# Patient Record
Sex: MALE | Race: WHITE | NOT HISPANIC OR LATINO | Employment: OTHER | URBAN - METROPOLITAN AREA
[De-identification: names, ages, dates, MRNs, and addresses within clinical notes are randomized per-mention and may not be internally consistent; named-entity substitution may affect disease eponyms.]

---

## 2017-02-13 ENCOUNTER — ALLSCRIPTS OFFICE VISIT (OUTPATIENT)
Dept: OTHER | Facility: OTHER | Age: 62
End: 2017-02-13

## 2017-03-16 ENCOUNTER — ALLSCRIPTS OFFICE VISIT (OUTPATIENT)
Dept: OTHER | Facility: OTHER | Age: 62
End: 2017-03-16

## 2017-04-19 ENCOUNTER — GENERIC CONVERSION - ENCOUNTER (OUTPATIENT)
Dept: OTHER | Facility: OTHER | Age: 62
End: 2017-04-19

## 2017-06-09 ENCOUNTER — GENERIC CONVERSION - ENCOUNTER (OUTPATIENT)
Dept: OTHER | Facility: OTHER | Age: 62
End: 2017-06-09

## 2017-07-10 ENCOUNTER — GENERIC CONVERSION - ENCOUNTER (OUTPATIENT)
Dept: OTHER | Facility: OTHER | Age: 62
End: 2017-07-10

## 2018-01-12 NOTE — RESULT NOTES
Verified Results  * MRI BRAIN IAC WO AND W CONTRAST [id number] 07:21AM Randy Stovall     Test Name Result Flag Reference   MRI BRAIN IAC WO AND W CONTRAST (Report)     MRI BRAIN AND IAC'S - WITH AND WITHOUT CONTRAST     INDICATION:  ([id number]) History:pt c/o mini stroke/ seizure-couldn't move occurred 2 times on different days x 1 month ago     COMPARISON: None  TECHNIQUE:   Brain:  Sagittal T1, axial T2, axial [[place], axial T1, axial FLAIR, axial diffusion imaging  Axial T1 postcontrast  Axial BRAVO post contrast       IAC'S: Coronal FIESTA, coronal T1 postcontrast, axial T1 postcontrast with fat suppression  Targeted images of the IAC'S were performed requiring additional time at acquisition and interpretation of approximately 25%   10 mL of Gadavist was injected intravenously without immediate consequence  IMAGE QUALITY:  Diagnostic  FINDINGS:     BRAIN PARENCHYMA: There is no evidence of acute intra-axial or extra-axial hemorrhage  In the parasagittal aspect of the right parietal lobe, image 100, series 6 there is a small focus of blooming artifact, possibly sequela of previous petechial    hemorrhage  No midline shift or mass-effect is demonstrated  The ventricular system is not dilated out of proportion to the degree of parenchymal volume loss  The cerebral parenchyma demonstrates signal abnormality in the periventricular white matter,   compatible with moderate microangiopathy  Age-appropriate parenchymal involution noted  The cerebellar tonsils are normal in position  There is no diffusion restriction to suggest recent infarction  Normal postcontrast imaging  IAC'S: No CP angle mass or abnormal enhancement  Normal aeration of the mastoid air cells and middle ear cavity       VENTRICLES: Normal      SELLA AND PITUITARY GLAND: Normal      ORBITS: Normal      PARANASAL SINUSES: Normal      VASCULATURE: Evaluation of the major intracranial vasculature demonstrates appropriate flow voids      CALVARIUM AND SKULL BASE: Normal      EXTRACRANIAL SOFT TISSUES: Normal        IMPRESSION:       1  Moderate, chronic microangiopathy   2  No acute infarction, intracranial hemorrhage or mass  3  Normal-appearing cranial nerve VII and VIII complexes  No cerebellopontine angle cistern mass lesion  Workstation performed: [id number]     Signed by:    Zaheer Gold MD   9/12/16       Discussion/Summary   Dr Jocelyn Rodriguez

## 2018-01-13 VITALS
WEIGHT: 248 LBS | SYSTOLIC BLOOD PRESSURE: 138 MMHG | DIASTOLIC BLOOD PRESSURE: 88 MMHG | RESPIRATION RATE: 16 BRPM | HEIGHT: 71 IN | BODY MASS INDEX: 34.72 KG/M2 | HEART RATE: 88 BPM | TEMPERATURE: 98.9 F

## 2018-01-13 VITALS
HEART RATE: 92 BPM | SYSTOLIC BLOOD PRESSURE: 140 MMHG | WEIGHT: 242 LBS | HEIGHT: 71 IN | TEMPERATURE: 98.2 F | DIASTOLIC BLOOD PRESSURE: 92 MMHG | BODY MASS INDEX: 33.88 KG/M2

## 2018-01-16 NOTE — MISCELLANEOUS
Message   Recorded as Task   Date: 07/03/2017 11:46 AM, Created By: Zaheer Fairchild   Task Name: Call Back   Assigned To: Richard Varela   Regarding Patient: Enrique Gale, Status: In Progress   Comment:    Emerald Mccabe - 03 Jul 2017 11:46 AM     TASK CREATED  Caller: Ilyafidel Patelnco; Other  patients wife called and stated that Patient was suppose to fly to [place] and then his son went into the hospital   He can no longer go to [place] now and due to this and contacted his airline for a reimbursment for the flight  The airline will NOT reimburse his flight to him unless HE is the person with a medical problem or if his Doctor writes him a letter  patients wife is aware that Dr Driss Maldonado is not in this week  Please call patients wife at [phone]   MICHAEL ARANA  [facility] - 03 Jul 2017 12:33 PM     TASK REASSIGNED: Previously Assigned To [facility] [place] [facility] [place]  please advise? will you write note? pts aware you are not in office this week  ac/Madison Fritz - 05 Jul 2017 4:23 PM     TASK EDITED  I calll to tell them this should come from the Son's doctor  Lenny Kckajal - 05 Jul 2017 4:23 PM     TASK REASSIGNED: Previously Assigned To Garett Denson - 05 Jul 2017 4:23 PM     TASK IN [place]if[place] Shahidbrynnangela Borden - 06 Jul 2017 9:44 AM     TASK EDITED  He needs a note that he can not travel due to him being sick in order to get the cost of his ticket back  She is aware you are out till Monday  I felt like you knew them the best to make a decision  Richard Varela - 06 Jul 2017 9:58 PM     TASK EDITED  I cannot write a note stating that he Nithya Mendez) is sick if he was not (and was not seen for the illness)  The best I can do is state that his son was reportedly sick (since we are not his son's doctor) and Augustina Becker was unable to keep his flight as a result  Cherire Whitney - 07 Jul 2017 10:16 AM     TASK EDITED  calling   [facility] 07 Jul 2017 10:17 AM     TASK EDITED  left message on machine requesting a call back  ac/cma   Madison Carreon - 10 Jul 2017 9:30 AM     TASK EDITED  LMOM TCB   Bree Colon - 10 Jul 2017 9:41 AM     TASK REASSIGNED: [facility]  spoke with pt's wife  she said what you offered is not [place] work  she said thank you, but they don't want a note anymore     Richard Varela - 10 Jul 2017 5:45 PM     TASK EDITED  ok        Signatures   Electronically signed by : Bekah Kelley DO; Jul 10 2017  5:46PM EST                       (Author)

## 2018-02-08 ENCOUNTER — OFFICE VISIT (OUTPATIENT)
Dept: FAMILY MEDICINE CLINIC | Facility: CLINIC | Age: 63
End: 2018-02-08
Payer: COMMERCIAL

## 2018-02-08 VITALS
RESPIRATION RATE: 22 BRPM | TEMPERATURE: 97.4 F | BODY MASS INDEX: 32.1 KG/M2 | SYSTOLIC BLOOD PRESSURE: 140 MMHG | HEART RATE: 86 BPM | WEIGHT: 237 LBS | DIASTOLIC BLOOD PRESSURE: 90 MMHG | HEIGHT: 72 IN

## 2018-02-08 DIAGNOSIS — J01.00 ACUTE NON-RECURRENT MAXILLARY SINUSITIS: Primary | ICD-10-CM

## 2018-02-08 PROCEDURE — 99213 OFFICE O/P EST LOW 20 MIN: CPT | Performed by: FAMILY MEDICINE

## 2018-02-08 RX ORDER — FEXOFENADINE HCL 180 MG/1
1 TABLET ORAL DAILY
COMMUNITY
Start: 2017-03-16 | End: 2018-02-14 | Stop reason: SDUPTHER

## 2018-02-08 RX ORDER — AMOXICILLIN AND CLAVULANATE POTASSIUM 875; 125 MG/1; MG/1
1 TABLET, FILM COATED ORAL EVERY 12 HOURS SCHEDULED
Qty: 20 TABLET | Refills: 0 | Status: SHIPPED | OUTPATIENT
Start: 2018-02-08 | End: 2018-02-18

## 2018-02-08 NOTE — PATIENT INSTRUCTIONS
Rhinosinusitis   WHAT YOU NEED TO KNOW:   Rhinosinusitis (RS) is inflammation of your nose and sinuses  It commonly begins as a virus, often as a common cold  Viruses usually last 7 to 10 days and do not need treatment  When the virus does not get better on its own, you may have bacterial RS  This means that bacteria have begun to grow inside your sinuses  Acute RS lasts less than 4 weeks  Chronic RS lasts 12 weeks or more  Recurrent RS is when you have 4 or more episodes of RS in one year  DISCHARGE INSTRUCTIONS:   Return to the emergency department if:   · Your eye and eyelid are red, swollen, and painful  · You cannot open your eye  · You have double vision or you cannot see  · Your eyeball bulges out or you cannot move your eye  · You are more sleepy than normal or you notice changes in your ability to think, move, or talk  · You have a stiff neck, a fever, or a bad headache  · You have swelling of your forehead or scalp  Contact your healthcare provider if:   · Your symptoms are worse or do not improve after 3 to 5 days of treatment  · You have questions or concerns about your condition or care  Medicines: You may need any of the following:  · Acetaminophen  decreases pain and fever  It is available without a doctor's order  Ask how much to take and how often to take it  Follow directions  Acetaminophen can cause liver damage if not taken correctly  · NSAIDs , such as ibuprofen, help decrease swelling, pain, and fever  This medicine is available with or without a doctor's order  NSAIDs can cause stomach bleeding or kidney problems in certain people  If you take blood thinner medicine, always ask your healthcare provider if NSAIDs are safe for you  Always read the medicine label and follow directions  · Nasal steroid sprays  decrease inflammation in your nose and sinuses  · Decongestants  reduce swelling and drain mucus in the nose and sinuses   They may help you breathe easier  · Antihistamines  dry mucus in the nose and relieve sneezing  · Antibiotics  treat a bacterial infection and may be needed if your symptoms do not improve or they get worse  · Take your medicine as directed  Contact your healthcare provider if you think your medicine is not helping or if you have side effects  Tell him or her if you are allergic to any medicine  Keep a list of the medicines, vitamins, and herbs you take  Include the amounts, and when and why you take them  Bring the list or the pill bottles to follow-up visits  Carry your medicine list with you in case of an emergency  Self-care:   · Rinse your sinuses  Use a sinus rinse device to rinse your nasal passages with a saline (salt water) solution  This will help thin the mucus in your nose and rinse away pollen and dirt  It will also help reduce swelling so you can breathe normally  Ask your healthcare provider how often to do this  · Breathe in steam   Heat a bowl of water until you see steam  Lean over the bowl and make a tent over your head with a large towel  Breathe deeply for about 20 minutes  Be careful not to get too close to the steam or burn yourself  Do this 3 times a day  You can also breathe deeply when you take a hot shower  · Sleep with your head elevated  Place an extra pillow under your head before you go to sleep to help your sinuses drain  · Drink liquids as directed  Ask your healthcare provider how much liquid to drink each day and which liquids are best for you  Liquids will thin the mucus in your nose and help it drain  Avoid drinks that contain alcohol or caffeine  · Do not smoke, and avoid secondhand smoke  Nicotine and other chemicals in cigarettes and cigars can make your symptoms worse  Ask your healthcare provider for information if you currently smoke and need help to quit  E-cigarettes or smokeless tobacco still contain nicotine   Talk to your healthcare provider before you use these products  Follow up with your healthcare provider as directed: Follow up if your symptoms are worse or not better after 3 to 5 days of treatment  Write down your questions so you remember to ask them during your visits  © 2017 [address] [employer] Information is for End User's use only and may not be sold, redistributed or otherwise used for commercial purposes  All illustrations and images included in CareNotes® are the copyrighted property of A D A M , Inc  or Mikhail Lee  The above information is an  only  It is not intended as medical advice for individual conditions or treatments  Talk to your doctor, nurse or pharmacist before following any medical regimen to see if it is safe and effective for you

## 2018-02-08 NOTE — PROGRESS NOTES
Assessment/Plan:    No problem-specific Assessment & Plan notes found for this encounter  Diagnoses and all orders for this visit:    Acute non-recurrent maxillary sinusitis  -     amoxicillin-clavulanate (AUGMENTIN) 875-125 mg per tablet; Take 1 tablet by mouth every 12 (twelve) hours for 10 days    Other orders  -     fexofenadine (ALLEGRA) 180 MG tablet; Take 1 tablet by mouth daily  -     aspirin 81 MG tablet; Take by mouth daily          Patient Instructions   Rhinosinusitis   WHAT YOU NEED TO KNOW:   Rhinosinusitis (RS) is inflammation of your nose and sinuses  It commonly begins as a virus, often as a common cold  Viruses usually last 7 to 10 days and do not need treatment  When the virus does not get better on its own, you may have bacterial RS  This means that bacteria have begun to grow inside your sinuses  Acute RS lasts less than 4 weeks  Chronic RS lasts 12 weeks or more  Recurrent RS is when you have 4 or more episodes of RS in one year  DISCHARGE INSTRUCTIONS:   Return to the emergency department if:   · Your eye and eyelid are red, swollen, and painful  · You cannot open your eye  · You have double vision or you cannot see  · Your eyeball bulges out or you cannot move your eye  · You are more sleepy than normal or you notice changes in your ability to think, move, or talk  · You have a stiff neck, a fever, or a bad headache  · You have swelling of your forehead or scalp  Contact your healthcare provider if:   · Your symptoms are worse or do not improve after 3 to 5 days of treatment  · You have questions or concerns about your condition or care  Medicines: You may need any of the following:  · Acetaminophen  decreases pain and fever  It is available without a doctor's order  Ask how much to take and how often to take it  Follow directions  Acetaminophen can cause liver damage if not taken correctly      · NSAIDs , such as ibuprofen, help decrease swelling, pain, and fever  This medicine is available with or without a doctor's order  NSAIDs can cause stomach bleeding or kidney problems in certain people  If you take blood thinner medicine, always ask your healthcare provider if NSAIDs are safe for you  Always read the medicine label and follow directions  · Nasal steroid sprays  decrease inflammation in your nose and sinuses  · Decongestants  reduce swelling and drain mucus in the nose and sinuses  They may help you breathe easier  · Antihistamines  dry mucus in the nose and relieve sneezing  · Antibiotics  treat a bacterial infection and may be needed if your symptoms do not improve or they get worse  · Take your medicine as directed  Contact your healthcare provider if you think your medicine is not helping or if you have side effects  Tell him or her if you are allergic to any medicine  Keep a list of the medicines, vitamins, and herbs you take  Include the amounts, and when and why you take them  Bring the list or the pill bottles to follow-up visits  Carry your medicine list with you in case of an emergency  Self-care:   · Rinse your sinuses  Use a sinus rinse device to rinse your nasal passages with a saline (salt water) solution  This will help thin the mucus in your nose and rinse away pollen and dirt  It will also help reduce swelling so you can breathe normally  Ask your healthcare provider how often to do this  · Breathe in steam   Heat a bowl of water until you see steam  Lean over the bowl and make a tent over your head with a large towel  Breathe deeply for about 20 minutes  Be careful not to get too close to the steam or burn yourself  Do this 3 times a day  You can also breathe deeply when you take a hot shower  · Sleep with your head elevated  Place an extra pillow under your head before you go to sleep to help your sinuses drain  · Drink liquids as directed    Ask your healthcare provider how much liquid to drink each day and which liquids are best for you  Liquids will thin the mucus in your nose and help it drain  Avoid drinks that contain alcohol or caffeine  · Do not smoke, and avoid secondhand smoke  Nicotine and other chemicals in cigarettes and cigars can make your symptoms worse  Ask your healthcare provider for information if you currently smoke and need help to quit  E-cigarettes or smokeless tobacco still contain nicotine  Talk to your healthcare provider before you use these products  Follow up with your healthcare provider as directed: Follow up if your symptoms are worse or not better after 3 to 5 days of treatment  Write down your questions so you remember to ask them during your visits  © 2017 [facility] [facility] Information is for End User's use only and may not be sold, redistributed or otherwise used for commercial purposes  All illustrations and images included in CareNotes® are the copyrighted property of A D A M , Inc  or [employer]ail Lee  The above information is an  only  It is not intended as medical advice for individual conditions or treatments  Talk to your doctor, nurse or pharmacist before following any medical regimen to see if it is safe and effective for you  Return if symptoms worsen or fail to improve  Subjective:      Patient ID: Harpal Hall is a 63 y o  male  Chief Complaint   Patient presents with    Cough     for the past 3 weeks     Nasal Congestion       Pt states for three weeks he had a uri, had mucus , cough runny nose  May have had a fever  Day quil ny qil helped a little  No throat pain  No ear pain   No facial pressure  Cough   This is a new problem  The current episode started 1 to 4 weeks ago  The problem has been gradually worsening  The problem occurs constantly  The cough is non-productive  Associated symptoms include chills, a fever, nasal congestion and postnasal drip   Pertinent negatives include no chest pain, ear congestion, ear pain, headaches, heartburn, hemoptysis, myalgias, rash, rhinorrhea, sore throat, shortness of breath, sweats, weight loss or wheezing  Nothing aggravates the symptoms  The following portions of the patient's history were reviewed and updated as appropriate: allergies, current medications, past family history, past medical history, past social history, past surgical history and problem list     Review of Systems   Constitutional: Positive for chills and fever  Negative for weight loss  HENT: Positive for postnasal drip  Negative for ear pain, rhinorrhea and sore throat  Respiratory: Positive for cough  Negative for hemoptysis, shortness of breath and wheezing  Cardiovascular: Negative for chest pain  Gastrointestinal: Negative for heartburn  Musculoskeletal: Negative for myalgias  Skin: Negative for rash  Neurological: Negative for headaches  Current Outpatient Prescriptions   Medication Sig Dispense Refill    aspirin 81 MG tablet Take by mouth daily      fexofenadine (ALLEGRA) 180 MG tablet Take 1 tablet by mouth daily      amoxicillin-clavulanate (AUGMENTIN) 875-125 mg per tablet Take 1 tablet by mouth every 12 (twelve) hours for 10 days 20 tablet 0     No current facility-administered medications for this visit  Objective:    /90   Pulse 86   Temp (!) 97 4 °F (36 3 °C)   Resp 22   Ht 6' (1 829 m)   Wt 108 kg (237 lb)   BMI 32 14 kg/m²        Physical Exam   Constitutional: He appears well-developed and well-nourished  No distress  HENT:   Right Ear: Tympanic membrane is erythematous and bulging  Left Ear: Tympanic membrane is erythematous and bulging  Nose: Mucosal edema present  Mouth/Throat:       Eyes: Pupils are equal, round, and reactive to light  Neck: Normal range of motion  Cardiovascular: Normal rate  Pulmonary/Chest: Effort normal    Abdominal: Soft  Musculoskeletal: Normal range of motion  Neurological: He is alert  Skin: He is not diaphoretic  Nursing note and vitals reviewed               Angel Lakhani DO

## 2018-02-14 ENCOUNTER — TELEPHONE (OUTPATIENT)
Dept: FAMILY MEDICINE CLINIC | Facility: CLINIC | Age: 63
End: 2018-02-14

## 2018-02-14 DIAGNOSIS — J06.9 UPPER RESPIRATORY TRACT INFECTION, UNSPECIFIED TYPE: Primary | ICD-10-CM

## 2018-02-14 RX ORDER — FEXOFENADINE HCL 180 MG/1
180 TABLET ORAL DAILY
Qty: 30 TABLET | Refills: 0 | Status: SHIPPED | OUTPATIENT
Start: 2018-02-14 | End: 2018-02-15 | Stop reason: SDUPTHER

## 2018-02-14 RX ORDER — PSEUDOEPHEDRINE HCL 60 MG/1
60 TABLET ORAL EVERY 6 HOURS PRN
Qty: 30 TABLET | Refills: 0 | Status: SHIPPED | OUTPATIENT
Start: 2018-02-14 | End: 2018-02-15 | Stop reason: SDUPTHER

## 2018-02-14 NOTE — TELEPHONE ENCOUNTER
SCRIPTS for Sanjay Conteh and [phone]    So insurance can pay for it  The medications need to be on a script so that wife can mail to insurance company so she can get reimbursed  He was seen Monday by Dr Yoav Trent but his primary is Dr Kirill Kohler      [phone] Wife, Sharon Williamsonina

## 2018-02-14 NOTE — TELEPHONE ENCOUNTER
Left message for terri making aware  Rx were  Sent to pharmacy  No further action is required  af/rma

## 2018-02-15 ENCOUNTER — TELEPHONE (OUTPATIENT)
Dept: FAMILY MEDICINE CLINIC | Facility: CLINIC | Age: 63
End: 2018-02-15

## 2018-02-15 DIAGNOSIS — J06.9 UPPER RESPIRATORY TRACT INFECTION, UNSPECIFIED TYPE: ICD-10-CM

## 2018-02-15 RX ORDER — PSEUDOEPHEDRINE HCL 60 MG/1
60 TABLET ORAL EVERY 6 HOURS PRN
Qty: 30 TABLET | Refills: 0 | Status: SHIPPED | OUTPATIENT
Start: 2018-02-15 | End: 2018-05-04

## 2018-02-15 RX ORDER — FEXOFENADINE HCL 180 MG/1
180 TABLET ORAL DAILY
Qty: 30 TABLET | Refills: 0 | Status: SHIPPED | OUTPATIENT
Start: 2018-02-15 | End: 2022-06-21

## 2018-02-15 NOTE — TELEPHONE ENCOUNTER
[facility] EMERGENCY [facility]    Patient needs WRITTEN SCRIPTS to  for allegra, mucinex and sudafed  They turn them in to their insurance for reimbursement  Please mail

## 2018-03-02 ENCOUNTER — OFFICE VISIT (OUTPATIENT)
Dept: GASTROENTEROLOGY | Facility: CLINIC | Age: 63
End: 2018-03-02
Payer: COMMERCIAL

## 2018-03-02 VITALS
BODY MASS INDEX: 29.29 KG/M2 | TEMPERATURE: 99 F | DIASTOLIC BLOOD PRESSURE: 76 MMHG | HEART RATE: 74 BPM | SYSTOLIC BLOOD PRESSURE: 134 MMHG | HEIGHT: 73 IN | WEIGHT: 221 LBS

## 2018-03-02 DIAGNOSIS — Z86.010 HISTORY OF COLON POLYPS: Primary | ICD-10-CM

## 2018-03-02 PROBLEM — Z86.0100 HX OF COLONIC POLYPS: Status: ACTIVE | Noted: 2018-03-02

## 2018-03-02 PROBLEM — Z86.0100 HISTORY OF COLON POLYPS: Status: ACTIVE | Noted: 2018-03-02

## 2018-03-02 PROCEDURE — 99243 OFF/OP CNSLTJ NEW/EST LOW 30: CPT | Performed by: INTERNAL MEDICINE

## 2018-03-02 NOTE — LETTER
March 2, 2018     Liang Gonzalez, [address] [id number]    Patient: Claudio Becker   YOB: 1955   Date of Visit: 3/2/2018       Dear Dr Elissa Griffin: Thank you for referring Schuyler Soulier to me for evaluation  Below are my notes for this consultation  If you have questions, please do not hesitate to call me  I look forward to following your patient along with you           Sincerely,        Helio Montiel MD        CC: No Recipients

## 2018-03-02 NOTE — PROGRESS NOTES
Consultation - [facility]  Gerard Butcher 1955 male         Chief Complaint:  History of colon polyps    HPI:  53-year-old male with no significant past medical history was referred for colonoscopy  Patient gives history of multiple colon polyps removed in the past and his last colonoscopy was about 2-3 years ago  Patient has regular bowel movements and denies any blood or mucus in the stool  Appetite is good and denies any recent weight loss  Denies any abdominal pain, nausea, or vomiting  Has no heartburn or acid reflux  Denies any difficulty swallowing  REVIEW OF SYSTEMS: Review of Systems   Constitutional: Negative for activity change, appetite change, chills, diaphoresis, fatigue, fever and unexpected weight change  HENT: Negative for ear discharge, ear pain, facial swelling, hearing loss, nosebleeds, sore throat, tinnitus and voice change  Eyes: Negative for pain, discharge, redness, itching and visual disturbance  Respiratory: Negative for apnea, cough, chest tightness, shortness of breath and wheezing  Cardiovascular: Negative for chest pain and palpitations  Gastrointestinal:        As noted in HPI   Endocrine: Negative for cold intolerance, heat intolerance and polyuria  Genitourinary: Negative for difficulty urinating, dysuria, flank pain, hematuria and urgency  Musculoskeletal: Negative for arthralgias, back pain, gait problem, joint swelling and myalgias  Skin: Negative for rash and wound  Neurological: Negative for dizziness, tremors, seizures, speech difficulty, light-headedness, numbness and headaches  Hematological: Negative for adenopathy  Does not bruise/bleed easily  Psychiatric/Behavioral: Negative for agitation, behavioral problems and confusion  The patient is not nervous/anxious           Past Medical History:   Diagnosis Date    Colon polyp       Past Surgical History:   Procedure Laterality Date    COLONOSCOPY       Social History     Social History    Marital status: /Civil Union     Spouse name: N/A    Number of children: N/A    Years of education: N/A     Occupational History    Not on file  Social History Main Topics    Smoking status: Never Smoker    Smokeless tobacco: Never Used    Alcohol use Yes      Comment: 2-3 mixed drinks    Drug use: No    Sexual activity: Not on file     Other Topics Concern    Not on file     Social History Narrative    No narrative on file     Family History   Problem Relation Age of Onset    Colon cancer Neg Hx     Liver disease Neg Hx      Iodine and Pollen extract  Current Outpatient Prescriptions   Medication Sig Dispense Refill    aspirin 81 MG tablet Take by mouth daily      fexofenadine (ALLEGRA) 180 MG tablet Take 1 tablet (180 mg total) by mouth daily for 30 days 30 tablet 0    dextromethorphan-guaifenesin (MUCINEX DM)  MG per 12 hr tablet Take 1 tablet by mouth every 12 (twelve) hours 60 tablet 0    pseudoephedrine (SUDAFED) 60 mg tablet Take 1 tablet (60 mg total) by mouth every 6 (six) hours as needed for congestion for up to 30 days 30 tablet 0     No current facility-administered medications for this visit  Blood pressure 134/76, pulse 74, temperature 99 °F (37 2 °C), height 6' 1" (1 854 m), weight 100 kg (221 lb)  PHYSICAL EXAM: Physical Exam   Constitutional: He is oriented to person, place, and time  He appears well-developed  HENT:   Head: Normocephalic and atraumatic  Mouth/Throat: Oropharynx is clear and moist    Eyes: Conjunctivae are normal  Pupils are equal, round, and reactive to light  Right eye exhibits no discharge  Left eye exhibits no discharge  No scleral icterus  Neck: Neck supple  No JVD present  No tracheal deviation present  No thyromegaly present  Cardiovascular: Normal rate, regular rhythm, normal heart sounds and intact distal pulses  Exam reveals no gallop and no friction rub      No murmur heard   Pulmonary/Chest: Effort normal and breath sounds normal  No respiratory distress  He has no wheezes  He has no rales  He exhibits no tenderness  Abdominal: Soft  Bowel sounds are normal  He exhibits no distension and no mass  There is no tenderness  There is no rebound and no guarding  No hernia  Musculoskeletal: He exhibits no edema  Lymphadenopathy:     He has no cervical adenopathy  Neurological: He is alert and oriented to person, place, and time  Skin: Skin is warm and dry  No rash noted  No erythema  Psychiatric: He has a normal mood and affect  His behavior is normal  Thought content normal         Lab Results   Component Value Date    WBC 5 6 09/07/2016    HGB 16 1 09/07/2016    HCT 47 2 09/07/2016    MCV 90 09/07/2016     09/07/2016     Lab Results   Component Value Date    GLUCOSE 109 (H) 09/07/2016    CALCIUM 8 8 09/07/2016     09/07/2016    K 3 7 09/07/2016    CO2 22 09/07/2016     09/07/2016    BUN 13 09/07/2016    CREATININE 0 72 (L) 09/07/2016     Lab Results   Component Value Date    ALT 32 09/07/2016    AST 20 09/07/2016    ALKPHOS 102 09/07/2016    BILITOT 1 0 09/07/2016     No results found for: INR, PROTIME    Mri Brain Iac Wo And W Contrast    Result Date: 9/12/2016  Impression: 1  Moderate, chronic microangiopathy 2  No acute infarction, intracranial hemorrhage or mass  3   Normal-appearing cranial nerve VII and VIII complexes  No cerebellopontine angle cistern mass lesion  Workstation performed: [id number]       ASSESSMENT & PLAN:    No problem-specific Assessment & Plan notes found for this encounter

## 2018-03-02 NOTE — ASSESSMENT & PLAN NOTE
Personal history of colon polyps- patient is at increased risk for colon cancer screening  Rule out colorectal lesions including polyps or malignancy     -Schedule for colonoscopy  -High-fiber diet     -Patient was given instructions about the colonoscopy prep     -Patient was explained about  the risks and benefits of the procedure  Risks including but not limited to bleeding, infection, perforation were explained in detail  Also explained about less than 100% sensitivity with the exam and other alternatives

## 2018-05-04 NOTE — PRE-PROCEDURE INSTRUCTIONS
Pre-Surgery Instructions:   Medication Instructions    aspirin 81 MG tablet Patient was instructed by Physician and understands   fexofenadine (ALLEGRA) 180 MG tablet Patient was instructed by Physician and understands   Na Sulfate-K Sulfate-Mg Sulf (SUPREP BOWEL PREP KIT) 17 5-3 13-1 6 GM/180ML SOLN Patient was instructed by Physician and understands  Pt to follow Dr Cata Jacobsen instructions    wife Letitia [facility] [phone]

## 2018-05-05 DIAGNOSIS — Z86.010 HISTORY OF COLON POLYPS: ICD-10-CM

## 2018-05-07 ENCOUNTER — ANESTHESIA EVENT (OUTPATIENT)
Dept: GASTROENTEROLOGY | Facility: AMBULARY SURGERY CENTER | Age: 63
End: 2018-05-07
Payer: COMMERCIAL

## 2018-05-07 ENCOUNTER — HOSPITAL ENCOUNTER (OUTPATIENT)
Facility: AMBULARY SURGERY CENTER | Age: 63
Setting detail: OUTPATIENT SURGERY
Discharge: HOME | End: 2018-05-07
Attending: INTERNAL MEDICINE | Admitting: INTERNAL MEDICINE
Payer: COMMERCIAL

## 2018-05-07 VITALS
DIASTOLIC BLOOD PRESSURE: 81 MMHG | RESPIRATION RATE: 16 BRPM | WEIGHT: 209 LBS | HEIGHT: 72 IN | HEART RATE: 66 BPM | OXYGEN SATURATION: 97 % | TEMPERATURE: 97.8 F | BODY MASS INDEX: 28.31 KG/M2 | SYSTOLIC BLOOD PRESSURE: 129 MMHG

## 2018-05-07 PROCEDURE — G0105 COLORECTAL SCRN; HI RISK IND: HCPCS | Performed by: INTERNAL MEDICINE

## 2018-05-07 RX ORDER — SODIUM CHLORIDE, SODIUM LACTATE, POTASSIUM CHLORIDE, CALCIUM CHLORIDE 600; 310; 30; 20 MG/100ML; MG/100ML; MG/100ML; MG/100ML
125 INJECTION, SOLUTION INTRAVENOUS CONTINUOUS
Status: DISCONTINUED | OUTPATIENT
Start: 2018-05-07 | End: 2018-05-07 | Stop reason: HOSPADM

## 2018-05-07 RX ORDER — LIDOCAINE HYDROCHLORIDE 10 MG/ML
INJECTION, SOLUTION INFILTRATION; PERINEURAL AS NEEDED
Status: DISCONTINUED | OUTPATIENT
Start: 2018-05-07 | End: 2018-05-07 | Stop reason: SURG

## 2018-05-07 RX ORDER — PROPOFOL 10 MG/ML
INJECTION, EMULSION INTRAVENOUS AS NEEDED
Status: DISCONTINUED | OUTPATIENT
Start: 2018-05-07 | End: 2018-05-07 | Stop reason: SURG

## 2018-05-07 RX ADMIN — SODIUM CHLORIDE, SODIUM LACTATE, POTASSIUM CHLORIDE, AND CALCIUM CHLORIDE 125 ML/HR: .6; .31; .03; .02 INJECTION, SOLUTION INTRAVENOUS at 08:17

## 2018-05-07 RX ADMIN — PROPOFOL 20 MG: 10 INJECTION, EMULSION INTRAVENOUS at 09:00

## 2018-05-07 RX ADMIN — LIDOCAINE HYDROCHLORIDE 50 MG: 10 INJECTION, SOLUTION INFILTRATION; PERINEURAL at 08:50

## 2018-05-07 RX ADMIN — PROPOFOL 100 MG: 10 INJECTION, EMULSION INTRAVENOUS at 08:50

## 2018-05-07 RX ADMIN — PROPOFOL 50 MG: 10 INJECTION, EMULSION INTRAVENOUS at 08:53

## 2018-05-07 RX ADMIN — PROPOFOL 20 MG: 10 INJECTION, EMULSION INTRAVENOUS at 08:57

## 2018-05-07 NOTE — ANESTHESIA PREPROCEDURE EVALUATION
Review of Systems/Medical History      No history of anesthetic complications     Cardiovascular  Exercise tolerance: good,  Hypertension controlled,    Pulmonary  Sleep apnea CPAP,        GI/Hepatic            Endo/Other     GYN       Hematology   Musculoskeletal       Neurology   Psychology           Physical Exam    Airway    Mallampati score: III  TM Distance: >3 FB  Neck ROM: full     Dental       Cardiovascular      Pulmonary  Breath sounds clear to auscultation,     Other Findings        Anesthesia Plan  ASA Score- 2     Anesthesia Type- IV sedation with anesthesia with ASA Monitors  Additional Monitors:   Airway Plan:         Plan Factors-    Induction- intravenous  Postoperative Plan-     Informed Consent- Anesthetic plan and risks discussed with patient  I personally reviewed this patient with the CRNA  Discussed and agreed on the Anesthesia Plan with the CRNA           Medical History     History Comments   Colon polyp    Wears hearing aid bilateral   Tinnitus    Sleep apnea    CPAP (continuous positive airway pressure) dependence    History of hypertension    Seasonal allergies    Pertinent Negatives Comments   No pertinent negative medical history recorded   Surgical History     COLONOSCOPY HERNIA REPAIR   KNEE ARTHROSCOPY

## 2018-05-07 NOTE — H&P
History and Physical - [facility]  Winnie Mayer 63 y o  male MRN: [id number]        HPI: 44-year-old male with no significant past medical history was referred for colonoscopy  Patient gives history of multiple colon polyps removed in the past and his last colonoscopy was about 2-3 years ago  Patient has regular bowel movements and denies any blood or mucus in the stool  Appetite is good and denies any recent weight loss  Denies any abdominal pain, nausea, or vomiting  Has no heartburn or acid reflux  Denies any difficulty swallowing             Historical Information   Past Medical History:   Diagnosis Date    Colon polyp     CPAP (continuous positive airway pressure) dependence     History of hypertension     Seasonal allergies     Sleep apnea     Tinnitus     Wears hearing aid     bilateral     Past Surgical History:   Procedure Laterality Date    COLONOSCOPY      x3-w/polyps    HERNIA REPAIR      umbilical,hemal inguinal    KNEE ARTHROSCOPY Left     meniscus     Social History   History   Alcohol Use    12 0 oz/week    20 Standard drinks or equivalent per week     Comment: 2-3 mixed drinks/day     History   Drug Use No     History   Smoking Status    Never Smoker   Smokeless Tobacco    Never Used     Family History   Problem Relation Age of Onset    Cancer Mother      breast    Diverticulitis Mother      colostomy    Heart disease Father      CHF    Cancer Sister      breast    Cancer Sister      skin cancer    Cancer Sister      skin cancer    Colon cancer Neg Hx     Liver disease Neg Hx        Meds/Allergies     Prescriptions Prior to Admission   Medication    aspirin 81 MG tablet    fexofenadine (ALLEGRA) 180 MG tablet    Na Sulfate-K Sulfate-Mg Sulf (SUPREP BOWEL PREP KIT) 17 5-3 13-1 6 GM/180ML SOLN       Allergies   Allergen Reactions    Iodine      Reaction Date: 18Sep2006;     Pollen Extract      Reaction Date: 18Sep2006;        Objective     There were no vitals taken for this visit      PHYSICAL EXAM:    Gen: NAD  CV: S1 & S2 normal, RRR  CHEST: Clear to auscultate  ABD: soft, NT/ND, good bowel sounds  EXT: no edema    ASSESSMENT:     History of colon polyps    PLAN:    Colonoscopy

## 2018-05-07 NOTE — OP NOTE
COLONOSCOPY    PROCEDURE: Colonoscopy    INDICATIONS: History of Colon Polyps    POST-OP DIAGNOSIS: See the impression below    SEDATION: Monitored anesthesia care, check anesthesia records    PHYSICAL EXAM:    /93   Pulse 73   Temp 97 8 °F (36 6 °C) (Tympanic)   Resp 16   Ht 6' (1 829 m)   Wt 94 8 kg (209 lb)   SpO2 95%   BMI 28 35 kg/m²   Body mass index is 28 35 kg/m²  General: NAD  Heart: S1 & S2 normal, RRR  Lungs: CTA, No rales or rhonchi  Abdomen: Soft, nontender, nondistended, good bowel sounds    CONSENT:  Informed consent was obtained for the procedure, including sedation after explaining the risks and benefits of the procedure  Risks including but not limited to bleeding, perforation, infection, aspiration were discussed in detail  Also explained about less than 100%$ sensitivity with the exam and other alternatives  PREPARATION:   EKG tracing, pulse oximetry, blood pressure were monitored throughout the procedure  Patient was identified by myself both verbally and by visual inspection of ID band  DESCRIPTION:   Patient was placed in the left lateral decubitus position and was sedated with the above medication  Digital rectal examination was performed  The colonoscope was introduced in to the anal canal and advanced up to cecum, which was identified by the appendiceal orifice and IC valve  A careful inspection was made as the colonoscope was withdrawn, including a retroflexed view of the rectum; findings and interventions are described below  Appropriate photodocumentation was obtained  The quality of the colonic preparation was adequate  FINDINGS:    1  Some liquid stool was washed off and suctioned    2  Cecum and ileocecal valve-normal mucosa    3  Ascending colon-adjacent to the ileocecal valve ink marker was noted with the well-healed scar    4    Multiple diverticula noted throughout the colon         IMPRESSIONS:      As above    RECOMMENDATIONS:    Next colonoscopy in 5 years    COMPLICATIONS:  None; patient tolerated the procedure well      DISPOSITION: PACU           CONDITION: Stable

## 2018-08-26 PROBLEM — K64.8 INTERNAL HEMORRHOIDS: Status: ACTIVE | Noted: 2017-03-16

## 2018-08-26 PROBLEM — M12.9 ARTHROPATHY: Status: ACTIVE | Noted: 2017-03-16

## 2018-08-28 ENCOUNTER — OFFICE VISIT (OUTPATIENT)
Dept: FAMILY MEDICINE CLINIC | Facility: CLINIC | Age: 63
End: 2018-08-28
Payer: COMMERCIAL

## 2018-08-28 VITALS
HEIGHT: 72 IN | RESPIRATION RATE: 16 BRPM | BODY MASS INDEX: 28.17 KG/M2 | DIASTOLIC BLOOD PRESSURE: 98 MMHG | TEMPERATURE: 99.1 F | WEIGHT: 208 LBS | HEART RATE: 82 BPM | SYSTOLIC BLOOD PRESSURE: 140 MMHG

## 2018-08-28 DIAGNOSIS — H61.21 IMPACTED CERUMEN OF RIGHT EAR: ICD-10-CM

## 2018-08-28 DIAGNOSIS — Z13.1 SCREENING FOR DIABETES MELLITUS (DM): Primary | ICD-10-CM

## 2018-08-28 DIAGNOSIS — Z13.83 SCREENING FOR CARDIOVASCULAR, RESPIRATORY, AND GENITOURINARY DISEASES: ICD-10-CM

## 2018-08-28 DIAGNOSIS — Z13.6 SCREENING FOR CARDIOVASCULAR, RESPIRATORY, AND GENITOURINARY DISEASES: ICD-10-CM

## 2018-08-28 DIAGNOSIS — Z13.89 SCREENING FOR CARDIOVASCULAR, RESPIRATORY, AND GENITOURINARY DISEASES: ICD-10-CM

## 2018-08-28 DIAGNOSIS — Z12.5 PROSTATE CANCER SCREENING: ICD-10-CM

## 2018-08-28 PROCEDURE — 3008F BODY MASS INDEX DOCD: CPT | Performed by: FAMILY MEDICINE

## 2018-08-28 PROCEDURE — 99213 OFFICE O/P EST LOW 20 MIN: CPT | Performed by: FAMILY MEDICINE

## 2018-08-28 PROCEDURE — 69209 REMOVE IMPACTED EAR WAX UNI: CPT | Performed by: FAMILY MEDICINE

## 2018-08-28 NOTE — PROGRESS NOTES
Assessment/Plan:    No problem-specific Assessment & Plan notes found for this encounter  Plan cpe in October  Labslip given  Hearing improved  Prevention discussed     Diagnoses and all orders for this visit:    Screening for diabetes mellitus (DM)  -     Comprehensive metabolic panel; Future    Prostate cancer screening  -     PSA, Total Screen; Future    Screening for cardiovascular, respiratory, and genitourinary diseases  -     Lipid Panel with Direct LDL reflex; Future    Impacted cerumen of right ear  -     Ear cerumen removal        Ear cerumen removal  Date/Time: 8/28/2018 12:12 PM  Performed by: Gregory Tripp by: Maryjane Mota     Patient location:  Clinic  Other Assisting Provider: No    Consent:     Consent obtained:  Verbal    Consent given by:  Patient    Risks discussed:  Bleeding, dizziness, incomplete removal, infection, pain and TM perforation    Alternatives discussed:  No treatment  Universal protocol:     Procedure explained and questions answered to patient or proxy's satisfaction: yes      Patient identity confirmed:  Verbally with patient  Procedure details:     Local anesthetic:  None    Location:  R ear    Procedure type: irrigation      Approach:  External  Post-procedure details:     Complication:  None    Hearing quality:  Normal    Patient tolerance of procedure: Tolerated well, no immediate complications      Flush right ear done    Return in about 2 months (around 10/28/2018) for Annual physical     Subjective:      Patient ID: Chantal Gamble is a 63 y o  male      Chief Complaint   Patient presents with    Clogged Ear     pt sts right ear is clogged drhlpn       HPI  Has hearing aids, clogged right side per audiologist  Used debrox few days, no pain  No dc or odor  Uses qtips      The following portions of the patient's history were reviewed and updated as appropriate: allergies, current medications, past family history, past medical history, past social history, past surgical history and problem list     Review of Systems   Constitutional: Negative for fever  Respiratory: Negative for shortness of breath  Neurological: Negative for dizziness  Current Outpatient Prescriptions   Medication Sig Dispense Refill    aspirin 81 MG tablet Take by mouth every morning        fexofenadine (ALLEGRA) 180 MG tablet Take 1 tablet (180 mg total) by mouth daily for 30 days (Patient taking differently: Take 180 mg by mouth every morning  ) 30 tablet 0     No current facility-administered medications for this visit  Objective:    /98   Pulse 82   Temp 99 1 °F (37 3 °C)   Resp 16   Ht 6' (1 829 m)   Wt 94 3 kg (208 lb)   BMI 28 21 kg/m²        Physical Exam   Constitutional: He appears well-developed  HENT:   Head: Normocephalic  Right cerumen impaction   Eyes: Conjunctivae are normal    Neck: Neck supple  Cardiovascular: Normal rate and intact distal pulses  Pulmonary/Chest: Effort normal  No respiratory distress  Abdominal: Soft  Musculoskeletal: He exhibits no edema or deformity  Neurological: He is alert  Skin: Skin is warm and dry  No rash noted  No pallor  Psychiatric: His behavior is normal  Thought content normal    Nursing note and vitals reviewed               Betsy Jamison DO

## 2018-09-28 LAB
ALBUMIN SERPL-MCNC: 4.4 G/DL (ref 3.6–4.8)
ALBUMIN/GLOB SERPL: 2.6 {RATIO} (ref 1.2–2.2)
ALP SERPL-CCNC: 81 IU/L (ref 39–117)
ALT SERPL-CCNC: 12 IU/L (ref 0–44)
AMBIG ABBREV DEFAULT: NORMAL
AST SERPL-CCNC: 14 IU/L (ref 0–40)
BILIRUB SERPL-MCNC: 0.7 MG/DL (ref 0–1.2)
BUN SERPL-MCNC: 18 MG/DL (ref 8–27)
BUN/CREAT SERPL: 23 (ref 10–24)
CALCIUM SERPL-MCNC: 8.9 MG/DL (ref 8.6–10.2)
CHLORIDE SERPL-SCNC: 103 MMOL/L (ref 96–106)
CHOLEST SERPL-MCNC: 169 MG/DL (ref 100–199)
CO2 SERPL-SCNC: 24 MMOL/L (ref 20–29)
CREAT SERPL-MCNC: 0.8 MG/DL (ref 0.76–1.27)
GLOBULIN SER-MCNC: 1.7 G/DL (ref 1.5–4.5)
GLUCOSE SERPL-MCNC: 98 MG/DL (ref 65–99)
HDLC SERPL-MCNC: 48 MG/DL
LDLC SERPL CALC-MCNC: 113 MG/DL (ref 0–99)
MICRODELETION SYND BLD/T FISH: NORMAL
POTASSIUM SERPL-SCNC: 4 MMOL/L (ref 3.5–5.2)
PROT SERPL-MCNC: 6.1 G/DL (ref 6–8.5)
PSA SERPL-MCNC: 1 NG/ML (ref 0–4)
SL AMB EGFR AFRICAN AMERICAN: 112 ML/MIN/1.73
SL AMB EGFR NON AFRICAN AMERICAN: 97 ML/MIN/1.73
SODIUM SERPL-SCNC: 142 MMOL/L (ref 134–144)
TRIGL SERPL-MCNC: 42 MG/DL (ref 0–149)

## 2018-10-17 ENCOUNTER — OFFICE VISIT (OUTPATIENT)
Dept: FAMILY MEDICINE CLINIC | Facility: CLINIC | Age: 63
End: 2018-10-17
Payer: COMMERCIAL

## 2018-10-17 VITALS
WEIGHT: 207.6 LBS | RESPIRATION RATE: 18 BRPM | BODY MASS INDEX: 28.12 KG/M2 | DIASTOLIC BLOOD PRESSURE: 84 MMHG | TEMPERATURE: 98.3 F | HEIGHT: 72 IN | HEART RATE: 80 BPM | SYSTOLIC BLOOD PRESSURE: 128 MMHG

## 2018-10-17 DIAGNOSIS — Z23 IMMUNIZATION DUE: ICD-10-CM

## 2018-10-17 DIAGNOSIS — Z00.00 HEALTHCARE MAINTENANCE: Primary | ICD-10-CM

## 2018-10-17 DIAGNOSIS — B35.4 TINEA CORPORIS: ICD-10-CM

## 2018-10-17 DIAGNOSIS — J30.89 SEASONAL ALLERGIC RHINITIS DUE TO OTHER ALLERGIC TRIGGER: ICD-10-CM

## 2018-10-17 PROCEDURE — 90682 RIV4 VACC RECOMBINANT DNA IM: CPT

## 2018-10-17 PROCEDURE — 90471 IMMUNIZATION ADMIN: CPT

## 2018-10-17 PROCEDURE — 99396 PREV VISIT EST AGE 40-64: CPT | Performed by: FAMILY MEDICINE

## 2018-10-17 RX ORDER — NYSTATIN 100000 U/G
CREAM TOPICAL 2 TIMES DAILY
Qty: 30 G | Refills: 0 | Status: SHIPPED | OUTPATIENT
Start: 2018-10-17 | End: 2020-02-28

## 2018-10-17 RX ORDER — NYSTATIN 100000 U/G
CREAM TOPICAL 2 TIMES DAILY
Qty: 30 G | Refills: 0 | Status: SHIPPED | OUTPATIENT
Start: 2018-10-17 | End: 2018-10-17 | Stop reason: SDUPTHER

## 2018-10-17 RX ORDER — LEVOCETIRIZINE DIHYDROCHLORIDE 5 MG/1
5 TABLET, FILM COATED ORAL EVERY EVENING
Qty: 90 TABLET | Refills: 3 | Status: SHIPPED | OUTPATIENT
Start: 2018-10-17 | End: 2019-06-06

## 2018-10-17 NOTE — PROGRESS NOTES
Assessment/Plan:    No problem-specific Assessment & Plan notes found for this encounter  cpe today  Encouraged on successful weight loss  Offer xyzal if covered/effective to replace allegra  llq tinea corporis new, nystatin rx  F/u if no better   Diagnoses and all orders for this visit:    Healthcare maintenance    Seasonal allergic rhinitis due to other allergic trigger  -     levocetirizine (XYZAL) 5 MG tablet; Take 1 tablet (5 mg total) by mouth every evening    Immunization due  -     influenza vaccine, 8744-0645, quadrivalent, recombinant, PF, 0 5 mL, for patients 18 yr+ (FLUBLOK)    Tinea corporis  -     Discontinue: nystatin (MYCOSTATIN) cream; Apply topically 2 (two) times a day , abdomen  -     nystatin (MYCOSTATIN) cream; Apply topically 2 (two) times a day , abdomen              Return if symptoms worsen or fail to improve  Subjective:      Patient ID: Ophelia Barthel is a 66 y o  male  Chief Complaint   Patient presents with    Physical Exam     akrma        HPI  Seasonal allergies, all but winter  Allegra helps, sudafed as needed  Could do better diet  Exercises daily  Limits carb  Lost 46# in 2m  Atkins and stress  Feels he can maintain it    The following portions of the patient's history were reviewed and updated as appropriate: allergies, current medications, past family history, past medical history, past social history, past surgical history and problem list     Review of Systems   Respiratory: Negative for shortness of breath  Cardiovascular: Negative for chest pain           Current Outpatient Prescriptions   Medication Sig Dispense Refill    aspirin 81 MG tablet Take by mouth every morning        fexofenadine (ALLEGRA) 180 MG tablet Take 1 tablet (180 mg total) by mouth daily for 30 days (Patient taking differently: Take 180 mg by mouth every morning  ) 30 tablet 0    levocetirizine (XYZAL) 5 MG tablet Take 1 tablet (5 mg total) by mouth every evening 90 tablet 3    nystatin (MYCOSTATIN) cream Apply topically 2 (two) times a day , abdomen 30 g 0     No current facility-administered medications for this visit  Objective:    /84   Pulse 80   Temp 98 3 °F (36 8 °C)   Resp 18   Ht 6' (1 829 m)   Wt 94 2 kg (207 lb 9 6 oz)   BMI 28 16 kg/m²        Physical Exam   Constitutional: He appears well-developed  No distress  HENT:   Head: Normocephalic  Mouth/Throat: No oropharyngeal exudate  Eyes: Conjunctivae are normal  No scleral icterus  Neck: Neck supple  No thyromegaly present  Cardiovascular: Normal rate and intact distal pulses  No murmur heard  Pulmonary/Chest: Effort normal  No respiratory distress  He has no wheezes  Abdominal: Soft  He exhibits no mass  There is no tenderness  There is no rebound and no guarding  Genitourinary: Prostate normal and penis normal  No penile tenderness  Musculoskeletal: He exhibits no edema or deformity  Lymphadenopathy:     He has no cervical adenopathy  Neurological: He is alert  He exhibits normal muscle tone  Skin: Skin is warm and dry  Rash noted  No pallor  tineal itchy rash llq   Psychiatric: His behavior is normal  Thought content normal    Nursing note and vitals reviewed               Emily Greco DO

## 2019-06-06 ENCOUNTER — OFFICE VISIT (OUTPATIENT)
Dept: FAMILY MEDICINE CLINIC | Facility: CLINIC | Age: 64
End: 2019-06-06
Payer: COMMERCIAL

## 2019-06-06 VITALS
TEMPERATURE: 98.3 F | DIASTOLIC BLOOD PRESSURE: 90 MMHG | HEIGHT: 72 IN | WEIGHT: 218.2 LBS | SYSTOLIC BLOOD PRESSURE: 126 MMHG | HEART RATE: 84 BPM | BODY MASS INDEX: 29.55 KG/M2 | RESPIRATION RATE: 18 BRPM

## 2019-06-06 DIAGNOSIS — Z13.89 SCREENING FOR CARDIOVASCULAR, RESPIRATORY, AND GENITOURINARY DISEASES: ICD-10-CM

## 2019-06-06 DIAGNOSIS — Z12.5 PROSTATE CANCER SCREENING: ICD-10-CM

## 2019-06-06 DIAGNOSIS — Z13.83 SCREENING FOR CARDIOVASCULAR, RESPIRATORY, AND GENITOURINARY DISEASES: ICD-10-CM

## 2019-06-06 DIAGNOSIS — E66.3 OVER WEIGHT: ICD-10-CM

## 2019-06-06 DIAGNOSIS — G56.21 CUBITAL TUNNEL SYNDROME ON RIGHT: ICD-10-CM

## 2019-06-06 DIAGNOSIS — R20.0 NUMBNESS IN FEET: Primary | ICD-10-CM

## 2019-06-06 DIAGNOSIS — Z13.1 SCREENING FOR DIABETES MELLITUS (DM): ICD-10-CM

## 2019-06-06 DIAGNOSIS — Z13.6 SCREENING FOR CARDIOVASCULAR, RESPIRATORY, AND GENITOURINARY DISEASES: ICD-10-CM

## 2019-06-06 PROCEDURE — 99214 OFFICE O/P EST MOD 30 MIN: CPT | Performed by: FAMILY MEDICINE

## 2019-06-06 PROCEDURE — 3008F BODY MASS INDEX DOCD: CPT | Performed by: FAMILY MEDICINE

## 2019-06-14 ENCOUNTER — CONSULT (OUTPATIENT)
Dept: OBGYN CLINIC | Facility: CLINIC | Age: 64
End: 2019-06-14
Payer: COMMERCIAL

## 2019-06-14 VITALS
BODY MASS INDEX: 30.88 KG/M2 | WEIGHT: 228 LBS | SYSTOLIC BLOOD PRESSURE: 164 MMHG | HEIGHT: 72 IN | DIASTOLIC BLOOD PRESSURE: 99 MMHG | HEART RATE: 97 BPM

## 2019-06-14 DIAGNOSIS — G56.21 CUBITAL TUNNEL SYNDROME ON RIGHT: Primary | ICD-10-CM

## 2019-06-14 PROCEDURE — 99243 OFF/OP CNSLTJ NEW/EST LOW 30: CPT | Performed by: ORTHOPAEDIC SURGERY

## 2019-06-18 ENCOUNTER — HOSPITAL ENCOUNTER (OUTPATIENT)
Dept: RADIOLOGY | Facility: HOSPITAL | Age: 64
Discharge: HOME | End: 2019-06-18
Attending: FAMILY MEDICINE
Payer: COMMERCIAL

## 2019-06-18 DIAGNOSIS — R20.0 NUMBNESS IN FEET: ICD-10-CM

## 2019-06-18 PROCEDURE — 93925 LOWER EXTREMITY STUDY: CPT

## 2019-06-18 PROCEDURE — 93923 UPR/LXTR ART STDY 3+ LVLS: CPT

## 2019-06-19 PROCEDURE — 93922 UPR/L XTREMITY ART 2 LEVELS: CPT | Performed by: SURGERY

## 2019-06-19 PROCEDURE — 93925 LOWER EXTREMITY STUDY: CPT | Performed by: SURGERY

## 2019-06-25 ENCOUNTER — TELEPHONE (OUTPATIENT)
Dept: OBGYN CLINIC | Facility: HOSPITAL | Age: 64
End: 2019-06-25

## 2019-06-25 NOTE — TELEPHONE ENCOUNTER
Caller: Dr Yumiko Dotson   C/B #  Cell or  office  Dr Azael Em wants to discuss this patient with you   Thanks

## 2019-07-01 ENCOUNTER — TELEPHONE (OUTPATIENT)
Dept: OBGYN CLINIC | Facility: CLINIC | Age: 64
End: 2019-07-01

## 2019-07-01 NOTE — TELEPHONE ENCOUNTER
Tried to call patient and was just blank air  Will try and call again later     ----- Message from Amrit Pina DO sent at 6/27/2019  8:25 PM EDT -----  Please let Alex Doing know I spoke with Dr Jessica Goode,     Dr Jessica Goode would like Alex Doing to see him as a patient to do a full evaluation of his numbness and tingling  Dr Jessica Goode saw Alex Doing for EMG to rule out carpal tunnel but EMG showed no carpal tunnel    Dr Jessica Goode thinks he may have other causes for his numbness

## 2019-07-03 NOTE — TELEPHONE ENCOUNTER
LMOM stating we have a message for him from Dr Fernanda Simpson to please give our office a call back

## 2020-02-28 ENCOUNTER — TELEPHONE (OUTPATIENT)
Dept: GASTROENTEROLOGY | Facility: AMBULARY SURGERY CENTER | Age: 65
End: 2020-02-28

## 2020-02-28 ENCOUNTER — HOSPITAL ENCOUNTER (EMERGENCY)
Facility: HOSPITAL | Age: 65
Discharge: HOME | End: 2020-02-28
Attending: EMERGENCY MEDICINE | Admitting: EMERGENCY MEDICINE
Payer: COMMERCIAL

## 2020-02-28 ENCOUNTER — APPOINTMENT (EMERGENCY)
Dept: RADIOLOGY | Facility: HOSPITAL | Age: 65
End: 2020-02-28
Payer: COMMERCIAL

## 2020-02-28 VITALS
SYSTOLIC BLOOD PRESSURE: 138 MMHG | WEIGHT: 226.38 LBS | RESPIRATION RATE: 18 BRPM | TEMPERATURE: 100.2 F | HEART RATE: 94 BPM | BODY MASS INDEX: 30.7 KG/M2 | OXYGEN SATURATION: 96 % | DIASTOLIC BLOOD PRESSURE: 83 MMHG

## 2020-02-28 DIAGNOSIS — K62.5 RECTAL BLEEDING: Primary | ICD-10-CM

## 2020-02-28 LAB
ABO GROUP BLD: NORMAL
ALBUMIN SERPL BCP-MCNC: 3.1 G/DL (ref 3.5–5)
ALP SERPL-CCNC: 76 U/L (ref 46–116)
ALT SERPL W P-5'-P-CCNC: 25 U/L (ref 12–78)
ANION GAP SERPL CALCULATED.3IONS-SCNC: 10 MMOL/L (ref 4–13)
APTT PPP: 27 SECONDS (ref 23–37)
AST SERPL W P-5'-P-CCNC: 11 U/L (ref 5–45)
BASOPHILS # BLD AUTO: 0.03 THOUSANDS/ΜL (ref 0–0.1)
BASOPHILS NFR BLD AUTO: 0 % (ref 0–1)
BILIRUB SERPL-MCNC: 0.7 MG/DL (ref 0.2–1)
BILIRUB UR QL STRIP: NEGATIVE
BLD GP AB SCN SERPL QL: NEGATIVE
BUN SERPL-MCNC: 21 MG/DL (ref 5–25)
CALCIUM SERPL-MCNC: 8.2 MG/DL (ref 8.3–10.1)
CHLORIDE SERPL-SCNC: 108 MMOL/L (ref 100–108)
CLARITY UR: CLEAR
CO2 SERPL-SCNC: 25 MMOL/L (ref 21–32)
COLOR UR: YELLOW
CREAT SERPL-MCNC: 1 MG/DL (ref 0.6–1.3)
EOSINOPHIL # BLD AUTO: 0.11 THOUSAND/ΜL (ref 0–0.61)
EOSINOPHIL NFR BLD AUTO: 1 % (ref 0–6)
ERYTHROCYTE [DISTWIDTH] IN BLOOD BY AUTOMATED COUNT: 12 % (ref 11.6–15.1)
GFR SERPL CREATININE-BSD FRML MDRD: 80 ML/MIN/1.73SQ M
GLUCOSE SERPL-MCNC: 166 MG/DL (ref 65–140)
GLUCOSE UR STRIP-MCNC: NEGATIVE MG/DL
HCT VFR BLD AUTO: 35.1 % (ref 36.5–49.3)
HGB BLD-MCNC: 12.3 G/DL (ref 12–17)
HGB UR QL STRIP.AUTO: NEGATIVE
IMM GRANULOCYTES # BLD AUTO: 0.03 THOUSAND/UL (ref 0–0.2)
IMM GRANULOCYTES NFR BLD AUTO: 0 % (ref 0–2)
INR PPP: 0.92 (ref 0.91–1.09)
KETONES UR STRIP-MCNC: NEGATIVE MG/DL
LEUKOCYTE ESTERASE UR QL STRIP: NEGATIVE
LIPASE SERPL-CCNC: 143 U/L (ref 73–393)
LYMPHOCYTES # BLD AUTO: 1.06 THOUSANDS/ΜL (ref 0.6–4.47)
LYMPHOCYTES NFR BLD AUTO: 14 % (ref 14–44)
MAGNESIUM SERPL-MCNC: 2 MG/DL (ref 1.6–2.6)
MCH RBC QN AUTO: 31.9 PG (ref 26.8–34.3)
MCHC RBC AUTO-ENTMCNC: 35 G/DL (ref 31.4–37.4)
MCV RBC AUTO: 91 FL (ref 82–98)
MONOCYTES # BLD AUTO: 0.68 THOUSAND/ΜL (ref 0.17–1.22)
MONOCYTES NFR BLD AUTO: 9 % (ref 4–12)
NEUTROPHILS # BLD AUTO: 5.71 THOUSANDS/ΜL (ref 1.85–7.62)
NEUTS SEG NFR BLD AUTO: 76 % (ref 43–75)
NITRITE UR QL STRIP: NEGATIVE
NRBC BLD AUTO-RTO: 0 /100 WBCS
PH UR STRIP.AUTO: 5 [PH]
PLATELET # BLD AUTO: 236 THOUSANDS/UL (ref 149–390)
PMV BLD AUTO: 8.9 FL (ref 8.9–12.7)
POTASSIUM SERPL-SCNC: 3.1 MMOL/L (ref 3.5–5.3)
PROT SERPL-MCNC: 5.6 G/DL (ref 6.4–8.2)
PROT UR STRIP-MCNC: NEGATIVE MG/DL
PROTHROMBIN TIME: 10 SECONDS (ref 9.8–12)
RBC # BLD AUTO: 3.86 MILLION/UL (ref 3.88–5.62)
RH BLD: POSITIVE
SODIUM SERPL-SCNC: 143 MMOL/L (ref 136–145)
SP GR UR STRIP.AUTO: 1.01 (ref 1–1.03)
SPECIMEN EXPIRATION DATE: NORMAL
TROPONIN I SERPL-MCNC: 0.02 NG/ML
UROBILINOGEN UR QL STRIP.AUTO: 0.2 E.U./DL
WBC # BLD AUTO: 7.62 THOUSAND/UL (ref 4.31–10.16)

## 2020-02-28 PROCEDURE — 96375 TX/PRO/DX INJ NEW DRUG ADDON: CPT

## 2020-02-28 PROCEDURE — 93005 ELECTROCARDIOGRAM TRACING: CPT

## 2020-02-28 PROCEDURE — 86900 BLOOD TYPING SEROLOGIC ABO: CPT | Performed by: EMERGENCY MEDICINE

## 2020-02-28 PROCEDURE — 81003 URINALYSIS AUTO W/O SCOPE: CPT | Performed by: EMERGENCY MEDICINE

## 2020-02-28 PROCEDURE — 36415 COLL VENOUS BLD VENIPUNCTURE: CPT | Performed by: EMERGENCY MEDICINE

## 2020-02-28 PROCEDURE — 96374 THER/PROPH/DIAG INJ IV PUSH: CPT

## 2020-02-28 PROCEDURE — 99285 EMERGENCY DEPT VISIT HI MDM: CPT

## 2020-02-28 PROCEDURE — 99285 EMERGENCY DEPT VISIT HI MDM: CPT | Performed by: EMERGENCY MEDICINE

## 2020-02-28 PROCEDURE — 83690 ASSAY OF LIPASE: CPT | Performed by: EMERGENCY MEDICINE

## 2020-02-28 PROCEDURE — 84484 ASSAY OF TROPONIN QUANT: CPT | Performed by: EMERGENCY MEDICINE

## 2020-02-28 PROCEDURE — 96361 HYDRATE IV INFUSION ADD-ON: CPT

## 2020-02-28 PROCEDURE — 71045 X-RAY EXAM CHEST 1 VIEW: CPT

## 2020-02-28 PROCEDURE — 80053 COMPREHEN METABOLIC PANEL: CPT | Performed by: EMERGENCY MEDICINE

## 2020-02-28 PROCEDURE — 85025 COMPLETE CBC W/AUTO DIFF WBC: CPT | Performed by: EMERGENCY MEDICINE

## 2020-02-28 PROCEDURE — 74177 CT ABD & PELVIS W/CONTRAST: CPT

## 2020-02-28 PROCEDURE — 85610 PROTHROMBIN TIME: CPT | Performed by: EMERGENCY MEDICINE

## 2020-02-28 PROCEDURE — 83735 ASSAY OF MAGNESIUM: CPT | Performed by: EMERGENCY MEDICINE

## 2020-02-28 PROCEDURE — 86901 BLOOD TYPING SEROLOGIC RH(D): CPT | Performed by: EMERGENCY MEDICINE

## 2020-02-28 PROCEDURE — 85730 THROMBOPLASTIN TIME PARTIAL: CPT | Performed by: EMERGENCY MEDICINE

## 2020-02-28 PROCEDURE — 86850 RBC ANTIBODY SCREEN: CPT | Performed by: EMERGENCY MEDICINE

## 2020-02-28 RX ORDER — DIPHENHYDRAMINE HYDROCHLORIDE 50 MG/ML
50 INJECTION INTRAMUSCULAR; INTRAVENOUS ONCE
Status: COMPLETED | OUTPATIENT
Start: 2020-02-28 | End: 2020-02-28

## 2020-02-28 RX ORDER — ACETAMINOPHEN 325 MG/1
650 TABLET ORAL ONCE
Status: COMPLETED | OUTPATIENT
Start: 2020-02-28 | End: 2020-02-28

## 2020-02-28 RX ORDER — METHYLPREDNISOLONE SODIUM SUCCINATE 125 MG/2ML
125 INJECTION, POWDER, LYOPHILIZED, FOR SOLUTION INTRAMUSCULAR; INTRAVENOUS ONCE
Status: COMPLETED | OUTPATIENT
Start: 2020-02-28 | End: 2020-02-28

## 2020-02-28 RX ADMIN — METHYLPREDNISOLONE SODIUM SUCCINATE 125 MG: 125 INJECTION, POWDER, FOR SOLUTION INTRAMUSCULAR; INTRAVENOUS at 09:46

## 2020-02-28 RX ADMIN — IOHEXOL 100 ML: 350 INJECTION, SOLUTION INTRAVENOUS at 10:36

## 2020-02-28 RX ADMIN — ACETAMINOPHEN 650 MG: 325 TABLET, FILM COATED ORAL at 12:44

## 2020-02-28 RX ADMIN — DIPHENHYDRAMINE HYDROCHLORIDE 50 MG: 50 INJECTION, SOLUTION INTRAMUSCULAR; INTRAVENOUS at 09:46

## 2020-02-28 RX ADMIN — SODIUM CHLORIDE 1000 ML: 0.9 INJECTION, SOLUTION INTRAVENOUS at 08:37

## 2020-02-28 NOTE — ED PROVIDER NOTES
History  Chief Complaint   Patient presents with    Rectal Bleeding     Pt c/o rectal bleeding for 2 days  Sts diarrhea mixed with blood  Happened 5 times yesterday and 2 times this am   C/O feeling dizzy this am   Was admitted to ICU several years ago for GI bleed  45-year-old male with past medical history of colon polyps, GRACIE on CPAP, hypertension, presents to the ED with complaint of bloody diarrhea since yesterday  Patient denies any specific abdominal pain  Patient does have history of colon polyps however denies any previous history of diverticulosis or diverticulitis  Patient was diagnosed with severe anemia during his last episode of rectal bleeding after having a colonoscopy done  Patient came to the ED for further evaluation  Patient denies any fevers, chills, chest pain, shortness of breath, headaches, weakness, dizziness  History provided by:  Patient      Prior to Admission Medications   Prescriptions Last Dose Informant Patient Reported?  Taking?   aspirin 81 MG tablet 2/27/2020 at Unknown time  Yes Yes   Sig: Take 81 mg by mouth daily    fexofenadine (ALLEGRA) 180 MG tablet 2/28/2020 at Unknown time  No Yes   Sig: Take 1 tablet (180 mg total) by mouth daily for 30 days   Patient taking differently: Take 180 mg by mouth every morning        Facility-Administered Medications: None       Past Medical History:   Diagnosis Date    Arthropathy of knee     last assessed 8/19/15    Bacterial pneumonia 02/05/2007    last assessed 2/5/7    Colon polyp     CPAP (continuous positive airway pressure) dependence     Disorder of male genital organ 02/12/2008    last assessed 2/12/08    ED (erectile dysfunction) of organic origin     last assessed 2/13/17     History of hypertension     Seasonal allergies     Situational anxiety     resolved 3/16/17     Sleep apnea     Tinnitus     Wears hearing aid     bilateral       Past Surgical History:   Procedure Laterality Date    COLONOSCOPY x3-w/polyps    HERNIA REPAIR      umbilical,hemal inguinal    KNEE ARTHROSCOPY Left     meniscus    [place] COLONOSCOPY FLX DX W/COLLJ SPEC WHEN PFRMD N/A 5/7/2018    Procedure: COLONOSCOPY;  Surgeon: Myles Arteaga MD;  Location: [place] [address] GI LAB; Service: Gastroenterology       Family History   Problem Relation Age of Onset    Cancer Mother         breast    Diverticulitis Mother         colostomy    Breast cancer Mother     Heart disease Father         CHF    Cancer Sister         breast    Cancer Sister         skin cancer    Cancer Sister         skin cancer    Colon cancer Neg Hx     Liver disease Neg Hx      I have reviewed and agree with the history as documented  E-Cigarette/Vaping    E-Cigarette Use Never User      E-Cigarette/Vaping Substances     Social History     Tobacco Use    Smoking status: Never Smoker    Smokeless tobacco: Never Used   Substance Use Topics    Alcohol use: Yes     Alcohol/week: 20 0 standard drinks     Types: 20 Standard drinks or equivalent per week     Frequency: 4 or more times a week     Drinks per session: 3 or 4     Comment: 2-3 mixed drinks/day    Drug use: No       Review of Systems   Constitutional: Negative for activity change, fatigue and fever  HENT: Negative for congestion, ear discharge and sore throat  Eyes: Negative for pain and redness  Respiratory: Negative for cough, chest tightness, shortness of breath and wheezing  Cardiovascular: Negative for chest pain  Gastrointestinal: Negative for abdominal pain, diarrhea, nausea and vomiting  Bloody diarrhea   Endocrine: Negative for cold intolerance  Genitourinary: Negative for dysuria and urgency  Musculoskeletal: Negative for arthralgias and back pain  Neurological: Negative for dizziness, weakness and headaches  Psychiatric/Behavioral: Negative for agitation and behavioral problems  Physical Exam  Physical Exam   Constitutional: He is oriented to person, place, and time  He appears well-developed and well-nourished  HENT:   Head: Normocephalic and atraumatic  Nose: Nose normal    Mouth/Throat: Oropharynx is clear and moist    Eyes: Conjunctivae and EOM are normal    Neck: Normal range of motion  Neck supple  Cardiovascular: Normal rate, regular rhythm and normal heart sounds  Pulmonary/Chest: Effort normal and breath sounds normal    Abdominal: Soft  Bowel sounds are normal  He exhibits no distension  There is no tenderness  Genitourinary: Rectal exam shows guaiac positive stool  Genitourinary Comments: Mild alida blood noted on digital rectal exam    Musculoskeletal: Normal range of motion  Neurological: He is alert and oriented to person, place, and time  Skin: Skin is warm  Psychiatric: He has a normal mood and affect  His behavior is normal  Judgment and thought content normal    Nursing note and vitals reviewed        Vital Signs  ED Triage Vitals   Temperature Pulse Respirations Blood Pressure SpO2   02/28/20 0811 02/28/20 0811 02/28/20 0811 02/28/20 0811 02/28/20 0811   98 9 °F (37 2 °C) (!) 114 18 146/77 99 %      Temp Source Heart Rate Source Patient Position - Orthostatic VS BP Location FiO2 (%)   02/28/20 1230 02/28/20 0900 -- -- --   Tympanic Monitor         Pain Score       02/28/20 0811       No Pain           Vitals:    02/28/20 0930 02/28/20 0945 02/28/20 1215 02/28/20 1230   BP: 126/81 127/81 155/86 138/83   Pulse: 92 90 94 94         Visual Acuity      ED Medications  Medications   sodium chloride 0 9 % bolus 1,000 mL (0 mL Intravenous Stopped 2/28/20 0946)   diphenhydrAMINE (BENADRYL) injection 50 mg (50 mg Intravenous Given 2/28/20 0946)   methylPREDNISolone sodium succinate (Solu-MEDROL) injection 125 mg (125 mg Intravenous Given 2/28/20 0946)   iohexol (OMNIPAQUE) 350 MG/ML injection (MULTI-DOSE) 100 mL (100 mL Intravenous Given 2/28/20 1036)   acetaminophen (TYLENOL) tablet 650 mg (650 mg Oral Given 2/28/20 1244)       Diagnostic Studies  Results Reviewed     Procedure Component Value Units Date/Time    UA w Reflex to Microscopic w Reflex to Culture [[id number]] Collected:  02/28/20 1239    Lab Status:  Final result Specimen:  Urine, Clean Catch Updated:  02/28/20 1245     Color, UA Yellow     Clarity, UA Clear     Specific Gravity, UA 1 010     pH, UA 5 0     Leukocytes, UA Negative     Nitrite, UA Negative     Protein, UA Negative mg/dl      Glucose, UA Negative mg/dl      Ketones, UA Negative mg/dl      Urobilinogen, UA 0 2 E U /dl      Bilirubin, UA Negative     Blood, UA Negative    Comprehensive metabolic panel [[id number]]  (Abnormal) Collected:  02/28/20 0827    Lab Status:  Final result Specimen:  Blood from Arm, Left Updated:  02/28/20 0858     Sodium 143 mmol/L      Potassium 3 1 mmol/L      Chloride 108 mmol/L      CO2 25 mmol/L      ANION GAP 10 mmol/L      BUN 21 mg/dL      Creatinine 1 00 mg/dL      Glucose 166 mg/dL      Calcium 8 2 mg/dL      AST 11 U/L      ALT 25 U/L      Alkaline Phosphatase 76 U/L      Total Protein 5 6 g/dL      Albumin 3 1 g/dL      Total Bilirubin 0 70 mg/dL      eGFR 80 ml/min/1 73sq m     Narrative:       [place] guidelines for Chronic Kidney Disease (CKD):     Stage 1 with normal or high GFR (GFR > 90 mL/min/1 73 square meters)    Stage 2 Mild CKD (GFR = 60-89 mL/min/1 73 square meters)    Stage 3A Moderate CKD (GFR = 45-59 mL/min/1 73 square meters)    Stage 3B Moderate CKD (GFR = 30-44 mL/min/1 73 square meters)    Stage 4 Severe CKD (GFR = 15-29 mL/min/1 73 square meters)    Stage 5 End Stage CKD (GFR <15 mL/min/1 73 square meters)  Note: GFR calculation is accurate only with a steady state creatinine    Magnesium [[id number]]  (Normal) Collected:  02/28/20 0827    Lab Status:  Final result Specimen:  Blood from Arm, Left Updated:  02/28/20 0858     Magnesium 2 0 mg/dL     Lipase [[id number]]  (Normal) Collected:  02/28/20 0827    Lab Status:  Final result Specimen: Blood from Arm, Left Updated:  02/28/20 0858     Lipase 143 u/L     Troponin I [[id number]]  (Normal) Collected:  02/28/20 0827    Lab Status:  Final result Specimen:  Blood from Arm, Left Updated:  02/28/20 0858     Troponin I 0 02 ng/mL     Protime-INR [[id number]]  (Normal) Collected:  02/28/20 0827    Lab Status:  Final result Specimen:  Blood from Arm, Left Updated:  02/28/20 0852     Protime 10 0 seconds      INR 0 92    APTT [[id number]]  (Normal) Collected:  02/28/20 0827    Lab Status:  Final result Specimen:  Blood from Arm, Left Updated:  02/28/20 0852     PTT 27 seconds     CBC and differential [[id number]]  (Abnormal) Collected:  02/28/20 0827    Lab Status:  Final result Specimen:  Blood from Arm, Left Updated:  02/28/20 0835     WBC 7 62 Thousand/uL      RBC 3 86 Million/uL      Hemoglobin 12 3 g/dL      Hematocrit 35 1 %      MCV 91 fL      MCH 31 9 pg      MCHC 35 0 g/dL      RDW 12 0 %      MPV 8 9 fL      Platelets 067 Thousands/uL      nRBC 0 /100 WBCs      Neutrophils Relative 76 %      Immat GRANS % 0 %      Lymphocytes Relative 14 %      Monocytes Relative 9 %      Eosinophils Relative 1 %      Basophils Relative 0 %      Neutrophils Absolute 5 71 Thousands/µL      Immature Grans Absolute 0 03 Thousand/uL      Lymphocytes Absolute 1 06 Thousands/µL      Monocytes Absolute 0 68 Thousand/µL      Eosinophils Absolute 0 11 Thousand/µL      Basophils Absolute 0 03 Thousands/µL                  CT abdomen pelvis with contrast   Final Result by Maricruz Smyth MD (02/28 1114)      No acute pathology identified in the abdomen and pelvis  There is colonic diverticulosis without diverticulitis  Workstation performed: [id number]         XR chest 1 view portable   Final Result by Dennise Meyer MD (02/28 1111)      Borderline enlargement of cardiac silhouette with tortuous aorta  Clear lungs              Workstation performed: [id number]                    Procedures  ECG 12 Lead Documentation Only  Date/Time: 2/28/2020 8:38 AM  Performed by: Emilia Lee DO  Authorized by: Emilia Lee DO     Indications / Diagnosis:  Rectal bleeding  ECG reviewed by me, the ED Provider: yes    Patient location:  ED  Previous ECG:     Previous ECG:  Compared to current    Similarity:  No change    Comparison to cardiac monitor: Yes    Interpretation:     Interpretation: abnormal    Comments:      Sinus rhythm rate 98, normal axis, normal intervals, no acute ST/T-wave abnormalities noted, Q-waves noted inferiorly suggesting inferior infarct of undetermined age that is unchanged from previous study  ED Course  ED Course as of Feb 28 1253   Fri Feb 28, 2020   1204 Patient re-examined at bedside  Patient has not had any further rectal bleeding in the ED  MDM  Number of Diagnoses or Management Options  Rectal bleeding: new and requires workup  Diagnosis management comments: Obtain blood work, UA, type and screen, CT abdomen/pelvis  Give IV fluids       Amount and/or Complexity of Data Reviewed  Clinical lab tests: ordered and reviewed  Tests in the radiology section of CPT®: ordered and reviewed  Tests in the medicine section of CPT®: ordered and reviewed  Review and summarize past medical records: yes  Independent visualization of images, tracings, or specimens: yes    Risk of Complications, Morbidity, and/or Mortality  General comments: Patient's lab work was essentially unremarkable  CT scan did not show any acute pathology  At this time patient's rectal bleeding may be diverticular in origin  At this time patient's hemoglobin is stable in the ED  Patient is discharged home with follow-up to GI  Close return instructions given to return to the ER for any worsening symptoms  Patient agrees with discharge plan  Patient well appearing at time of discharge        Patient Progress  Patient progress: stable        Disposition  Final diagnoses:   Rectal bleeding     Time reflects when diagnosis was documented in both MDM as applicable and the Disposition within this note     Time User Action Codes Description Comment    2/28/2020 12:06 PM Ryan Grande Add [K62 5] Rectal bleeding       ED Disposition     ED Disposition Condition Date/Time Comment    Discharge Stable Fri Feb 28, 2020 12:06 PM [address] discharge to home/self care  Follow-up Information     Follow up With Specialties Details Why [address] [facility], DO Family Medicine In 4 days  [facility] [place] [address] [phone]      Jacobo Mcleod MD Gastroenterology  Called to make an appointment as soon as possible for further evaluation of your rectal bleeding  [place] [address]  [phone]            Discharge Medication List as of 2/28/2020 12:06 PM      CONTINUE these medications which have NOT CHANGED    Details   aspirin 81 MG tablet Take 81 mg by mouth daily , Starting Mon 8/22/2016, Historical Med      fexofenadine (ALLEGRA) 180 MG tablet Take 1 tablet (180 mg total) by mouth daily for 30 days, Starting Thu 2/15/2018, Until Fri 2/28/2020, Print           No discharge procedures on file      PDMP Review     None          ED Provider  Electronically Signed by           Moises Bolaños DO  02/28/20 701 WENDY Becerril DO  02/28/20 [id number]

## 2020-02-28 NOTE — TELEPHONE ENCOUNTER
Patients GI provider:  Dr Caitlyn Steele    Number to return call: ([phone]    Reason for call: Pt calling because he was just seen in the ED today  For bloody diarrhea and wants to be seen by dr Татьяна Vizcaino over at [facility] today    I offered him an appt for next week be he doesn't want to wait  Scheduled procedure/appointment date if applicable: Apt/procedure

## 2020-03-02 LAB
ATRIAL RATE: 98 BPM
P AXIS: 39 DEGREES
PR INTERVAL: 138 MS
QRS AXIS: -4 DEGREES
QRSD INTERVAL: 92 MS
QT INTERVAL: 358 MS
QTC INTERVAL: 457 MS
T WAVE AXIS: 76 DEGREES
VENTRICULAR RATE: 98 BPM

## 2020-03-02 PROCEDURE — 93010 ELECTROCARDIOGRAM REPORT: CPT | Performed by: INTERNAL MEDICINE

## 2020-03-02 NOTE — TELEPHONE ENCOUNTER
Called and spoke with patient  He has since stopped bleeding and is asking if he should still be seen?

## 2020-03-02 NOTE — TELEPHONE ENCOUNTER
Offer patient 11:40am on 3/5/2020 with Dr Wilber Schaefer in our [facility] location

## 2020-03-05 ENCOUNTER — CONSULT (OUTPATIENT)
Dept: GASTROENTEROLOGY | Facility: AMBULARY SURGERY CENTER | Age: 65
End: 2020-03-05
Payer: COMMERCIAL

## 2020-03-05 ENCOUNTER — APPOINTMENT (OUTPATIENT)
Dept: LAB | Facility: AMBULARY SURGERY CENTER | Age: 65
End: 2020-03-05
Attending: INTERNAL MEDICINE
Payer: COMMERCIAL

## 2020-03-05 VITALS
RESPIRATION RATE: 18 BRPM | BODY MASS INDEX: 29.8 KG/M2 | HEIGHT: 72 IN | HEART RATE: 89 BPM | WEIGHT: 220 LBS | DIASTOLIC BLOOD PRESSURE: 90 MMHG | SYSTOLIC BLOOD PRESSURE: 124 MMHG | TEMPERATURE: 100 F

## 2020-03-05 DIAGNOSIS — K64.8 INTERNAL HEMORRHOIDS: ICD-10-CM

## 2020-03-05 DIAGNOSIS — D12.6 TUBULAR ADENOMA OF COLON: ICD-10-CM

## 2020-03-05 DIAGNOSIS — K62.5 RECTAL BLEEDING: ICD-10-CM

## 2020-03-05 DIAGNOSIS — K62.5 RECTAL BLEEDING: Primary | ICD-10-CM

## 2020-03-05 LAB
BASOPHILS # BLD AUTO: 0.05 THOUSANDS/ΜL (ref 0–0.1)
BASOPHILS NFR BLD AUTO: 1 % (ref 0–1)
EOSINOPHIL # BLD AUTO: 0.09 THOUSAND/ΜL (ref 0–0.61)
EOSINOPHIL NFR BLD AUTO: 1 % (ref 0–6)
ERYTHROCYTE [DISTWIDTH] IN BLOOD BY AUTOMATED COUNT: 14 % (ref 11.6–15.1)
HCT VFR BLD AUTO: 29.4 % (ref 36.5–49.3)
HGB BLD-MCNC: 10 G/DL (ref 12–17)
IMM GRANULOCYTES # BLD AUTO: 0.11 THOUSAND/UL (ref 0–0.2)
IMM GRANULOCYTES NFR BLD AUTO: 2 % (ref 0–2)
LYMPHOCYTES # BLD AUTO: 1.11 THOUSANDS/ΜL (ref 0.6–4.47)
LYMPHOCYTES NFR BLD AUTO: 16 % (ref 14–44)
MCH RBC QN AUTO: 32.2 PG (ref 26.8–34.3)
MCHC RBC AUTO-ENTMCNC: 34 G/DL (ref 31.4–37.4)
MCV RBC AUTO: 95 FL (ref 82–98)
MONOCYTES # BLD AUTO: 0.76 THOUSAND/ΜL (ref 0.17–1.22)
MONOCYTES NFR BLD AUTO: 11 % (ref 4–12)
NEUTROPHILS # BLD AUTO: 4.73 THOUSANDS/ΜL (ref 1.85–7.62)
NEUTS SEG NFR BLD AUTO: 69 % (ref 43–75)
NRBC BLD AUTO-RTO: 1 /100 WBCS
PLATELET # BLD AUTO: 364 THOUSANDS/UL (ref 149–390)
PMV BLD AUTO: 9.1 FL (ref 8.9–12.7)
RBC # BLD AUTO: 3.11 MILLION/UL (ref 3.88–5.62)
WBC # BLD AUTO: 6.85 THOUSAND/UL (ref 4.31–10.16)

## 2020-03-05 PROCEDURE — 82784 ASSAY IGA/IGD/IGG/IGM EACH: CPT

## 2020-03-05 PROCEDURE — 85025 COMPLETE CBC W/AUTO DIFF WBC: CPT

## 2020-03-05 PROCEDURE — 83516 IMMUNOASSAY NONANTIBODY: CPT

## 2020-03-05 PROCEDURE — 1036F TOBACCO NON-USER: CPT | Performed by: INTERNAL MEDICINE

## 2020-03-05 PROCEDURE — 3074F SYST BP LT 130 MM HG: CPT | Performed by: INTERNAL MEDICINE

## 2020-03-05 PROCEDURE — 99214 OFFICE O/P EST MOD 30 MIN: CPT | Performed by: INTERNAL MEDICINE

## 2020-03-05 PROCEDURE — 86255 FLUORESCENT ANTIBODY SCREEN: CPT

## 2020-03-05 PROCEDURE — 36415 COLL VENOUS BLD VENIPUNCTURE: CPT

## 2020-03-05 PROCEDURE — 3008F BODY MASS INDEX DOCD: CPT | Performed by: INTERNAL MEDICINE

## 2020-03-05 PROCEDURE — 3080F DIAST BP >= 90 MM HG: CPT | Performed by: INTERNAL MEDICINE

## 2020-03-05 NOTE — PROGRESS NOTES
[address] [facility]  Severiano Floras 60 y o  male MRN: [id number]            Assessment & Plan:    Pleasant 80-year-old gentleman, last colonoscopy in 2018 was relatively unremarkable, has a long-standing history irregular stools, recently admitted to the hospital with rectal bleeding  Has a history of post polypectomy bleed about 6 years ago  1  Rectal bleeding:  Appears to have been self-limited, most likely secondary to hemorrhoids, infectious etiology is also in the differential  -given irregular stools and rectal bleeding, will proceed with colonoscopy to exclude underlying inflammatory bowel disease  -if he has hemorrhoids would recommend Anusol suppositories at that time  2  Irregular stools and diarrhea:  Longstanding history of symptoms, could be irritable bowel syndrome  -recommend lactose-free diet  -we will check a celiac panel  -we will proceed with an upper endoscopy the same time as his colonoscopy    I discussed with him the risks of the procedures including bleeding, surgery, perforation               _____________________________________________________________        CC:  Rectal bleeding    HPI:  Severiano Floras is a 60 y o male who is here for rectal bleeding  As you know this is a pleasant 82-year-old gentleman who reports a longstanding history of irregular stools, frequent loose stools, recently had bright red blood per rectum  He presented to the emergency room where he after he had fiber 6 episodes felt weak and dizzy  Had a CT scan the ER which was normal   Laboratory studies demonstrated fairly unremarkable examination except for mild hypoalbuminemia and hemoglobin of 12  Patient reports the bleeding stops, he was discharged home  Presents today for follow-up  His last colonoscopy was in 2018 was unremarkable at that time      He had a colonoscopy in 2014 with a polyp removed, he developed post polypectomy bleed at that time which required ICU admission and repeat colonoscopy  Patient reports a longstanding history of irregular stools with frequent diarrhea  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[employer]  ROS:  The remainder of the ROS was negative except for the pertinent positives mentioned in HPI  Allergies: Pollen extract    Medications:   Current Outpatient Medications:     aspirin 81 MG tablet, Take 81 mg by mouth daily , Disp: , Rfl:     fexofenadine (ALLEGRA) 180 MG tablet, Take 1 tablet (180 mg total) by mouth daily for 30 days (Patient taking differently: Take 180 mg by mouth every morning  ), Disp: 30 tablet, Rfl: 0    Na Sulfate-K Sulfate-Mg Sulf (Suprep Bowel Prep Kit) 17 5-3 13-1 6 GM/177ML SOLN, Take 1 Bottle by mouth once for 1 dose, Disp: 1 Bottle, Rfl: 0    Past Medical History:   Diagnosis Date    Arthropathy of knee     last assessed 8/19/15    Bacterial pneumonia 02/05/2007    last assessed 2/5/7    Colon polyp     CPAP (continuous positive airway pressure) dependence     Disorder of male genital organ 02/12/2008    last assessed 2/12/08    ED (erectile dysfunction) of organic origin     last assessed 2/13/17     History of hypertension     Seasonal allergies     Situational anxiety     resolved 3/16/17     Sleep apnea     Tinnitus     Wears hearing aid     bilateral       Past Surgical History:   Procedure Laterality Date    COLONOSCOPY      x3-w/polyps    HERNIA REPAIR      umbilical,hemal inguinal    KNEE ARTHROSCOPY Left     meniscus    [place] COLONOSCOPY FLX DX W/COLLJ SPEC WHEN PFRMD N/A 5/7/2018    Procedure: COLONOSCOPY;  Surgeon: Adele Camejo MD;  Location: [place] [address] GI LAB;   Service: Gastroenterology       Family History   Problem Relation Age of Onset    Cancer Mother         breast    Diverticulitis Mother         colostomy    Breast cancer Mother     Heart disease Father         CHF    Cancer Sister         breast    Cancer Sister         skin cancer    Cancer Sister         skin cancer    Colon cancer Neg Hx     Liver disease Neg Hx         reports that he has never smoked  He has never used smokeless tobacco  He reports that he drinks about 20 0 standard drinks of alcohol per week  He reports that he does not use drugs        Physical Exam:    /90 (BP Location: Left arm, Patient Position: Sitting, Cuff Size: Standard)   Pulse 89   Temp 100 °F (37 8 °C) (Tympanic)   Resp 18   Ht 6' (1 829 m)   Wt 99 8 kg (220 lb)   BMI 29 84 kg/m²     Gen: wn/wd, NAD a pleasant elderly male  HEENT: anicteric, MMM, no cervical LAD  CVS: RRR, no m/r/g  CHEST: CTA b/l  ABD: +BS, soft, NT,ND, no hepatosplenomegaly  EXT: no c/c/e  NEURO: aaox3  SKIN: NO rashes

## 2020-03-05 NOTE — LETTER
March 5, 2020     Shen Pretty, DO  [address] [facility] [address]    Patient: Gilberto Baig   YOB: 1955   Date of Visit: 3/5/2020       Dear Dr Michelle Galloway: Thank you for referring Honorio Meliza to me for evaluation  Below are my notes for this consultation  If you have questions, please do not hesitate to call me  I look forward to following your patient along with you  Sincerely,        Chris Goldberg MD        CC: No Recipients  Chris Goldberg MD  3/5/2020 12:27 PM  Sign at close encounter  [address] [facility]  Gilberto Baig 60 y o  male MRN: [id number]            Assessment & Plan:    Pleasant 63-year-old gentleman, last colonoscopy in 2018 was relatively unremarkable, has a long-standing history irregular stools, recently admitted to the hospital with rectal bleeding  Has a history of post polypectomy bleed about 6 years ago  1  Rectal bleeding:  Appears to have been self-limited, most likely secondary to hemorrhoids, infectious etiology is also in the differential  -given irregular stools and rectal bleeding, will proceed with colonoscopy to exclude underlying inflammatory bowel disease  -if he has hemorrhoids would recommend Anusol suppositories at that time  2  Irregular stools and diarrhea:  Longstanding history of symptoms, could be irritable bowel syndrome  -recommend lactose-free diet  -we will check a celiac panel  -we will proceed with an upper endoscopy the same time as his colonoscopy    I discussed with him the risks of the procedures including bleeding, surgery, perforation               _____________________________________________________________        CC:  Rectal bleeding    HPI:  Gilberto Baig is a 60 y o male who is here for rectal bleeding  As you know this is a pleasant 63-year-old gentleman who reports a longstanding history of irregular stools, frequent loose stools, recently had bright red blood per rectum    He presented to the emergency room where he after he had fiber 6 episodes felt weak and dizzy  Had a CT scan the ER which was normal   Laboratory studies demonstrated fairly unremarkable examination except for mild hypoalbuminemia and hemoglobin of 12  Patient reports the bleeding stops, he was discharged home  Presents today for follow-up  His last colonoscopy was in 2018 was unremarkable at that time  He had a colonoscopy in 2014 with a polyp removed, he developed post polypectomy bleed at that time which required ICU admission and repeat colonoscopy  Patient reports a longstanding history of irregular stools with frequent diarrhea  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[employer]  ROS:  The remainder of the ROS was negative except for the pertinent positives mentioned in HPI        Allergies: Pollen extract    Medications:   Current Outpatient Medications:     aspirin 81 MG tablet, Take 81 mg by mouth daily , Disp: , Rfl:     fexofenadine (ALLEGRA) 180 MG tablet, Take 1 tablet (180 mg total) by mouth daily for 30 days (Patient taking differently: Take 180 mg by mouth every morning  ), Disp: 30 tablet, Rfl: 0    Na Sulfate-K Sulfate-Mg Sulf (Suprep Bowel Prep Kit) 17 5-3 13-1 6 GM/177ML SOLN, Take 1 Bottle by mouth once for 1 dose, Disp: 1 Bottle, Rfl: 0    Past Medical History:   Diagnosis Date    Arthropathy of knee     last assessed 8/19/15    Bacterial pneumonia 02/05/2007    last assessed 2/5/7    Colon polyp     CPAP (continuous positive airway pressure) dependence     Disorder of male genital organ 02/12/2008    last assessed 2/12/08    ED (erectile dysfunction) of organic origin     last assessed 2/13/17     History of hypertension     Seasonal allergies     Situational anxiety     resolved 3/16/17     Sleep apnea     Tinnitus     Wears hearing aid     bilateral       Past Surgical History:   Procedure Laterality Date    COLONOSCOPY      x3-w/polyps    HERNIA REPAIR      umbilical,hemal inguinal    KNEE ARTHROSCOPY Left meniscus    [place] COLONOSCOPY FLX DX W/COLLJ SPEC WHEN PFRMD N/A 5/7/2018    Procedure: COLONOSCOPY;  Surgeon: Rafy Merchant MD;  Location: [facility] GI [facility]; Service: Gastroenterology       Family History   Problem Relation Age of Onset    Cancer Mother         breast    Diverticulitis Mother         colostomy    Breast cancer Mother     Heart disease Father         CHF    Cancer Sister         breast    Cancer Sister         skin cancer    Cancer Sister         skin cancer    Colon cancer Neg Hx     Liver disease Neg Hx         reports that he has never smoked  He has never used smokeless tobacco  He reports that he drinks about 20 0 standard drinks of alcohol per week  He reports that he does not use drugs        Physical Exam:    /90 (BP Location: Left arm, Patient Position: Sitting, Cuff Size: Standard)   Pulse 89   Temp 100 °F (37 8 °C) (Tympanic)   Resp 18   Ht 6' (1 829 m)   Wt 99 8 kg (220 lb)   BMI 29 84 kg/m²      Gen: wn/wd, NAD a pleasant elderly male  HEENT: anicteric, MMM, no cervical LAD  CVS: RRR, no m/r/g  CHEST: CTA b/l  ABD: +BS, soft, NT,ND, no hepatosplenomegaly  EXT: no c/c/e  NEURO: aaox3  SKIN: NO rashes

## 2020-03-07 LAB
ENDOMYSIUM IGA SER QL: NEGATIVE
GLIADIN PEPTIDE IGA SER-ACNC: 4 UNITS (ref 0–19)
GLIADIN PEPTIDE IGG SER-ACNC: 2 UNITS (ref 0–19)
IGA SERPL-MCNC: 105 MG/DL (ref 61–437)
TTG IGA SER-ACNC: <2 U/ML (ref 0–3)
TTG IGG SER-ACNC: <2 U/ML (ref 0–5)

## 2020-03-13 ENCOUNTER — OFFICE VISIT (OUTPATIENT)
Dept: FAMILY MEDICINE CLINIC | Facility: CLINIC | Age: 65
End: 2020-03-13
Payer: COMMERCIAL

## 2020-03-13 VITALS
TEMPERATURE: 100 F | HEIGHT: 72 IN | RESPIRATION RATE: 16 BRPM | BODY MASS INDEX: 30.48 KG/M2 | OXYGEN SATURATION: 97 % | SYSTOLIC BLOOD PRESSURE: 134 MMHG | HEART RATE: 87 BPM | WEIGHT: 225 LBS | DIASTOLIC BLOOD PRESSURE: 86 MMHG

## 2020-03-13 DIAGNOSIS — Z12.5 PROSTATE CANCER SCREENING: ICD-10-CM

## 2020-03-13 DIAGNOSIS — F41.1 GAD (GENERALIZED ANXIETY DISORDER): ICD-10-CM

## 2020-03-13 DIAGNOSIS — K62.5 RECTAL BLEEDING: ICD-10-CM

## 2020-03-13 DIAGNOSIS — R73.03 PRE-DIABETES: ICD-10-CM

## 2020-03-13 DIAGNOSIS — G47.33 OBSTRUCTIVE SLEEP APNEA: ICD-10-CM

## 2020-03-13 DIAGNOSIS — Z13.6 SCREENING FOR CARDIOVASCULAR, RESPIRATORY, AND GENITOURINARY DISEASES: ICD-10-CM

## 2020-03-13 DIAGNOSIS — G62.9 NEUROPATHY: Primary | ICD-10-CM

## 2020-03-13 DIAGNOSIS — Z13.83 SCREENING FOR CARDIOVASCULAR, RESPIRATORY, AND GENITOURINARY DISEASES: ICD-10-CM

## 2020-03-13 DIAGNOSIS — Z13.1 SCREENING FOR DIABETES MELLITUS (DM): ICD-10-CM

## 2020-03-13 DIAGNOSIS — Z13.89 SCREENING FOR CARDIOVASCULAR, RESPIRATORY, AND GENITOURINARY DISEASES: ICD-10-CM

## 2020-03-13 PROBLEM — H61.21 IMPACTED CERUMEN OF RIGHT EAR: Status: RESOLVED | Noted: 2018-08-28 | Resolved: 2020-03-13

## 2020-03-13 PROCEDURE — 3008F BODY MASS INDEX DOCD: CPT | Performed by: FAMILY MEDICINE

## 2020-03-13 PROCEDURE — 3075F SYST BP GE 130 - 139MM HG: CPT | Performed by: FAMILY MEDICINE

## 2020-03-13 PROCEDURE — 1036F TOBACCO NON-USER: CPT | Performed by: FAMILY MEDICINE

## 2020-03-13 PROCEDURE — 99214 OFFICE O/P EST MOD 30 MIN: CPT | Performed by: FAMILY MEDICINE

## 2020-03-13 PROCEDURE — 3079F DIAST BP 80-89 MM HG: CPT | Performed by: FAMILY MEDICINE

## 2020-03-13 RX ORDER — ESCITALOPRAM OXALATE 10 MG/1
10 TABLET ORAL DAILY
Qty: 30 TABLET | Refills: 5 | Status: SHIPPED | OUTPATIENT
Start: 2020-03-13 | End: 2020-08-27

## 2020-03-13 NOTE — PROGRESS NOTES
Assessment/Plan:    No problem-specific Assessment & Plan notes found for this encounter  anxiety new  He will try to unload work a bit before considering SSRI  rx given in case decides to start    Rectal bleeding, hemorrhoidal vs diverticular vs pathology  Recheck cbc/iron  Keep gi f/u appt    ifg hx, glu 166 in ER, recheck fasting  Update phm labs    Allergies stable    Neuropathy, check b12, been to neurologist in past? He is not sure  Next step may be neurology referral for evaluation  GRACIE, encouraged to use CPAP     Diagnoses and all orders for this visit:    Neuropathy  -     Vitamin B12; Future    Obstructive sleep apnea    CAMMIE (generalized anxiety disorder)  -     escitalopram (LEXAPRO) 10 mg tablet; Take 1 tablet (10 mg total) by mouth daily    Pre-diabetes  -     Hemoglobin A1C; Future    Rectal bleeding  -     CBC and differential; Future  -     Iron Saturation %; Future    Screening for diabetes mellitus (DM)  -     Comprehensive metabolic panel; Future    Screening for cardiovascular, respiratory, and genitourinary diseases  -     Lipid Panel with Direct LDL reflex; Future    Prostate cancer screening  -     PSA, Total Screen; Future          Return in about 1 month (around 4/13/2020) for Recheck  Subjective:      Patient ID: Yoselyn Riojas is a 60 y o  male  Chief Complaint   Patient presents with    Follow-up     follow up post E/R visit due to rectal bleeding jlopezcma        HPI  Had rectal bleeding and diarrhea  Recurred after ER visit, lasted 2d before stopping  Not dripping from rectum  Some abdominal cramping  No rectal pain    Notes neuropathy  Affects gait  Started about few months ago  Drinks etoh 3-4 drinks  No supplements besides mvi  Mostly toes but some on fingers  Nocturia x 1    Has anxiety   Overwhelmed  Work is busy  Stressed  Irritable  Denies suicidal/homicidal/destructive ideations    Wife also notes    Sister has depression, no suicide attempt    The following portions of the patient's history were reviewed and updated as appropriate: allergies, current medications, past family history, past medical history, past social history, past surgical history and problem list     Review of Systems   Respiratory: Negative for shortness of breath  Cardiovascular: Negative for chest pain  Current Outpatient Medications   Medication Sig Dispense Refill    aspirin 81 MG tablet Take 81 mg by mouth daily       fexofenadine (ALLEGRA) 180 MG tablet Take 1 tablet (180 mg total) by mouth daily for 30 days (Patient taking differently: Take 180 mg by mouth every morning  ) 30 tablet 0    escitalopram (LEXAPRO) 10 mg tablet Take 1 tablet (10 mg total) by mouth daily 30 tablet 5    Na Sulfate-K Sulfate-Mg Sulf (Suprep Bowel Prep Kit) 17 5-3 13-1 6 GM/177ML SOLN Take 1 Bottle by mouth once for 1 dose 1 Bottle 0     No current facility-administered medications for this visit  Objective:    /86   Pulse 87   Temp 100 °F (37 8 °C)   Resp 16   Ht 6' (1 829 m)   Wt 102 kg (225 lb)   SpO2 97%   BMI 30 52 kg/m²        Physical Exam   Constitutional: He appears well-developed  No distress  HENT:   Head: Normocephalic  Right Ear: External ear normal    Left Ear: External ear normal    Mouth/Throat: No oropharyngeal exudate  Eyes: Conjunctivae are normal  No scleral icterus  Neck: Neck supple  No thyromegaly present  Cardiovascular: Normal rate, regular rhythm, normal heart sounds and intact distal pulses  Pulmonary/Chest: Effort normal and breath sounds normal  No respiratory distress  Abdominal: Soft  Bowel sounds are normal  He exhibits no distension  There is no tenderness  There is no rebound and no guarding  Musculoskeletal: He exhibits no edema or deformity  Neurological: He is alert  He exhibits normal muscle tone  Skin: Skin is warm and dry  No pallor  Psychiatric: His behavior is normal  Thought content normal    Nursing note and vitals reviewed  BMI Counseling: Body mass index is 30 52 kg/m²  The BMI is above normal  Nutrition recommendations include decreasing portion sizes and moderation in carbohydrate intake  Exercise recommendations include exercising 3-5 times per week  No pharmacotherapy was ordered                [place]ico DO Marcelo

## 2020-03-16 ENCOUNTER — TELEPHONE (OUTPATIENT)
Dept: GASTROENTEROLOGY | Facility: AMBULARY SURGERY CENTER | Age: 65
End: 2020-03-16

## 2020-03-16 NOTE — TELEPHONE ENCOUNTER
Patient:   SOFIA, GIRL            MRN: [id number]            FIN: [id number]              Age:   9 days     Sex:  FEMALE     :  22   Associated Diagnoses:   None   Author:   MINE NUÑEZ       :  2020 15:51  AGE:  9 Days  Duration of Current Hospitalization:  9 Days  Gestational Age at Birth:  39 1/7   Corrected Gestational Age:  40 3/7   PROBLEM LIST:    TTN (transient tachypnea of )  SIGNIFICANT EVENTS SINCE PREVIOUS NOTE:   - supported on 2L HFNC - weaning to 2L for VSS  - all NG feeds  - bilirubin stabilizing    Vitals between:   2020 04:32:22   TO   2020 04:32:22                   LAST RESULT MINIMUM MAXIMUM  Temperature 36.8 36.7 37.1  Heart Rate 128 113 140  Respiratory Rate 52 27 69  NISBP           65 65 84  NIDBP           37 37 65  NIMBP           45 45 72  SpO2                    96 94 99  FiO2                    0.23 0.23 0.26  PHYSICAL EXAM:   General: No acute distress, well appearing, responds to stimuli appropriately  Eye: Normal conjunctiva  HENT: Normocephalic, Anterior fontanelle open/soft/flat, hard protuberance non-fluctuant over occipital suture  Neck: Supple  Respiratory: Lungs are clear to auscultation, Respirations are non-labored, Breath sounds are equal, Symmetrical chest wall expansion  Cardiovascular: Normal rate, Regular rhythm, No murmur, Normal peripheral perfusion  Gastrointestinal: Soft, Non-tender, Non-distended, Anus patent  Genitourinary: Normal genitalia for gestational age and sex, No lesions  Musculoskeletal: No swelling. No deformity  Skin: Moist, No pallor, No rash  Neurologic: No focal deficits, normal tone for gestational age  FLUIDS, ELECTROLYTES, NUTRITION:   Birthweight: 4260 g, 97 %tile  Length at birth: 50.8 cm, 69 %tile  Head circumference at birth: 37 cm, 98 %tile  Most recent length (Date: ):  52.5 cm, 89 %tile   Most recent head circumference (Date: ): 36 cm, 90 %tile   7 Day weight change (Tue to  ----- Message from Audrey Ramos MD sent at 3/16/2020  9:28 AM EDT -----  Please inform the patient that recent laboratory studies were negative for celiac sprue  His blood count did drop since he left the hospital     Please find out of patient continues to have rectal bleeding, if so please have him call the office immediately  Otherwise please make sure that he is scheduled for the upper endoscopy and colonoscopy as discussed previously in the office  Please have him call with any questions or concerns  Tue): -50 g/day      Dosing Weight:   4.260 kg    2020 18:07  Most Recent Clinical Weight:   3.900  kg    2020 14:56    Previous Clinical Weight:   3.756  kg 2020 21:00  Changed:   +   %3.83   +  144.00 gm    I & O between:  2020 04:32 TO 2020 04:32  Med Dosing Weight:  4.260  kg   2020  24 Hour Intake:   560.00  ( 131.46 mL/kg )  24 Hour Output:   420.10           24 Hour Urine/Stool Output:   324.00  24 Hour Balance:   139.90           24 Hour Urine Output:   95.00  ( 0.93 mL/kg/hr )                   Urine Count:  7.00    Stool Count:  5.00     TOTAL PARENTERAL NUTRITION: n/a   Lab Value - Sodium:  129 mmol/L 07/16/20 05:30  Lab Value - Potassium:  5.3 mmol/L 07/16/20 05:30  Lab Value - Calcium:  6.3 mg/dL 07/16/20 05:30  Lab Value - Ionized Calcium:  No results available    Lab Value - Magnesium:  No results available    Lab Value - Carbon Dioxide (CO2):  27 mmol/L 07/16/20 05:30  Lab Value - Chloride:  86 mmol/L 07/16/20 05:30  Lab Value - Creatinine:  0.47 mg/dL 07/16/20 05:30  Lab Value - BUN:  22 mg/dL 07/16/20 05:30  Lab Value - Phosphorous:  No results available     Lab Value - Clinical Weight:  4.260 kg 07/07/20 18:07  Lab Value - Med Dose Weight:  4.260 kg 07/07/20 18:07  Lab Value - Glucose:  115 mg/dL 07/16/20 05:48  Lab Value - Prealbumin:  No results available    Lab Value - Albumin:  3.2 gm/dL 07/15/20 16:30  Lab Value - Triglycerides:  No results available    ENTERAL NUTRITION:   Type of feed (Breast milk or Formula): sim19  kcal / oz: _  Volume (ml/kg/day): 130  Frequency: 70mL q3 NG  % PO: 0   FEN Medications   Enteral   cholecalciferol,     400 unit Oral Daily  sodium chloride,       4 mEq Oral Q12H  ASSESSMENT: LGA,  at risk for hypoglycemia, glucose x4 pre-feed normal, feeds increasing, recieved Calcium supplement 7/16 and started on NaCl supplements 7/16.    PLAN:   - SIM19 70mL q3hNG (goal 84mL)  - Speech consult for disordered eating; VSS today  - NaCl  supplements  - Maximize feeds for hypocalcemia; calcium carbonate supplement - consider scheduling qD  - AM BMP  HYPERBILIRUBINEMIA:   24HOL 6.3; LL 9.9  38HOL 9.3; LL 11.9, RoR 0.17  48HOL 12; LL 13.1, RoR 0.27 - single photo  62 HOL 13.7, LL 16.8   86HOL 15.1, LL 19.1, RoR 0.09  92HOL 14.7  109HOL 14; LL 18   134HOL 15; LL 18  158HOL 15.5, LL 18  182HOL 18.3, LL 18 - double photo  192HOL 17.4,  205HOL 14.4 - single photo  229HOL 11  ASSESSMENT: fullterm, Mom A+, antibody negative, no risk factors, photo (7/9 - 7/12) (7/15-current), CBC with stable HB; otherwise reassuring.  CMP with reassuring liver function tests  PLAN:   - bili  AM  - stop single photo  RESPIRATORY:     NO VENTILATORS QUALIFIED    Respiratory Support    Nonmechanical    Oxygen Delivery: High Humidity Cannula as of 07/17/20 03:12   FiO2: 0.23 as of 07/17/20 03:12   O2 L/min: 2.5 as of 07/17/20 03:12  No ongoing noninvasive mechanical respiratory documentation has been documented in the last 24 hours.  Respiratory Labs    No Resp results found over the defined time period   Repiratory Medications   Enteral   sodium chloride,       4 mEq Oral Q12H  Dates on Ventilator: _  Dates on CPAP/HFNC: 7/7-7/8, 7/10- current  Dates on Oxygen: 7/7-7/8; 7/10- current  ASSESSMENT: required CPAP at delivery, supported on 3L HFNC 21%, weaned to RA 7/8, had desaturation requiring 2L HFNC s7/10, CXR 7/12 reassuring, completed Lasix 3d course (7/14-7/17)  PLAN:   - 2L HFNC 23%   CARDIOVASCULAR:    Echocardiogram Results  No Results Have Been Found  Cardiovascular Labs   No cardiovascular lab results found over the previous 48 hours.   Cardiovascular Medications   No relevant medications documented.   ASSESSMENT: LGA infant, non-diabetic mother, ECHO significant for PFO and mild PPHN  PLAN:   - monitor clinically  HEMATOLOGY:     Hematology Labs   CBC resulted on:  07/16/20 05:30     WBC 11.0 THOUSAND/mcL     Hemoglobin 20.7 gm/dL     Hematocrit 57.0 %     Platelet  279 THOUSAND/mcL     Segs 43 %     Band Neutrophil 1 %     Absolute Neutrophils 4.8 THOUSAND/mcL     Abs Lymph 4.8 THOUSAND/mcL   Anticoagulation:     Platelet 279  THOUSAND/mcL  (20 05:30)  ASSESSMENT: elevated Hb  PLAN:   - monitor  INFECTIOUS DISEASE:     ID Labs   No lab results for ID labs found in the previous 24 hours.    Anti-Infective Medications   No relevant medications documented.   ASSESSMENT: no concerns for sepsis however, unable to wean respiratory support and increasing bilirubin despite no risk factors; CBC reassuring x2, no concerns for infection  PLAN:   - monitor  NEUROLOGY:   Assessment: tuft of hair at sacrum  U/S Sacrum: within normal limits for age  Plan: monitor  CURRENT MEDICATIONS:    Medications (2) Active  Scheduled: (2)  Cholecalciferol 400 unit (10 mcg)/1 mL oral liquid repack  400 unit 1 mL, Oral, Daily  Sodium chloride 4 mEq/mL oral soln syringe  4 mEq 1 mL, Oral, Q12H  Continuous: (0)  PRN: (0)  LINES:   DRAINS AND TUBES  Gastric Tubes Nasogastric Left Nare   Tube Size: 6.5   Gastric Tube Level: 25   Charted: 20 21:00  Inserted: 20   Days Since Insertion: 2 days  Usage: Feeding  Necessity of lines discussed during rounds. Lines functioning well.  Reason for central line: n/a  PLAN:   - NG for feeds  HEALTH MAINTENANCE / DISCHARGE PLANNING     Orders:     SCREEN [NEOS] ( In Process ) 2020 10:14       SCREEN REPEAT [NEOSR] ( In Process ) 2020 08:27     Results:     Vaccine Name: hepatitis B pediatric VACCINE ( Completed ) Ordered On 2020 11:20  -   Administered: 2020 18:02      Hearing Screen:   Refer Left, Refer Right, Other: baby needs referral to audiology after discharge     Date and Time of Screenin20 13:22   CCHD Preductal SpO2:   95 %     CCHD Preductal SpO2 Site:   Right Wrist     CCHD Postductal SpO2:   95 %     CCHD Postductal SpO2 Site:   Left foot     Cardiac Screening Results:   Initial, Pass      Cardiac Screening Intervention:        Reason Cardiac Screening not Applicable:        Date and Time of Documentation:   07/09/20 18:00   Car Seat Screening has not yet been documented.  PEDIATRICIAN: AVELINO  DISPOSITION: NICU for respiratory support and risk of hypoglycemia  SOCIAL:   Plan of care discussed with family, who expressed verbal understanding. All questions and concerns addressed.

## 2020-03-16 NOTE — LETTER
March 16, 2020     [address] [id number]      Dear   Angelito Laguerre:    We have attempted to reach you regarding your results  We ask that you please contact our office upon receipt of this letter to receive your results  Thank you in advance for your cooperation and assistance          Sincerely,   C[facility] Staff  [phone]

## 2020-03-18 ENCOUNTER — TRANSCRIBE ORDERS (OUTPATIENT)
Dept: ADMINISTRATIVE | Facility: HOSPITAL | Age: 65
End: 2020-03-18

## 2020-03-18 ENCOUNTER — APPOINTMENT (OUTPATIENT)
Dept: LAB | Facility: HOSPITAL | Age: 65
End: 2020-03-18
Attending: FAMILY MEDICINE
Payer: COMMERCIAL

## 2020-03-18 DIAGNOSIS — Z13.89 SCREENING FOR CARDIOVASCULAR, RESPIRATORY, AND GENITOURINARY DISEASES: ICD-10-CM

## 2020-03-18 DIAGNOSIS — R73.03 PRE-DIABETES: ICD-10-CM

## 2020-03-18 DIAGNOSIS — Z12.5 PROSTATE CANCER SCREENING: ICD-10-CM

## 2020-03-18 DIAGNOSIS — Z13.1 SCREENING FOR DIABETES MELLITUS (DM): ICD-10-CM

## 2020-03-18 DIAGNOSIS — Z13.6 SCREENING FOR CARDIOVASCULAR, RESPIRATORY, AND GENITOURINARY DISEASES: ICD-10-CM

## 2020-03-18 DIAGNOSIS — K62.5 RECTAL BLEEDING: ICD-10-CM

## 2020-03-18 DIAGNOSIS — G62.9 NEUROPATHY: ICD-10-CM

## 2020-03-18 DIAGNOSIS — Z13.83 SCREENING FOR CARDIOVASCULAR, RESPIRATORY, AND GENITOURINARY DISEASES: ICD-10-CM

## 2020-03-18 PROBLEM — Z91.89 FRAMINGHAM CARDIAC RISK 10-20% IN NEXT 10 YEARS: Status: ACTIVE | Noted: 2020-03-18

## 2020-03-18 LAB
ALBUMIN SERPL BCP-MCNC: 3.7 G/DL (ref 3.5–5)
ALP SERPL-CCNC: 86 U/L (ref 46–116)
ALT SERPL W P-5'-P-CCNC: 24 U/L (ref 12–78)
ANION GAP SERPL CALCULATED.3IONS-SCNC: 9 MMOL/L (ref 4–13)
AST SERPL W P-5'-P-CCNC: 13 U/L (ref 5–45)
BASOPHILS # BLD AUTO: 0.02 THOUSANDS/ΜL (ref 0–0.1)
BASOPHILS NFR BLD AUTO: 1 % (ref 0–1)
BILIRUB SERPL-MCNC: 0.9 MG/DL (ref 0.2–1)
BUN SERPL-MCNC: 14 MG/DL (ref 5–25)
CALCIUM SERPL-MCNC: 8.4 MG/DL (ref 8.3–10.1)
CHLORIDE SERPL-SCNC: 107 MMOL/L (ref 100–108)
CHOLEST SERPL-MCNC: 159 MG/DL (ref 50–200)
CO2 SERPL-SCNC: 27 MMOL/L (ref 21–32)
CREAT SERPL-MCNC: 0.68 MG/DL (ref 0.6–1.3)
EOSINOPHIL # BLD AUTO: 0.09 THOUSAND/ΜL (ref 0–0.61)
EOSINOPHIL NFR BLD AUTO: 2 % (ref 0–6)
ERYTHROCYTE [DISTWIDTH] IN BLOOD BY AUTOMATED COUNT: 12.9 % (ref 11.6–15.1)
EST. AVERAGE GLUCOSE BLD GHB EST-MCNC: 85 MG/DL
GFR SERPL CREATININE-BSD FRML MDRD: 102 ML/MIN/1.73SQ M
GLUCOSE P FAST SERPL-MCNC: 104 MG/DL (ref 65–99)
HBA1C MFR BLD: 4.6 %
HCT VFR BLD AUTO: 37.6 % (ref 36.5–49.3)
HDLC SERPL-MCNC: 37 MG/DL
HGB BLD-MCNC: 12 G/DL (ref 12–17)
IMM GRANULOCYTES # BLD AUTO: 0.02 THOUSAND/UL (ref 0–0.2)
IMM GRANULOCYTES NFR BLD AUTO: 1 % (ref 0–2)
IRON SATN MFR SERPL: 9 %
IRON SERPL-MCNC: 36 UG/DL (ref 65–175)
LDLC SERPL CALC-MCNC: 109 MG/DL (ref 0–100)
LYMPHOCYTES # BLD AUTO: 0.77 THOUSANDS/ΜL (ref 0.6–4.47)
LYMPHOCYTES NFR BLD AUTO: 19 % (ref 14–44)
MCH RBC QN AUTO: 29.9 PG (ref 26.8–34.3)
MCHC RBC AUTO-ENTMCNC: 31.9 G/DL (ref 31.4–37.4)
MCV RBC AUTO: 94 FL (ref 82–98)
MONOCYTES # BLD AUTO: 0.41 THOUSAND/ΜL (ref 0.17–1.22)
MONOCYTES NFR BLD AUTO: 10 % (ref 4–12)
NEUTROPHILS # BLD AUTO: 2.79 THOUSANDS/ΜL (ref 1.85–7.62)
NEUTS SEG NFR BLD AUTO: 67 % (ref 43–75)
NRBC BLD AUTO-RTO: 0 /100 WBCS
PLATELET # BLD AUTO: 349 THOUSANDS/UL (ref 149–390)
PMV BLD AUTO: 8.6 FL (ref 8.9–12.7)
POTASSIUM SERPL-SCNC: 3.4 MMOL/L (ref 3.5–5.3)
PROT SERPL-MCNC: 6.5 G/DL (ref 6.4–8.2)
PSA SERPL-MCNC: 1.3 NG/ML (ref 0–4)
RBC # BLD AUTO: 4.01 MILLION/UL (ref 3.88–5.62)
SODIUM SERPL-SCNC: 143 MMOL/L (ref 136–145)
TIBC SERPL-MCNC: 385 UG/DL (ref 250–450)
TRIGL SERPL-MCNC: 66 MG/DL
VIT B12 SERPL-MCNC: 192 PG/ML (ref 100–900)
WBC # BLD AUTO: 4.1 THOUSAND/UL (ref 4.31–10.16)

## 2020-03-18 PROCEDURE — 80061 LIPID PANEL: CPT

## 2020-03-18 PROCEDURE — G0103 PSA SCREENING: HCPCS

## 2020-03-18 PROCEDURE — 83036 HEMOGLOBIN GLYCOSYLATED A1C: CPT

## 2020-03-18 PROCEDURE — 80053 COMPREHEN METABOLIC PANEL: CPT

## 2020-03-18 PROCEDURE — 83540 ASSAY OF IRON: CPT

## 2020-03-18 PROCEDURE — 85025 COMPLETE CBC W/AUTO DIFF WBC: CPT

## 2020-03-18 PROCEDURE — 36415 COLL VENOUS BLD VENIPUNCTURE: CPT

## 2020-03-18 PROCEDURE — 83550 IRON BINDING TEST: CPT

## 2020-03-18 PROCEDURE — 82607 VITAMIN B-12: CPT

## 2020-03-24 ENCOUNTER — TELEPHONE (OUTPATIENT)
Dept: GASTROENTEROLOGY | Facility: CLINIC | Age: 65
End: 2020-03-24

## 2020-03-24 NOTE — TELEPHONE ENCOUNTER
----- Message from Des Al MD sent at 3/23/2020 10:47 AM EDT -----  Please call patient, cancel and reschedule it is a non urgent patient  Unless he is having more bleeding    His recent hemoglobin was normal

## 2020-03-24 NOTE — TELEPHONE ENCOUNTER
Procedure has been cancelled due to COVID-19  Pt stated he will call back to reschedule at a later date

## 2020-05-28 ENCOUNTER — TELEPHONE (OUTPATIENT)
Dept: FAMILY MEDICINE CLINIC | Facility: CLINIC | Age: 65
End: 2020-05-28

## 2020-05-28 DIAGNOSIS — N52.9 ORGANIC IMPOTENCE: Primary | ICD-10-CM

## 2020-05-28 RX ORDER — SILDENAFIL 100 MG/1
100 TABLET, FILM COATED ORAL DAILY PRN
Qty: 12 TABLET | Refills: 5 | Status: SHIPPED | OUTPATIENT
Start: 2020-05-28 | End: 2021-11-05 | Stop reason: SDUPTHER

## 2020-07-07 ENCOUNTER — OFFICE VISIT (OUTPATIENT)
Dept: FAMILY MEDICINE CLINIC | Facility: CLINIC | Age: 65
End: 2020-07-07
Payer: COMMERCIAL

## 2020-07-07 VITALS
SYSTOLIC BLOOD PRESSURE: 126 MMHG | RESPIRATION RATE: 16 BRPM | TEMPERATURE: 98.8 F | DIASTOLIC BLOOD PRESSURE: 80 MMHG | HEIGHT: 72 IN | WEIGHT: 216 LBS | HEART RATE: 94 BPM | OXYGEN SATURATION: 96 % | BODY MASS INDEX: 29.26 KG/M2

## 2020-07-07 DIAGNOSIS — Z91.89 FRAMINGHAM CARDIAC RISK 10-20% IN NEXT 10 YEARS: ICD-10-CM

## 2020-07-07 DIAGNOSIS — F41.1 GAD (GENERALIZED ANXIETY DISORDER): ICD-10-CM

## 2020-07-07 DIAGNOSIS — R26.89 IMBALANCE: Primary | ICD-10-CM

## 2020-07-07 PROCEDURE — 1036F TOBACCO NON-USER: CPT | Performed by: FAMILY MEDICINE

## 2020-07-07 PROCEDURE — 3079F DIAST BP 80-89 MM HG: CPT | Performed by: FAMILY MEDICINE

## 2020-07-07 PROCEDURE — 3074F SYST BP LT 130 MM HG: CPT | Performed by: FAMILY MEDICINE

## 2020-07-07 PROCEDURE — 99214 OFFICE O/P EST MOD 30 MIN: CPT | Performed by: FAMILY MEDICINE

## 2020-07-07 PROCEDURE — 3008F BODY MASS INDEX DOCD: CPT | Performed by: FAMILY MEDICINE

## 2020-07-07 NOTE — PROGRESS NOTES
Assessment/Plan:    No problem-specific Assessment & Plan notes found for this encounter  Try w/o lexapro for 1w in case its related  If unchanged, restart lexapro and f/u I n1m    CT brain ordered  Has gait issues, imbalance, neg hallpike, no focal neuro deficits    Neurologist possible in future    fram score advised, declines statins again     Diagnoses and all orders for this visit:    Imbalance  -     CT head wo contrast; Future    CAMMIE (generalized anxiety disorder)    [place] cardiac risk 10-20% in next 10 years        Return in about 1 month (around 8/7/2020) for Recheck  Subjective:      Patient ID: Gerard Butcher is a 60 y o  male  Chief Complaint   Patient presents with    off balance     prcma       HPI  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    Only started lexapro recently about 1w ago  Was away for 2 months in [facility] worse when returned  Taking for past week    Hx of BPPV  Has been off balance when getting up about 1-2w ago  Lasted 1-2 minutes then better    Feet numb  Feels off balance  Has tinnitus  Not with rolling over in bed  No weakness or incoordination  No dropping  No change in speech  No confusion  No falls but only one near fall  No fever  No vision probs or trouble swallowing    No etoh much anyway    Use of statins for the purposes of CVD/CVA risks reduction was advised according to USPSTF guidelines along with appropriate continued liver monitoring  The following portions of the patient's history were reviewed and updated as appropriate: allergies, current medications, past family history, past medical history, past social history, past surgical history and problem list     Review of Systems   Constitutional: Negative for fever  Respiratory: Negative for shortness of breath  Neurological: Positive for dizziness and numbness  Negative for tremors, seizures, syncope, facial asymmetry, speech difficulty, weakness and light-headedness  Current Outpatient Medications   Medication Sig Dispense Refill    aspirin 81 MG tablet Take 81 mg by mouth daily       escitalopram (LEXAPRO) 10 mg tablet Take 1 tablet (10 mg total) by mouth daily 30 tablet 5    sildenafil (VIAGRA) 100 mg tablet Take 1 tablet (100 mg total) by mouth daily as needed for erectile dysfunction 12 tablet 5    fexofenadine (ALLEGRA) 180 MG tablet Take 1 tablet (180 mg total) by mouth daily for 30 days (Patient taking differently: Take 180 mg by mouth every morning  ) 30 tablet 0    Na Sulfate-K Sulfate-Mg Sulf (Suprep Bowel Prep Kit) 17 5-3 13-1 6 GM/177ML SOLN Take 1 Bottle by mouth once for 1 dose 1 Bottle 0     No current facility-administered medications for this visit  Objective:    /80   Pulse 94   Temp 98 8 °F (37 1 °C)   Resp 16   Ht 6' (1 829 m)   Wt 98 kg (216 lb)   SpO2 96%   BMI 29 29 kg/m²        Physical Exam   Constitutional: He appears well-developed  No distress  HENT:   Head: Normocephalic  Right Ear: External ear normal    Left Ear: External ear normal    Mouth/Throat: No oropharyngeal exudate  Eyes: Conjunctivae are normal  No scleral icterus  Neck: Neck supple  Carotid bruit is not present  Cardiovascular: Normal rate, regular rhythm, normal heart sounds and intact distal pulses  No murmur heard  Pulmonary/Chest: Effort normal and breath sounds normal  No stridor  No respiratory distress  He has no wheezes  He has no rales  Abdominal: Soft  Bowel sounds are normal  He exhibits no distension  There is no tenderness  Musculoskeletal: He exhibits no edema or deformity  Lymphadenopathy:     He has no cervical adenopathy  Neurological: He is alert  He displays normal reflexes  No cranial nerve deficit  He exhibits normal muscle tone  Coordination normal    Skin: Skin is warm and dry  No rash noted  No pallor  Psychiatric: His behavior is normal  Thought content normal    Nursing note and vitals reviewed  Jun Bales, DO

## 2020-07-07 NOTE — PATIENT INSTRUCTIONS
For one week, please stop the escitalopram in case it is related to your feeling of imbalance    Please get the CAT scan of your brain done especially if you do no improve without the escitalopram

## 2020-07-15 ENCOUNTER — HOSPITAL ENCOUNTER (OUTPATIENT)
Dept: RADIOLOGY | Facility: HOSPITAL | Age: 65
Discharge: HOME | End: 2020-07-15
Attending: FAMILY MEDICINE
Payer: COMMERCIAL

## 2020-07-15 DIAGNOSIS — R26.89 IMBALANCE: ICD-10-CM

## 2020-07-15 PROCEDURE — 70450 CT HEAD/BRAIN W/O DYE: CPT

## 2020-07-16 ENCOUNTER — TELEPHONE (OUTPATIENT)
Dept: FAMILY MEDICINE CLINIC | Facility: CLINIC | Age: 65
End: 2020-07-16

## 2020-07-17 ENCOUNTER — TELEPHONE (OUTPATIENT)
Dept: FAMILY MEDICINE CLINIC | Facility: CLINIC | Age: 65
End: 2020-07-17

## 2020-07-17 DIAGNOSIS — G31.9 BRAIN ATROPHY (HCC): Primary | ICD-10-CM

## 2020-07-17 NOTE — TELEPHONE ENCOUNTER
DR Mitch Bailey     Please add an order for a neurologist for patient  He wants to see Dr Jose A Baptiste  Patient would also like to speak to you

## 2020-07-17 NOTE — TELEPHONE ENCOUNTER
Dr Gloria Paez,    Let me know if you need additional information regarding this request  I know you had another message open for him as well   Nela Welch

## 2020-07-17 NOTE — TELEPHONE ENCOUNTER
ken pt  Explained results  Agreeable to neurology eval  He reports family history of Cadasil syndrome

## 2020-07-20 ENCOUNTER — TELEPHONE (OUTPATIENT)
Dept: NEUROLOGY | Facility: CLINIC | Age: 65
End: 2020-07-20

## 2020-07-20 NOTE — TELEPHONE ENCOUNTER
Patient's wife called again and said no one else is offering anything sooner, so patient's wife took the original appointment   Added patient to waitlist

## 2020-07-20 NOTE — TELEPHONE ENCOUNTER
Offered patient's wife October 1st in [facility] with Dr Ap Moore but patient's wife refused  I let them know this is a good appointment and we have a wait list, but patient's wife declined  and said he needs to get in sooner

## 2020-08-27 ENCOUNTER — OFFICE VISIT (OUTPATIENT)
Dept: FAMILY MEDICINE CLINIC | Facility: CLINIC | Age: 65
End: 2020-08-27
Payer: COMMERCIAL

## 2020-08-27 VITALS
OXYGEN SATURATION: 95 % | HEART RATE: 84 BPM | SYSTOLIC BLOOD PRESSURE: 136 MMHG | DIASTOLIC BLOOD PRESSURE: 80 MMHG | RESPIRATION RATE: 16 BRPM | WEIGHT: 214 LBS | BODY MASS INDEX: 28.99 KG/M2 | TEMPERATURE: 98.8 F | HEIGHT: 72 IN

## 2020-08-27 DIAGNOSIS — G47.00 PERSISTENT INSOMNIA: Primary | ICD-10-CM

## 2020-08-27 DIAGNOSIS — R73.03 PRE-DIABETES: ICD-10-CM

## 2020-08-27 DIAGNOSIS — R26.89 IMBALANCE: ICD-10-CM

## 2020-08-27 DIAGNOSIS — G31.9 BRAIN ATROPHY (HCC): ICD-10-CM

## 2020-08-27 PROBLEM — Z86.010 HX OF COLONIC POLYPS: Status: RESOLVED | Noted: 2018-03-02 | Resolved: 2020-08-27

## 2020-08-27 PROBLEM — Z86.0100 HX OF COLONIC POLYPS: Status: RESOLVED | Noted: 2018-03-02 | Resolved: 2020-08-27

## 2020-08-27 PROCEDURE — 3725F SCREEN DEPRESSION PERFORMED: CPT | Performed by: FAMILY MEDICINE

## 2020-08-27 PROCEDURE — 99214 OFFICE O/P EST MOD 30 MIN: CPT | Performed by: FAMILY MEDICINE

## 2020-08-27 PROCEDURE — 3075F SYST BP GE 130 - 139MM HG: CPT | Performed by: FAMILY MEDICINE

## 2020-08-27 PROCEDURE — 3008F BODY MASS INDEX DOCD: CPT | Performed by: FAMILY MEDICINE

## 2020-08-27 PROCEDURE — 1036F TOBACCO NON-USER: CPT | Performed by: FAMILY MEDICINE

## 2020-08-27 PROCEDURE — 3079F DIAST BP 80-89 MM HG: CPT | Performed by: FAMILY MEDICINE

## 2020-08-27 RX ORDER — TRAZODONE HYDROCHLORIDE 50 MG/1
50 TABLET ORAL
Qty: 90 TABLET | Refills: 1 | Status: SHIPPED | OUTPATIENT
Start: 2020-08-27 | End: 2020-09-16 | Stop reason: SDUPTHER

## 2020-08-27 RX ORDER — ALPRAZOLAM 0.25 MG/1
TABLET ORAL
Status: ON HOLD | COMMUNITY
Start: 2020-08-24 | End: 2020-09-23 | Stop reason: CLARIF

## 2020-08-27 NOTE — PROGRESS NOTES
Assessment/Plan:    No problem-specific Assessment & Plan notes found for this encounter  Brain atrophy uncertain causes, questionable gait disorder since wife mostly notes, faxed recent labs and prior brain imaging studies to neurologist at pt request    Insomnia new, suggest start with trazodone and avoid strong sedatives for now, offer titration in future    Prediabetes, cont TLC efforts, exercise, monitoring    ED stable on viagra     Diagnoses and all orders for this visit:    Persistent insomnia  -     traZODone (DESYREL) 50 mg tablet; Take 1 tablet (50 mg total) by mouth daily at bedtime as needed for sleep    Brain atrophy (HCC)    Pre-diabetes    Imbalance    Other orders  -     ALPRAZolam (XANAX) 0 25 mg tablet              Return in about 6 months (around 2/27/2021)  Subjective:      Patient ID: Vladimir Shelton is a 60 y o  male  Chief Complaint   Patient presents with    trouble sleeping     patient having truoble staying asleep for the past 6 months or so jlopezcma        HPI  Trouble sleeping  Sister on restoril? She is a nurse  Ok falling asleep, consistent hours, wakes after 1-2hrs  Has DSA  lexapro gave him violent thoughts while awake? ? Used to sleep walk    Had GRACIE  Cured per pt with wt loss  No more witnessed apnea    The following portions of the patient's history were reviewed and updated as appropriate: allergies, current medications, past family history, past medical history, past social history, past surgical history and problem list     Review of Systems   Constitutional: Negative for fever  Respiratory: Negative for shortness of breath  Psychiatric/Behavioral: Negative for dysphoric mood  The patient is not nervous/anxious            Current Outpatient Medications   Medication Sig Dispense Refill    aspirin 81 MG tablet Take 81 mg by mouth daily       fexofenadine (ALLEGRA) 180 MG tablet Take 1 tablet (180 mg total) by mouth daily for 30 days (Patient taking differently: Take 180 mg by mouth every morning  ) 30 tablet 0    sildenafil (VIAGRA) 100 mg tablet Take 1 tablet (100 mg total) by mouth daily as needed for erectile dysfunction 12 tablet 5    ALPRAZolam (XANAX) 0 25 mg tablet       traZODone (DESYREL) 50 mg tablet Take 1 tablet (50 mg total) by mouth daily at bedtime as needed for sleep 90 tablet 1     No current facility-administered medications for this visit  Objective:    /80   Pulse 84   Temp 98 8 °F (37 1 °C)   Resp 16   Ht 6' (1 829 m)   Wt 97 1 kg (214 lb)   SpO2 95%   BMI 29 02 kg/m²        Physical Exam  Vitals signs and nursing note reviewed  Constitutional:       General: He is not in acute distress  Appearance: Normal appearance  He is well-developed  He is not ill-appearing or diaphoretic  HENT:      Head: Normocephalic  Right Ear: Tympanic membrane normal       Left Ear: Tympanic membrane normal       Mouth/Throat:      Pharynx: No posterior oropharyngeal erythema  Eyes:      General: No scleral icterus  Conjunctiva/sclera: Conjunctivae normal    Neck:      Musculoskeletal: Neck supple  Vascular: No carotid bruit  Cardiovascular:      Rate and Rhythm: Normal rate and regular rhythm  Heart sounds: No murmur  Pulmonary:      Effort: Pulmonary effort is normal  No respiratory distress  Breath sounds: No wheezing  Abdominal:      Palpations: Abdomen is soft  Musculoskeletal:         General: No deformity  Skin:     General: Skin is warm and dry  Neurological:      General: No focal deficit present  Mental Status: He is alert  Psychiatric:         Behavior: Behavior normal          Thought Content: Thought content normal        BMI Counseling: Body mass index is 29 02 kg/m²  The BMI is above normal  Nutrition recommendations include decreasing portion sizes and moderation in carbohydrate intake  Exercise recommendations include exercising 3-5 times per week  No pharmacotherapy was ordered  Aissatou Zhao, DO

## 2020-09-16 ENCOUNTER — TELEPHONE (OUTPATIENT)
Dept: FAMILY MEDICINE CLINIC | Facility: CLINIC | Age: 65
End: 2020-09-16

## 2020-09-16 DIAGNOSIS — G47.00 PERSISTENT INSOMNIA: ICD-10-CM

## 2020-09-16 RX ORDER — TRAZODONE HYDROCHLORIDE 100 MG/1
100 TABLET ORAL
Qty: 90 TABLET | Refills: 1 | Status: SHIPPED | OUTPATIENT
Start: 2020-09-16 | End: 2021-03-17

## 2020-09-16 NOTE — TELEPHONE ENCOUNTER
DR Stephanie Victoria    Patient called and asked for his trazadone to be increased  Please call back

## 2020-09-16 NOTE — TELEPHONE ENCOUNTER
Spoke with pt, gave him the message below, he is aware a new Rx was sent over to the pharmacy  No further action needed   René Jenkins

## 2020-09-21 ENCOUNTER — TELEPHONE (OUTPATIENT)
Dept: FAMILY MEDICINE CLINIC | Facility: CLINIC | Age: 65
End: 2020-09-21

## 2020-09-21 ENCOUNTER — HOSPITAL ENCOUNTER (EMERGENCY)
Facility: HOSPITAL | Age: 65
End: 2020-09-21
Attending: EMERGENCY MEDICINE | Admitting: EMERGENCY MEDICINE
Payer: COMMERCIAL

## 2020-09-21 ENCOUNTER — APPOINTMENT (EMERGENCY)
Dept: RADIOLOGY | Facility: HOSPITAL | Age: 65
End: 2020-09-21
Payer: COMMERCIAL

## 2020-09-21 VITALS
TEMPERATURE: 98.2 F | BODY MASS INDEX: 28.7 KG/M2 | DIASTOLIC BLOOD PRESSURE: 92 MMHG | SYSTOLIC BLOOD PRESSURE: 155 MMHG | WEIGHT: 211.64 LBS | HEART RATE: 86 BPM | OXYGEN SATURATION: 95 % | RESPIRATION RATE: 17 BRPM

## 2020-09-21 DIAGNOSIS — I61.9 ICH (INTRACEREBRAL HEMORRHAGE) (HCC): ICD-10-CM

## 2020-09-21 DIAGNOSIS — R27.0 ATAXIA: ICD-10-CM

## 2020-09-21 DIAGNOSIS — R42 DIZZINESS: Primary | ICD-10-CM

## 2020-09-21 DIAGNOSIS — R93.89 ABNORMAL MRI: ICD-10-CM

## 2020-09-21 PROBLEM — R93.0 ABNORMAL MRI OF HEAD: Status: ACTIVE | Noted: 2020-09-21

## 2020-09-21 PROBLEM — J30.2 SEASONAL ALLERGIES: Status: ACTIVE | Noted: 2020-09-21

## 2020-09-21 LAB
ALBUMIN SERPL BCP-MCNC: 3.6 G/DL (ref 3.5–5)
ALP SERPL-CCNC: 103 U/L (ref 46–116)
ALT SERPL W P-5'-P-CCNC: 19 U/L (ref 12–78)
ANION GAP SERPL CALCULATED.3IONS-SCNC: 9 MMOL/L (ref 4–13)
AST SERPL W P-5'-P-CCNC: 14 U/L (ref 5–45)
BASOPHILS # BLD AUTO: 0.03 THOUSANDS/ΜL (ref 0–0.1)
BASOPHILS NFR BLD AUTO: 1 % (ref 0–1)
BILIRUB SERPL-MCNC: 0.5 MG/DL (ref 0.2–1)
BUN SERPL-MCNC: 15 MG/DL (ref 5–25)
CALCIUM SERPL-MCNC: 8.8 MG/DL (ref 8.3–10.1)
CHLORIDE SERPL-SCNC: 106 MMOL/L (ref 100–108)
CO2 SERPL-SCNC: 29 MMOL/L (ref 21–32)
CREAT SERPL-MCNC: 0.87 MG/DL (ref 0.6–1.3)
EOSINOPHIL # BLD AUTO: 0.19 THOUSAND/ΜL (ref 0–0.61)
EOSINOPHIL NFR BLD AUTO: 3 % (ref 0–6)
ERYTHROCYTE [DISTWIDTH] IN BLOOD BY AUTOMATED COUNT: 12.4 % (ref 11.6–15.1)
GFR SERPL CREATININE-BSD FRML MDRD: 92 ML/MIN/1.73SQ M
GLUCOSE SERPL-MCNC: 99 MG/DL (ref 65–140)
HCT VFR BLD AUTO: 45.2 % (ref 36.5–49.3)
HGB BLD-MCNC: 15.2 G/DL (ref 12–17)
IMM GRANULOCYTES # BLD AUTO: 0.02 THOUSAND/UL (ref 0–0.2)
IMM GRANULOCYTES NFR BLD AUTO: 0 % (ref 0–2)
INR PPP: 0.95 (ref 0.84–1.19)
LYMPHOCYTES # BLD AUTO: 1.13 THOUSANDS/ΜL (ref 0.6–4.47)
LYMPHOCYTES NFR BLD AUTO: 20 % (ref 14–44)
MCH RBC QN AUTO: 30 PG (ref 26.8–34.3)
MCHC RBC AUTO-ENTMCNC: 33.6 G/DL (ref 31.4–37.4)
MCV RBC AUTO: 89 FL (ref 82–98)
MONOCYTES # BLD AUTO: 0.55 THOUSAND/ΜL (ref 0.17–1.22)
MONOCYTES NFR BLD AUTO: 10 % (ref 4–12)
NEUTROPHILS # BLD AUTO: 3.63 THOUSANDS/ΜL (ref 1.85–7.62)
NEUTS SEG NFR BLD AUTO: 66 % (ref 43–75)
NRBC BLD AUTO-RTO: 0 /100 WBCS
PLATELET # BLD AUTO: 284 THOUSANDS/UL (ref 149–390)
PMV BLD AUTO: 8.6 FL (ref 8.9–12.7)
POTASSIUM SERPL-SCNC: 3.5 MMOL/L (ref 3.5–5.3)
PROT SERPL-MCNC: 6.6 G/DL (ref 6.4–8.2)
PROTHROMBIN TIME: 12.6 SECONDS (ref 11.6–14.5)
RBC # BLD AUTO: 5.07 MILLION/UL (ref 3.88–5.62)
SODIUM SERPL-SCNC: 144 MMOL/L (ref 136–145)
TROPONIN I SERPL-MCNC: <0.02 NG/ML
WBC # BLD AUTO: 5.55 THOUSAND/UL (ref 4.31–10.16)

## 2020-09-21 PROCEDURE — 84484 ASSAY OF TROPONIN QUANT: CPT

## 2020-09-21 PROCEDURE — 99285 EMERGENCY DEPT VISIT HI MDM: CPT

## 2020-09-21 PROCEDURE — NC001 PR NO CHARGE: Performed by: NEUROLOGICAL SURGERY

## 2020-09-21 PROCEDURE — 70496 CT ANGIOGRAPHY HEAD: CPT

## 2020-09-21 PROCEDURE — 70498 CT ANGIOGRAPHY NECK: CPT

## 2020-09-21 PROCEDURE — NC001 PR NO CHARGE: Performed by: PSYCHIATRY & NEUROLOGY

## 2020-09-21 PROCEDURE — 36415 COLL VENOUS BLD VENIPUNCTURE: CPT

## 2020-09-21 PROCEDURE — 99285 EMERGENCY DEPT VISIT HI MDM: CPT | Performed by: EMERGENCY MEDICINE

## 2020-09-21 PROCEDURE — G1004 CDSM NDSC: HCPCS

## 2020-09-21 PROCEDURE — 85610 PROTHROMBIN TIME: CPT | Performed by: EMERGENCY MEDICINE

## 2020-09-21 PROCEDURE — 85025 COMPLETE CBC W/AUTO DIFF WBC: CPT

## 2020-09-21 PROCEDURE — 80053 COMPREHEN METABOLIC PANEL: CPT

## 2020-09-21 PROCEDURE — 93005 ELECTROCARDIOGRAM TRACING: CPT

## 2020-09-21 RX ADMIN — IOHEXOL 85 ML: 350 INJECTION, SOLUTION INTRAVENOUS at 19:12

## 2020-09-21 NOTE — ED NOTES
Wife reported that he had outpatient MRI testing for gait imbalanced that pt has been experiencing for months  Result of MRI showed that he has recent strokes and was advised to come to ED  Pt denies any neuro symptoms        Tod Robison, RN  09/21/20 [id number]

## 2020-09-21 NOTE — ED PROVIDER NOTES
History  Chief Complaint   Patient presents with    Evaluation of Abnormal Diagnostic Test     patient states he has been "off balance" for several months   had an outpatient MRI done today that showed two acute strokes - directed to ER  61 yo male with off-balanced walking x months, had outpt  MRI in [facility] this morning  Pt  Was then called and told to go to the ER immediately for acute strokes  No new or different symptoms today  No slurred speech, vision changes, weakness  Does have some neuropathy with numbness both feet but that is not new  No chest pain or sob  No trauma  History provided by:  Patient   used: No    Evaluation of Abnormal Diagnostic Test       Prior to Admission Medications   Prescriptions Last Dose Informant Patient Reported? Taking?    ALPRAZolam (XANAX) 0 25 mg tablet   Yes No   aspirin 81 MG tablet 9/21/2020 at Unknown time  Yes Yes   Sig: Take 81 mg by mouth daily    fexofenadine (ALLEGRA) 180 MG tablet 9/21/2020 at Unknown time  No Yes   Sig: Take 1 tablet (180 mg total) by mouth daily for 30 days   Patient taking differently: Take 180 mg by mouth every morning     sildenafil (VIAGRA) 100 mg tablet Unknown at Unknown time  No No   Sig: Take 1 tablet (100 mg total) by mouth daily as needed for erectile dysfunction   traZODone (DESYREL) 100 mg tablet 9/20/2020 at Unknown time  No Yes   Sig: Take 1 tablet (100 mg total) by mouth daily at bedtime as needed for sleep      Facility-Administered Medications: None       Past Medical History:   Diagnosis Date    Arthropathy of knee     last assessed 8/19/15    Bacterial pneumonia 02/05/2007    last assessed 2/5/7    Colon polyp     CPAP (continuous positive airway pressure) dependence     Disorder of male genital organ 02/12/2008    last assessed 2/12/08    ED (erectile dysfunction) of organic origin     last assessed 2/13/17     History of hypertension     Seasonal allergies     Situational anxiety resolved 3/16/17     Sleep apnea     Tinnitus     Wears hearing aid     bilateral       Past Surgical History:   Procedure Laterality Date    COLONOSCOPY      x3-w/polyps    HERNIA REPAIR      umbilical,hemal inguinal    KNEE ARTHROSCOPY Left     meniscus    LA COLONOSCOPY FLX DX W/COLLJ SPEC WHEN PFRMD N/A 5/7/2018    Procedure: COLONOSCOPY;  Surgeon: Rupesh Dalton MD;  Location: [facility] GI LAB; Service: Gastroenterology       Family History   Problem Relation Age of Onset    Cancer Mother         breast    Diverticulitis Mother         colostomy    Breast cancer Mother     Heart disease Father         CHF    Cancer Sister         breast    Depression Sister     Cancer Sister         skin cancer    Cancer Sister         skin cancer    Colon cancer Neg Hx     Liver disease Neg Hx      I have reviewed and agree with the history as documented  E-Cigarette/Vaping    E-Cigarette Use Never User      E-Cigarette/Vaping Substances     Social History     Tobacco Use    Smoking status: Never Smoker    Smokeless tobacco: Never Used   Substance Use Topics    Alcohol use: Yes     Alcohol/week: 20 0 standard drinks     Types: 20 Standard drinks or equivalent per week     Frequency: 4 or more times a week     Drinks per session: 3 or 4     Comment: 2-3 mixed drinks/day    Drug use: No       Review of Systems   Constitutional: Negative  Negative for chills and fever  HENT: Negative  Negative for congestion and sore throat  Eyes: Negative  Respiratory: Negative  Negative for cough and shortness of breath  Cardiovascular: Negative  Negative for chest pain and leg swelling  Gastrointestinal: Negative  Negative for abdominal pain, diarrhea, nausea and vomiting  Genitourinary: Negative  Negative for dysuria, flank pain and hematuria  Musculoskeletal: Positive for gait problem  Negative for back pain and myalgias  Skin: Negative  Negative for rash and wound     Neurological: Positive for dizziness  Negative for headaches  Psychiatric/Behavioral: Negative  Negative for confusion and hallucinations  The patient is not nervous/anxious  All other systems reviewed and are negative  Physical Exam  Physical Exam  Vitals signs and nursing note reviewed  Constitutional:       General: He is not in acute distress  Appearance: He is well-developed  He is not ill-appearing or diaphoretic  HENT:      Head: Normocephalic and atraumatic  Eyes:      General: No scleral icterus  Extraocular Movements: Extraocular movements intact  Conjunctiva/sclera: Conjunctivae normal       Pupils: Pupils are equal, round, and reactive to light  Neck:      Musculoskeletal: Normal range of motion and neck supple  Cardiovascular:      Rate and Rhythm: Normal rate and regular rhythm  Heart sounds: Normal heart sounds  No murmur  Pulmonary:      Effort: Pulmonary effort is normal  No respiratory distress  Breath sounds: Normal breath sounds  Chest:      Chest wall: No tenderness  Abdominal:      General: Bowel sounds are normal  There is no distension  Palpations: Abdomen is soft  Tenderness: There is no abdominal tenderness  Musculoskeletal: Normal range of motion  General: No tenderness or deformity  Right lower leg: No edema  Left lower leg: No edema  Skin:     General: Skin is warm and dry  Coloration: Skin is not pale  Findings: No erythema or rash  Neurological:      General: No focal deficit present  Mental Status: He is alert and oriented to person, place, and time  Cranial Nerves: No cranial nerve deficit        Comments: No drift, no droop, finger to nose intact, speech intact   Psychiatric:         Mood and Affect: Mood normal          Behavior: Behavior normal          Vital Signs  ED Triage Vitals [09/21/20 1756]   Temperature Pulse Respirations Blood Pressure SpO2   98 2 °F (36 8 °C) 88 19 (!) 173/100 99 %      Temp Source Heart Rate Source Patient Position - Orthostatic VS BP Location FiO2 (%)   Tympanic Monitor Lying Left arm --      Pain Score       --           Vitals:    09/21/20 1830 09/21/20 2000 09/21/20 2030 09/21/20 2200   BP: 145/89 144/90 145/78 151/94   Pulse: 80 78 84 96   Patient Position - Orthostatic VS:             Visual Acuity  Visual Acuity      Most Recent Value   L Pupil Size (mm)  3   R Pupil Size (mm)  3          ED Medications  Medications   iohexol (OMNIPAQUE) 350 MG/ML injection (SINGLE-DOSE) 85 mL (85 mL Intravenous Given 9/21/20 1912)       Diagnostic Studies  Results Reviewed     Procedure Component Value Units Date/Time    Troponin I [[id number]]  (Normal) Collected:  09/21/20 1816    Lab Status:  Final result Specimen:  Blood from Arm, Right Updated:  09/21/20 1843     Troponin I <0 02 ng/mL     Comprehensive metabolic panel [[id number]] Collected:  09/21/20 1816    Lab Status:  Final result Specimen:  Blood from Arm, Right Updated:  09/21/20 1840     Sodium 144 mmol/L      Potassium 3 5 mmol/L      Chloride 106 mmol/L      CO2 29 mmol/L      ANION GAP 9 mmol/L      BUN 15 mg/dL      Creatinine 0 87 mg/dL      Glucose 99 mg/dL      Calcium 8 8 mg/dL      AST 14 U/L      ALT 19 U/L      Alkaline Phosphatase 103 U/L      Total Protein 6 6 g/dL      Albumin 3 6 g/dL      Total Bilirubin 0 50 mg/dL      eGFR 92 ml/min/1 73sq m     Narrative:       Chemo guidelines for Chronic Kidney Disease (CKD):     Stage 1 with normal or high GFR (GFR > 90 mL/min/1 73 square meters)    Stage 2 Mild CKD (GFR = 60-89 mL/min/1 73 square meters)    Stage 3A Moderate CKD (GFR = 45-59 mL/min/1 73 square meters)    Stage 3B Moderate CKD (GFR = 30-44 mL/min/1 73 square meters)    Stage 4 Severe CKD (GFR = 15-29 mL/min/1 73 square meters)    Stage 5 End Stage CKD (GFR <15 mL/min/1 73 square meters)  Note: GFR calculation is accurate only with a steady state creatinine    Protime-INR [[id number]]  (Normal) Collected:  09/21/20 1816    Lab Status:  Final result Specimen:  Blood from Arm, Right Updated:  09/21/20 1834     Protime 12 6 seconds      INR 0 95    CBC and differential [[id number]]  (Abnormal) Collected:  09/21/20 1816    Lab Status:  Final result Specimen:  Blood from Arm, Right Updated:  09/21/20 1821     WBC 5 55 Thousand/uL      RBC 5 07 Million/uL      Hemoglobin 15 2 g/dL      Hematocrit 45 2 %      MCV 89 fL      MCH 30 0 pg      MCHC 33 6 g/dL      RDW 12 4 %      MPV 8 6 fL      Platelets 284 Thousands/uL      nRBC 0 /100 WBCs      Neutrophils Relative 66 %      Immat GRANS % 0 %      Lymphocytes Relative 20 %      Monocytes Relative 10 %      Eosinophils Relative 3 %      Basophils Relative 1 %      Neutrophils Absolute 3 63 Thousands/µL      Immature Grans Absolute 0 02 Thousand/uL      Lymphocytes Absolute 1 13 Thousands/µL      Monocytes Absolute 0 55 Thousand/µL      Eosinophils Absolute 0 19 Thousand/µL      Basophils Absolute 0 03 Thousands/µL                  CTA head and neck with and without contrast   Final Result by  (09/21 2258)   Addendum 1 of 1 by Jaya Reynaga MD (09/21 2017)   ADDENDUM:   Impression should include   4 4 cm ascending thoracic aortic aneurysm        Final                 Procedures  ECG 12 Lead Documentation Only    Date/Time: 9/21/2020 6:55 PM  Performed by: Chilango Mott MD  Authorized by: Chilango Mott MD     Indications / Diagnosis:  Dizziness  ECG reviewed by me, the ED Provider: yes    Patient location:  ED  Previous ECG:     Previous ECG:  Unavailable  Interpretation:     Interpretation: normal    Rate:     ECG rate:  83    ECG rate assessment: normal    Rhythm:     Rhythm: sinus rhythm    Ectopy:     Ectopy: none    QRS:     QRS axis:  Normal  Conduction:     Conduction: normal    ST segments:     ST segments:  Normal  T waves:     T waves: normal               ED Course               Stroke Assessment     Row Name 09/21/20 1858 NIH Stroke Scale    Interval  Baseline      Level of Consciousness (1a )  0      LOC Questions (1b )  0      LOC Commands (1c )  0      Best Gaze (2 )  0      Visual (3 )  0      Facial Palsy (4 )  0      Motor Arm, Left (5a )  0      Motor Arm, Right (5b )  0      Motor Leg, Left (6a )  0      Motor Leg, Right (6b )  0      Limb Ataxia (7 )  0      Sensory (8 )  0      Best Language (9 )  0      Dysarthria (10 )  0      Extinction and Inattention (11 ) (Formerly Neglect)  0      Total  0          First Filed Value   TPA Decision  Patient not a TPA candidate  Patient is not a candidate options  Unclear time of onset outside appropriate time window  MDM  Number of Diagnoses or Management Options  Abnormal MRI:   Ataxia:   Dizziness:   ICH (intracerebral hemorrhage) [facility]):   Diagnosis management comments: Spoke with neurology on call for stroke alerts who reviewed MRI, did not recommend calling alert as pt  Does not meet criteria for any intervention but she does recommend CTA and admit for stroke evaluation  2100 - I have been back and forth with neurology, radiology and neurosurgeon  The neurosurgeon is trying to look at MRI but can't access it  Our tech tried to download it from disc but couldn't   2150 - still trying to get plan with neurosurgery whether to transfer to [place] and they can view MRI on disc or repeat MRI tomorrow  Pt  And wife are aware  2215 - will send to [place], discussed with [facility]  Pt  And wife aware and agree to transfer        Disposition  Final diagnoses:   Dizziness   Ataxia   Abnormal MRI   ICH (intracerebral hemorrhage) ([facility] [address] )     Time reflects when diagnosis was documented in both MDM as applicable and the Disposition within this note     Time User Action Codes Description Comment    2/51/2164  8:96 PM Jonas Pellet Add [S53] Dizziness     3/07/5114  5:43 PM Jonas Pellet Add [K56 4] Ataxia     6/09/8469  9:09 PM Jonas Pellet Add [C68 80] Abnormal MRI     5/31/7817  4:41 PM Milind Shabana LIPSCOMB Add [W25 5] ICH (intracerebral hemorrhage) [facility])       ED Disposition     ED Disposition Condition Date/Time Comment    Transfer to Another [facility] Sep 21, 2020 10:55 PM Lyla Chandra should be transferred out to [facility]**  Follow-up Information    None         Patient's Medications   Discharge Prescriptions    No medications on file     No discharge procedures on file      PDMP Review     None          ED Provider  Electronically Signed by           Eleanor Osborne MD  78/72/72 7536

## 2020-09-21 NOTE — TELEPHONE ENCOUNTER
Pt was seen by neurology outside of [facility], The office called to let him know he may have had a stroke  They are faxing orders for MRI brain and a script   Pt is being advised to go to the hospital  Transferred to [facility]

## 2020-09-21 NOTE — TELEPHONE ENCOUNTER
Spoke to harley's wife gave message that dr Russ Ragland office strongly suggested er visit  For possible stroke-I also strongly suggested he go to er for follow up after speaking to dr Russ Ragland office where mri shoed probable cause for stroke--they agreed  &  Were going over  Now--lj

## 2020-09-22 ENCOUNTER — APPOINTMENT (INPATIENT)
Dept: NON INVASIVE DIAGNOSTICS | Facility: HOSPITAL | Age: 65
DRG: 066 | End: 2020-09-22
Payer: COMMERCIAL

## 2020-09-22 ENCOUNTER — HOSPITAL ENCOUNTER (INPATIENT)
Facility: HOSPITAL | Age: 65
LOS: 1 days | Discharge: HOME | DRG: 066 | End: 2020-09-23
Attending: INTERNAL MEDICINE | Admitting: INTERNAL MEDICINE
Payer: COMMERCIAL

## 2020-09-22 DIAGNOSIS — I10 HTN (HYPERTENSION): ICD-10-CM

## 2020-09-22 DIAGNOSIS — I63.9 STROKE (HCC): ICD-10-CM

## 2020-09-22 DIAGNOSIS — R93.0 ABNORMAL MRI OF HEAD: ICD-10-CM

## 2020-09-22 DIAGNOSIS — R27.0 ATAXIA: Primary | ICD-10-CM

## 2020-09-22 DIAGNOSIS — R26.81 UNSTEADY GAIT: ICD-10-CM

## 2020-09-22 LAB
ALBUMIN SERPL BCP-MCNC: 3.2 G/DL (ref 3.5–5)
ALP SERPL-CCNC: 92 U/L (ref 46–116)
ALT SERPL W P-5'-P-CCNC: 16 U/L (ref 12–78)
ANION GAP SERPL CALCULATED.3IONS-SCNC: 7 MMOL/L (ref 4–13)
AST SERPL W P-5'-P-CCNC: 11 U/L (ref 5–45)
BASOPHILS # BLD AUTO: 0.05 THOUSANDS/ΜL (ref 0–0.1)
BASOPHILS NFR BLD AUTO: 1 % (ref 0–1)
BILIRUB SERPL-MCNC: 0.36 MG/DL (ref 0.2–1)
BILIRUB UR QL STRIP: NEGATIVE
BUN SERPL-MCNC: 16 MG/DL (ref 5–25)
CALCIUM ALBUM COR SERPL-MCNC: 9.1 MG/DL (ref 8.3–10.1)
CALCIUM SERPL-MCNC: 8.5 MG/DL (ref 8.3–10.1)
CHLORIDE SERPL-SCNC: 112 MMOL/L (ref 100–108)
CHOLEST SERPL-MCNC: 126 MG/DL (ref 50–200)
CLARITY UR: CLEAR
CO2 SERPL-SCNC: 25 MMOL/L (ref 21–32)
COLOR UR: YELLOW
CREAT SERPL-MCNC: 0.73 MG/DL (ref 0.6–1.3)
EOSINOPHIL # BLD AUTO: 0.22 THOUSAND/ΜL (ref 0–0.61)
EOSINOPHIL NFR BLD AUTO: 4 % (ref 0–6)
ERYTHROCYTE [DISTWIDTH] IN BLOOD BY AUTOMATED COUNT: 12.8 % (ref 11.6–15.1)
EST. AVERAGE GLUCOSE BLD GHB EST-MCNC: 103 MG/DL
GFR SERPL CREATININE-BSD FRML MDRD: 99 ML/MIN/1.73SQ M
GLUCOSE SERPL-MCNC: 105 MG/DL (ref 65–140)
GLUCOSE UR STRIP-MCNC: NEGATIVE MG/DL
HBA1C MFR BLD: 5.2 %
HCT VFR BLD AUTO: 40.7 % (ref 36.5–49.3)
HDLC SERPL-MCNC: 29 MG/DL
HGB BLD-MCNC: 14.1 G/DL (ref 12–17)
HGB UR QL STRIP.AUTO: NEGATIVE
IMM GRANULOCYTES # BLD AUTO: 0.04 THOUSAND/UL (ref 0–0.2)
IMM GRANULOCYTES NFR BLD AUTO: 1 % (ref 0–2)
KETONES UR STRIP-MCNC: NEGATIVE MG/DL
LDLC SERPL CALC-MCNC: 77 MG/DL (ref 0–100)
LEUKOCYTE ESTERASE UR QL STRIP: NEGATIVE
LYMPHOCYTES # BLD AUTO: 1.35 THOUSANDS/ΜL (ref 0.6–4.47)
LYMPHOCYTES NFR BLD AUTO: 23 % (ref 14–44)
MAGNESIUM SERPL-MCNC: 2.6 MG/DL (ref 1.6–2.6)
MCH RBC QN AUTO: 30.6 PG (ref 26.8–34.3)
MCHC RBC AUTO-ENTMCNC: 34.6 G/DL (ref 31.4–37.4)
MCV RBC AUTO: 88 FL (ref 82–98)
MONOCYTES # BLD AUTO: 0.64 THOUSAND/ΜL (ref 0.17–1.22)
MONOCYTES NFR BLD AUTO: 11 % (ref 4–12)
NEUTROPHILS # BLD AUTO: 3.48 THOUSANDS/ΜL (ref 1.85–7.62)
NEUTS SEG NFR BLD AUTO: 60 % (ref 43–75)
NITRITE UR QL STRIP: NEGATIVE
NRBC BLD AUTO-RTO: 0 /100 WBCS
PH UR STRIP.AUTO: 6.5 [PH]
PHOSPHATE SERPL-MCNC: 3.5 MG/DL (ref 2.3–4.1)
PLATELET # BLD AUTO: 252 THOUSANDS/UL (ref 149–390)
PMV BLD AUTO: 8.8 FL (ref 8.9–12.7)
POTASSIUM SERPL-SCNC: 3.1 MMOL/L (ref 3.5–5.3)
PROT SERPL-MCNC: 6 G/DL (ref 6.4–8.2)
PROT UR STRIP-MCNC: NEGATIVE MG/DL
RBC # BLD AUTO: 4.61 MILLION/UL (ref 3.88–5.62)
SODIUM SERPL-SCNC: 144 MMOL/L (ref 136–145)
SP GR UR STRIP.AUTO: 1.02 (ref 1–1.03)
TRIGL SERPL-MCNC: 100 MG/DL
TSH SERPL DL<=0.05 MIU/L-ACNC: 2.09 UIU/ML (ref 0.36–3.74)
UROBILINOGEN UR QL STRIP.AUTO: 1 E.U./DL
WBC # BLD AUTO: 5.78 THOUSAND/UL (ref 4.31–10.16)

## 2020-09-22 PROCEDURE — 93306 TTE W/DOPPLER COMPLETE: CPT

## 2020-09-22 PROCEDURE — 80053 COMPREHEN METABOLIC PANEL: CPT | Performed by: INTERNAL MEDICINE

## 2020-09-22 PROCEDURE — 99255 IP/OBS CONSLTJ NEW/EST HI 80: CPT | Performed by: PSYCHIATRY & NEUROLOGY

## 2020-09-22 PROCEDURE — 93306 TTE W/DOPPLER COMPLETE: CPT | Performed by: INTERNAL MEDICINE

## 2020-09-22 PROCEDURE — 81003 URINALYSIS AUTO W/O SCOPE: CPT | Performed by: INTERNAL MEDICINE

## 2020-09-22 PROCEDURE — 84443 ASSAY THYROID STIM HORMONE: CPT | Performed by: INTERNAL MEDICINE

## 2020-09-22 PROCEDURE — 99255 IP/OBS CONSLTJ NEW/EST HI 80: CPT | Performed by: NURSE PRACTITIONER

## 2020-09-22 PROCEDURE — 99255 IP/OBS CONSLTJ NEW/EST HI 80: CPT | Performed by: PHYSICIAN ASSISTANT

## 2020-09-22 PROCEDURE — 85025 COMPLETE CBC W/AUTO DIFF WBC: CPT | Performed by: INTERNAL MEDICINE

## 2020-09-22 PROCEDURE — 97163 PT EVAL HIGH COMPLEX 45 MIN: CPT

## 2020-09-22 PROCEDURE — 84100 ASSAY OF PHOSPHORUS: CPT | Performed by: INTERNAL MEDICINE

## 2020-09-22 PROCEDURE — 99223 1ST HOSP IP/OBS HIGH 75: CPT | Performed by: INTERNAL MEDICINE

## 2020-09-22 PROCEDURE — 83036 HEMOGLOBIN GLYCOSYLATED A1C: CPT | Performed by: INTERNAL MEDICINE

## 2020-09-22 PROCEDURE — 80061 LIPID PANEL: CPT | Performed by: INTERNAL MEDICINE

## 2020-09-22 PROCEDURE — 83735 ASSAY OF MAGNESIUM: CPT | Performed by: INTERNAL MEDICINE

## 2020-09-22 PROCEDURE — 97166 OT EVAL MOD COMPLEX 45 MIN: CPT

## 2020-09-22 RX ORDER — TRAZODONE HYDROCHLORIDE 100 MG/1
100 TABLET ORAL
Status: DISCONTINUED | OUTPATIENT
Start: 2020-09-22 | End: 2020-09-23 | Stop reason: HOSPADM

## 2020-09-22 RX ORDER — CLOPIDOGREL BISULFATE 75 MG/1
75 TABLET ORAL DAILY
Status: DISCONTINUED | OUTPATIENT
Start: 2020-09-23 | End: 2020-09-23 | Stop reason: HOSPADM

## 2020-09-22 RX ORDER — ONDANSETRON 2 MG/ML
4 INJECTION INTRAMUSCULAR; INTRAVENOUS EVERY 6 HOURS PRN
Status: DISCONTINUED | OUTPATIENT
Start: 2020-09-22 | End: 2020-09-23 | Stop reason: HOSPADM

## 2020-09-22 RX ORDER — POTASSIUM CHLORIDE 20 MEQ/1
40 TABLET, EXTENDED RELEASE ORAL ONCE
Status: COMPLETED | OUTPATIENT
Start: 2020-09-22 | End: 2020-09-22

## 2020-09-22 RX ORDER — ASPIRIN 81 MG/1
81 TABLET ORAL DAILY
Status: DISCONTINUED | OUTPATIENT
Start: 2020-09-23 | End: 2020-09-23 | Stop reason: HOSPADM

## 2020-09-22 RX ORDER — ACETAMINOPHEN 325 MG/1
650 TABLET ORAL EVERY 6 HOURS PRN
Status: DISCONTINUED | OUTPATIENT
Start: 2020-09-22 | End: 2020-09-23 | Stop reason: HOSPADM

## 2020-09-22 RX ORDER — LORATADINE 10 MG/1
10 TABLET ORAL DAILY
Status: DISCONTINUED | OUTPATIENT
Start: 2020-09-22 | End: 2020-09-23 | Stop reason: HOSPADM

## 2020-09-22 RX ORDER — DOCUSATE SODIUM 100 MG/1
100 CAPSULE, LIQUID FILLED ORAL 2 TIMES DAILY PRN
Status: DISCONTINUED | OUTPATIENT
Start: 2020-09-22 | End: 2020-09-23 | Stop reason: HOSPADM

## 2020-09-22 RX ORDER — ATORVASTATIN CALCIUM 40 MG/1
40 TABLET, FILM COATED ORAL EVERY EVENING
Status: DISCONTINUED | OUTPATIENT
Start: 2020-09-22 | End: 2020-09-23 | Stop reason: HOSPADM

## 2020-09-22 RX ADMIN — TRAZODONE HYDROCHLORIDE 100 MG: 100 TABLET ORAL at 00:42

## 2020-09-22 RX ADMIN — ATORVASTATIN CALCIUM 40 MG: 40 TABLET, FILM COATED ORAL at 18:16

## 2020-09-22 RX ADMIN — LORATADINE 10 MG: 10 TABLET ORAL at 08:05

## 2020-09-22 RX ADMIN — POTASSIUM CHLORIDE 40 MEQ: 1500 TABLET, EXTENDED RELEASE ORAL at 12:22

## 2020-09-22 RX ADMIN — ATORVASTATIN CALCIUM 40 MG: 40 TABLET, FILM COATED ORAL at 00:43

## 2020-09-22 RX ADMIN — TRAZODONE HYDROCHLORIDE 100 MG: 100 TABLET ORAL at 21:29

## 2020-09-22 RX ADMIN — GLYCERIN, HYPROMELLOSE, POLYETHYLENE GLYCOL 2 DROP: .2; .2; 1 LIQUID OPHTHALMIC at 14:46

## 2020-09-22 NOTE — CONSULTS
PHYSICAL MEDICINE AND REHABILITATION CONSULT NOTE  Laverne Jorge 60 y o  male MRN: [id number]  Unit/Bed#: [facility] 564-25 Encounter: [id number]    Requested by (Physician/Service): Duncan Santana MD  Reason for Consultation:  Assessment of rehabilitation needs    Assessment:  Rehabilitation Diagnosis:    Acute petechial hemorrhage vs CVA vs cavernoma    Impaired mobility and self care   Impaired cognition     Recommendations:  Rehabilitation Plan:   Continue PT/OT while on acute care   Therapy evaluations are pending however the patient may be able to d/c to home with therapy and family support  Medical Co-morbidities Plan:  · Acute petechial hemorrhage vs CVA vs cavernoma   · Gait imbalance due to ataxia   · HTN  · GRACIE  · Anxiety  · Allergic rhinitis   · DVT ppx:  SCD    Thank you for this consultation  Do not hesitate to contact service with further questions  Orpha ELVIS Govea  PM&R    History of Present Illness:  Laverne Jorge is a 60 y o  male with a PMH of GRACIE, HTN who presented to the [address] [place] [address] on 9/22/20 from his PCP's office for imbalanced gait  CT of the head showed a focus of hyperdensity in the right parietal lobe likely small focus of acute petechial hemorrhage or cavernoma  There were lacunar infarcts bilateral thalamus  MRI showed hyperdense tiny foci within the right thalamus and left frontal lobe concerning for acute infarcts  Therapy evaluations are pending  PM&R are consulted for rehabilitation recommendations  The patient was seen in his room with spouse at bedside  He denies any pain, weakness, SOB, chest pain, numbness or tingling  He states that he feels good  Review of Systems: 10 point ROS negative except for what is noted in HPI    Function:  Prior level of function and living situation:   The patient lives with his spouse in a 2 story home  Both of them were working from home due to the pandemic    He was independent prior to admission  Current level of function:  Physical Therapy:  Pending   Occupational Therapy:  Pending   Speech Therapy:  No consult needed       Physical Exam:  /96   Pulse 82   Temp 97 6 °F (36 4 °C)   Resp 16   Ht 6' (1 829 m)   Wt 102 kg (223 lb 15 8 oz)   SpO2 96%   BMI 30 38 kg/m²        Intake/Output Summary (Last 24 hours) at 9/22/2020 0939  Last data filed at 9/22/2020 0854  Gross per 24 hour   Intake 240 ml   Output    Net 240 ml       Body mass index is 30 38 kg/m²  Physical Exam  Constitutional:       Appearance: Normal appearance  HENT:      Head: Normocephalic and atraumatic  Nose: Nose normal       Mouth/Throat:      Mouth: Mucous membranes are moist    Eyes:      Extraocular Movements: Extraocular movements intact  Cardiovascular:      Pulses: Normal pulses  Pulmonary:      Effort: Pulmonary effort is normal    Abdominal:      General: There is no distension  Musculoskeletal: Normal range of motion  Skin:     General: Skin is warm and dry  Neurological:      Mental Status: He is alert and oriented to person, place, and time  Psychiatric:         Mood and Affect: Mood normal           Social History:    Social History     Socioeconomic History    Marital status: /Civil Union     Spouse name: Not on file    Number of children: Not on file    Years of education: Not on file    Highest education level: Not on file   Occupational History    Not on file   Social Needs    Financial resource strain: Not on file    Food insecurity     Worry: Not on file     Inability: Not on file   [employer] needs     Medical: Not on file     Non-medical: Not on file   Tobacco Use    Smoking status: Never Smoker    Smokeless tobacco: Never Used   Substance and Sexual Activity    Alcohol use:  Yes     Alcohol/week: 24 0 standard drinks     Types: 20 Standard drinks or equivalent, 2 Glasses of wine, 2 Cans of beer per week     Frequency: 4 or more times a week     Drinks per session: 3 or 4     Comment: 2-3 mixed drinks/day    Drug use: No    Sexual activity: Not on file   Lifestyle    Physical activity     Days per week: Not on file     Minutes per session: Not on file    Stress: Not on file   Relationships    Social connections     Talks on phone: Not on file     Gets together: Not on file     Attends [employer] service: Not on file     Active member of club or organization: Not on file     Attends meetings of clubs or organizations: Not on file     Relationship status: Not on file    Intimate partner violence     Fear of current or ex partner: Not on file     Emotionally abused: Not on file     Physically abused: Not on file     Forced sexual activity: Not on file   Other Topics Concern    Not on file   Social History Narrative    Not on file        Family History:    Family History   Problem Relation Age of Onset    Cancer Mother         breast    Diverticulitis Mother         colostomy    Breast cancer Mother     Heart disease Father         CHF    Cancer Sister         breast    Depression Sister     Cancer Sister         skin cancer    Cancer Sister         skin cancer    Colon cancer Neg Hx     Liver disease Neg Hx          Medications:     Current Facility-Administered Medications:     acetaminophen (TYLENOL) tablet 650 mg, 650 mg, Oral, Q6H PRN, Ascencion Cobb MD    atorvastatin (LIPITOR) tablet 40 mg, 40 mg, Oral, QPM, Ascencion Cobb MD, 40 mg at 09/22/20 0043    docusate sodium (COLACE) capsule 100 mg, 100 mg, Oral, BID PRN, Ascencion Cobb MD    loratadine (CLARITIN) tablet 10 mg, 10 mg, Oral, Daily, Ascencion Cobb MD, 10 mg at 09/22/20 0805    ondansetron (ZOFRAN) injection 4 mg, 4 mg, Intravenous, Q6H PRN, Ascencion Cobb MD    traZODone (DESYREL) tablet 100 mg, 100 mg, Oral, HS, Ascencion Cobb MD, 100 mg at 09/22/20 0042    Past Medical History:     Past Medical History:   Diagnosis Date    Arthropathy of knee last assessed 8/19/15    Bacterial pneumonia 02/05/2007    last assessed 2/5/7    Colon polyp     CPAP (continuous positive airway pressure) dependence     Disorder of male genital organ 02/12/2008    last assessed 2/12/08    ED (erectile dysfunction) of organic origin     last assessed 2/13/17     History of hypertension     Seasonal allergies     Situational anxiety     resolved 3/16/17     Sleep apnea     Tinnitus     Wears hearing aid     bilateral        Past Surgical History:     Past Surgical History:   Procedure Laterality Date    COLONOSCOPY      x3-w/polyps    HERNIA REPAIR      umbilical,hemal inguinal    KNEE ARTHROSCOPY Left     meniscus    CA COLONOSCOPY FLX DX W/COLLJ SPEC WHEN PFRMD N/A 5/7/2018    Procedure: COLONOSCOPY;  Surgeon: Ezekiel Ortega MD;  Location: [place] [address] GI LAB; Service: Gastroenterology         Allergies: Allergies   Allergen Reactions    Iodine Solution [Povidone Iodine]     Pollen Extract      Reaction Date: 18Sep2006;            LABORATORY RESULTS:      Lab Results   Component Value Date    HGB 14 1 09/22/2020    HGB 16 1 09/07/2016    HCT 40 7 09/22/2020    HCT 47 2 09/07/2016    WBC 5 78 09/22/2020    WBC 5 6 09/07/2016     Lab Results   Component Value Date    BUN 16 09/22/2020    BUN 18 09/27/2018     09/07/2016    K 3 1 (L) 09/22/2020    K 4 0 09/27/2018     (H) 09/22/2020     09/27/2018    GLUCOSE 109 (H) 09/07/2016    CREATININE 0 73 09/22/2020    CREATININE 0 72 (L) 09/07/2016     Lab Results   Component Value Date    PROTIME 12 6 09/21/2020    INR 0 95 09/21/2020        DIAGNOSTIC STUDIES: Reviewed  No results found

## 2020-09-22 NOTE — TELEMEDICINE
Neurology contacted regarding patient's MRI out of network for which he was sent to the hospital    Patient with right thalamic region of restricted diffusion and parietal cavernoma vs acute bleed  I cannot view MRI in PACS but read report    Pt denied any acute symptoms however does report imbalance x months  Thus does not meet criteria for acute intervention  Will defer work up re:cavernoma vs bleed to neurosurgery whom ED spoke with  Did recommend CTA H/N which shows no vascular anomaly    In regards to potential areas of acute ischemia right thalamus noted in MR, recommend admit for further work up stroke care path    Of note prior CTH from 7/20 did show bl remote thalamic infarcts    D/w Dr Boyle  Total time spent with physician to physician direct audio telecommunication to formulate above plan including chart review at least 25 minutes
157.48

## 2020-09-22 NOTE — H&P
H&P- Claudio Becker 1955, 60 y o  male MRN: [id number]    Unit/Bed#: [facility] 609-01 Encounter: [id number]    Primary Care Provider: Liang Gonzalez DO   Date and time admitted to hospital: 9/22/2020 12:04 AM        Abnormal MRI of head  Assessment & Plan  Films are sent with patient and will be uploaded through radiology reading room  Report of radiology an MRI of Jade Lou is in the chart  Patient would need to be seen by Neurosurgery to ensure that he does not have hemorrhage versus cavernomas as per findings  As the patient is complaining of 4 week history of possible ataxic gait; continue neurology consult with TIA/CVA protocol  Lipitor 40 mg started  Aspirin put on hold due to the possibility of hemangioma  Lipid profile, hemoglobin A1c and TSH for tomorrow  Neuro checks per protocol  Occupational and physical therapy consult with case management consult  PMR consult  Ataxia  Assessment & Plan  Evaluation by neuro pending  Please see the plans under abnormal MRI  CAMMIE (generalized anxiety disorder)  Assessment & Plan  Continue trazodone 100 mg daily  Patient is also on Xanax 0 25 as needed  Allergic rhinitis  Assessment & Plan  Continue Claritin/Allegra  Addendum:  Would also add echocardiogram due to presence of aortic site murmur in the setting of possible TIA/CVA  VTE Prophylaxis: Pharmacologic VTE Prophylaxis contraindicated due to Possible cavernoma/petechial hemorrhage on MRI  / sequential compression device   Code Status: Level 1 - Full Code as discussed with patient  POLST: There is no POLST form on file for this patient (pre-hospital)    Anticipated Length of Stay:  Patient will be admitted on an Inpatient basis with an anticipated length of stay of  greater than 2 midnights  Justification for Hospital Stay: Please see detailed plans noted above      Chief Complaint:     Ataxic gait with abnormal MRI finding  History of Present Illness:  Lynn Herbert Segundo Pantoja is a 60 y o  male who is a transfer from [address] [place] [address]  The patient prior to being seen in the emergency room just had an MRI upon the advice of primary care physician  According to history and the patient, he was noted by his wife to have imbalanced gait  Patient however denies any other neurologic symptoms such as numbness or tingling or visual disturbances or any alida weakness  He has not fallen nor has he lost consciousness  Also they were thinking of neurology consult in the future  So far he has not seen 1  In any case an MRI was taken as out patient by the radiology an MRI of [place], [facility] and not associated with [facility], it was officially read as follows:    Significant periventricular white matter disease and parenchymal atrophy, prominent for the patient's age, which likely represents sequela of chronic small vessel disease  The degree of progression cannot be determined with out the prior study from 2016 for comparison  An addendum will be made when that study becomes available  Solitary, tiny foci of high signal noted on diffusion imaging within the anterior right thalamus and left frontal lobe white matter may represent tiny acute infarcts  Foci of gradient echo blooming artifact are noted within both cerebral hemispheres, right greater than left, which may represent areas of prior petechial hemorrhage versus cavernomas however, there are no foci of high density on the CT exam to suggest cavernomas  Correlation with the prior exam can be made when that study becomes available  The case was then referred to neurosurgery and neurology and they suggested that the patient be transferred to [place] for further evaluation by neurosurgery and stroke/TIA workup on Neurology  Currently, patient is lying on bed without any complaints  He however feels anxious about whole situation      Review of Systems:    Constitutional:  Denies fever or chills   Eyes:  Denies change in visual acuity   HENT:  Denies nasal congestion or sore throat   Respiratory:  Denies cough or shortness of breath   Cardiovascular:  Denies chest pain or edema   GI:  Denies abdominal pain, nausea, vomiting, bloody stools or diarrhea   :  Denies dysuria   Musculoskeletal:  Denies back pain or joint pain   Integument:  Denies rash   Neurologic:  Denies headache, focal weakness or sensory changes   Endocrine:  Denies polyuria or polydipsia   Lymphatic:  Denies swollen glands   Psychiatric:  Denies depression or anxiety     Past Medical and Surgical History:   Past Medical History:   Diagnosis Date    Arthropathy of knee     last assessed 8/19/15    Bacterial pneumonia 02/05/2007    last assessed 2/5/7    Colon polyp     CPAP (continuous positive airway pressure) dependence     Disorder of male genital organ 02/12/2008    last assessed 2/12/08    ED (erectile dysfunction) of organic origin     last assessed 2/13/17     History of hypertension     Seasonal allergies     Situational anxiety     resolved 3/16/17     Sleep apnea     Tinnitus     Wears hearing aid     bilateral     Past Surgical History:   Procedure Laterality Date    COLONOSCOPY      x3-w/polyps    HERNIA REPAIR      umbilical,hemal inguinal    KNEE ARTHROSCOPY Left     meniscus    IN COLONOSCOPY FLX DX W/COLLJ SPEC WHEN PFRMD N/A 5/7/2018    Procedure: COLONOSCOPY;  Surgeon: Mack Graham MD;  Location: [facility]; Service: Gastroenterology       Meds/Allergies:  Medications Prior to Admission   Medication    ALPRAZolam (XANAX) 0 25 mg tablet    aspirin 81 MG tablet    fexofenadine (ALLEGRA) 180 MG tablet    sildenafil (VIAGRA) 100 mg tablet    traZODone (DESYREL) 100 mg tablet       Allergies: Allergies   Allergen Reactions    Iodine Solution [Povidone Iodine]     Pollen Extract      Reaction Date: 18Sep2006;       History:  Marital Status: /Civil Union   Occupation:  Restaurant owner  Patient Pre-hospital Living Situation:  Lives at home  Patient Pre-hospital Level of Mobility:  Ambulatory  Patient Pre-hospital Diet Restrictions:  Regular diet  Substance Use History:   Social History     Substance and Sexual Activity   Alcohol Use Yes    Alcohol/week: 24 0 standard drinks    Types: 20 Standard drinks or equivalent, 2 Glasses of wine, 2 Cans of beer per week    Frequency: 4 or more times a week    Drinks per session: 3 or 4    Comment: 2-3 mixed drinks/day     Social History     Tobacco Use   Smoking Status Never Smoker   Smokeless Tobacco Never Used     Social History     Substance and Sexual Activity   Drug Use No       Family History:  Family History   Problem Relation Age of Onset    Cancer Mother         breast    Diverticulitis Mother         colostomy    Breast cancer Mother     Heart disease Father         CHF    Cancer Sister         breast    Depression Sister     Cancer Sister         skin cancer    Cancer Sister         skin cancer    Colon cancer Neg Hx     Liver disease Neg Hx        Physical Exam:     Vitals:   Blood Pressure: 156/98 (09/22/20 0021)  Pulse: 91 (09/22/20 0021)  Temperature: 97 8 °F (36 6 °C) (09/22/20 0021)  Respirations: 17 (09/22/20 0021)  SpO2: 96 % (09/22/20 0021)    Constitutional:  Well developed, well nourished, no acute distress, non-toxic appearance   Eyes:  PERRL, conjunctiva normal   HENT:  Atraumatic, external ears normal, nose normal, oropharynx moist, no pharyngeal exudates  Neck- normal range of motion, no tenderness, supple   Respiratory:  No respiratory distress, normal breath sounds, no rales, no wheezing   Cardiovascular:  Normal rate, normal rhythm, systolic murmur aortic area?  no gallops, no rubs   GI:  Soft, nondistended, normal bowel sounds, nontender, no organomegaly, no mass, no rebound, no guarding   :  No costovertebral angle tenderness   Musculoskeletal:  No edema, no tenderness, no deformities   Back- no tenderness  Integument:  Well hydrated, no rash   Lymphatic:  No lymphadenopathy noted   Neurologic:  Alert &awake, communicative, CN 2-12 normal, normal motor function, normal sensory function, no focal deficits noted without any preferential movement  Psychiatric:  Speech and behavior appropriate       Lab Results: I have personally reviewed pertinent reports  Results from last 7 days   Lab Units 09/21/20  1816   WBC Thousand/uL 5 55   HEMOGLOBIN g/dL 15 2   HEMATOCRIT % 45 2   PLATELETS Thousands/uL 284   NEUTROS PCT % 66   LYMPHS PCT % 20   MONOS PCT % 10   EOS PCT % 3     Results from last 7 days   Lab Units 09/21/20  1816   POTASSIUM mmol/L 3 5   CHLORIDE mmol/L 106   CO2 mmol/L 29   BUN mg/dL 15   CREATININE mg/dL 0 87   CALCIUM mg/dL 8 8   ALK PHOS U/L 103   ALT U/L 19   AST U/L 14     Results from last 7 days   Lab Units 09/21/20  1816   INR  0 95       EKG:  Normal sinus rhythm and normal EKG with no ST T-wave changes  Film from outside imaging source, sent to radiology as a CD for upload to our records  Imaging: I have personally reviewed pertinent reports  Cta Head And Neck With And Without Contrast    Addendum Date: 9/21/2020 Addendum:   ADDENDUM: Impression should include 4 4 cm ascending thoracic aortic aneurysm  Result Date: 9/21/2020  Narrative: CTA NECK AND BRAIN WITH AND WITHOUT CONTRAST INDICATION: Ataxia, stroke suspected COMPARISON:   None  TECHNIQUE:  Routine CT imaging of the Brain without contrast   Post contrast imaging was performed after administration of iodinated contrast through the neck and brain  Post contrast axial 0 625 mm images timed to opacify the arterial system  3D rendering was performed on an independent workstation  MIP reconstructions performed  Coronal reconstructions were performed of the noncontrast portion of the brain  Radiation dose length product (DLP) for this visit:  21  mGy-cm     This examination, like all CT scans performed in the [facility], was performed utilizing techniques to minimize radiation dose exposure, including the use of iterative reconstruction and automated exposure control  IV Contrast:  85 mL of iohexol (OMNIPAQUE)  IMAGE QUALITY:   Diagnostic FINDINGS: NONCONTRAST BRAIN PARENCHYMA:  4 x 4 millimeter focus of hyperdensity identified in the right parietal lobe likely small focus of acute parenchymal hemorrhage  Microangiopathy  Bilateral old thalamic infarcts  VENTRICLES AND EXTRA-AXIAL SPACES:  Normal for the patient's age  VISUALIZED ORBITS AND PARANASAL SINUSES:  Unremarkable  CERVICAL VASCULATURE AORTIC ARCH AND GREAT VESSELS:  4 4 cm partially visualized ascending thoracic aortic aneurysm  Mild atherosclerotic plaque aortic arch  RIGHT VERTEBRAL ARTERY CERVICAL SEGMENT:  Normal origin  The vessel is normal in caliber throughout the neck  LEFT VERTEBRAL ARTERY CERVICAL SEGMENT:  Mild stenosis at the origin  The vessel is normal in caliber throughout the neck  RIGHT EXTRACRANIAL CAROTID SEGMENT:  Normal caliber common carotid artery  Normal bifurcation and cervical internal carotid artery  No stenosis or dissection  LEFT EXTRACRANIAL CAROTID SEGMENT:  Mild atherosclerotic disease of the distal common carotid artery and proximal cervical internal carotid artery without significant stenosis compared to the more distal ICA  NASCET criteria was used to determine the degree of internal carotid artery diameter stenosis  INTRACRANIAL VASCULATURE INTERNAL CAROTID ARTERIES:  Normal enhancement of the intracranial portions of the internal carotid arteries  Normal ophthalmic artery origins  Normal ICA terminus  ANTERIOR CIRCULATION:  Symmetric A1 segments and anterior cerebral arteries with normal enhancement  Normal anterior communicating artery  MIDDLE CEREBRAL ARTERY CIRCULATION:  M1 segment and middle cerebral artery branches demonstrate normal enhancement bilaterally  DISTAL VERTEBRAL ARTERIES:  Moderate atherosclerotic plaque distal vertebral arteries  Posterior inferior cerebellar artery origins are normal  Normal vertebral basilar junction  BASILAR ARTERY:  Basilar artery is normal in caliber  Normal superior cerebellar arteries  POSTERIOR CEREBRAL ARTERIES: Both posterior cerebral arteries arises from the basilar tip  Both arteries demonstrate normal enhancement  Normal posterior communicating arteries  DURAL VENOUS SINUSES:  Normal  NON VASCULAR ANATOMY BONY STRUCTURES:  No acute osseous abnormality  SOFT TISSUES OF THE NECK:  Normal  THORACIC INLET:  Unremarkable  Impression: 1  4 x 4 millimeter focus of hyperdensity identified in the right parietal lobe likely small focus of acute petechial hemorrhage or cavernoma  2  Microangiopathy, lacunar infarcts bilateral thalamus  3  No evidence of hemodynamic significant stenosis, aneurysm or dissection  I personally discussed this study with Lilly Tapia on 6/29/6452 at 7:48 PM  Workstation performed: [id number]         ** Please Note: Dragon 360 Dictation voice to text software was used in the creation of this document   **

## 2020-09-22 NOTE — OCCUPATIONAL THERAPY NOTE
Occupational Therapy Evaluation     Patient Name: Laverne Jorge  MRZQY'[id number] Date: 9/22/2020  Problem List  Principal Problem:    Abnormal MRI of head  Active Problems:    Allergic rhinitis    CAMMIE (generalized anxiety disorder)    Stroke ([address] )    Unsteady gait    Past Medical History  Past Medical History:   Diagnosis Date    Arthropathy of knee     last assessed 8/19/15    Bacterial pneumonia 02/05/2007    last assessed 2/5/7    Colon polyp     CPAP (continuous positive airway pressure) dependence     Disorder of male genital organ 02/12/2008    last assessed 2/12/08    ED (erectile dysfunction) of organic origin     last assessed 2/13/17     History of hypertension     Seasonal allergies     Situational anxiety     resolved 3/16/17     Sleep apnea     Tinnitus     Wears hearing aid     bilateral     Past Surgical History  Past Surgical History:   Procedure Laterality Date    COLONOSCOPY      x3-w/polyps    HERNIA REPAIR      umbilical,hemal inguinal    KNEE ARTHROSCOPY Left     meniscus    [place] COLONOSCOPY FLX DX W/COLLJ SPEC WHEN PFRMD N/A 5/7/2018    Procedure: COLONOSCOPY;  Surgeon: Ree Gutierrez MD;  Location: [facility];   Service: Gastroenterology         09/22/20 1025   Note Type   Note type Eval only   Restrictions/Precautions   Weight Bearing Precautions Per Order No   Other Precautions Fall Risk   Pain Assessment   Pain Assessment Tool Pain Assessment not indicated - pt denies pain   Pain Score No Pain   Home Living   Type of [address] [place] [address] Two level;Stairs to enter with rails   Bathroom Shower/Tub Walk-in shower   Bathroom Toilet Standard   Bathroom Accessibility Accessible   Additional Comments 3 LYLY; NO USE OF DME   Prior Function   Level of [place] Independent with ADLs and functional mobility   Lives With Spouse   Receives Help From Family   ADL Assistance Independent   IADLs Independent   Falls in the last 6 months 0   Vocational Self employed   Lifestyle   Autonomy PT REPORTS BEING FULLY INDEPENDENT AT BASELINE  Reciprocal Relationships LIVES WITH SUPPORTIVE SPOUSE    Service to Others WORKS FULL TIME; OWNS [employer]    Intrinsic Gratification ENJOYS WORKING    Psychosocial   Psychosocial (WDL) WDL   Subjective   Subjective "MY WIFE SAID I'VE BEEN WALKING FUNNY FOR A MONTH"    ADL   Eating Assistance 7  Independent   Grooming Assistance 7  Independent   UB Bathing Assistance 7  Independent   LB Bathing Assistance 5  Supervision/Setup   UB Dressing Assistance 7  Independent   LB Dressing Assistance 5  Supervision/Setup   Toileting Assistance  5  Supervision/Setup   Functional Assistance 5  Supervision/Setup   Bed Mobility   Supine to Sit 5  Supervision   Additional items Assist x 1; Increased time required;Verbal cues   Sit to Supine Unable to assess  (PT LEFT OOB WITH ALL NEEDS IN REACH )   Transfers   Sit to Stand 5  Supervision   Additional items Assist x 1; Increased time required;Verbal cues   Stand to Sit 5  Supervision   Additional items Assist x 1; Increased time required;Verbal cues   Functional Mobility   Functional Mobility 5  Supervision   Additional Comments CGA WITHOUT AD- IMPROVED TO S LEVEL WITH USE OF SPC    Additional items SPC   Balance   Static Sitting Good   Static Standing Fair   Ambulatory Fair -   Activity Tolerance   Activity Tolerance Patient tolerated treatment well   Medical Staff Made Aware MOHIT-PT   SPOKE TO CM REGARDING D/C PLAN    Nurse Made Aware APPROPRIATE TO SEE    RUE Assessment   RUE Assessment WFL   LUE Assessment   LUE Assessment WFL   Hand Function   Gross Motor Coordination   (MILD ATAXIA )   Fine Motor Coordination   (MILD ATAXIA)   Sensation   Light Touch No apparent deficits   Vision - Complex Assessment   Additional Comments PT REPORTS NO VISUAL CHANGES    Perception   Inattention/Neglect Appears intact   Motor Planning Appears intact   Perseveration Not present   Cognition   Overall Cognitive Status [facility]   Arousal/Participation Alert; Cooperative   Attention Within functional limits   Orientation Level Oriented X4   Memory Within functional limits   Following Commands Follows all commands and directions without difficulty   Comments PT IS PLEASANT AND COOPERATIVE- EAGER TO RETURN HOME    Assessment   Assessment Pt is a 60year old male admitted [facility] with abnormal MRI with questionable petchial hemorrhage following ~1 month of ataxic gait  Per neurosx, no surgical intervention  Pt with OT orders and cleared by RN prior to evaluation  Pt reports 0/10 pain and is A+Ox4  Pt lives w/ supportive wife in [facility] with 3STE  Full bathroom/bedroom on second floor with half bath on first  No AD or DME utilized  At baseline, pt I w/ all ADL's, IADL's, and is a   Pt is self-employed and owns [employer]  Pt enjoys spending time in [place]  Currently, pt at a S level with functional mobility with use of SPC  Pt presents with ataxic gait-see PT eval for further details  Bed mobility, UB ADL's, & LB ADL's also at S level  Pt educated on energy conservation and compensatory techniques  Pt limited by mild ataxia, impaired balance, and limited activity tolerance  From an OT standpoint, pt is okay for d/c to home with increased family support  No further acute care OT needs at this time      Goals   Patient Goals to return home   Recommendation   OT Discharge Recommendation Return to previous environment with social support   OT - OK to Discharge Yes   Modified [place] Scale   Modified [place] Scale 2       Documentation completed by PRATIBHA Hobbs, OTR/L

## 2020-09-22 NOTE — PLAN OF CARE
Problem: Potential for Falls  Goal: Patient will remain free of falls  Description: INTERVENTIONS:  - Assess patient frequently for physical needs  -  Identify cognitive and physical deficits and behaviors that affect risk of falls    -  [place] fall precautions as indicated by assessment   - Educate patient/family on patient safety including physical limitations  - Instruct patient to call for assistance with activity based on assessment  - Modify environment to reduce risk of injury  - Consider OT/PT consult to assist with strengthening/mobility  Outcome: Progressing     Problem: PAIN - ADULT  Goal: Verbalizes/displays adequate comfort level or baseline comfort level  Description: Interventions:  - Encourage patient to monitor pain and request assistance  - Assess pain using appropriate pain scale  - Administer analgesics based on type and severity of pain and evaluate response  - Implement non-pharmacological measures as appropriate and evaluate response  - Consider cultural and social influences on pain and pain management  - Notify physician/advanced practitioner if interventions unsuccessful or patient reports new pain  Outcome: Progressing     Problem: INFECTION - ADULT  Goal: Absence or prevention of progression during hospitalization  Description: INTERVENTIONS:  - Assess and monitor for signs and symptoms of infection  - Monitor lab/diagnostic results  - Monitor all insertion sites, i e  indwelling lines, tubes, and drains  - Monitor endotracheal if appropriate and nasal secretions for changes in amount and color  - [place] appropriate cooling/warming therapies per order  - Administer medications as ordered  - Instruct and encourage patient and family to use good hand hygiene technique  - Identify and instruct in appropriate isolation precautions for identified infection/condition  Outcome: Progressing     Problem: SAFETY ADULT  Goal: Patient will remain free of falls  Description: INTERVENTIONS:  - Assess patient frequently for physical needs  -  Identify cognitive and physical deficits and behaviors that affect risk of falls    -  [place] fall precautions as indicated by assessment   - Educate patient/family on patient safety including physical limitations  - Instruct patient to call for assistance with activity based on assessment  - Modify environment to reduce risk of injury  - Consider OT/PT consult to assist with strengthening/mobility  Outcome: Progressing  Goal: Maintain or return to baseline ADL function  Description: INTERVENTIONS:  -  Assess patient's ability to carry out ADLs; assess patient's baseline for ADL function and identify physical deficits which impact ability to perform ADLs (bathing, care of mouth/teeth, toileting, grooming, dressing, etc )  - Assess/evaluate cause of self-care deficits   - Assess range of motion  - Assess patient's mobility; develop plan if impaired  - Assess patient's need for assistive devices and provide as appropriate  - Encourage maximum independence but intervene and supervise when necessary  - Involve family in performance of ADLs  - Assess for home care needs following discharge   - Consider OT consult to assist with ADL evaluation and planning for discharge  - Provide patient education as appropriate  Outcome: Progressing  Goal: Maintain or return mobility status to optimal level  Description: INTERVENTIONS:  - Assess patient's baseline mobility status (ambulation, transfers, stairs, etc )    - Identify cognitive and physical deficits and behaviors that affect mobility  - Identify mobility aids required to assist with transfers and/or ambulation (gait belt, sit-to-stand, lift, walker, cane, etc )  - [place] fall precautions as indicated by assessment  - Record patient progress and toleration of activity level on Mobility SBAR; progress patient to next Phase/Stage  - Instruct patient to call for assistance with activity based on assessment  - Consider rehabilitation consult to assist with strengthening/weightbearing, etc   Outcome: Progressing     Problem: DISCHARGE PLANNING  Goal: Discharge to home or other facility with appropriate resources  Description: INTERVENTIONS:  - Identify barriers to discharge w/patient and caregiver  - Arrange for needed discharge resources and transportation as appropriate  - Identify discharge learning needs (meds, wound care, etc )  - Arrange for interpretive services to assist at discharge as needed  - Refer to Case Management Department for coordinating discharge planning if the patient needs post-hospital services based on physician/advanced practitioner order or complex needs related to functional status, cognitive ability, or social support system  Outcome: Progressing     Problem: Knowledge Deficit  Goal: Patient/family/caregiver demonstrates understanding of disease process, treatment plan, medications, and discharge instructions  Description: Complete learning assessment and assess knowledge base  Interventions:  - Provide teaching at level of understanding  - Provide teaching via preferred learning methods  Outcome: Progressing     Problem: NEUROSENSORY - ADULT  Goal: Achieves stable or improved neurological status  Description: INTERVENTIONS  - Monitor and report changes in neurological status  - Monitor vital signs such as temperature, blood pressure, glucose, and any other labs ordered   - Initiate measures to prevent increased intracranial pressure  - Monitor for seizure activity and implement precautions if appropriate      Outcome: Progressing  Goal: Achieves maximal functionality and self care  Description: INTERVENTIONS  - Monitor swallowing and airway patency with patient fatigue and changes in neurological status  - Encourage and assist patient to increase activity and self care     - Encourage visually impaired, hearing impaired and aphasic patients to use assistive/communication devices  Outcome: Progressing Problem: METABOLIC, FLUID AND ELECTROLYTES - ADULT  Goal: Electrolytes maintained within normal limits  Description: INTERVENTIONS:  - Monitor labs and assess patient for signs and symptoms of electrolyte imbalances  - Administer electrolyte replacement as ordered  - Monitor response to electrolyte replacements, including repeat lab results as appropriate  - Instruct patient on fluid and nutrition as appropriate  Outcome: Progressing     Problem: MUSCULOSKELETAL - ADULT  Goal: Maintain or return mobility to safest level of function  Description: INTERVENTIONS:  - Assess patient's ability to carry out ADLs; assess patient's baseline for ADL function and identify physical deficits which impact ability to perform ADLs (bathing, care of mouth/teeth, toileting, grooming, dressing, etc )  - Assess/evaluate cause of self-care deficits   - Assess range of motion  - Assess patient's mobility  - Assess patient's need for assistive devices and provide as appropriate  - Encourage maximum independence but intervene and supervise when necessary  - Involve family in performance of ADLs  - Assess for home care needs following discharge   - Consider OT consult to assist with ADL evaluation and planning for discharge  - Provide patient education as appropriate  Outcome: Progressing  Goal: Maintain proper alignment of affected body part  Description: INTERVENTIONS:  - Support, maintain and protect limb and body alignment  - Provide patient/ family with appropriate education  Outcome: Progressing     Problem: Neurological Deficit  Goal: Neurological status is stable or improving  Description: Interventions:  - Monitor and assess patient's level of consciousness, motor function, sensory function, and level of assistance needed for ADLs  - Monitor and report changes from baseline  Collaborate with interdisciplinary team to initiate plan and implement interventions as ordered  - Provide and maintain a safe environment    - Consider seizure precautions  - Consider fall precautions  - Consider aspiration precautions  - Consider bleeding precautions  Outcome: Progressing     Problem: Activity Intolerance/Impaired Mobility  Goal: Mobility/activity is maintained at optimum level for patient  Description: Interventions:  - Assess and monitor patient  barriers to mobility and need for assistive/adaptive devices  - Assess patient's emotional response to limitations  - Collaborate with interdisciplinary team and initiate plans and interventions as ordered  - Encourage independent activity per ability   - Maintain proper body alignment  - Perform active/passive rom as tolerated/ordered  - Plan activities to conserve energy   - Turn patient as appropriate  Outcome: Progressing     Problem: Communication Impairment  Goal: Ability to express needs and understand communication  Description: Assess patient's communication skills and ability to understand information  Patient will demonstrate use of effective communication techniques, alternative methods of communication and understanding even if not able to speak  - Encourage communication and provide alternate methods of communication as needed  - Collaborate with case management/ for discharge needs  - Include patient/family/caregiver in decisions related to communication  Outcome: Progressing     Problem: Potential for Aspiration  Goal: Non-ventilated patient's risk of aspiration is minimized  Description: Assess and monitor vital signs, respiratory status, and labs (WBC)  Monitor for signs of aspiration (tachypnea, cough, rales, wheezing, cyanosis, fever)  - Assess and monitor patient's ability to swallow  - Place patient up in chair to eat if possible  - HOB up at 90 degrees to eat if unable to get patient up into chair   - Supervise patient during oral intake  - Instruct patient/ family to take small bites    - Instruct patient/ family to take small single sips when taking liquids  - Follow patient-specific strategies generated by speech pathologist   Outcome: Progressing  Goal: Ventilated patient's risk of aspiration is minimized  Description: Assess and monitor vital signs, respiratory status, airway cuff pressure, and labs (WBC)  Monitor for signs of aspiration (tachypnea, cough, rales, wheezing, cyanosis, fever)  - Elevate head of bed 30 degrees if patient has tube feeding   - Monitor tube feeding  Outcome: Progressing     Problem: Nutrition  Goal: Nutrition/Hydration status is improving  Description: Monitor and assess patient's nutrition/hydration status for malnutrition (ex- brittle hair, bruises, dry skin, pale skin and conjunctiva, muscle wasting, smooth red tongue, and disorientation)  Collaborate with interdisciplinary team and initiate plan and interventions as ordered  Monitor patient's weight and dietary intake as ordered or per policy  Utilize nutrition screening tool and intervene per policy  Determine patient's food preferences and provide high-protein, high-caloric foods as appropriate  - Assist patient with eating   - Allow adequate time for meals   - Encourage patient to take dietary supplement as ordered  - Collaborate with clinical nutritionist   - Include patient/family/caregiver in decisions related to nutrition    Outcome: Progressing

## 2020-09-22 NOTE — EMTALA/ACUTE CARE TRANSFER
[address] [address]  Dept: [phone]      EMTALA TRANSFER CONSENT    NAME Mary Jo SCOTT 1955                              MRN [id number]    I have been informed of my rights regarding examination, treatment, and transfer   by Dr Nisreen Feldman MD    Benefits: Specialized equipment and/or services available at the receiving facility (Include comment)________________________    Risks: Potential for delay in receiving treatment, Potential deterioration of medical condition, Loss of IV, Increased discomfort during transfer, Possible worsening of condition or death during transfer      Consent for Transfer:  I acknowledge that my medical condition has been evaluated and explained to me by the emergency department physician or other qualified medical person and/or my attending physician, who has recommended that I be transferred to the service of  Accepting Physician: Dr Sylvester Johnson at [address] Name, [address] : [place], [place]  The above potential benefits of such transfer, the potential risks associated with such transfer, and the probable risks of not being transferred have been explained to me, and I fully understand them  The doctor has explained that, in my case, the benefits of transfer outweigh the risks  I agree to be transferred  I authorize the performance of emergency medical procedures and treatments upon me in both transit and upon arrival at the receiving facility  Additionally, I authorize the release of any and all medical records to the receiving facility and request they be transported with me, if possible  I understand that the safest mode of transportation during a medical emergency is an ambulance and that the Hospital advocates the use of this mode of transport   Risks of traveling to the receiving facility by car, including absence of medical control, life sustaining equipment, such as oxygen, and medical personnel has been explained to me and I fully understand them  (CAMMI[place] CORRECT BOX BELOW)  [  ]  I consent to the stated transfer and to be transported by ambulance/helicopter  [  ]  I consent to the stated transfer, but refuse transportation by ambulance and accept full responsibility for my transportation by car  I understand the risks of non-ambulance transfers and I exonerate the Hospital and its staff from any deterioration in my condition that results from this refusal     X___________________________________________    DATE  20  TIME________  Signature of patient or legally responsible individual signing on patient behalf           RELATIONSHIP TO PATIENT_________________________          Provider Certification    NAME Darwin Alves                                         1955                              MRN [id number]    A medical screening exam was performed on the above named patient  Based on the examination:    Condition Necessitating Transfer The primary encounter diagnosis was Dizziness  Diagnoses of Ataxia, Abnormal MRI, and ICH (intracerebral hemorrhage) ([facility] [address] ) were also pertinent to this visit      Patient Condition: The patient has been stabilized such that within reasonable medical probability, no material deterioration of the patient condition or the condition of the unborn child(leticia) is likely to result from the transfer    Reason for Transfer: Level of Care needed not available at this facility    Transfer Requirements: [address]   · Space available and qualified personnel available for treatment as acknowledged by    · Agreed to accept transfer and to provide appropriate medical treatment as acknowledged by       Dr Jeana Fitch  · Appropriate medical records of the examination and treatment of the patient are provided at the time of transfer   [address] _______  · Transfer will be performed by qualified personnel from    and appropriate transfer equipment as required, including the use of necessary and appropriate life support measures  Provider Certification: I have examined the patient and explained the following risks and benefits of being transferred/refusing transfer to the patient/family:  General risk, such as traffic hazards, adverse weather conditions, rough terrain or turbulence, possible failure of equipment (including vehicle or aircraft), or consequences of actions of persons outside the control of the transport personnel      Based on these reasonable risks and benefits to the patient and/or the unborn child(leticia), and based upon the information available at the time of the patients examination, I certify that the medical benefits reasonably to be expected from the provision of appropriate medical treatments at another medical facility outweigh the increasing risks, if any, to the individuals medical condition, and in the case of labor to the unborn child, from effecting the transfer      X____________________________________________ DATE 09/21/20        TIME_______      ORIGINAL - SEND TO MEDICAL RECORDS   COPY - SEND WITH PATIENT DURING TRANSFER

## 2020-09-22 NOTE — ASSESSMENT & PLAN NOTE
Umair Montero is a 60 y o  male who presented to [facility] following an abnormal MRI that showed tiny foci within the R thalamus and L frontal lobe concerning for infarcts  Gait is ataxic; exam was negative for other focal neurologic deficits  Imaging  · CTA: 4 x 4 millimeter focus of hyperdensity in R parietal lobe likely acute petechial hemorrhage or cavernoma; lacunar infarct bilateral thalamus  · MRI:  tiny foci of high signal noted on diffusion imaging within the anterior right thalamus and left frontal lobe white matter may represent tiny acute infarcts  Significant periventricular white matter disease and parenchymal atrophy representing small vessel disease     · Compared to the MRI done in 2016, worsening small vessel disease and 1 new infarct in the L frontal lobe seen on recent MRI    Plan  · Neurosurgery consult for petechial hemorrhage vs cavernoma  · Restart 81mg aspirin pending neurosurgery clearance  · 21 days of dual anticoagulation (aspirin, Plavix) followed by Plavix for stroke prevention  · Echo pending  · Outpatient neurology follow-up  · Patient has been counceled on abstinence from alcohol

## 2020-09-22 NOTE — CONSULTS
ConsultEtter Marisabelnademetri 1955, 60 y o  male MRN: [id number]    Unit/Bed#: [facility] 609-01 Encounter: [id number]    Primary Care Provider: Helena Khan DO   Date and time admitted to hospital: 9/22/2020 12:04 AM    Inpatient consult to Neurosurgery  Consult performed by: Ana Maria Hood PA-C  Consult ordered by: Lukas Valles MD        * Abnormal MRI of head  Assessment & Plan  · Presented to the hospital after outpatient work up for ataxia with MRI brain yesterday showing question for petechial hemorrhage  Imaging:   · MRI brain 9/21/2020: Solitary tiny focus of high signal noted within anterior right, left frontal lobe white matter may represent tiny acute infarct  Foci gradient blooming artifact within both cerebral hemispheres right greater than left may resume present small petechial hemorrhage versus cavernoma  · CTA head 9/21/2020:   4 x 4 mm and focus of hyperdensity identified in the right parietal lobe likely small focus of acute petechial hemorrhage or cavernoma  Microangiopathy in lacunar infarcts bilateral thalamus  No evidence of hemodynamic significant stenosis aneurysm or dissection  Plan:  · Ongoing neurologic checks  · Neurology consultation for a stroke seen on MRI brain as well as CTA  · No anticipated neurosurgical intervention this juncture  · Patient can have delayed MRI brain in 6-12 months in the outpatient setting to assess for stability given differential include cavernomas  · Nothing further from neurosurgical standpoint  · Medical management per primary team    · Would recommend stable CT head prior to initiation of pharmacologic DVT ppx  · Will sign off and follow up in 6 months  Call with questions or concerns  Stroke [facility])  Assessment & Plan  · Recommend neurology consultation  · No surgical needs       History of Present Illness   HPI: Trina Soni is a 60 y o  male with PMH including tennis, sleep apnea w/ CPAP use, hypertension who presents after MRI of the brain showed questionable petechial hemorrhage seen on outpatient MRI brain  He was undergoing evaluation of his gait stability that has been progressive over the last couple months  Use cauda his MRI results and told to come to the ER for evaluation  At this time he has no issues complaints or concerns  He did ask when he is able to go home  Patient denies any headaches, changes in vision, trouble speech, weakness, sensation changes, chest pain, shortness of breath, abdominal pain, neck pain, back pain  States over the last few months his wife more than him of notice some more gain stability the previously noted  He continues to deny any use of assistive device at this time  He states he did ambulate this morning with physical therapy while using a cane in the hallway  Patient denies any falls at home  Review of Systems   Constitutional: Negative for appetite change, diaphoresis and fatigue  HENT: Negative for congestion, facial swelling and voice change  Eyes: Negative for visual disturbance  Respiratory: Negative for cough and chest tightness  Cardiovascular: Negative for chest pain and leg swelling  Gastrointestinal: Negative for abdominal pain, diarrhea, nausea and vomiting  Genitourinary: Negative for difficulty urinating  Musculoskeletal: Positive for gait problem  Negative for back pain, neck pain and neck stiffness  Skin: Negative for rash and wound  Neurological: Negative for dizziness, weakness, numbness and headaches  Psychiatric/Behavioral: Negative for agitation, behavioral problems and confusion         Historical Information   Past Medical History:   Diagnosis Date    Arthropathy of knee     last assessed 8/19/15    Bacterial pneumonia 02/05/2007    last assessed 2/5/7    Colon polyp     CPAP (continuous positive airway pressure) dependence     Disorder of male genital organ 02/12/2008    last assessed 2/12/08    ED (erectile dysfunction) of organic origin     last assessed 2/13/17     History of hypertension     Seasonal allergies     Situational anxiety     resolved 3/16/17     Sleep apnea     Tinnitus     Wears hearing aid     bilateral     Past Surgical History:   Procedure Laterality Date    COLONOSCOPY      x3-w/polyps    HERNIA REPAIR      umbilical,hemal inguinal    KNEE ARTHROSCOPY Left     meniscus    VT COLONOSCOPY FLX DX W/COLLJ SPEC WHEN PFRMD N/A 5/7/2018    Procedure: COLONOSCOPY;  Surgeon: Madan Harrington MD;  Location: [place] [address] [facility];   Service: Gastroenterology     Social History     Substance and Sexual Activity   Alcohol Use Yes    Alcohol/week: 24 0 standard drinks    Types: 20 Standard drinks or equivalent, 2 Glasses of wine, 2 Cans of beer per week    Frequency: 4 or more times a week    Drinks per session: 3 or 4    Comment: 2-3 mixed drinks/day     Social History     Substance and Sexual Activity   Drug Use No     Social History     Tobacco Use   Smoking Status Never Smoker   Smokeless Tobacco Never Used     Family History   Problem Relation Age of Onset    Cancer Mother         breast    Diverticulitis Mother         colostomy    Breast cancer Mother     Heart disease Father         CHF    Cancer Sister         breast    Depression Sister     Cancer Sister         skin cancer    Cancer Sister         skin cancer    Colon cancer Neg Hx     Liver disease Neg Hx        Meds/Allergies   all current active meds have been reviewed, current meds:   Current Facility-Administered Medications   Medication Dose Route Frequency    acetaminophen (TYLENOL) tablet 650 mg  650 mg Oral Q6H PRN    atorvastatin (LIPITOR) tablet 40 mg  40 mg Oral QPM    docusate sodium (COLACE) capsule 100 mg  100 mg Oral BID PRN    glycerin-hypromellose- (ARTIFICIAL TEARS) ophthalmic solution 2 drop  2 drop Both Eyes Q3H PRN    loratadine (CLARITIN) tablet 10 mg  10 mg Oral Daily    ondansetron (ZOFRAN) injection 4 mg  4 mg Intravenous Q6H PRN    traZODone (DESYREL) tablet 100 mg  100 mg Oral HS    and PTA meds:   Prior to Admission Medications   Prescriptions Last Dose Informant Patient Reported? Taking? ALPRAZolam (XANAX) 0 25 mg tablet   Yes No   aspirin 81 MG tablet   Yes No   Sig: Take 81 mg by mouth daily    fexofenadine (ALLEGRA) 180 MG tablet   No No   Sig: Take 1 tablet (180 mg total) by mouth daily for 30 days   Patient taking differently: Take 180 mg by mouth every morning     sildenafil (VIAGRA) 100 mg tablet   No No   Sig: Take 1 tablet (100 mg total) by mouth daily as needed for erectile dysfunction   traZODone (DESYREL) 100 mg tablet   No No   Sig: Take 1 tablet (100 mg total) by mouth daily at bedtime as needed for sleep      Facility-Administered Medications: None     Allergies   Allergen Reactions    Iodine Solution [Povidone Iodine]     Pollen Extract      Reaction Date: 18Sep2006;        Objective   I/O       09/20 0701 - 09/21 0700 09/21 0701 - 09/22 0700 09/22 0701 - 09/23 0700    P  O    240    Total Intake(mL/kg)   240 (2 4)    Net   +240           Unmeasured Urine Occurrence   1 x          Physical Exam  Constitutional:       Appearance: He is well-developed  HENT:      Head: Normocephalic  Eyes:      General:         Right eye: No discharge  Extraocular Movements: EOM normal       Conjunctiva/sclera: Conjunctivae normal       Pupils: Pupils are equal, round, and reactive to light  Neck:      Musculoskeletal: Normal range of motion and neck supple  Vascular: No JVD  Cardiovascular:      Rate and Rhythm: Normal rate  Pulmonary:      Effort: Pulmonary effort is normal    Abdominal:      General: There is no distension  Palpations: Abdomen is soft  Tenderness: There is no abdominal tenderness  Musculoskeletal: Normal range of motion  General: No deformity  Skin:     General: Skin is warm and dry  Neurological:      General: No focal deficit present        Mental Status: He is alert and oriented to person, place, and time  Cranial Nerves: No cranial nerve deficit  Sensory: No sensory deficit  Motor: No weakness  Coordination: Finger-Nose-Finger Test normal       Deep Tendon Reflexes: Reflexes are normal and symmetric  Reflex Scores:       Bicep reflexes are 2+ on the right side and 2+ on the left side  Brachioradialis reflexes are 2+ on the right side and 2+ on the left side  Patellar reflexes are 2+ on the right side and 2+ on the left side  Psychiatric:         Speech: Speech normal          Behavior: Behavior normal          Thought Content: Thought content normal        Neurologic Exam     Mental Status   Oriented to person, place, and time  Registration: recalls 3 of 3 objects  Attention: normal    Speech: speech is normal   Level of consciousness: alert  Knowledge: good and consistent with education  Able to name object  Normal comprehension  Cranial Nerves     CN III, IV, VI   Pupils are equal, round, and reactive to light  Extraocular motions are normal    CN III: no CN III palsy  CN VI: no CN VI palsy  Nystagmus: none   Upgaze: normal  Downgaze: normal    CN V   Facial sensation intact  CN VII   Facial expression full, symmetric       CN VIII   CN VIII normal    Hearing: intact    CN XI   CN XI normal      CN XII   CN XII normal    Tongue: not atrophic  Tongue deviation: none    Motor Exam   Muscle bulk: normal  Overall muscle tone: normal  Right arm tone: normal  Left arm tone: normal  Right arm pronator drift: absent  Left arm pronator drift: absent  Right leg tone: normal  Left leg tone: normal    Strength   Right deltoid: 5/5  Left deltoid: 5/5  Right biceps: 5/5  Left biceps: 5/5  Right triceps: 5/5  Left triceps: 5/5  Right wrist flexion: 5/5  Left wrist flexion: 5/5  Right wrist extension: 5/5  Left wrist extension: 5/5  Right iliopsoas: 5/5  Left iliopsoas: 5/5  Right quadriceps: 5/5  Left quadriceps: 5/5  Right hamstrin/5  Left hamstrin/5  Right anterior tibial: 5/5  Left anterior tibial: 5/5  Right gastroc: 5/5  Left gastroc: 5/5    Sensory Exam   Light touch normal    DST intact  Gait, Coordination, and Reflexes     Coordination   Finger to nose coordination: normal    Tremor   Resting tremor: absent  Action tremor: absent    Reflexes   Right brachioradialis: 2+  Left brachioradialis: 2+  Right biceps: 2+  Left biceps: 2+  Right patellar: 2+  Left patellar: 2+  Right ankle clonus: absent  Left ankle clonus: absent        Vitals:Blood pressure 147/97, pulse 68, temperature (!) 97 4 °F (36 3 °C), resp  rate 17, height 6' (1 829 m), weight 102 kg (223 lb 15 8 oz), SpO2 96 %  ,Body mass index is 30 38 kg/m²  Lab Results:   Results from last 7 days   Lab Units 20  0439 20  1816   WBC Thousand/uL 5 78 5 55   HEMOGLOBIN g/dL 14 1 15 2   HEMATOCRIT % 40 7 45 2   PLATELETS Thousands/uL 252 284   NEUTROS PCT % 60 66   MONOS PCT % 11 10     Results from last 7 days   Lab Units 20  0439 20  1816   POTASSIUM mmol/L 3 1* 3 5   CHLORIDE mmol/L 112* 106   CO2 mmol/L 25 29   BUN mg/dL 16 15   CREATININE mg/dL 0 73 0 87   CALCIUM mg/dL 8 5 8 8   ALK PHOS U/L 92 103   ALT U/L 16 19   AST U/L 11 14     Results from last 7 days   Lab Units 20  0439   MAGNESIUM mg/dL 2 6     Results from last 7 days   Lab Units 20  0439   PHOSPHORUS mg/dL 3 5     Results from last 7 days   Lab Units 20  1816   INR  0 95     No results found for: TROPONINT  ABG:No results found for: PHART, XSI2YCG, PO2ART, LEC6KQT, Z7ADRUOF, BEART, SOURCE    Imaging Studies: I have personally reviewed pertinent reports  and I have personally reviewed pertinent films in PACS    No results found  EKG, Pathology, and Other Studies: I have personally reviewed pertinent reports        VTE Prophylaxis: Sequential compression device (Venodyne)     Code Status: Level 1 - Full Code  Advance Directive and Living Will:      Power of :    POLST:      Counseling / Coordination of Care  I spent 45 minutes with the patient

## 2020-09-22 NOTE — PHYSICAL THERAPY NOTE
PHYSICAL THERAPY Evaluation NOTE    Patient Name: Ophelia Barthel  DKBPP'C Date: 9/22/2020     AGE:   60 y o  Mrn:   [id number]  ADMIT DX:  Petechial hemorrhage [R23 3]    Past Medical History:   Diagnosis Date    Arthropathy of knee     last assessed 8/19/15    Bacterial pneumonia 02/05/2007    last assessed 2/5/7    Colon polyp     CPAP (continuous positive airway pressure) dependence     Disorder of male genital organ 02/12/2008    last assessed 2/12/08    ED (erectile dysfunction) of organic origin     last assessed 2/13/17     History of hypertension     Seasonal allergies     Situational anxiety     resolved 3/16/17     Sleep apnea     Tinnitus     Wears hearing aid     bilateral     Length Of Stay: 0  PHYSICAL THERAPY EVALUATION :    09/22/20 1023   PT Last Visit   PT Visit Date 09/22/20   Note Type   Note type Eval/Treat   Pain Assessment   Pain Assessment Tool Pain Assessment not indicated - pt denies pain   Pain Score No Pain   Home Living   Type of Home House  (2 , 3 LYLY)   Home Layout Two level;Stairs to enter with rails   Bathroom Shower/Tub Walk-in shower   Bathroom Toilet Standard   Bathroom Accessibility Accessible   Home Equipment   (No AD no DME)   Additional Comments Pt  lives in a [address] [place] [address] 3 LYLY    Prior Function   Level of [place] Independent with ADLs and functional mobility   Lives With [place] Help From Family   ADL Assistance Independent   IADLs Independent   Falls in the last 6 months 0   Vocational Self employed   Comments PTA, Pt  reports INDEP with ADLs, IADLs, and functional mobility without an AD  Restrictions/Precautions   Weight Bearing Precautions Per Order No   Other Precautions Fall Risk; Chair Alarm; Bed Alarm   General   Additional Pertinent History Pt  is a 59 yo M who presents with gait ataxia  MRI demonstrates acute infarction      Family/Caregiver Present No   Cognition Overall Cognitive Status WFL   Arousal/Participation Cooperative   Orientation Level Oriented X4   Memory Within functional limits   Following Commands Follows all commands and directions without difficulty   Comments Pt  was identified with full name and birthdate   RLE Assessment   RLE Assessment   (functionally > 3+/5)   LLE Assessment   LLE Assessment   (functionally > 3+/5)   Coordination   Movements are Fluid and Coordinated 0   Coordination and Movement Description gait ataxia noted    Bed Mobility   Supine to Sit 5  Supervision   Additional items Assist x 1   Sit to Supine 5  Supervision   Additional items Assist x 1   Transfers   Sit to Stand 5  Supervision   Additional items Assist x 1; Increased time required;Verbal cues  (to reach back to control descent)   Stand to Sit 5  Supervision   Additional items Assist x 1; Increased time required   Ambulation/Elevation   Gait pattern Improper Weight shift;Narrow NATHANAEL; Forward Flexion;Decreased foot clearance; Ataxia; Redundant gait at times   Gait Assistance 5  Supervision   Additional items Assist x 1;Verbal cues  (for posture and gait mechanics)   Assistive Device None;SPC   Distance 150'x1 without AD, 150'x1 with SPC noted to have improved balance and gait mechanics   Balance   Static Sitting Good   Dynamic Sitting Fair +   Static Standing Fair   Dynamic Standing Fair -   Ambulatory Poor +   Endurance Deficit   Endurance Deficit Yes   Endurance Deficit Description Postural and gait degradation noted with fatigue   Activity Tolerance   Activity Tolerance Patient limited by fatigue;Patient limited by pain   Medical Staff Made Aware Spoke to CM    Nurse Made Aware Spoke to RN   Assessment   Prognosis Good   Problem List Decreased strength;Decreased range of motion;Decreased endurance; Impaired balance;Decreased mobility; Decreased coordination;Decreased safety awareness   Assessment Pt  is a 59 yo M who presents with gait ataxia  MRI demonstrates acute infarction  order placed for PT eval and tx, w/ activity order of up w/ A  pt presents w/ comorbidities of CAMMIE, stroke, unsteady gait, Asthma, complaints of weakness of lower extremity, and personal factors of living in 2 story house, mobilizing w/ assistive device, stair(s) to enter home, inability to navigate level surfaces w/o external assistance, unable to perform dynamic tasks in community and unable to perform physical activity  pt presents w/ weakness, decreased ROM, decreased endurance, impaired balance, gait deviations, impaired coordination, decreased safety awareness and fall risk  these impairments are evident in findings from physical examination (weakness, decreased ROM and impaired coordination), mobility assessment (need for Supervision assist w/ all phases of mobility when usually mobilizing independently, tolerance to only 150'x2 feet of ambulation and need for cueing for mobility technique), and Barthel Index: 75/100  pt needed input for task focus and mobility technique  pt is at risk for falls due to physical and safety awareness deficits  pt's clinical presentation is unstable/unpredictable (evident in need for assist w/ all phases of mobility when usually mobilizing independently, tolerance to only 150'x2 feet of ambulation, pain impacting overall mobility status, need for input for mobility technique, need for input for task focus and mobility technique and need for input for mobility technique/safety)  pt needs inpatient PT tx to improve mobility deficits  discharge recommendation is for outpatient PT and home w/ family support to reduce fall risk and maximize level of functional independence  Goals   Patient Goals to get back to normal   STG Expiration Date 10/02/20   Short Term Goal #1 pt will:  Increase bilateral LE strength 1/2 grade to facilitate independent mobility, Perform all bed mobility tasks independently to decrease fall risk factors, Perform all transfers independently to improve independence, Ambulate 350 ft  with least restrictive assistive device w/ supervision w/o LOB, Navigate 12 stairs modified independent with unilateral handrail to facilitate return to previous living environment, Increase all balance 1/2 grade to decrease risk for falls and Improve Barthel Index score to 100 or greater to facilitate independence   PT Treatment Day 0   Plan   Treatment/Interventions Functional transfer training;LE strengthening/ROM; Therapeutic exercise; Endurance training;Patient/family training;Equipment eval/education; Bed mobility;Gait training;Spoke to nursing;Spoke to case management   PT Frequency   (3-5x/wk)   Recommendation   PT Discharge Recommendation Home with skilled therapy  (OPPT and home with family support)   Equipment Recommended Cane   PT - OK to Discharge Yes   Additional Comments when medically appropriate   Barthel Index   Feeding 10   Bathing 5   Grooming Score 5   Dressing Score 5   Bladder Score 10   Bowels Score 10   Toilet Use Score 5   Transfers (Bed/Chair) Score 10   Mobility (Level Surface) Score 10   Stairs Score 5   Barthel Index Score 75     Skilled PT recommended while in hospital and upon DC to progress pt toward treatment goals       Melynda Bamberger, PT, DPT 9/22/2020

## 2020-09-22 NOTE — ASSESSMENT & PLAN NOTE
· Presented to the hospital after outpatient work up for ataxia with MRI brain yesterday showing question for petechial hemorrhage  Imaging:   · MRI brain 9/21/2020: Solitary tiny focus of high signal noted within anterior right, left frontal lobe white matter may represent tiny acute infarct  Foci gradient blooming artifact within both cerebral hemispheres right greater than left may resume present small petechial hemorrhage versus cavernoma  · CTA head 9/21/2020:   4 x 4 mm and focus of hyperdensity identified in the right parietal lobe likely small focus of acute petechial hemorrhage or cavernoma  Microangiopathy in lacunar infarcts bilateral thalamus  No evidence of hemodynamic significant stenosis aneurysm or dissection  Plan:  · Ongoing neurologic checks  · Neurology consultation for a stroke seen on MRI brain as well as CTA  · No anticipated neurosurgical intervention this juncture  · Patient can have delayed MRI brain in 6-12 months in the outpatient setting to assess for stability given differential include cavernomas  · Nothing further from neurosurgical standpoint  · Medical management per primary team    · Would recommend stable CT head prior to initiation of pharmacologic DVT ppx  · Will sign off and follow up in 6 months  Call with questions or concerns

## 2020-09-22 NOTE — PROGRESS NOTES
Case reviewed in detail  Briefly this is the gentleman who had an MRI of the brain done  This suggested two areas of acute stroke  A CT and CT a were repeated  In the right parietal region there is a high density which could either be hemorrhage or a cavernoma  images or not available  Currently the ER doctor has the disc but they are not uploaded to our pact system  The report however is reviewed  This demonstrates a blooming artifact on gradient echo studies which is unchanged from a previous study  This could only happen in the setting of a cavernoma

## 2020-09-22 NOTE — PLAN OF CARE
Problem: Potential for Falls  Goal: Patient will remain free of falls  Description: INTERVENTIONS:  - Assess patient frequently for physical needs  -  Identify cognitive and physical deficits and behaviors that affect risk of falls    -  [place] fall precautions as indicated by assessment   - Educate patient/family on patient safety including physical limitations  - Instruct patient to call for assistance with activity based on assessment  - Modify environment to reduce risk of injury  - Consider OT/PT consult to assist with strengthening/mobility  Outcome: Progressing     Problem: PAIN - ADULT  Goal: Verbalizes/displays adequate comfort level or baseline comfort level  Description: Interventions:  - Encourage patient to monitor pain and request assistance  - Assess pain using appropriate pain scale  - Administer analgesics based on type and severity of pain and evaluate response  - Implement non-pharmacological measures as appropriate and evaluate response  - Consider cultural and social influences on pain and pain management  - Notify physician/advanced practitioner if interventions unsuccessful or patient reports new pain  Outcome: Progressing     Problem: INFECTION - ADULT  Goal: Absence or prevention of progression during hospitalization  Description: INTERVENTIONS:  - Assess and monitor for signs and symptoms of infection  - Monitor lab/diagnostic results  - Monitor all insertion sites, i e  indwelling lines, tubes, and drains  - Monitor endotracheal if appropriate and nasal secretions for changes in amount and color  - [place] appropriate cooling/warming therapies per order  - Administer medications as ordered  - Instruct and encourage patient and family to use good hand hygiene technique  - Identify and instruct in appropriate isolation precautions for identified infection/condition  Outcome: Progressing     Problem: SAFETY ADULT  Goal: Patient will remain free of falls  Description: INTERVENTIONS:  - Assess patient frequently for physical needs  -  Identify cognitive and physical deficits and behaviors that affect risk of falls    -  [place] fall precautions as indicated by assessment   - Educate patient/family on patient safety including physical limitations  - Instruct patient to call for assistance with activity based on assessment  - Modify environment to reduce risk of injury  - Consider OT/PT consult to assist with strengthening/mobility  Outcome: Progressing  Goal: Maintain or return to baseline ADL function  Description: INTERVENTIONS:  -  Assess patient's ability to carry out ADLs; assess patient's baseline for ADL function and identify physical deficits which impact ability to perform ADLs (bathing, care of mouth/teeth, toileting, grooming, dressing, etc )  - Assess/evaluate cause of self-care deficits   - Assess range of motion  - Assess patient's mobility; develop plan if impaired  - Assess patient's need for assistive devices and provide as appropriate  - Encourage maximum independence but intervene and supervise when necessary  - Involve family in performance of ADLs  - Assess for home care needs following discharge   - Consider OT consult to assist with ADL evaluation and planning for discharge  - Provide patient education as appropriate  Outcome: Progressing  Goal: Maintain or return mobility status to optimal level  Description: INTERVENTIONS:  - Assess patient's baseline mobility status (ambulation, transfers, stairs, etc )    - Identify cognitive and physical deficits and behaviors that affect mobility  - Identify mobility aids required to assist with transfers and/or ambulation (gait belt, sit-to-stand, lift, walker, cane, etc )  - [place] fall precautions as indicated by assessment  - Record patient progress and toleration of activity level on Mobility SBAR; progress patient to next Phase/Stage  - Instruct patient to call for assistance with activity based on assessment  - Consider rehabilitation consult to assist with strengthening/weightbearing, etc   Outcome: Progressing     Problem: DISCHARGE PLANNING  Goal: Discharge to home or other facility with appropriate resources  Description: INTERVENTIONS:  - Identify barriers to discharge w/patient and caregiver  - Arrange for needed discharge resources and transportation as appropriate  - Identify discharge learning needs (meds, wound care, etc )  - Arrange for interpretive services to assist at discharge as needed  - Refer to Case Management Department for coordinating discharge planning if the patient needs post-hospital services based on physician/advanced practitioner order or complex needs related to functional status, cognitive ability, or social support system  Outcome: Progressing     Problem: Knowledge Deficit  Goal: Patient/family/caregiver demonstrates understanding of disease process, treatment plan, medications, and discharge instructions  Description: Complete learning assessment and assess knowledge base    Interventions:  - Provide teaching at level of understanding  - Provide teaching via preferred learning methods  Outcome: Progressing

## 2020-09-22 NOTE — ASSESSMENT & PLAN NOTE
Claudean Charnley Enrike Ernandez is a 60 y o  male who presented to [facility] following an abnormal MRI that showed tiny foci within the R deep cortical region and L frontal lobe concerning for infarcts  Compared to imaging done in 2016, MRI showed worsening small vessel disease and 1 new infarct in R deep cortical structures  New infarct was likely a result of small vessel disease due to previous risk factors including HTN, sleep apnea, and alcohol use  He presented with a month long history of gait unsteadiness; exam was negative for other focal neurologic deficits  Imaging   · CTA: 4 x 4 millimeter focus of hyperdensity in R parietal lobe likely acute petechial hemorrhage or cavernoma; lacunar infarcts bilateral thalamus (performed and read at outside facility)  · MRI:  tiny foci of high signal noted on diffusion imaging within the anterior right thalamus and left frontal lobe white matter may represent tiny acute infarcts   Significant periventricular white matter disease and parenchymal atrophy representing small vessel disease  (performed and read at outside facility)    Plan  · Neurosurgery consult for petechial hemorrhage vs cavernoma  · Restart 81mg aspirin pending neurosurgery clearance  · 21 days of dual anticoagulation (aspirin, Plavix) followed by Plavix alone for stroke prevention  · Echo pending  · Outpatient neurology follow-up  · Patient has been counceled on abstinence from alcohol and regular exercise 3-4 times/week  · Maintain BP goal of <130/90

## 2020-09-22 NOTE — ASSESSMENT & PLAN NOTE
Films are sent with patient and will be uploaded through radiology reading room  Report of radiology an MRI of [place] is in the chart  Patient would need to be seen by Neurosurgery to ensure that he does not have hemorrhage versus cavernomas as per findings  As the patient is complaining of 4 week history of possible ataxic gait; continue neurology consult with TIA/CVA protocol  Lipitor 40 mg started  Aspirin put on hold due to the possibility of hemangioma  Lipid profile, hemoglobin A1c and TSH for tomorrow  Neuro checks per protocol  Occupational and physical therapy consult with case management consult  PMR consult

## 2020-09-22 NOTE — SPEECH THERAPY NOTE
Speech Language/Pathology  Pt admitted following c/o 4 week history of possible ataxic gait; noted abnormal MRI, CTA - 4 x 4 millimeter focus of hyperdensity identified in the right parietal lobe likely small focus of acute petechial hemorrhage or cavernoma,  Microangiopathy, lacunar infarcts bilateral thalamus, No evidence of hemodynamic significant stenosis, aneurysm or dissection  Pt screened bedside, speech is clear, no dysphagia noted & he completed 100% of his meal   No formal assessment needed at this time  Re consult if needed during stay

## 2020-09-22 NOTE — PLAN OF CARE
MD please advise     Pt requesting switching Zolpidem from CR to regular.     OK to switch?   Problem: PHYSICAL THERAPY ADULT  Goal: Performs mobility at highest level of function for planned discharge setting  See evaluation for individualized goals  Description: Treatment/Interventions: Functional transfer training, LE strengthening/ROM, Therapeutic exercise, Endurance training, Patient/family training, Equipment eval/education, Bed mobility, Gait training, Spoke to nursing, Spoke to case management  Equipment Recommended: [place]       See flowsheet documentation for full assessment, interventions and recommendations  Note: Prognosis: Good  Problem List: Decreased strength, Decreased range of motion, Decreased endurance, Impaired balance, Decreased mobility, Decreased coordination, Decreased safety awareness  Assessment: Pt  is a 61 yo M who presents with gait ataxia  MRI demonstrates acute infarction  order placed for PT eval and tx, w/ activity order of up w/ A  pt presents w/ comorbidities of CAMMIE, stroke, unsteady gait, Asthma, complaints of weakness of lower extremity, and personal factors of living in 2 story house, mobilizing w/ assistive device, stair(s) to enter home, inability to navigate level surfaces w/o external assistance, unable to perform dynamic tasks in community and unable to perform physical activity  pt presents w/ weakness, decreased ROM, decreased endurance, impaired balance, gait deviations, impaired coordination, decreased safety awareness and fall risk  these impairments are evident in findings from physical examination (weakness, decreased ROM and impaired coordination), mobility assessment (need for Supervision assist w/ all phases of mobility when usually mobilizing independently, tolerance to only 150'x2 feet of ambulation and need for cueing for mobility technique), and Barthel Index: 75/100  pt needed input for task focus and mobility technique  pt is at risk for falls due to physical and safety awareness deficits   pt's clinical presentation is unstable/unpredictable (evident in need for assist w/ all phases of mobility when usually mobilizing independently, tolerance to only 150'x2 feet of ambulation, pain impacting overall mobility status, need for input for mobility technique, need for input for task focus and mobility technique and need for input for mobility technique/safety)  pt needs inpatient PT tx to improve mobility deficits  discharge recommendation is for outpatient PT and home w/ family support to reduce fall risk and maximize level of functional independence  PT Discharge Recommendation: Home with skilled therapy(OPPT and home with family support)     PT - OK to Discharge: Yes    See flowsheet documentation for full assessment

## 2020-09-22 NOTE — CONSULTS
NEUROLOGY RESIDENCY CONSULT NOTE     Name: Surekha Elliott   Age & Sex: 60 y o  male   MRN: [id number]  Unit/Bed#: [facility] 609-01   Encounter: [id number]  Length of Stay: 0    ASSESSMENT & PLAN     Unsteady gait  Assessment & Plan  One month history of "wobbly" gait  Patient reports feeling unsteady intermittently and bilateral numbness in his hands and feet  Gait is not explained by abnormal MRI findings as the findings were not cerebellar  Possible etiologies include alcoholic neuropathy or vestibular origin  Plan  · Patient counseled on alcohol abstinence  · PT/OT follow-up        Stroke [facility])  Assessment & Plan  Earl Gitelman Duncan Ramsay is a 60 y o  male who presented to [facility] following an abnormal MRI that showed tiny foci within the R deep cortical region and L frontal lobe concerning for infarcts  Compared to imaging done in 2016, MRI showed worsening small vessel disease and 1 new infarct in R deep cortical structures  New infarct was likely a result of small vessel disease due to previous risk factors including HTN, sleep apnea, and alcohol use  He presented with a month long history of gait unsteadiness; exam was negative for other focal neurologic deficits  Imaging   · CTA: 4 x 4 millimeter focus of hyperdensity in R parietal lobe likely acute petechial hemorrhage or cavernoma; lacunar infarcts bilateral thalamus (performed and read at outside facility)  · MRI:  tiny foci of high signal noted on diffusion imaging within the anterior right thalamus and left frontal lobe white matter may represent tiny acute infarcts   Significant periventricular white matter disease and parenchymal atrophy representing small vessel disease  (performed and read at outside facility)    Plan  · Neurosurgery consult for petechial hemorrhage vs cavernoma  · Restart 81mg aspirin pending neurosurgery clearance  · 21 days of dual anticoagulation (aspirin, Plavix) followed by Plavix alone for stroke prevention  · Echo pending  · Outpatient neurology follow-up  · Patient has been counceled on abstinence from alcohol and regular exercise 3-4 times/week  · Maintain BP goal of <130/90          SUBJECTIVE     Reason for Consult / Principal Problem: Abnormal MRI of head  Hx and PE limited by: N/A    HPI: Digna Walker is a 60 y o  male with a past medical history of HTN, sleep apnea, and hay fever who presents after an abnormal MRI result concerning for stroke  Patient was initially seen at [address] [place] [address] and was sent to [facility]  His wife noticed ataxia for about a month which she describing as "wobbling " This prompted him to get an MRI on  showing hyperdense tiny foci within the R thalamus and L frontal lobe concerning for acute infarcts  CTA on  which showed hyperdensity in the R parietal lobe likely focus of acute petechial hemorrhage or cavernoma and lacunar infarcts of bilateral thalamus  Imaging and reads were done at an outside source  Patient was then transferred to [place] for further evaluation  Patient takes 81mg aspirin at home and controlled previous HTN with weight loss over the past 6 months  Relevant family history includes his father who  of heart disease at age 78 and multiple cousins with strokes  Patients has cousins with CADASIL; mother tested negative for the syndrome  No family hx of HTN or diabetes that he is aware of  Social history is positive for alcohol use  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  Patients reports some chronic numbness in the hands and feet bilaterally  Patient denies any weakness, sensory loss or abnormalities, tingling, dizziness, headaches, changes in vision, or LOC  Patient denies any recent falls or trauma  Denies chest pain, SOB, fever or chills, and urinary incontinence  Inpatient consult to Neurology  Consult performed by: Mckinley Murray  Consult ordered by:  Inocencia Gamble MD          Historical Information   Past Medical History:   Diagnosis Date    Arthropathy of knee     last assessed 8/19/15    Bacterial pneumonia 02/05/2007    last assessed 2/5/7    Colon polyp     CPAP (continuous positive airway pressure) dependence     Disorder of male genital organ 02/12/2008    last assessed 2/12/08    ED (erectile dysfunction) of organic origin     last assessed 2/13/17     History of hypertension     Seasonal allergies     Situational anxiety     resolved 3/16/17     Sleep apnea     Tinnitus     Wears hearing aid     bilateral     Past Surgical History:   Procedure Laterality Date    COLONOSCOPY      x3-w/polyps    HERNIA REPAIR      umbilical,hemal inguinal    KNEE ARTHROSCOPY Left     meniscus    RI COLONOSCOPY FLX DX W/COLLJ SPEC WHEN PFRMD N/A 5/7/2018    Procedure: COLONOSCOPY;  Surgeon: Joy Lujan MD;  Location: [facility] GI LAB;   Service: Gastroenterology     Social History   Social History     Substance and Sexual Activity   Alcohol Use Yes    Alcohol/week: 24 0 standard drinks    Types: 20 Standard drinks or equivalent, 2 Glasses of wine, 2 Cans of beer per week    Frequency: 4 or more times a week    Drinks per session: 3 or 4    Comment: 2-3 mixed drinks/day     Social History     Substance and Sexual Activity   Drug Use No     E-Cigarette/Vaping    E-Cigarette Use Never User      E-Cigarette/Vaping Substances     Social History     Tobacco Use   Smoking Status Never Smoker   Smokeless Tobacco Never Used     Family History:   Family History   Problem Relation Age of Onset    Cancer Mother         breast    Diverticulitis Mother         colostomy   Earna Dominqiue Breast cancer Mother     Heart disease Father         CHF    Cancer Sister         breast    Depression Sister     Cancer Sister         skin cancer    Cancer Sister         skin cancer    Colon cancer Neg Hx     Liver disease Neg Hx      Meds/Allergies   all current active meds have been reviewed  Allergies   Allergen Reactions    Iodine Solution [Povidone Iodine]     Pollen Extract      Reaction Date: 2006;        Review of previous medical records was completed  Review of Systems   Constitutional: Negative for chills and fever  Eyes: Negative for photophobia and visual disturbance  Respiratory: Negative for shortness of breath  Cardiovascular: Negative for chest pain  Gastrointestinal: Negative for abdominal pain and nausea  Genitourinary: Negative for difficulty urinating and urgency  Musculoskeletal: Positive for gait problem  Negative for arthralgias and myalgias  Neurological: Negative for dizziness, tremors, facial asymmetry, speech difficulty, weakness, light-headedness and headaches  Psychiatric/Behavioral: Negative for confusion  OBJECTIVE     Patient ID: Selwyn Aquino is a 60 y o  male  Vitals:   Vitals:    20 0307 20 0747 20 1058 20 1133   BP: 152/96 148/96  147/97   Pulse: 83 82  68   Resp: 17 16  17   Temp: 98 °F (36 7 °C) 97 6 °F (36 4 °C)  (!) 97 4 °F (36 3 °C)   SpO2: 97% 96% 96% 96%   Weight:  102 kg (223 lb 15 8 oz)     Height:  6' (1 829 m)        Body mass index is 30 38 kg/m²  Intake/Output Summary (Last 24 hours) at 2020 1459  Last data filed at 2020 0854  Gross per 24 hour   Intake 240 ml   Output    Net 240 ml       Temperature:   Temp (24hrs), Av 7 °F (36 5 °C), Min:97 4 °F (36 3 °C), Max:98 °F (36 7 °C)    Temperature: (!) 97 4 °F (36 3 °C)    Invasive Devices: Invasive Devices     Peripheral Intravenous Line            Peripheral IV 20 Right Antecubital 1 day                Physical Exam  Constitutional:       Appearance: Normal appearance  HENT:      Head: Normocephalic        Mouth/Throat:      Mouth: Mucous membranes are moist    Eyes:      Extraocular Movements: Extraocular movements intact and EOM normal       Conjunctiva/sclera: Conjunctivae normal       Pupils: Pupils are equal, round, and reactive to light  Neck:      Musculoskeletal: Normal range of motion and neck supple  Cardiovascular:      Rate and Rhythm: Normal rate and regular rhythm  Pulmonary:      Effort: Pulmonary effort is normal       Breath sounds: Normal breath sounds  Musculoskeletal: Normal range of motion  Skin:     General: Skin is warm and dry  Neurological:      General: No focal deficit present  Mental Status: He is alert and oriented to person, place, and time  Coordination: Finger-Nose-Finger Test and Heel to [place] Test normal       Deep Tendon Reflexes: Strength normal    Psychiatric:         Speech: Speech normal          Behavior: Behavior normal          Thought Content: Thought content normal           Neurologic Exam     Mental Status   Oriented to person, place, and time  Registration: recalls 3 of 3 objects  Recall at 5 minutes: recalls 3 of 3 objects  Follows 2 step commands  Attention: normal  Concentration: normal    Speech: speech is normal   Level of consciousness: alert  Knowledge: good  Normal comprehension  Cranial Nerves     CN II   Visual fields full to confrontation  CN III, IV, VI   Pupils are equal, round, and reactive to light  Extraocular motions are normal    Right pupil: Shape: regular  Reactivity: brisk  Consensual response: intact  Accommodation: intact  Left pupil: Shape: regular  Reactivity: brisk  Consensual response: intact  Accommodation: intact  Nystagmus: none   Diplopia: none    CN V   Facial sensation intact  CN VII   Facial expression full, symmetric  CN VIII   Hearing: impaired    CN IX, X   Palate: symmetric    CN XI   CN XI normal      CN XII   CN XII normal    Patient uses a hearing aid       Motor Exam   Muscle bulk: normal  Overall muscle tone: normal  Right arm tone: normal  Left arm tone: normal  Right arm pronator drift: absent  Left arm pronator drift: absent  Right leg tone: normal  Left leg tone: normal    Strength   Strength 5/5 throughout  Sensory Exam   Light touch normal    Vibration normal    Patient experiences some numbness bilaterally in his hands and feet  Gait, Coordination, and Reflexes     Gait  Gait: shuffling    Coordination   Finger to nose coordination: normal  Heel to shin coordination: normal    Tremor   Resting tremor: absent  Intention tremor: absent  Action tremor: absent    Reflexes   Reflexes 2+ except as noted  LABORATORY DATA     Labs: I have personally reviewed pertinent reports  Results from last 7 days   Lab Units 09/22/20  0439 09/21/20  1816   WBC Thousand/uL 5 78 5 55   HEMOGLOBIN g/dL 14 1 15 2   HEMATOCRIT % 40 7 45 2   PLATELETS Thousands/uL 252 284   NEUTROS PCT % 60 66   MONOS PCT % 11 10      Results from last 7 days   Lab Units 09/22/20  0439 09/21/20  1816   POTASSIUM mmol/L 3 1* 3 5   CHLORIDE mmol/L 112* 106   CO2 mmol/L 25 29   BUN mg/dL 16 15   CREATININE mg/dL 0 73 0 87   CALCIUM mg/dL 8 5 8 8   ALK PHOS U/L 92 103   ALT U/L 16 19   AST U/L 11 14     Results from last 7 days   Lab Units 09/22/20  0439   MAGNESIUM mg/dL 2 6     Results from last 7 days   Lab Units 09/22/20  0439   PHOSPHORUS mg/dL 3 5      Results from last 7 days   Lab Units 09/21/20  1816   INR  0 95         Results from last 7 days   Lab Units 09/21/20  1816   TROPONIN I ng/mL <0 02       IMAGING & DIAGNOSTIC TESTING     Radiology Results: I have personally reviewed pertinent reports  No orders to display       Other Diagnostic Testing: I have personally reviewed pertinent reports        ACTIVE MEDICATIONS     Current Facility-Administered Medications   Medication Dose Route Frequency    acetaminophen (TYLENOL) tablet 650 mg  650 mg Oral Q6H PRN    atorvastatin (LIPITOR) tablet 40 mg  40 mg Oral QPM    docusate sodium (COLACE) capsule 100 mg  100 mg Oral BID PRN    glycerin-hypromellose- (ARTIFICIAL TEARS) ophthalmic solution 2 drop  2 drop Both Eyes Q3H PRN    loratadine (CLARITIN) tablet 10 mg  10 mg Oral Daily    ondansetron (ZOFRAN) injection 4 mg  4 mg Intravenous Q6H PRN    traZODone (DESYREL) tablet 100 mg  100 mg Oral HS       Prior to Admission medications    Medication Sig Start Date End Date Taking?  Authorizing Provider   Tegan Madrid) 0 25 mg tablet  8/24/20   Historical Provider, MD   aspirin 81 MG tablet Take 81 mg by mouth daily  8/22/16   Historical Provider, MD   fexofenadine (ALLEGRA) 180 MG tablet Take 1 tablet (180 mg total) by mouth daily for 30 days  Patient taking differently: Take 180 mg by mouth every morning   2/15/18 9/21/20  Tamra Mattson DO   sildenafil (VIAGRA) 100 mg tablet Take 1 tablet (100 mg total) by mouth daily as needed for erectile dysfunction 5/28/20   Katie Cazares DO   traZODone (DESYREL) 100 mg tablet Take 1 tablet (100 mg total) by mouth daily at bedtime as needed for sleep 9/16/20   Katie Cazares DO         CODE STATUS & ADVANCED DIRECTIVES     Code Status: Level 1 - Full Code      VTE Pharmacologic Prophylaxis: Pharmacologic VTE Prophylaxis contraindicated due to possible bleed  VTE Mechanical Prophylaxis: foot pump applied    ==  Terri, MS3

## 2020-09-23 ENCOUNTER — TELEPHONE (OUTPATIENT)
Dept: NEUROSURGERY | Facility: CLINIC | Age: 65
End: 2020-09-23

## 2020-09-23 ENCOUNTER — TRANSITIONAL CARE MANAGEMENT (OUTPATIENT)
Dept: FAMILY MEDICINE CLINIC | Facility: CLINIC | Age: 65
End: 2020-09-23

## 2020-09-23 VITALS
RESPIRATION RATE: 18 BRPM | SYSTOLIC BLOOD PRESSURE: 146 MMHG | OXYGEN SATURATION: 95 % | HEART RATE: 82 BPM | TEMPERATURE: 98.3 F | HEIGHT: 72 IN | DIASTOLIC BLOOD PRESSURE: 92 MMHG | WEIGHT: 223.99 LBS | BODY MASS INDEX: 30.34 KG/M2

## 2020-09-23 PROBLEM — R26.81 UNSTEADY GAIT: Status: RESOLVED | Noted: 2020-09-22 | Resolved: 2020-09-23

## 2020-09-23 LAB
ANION GAP SERPL CALCULATED.3IONS-SCNC: 4 MMOL/L (ref 4–13)
BUN SERPL-MCNC: 14 MG/DL (ref 5–25)
CALCIUM SERPL-MCNC: 8.3 MG/DL (ref 8.3–10.1)
CHLORIDE SERPL-SCNC: 113 MMOL/L (ref 100–108)
CO2 SERPL-SCNC: 27 MMOL/L (ref 21–32)
CREAT SERPL-MCNC: 0.76 MG/DL (ref 0.6–1.3)
GFR SERPL CREATININE-BSD FRML MDRD: 98 ML/MIN/1.73SQ M
GLUCOSE SERPL-MCNC: 101 MG/DL (ref 65–140)
MAGNESIUM SERPL-MCNC: 2.7 MG/DL (ref 1.6–2.6)
POTASSIUM SERPL-SCNC: 3.6 MMOL/L (ref 3.5–5.3)
SODIUM SERPL-SCNC: 144 MMOL/L (ref 136–145)

## 2020-09-23 PROCEDURE — 99239 HOSP IP/OBS DSCHRG MGMT >30: CPT | Performed by: NURSE PRACTITIONER

## 2020-09-23 PROCEDURE — 97116 GAIT TRAINING THERAPY: CPT

## 2020-09-23 PROCEDURE — 83735 ASSAY OF MAGNESIUM: CPT | Performed by: NURSE PRACTITIONER

## 2020-09-23 PROCEDURE — 80048 BASIC METABOLIC PNL TOTAL CA: CPT | Performed by: NURSE PRACTITIONER

## 2020-09-23 RX ORDER — AMLODIPINE BESYLATE 2.5 MG/1
2.5 TABLET ORAL DAILY
Status: DISCONTINUED | OUTPATIENT
Start: 2020-09-23 | End: 2020-09-23 | Stop reason: HOSPADM

## 2020-09-23 RX ORDER — ASPIRIN 81 MG/1
81 TABLET ORAL DAILY
Qty: 21 TABLET | Refills: 0 | Status: SHIPPED | OUTPATIENT
Start: 2020-09-24 | End: 2021-03-17

## 2020-09-23 RX ORDER — AMLODIPINE BESYLATE 5 MG/1
5 TABLET ORAL DAILY
Qty: 30 TABLET | Refills: 0 | Status: SHIPPED | OUTPATIENT
Start: 2020-09-24 | End: 2021-05-18

## 2020-09-23 RX ORDER — ATORVASTATIN CALCIUM 40 MG/1
40 TABLET, FILM COATED ORAL EVERY EVENING
Qty: 30 TABLET | Refills: 0 | Status: SHIPPED | OUTPATIENT
Start: 2020-09-23 | End: 2020-10-30 | Stop reason: SDUPTHER

## 2020-09-23 RX ORDER — CLOPIDOGREL BISULFATE 75 MG/1
75 TABLET ORAL DAILY
Qty: 30 TABLET | Refills: 0 | Status: SHIPPED | OUTPATIENT
Start: 2020-09-24 | End: 2020-12-18 | Stop reason: SDUPTHER

## 2020-09-23 RX ADMIN — AMLODIPINE BESYLATE 2.5 MG: 2.5 TABLET ORAL at 05:11

## 2020-09-23 RX ADMIN — LORATADINE 10 MG: 10 TABLET ORAL at 08:02

## 2020-09-23 RX ADMIN — ASPIRIN 81 MG: 81 TABLET, COATED ORAL at 08:02

## 2020-09-23 RX ADMIN — CLOPIDOGREL BISULFATE 75 MG: 75 TABLET ORAL at 08:02

## 2020-09-23 NOTE — PLAN OF CARE
Problem: PHYSICAL THERAPY ADULT  Goal: Performs mobility at highest level of function for planned discharge setting  See evaluation for individualized goals  Description: Treatment/Interventions: Functional transfer training, LE strengthening/ROM, Therapeutic exercise, Endurance training, Patient/family training, Equipment eval/education, Bed mobility, Gait training, Spoke to nursing, Spoke to case management  Equipment Recommended: Jed Christine       See flowsheet documentation for full assessment, interventions and recommendations  Outcome: Progressing  Note: Prognosis: Good  Problem List: Decreased strength, Decreased range of motion, Decreased endurance, Impaired balance, Decreased mobility, Decreased coordination, Decreased safety awareness  Assessment: Pt continues to ambualte community distances without change in symptoms, utilizing SPC to improve gait stability  No LOB noted with dynamic mobility tasks  Trialed steps as noted above without incident  Pt educated on safe mobility, use of SPC to improve stability & symtpoms of worsening/changing brain function  Pt verbalied understanding during session & offers no further questions ro concerns at this time  Will continue to benefit from therapy services to progress mobiilty with LRAD during acute hospital stay  Barriers to Discharge: None     PT Discharge Recommendation: Return to previous environment with social support, Home with skilled therapy(OPPT)     PT - OK to Discharge: Yes    See flowsheet documentation for full assessment

## 2020-09-23 NOTE — PLAN OF CARE
Problem: Potential for Falls  Goal: Patient will remain free of falls  Description: INTERVENTIONS:  - Assess patient frequently for physical needs  -  Identify cognitive and physical deficits and behaviors that affect risk of falls    -  [place] fall precautions as indicated by assessment   - Educate patient/family on patient safety including physical limitations  - Instruct patient to call for assistance with activity based on assessment  - Modify environment to reduce risk of injury  - Consider OT/PT consult to assist with strengthening/mobility  Outcome: Progressing     Problem: PAIN - ADULT  Goal: Verbalizes/displays adequate comfort level or baseline comfort level  Description: Interventions:  - Encourage patient to monitor pain and request assistance  - Assess pain using appropriate pain scale  - Administer analgesics based on type and severity of pain and evaluate response  - Implement non-pharmacological measures as appropriate and evaluate response  - Consider cultural and social influences on pain and pain management  - Notify physician/advanced practitioner if interventions unsuccessful or patient reports new pain  Outcome: Progressing     Problem: INFECTION - ADULT  Goal: Absence or prevention of progression during hospitalization  Description: INTERVENTIONS:  - Assess and monitor for signs and symptoms of infection  - Monitor lab/diagnostic results  - Monitor all insertion sites, i e  indwelling lines, tubes, and drains  - Monitor endotracheal if appropriate and nasal secretions for changes in amount and color  - [place] appropriate cooling/warming therapies per order  - Administer medications as ordered  - Instruct and encourage patient and family to use good hand hygiene technique  - Identify and instruct in appropriate isolation precautions for identified infection/condition  Outcome: Progressing     Problem: SAFETY ADULT  Goal: Patient will remain free of falls  Description: INTERVENTIONS:  - Assess patient frequently for physical needs  -  Identify cognitive and physical deficits and behaviors that affect risk of falls    -  [place] fall precautions as indicated by assessment   - Educate patient/family on patient safety including physical limitations  - Instruct patient to call for assistance with activity based on assessment  - Modify environment to reduce risk of injury  - Consider OT/PT consult to assist with strengthening/mobility  Outcome: Progressing  Goal: Maintain or return to baseline ADL function  Description: INTERVENTIONS:  -  Assess patient's ability to carry out ADLs; assess patient's baseline for ADL function and identify physical deficits which impact ability to perform ADLs (bathing, care of mouth/teeth, toileting, grooming, dressing, etc )  - Assess/evaluate cause of self-care deficits   - Assess range of motion  - Assess patient's mobility; develop plan if impaired  - Assess patient's need for assistive devices and provide as appropriate  - Encourage maximum independence but intervene and supervise when necessary  - Involve family in performance of ADLs  - Assess for home care needs following discharge   - Consider OT consult to assist with ADL evaluation and planning for discharge  - Provide patient education as appropriate  Outcome: Progressing  Goal: Maintain or return mobility status to optimal level  Description: INTERVENTIONS:  - Assess patient's baseline mobility status (ambulation, transfers, stairs, etc )    - Identify cognitive and physical deficits and behaviors that affect mobility  - Identify mobility aids required to assist with transfers and/or ambulation (gait belt, sit-to-stand, lift, walker, cane, etc )  - [place] fall precautions as indicated by assessment  - Record patient progress and toleration of activity level on Mobility SBAR; progress patient to next Phase/Stage  - Instruct patient to call for assistance with activity based on assessment  - Consider rehabilitation consult to assist with strengthening/weightbearing, etc   Outcome: Progressing     Problem: DISCHARGE PLANNING  Goal: Discharge to home or other facility with appropriate resources  Description: INTERVENTIONS:  - Identify barriers to discharge w/patient and caregiver  - Arrange for needed discharge resources and transportation as appropriate  - Identify discharge learning needs (meds, wound care, etc )  - Arrange for interpretive services to assist at discharge as needed  - Refer to Case Management Department for coordinating discharge planning if the patient needs post-hospital services based on physician/advanced practitioner order or complex needs related to functional status, cognitive ability, or social support system  Outcome: Progressing     Problem: Knowledge Deficit  Goal: Patient/family/caregiver demonstrates understanding of disease process, treatment plan, medications, and discharge instructions  Description: Complete learning assessment and assess knowledge base  Interventions:  - Provide teaching at level of understanding  - Provide teaching via preferred learning methods  Outcome: Progressing     Problem: NEUROSENSORY - ADULT  Goal: Achieves stable or improved neurological status  Description: INTERVENTIONS  - Monitor and report changes in neurological status  - Monitor vital signs such as temperature, blood pressure, glucose, and any other labs ordered   - Initiate measures to prevent increased intracranial pressure  - Monitor for seizure activity and implement precautions if appropriate      Outcome: Progressing  Goal: Achieves maximal functionality and self care  Description: INTERVENTIONS  - Monitor swallowing and airway patency with patient fatigue and changes in neurological status  - Encourage and assist patient to increase activity and self care     - Encourage visually impaired, hearing impaired and aphasic patients to use assistive/communication devices  Outcome: Progressing Problem: METABOLIC, FLUID AND ELECTROLYTES - ADULT  Goal: Electrolytes maintained within normal limits  Description: INTERVENTIONS:  - Monitor labs and assess patient for signs and symptoms of electrolyte imbalances  - Administer electrolyte replacement as ordered  - Monitor response to electrolyte replacements, including repeat lab results as appropriate  - Instruct patient on fluid and nutrition as appropriate  Outcome: Progressing     Problem: MUSCULOSKELETAL - ADULT  Goal: Maintain or return mobility to safest level of function  Description: INTERVENTIONS:  - Assess patient's ability to carry out ADLs; assess patient's baseline for ADL function and identify physical deficits which impact ability to perform ADLs (bathing, care of mouth/teeth, toileting, grooming, dressing, etc )  - Assess/evaluate cause of self-care deficits   - Assess range of motion  - Assess patient's mobility  - Assess patient's need for assistive devices and provide as appropriate  - Encourage maximum independence but intervene and supervise when necessary  - Involve family in performance of ADLs  - Assess for home care needs following discharge   - Consider OT consult to assist with ADL evaluation and planning for discharge  - Provide patient education as appropriate  Outcome: Progressing  Goal: Maintain proper alignment of affected body part  Description: INTERVENTIONS:  - Support, maintain and protect limb and body alignment  - Provide patient/ family with appropriate education  Outcome: Progressing     Problem: Neurological Deficit  Goal: Neurological status is stable or improving  Description: Interventions:  - Monitor and assess patient's level of consciousness, motor function, sensory function, and level of assistance needed for ADLs  - Monitor and report changes from baseline  Collaborate with interdisciplinary team to initiate plan and implement interventions as ordered  - Provide and maintain a safe environment    - Consider seizure precautions  - Consider fall precautions  - Consider aspiration precautions  - Consider bleeding precautions  Outcome: Progressing     Problem: Activity Intolerance/Impaired Mobility  Goal: Mobility/activity is maintained at optimum level for patient  Description: Interventions:  - Assess and monitor patient  barriers to mobility and need for assistive/adaptive devices  - Assess patient's emotional response to limitations  - Collaborate with interdisciplinary team and initiate plans and interventions as ordered  - Encourage independent activity per ability   - Maintain proper body alignment  - Perform active/passive rom as tolerated/ordered  - Plan activities to conserve energy   - Turn patient as appropriate  Outcome: Progressing     Problem: Communication Impairment  Goal: Ability to express needs and understand communication  Description: Assess patient's communication skills and ability to understand information  Patient will demonstrate use of effective communication techniques, alternative methods of communication and understanding even if not able to speak  - Encourage communication and provide alternate methods of communication as needed  - Collaborate with case management/ for discharge needs  - Include patient/family/caregiver in decisions related to communication  Outcome: Progressing     Problem: Potential for Aspiration  Goal: Non-ventilated patient's risk of aspiration is minimized  Description: Assess and monitor vital signs, respiratory status, and labs (WBC)  Monitor for signs of aspiration (tachypnea, cough, rales, wheezing, cyanosis, fever)  - Assess and monitor patient's ability to swallow  - Place patient up in chair to eat if possible  - HOB up at 90 degrees to eat if unable to get patient up into chair   - Supervise patient during oral intake  - Instruct patient/ family to take small bites    - Instruct patient/ family to take small single sips when taking liquids  - Follow patient-specific strategies generated by speech pathologist   Outcome: Progressing  Goal: Ventilated patient's risk of aspiration is minimized  Description: Assess and monitor vital signs, respiratory status, airway cuff pressure, and labs (WBC)  Monitor for signs of aspiration (tachypnea, cough, rales, wheezing, cyanosis, fever)  - Elevate head of bed 30 degrees if patient has tube feeding   - Monitor tube feeding  Outcome: Progressing     Problem: Nutrition  Goal: Nutrition/Hydration status is improving  Description: Monitor and assess patient's nutrition/hydration status for malnutrition (ex- brittle hair, bruises, dry skin, pale skin and conjunctiva, muscle wasting, smooth red tongue, and disorientation)  Collaborate with interdisciplinary team and initiate plan and interventions as ordered  Monitor patient's weight and dietary intake as ordered or per policy  Utilize nutrition screening tool and intervene per policy  Determine patient's food preferences and provide high-protein, high-caloric foods as appropriate  - Assist patient with eating   - Allow adequate time for meals   - Encourage patient to take dietary supplement as ordered  - Collaborate with clinical nutritionist   - Include patient/family/caregiver in decisions related to nutrition    Outcome: Progressing     Problem: Prexisting or High Potential for Compromised Skin Integrity  Goal: Skin integrity is maintained or improved  Description: INTERVENTIONS:  - Identify patients at risk for skin breakdown  - Assess and monitor skin integrity  - Assess and monitor nutrition and hydration status  - Monitor labs   - Assess for incontinence   - Turn and reposition patient  - Assist with mobility/ambulation  - Relieve pressure over bony prominences  - Avoid friction and shearing  - Provide appropriate hygiene as needed including keeping skin clean and dry  - Evaluate need for skin moisturizer/barrier cream  - Collaborate with interdisciplinary team   - Patient/family teaching  - Consider wound care consult   Outcome: Progressing

## 2020-09-23 NOTE — TELEPHONE ENCOUNTER
02/24/2021-CT SCHEDULED ON 3/15/2021    09/24/2020-CALLED PT, CONFIRMED 03/17/2021 APT  PT AGREED FOR OUR OFFICE TO CALL HIM IN THE BEGINNING OF FEB 2021 TO SCHEDULE MRI       09/23/2020-PT Julia Anderson  03/17/2021 APT W/MRI BRAIN      09/22/2020-(MARILEE)SIGN OFF AND FOLLOW-UP IN 6 MONTHS WITH MRI BRAIN

## 2020-09-23 NOTE — UTILIZATION REVIEW
Notification of Inpatient Admission/Inpatient Authorization Request   This is a Notification of Inpatient Admission for [address]  Be advised that this patient was admitted to our facility under Inpatient Status  Contact Pedro Zaragoza at [phone] for additional admission information  [facility]  DEDICATED [phone]  Patient Name:   Kimberly Martínez   YOB: 1955       [address]   [address]:   [place]  Tax ID: [id number]  NPI: [id number] Attending Provider/NPI:  Phone:  Address: Sherri Osorio [[id number]]  [phone]  Same as LISA/Bianca Earl 1106 of Service Code: 24 Place of Service Name:  [address] [place] [address]   Start Date: 9/22/20 0004 Discharge Date & Time: 9/23/2020  1:12 PM    Type of Admission: Inpatient Status Discharge Disposition (if discharged): Home/Self Care   Patient Diagnoses: Petechial hemorrhage [R23 3]     Orders: Admission Orders (From admission, onward)     Ordered        09/22/20 0014  Inpatient Admission  Once                    Assigned Utilization Review Contact: Pedro Zaragoza  Utilization   Network Utilization Review Department  Phone: [phone]; Fax [phone]  Email: [email or web address]   ATTENTION PAYERS: Please call the assigned Utilization  directly with any questions or concerns ALL voicemails in the department are confidential  Send all requests for admission clinical reviews, approved or denied determinations and any other requests to dedicated fax number belonging to the campus where the patient is receiving treatment

## 2020-09-23 NOTE — PHYSICAL THERAPY NOTE
Physical Therapy Progress Note     09/23/20 1001   PT Last Visit   PT Visit Date 09/23/20   Pain Assessment   Pain Assessment Tool Pain Assessment not indicated - pt denies pain   Restrictions/Precautions   Other Precautions Fall Risk   Subjective   Subjective Pt with no new complaints at this time  Is hopeful to go home today  Bed Mobility   Supine to Sit 5  Supervision   Additional items Assist x 1   Transfers   Sit to Stand 5  Supervision   Additional items Assist x 1; Armrests; Increased time required   Stand to Sit 5  Supervision   Additional items Assist x 1; Armrests; Increased time required   Ambulation/Elevation   Gait pattern Excessively slow; Short stride; Inconsistent nia;Decreased foot clearance; Improper Weight shift   Gait Assistance 5  Supervision   Additional items Assist x 1   Assistive Device [facility]   Balance   Static Sitting Good   Static Standing Fair   Ambulatory Fair -   Endurance Deficit   Endurance Deficit No   Activity Tolerance   Activity Tolerance Patient tolerated treatment well   Nurse Made Janes Kaplan RN   Assessment   Prognosis Good   Problem List Decreased strength;Decreased range of motion;Decreased endurance; Impaired balance;Decreased mobility; Decreased coordination;Decreased safety awareness   Assessment Pt continues to ambualte community distances without change in symptoms, utilizing SPC to improve gait stability  No LOB noted with dynamic mobility tasks  Trialed steps as noted above without incident  Pt educated on safe mobility, use of SPC to improve stability & symtpoms of worsening/changing brain function  Pt verbalied understanding during session & offers no further questions ro concerns at this time  Will continue to benefit from therapy services to progress mobiilty with LRAD during acute hospital stay     Barriers to Discharge None   Goals   Patient Goals to go home before his wife's birthday tomorrow   Jassidaphne Garland Date 10/02/20   PT Treatment Day 1   Plan Treatment/Interventions Functional transfer training;LE strengthening/ROM; Elevations; Therapeutic exercise; Endurance training;Patient/family training;Equipment eval/education; Bed mobility;Gait training   Progress Progressing toward goals   PT Frequency Other (Comment)  (3-5x/week)   Recommendation   PT Discharge Recommendation Return to previous environment with social support;Home with skilled therapy  (OPPT)   Equipment Recommended Zev Stager   PT - OK to Discharge Yes     Jailyn Barry, PTA

## 2020-09-23 NOTE — UTILIZATION REVIEW
Initial Clinical Review    9/22 Transfer from [facility] ED  Pt at [facility] from 9/21    Admission: Date/Time/Statement:   Admission Orders (From admission, onward)     Ordered        09/22/20 0014  Inpatient Admission  Once                   Orders Placed This Encounter   Procedures    Inpatient Admission     Standing Status:   Standing     Number of Occurrences:   1     Order Specific Question:   Admitting Physician     Answer:   Lalitha Langford [[id number]]     Order Specific Question:   Level of Care     Answer:   Level 2 Stepdown / HOT [14]     Order Specific Question:   Estimated length of stay     Answer:   More than 2 Midnights     Order Specific Question:   Certification     Answer:   I certify that inpatient services are medically necessary for this patient for a duration of greater than two midnights  See H&P and MD Progress Notes for additional information about the patient's course of treatment  Assessment/Plan:   64y Male, transfer from [facility] ED to [facility] surg unit presents with imbalanced gait  MRI ordered and showing tiny foci of high signal noted on diffusion imaging within the anterior right thalamus and left frontal lobe white matter may represent tiny acute infarcts  Significant periventricular white matter disease and parenchymal atrophy representing small vessel disease  (performed and read at outside facility)  Transferred to [facility] for Neurology and Neurosurgery evaluation  Admit Inpatient level of care for Unsteady Gait and Stroke  One month history of "wobbly" gait  Pt reports feeling unsteady intermittently and bilateral numbness in his hands and feet  Gait is not explained by abnormal MRI findings as the findings were not cerebellar  Possible etiologies include alcoholic neuropathy or vestibular origin   CTA: 4 x 4 millimeter focus of hyperdensity in R parietal lobe likely acute petechial hemorrhage or cavernoma; lacunar infarcts bilateral thalamus (performed and read at outside facility)  Restart 81mg aspirin pending neurosurgery clearance  21 days of dual anticoagulation (aspirin, Plavix) followed by Plavix alone for stroke prevention  Echo pending  Stroke pathway  Neurology and neurosurgery consultation is pending  On statin  AP on hold given CTA 9/21 which was concerning for right parietal hemorrhage  9/22 Neurology cons; Petechial hemorrhage vs cavernoma  Restart 81mg aspirin pending neurosurgery clearance  Echo pending  Maintain BP goal of <130/90    9/22 Neurosurgery cons; Abnormal MRI of Head, Stroke  Neurologic checks  No anticipated neurosurgical intervention this juncture  Vitals   Temperature Pulse Respirations Blood Pressure SpO2   09/22/20 0021 09/22/20 0021 09/22/20 0021 09/22/20 0021 09/22/20 0021   97 8 °F (36 6 °C) 91 17 156/98 96 %      Temp src Heart Rate Source Patient Position - Orthostatic VS BP Location FiO2 (%)   -- -- -- 09/23/20 0805 --      Right arm       Pain Score       09/22/20 0020       No Pain          Wt Readings from Last 1 Encounters:   09/22/20 102 kg (223 lb 15 8 oz)     Additional Vital Signs:   09/23/20 08:00:08   98 1 °F (36 7 °C)   79   18   150/103Abnormal     119   95 %       09/23/20 03:14:28   97 6 °F (36 4 °C)   70   18   153/103Abnormal     120   95 %       09/22/20 22:38:03   98 3 °F (36 8 °C)   78   18   142/95   111   95 %       09/22/20 19:25:59   98 5 °F (36 9 °C)      17   144/97   113   100 %       09/22/20 15:44:59   97 6 °F (36 4 °C)   75      145/98   114   95 %       09/22/20 11:33:06   97 4 °F (36 3 °C)Abnormal     68   17   147/97            Pertinent Labs/Diagnostic Test Results:   9/21 CTA Head and Neck - 4 x 4 millimeter focus of hyperdensity identified in the right parietal lobe likely small focus of acute petechial hemorrhage or cavernoma  Microangiopathy, lacunar infarcts bilateral thalamus  No evidence of hemodynamic significant stenosis, aneurysm or dissection       3/03 Echo - Systolic function was normal  Ejection fraction was estimated to be 60 %        Results from last 7 days   Lab Units 09/22/20  0439 09/21/20  1816   WBC Thousand/uL 5 78 5 55   HEMOGLOBIN g/dL 14 1 15 2   HEMATOCRIT % 40 7 45 2   PLATELETS Thousands/uL 252 284   NEUTROS ABS Thousands/µL 3 48 3 63         Results from last 7 days   Lab Units 09/23/20  0502 09/22/20  0439 09/21/20  1816   SODIUM mmol/L 144 144 144   POTASSIUM mmol/L 3 6 3 1* 3 5   CHLORIDE mmol/L 113* 112* 106   CO2 mmol/L 27 25 29   ANION GAP mmol/L 4 7 9   BUN mg/dL 14 16 15   CREATININE mg/dL 0 76 0 73 0 87   EGFR ml/min/1 73sq m 98 99 92   CALCIUM mg/dL 8 3 8 5 8 8   MAGNESIUM mg/dL 2 7* 2 6  --    PHOSPHORUS mg/dL  --  3 5  --      Results from last 7 days   Lab Units 09/22/20  0439 09/21/20  1816   AST U/L 11 14   ALT U/L 16 19   ALK PHOS U/L 92 103   TOTAL PROTEIN g/dL 6 0* 6 6   ALBUMIN g/dL 3 2* 3 6   TOTAL BILIRUBIN mg/dL 0 36 0 50         Results from last 7 days   Lab Units 09/23/20  0502 09/22/20  0439 09/21/20  1816   GLUCOSE RANDOM mg/dL 101 105 99         Results from last 7 days   Lab Units 09/22/20  0439   HEMOGLOBIN A1C % 5 2   EAG mg/dl 103     Results from last 7 days   Lab Units 09/21/20  1816   TROPONIN I ng/mL <0 02         Results from last 7 days   Lab Units 09/21/20  1816   PROTIME seconds 12 6   INR  0 95     Results from last 7 days   Lab Units 09/22/20  0439   TSH 3RD GENERATON uIU/mL 2 090     Results from last 7 days   Lab Units 09/22/20  0609   CLARITY UA  Clear   COLOR UA  Yellow   SPEC GRAV UA  1 024   PH UA  6 5   GLUCOSE UA mg/dl Negative   KETONES UA mg/dl Negative   BLOOD UA  Negative   PROTEIN UA mg/dl Negative   NITRITE UA  Negative   BILIRUBIN UA  Negative   UROBILINOGEN UA E U /dl 1 0   LEUKOCYTES UA  Negative       Past Medical History:   Diagnosis Date    Arthropathy of knee     last assessed 8/19/15    Bacterial pneumonia 02/05/2007    last assessed 2/5/7    Colon polyp     CPAP (continuous positive airway pressure) dependence     Disorder of male genital organ 02/12/2008    last assessed 2/12/08    ED (erectile dysfunction) of organic origin     last assessed 2/13/17     History of hypertension     Seasonal allergies     Situational anxiety     resolved 3/16/17     Sleep apnea     Tinnitus     Wears hearing aid     bilateral     Present on Admission:   Abnormal MRI of head   Allergic rhinitis   CAMMIE (generalized anxiety disorder)      Admitting Diagnosis: Petechial hemorrhage [R23 3]  Age/Sex: 60 y o  male     Admission Orders:  Scheduled Medications:  amLODIPine, 2 5 mg, Oral, Daily  aspirin, 81 mg, Oral, Daily  atorvastatin, 40 mg, Oral, QPM  clopidogrel, 75 mg, Oral, Daily  loratadine, 10 mg, Oral, Daily  traZODone, 100 mg, Oral, HS      Continuous IV Infusions: None     PRN Meds:  acetaminophen, 650 mg, Oral, Q6H PRN  docusate sodium, 100 mg, Oral, BID PRN  glycerin-hypromellose-, 2 drop, Both Eyes, Q3H PRN  ondansetron, 4 mg, Intravenous, Q6H PRN      Cardiovascular diet  Echo  Neuro checks qEvery 1 hour x 4 hours, then every 2 hours x 8 hours, then every 4 hours x 72 hours   Tele monitoring  IP CONSULT TO PHYSICAL MEDICINE REHAB  IP CONSULT TO NEUROLOGY  IP CONSULT TO NEUROSURGERY    Network Utilization Review Department  [email or web address]  ATTENTION: Please call with any questions or concerns to [phone] and carefully listen to the prompts so that you are directed to the right person  All voicemails are confidential   Lynette Kussmaul all requests for admission clinical reviews, approved or denied determinations and any other requests to dedicated fax number below belonging to the campus where the patient is receiving treatment  List of dedicated fax numbers for the Facilities:  [address] [place] [address] DENIALS (Administrative/Medical Necessity) [phone]   [address] [place] [address] (Maternity/NICU/Pediatrics) [phone]   Felipe Fatuma [phone]     Tashia Dailey [phone]   Deangelo Nguyen [phone]   [address] [phone]   [facility]  [phone]   [address] [facility]  [address] [facility] [address] [facility] [phone]

## 2020-09-23 NOTE — DISCHARGE SUMMARY
Discharge Summary - [facility] Internal Medicine    Patient Information: Antione Simeon 60 y o  male MRN: [id number]  Unit/Bed#: [facility] 476-50 Encounter: [id number]    Discharging Physician / Practitioner: ELVIS Mclean  PCP: Nisreen Leiva DO  Admission Date: 9/22/2020  Discharge Date: 09/23/20    Reason for Admission:  Stroke    Discharge Diagnoses:     Principal Problem:    Abnormal MRI of head  Active Problems:    Allergic rhinitis    CAMMIE (generalized anxiety disorder)    Stroke [facility])  Resolved Problems:    Unsteady gait      Consultations During Hospital Stay:  · Neurology  · Neurosurgery  · CMP/CBC unremarkable  · Troponin negative  · Total cholesterol 126, triglycerides 100, HDL 29, LDL 77  · PT/OT    Procedures Performed:     · None    Significant Findings / Test Results:     · MRI brain done as outpatient, reports unavailable to me  · Echocardiogram EF 60%  No regional wall motion abnormalities  Left atrium, right atrium dilated  Mitral valve with mild regurgitation  Mild stenosis of aortic valve  Dilation of the ascending aorta with maximum measured diameter of 4 3 cm  Incidental Findings:     Dilation of the ascending aorta with maximum measured diameter of 4 3 cm  Recommend outpatient CTA of chest      Test Results Pending at Discharge (will require follow up): · None      Outpatient Tests Requested:  · Outpatient f/u with PCP  · Outpatient f/u with neurology  · Outpatient f/u with neurosurgery  · Outpatient MRI in March  · Outpatient CTA of chest     Complications:  None    Hospital Course:     Antione Simeon is a 60 y o  male patient with past medical history of anxiety and allergic rhinitis who originally presented to the hospital on 9/22/2020 due to ataxic gait  Patient had previously been seen by primary care provider who ordered outpatient MRI which was concerning for stroke  Patient was sent to the ED for further evaluation    Seen by neurology and neurosurgery given stroke but also concern for hemorrhage  Per Neurosurgery, no neurosurgical intervention anticipated  Patient should have delayed MRI brain in 6-months as an outpatient to assess for stability given differential to include cavernomas  Neurology recommended dual antiplatelet therapy with aspirin and Plavix for 21 days then Plavix monotherapy  He has also been started on a statin as well as Norvasc for hypertension  Incidentally on echo, patient was noted to have dilation of the ascending aorta  Would recommend outpatient CTA of chest which can be ordered by PCP  Medically stable for discharge  Cleared for home by PT/OT  Condition at Discharge: stable     Discharge Day Visit / Exam:     Subjective:  Patient offers no acute complaints  Feels well  Wants to go home  Vitals: Blood Pressure: 146/92 (09/23/20 0805)  Pulse: 82 (09/23/20 1113)  Temperature: 98 3 °F (36 8 °C) (09/23/20 1113)  Respirations: 18 (09/23/20 0800)  Height: 6' (182 9 cm) (09/22/20 0747)  Weight - Scale: 102 kg (223 lb 15 8 oz) (09/22/20 0747)  SpO2: 95 % (09/23/20 1113)     Exam:   Physical Exam  Vitals signs and nursing note reviewed  Constitutional:       Appearance: He is not ill-appearing  HENT:      Head: Normocephalic  Eyes:      Conjunctiva/sclera: Conjunctivae normal    Neck:      Musculoskeletal: Normal range of motion  Cardiovascular:      Rate and Rhythm: Normal rate  Pulmonary:      Breath sounds: Normal breath sounds  Abdominal:      General: Bowel sounds are normal    Musculoskeletal: Normal range of motion  Skin:     General: Skin is warm  Neurological:      Mental Status: He is alert and oriented to person, place, and time  Mental status is at baseline  Psychiatric:         Mood and Affect: Mood normal          Discussion with Family:  Patient    Discharge instructions/Information to patient and family:   See after visit summary for information provided to patient and family        Provisions for Follow-Up Care:  See after visit summary for information related to follow-up care and any pertinent home health orders  Disposition:     Home    For Discharges to [address] [facility]:   · Not Applicable to this Patient - Not Applicable to this Patient    Planned Readmission: no     Discharge Statement:  I spent 45 minutes discharging the patient  This time was spent on the day of discharge  I had direct contact with the patient on the day of discharge  Greater than 50% of the total time was spent examining patient, answering all patient questions, arranging and discussing plan of care with patient as well as directly providing post-discharge instructions  Additional time then spent on discharge activities  Discharge Medications:  See after visit summary for reconciled discharge medications provided to patient and family        ** Please Note: This note has been constructed using a voice recognition system **

## 2020-09-23 NOTE — INCIDENTAL FINDINGS
The following findings require follow up:  Radiographic finding   Finding:  Dilation of the ascending aorta with maximum measured diameter of 4 3 cm     Follow up required: outpatient CTA chest   Follow up should be done within 1 month(s)    Please notify the following clinician to assist with the follow up:   Dr Colten Robertson, DO

## 2020-09-23 NOTE — PLAN OF CARE
Problem: Potential for Falls  Goal: Patient will remain free of falls  Description: INTERVENTIONS:  - Assess patient frequently for physical needs  -  Identify cognitive and physical deficits and behaviors that affect risk of falls    -  [place] fall precautions as indicated by assessment   - Educate patient/family on patient safety including physical limitations  - Instruct patient to call for assistance with activity based on assessment  - Modify environment to reduce risk of injury  - Consider OT/PT consult to assist with strengthening/mobility  9/23/2020 1243 by Yinka Wells RN  Outcome: Adequate for Discharge  9/23/2020 0936 by Yinka Wells RN  Outcome: Progressing     Problem: PAIN - ADULT  Goal: Verbalizes/displays adequate comfort level or baseline comfort level  Description: Interventions:  - Encourage patient to monitor pain and request assistance  - Assess pain using appropriate pain scale  - Administer analgesics based on type and severity of pain and evaluate response  - Implement non-pharmacological measures as appropriate and evaluate response  - Consider cultural and social influences on pain and pain management  - Notify physician/advanced practitioner if interventions unsuccessful or patient reports new pain  9/23/2020 1243 by Yinka Wells RN  Outcome: Adequate for Discharge  9/23/2020 0936 by Yinka Wells RN  Outcome: Progressing     Problem: INFECTION - ADULT  Goal: Absence or prevention of progression during hospitalization  Description: INTERVENTIONS:  - Assess and monitor for signs and symptoms of infection  - Monitor lab/diagnostic results  - Monitor all insertion sites, i e  indwelling lines, tubes, and drains  - Monitor endotracheal if appropriate and nasal secretions for changes in amount and color  - [place] appropriate cooling/warming therapies per order  - Administer medications as ordered  - Instruct and encourage patient and family to use good hand hygiene technique  - Identify and instruct in appropriate isolation precautions for identified infection/condition  9/23/2020 1243 by Lilia Leach RN  Outcome: Adequate for Discharge  9/23/2020 2270 by Lilia Leach RN  Outcome: Progressing     Problem: SAFETY ADULT  Goal: Patient will remain free of falls  Description: INTERVENTIONS:  - Assess patient frequently for physical needs  -  Identify cognitive and physical deficits and behaviors that affect risk of falls    -  [place] fall precautions as indicated by assessment   - Educate patient/family on patient safety including physical limitations  - Instruct patient to call for assistance with activity based on assessment  - Modify environment to reduce risk of injury  - Consider OT/PT consult to assist with strengthening/mobility  9/23/2020 1243 by Lilia Leach RN  Outcome: Adequate for Discharge  9/23/2020 0936 by Lilia Leach RN  Outcome: Progressing  Goal: Maintain or return to baseline ADL function  Description: INTERVENTIONS:  -  Assess patient's ability to carry out ADLs; assess patient's baseline for ADL function and identify physical deficits which impact ability to perform ADLs (bathing, care of mouth/teeth, toileting, grooming, dressing, etc )  - Assess/evaluate cause of self-care deficits   - Assess range of motion  - Assess patient's mobility; develop plan if impaired  - Assess patient's need for assistive devices and provide as appropriate  - Encourage maximum independence but intervene and supervise when necessary  - Involve family in performance of ADLs  - Assess for home care needs following discharge   - Consider OT consult to assist with ADL evaluation and planning for discharge  - Provide patient education as appropriate  9/23/2020 1243 by Lilia Leach RN  Outcome: Adequate for Discharge  9/23/2020 0936 by Lilia Leach RN  Outcome: Progressing  Goal: Maintain or return mobility status to optimal level  Description: INTERVENTIONS:  - Assess patient's baseline mobility status (ambulation, transfers, stairs, etc )    - Identify cognitive and physical deficits and behaviors that affect mobility  - Identify mobility aids required to assist with transfers and/or ambulation (gait belt, sit-to-stand, lift, walker, cane, etc )  - [place] fall precautions as indicated by assessment  - Record patient progress and toleration of activity level on Mobility SBAR; progress patient to next Phase/Stage  - Instruct patient to call for assistance with activity based on assessment  - Consider rehabilitation consult to assist with strengthening/weightbearing, etc   9/23/2020 1243 by Cheyenne Ramirez RN  Outcome: Adequate for Discharge  9/23/2020 0936 by Cheyenne Ramirez RN  Outcome: Progressing     Problem: DISCHARGE PLANNING  Goal: Discharge to home or other facility with appropriate resources  Description: INTERVENTIONS:  - Identify barriers to discharge w/patient and caregiver  - Arrange for needed discharge resources and transportation as appropriate  - Identify discharge learning needs (meds, wound care, etc )  - Arrange for interpretive services to assist at discharge as needed  - Refer to Case Management Department for coordinating discharge planning if the patient needs post-hospital services based on physician/advanced practitioner order or complex needs related to functional status, cognitive ability, or social support system  9/23/2020 1243 by Cheyenne Ramirez RN  Outcome: Adequate for Discharge  9/23/2020 0936 by Cheyenne Ramirez RN  Outcome: Progressing     Problem: Knowledge Deficit  Goal: Patient/family/caregiver demonstrates understanding of disease process, treatment plan, medications, and discharge instructions  Description: Complete learning assessment and assess knowledge base    Interventions:  - Provide teaching at level of understanding  - Provide teaching via preferred learning methods  9/23/2020 1243 by Cheyenne Ramirez RN  Outcome: Adequate for Discharge  9/23/2020 5763 by Lisandro Shailesh Rodriguez RN  Outcome: Progressing     Problem: NEUROSENSORY - ADULT  Goal: Achieves stable or improved neurological status  Description: INTERVENTIONS  - Monitor and report changes in neurological status  - Monitor vital signs such as temperature, blood pressure, glucose, and any other labs ordered   - Initiate measures to prevent increased intracranial pressure  - Monitor for seizure activity and implement precautions if appropriate      9/23/2020 1243 by Zan Stark RN  Outcome: Adequate for Discharge  9/23/2020 0936 by Zan Stark RN  Outcome: Progressing  Goal: Achieves maximal functionality and self care  Description: INTERVENTIONS  - Monitor swallowing and airway patency with patient fatigue and changes in neurological status  - Encourage and assist patient to increase activity and self care     - Encourage visually impaired, hearing impaired and aphasic patients to use assistive/communication devices  9/23/2020 1243 by Zan Stark RN  Outcome: Adequate for Discharge  9/23/2020 9161 by Zan Stark RN  Outcome: Progressing     Problem: METABOLIC, FLUID AND ELECTROLYTES - ADULT  Goal: Electrolytes maintained within normal limits  Description: INTERVENTIONS:  - Monitor labs and assess patient for signs and symptoms of electrolyte imbalances  - Administer electrolyte replacement as ordered  - Monitor response to electrolyte replacements, including repeat lab results as appropriate  - Instruct patient on fluid and nutrition as appropriate  9/23/2020 1243 by Zan Stark RN  Outcome: Adequate for Discharge  9/23/2020 0936 by Zan Stark RN  Outcome: Progressing     Problem: MUSCULOSKELETAL - ADULT  Goal: Maintain or return mobility to safest level of function  Description: INTERVENTIONS:  - Assess patient's ability to carry out ADLs; assess patient's baseline for ADL function and identify physical deficits which impact ability to perform ADLs (bathing, care of mouth/teeth, toileting, grooming, dressing, etc )  - Assess/evaluate cause of self-care deficits   - Assess range of motion  - Assess patient's mobility  - Assess patient's need for assistive devices and provide as appropriate  - Encourage maximum independence but intervene and supervise when necessary  - Involve family in performance of ADLs  - Assess for home care needs following discharge   - Consider OT consult to assist with ADL evaluation and planning for discharge  - Provide patient education as appropriate  9/23/2020 1243 by Adelfo Darling RN  Outcome: Adequate for Discharge  9/23/2020 0936 by Adelfo Darling RN  Outcome: Progressing  Goal: Maintain proper alignment of affected body part  Description: INTERVENTIONS:  - Support, maintain and protect limb and body alignment  - Provide patient/ family with appropriate education  9/23/2020 1243 by Adelfo Darling RN  Outcome: Adequate for Discharge  9/23/2020 0936 by Adelfo Darling RN  Outcome: Progressing     Problem: Neurological Deficit  Goal: Neurological status is stable or improving  Description: Interventions:  - Monitor and assess patient's level of consciousness, motor function, sensory function, and level of assistance needed for ADLs  - Monitor and report changes from baseline  Collaborate with interdisciplinary team to initiate plan and implement interventions as ordered  - Provide and maintain a safe environment  - Consider seizure precautions  - Consider fall precautions  - Consider aspiration precautions  - Consider bleeding precautions  9/23/2020 1243 by Adelfo Darling RN  Outcome: Adequate for Discharge  9/23/2020 0213 by Adelfo Darling RN  Outcome: Progressing     Problem: Activity Intolerance/Impaired Mobility  Goal: Mobility/activity is maintained at optimum level for patient  Description: Interventions:  - Assess and monitor patient  barriers to mobility and need for assistive/adaptive devices  - Assess patient's emotional response to limitations    - Collaborate with interdisciplinary team and initiate plans and interventions as ordered  - Encourage independent activity per ability   - Maintain proper body alignment  - Perform active/passive rom as tolerated/ordered  - Plan activities to conserve energy   - Turn patient as appropriate  9/23/2020 1243 by Deandre Fowler RN  Outcome: Adequate for Discharge  9/23/2020 7706 by Deandre Fowler RN  Outcome: Progressing     Problem: Communication Impairment  Goal: Ability to express needs and understand communication  Description: Assess patient's communication skills and ability to understand information  Patient will demonstrate use of effective communication techniques, alternative methods of communication and understanding even if not able to speak  - Encourage communication and provide alternate methods of communication as needed  - Collaborate with case management/ for discharge needs  - Include patient/family/caregiver in decisions related to communication  9/23/2020 1243 by Deandre Fowler RN  Outcome: Adequate for Discharge  9/23/2020 6506 by Deandre Fowler RN  Outcome: Progressing     Problem: Potential for Aspiration  Goal: Non-ventilated patient's risk of aspiration is minimized  Description: Assess and monitor vital signs, respiratory status, and labs (WBC)  Monitor for signs of aspiration (tachypnea, cough, rales, wheezing, cyanosis, fever)  - Assess and monitor patient's ability to swallow  - Place patient up in chair to eat if possible  - HOB up at 90 degrees to eat if unable to get patient up into chair   - Supervise patient during oral intake  - Instruct patient/ family to take small bites  - Instruct patient/ family to take small single sips when taking liquids    - Follow patient-specific strategies generated by speech pathologist   9/23/2020 1243 by Deandre Fowler RN  Outcome: Adequate for Discharge  9/23/2020 2185 by Deandre Fowler RN  Outcome: Progressing  Goal: Ventilated patient's risk of aspiration is minimized  Description: Assess and monitor vital signs, respiratory status, airway cuff pressure, and labs (WBC)  Monitor for signs of aspiration (tachypnea, cough, rales, wheezing, cyanosis, fever)  - Elevate head of bed 30 degrees if patient has tube feeding   - Monitor tube feeding  9/23/2020 1243 by Arleth Mehta RN  Outcome: Adequate for Discharge  9/23/2020 6092 by Arleth Mehta RN  Outcome: Progressing     Problem: Nutrition  Goal: Nutrition/Hydration status is improving  Description: Monitor and assess patient's nutrition/hydration status for malnutrition (ex- brittle hair, bruises, dry skin, pale skin and conjunctiva, muscle wasting, smooth red tongue, and disorientation)  Collaborate with interdisciplinary team and initiate plan and interventions as ordered  Monitor patient's weight and dietary intake as ordered or per policy  Utilize nutrition screening tool and intervene per policy  Determine patient's food preferences and provide high-protein, high-caloric foods as appropriate  - Assist patient with eating   - Allow adequate time for meals   - Encourage patient to take dietary supplement as ordered  - Collaborate with clinical nutritionist   - Include patient/family/caregiver in decisions related to nutrition    9/23/2020 1243 by Arleth Mehta RN  Outcome: Adequate for Discharge  9/23/2020 4996 by Arleth Mehta RN  Outcome: Progressing     Problem: Prexisting or High Potential for Compromised Skin Integrity  Goal: Skin integrity is maintained or improved  Description: INTERVENTIONS:  - Identify patients at risk for skin breakdown  - Assess and monitor skin integrity  - Assess and monitor nutrition and hydration status  - Monitor labs   - Assess for incontinence   - Turn and reposition patient  - Assist with mobility/ambulation  - Relieve pressure over bony prominences  - Avoid friction and shearing  - Provide appropriate hygiene as needed including keeping skin clean and dry  - Evaluate need for skin moisturizer/barrier cream  - Collaborate with interdisciplinary team   - Patient/family teaching  - Consider wound care consult   9/23/2020 1243 by Hortencia Carrillo RN  Outcome: Adequate for Discharge  9/23/2020 0936 by Hortencia Carrillo RN  Outcome: Progressing

## 2020-09-24 ENCOUNTER — OFFICE VISIT (OUTPATIENT)
Dept: FAMILY MEDICINE CLINIC | Facility: CLINIC | Age: 65
End: 2020-09-24
Payer: COMMERCIAL

## 2020-09-24 VITALS
SYSTOLIC BLOOD PRESSURE: 126 MMHG | BODY MASS INDEX: 28.71 KG/M2 | WEIGHT: 212 LBS | RESPIRATION RATE: 16 BRPM | TEMPERATURE: 97.8 F | DIASTOLIC BLOOD PRESSURE: 74 MMHG | HEART RATE: 72 BPM | HEIGHT: 72 IN

## 2020-09-24 DIAGNOSIS — I10 BENIGN ESSENTIAL HYPERTENSION: ICD-10-CM

## 2020-09-24 DIAGNOSIS — I35.0 MILD AORTIC STENOSIS: ICD-10-CM

## 2020-09-24 DIAGNOSIS — E78.5 HYPERLIPIDEMIA LDL GOAL <130: ICD-10-CM

## 2020-09-24 DIAGNOSIS — G47.00 INSOMNIA, UNSPECIFIED TYPE: ICD-10-CM

## 2020-09-24 DIAGNOSIS — I77.810 ASCENDING AORTA DILATATION (HCC): Primary | ICD-10-CM

## 2020-09-24 DIAGNOSIS — R27.0 ATAXIA: ICD-10-CM

## 2020-09-24 DIAGNOSIS — I63.9 CEREBROVASCULAR ACCIDENT (CVA), UNSPECIFIED MECHANISM (HCC): ICD-10-CM

## 2020-09-24 LAB
ATRIAL RATE: 83 BPM
P AXIS: 47 DEGREES
PR INTERVAL: 138 MS
QRS AXIS: 5 DEGREES
QRSD INTERVAL: 92 MS
QT INTERVAL: 372 MS
QTC INTERVAL: 437 MS
T WAVE AXIS: 33 DEGREES
VENTRICULAR RATE: 83 BPM

## 2020-09-24 PROCEDURE — 99496 TRANSJ CARE MGMT HIGH F2F 7D: CPT | Performed by: NURSE PRACTITIONER

## 2020-09-24 PROCEDURE — 93010 ELECTROCARDIOGRAM REPORT: CPT | Performed by: INTERNAL MEDICINE

## 2020-09-24 NOTE — PROGRESS NOTES
Assessment/Plan:  Discussed with PCP and will follow up   1  Ascending aorta dilatation (HCC)  -     CTA chest wo w contrast; Future; Expected date: 09/24/2020    2  Ataxia  Comments:  will follow up with neurology and PT    3  Benign essential hypertension  Comments:  stable    4  Cerebrovascular accident (CVA), unspecified mechanism ([address] )    5  Hyperlipidemia LDL goal <130  Comments:  on statin     6  Insomnia, unspecified type  Comments:  Improved with trazadone but not at goal and will discuss with neurologist about other options  7  Mild aortic stenosis  Comments:  found on recent ECHO  and will follow up by PCP and discussed with PCP              Patient Instructions: Will follow up with neurologist and PT and will schedule CT  Supportive care discussed and advised  Advised to RTO for any worsening and no improvement  Follow up for no improvement and worsening of conditions  Patient advised and educated when to see immediate medical care  Return if symptoms worsen or fail to improve  Future Appointments   Date Time Provider Ron Chino   10/8/2020  3:00 PM Chilango May MD NEURO [facility]   3/17/2021  9:15 AM Georges Downs MD NEURO [facility]           Subjective:      Patient ID: Digna Walker is a 60 y o  male  Chief Complaint   Patient presents with    Transition of Care Management      [facility]-mini stroke--lj         Vitals:  /74   Pulse 72   Temp 97 8 °F (36 6 °C)   Resp 16   Ht 6' (1 829 m)   Wt 96 2 kg (212 lb)   BMI 28 75 kg/m²     HPI  Patient is here to follow up after recent hospitalization  Stated that doing very well  Stated that mostly his gait is steady and once felt staggering  Started taking aspirin and Plavix with lipitor  Have not scheduled with PT yet  Have appt scheduled with neurology on 10/8/2020  Had ECHO done in hospital and showed mild AS but EF is normal at 60%   ECHO also showed dilatation of ascending aorta and needs follow up CTA chest but denies any chest pain, sob and dizziness  TCM Call (since 8/24/2020)     Date and time call was made  9/23/2020  1:39 PM    Hospital care reviewed  Records reviewed    Patient was hospitial[facility] at  [facility]    Date of Admission  09/22/20    Date of discharge  09/23/20    Diagnosis  Abnormal MRI of head     Disposition  Home    Were the patients medications reviewed and updated  Yes    Current Symptoms  None      TCM Call (since 8/24/2020)     Post hospital issues  None    Should patient be enrolled in anticoag monitoring? No    Scheduled for follow up? Yes    Patients specialists  Neurologist    Neurologist name  Emma Ward [facility]    Did you obtain your prescribed medications  Yes    Do you need help managing your prescriptions or medications  No    Is transportation to your appointment needed  No    I have advised the patient to call PCP with any new or worsening symptoms  [address] [place] [address] [facility]    Support System  Family    The type of support provided  Physical; Emotional    Do you have social support  Yes, as much as I need    Are you recieving any outpatient services  No    Are you recieving home care services  No    Have you fallen in the last 12 months  Yes    How many times  one    Interperter language line needed  No    Counseling  Patient    Counseling topics  Activities of daily living; Importance of RX compliance; patient and family education; Risk factor reduction; instructions for management    Comments  I spoke with Carlos Aragon who states he is doing well  No c/o at this time   No chest pain, dyspnea, weakness, dizziness, etc  He knows to go to the ER if any chest pain, dyspnea, weakness, etc Lindy Olivarez                        The following portions of the patient's history were reviewed and updated as appropriate: allergies, current medications, past family history, past medical history, past social history, past surgical history and problem list       Review of Systems   Constitutional: Negative  Respiratory: Negative  Cardiovascular: Negative  Gastrointestinal: Negative  Genitourinary: Negative  Musculoskeletal:        As noted in HPI     Skin: Negative  Neurological: Negative for dizziness and headaches  As noted in HPI     Psychiatric/Behavioral: Negative  Objective:    Social History     Tobacco Use   Smoking Status Never Smoker   Smokeless Tobacco Never Used       Allergies: Allergies   Allergen Reactions    Iodine Solution [Povidone Iodine]     Pollen Extract      Reaction Date: 18Sep2006;          Current Outpatient Medications   Medication Sig Dispense Refill    amLODIPine (NORVASC) 5 mg tablet Take 1 tablet (5 mg total) by mouth daily 30 tablet 0    aspirin (ECOTRIN LOW STRENGTH) 81 mg EC tablet Take 1 tablet (81 mg total) by mouth daily for 21 days 21 tablet 0    atorvastatin (LIPITOR) 40 mg tablet Take 1 tablet (40 mg total) by mouth every evening 30 tablet 0    clopidogrel (PLAVIX) 75 mg tablet Take 1 tablet (75 mg total) by mouth daily 30 tablet 0    fexofenadine (ALLEGRA) 180 MG tablet Take 1 tablet (180 mg total) by mouth daily for 30 days (Patient taking differently: Take 180 mg by mouth every morning  ) 30 tablet 0    sildenafil (VIAGRA) 100 mg tablet Take 1 tablet (100 mg total) by mouth daily as needed for erectile dysfunction 12 tablet 5    traZODone (DESYREL) 100 mg tablet Take 1 tablet (100 mg total) by mouth daily at bedtime as needed for sleep 90 tablet 1     No current facility-administered medications for this visit  Physical Exam  Constitutional:       Appearance: Normal appearance  HENT:      Head: Normocephalic  Nose: Nose normal    Eyes:      Conjunctiva/sclera: Conjunctivae normal    Cardiovascular:      Rate and Rhythm: Normal rate and regular rhythm  Heart sounds: Murmur present     Pulmonary:      Effort: Pulmonary effort is normal       Breath sounds: Normal breath sounds  Musculoskeletal: Normal range of motion  General: No swelling or tenderness  Skin:     General: Skin is warm and dry  Findings: No rash  Neurological:      Mental Status: He is alert and oriented to person, place, and time  Psychiatric:         Mood and Affect: Mood normal          Behavior: Behavior normal          Thought Content:  Thought content normal          Judgment: Judgment normal                      ELVIS Travis

## 2020-09-24 NOTE — PATIENT INSTRUCTIONS
Will follow up with neurologist and PT and will schedule CT  Supportive care discussed and advised  Advised to RTO for any worsening and no improvement  Follow up for no improvement and worsening of conditions  Patient advised and educated when to see immediate medical care

## 2020-09-30 ENCOUNTER — TELEPHONE (OUTPATIENT)
Dept: NEUROLOGY | Facility: CLINIC | Age: 65
End: 2020-09-30

## 2020-09-30 NOTE — TELEPHONE ENCOUNTER
Post CVA Discharge Follow Up    Hospitalization: 9/22-9/23  The purpose of this phone call is to assess patient's general wellbeing or for any assistance needed with follow-up care  Called patient at [phone]  I introduced myself and explained neurovascular nurse navigator role  Since discharge, he denies experiencing any new or worsening stroke-like symptoms  Patient denies the presence of any residual symptoms following hospitalization and claims he has returned to baseline functioning  He notes his is slightly more fatigued than baseline but notes this is already improving  Ambulation / ADLs:  Patient claims he is ambulating independently as well as preforming his own ADLs  Patient manages his own medications, appointments, and affairs  Appointments / Medication Review:  Patient successfully followed up with PCP 9/24, patient states his neurologist in [place] is scheduled for "sometime in a couple weeks"  He did request his hospital records for this appointment and I provided him the phone number for medical records department  His stroke hospital follow up appointment with [address] [place] [address] [facility] is also scheduled 10/8  He notes he is on vacation in [place] at this time but will return 10/2 to be sure he can make his appointments  He is also scheduled with Neurosurgery as instructed at discharge, appointment is 3/17/2021  I reviewed medications with him  There have not been any medication changes since discharge from the hospital  Patient reports having no difficulties obtaining medications  Reports he is taking all as prescribed with no missed doses, medication side effects, or signs of bleeding  he also verbalizes understanding of [facility] instructions/plan per inpatient team, plan to discontinue aspirin 10/14  Risk Factors / Education:  He verbalizes understanding of stroke type and symptoms   We reviewed personal risk factors, management, medications, and resources  Patient verbalizes understanding of information  Offered to send stroke education binder and my card in the mail and patient is agreeable to this  I mailed the information as requested along with my card for any questions  Patient reports managing his risk factors in the following way: states he is participating in (almost daily) cardiovascular exercise  He reports he rode his bike for 10 miles today and also walks often  He notes he also has adjusted his diet and is "trying to eat smart"  He reports following a low salt diet and has "stayed away from junk food"  Also reviewed low cholesterol diets and he notes he will continue to try  Patient reports he is a nonsmoker and normally checks his BP at home but forgot his BP cuff at home while on vacation  He notes he will continue to monitor daily to ensure efficacy of medications and proper vasculature  I addressed all his questions  At the conclusion of the conversation, patient denies having any further questions or concerns

## 2020-10-01 ENCOUNTER — TELEPHONE (OUTPATIENT)
Dept: FAMILY MEDICINE CLINIC | Facility: CLINIC | Age: 65
End: 2020-10-01

## 2020-10-02 ENCOUNTER — TELEMEDICINE (OUTPATIENT)
Dept: FAMILY MEDICINE CLINIC | Facility: CLINIC | Age: 65
End: 2020-10-02
Payer: COMMERCIAL

## 2020-10-02 DIAGNOSIS — K57.30 COLON, DIVERTICULOSIS: ICD-10-CM

## 2020-10-02 DIAGNOSIS — K62.5 RECTAL BLEEDING: Primary | ICD-10-CM

## 2020-10-02 DIAGNOSIS — I63.9 CEREBROVASCULAR ACCIDENT (CVA), UNSPECIFIED MECHANISM (HCC): ICD-10-CM

## 2020-10-02 PROCEDURE — 99214 OFFICE O/P EST MOD 30 MIN: CPT | Performed by: FAMILY MEDICINE

## 2020-10-14 ENCOUNTER — TELEPHONE (OUTPATIENT)
Dept: NEUROLOGY | Facility: CLINIC | Age: 65
End: 2020-10-14

## 2020-10-20 ENCOUNTER — HOSPITAL ENCOUNTER (OUTPATIENT)
Dept: RADIOLOGY | Facility: HOSPITAL | Age: 65
Discharge: HOME | End: 2020-10-20
Payer: COMMERCIAL

## 2020-10-20 DIAGNOSIS — I77.810 ASCENDING AORTA DILATATION (HCC): ICD-10-CM

## 2020-10-20 PROCEDURE — G1004 CDSM NDSC: HCPCS

## 2020-10-20 PROCEDURE — 71275 CT ANGIOGRAPHY CHEST: CPT

## 2020-10-20 RX ADMIN — IOHEXOL 100 ML: 350 INJECTION, SOLUTION INTRAVENOUS at 10:24

## 2020-10-22 ENCOUNTER — OFFICE VISIT (OUTPATIENT)
Dept: NEUROLOGY | Facility: CLINIC | Age: 65
End: 2020-10-22
Payer: COMMERCIAL

## 2020-10-22 VITALS
HEIGHT: 72 IN | DIASTOLIC BLOOD PRESSURE: 70 MMHG | WEIGHT: 209 LBS | TEMPERATURE: 97.5 F | BODY MASS INDEX: 28.31 KG/M2 | HEART RATE: 68 BPM | SYSTOLIC BLOOD PRESSURE: 118 MMHG

## 2020-10-22 DIAGNOSIS — R26.89 IMBALANCE: ICD-10-CM

## 2020-10-22 DIAGNOSIS — I63.9 CEREBROVASCULAR ACCIDENT (CVA), UNSPECIFIED MECHANISM (HCC): Primary | ICD-10-CM

## 2020-10-22 PROCEDURE — 1036F TOBACCO NON-USER: CPT | Performed by: PSYCHIATRY & NEUROLOGY

## 2020-10-22 PROCEDURE — 3074F SYST BP LT 130 MM HG: CPT | Performed by: PSYCHIATRY & NEUROLOGY

## 2020-10-22 PROCEDURE — 3078F DIAST BP <80 MM HG: CPT | Performed by: PSYCHIATRY & NEUROLOGY

## 2020-10-22 PROCEDURE — 99214 OFFICE O/P EST MOD 30 MIN: CPT | Performed by: PSYCHIATRY & NEUROLOGY

## 2020-10-22 RX ORDER — ALPRAZOLAM 0.5 MG/1
0.5 TABLET ORAL
Qty: 1 TABLET | Refills: 0 | Status: SHIPPED | OUTPATIENT
Start: 2020-10-22 | End: 2021-03-17

## 2020-10-22 RX ORDER — METOPROLOL SUCCINATE 50 MG/1
50 TABLET, EXTENDED RELEASE ORAL DAILY
COMMUNITY
Start: 2020-10-14 | End: 2022-05-02 | Stop reason: SDUPTHER

## 2020-10-28 ENCOUNTER — TELEPHONE (OUTPATIENT)
Dept: NEUROLOGY | Facility: CLINIC | Age: 65
End: 2020-10-28

## 2020-10-28 ENCOUNTER — HOSPITAL ENCOUNTER (OUTPATIENT)
Dept: RADIOLOGY | Age: 65
Discharge: HOME | End: 2020-10-28
Payer: COMMERCIAL

## 2020-10-28 DIAGNOSIS — R26.89 IMBALANCE: ICD-10-CM

## 2020-10-28 DIAGNOSIS — I63.9 CEREBROVASCULAR ACCIDENT (CVA), UNSPECIFIED MECHANISM (HCC): ICD-10-CM

## 2020-10-28 PROCEDURE — 72141 MRI NECK SPINE W/O DYE: CPT

## 2020-10-28 PROCEDURE — G1004 CDSM NDSC: HCPCS

## 2020-10-29 ENCOUNTER — OFFICE VISIT (OUTPATIENT)
Dept: NEUROLOGY | Facility: CLINIC | Age: 65
End: 2020-10-29
Payer: COMMERCIAL

## 2020-10-29 VITALS
BODY MASS INDEX: 28.71 KG/M2 | DIASTOLIC BLOOD PRESSURE: 80 MMHG | HEIGHT: 72 IN | TEMPERATURE: 97.6 F | WEIGHT: 212 LBS | SYSTOLIC BLOOD PRESSURE: 130 MMHG | HEART RATE: 76 BPM

## 2020-10-29 DIAGNOSIS — I63.89 CEREBROVASCULAR ACCIDENT (CVA) DUE TO OTHER MECHANISM (HCC): ICD-10-CM

## 2020-10-29 DIAGNOSIS — F01.50 VASCULAR DEMENTIA WITHOUT BEHAVIORAL DISTURBANCE (HCC): ICD-10-CM

## 2020-10-29 DIAGNOSIS — R26.81 UNSTEADY GAIT: Primary | ICD-10-CM

## 2020-10-29 PROBLEM — F03.90 DEMENTIA WITHOUT BEHAVIORAL DISTURBANCE (HCC): Status: ACTIVE | Noted: 2020-10-29

## 2020-10-29 PROBLEM — I95.1 ORTHOSTATIC HYPOTENSION: Status: ACTIVE | Noted: 2020-10-29

## 2020-10-29 PROCEDURE — 99215 OFFICE O/P EST HI 40 MIN: CPT | Performed by: PSYCHIATRY & NEUROLOGY

## 2020-10-29 PROCEDURE — 3075F SYST BP GE 130 - 139MM HG: CPT | Performed by: PSYCHIATRY & NEUROLOGY

## 2020-10-29 PROCEDURE — 3008F BODY MASS INDEX DOCD: CPT | Performed by: PSYCHIATRY & NEUROLOGY

## 2020-10-29 PROCEDURE — 3079F DIAST BP 80-89 MM HG: CPT | Performed by: PSYCHIATRY & NEUROLOGY

## 2020-10-30 ENCOUNTER — TELEPHONE (OUTPATIENT)
Dept: NEUROLOGY | Facility: CLINIC | Age: 65
End: 2020-10-30

## 2020-10-30 ENCOUNTER — LAB (OUTPATIENT)
Dept: LAB | Facility: HOSPITAL | Age: 65
End: 2020-10-30
Payer: COMMERCIAL

## 2020-10-30 DIAGNOSIS — I63.9 STROKE (HCC): ICD-10-CM

## 2020-10-30 DIAGNOSIS — R26.81 UNSTEADY GAIT: ICD-10-CM

## 2020-10-30 LAB
FOLATE SERPL-MCNC: 15.5 NG/ML (ref 3.1–17.5)
RPR SER QL: NORMAL
TSH SERPL DL<=0.05 MIU/L-ACNC: 1.65 UIU/ML (ref 0.36–3.74)
VIT B12 SERPL-MCNC: 313 PG/ML (ref 100–900)

## 2020-10-30 PROCEDURE — 84165 PROTEIN E-PHORESIS SERUM: CPT

## 2020-10-30 PROCEDURE — 82607 VITAMIN B-12: CPT

## 2020-10-30 PROCEDURE — 86592 SYPHILIS TEST NON-TREP QUAL: CPT

## 2020-10-30 PROCEDURE — 84165 PROTEIN E-PHORESIS SERUM: CPT | Performed by: PATHOLOGY

## 2020-10-30 PROCEDURE — 36415 COLL VENOUS BLD VENIPUNCTURE: CPT

## 2020-10-30 PROCEDURE — 84207 ASSAY OF VITAMIN B-6: CPT

## 2020-10-30 PROCEDURE — 86618 LYME DISEASE ANTIBODY: CPT

## 2020-10-30 PROCEDURE — 84443 ASSAY THYROID STIM HORMONE: CPT

## 2020-10-30 PROCEDURE — 82746 ASSAY OF FOLIC ACID SERUM: CPT

## 2020-10-30 PROCEDURE — 84425 ASSAY OF VITAMIN B-1: CPT

## 2020-10-30 RX ORDER — ATORVASTATIN CALCIUM 40 MG/1
40 TABLET, FILM COATED ORAL EVERY EVENING
Qty: 30 TABLET | Refills: 5 | Status: SHIPPED | OUTPATIENT
Start: 2020-10-30 | End: 2021-04-22 | Stop reason: SDUPTHER

## 2020-10-31 LAB — B BURGDOR IGG+IGM SER-ACNC: 14

## 2020-11-02 LAB
ALBUMIN SERPL ELPH-MCNC: 3.91 G/DL (ref 3.5–5)
ALBUMIN SERPL ELPH-MCNC: 62.1 % (ref 52–65)
ALPHA1 GLOB SERPL ELPH-MCNC: 0.37 G/DL (ref 0.1–0.4)
ALPHA1 GLOB SERPL ELPH-MCNC: 5.8 % (ref 2.5–5)
ALPHA2 GLOB SERPL ELPH-MCNC: 0.76 G/DL (ref 0.4–1.2)
ALPHA2 GLOB SERPL ELPH-MCNC: 12 % (ref 7–13)
BETA GLOB ABNORMAL SERPL ELPH-MCNC: 0.45 G/DL (ref 0.4–0.8)
BETA1 GLOB SERPL ELPH-MCNC: 7.1 % (ref 5–13)
BETA2 GLOB SERPL ELPH-MCNC: 5 % (ref 2–8)
BETA2+GAMMA GLOB SERPL ELPH-MCNC: 0.32 G/DL (ref 0.2–0.5)
GAMMA GLOB ABNORMAL SERPL ELPH-MCNC: 0.5 G/DL (ref 0.5–1.6)
GAMMA GLOB SERPL ELPH-MCNC: 8 % (ref 12–22)
IGG/ALB SER: 1.64 {RATIO} (ref 1.1–1.8)
PROT PATTERN SERPL ELPH-IMP: ABNORMAL
PROT SERPL-MCNC: 6.3 G/DL (ref 6.4–8.2)

## 2020-11-04 ENCOUNTER — EVALUATION (OUTPATIENT)
Dept: PHYSICAL THERAPY | Facility: CLINIC | Age: 65
End: 2020-11-04
Payer: COMMERCIAL

## 2020-11-04 DIAGNOSIS — R26.81 UNSTEADY GAIT: Primary | ICD-10-CM

## 2020-11-04 DIAGNOSIS — I63.9 RIGHT-SIDED CEREBROVASCULAR ACCIDENT (CVA) (HCC): ICD-10-CM

## 2020-11-04 DIAGNOSIS — I67.9 ATAXIA DUE TO CEREBROVASCULAR DISEASE: ICD-10-CM

## 2020-11-04 DIAGNOSIS — R27.0 ATAXIA DUE TO CEREBROVASCULAR DISEASE: ICD-10-CM

## 2020-11-04 PROCEDURE — 97162 PT EVAL MOD COMPLEX 30 MIN: CPT | Performed by: PHYSICAL THERAPIST

## 2020-11-05 LAB — VIT B1 BLD-SCNC: 97.9 NMOL/L (ref 66.5–200)

## 2020-11-07 LAB — VIT B6 SERPL-MCNC: 5.8 UG/L (ref 5.3–46.7)

## 2020-11-10 ENCOUNTER — EVALUATION (OUTPATIENT)
Dept: OCCUPATIONAL THERAPY | Facility: CLINIC | Age: 65
End: 2020-11-10
Payer: COMMERCIAL

## 2020-11-10 ENCOUNTER — TELEPHONE (OUTPATIENT)
Dept: OTHER | Facility: HOSPITAL | Age: 65
End: 2020-11-10

## 2020-11-10 DIAGNOSIS — R26.81 UNSTEADY GAIT: ICD-10-CM

## 2020-11-10 DIAGNOSIS — G31.84 MILD COGNITIVE IMPAIRMENT: Primary | ICD-10-CM

## 2020-11-10 PROCEDURE — 97167 OT EVAL HIGH COMPLEX 60 MIN: CPT

## 2020-11-13 ENCOUNTER — APPOINTMENT (OUTPATIENT)
Dept: PHYSICAL THERAPY | Facility: CLINIC | Age: 65
End: 2020-11-13
Payer: COMMERCIAL

## 2020-11-13 ENCOUNTER — TELEPHONE (OUTPATIENT)
Dept: FAMILY MEDICINE CLINIC | Facility: CLINIC | Age: 65
End: 2020-11-13

## 2020-11-13 DIAGNOSIS — I63.89 CEREBROVASCULAR ACCIDENT (CVA) DUE TO OTHER MECHANISM (HCC): Primary | ICD-10-CM

## 2020-11-16 ENCOUNTER — APPOINTMENT (OUTPATIENT)
Dept: PHYSICAL THERAPY | Facility: CLINIC | Age: 65
End: 2020-11-16
Payer: COMMERCIAL

## 2020-11-17 ENCOUNTER — APPOINTMENT (OUTPATIENT)
Dept: OCCUPATIONAL THERAPY | Facility: CLINIC | Age: 65
End: 2020-11-17
Payer: COMMERCIAL

## 2020-11-20 ENCOUNTER — APPOINTMENT (OUTPATIENT)
Dept: PHYSICAL THERAPY | Facility: CLINIC | Age: 65
End: 2020-11-20
Payer: COMMERCIAL

## 2020-11-23 ENCOUNTER — APPOINTMENT (OUTPATIENT)
Dept: PHYSICAL THERAPY | Facility: CLINIC | Age: 65
End: 2020-11-23
Payer: COMMERCIAL

## 2020-11-30 ENCOUNTER — APPOINTMENT (OUTPATIENT)
Dept: PHYSICAL THERAPY | Facility: CLINIC | Age: 65
End: 2020-11-30
Payer: COMMERCIAL

## 2020-12-18 DIAGNOSIS — I63.9 STROKE (HCC): ICD-10-CM

## 2020-12-18 RX ORDER — CLOPIDOGREL BISULFATE 75 MG/1
75 TABLET ORAL DAILY
Qty: 30 TABLET | Refills: 0 | Status: SHIPPED | OUTPATIENT
Start: 2020-12-18 | End: 2021-01-11

## 2020-12-24 ENCOUNTER — TELEPHONE (OUTPATIENT)
Dept: NEUROLOGY | Facility: CLINIC | Age: 65
End: 2020-12-24

## 2021-01-11 DIAGNOSIS — I63.9 STROKE (HCC): ICD-10-CM

## 2021-01-11 RX ORDER — CLOPIDOGREL BISULFATE 75 MG/1
TABLET ORAL
Qty: 30 TABLET | Refills: 0 | Status: SHIPPED | OUTPATIENT
Start: 2021-01-11 | End: 2021-02-08

## 2021-02-08 DIAGNOSIS — I63.9 STROKE (HCC): ICD-10-CM

## 2021-02-08 RX ORDER — CLOPIDOGREL BISULFATE 75 MG/1
TABLET ORAL
Qty: 30 TABLET | Refills: 5 | Status: SHIPPED | OUTPATIENT
Start: 2021-02-08 | End: 2021-04-22 | Stop reason: SDUPTHER

## 2021-02-26 DIAGNOSIS — F40.240 CLAUSTROPHOBIA: Primary | ICD-10-CM

## 2021-02-26 RX ORDER — ALPRAZOLAM 0.5 MG/1
TABLET ORAL
Qty: 2 TABLET | Refills: 0 | Status: SHIPPED | OUTPATIENT
Start: 2021-02-26 | End: 2021-03-17

## 2021-02-26 NOTE — TELEPHONE ENCOUNTER
Patient's wife called nurseline requesting script for xanax for patient's MRI on 3/15/21  Will place order now  Left voicemail informing patient of medication order being sent to [facility] in [place]  Provided office number for any other questions or concerns

## 2021-03-15 ENCOUNTER — HOSPITAL ENCOUNTER (OUTPATIENT)
Dept: RADIOLOGY | Facility: HOSPITAL | Age: 66
Discharge: HOME | End: 2021-03-15
Payer: COMMERCIAL

## 2021-03-15 DIAGNOSIS — R26.81 UNSTEADY GAIT: ICD-10-CM

## 2021-03-15 DIAGNOSIS — R93.0 ABNORMAL MRI OF HEAD: ICD-10-CM

## 2021-03-15 PROCEDURE — A9585 GADOBUTROL INJECTION: HCPCS | Performed by: PHYSICIAN ASSISTANT

## 2021-03-15 PROCEDURE — 70553 MRI BRAIN STEM W/O & W/DYE: CPT

## 2021-03-15 PROCEDURE — G1004 CDSM NDSC: HCPCS

## 2021-03-15 RX ADMIN — GADOBUTROL 10 ML: 604.72 INJECTION INTRAVENOUS at 13:06

## 2021-03-16 NOTE — ASSESSMENT & PLAN NOTE
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  · Discovered during work up for stroke on 9/22/2020  · Presented at the time to hospital with ataxia  · Since has been well  Continues to endorse difficulty with balance/gait and memory struggles  · Exam non-focal  Slight delay in timing to responses of questions  Imaging:  · MRI brain 3/15/21: read pending  Unchanged foci of gradient blooming artifact throught both cerebral hemispheres  Plan:  · Imaging reviewed with patient and wife extensively  Discussed findings and that they remain unchanged from MRI completed 9/21/2020  · Follow up with neurology Dr Maria Elena Kerns as scheduled in May  · No neurosurgical intervention anticipated  · Follow up PRN

## 2021-03-17 ENCOUNTER — OFFICE VISIT (OUTPATIENT)
Dept: NEUROSURGERY | Facility: CLINIC | Age: 66
End: 2021-03-17
Payer: COMMERCIAL

## 2021-03-17 VITALS
HEART RATE: 69 BPM | RESPIRATION RATE: 16 BRPM | WEIGHT: 224 LBS | TEMPERATURE: 97.7 F | SYSTOLIC BLOOD PRESSURE: 140 MMHG | HEIGHT: 72 IN | BODY MASS INDEX: 30.34 KG/M2 | DIASTOLIC BLOOD PRESSURE: 86 MMHG

## 2021-03-17 DIAGNOSIS — R90.89 ABNORMAL FINDING ON MRI OF BRAIN: Primary | ICD-10-CM

## 2021-03-17 PROCEDURE — 3008F BODY MASS INDEX DOCD: CPT | Performed by: NURSE PRACTITIONER

## 2021-03-17 PROCEDURE — 1036F TOBACCO NON-USER: CPT | Performed by: NURSE PRACTITIONER

## 2021-03-17 PROCEDURE — 99213 OFFICE O/P EST LOW 20 MIN: CPT | Performed by: NURSE PRACTITIONER

## 2021-03-17 NOTE — PROGRESS NOTES
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  · Discovered during work up for stroke on 9/22/2020  · Presented at the time to hospital with ataxia  · Since has been well  Continues to endorse difficulty with balance/gait and memory struggles  · Exam non-focal  Slight delay in timing to responses of questions  Imaging:  · MRI brain 3/15/21: read pending  Unchanged foci of gradient blooming artifact throught both cerebral hemispheres  Plan:  · Imaging reviewed with patient and wife extensively  Discussed findings and that they remain unchanged from MRI completed 9/21/2020  · Follow up with neurology Dr Leola Mondragon as scheduled in May  · No neurosurgical intervention anticipated  · Follow up PRN  Diagnoses and all orders for this visit:    Abnormal finding on MRI of brain            CHIEF COMPLAINT    Chief Complaint   Patient presents with    Follow-up     Shared [facility] f/u with MRI Brain wwo 3/15/21  Dx: Abnormal MRI of head; Unsteady gait       HISTORY    History of Present Illness     63y o  year old male With past medical history including hypertension, sleep apnea, who presents for follow-up after an MRI completed last September during a workup for gait instability/ataxia indicated questionable petechial hemorrhage versus cavernoma  He was evaluated at the time in the hospital and was noted with lacunar infarcts as well  He states since that time he has been doing well at home although he continues to endorse difficulty with his gait as well as memory impairment  He followed up with a neurologist in floor where they spend the led[place] but also has scheduled follow-up in May with Dr Leloa Mondragon here  She he denies any visual disturbances  He denies any headache  He denies any dizziness    He denies any numbness or weakness  HPI    See Discussion    REVIEW OF SYSTEMS    Review of Systems   Constitutional: Negative  HENT: Negative  Eyes: Negative  Respiratory: Negative  Cardiovascular: Negative  Gastrointestinal: Negative  Endocrine: Negative  Genitourinary: Negative  Musculoskeletal: Negative  Skin: Negative  Allergic/Immunologic: Negative  Neurological: Negative  Hematological: Negative  Psychiatric/Behavioral: Negative  All other systems reviewed and are negative  ROS reviewed and edited as needed  Meds/Allergies     Current Outpatient Medications   Medication Sig Dispense Refill    amLODIPine (NORVASC) 5 mg tablet Take 1 tablet (5 mg total) by mouth daily 30 tablet 0    atorvastatin (LIPITOR) 40 mg tablet Take 1 tablet (40 mg total) by mouth every evening 30 tablet 5    clopidogrel (PLAVIX) 75 mg tablet TAKE 1 TABLET BY MOUTH EVERY DAY 30 tablet 5    fexofenadine (ALLEGRA) 180 MG tablet Take 1 tablet (180 mg total) by mouth daily for 30 days (Patient taking differently: Take 180 mg by mouth every morning  ) 30 tablet 0    metoprolol succinate (TOPROL-XL) 50 mg 24 hr tablet Take 50 mg by mouth daily      sildenafil (VIAGRA) 100 mg tablet Take 1 tablet (100 mg total) by mouth daily as needed for erectile dysfunction 12 tablet 5     No current facility-administered medications for this visit          Allergies   Allergen Reactions    Iodine Solution [Povidone Iodine]     Pollen Extract      Reaction Date: 18Sep2006;        PAST HISTORY    Past Medical History:   Diagnosis Date    Arthropathy of knee     last assessed 8/19/15    Bacterial pneumonia 02/05/2007    last assessed 2/5/7    Brain atrophy ([facility] [address] )     Colon polyp     CPAP (continuous positive airway pressure) dependence     Disorder of male genital organ 02/12/2008    last assessed 2/12/08    ED (erectile dysfunction) of organic origin     last assessed 2/13/17     History of hypertension  Seasonal allergies     Situational anxiety     resolved 3/16/17     Sleep apnea     Tinnitus     Wears hearing aid     bilateral       Past Surgical History:   Procedure Laterality Date    COLONOSCOPY      x3-w/polyps    HERNIA REPAIR      umbilical,hemal inguinal    KNEE ARTHROSCOPY Left     meniscus    MO COLONOSCOPY FLX DX W/COLLJ SPEC WHEN PFRMD N/A 5/7/2018    Procedure: COLONOSCOPY;  Surgeon: Abraham Dubois MD;  Location: [facility] GI LAB; Service: Gastroenterology       Social History     Tobacco Use    Smoking status: Never Smoker    Smokeless tobacco: Never Used   Substance Use Topics    Alcohol use: Yes     Alcohol/week: 24 0 standard drinks     Types: 20 Standard drinks or equivalent, 2 Glasses of wine, 2 Cans of beer per week     Frequency: 4 or more times a week     Drinks per session: 3 or 4     Comment: 2-3 mixed drinks/day    Drug use: No       Family History   Problem Relation Age of Onset    Cancer Mother         breast    Diverticulitis Mother         colostomy    Breast cancer Mother     Heart disease Father         CHF    Cancer Sister         breast    Depression Sister     Cancer Sister         skin cancer    Cancer Sister         skin cancer    Colon cancer Neg Hx     Liver disease Neg Hx          Above history personally reviewed  EXAM    Vitals:Blood pressure 140/86, pulse 69, temperature 97 7 °F (36 5 °C), temperature source Probe, resp  rate 16, height 6' (1 829 m), weight 102 kg (224 lb)  ,Body mass index is 30 38 kg/m²  Physical Exam  Constitutional:       General: He is not in acute distress  Appearance: He is well-developed  He is not diaphoretic  Eyes:      General:         Right eye: No discharge  Left eye: No discharge  Extraocular Movements: EOM normal       Conjunctiva/sclera: Conjunctivae normal       Pupils: Pupils are equal, round, and reactive to light  Neck:      Musculoskeletal: Normal range of motion and neck supple  Pulmonary:      Effort: Pulmonary effort is normal  No respiratory distress  Abdominal:      General: Bowel sounds are normal  There is no distension  Palpations: Abdomen is soft  Tenderness: There is no abdominal tenderness  Musculoskeletal: Normal range of motion  Skin:     General: Skin is warm and dry  Neurological:      Mental Status: He is alert and oriented to person, place, and time  Cranial Nerves: No cranial nerve deficit  Sensory: No sensory deficit  Motor: No weakness  Coordination: Coordination normal  Finger-Nose-Finger Test normal       Gait: Gait normal       Deep Tendon Reflexes: Reflexes normal    Psychiatric:         Speech: Speech normal          Behavior: Behavior normal          Thought Content: Thought content normal          Judgment: Judgment normal          Neurologic Exam     Mental Status   Oriented to person, place, and time  Oriented to person  Oriented to place  Oriented to time  Oriented to year, month and date  Registration: recalls 3 of 3 objects  Recall at 5 minutes: recalls 3 of 3 objects  Follows 2 step commands  Attention: normal  Concentration: normal    Speech: speech is normal   Level of consciousness: alert  Knowledge: good and consistent with education  Able to perform simple calculations  Able to name object  Cranial Nerves   Cranial nerves II through XII intact  CN III, IV, VI   Pupils are equal, round, and reactive to light  Extraocular motions are normal    Right pupil: Size: 3 mm  Shape: regular  Reactivity: brisk  Consensual response: intact  Accommodation: intact  Left pupil: Size: 3 mm  Shape: regular  Reactivity: brisk  Consensual response: intact  Accommodation: intact  Nystagmus: none   Diplopia: none  Conjugate gaze: present    CN V   Right facial sensation deficit: none  Left facial sensation deficit: none    CN VII   Facial expression full, symmetric       CN VIII   Hearing: intact    CN IX, X Palate: symmetric    CN XI   Right sternocleidomastoid strength: normal  Left sternocleidomastoid strength: normal  Right trapezius strength: normal  Left trapezius strength: normal    CN XII   Tongue: not atrophic  Fasciculations: absent  Tongue deviation: none    Motor Exam   Muscle bulk: normal  Overall muscle tone: normal  Right arm pronator drift: absent  Left arm pronator drift: absent    Strength   Right deltoid: 5/5  Left deltoid: 5/5  Right biceps: 5/5  Left biceps: 5/5  Right triceps: 5/5  Left triceps: 5/5  Right quadriceps: 5/5  Left quadriceps: 5/5  Right hamstrin/5  Left hamstrin/5  Right anterior tibial: 5/5  Left anterior tibial: 5/5  Right posterior tibial: 5/5  Left posterior tibial: 5/5  Right peroneal: 5/5  Left peroneal: 5/5  Right gastroc: 5/5  Left gastroc: 5/5    Sensory Exam   Light touch normal    Proprioception normal      Gait, Coordination, and Reflexes     Gait  Gait: non-neurologic    Coordination   Finger to nose coordination: normal    Tremor   Resting tremor: absent  Intention tremor: absent  Action tremor: absent        MEDICAL DECISION MAKING    Imaging Studies:     No results found  I have personally reviewed pertinent reports     and I have personally reviewed pertinent films in PACS

## 2021-04-12 DIAGNOSIS — Z23 ENCOUNTER FOR IMMUNIZATION: ICD-10-CM

## 2021-04-20 ENCOUNTER — TELEPHONE (OUTPATIENT)
Dept: GASTROENTEROLOGY | Facility: CLINIC | Age: 66
End: 2021-04-20

## 2021-04-20 NOTE — TELEPHONE ENCOUNTER
Left mess returning patient's call to schedule colon/EGD with Dr Loni Orta  Direct line given to patient

## 2021-04-21 ENCOUNTER — TELEPHONE (OUTPATIENT)
Dept: GASTROENTEROLOGY | Facility: CLINIC | Age: 66
End: 2021-04-21

## 2021-04-21 NOTE — TELEPHONE ENCOUNTER
Spoke with patient and scheduled his colon/EGD for 05/24/21 at [place] [address] with Dr Jeremiah Hayes/Johnalax prep emailed at patient's request  Request sent to family  to request 5 day hold on Plavix  Aware he needs covid test 05/18/21

## 2021-04-22 DIAGNOSIS — I63.9 STROKE (HCC): ICD-10-CM

## 2021-04-22 RX ORDER — CLOPIDOGREL BISULFATE 75 MG/1
75 TABLET ORAL DAILY
Qty: 90 TABLET | Refills: 0 | Status: SHIPPED | OUTPATIENT
Start: 2021-04-22 | End: 2021-11-05 | Stop reason: SDUPTHER

## 2021-04-22 RX ORDER — ATORVASTATIN CALCIUM 40 MG/1
40 TABLET, FILM COATED ORAL EVERY EVENING
Qty: 90 TABLET | Refills: 0 | Status: SHIPPED | OUTPATIENT
Start: 2021-04-22 | End: 2021-04-27

## 2021-04-27 DIAGNOSIS — I63.9 STROKE (HCC): ICD-10-CM

## 2021-04-27 RX ORDER — ATORVASTATIN CALCIUM 40 MG/1
TABLET, FILM COATED ORAL
Qty: 30 TABLET | Refills: 0 | Status: SHIPPED | OUTPATIENT
Start: 2021-04-27 | End: 2021-08-09

## 2021-05-02 PROBLEM — I71.21 ASCENDING AORTIC ANEURYSM: Status: ACTIVE | Noted: 2020-10-14

## 2021-05-02 PROBLEM — I71.2 ASCENDING AORTIC ANEURYSM (HCC): Status: ACTIVE | Noted: 2020-10-14

## 2021-05-02 PROBLEM — I34.0 MITRAL REGURGITATION: Status: ACTIVE | Noted: 2020-10-14

## 2021-05-02 PROBLEM — I35.0 AORTIC STENOSIS: Status: ACTIVE | Noted: 2020-10-14

## 2021-05-03 ENCOUNTER — OFFICE VISIT (OUTPATIENT)
Dept: FAMILY MEDICINE CLINIC | Facility: CLINIC | Age: 66
End: 2021-05-03
Payer: COMMERCIAL

## 2021-05-03 VITALS
HEART RATE: 79 BPM | SYSTOLIC BLOOD PRESSURE: 126 MMHG | TEMPERATURE: 100 F | HEIGHT: 72 IN | RESPIRATION RATE: 16 BRPM | OXYGEN SATURATION: 97 % | WEIGHT: 223 LBS | BODY MASS INDEX: 30.2 KG/M2 | DIASTOLIC BLOOD PRESSURE: 80 MMHG

## 2021-05-03 DIAGNOSIS — Z12.5 PROSTATE CANCER SCREENING: ICD-10-CM

## 2021-05-03 DIAGNOSIS — R73.03 PRE-DIABETES: ICD-10-CM

## 2021-05-03 DIAGNOSIS — Z00.00 HEALTHCARE MAINTENANCE: Primary | ICD-10-CM

## 2021-05-03 DIAGNOSIS — I63.89 CEREBROVASCULAR ACCIDENT (CVA) DUE TO OTHER MECHANISM (HCC): ICD-10-CM

## 2021-05-03 DIAGNOSIS — E78.5 HYPERLIPIDEMIA LDL GOAL <130: ICD-10-CM

## 2021-05-03 DIAGNOSIS — I10 BENIGN ESSENTIAL HYPERTENSION: ICD-10-CM

## 2021-05-03 PROCEDURE — 99396 PREV VISIT EST AGE 40-64: CPT | Performed by: FAMILY MEDICINE

## 2021-05-03 PROCEDURE — 3008F BODY MASS INDEX DOCD: CPT | Performed by: NURSE PRACTITIONER

## 2021-05-03 NOTE — PROGRESS NOTES
Assessment/Plan:    No problem-specific Assessment & Plan notes found for this encounter  cpe  htn stable  hld stable  Prediabetes unchanged  Monitor q6m     Diagnoses and all orders for this visit:    Healthcare maintenance    Benign essential hypertension  -     Comprehensive metabolic panel; Future    Pre-diabetes    Cerebrovascular accident (CVA) due to other mechanism ([facility] [address] )  -     CBC and differential; Future    Hyperlipidemia LDL goal <130  -     Comprehensive metabolic panel; Future  -     Lipid Panel with Direct LDL reflex; Future    Prostate cancer screening  -     PSA, Total Screen; Future              Return in about 6 months (around 11/3/2021) for Recheck  Subjective:      Patient ID: Laura Trujillo is a 63 y o  male  Chief Complaint   Patient presents with    Physical Exam     Rizwana Maria       HPI  Has seen neurologist in [place]  Continued on plavix  Hx of cva and has resulting ataxia    No bleeding  No gi bleeding noted    On his meds  Diet is regular, no restrictions    Some wt gain  Not exercising    Plans egd/colon  Ok to hold plavix 5d preprocedure    The following portions of the patient's history were reviewed and updated as appropriate: allergies, current medications, past family history, past medical history, past social history, past surgical history and problem list     Review of Systems   Constitutional: Negative for fever  Respiratory: Negative for shortness of breath  Musculoskeletal: Positive for gait problem           Current Outpatient Medications   Medication Sig Dispense Refill    atorvastatin (LIPITOR) 40 mg tablet TAKE 1 TABLET BY MOUTH EVERY DAY IN THE EVENING 30 tablet 0    clopidogrel (PLAVIX) 75 mg tablet Take 1 tablet (75 mg total) by mouth daily 90 tablet 0    fexofenadine (ALLEGRA) 180 MG tablet Take 1 tablet (180 mg total) by mouth daily for 30 days (Patient taking differently: Take 180 mg by mouth every morning  ) 30 tablet 0    metoprolol succinate (TOPROL-XL) 50 mg 24 hr tablet Take 50 mg by mouth daily      amLODIPine (NORVASC) 5 mg tablet Take 1 tablet (5 mg total) by mouth daily (Patient not taking: Reported on 5/3/2021) 30 tablet 0    sildenafil (VIAGRA) 100 mg tablet Take 1 tablet (100 mg total) by mouth daily as needed for erectile dysfunction (Patient not taking: Reported on 5/3/2021) 12 tablet 5     No current facility-administered medications for this visit  Objective:    /80   Pulse 79   Temp 100 °F (37 8 °C)   Resp 16   Ht 6' (1 829 m)   Wt 101 kg (223 lb)   SpO2 97%   BMI 30 24 kg/m²        Physical Exam  Vitals signs and nursing note reviewed  Constitutional:       General: He is not in acute distress  Appearance: He is well-developed  He is not ill-appearing  HENT:      Head: Normocephalic  Right Ear: Tympanic membrane normal       Left Ear: Tympanic membrane normal    Eyes:      General: No scleral icterus  Conjunctiva/sclera: Conjunctivae normal    Neck:      Musculoskeletal: Neck supple  Cardiovascular:      Rate and Rhythm: Normal rate and regular rhythm  Heart sounds: No murmur  Pulmonary:      Effort: Pulmonary effort is normal  No respiratory distress  Breath sounds: No wheezing  Abdominal:      General: There is no distension  Palpations: Abdomen is soft  Tenderness: There is no abdominal tenderness  Genitourinary:     Penis: Normal        Scrotum/Testes: Normal       Prostate: Normal       Rectum: Normal    Musculoskeletal:         General: No deformity  Right lower leg: No edema  Left lower leg: No edema  Skin:     General: Skin is warm and dry  Coloration: Skin is not jaundiced or pale  Neurological:      Mental Status: He is alert  Motor: No weakness  Gait: Gait abnormal    Psychiatric:         Behavior: Behavior normal          Thought Content: Thought content normal          BMI Counseling: Body mass index is 30 24 kg/m²   The BMI is above normal  Nutrition recommendations include decreasing portion sizes and moderation in carbohydrate intake  Exercise recommendations include exercising 3-5 times per week  No pharmacotherapy was ordered                Norm Josh, DO

## 2021-05-06 ENCOUNTER — OFFICE VISIT (OUTPATIENT)
Dept: NEUROLOGY | Facility: CLINIC | Age: 66
End: 2021-05-06
Payer: COMMERCIAL

## 2021-05-06 VITALS
HEIGHT: 72 IN | WEIGHT: 223.8 LBS | DIASTOLIC BLOOD PRESSURE: 82 MMHG | HEART RATE: 75 BPM | BODY MASS INDEX: 30.31 KG/M2 | SYSTOLIC BLOOD PRESSURE: 128 MMHG

## 2021-05-06 DIAGNOSIS — I63.89 CEREBROVASCULAR ACCIDENT (CVA) DUE TO OTHER MECHANISM (HCC): Primary | ICD-10-CM

## 2021-05-06 PROCEDURE — 1036F TOBACCO NON-USER: CPT | Performed by: PSYCHIATRY & NEUROLOGY

## 2021-05-06 PROCEDURE — 3008F BODY MASS INDEX DOCD: CPT | Performed by: PSYCHIATRY & NEUROLOGY

## 2021-05-06 PROCEDURE — 99214 OFFICE O/P EST MOD 30 MIN: CPT | Performed by: PSYCHIATRY & NEUROLOGY

## 2021-05-06 NOTE — ASSESSMENT & PLAN NOTE
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

## 2021-05-06 NOTE — ASSESSMENT & PLAN NOTE
Memory issues noted 1-2 years ago, Have trouble with names, conversations, medications, directions, trouble using phone  Still able to do all ADLS by himself  Can still drive  Wife does the finances, patient does not like to cook  [address] [place] [address], Ne- October 2020 14/30  Dementia about the same  No change       Etiology Likely has some short term memory changes, mild dementia at this time without behavior changes, likely related to vascular dementia (d/t uncontrolled vascular risk factors)     Plan  Control vascular risk factors

## 2021-05-06 NOTE — PATIENT INSTRUCTIONS
Will do MRI brain w and wo contrast       Things that we know are helpful for thinking and memory   1) Exercise program: gradually increase your physical activity over time  Start small and be patient  Aerobic (cardio) activity is best but incorporate balance, strength and flexibility training as well    a  Try to get at least 30min 3 times per week   2) Diet: Mediterranean diet (colorful fruits and vegetables, olive oil, fish, whole grains, very little red meet is any), MIND diet, anti-inflammatory diet  a  Stay well hydrated: drink 6-8 glasses of water per day   b  What's good for your heart is good for your brain  c  Avoid high-salt foods   3) Sleep: aim for at least 7-8 hours per night   a  Avoid alcohol and medications like Benadryl (Tylenol PM) or other sedating drugs  4) Stress management/mindfulness practice:   a  Talk with our social workers about finding a cognitive behavioral therapists  b  Try a smart phone lili like [employer] mobile or Spar[employer] for beginners   i  Try curable for pain    c  Take a course in Mindfulness Based Stress Reduction (MBSR)   i  Dariana solorzano  Read or listen to an audiobook about it:  i  Mindfulness for beginners   ii  10% happier  iii  The happiness advantage   5) Social engagement:   a  Stay in touch with family and friends   b  Plan a few specific activities for your social health every week   c  Shady Taylor a local support group   d  Volunteer! www [email or web address] or call [phone]    MIND diet score:  1 point for each component      - Green leafy vegetables: at least 6 per week  - Other vegetable: at least 1 per day   - Berries: at least 2 per week  - Red meat: fewer than 4 per week   - Fish: at least 1 per week   - Poultry: at least 2 per week  - Beans: at least 3 per week  - Nuts: at least 5 per week  - Fast or fried food: less than 1 per week  - Olive oil   - Butter less: less than 1 table-spoon per day   - Cheese: less than 1 serving per week - Pastries/sweets: less than 5 servings per week  - Alcohol: no more than 1 serving/ day

## 2021-05-06 NOTE — PROGRESS NOTES
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, sensory ataxia  Plan  Use cane while walking  Take fall precautions          Dementia without behavioral disturbance ([facility] [address] )  Memory issues noted 1-2 years ago, Have trouble with names, conversations, medications, directions, trouble using phone  Still able to do all ADLS by himself  Can still drive  Wife does the finances, patient does not like to cook  [address] [place] [address], Ne- October 2020 14/30  Dementia about the same  No change  Etiology Likely has some short term memory changes, mild dementia at this time without behavior changes, likely related to vascular dementia (d/t uncontrolled vascular risk factors)     Plan  Control vascular risk factors      Stroke [facility])  He started having trouble with walking around March/June 2020, walk was unsteady, wide based gait  2 falls in August 2020 then MRI was done 9/2020 found to have acute small lacunar stroke in Rt thalamic region  He was hospitalized on 9/21 for and stroke workup was done  Pt was on Aspirin at time of stroke, not on Plavix as it was stopped due to rectal bleed  It was suspected Small vessel stroke d/t uncontrolled vascular risk factors  (HTN, HLD, GRACIE not complaint on CPAP)    Repeat MRI March/2021-  reviewed by me and Dr Mcfarlane Few  It shows Punctuate nodular enhancement in the anterior medial right cerebellum otherwise stable       Plan-  Repeat MRI brain with and without contrast to confirm the cause of nodular enhancement in anterior right cerebellum  Continue Plavix 75 mg daily  Continue Lipitor 40 mg poqd   Use CPAP daily   BP goal <140/80   Physical activity 20-30 min 5 times a day  Pending LDL and HbA1c for primary       Diagnoses and all orders for this visit:    Cerebrovascular accident (CVA) due to other mechanism ([facility] )  -     MRI brain w wo contrast; Future           Subjective:    62 y/o m w recent h/o Rt thalamic stroke,  HTN, HLD, GRACIE (not compliant with CPAP), Colon polyps here as a follow up for balance issues, stroke and memory issues  Gait-  To review, He started having trouble with walking around March/June 2020, walk was unsteady, wide based gait  2 falls in August 2020, then MRI was done 9/2020 found to have small stroke in Rt thalamic region  Tamsen Sole is getting worse with time  Contributing factor- Alcohol usage- 1-3 hard liquor every day  All labs Vit B1, B6, B12, Lyme, RPR, SPEP, TSH reviewed, vitamin B12 313,     Interval history-   He thinks walk is about the same, no recent falls, may be last fall >6 months ago November 2020  He went PT once but then he stopped going d/t copay  He was advised to take vitamin B12 every day 1000 mcg however patient reports not taking  Educated patient about vitamin B12 importance  Dementia-  Memory issues noted 2019 and worsened in 2020 Have trouble with names, conversations, medications, directions, trouble using phone  Still able to do all ADLS by himself  Can still drive  10/29- MOCA- 14/30, Reflexes-increased  Interval history- Memory about the same, he still sometimes forgets the words, Doesn't drive long distances  Still independent in most of the ADLs, wife does the finances  He doesn't like to cook  He does clean by himself maintain hygiene, and independent in all basic ADLs  Stroke- MRI 09/2020 found to have acute small lacunar stroke in right thalamic region, MRI was done due to unsteady gait    He was just on aspirin at time of stroke and not on Plavix due to rectal bleed  His stroke was suspected due to small-vessel  No new stroke like symptoms, BP stable, he is taking Plavix regularly  He had recent MRI done March 2021, reviewed by me and Dr Darrion Styles  It shows Punctuate nodular enhancement is seen in the anterior medial right cerebellum otherwise stable  The following portions of the patient's history were reviewed and updated as appropriate: allergies, current medications, past family history, past medical history, past social history, past surgical history and problem list          Objective:    Blood pressure 128/82, pulse 75, height 6' (1 829 m), weight 102 kg (223 lb 12 8 oz)  Physical Exam  Vitals signs reviewed  Constitutional:       Appearance: Normal appearance  HENT:      Head: Normocephalic  Mouth/Throat:      Mouth: Mucous membranes are moist       Pharynx: Oropharynx is clear  Eyes:      Extraocular Movements: Extraocular movements intact  Pupils: Pupils are equal, round, and reactive to light  Neck:      Musculoskeletal: Normal range of motion  Cardiovascular:      Rate and Rhythm: Normal rate  Pulses: Normal pulses  Pulmonary:      Effort: Pulmonary effort is normal    Abdominal:      Palpations: Abdomen is soft  Musculoskeletal: Normal range of motion  Skin:     General: Skin is warm  Capillary Refill: Capillary refill takes less than 2 seconds  Neurological:      Mental Status: He is alert  Psychiatric:         Mood and Affect: Mood normal          Mental Status: The patient was awake, alert, attentive, oriented to time place and person, recall 2/3, president's name Lily So, unable to do the calculation  Able to name objects correctly  Cranial Nerves:   I: smell Not tested   II: visual fields Full to confrontation  Pupils equal, round, reactive to light with normal accomodation  Fundus: benign fundus     III,IV,VI: extraocular muscles EOMI, no nystagmus   V: masseter and pterygoid strength full  Sensation in the V1 through V3 distributions intact to pinprick and light touch bilaterally  VII: Face is symmetric with no weakness noted  VIII: Audition intact to finger rub bilaterally  IX/X: Uvula midline  Soft palate elevation symmetric  XI: Trapezius and SCM strength 5/5 B/L  XII: Tongue midline with no atrophy or fasciculations with appropriate movement  Motor Examination:   No tremors, No pronator drift  Bulk: Normal  No atrophy Tone: Normal  Fasciculations: None  Deltoid Biceps Triceps WE   WF   FF IO     Right        5         5          5         5      5      5   5        Left           5        5          5          5      5     5   5                       IP        Quad   Ham     TA       Gastroc   Right      5            5          5         5                5  Left         5            5         5         5                5       Reflexes:                   Biceps Brachioradialis Triceps Patella Achilles Plantars   Right          2+            3+                  3+        3+       2+         mute   Left            2+             3+                 3+         3+       2+         mute     Cross adductors present  Coordination: Patient able to perform normal finger-to-nose and heel to shin appropriately  Normal rapid alternating movements  Sensory: Decrease vibration b/l lower legs around knee and much lower on feet region  Gait:Sway on Romberg  wide base gait  ROS:    Review of Systems   Constitutional: Negative  Negative for appetite change and fever  HENT: Negative  Negative for hearing loss, tinnitus, trouble swallowing and voice change  Eyes: Negative  Negative for photophobia and pain  Respiratory: Negative  Negative for shortness of breath  Cardiovascular: Negative  Negative for palpitations  Gastrointestinal: Negative  Negative for nausea and vomiting  Endocrine: Negative  Negative for cold intolerance  Genitourinary: Negative  Negative for dysuria, frequency and urgency  Musculoskeletal: Negative  Negative for myalgias and neck pain  Off balance   Skin: Negative  Negative for rash  Neurological: Negative  Negative for dizziness, tremors, seizures, syncope, facial asymmetry, speech difficulty, weakness, light-headedness, numbness and headaches  Hematological: Negative  Does not bruise/bleed easily  Psychiatric/Behavioral: Negative  Negative for confusion, hallucinations and sleep disturbance

## 2021-05-06 NOTE — ASSESSMENT & PLAN NOTE
He started having trouble with walking around March/June 2020, walk was unsteady, wide based gait  2 falls in August 2020 then MRI was done 9/2020 found to have acute small lacunar stroke in Rt thalamic region  He was hospitalized on 9/21 for and stroke workup was done  Pt was on Aspirin at time of stroke, not on Plavix as it was stopped due to rectal bleed  It was suspected Small vessel stroke d/t uncontrolled vascular risk factors  (HTN, HLD, GRACIE not complaint on CPAP)    Repeat MRI March/2021-  reviewed by me and Dr Diaz Proffer  It shows Punctuate nodular enhancement in the anterior medial right cerebellum otherwise stable  Plan-  Repeat MRI brain with and without contrast to confirm the cause of nodular enhancement in anterior right cerebellum  Continue Plavix 75 mg daily  Continue Lipitor 40 mg poqd   Use CPAP daily   BP goal <140/80   Physical activity 20-30 min 5 times a day     Pending LDL and HbA1c for primary

## 2021-05-18 ENCOUNTER — TELEPHONE (OUTPATIENT)
Dept: NEUROLOGY | Facility: CLINIC | Age: 66
End: 2021-05-18

## 2021-05-18 NOTE — PRE-PROCEDURE INSTRUCTIONS
Pre-Surgery Instructions:   Medication Instructions    atorvastatin (LIPITOR) 40 mg tablet Patient was instructed by Physician and understands   clopidogrel (PLAVIX) 75 mg tablet Patient was instructed to contact Physician for medication instruction   fexofenadine (ALLEGRA) 180 MG tablet Patient was instructed by Physician and understands   metoprolol succinate (TOPROL-XL) 50 mg 24 hr tablet Instructed patient per Anesthesia Guidelines   sildenafil (VIAGRA) 100 mg tablet Patient was instructed by Physician and understands

## 2021-05-18 NOTE — TELEPHONE ENCOUNTER
Received TT from Amador Garza Azima([facility])  Usually we defer stopping/starting AP/AC on who ever is doing the procedure as it depends upon the bleeding risk of that particular procedure  If they want to ask our opinion, I would say okay to hold and start ASAP when ok per Nirmal Rodriguez([facility])  I agree  I typically caution that he wouldnt be protected as much for recurrent stroke during that time, but the risk is low as he restarts it right away after    These are our thoughts   Ok to hold then restart as soon as GI doc can

## 2021-05-18 NOTE — TELEPHONE ENCOUNTER
pt's wife called and states that pt is scheduled for colonoscopy for 5/24  GI is asking if pt can stop plavix for 5-7 days prior to colonoscopy  she states that pt would have to stop starting tomorrow if ok to hold plavix  pcp prescribes plavix, but pt takes for hx of stroke  She states that GI told her to check with our office    Please advise  [phone] to leave detailed message    TT sent to dr Manuel Chua

## 2021-05-19 DIAGNOSIS — Z11.59 SCREENING FOR VIRAL DISEASE: ICD-10-CM

## 2021-05-19 PROCEDURE — U0003 INFECTIOUS AGENT DETECTION BY NUCLEIC ACID (DNA OR RNA); SEVERE ACUTE RESPIRATORY SYNDROME CORONAVIRUS 2 (SARS-COV-2) (CORONAVIRUS DISEASE [COVID-19]), AMPLIFIED PROBE TECHNIQUE, MAKING USE OF HIGH THROUGHPUT TECHNOLOGIES AS DESCRIBED BY CMS-2020-01-R: HCPCS | Performed by: INTERNAL MEDICINE

## 2021-05-19 PROCEDURE — U0005 INFEC AGEN DETEC AMPLI PROBE: HCPCS | Performed by: INTERNAL MEDICINE

## 2021-05-20 LAB — SARS-COV-2 RNA RESP QL NAA+PROBE: NEGATIVE

## 2021-05-23 ENCOUNTER — ANESTHESIA EVENT (OUTPATIENT)
Dept: GASTROENTEROLOGY | Facility: AMBULARY SURGERY CENTER | Age: 66
End: 2021-05-23

## 2021-05-24 ENCOUNTER — HOSPITAL ENCOUNTER (OUTPATIENT)
Dept: GASTROENTEROLOGY | Facility: AMBULARY SURGERY CENTER | Age: 66
Setting detail: OUTPATIENT SURGERY
Discharge: HOME | End: 2021-05-24
Attending: INTERNAL MEDICINE
Payer: COMMERCIAL

## 2021-05-24 ENCOUNTER — ANESTHESIA (OUTPATIENT)
Dept: GASTROENTEROLOGY | Facility: AMBULARY SURGERY CENTER | Age: 66
End: 2021-05-24

## 2021-05-24 VITALS
DIASTOLIC BLOOD PRESSURE: 86 MMHG | WEIGHT: 223 LBS | SYSTOLIC BLOOD PRESSURE: 148 MMHG | HEART RATE: 62 BPM | BODY MASS INDEX: 30.2 KG/M2 | HEIGHT: 72 IN | TEMPERATURE: 98 F | RESPIRATION RATE: 14 BRPM | OXYGEN SATURATION: 100 %

## 2021-05-24 DIAGNOSIS — D12.6 TUBULAR ADENOMA OF COLON: ICD-10-CM

## 2021-05-24 DIAGNOSIS — K64.8 INTERNAL HEMORRHOIDS: ICD-10-CM

## 2021-05-24 DIAGNOSIS — R19.7 DIARRHEA, UNSPECIFIED TYPE: ICD-10-CM

## 2021-05-24 DIAGNOSIS — K62.5 RECTAL BLEEDING: ICD-10-CM

## 2021-05-24 PROCEDURE — 43239 EGD BIOPSY SINGLE/MULTIPLE: CPT | Performed by: INTERNAL MEDICINE

## 2021-05-24 PROCEDURE — 88305 TISSUE EXAM BY PATHOLOGIST: CPT | Performed by: PATHOLOGY

## 2021-05-24 PROCEDURE — 45380 COLONOSCOPY AND BIOPSY: CPT | Performed by: INTERNAL MEDICINE

## 2021-05-24 PROCEDURE — 45385 COLONOSCOPY W/LESION REMOVAL: CPT | Performed by: INTERNAL MEDICINE

## 2021-05-24 RX ORDER — PROPOFOL 10 MG/ML
INJECTION, EMULSION INTRAVENOUS CONTINUOUS PRN
Status: DISCONTINUED | OUTPATIENT
Start: 2021-05-24 | End: 2021-05-24

## 2021-05-24 RX ORDER — LIDOCAINE HYDROCHLORIDE 20 MG/ML
INJECTION, SOLUTION INFILTRATION; PERINEURAL AS NEEDED
Status: DISCONTINUED | OUTPATIENT
Start: 2021-05-24 | End: 2021-05-24

## 2021-05-24 RX ORDER — GLYCOPYRROLATE 0.2 MG/ML
INJECTION INTRAMUSCULAR; INTRAVENOUS AS NEEDED
Status: DISCONTINUED | OUTPATIENT
Start: 2021-05-24 | End: 2021-05-24

## 2021-05-24 RX ORDER — ONDANSETRON 2 MG/ML
4 INJECTION INTRAMUSCULAR; INTRAVENOUS ONCE AS NEEDED
Status: CANCELLED | OUTPATIENT
Start: 2021-05-24

## 2021-05-24 RX ORDER — SODIUM CHLORIDE, SODIUM LACTATE, POTASSIUM CHLORIDE, CALCIUM CHLORIDE 600; 310; 30; 20 MG/100ML; MG/100ML; MG/100ML; MG/100ML
125 INJECTION, SOLUTION INTRAVENOUS CONTINUOUS
Status: DISCONTINUED | OUTPATIENT
Start: 2021-05-24 | End: 2021-05-28 | Stop reason: HOSPADM

## 2021-05-24 RX ORDER — PROPOFOL 10 MG/ML
INJECTION, EMULSION INTRAVENOUS AS NEEDED
Status: DISCONTINUED | OUTPATIENT
Start: 2021-05-24 | End: 2021-05-24

## 2021-05-24 RX ADMIN — LIDOCAINE HYDROCHLORIDE 5 ML: 20 INJECTION, SOLUTION INFILTRATION; PERINEURAL at 12:04

## 2021-05-24 RX ADMIN — PROPOFOL 100 MG: 10 INJECTION, EMULSION INTRAVENOUS at 12:06

## 2021-05-24 RX ADMIN — PROPOFOL 100 MG: 10 INJECTION, EMULSION INTRAVENOUS at 12:04

## 2021-05-24 RX ADMIN — GLYCOPYRROLATE 0.2 MG: 0.2 INJECTION, SOLUTION INTRAMUSCULAR; INTRAVENOUS at 12:04

## 2021-05-24 RX ADMIN — SODIUM CHLORIDE, SODIUM LACTATE, POTASSIUM CHLORIDE, AND CALCIUM CHLORIDE 125 ML/HR: .6; .31; .03; .02 INJECTION, SOLUTION INTRAVENOUS at 11:18

## 2021-05-24 RX ADMIN — PROPOFOL 140 MCG/KG/MIN: 10 INJECTION, EMULSION INTRAVENOUS at 12:06

## 2021-05-24 NOTE — ANESTHESIA PREPROCEDURE EVALUATION
Procedure:  COLONOSCOPY  EGD    Relevant Problems   ANESTHESIA (within normal limits)      CARDIO   (+) Aortic stenosis   (+) Ascending aortic aneurysm (HCC)   (+) Benign essential hypertension   (+) Hyperlipidemia LDL goal <130   (+) Mitral regurgitation      GI/HEPATIC   (+) Rectal bleeding      NEURO/PSYCH   (+) Dementia without behavioral disturbance (HCC)   (+) CAMMIE (generalized anxiety disorder)   (+) History of colon polyps   (+) Stroke (HCC)      PULMONARY   (+) Asthma, mild intermittent        Physical Exam    Airway    Mallampati score: I  TM Distance: >3 FB  Neck ROM: full     Dental   Comment: No loose ,     Cardiovascular  Rhythm: regular, Rate: normal,     Pulmonary  Breath sounds clear to auscultation,     Other Findings        Anesthesia Plan  ASA Score- 3     Anesthesia Type- IV sedation with anesthesia with ASA Monitors  Additional Monitors:   Airway Plan:           Plan Factors-    Chart reviewed  EKG reviewed  Existing labs reviewed  Patient is not a current smoker  Induction- intravenous  Postoperative Plan-     Informed Consent- Anesthetic plan and risks discussed with patient  I personally reviewed this patient with the CRNA  Discussed and agreed on the Anesthesia Plan with the CRNA  Chris Shiakh

## 2021-05-24 NOTE — ANESTHESIA POSTPROCEDURE EVALUATION
Post-Op Assessment Note    CV Status:  Stable  Pain Score: 0    Pain management: adequate     Mental Status:  Sleepy   Hydration Status:  Stable   PONV Controlled:  None   Airway Patency:  Patent   Two or more mitigation strategies used for obstructive sleep apnea   Post Op Vitals Reviewed: Yes      Staff: CRNA         No complications documented      BP   100/54   Temp 97   Pulse 64   Resp 16   SpO2 99

## 2021-05-24 NOTE — H&P
History and Physical - [facility]  Gerard Butcher 63 y o  male MRN: [id number]    HPI: Gerard Butcher is a 63y o  year old male who presents with rectal bleeding and change in bowel movement    Review of Systems    Historical Information   Past Medical History:   Diagnosis Date    Arthropathy of knee     last assessed 8/19/15    Bacterial pneumonia 02/05/2007    last assessed 2/5/7    Brain atrophy ([facility] )     Colon polyp     CPAP (continuous positive airway pressure) dependence     Disorder of male genital organ 02/12/2008    last assessed 2/12/08    ED (erectile dysfunction) of organic origin     last assessed 2/13/17     History of hypertension     Seasonal allergies     Situational anxiety     resolved 3/16/17     Sleep apnea     Stroke ([facility] )     09/2020 TIA    Tinnitus     Wears hearing aid     bilateral     Past Surgical History:   Procedure Laterality Date    COLONOSCOPY      x3-w/polyps    HERNIA REPAIR      umbilical,hemal inguinal    KNEE ARTHROSCOPY Left     meniscus    WI COLONOSCOPY FLX DX W/COLLJ SPEC WHEN PFRMD N/A 5/7/2018    Procedure: COLONOSCOPY;  Surgeon: Cinthya Koehler MD;  Location: [facility];   Service: Gastroenterology     Social History   Social History     Substance and Sexual Activity   Alcohol Use Yes    Alcohol/week: 24 0 standard drinks    Types: 20 Standard drinks or equivalent, 2 Glasses of wine, 2 Cans of beer per week    Frequency: 4 or more times a week    Drinks per session: 3 or 4    Comment: 2-3 mixed drinks/day     Social History     Substance and Sexual Activity   Drug Use No     Social History     Tobacco Use   Smoking Status Never Smoker   Smokeless Tobacco Never Used     Family History   Problem Relation Age of Onset    Cancer Mother         breast    Diverticulitis Mother         colostomy    Breast cancer Mother     Heart disease Father         CHF    Cancer Sister         breast    Depression Sister     Cancer Sister skin cancer    Cancer Sister         skin cancer    Colon cancer Neg Hx     Liver disease Neg Hx        Meds/Allergies     (Not in a hospital admission)      Allergies   Allergen Reactions    Iodine Solution [Povidone Iodine]     Pollen Extract      Reaction Date: 18Sep2006;        Objective     /84   Pulse 71   Temp 98 °F (36 7 °C) (Tympanic)   Resp 18   Ht 6' (1 829 m)   Wt 101 kg (223 lb)   SpO2 95%   BMI 30 24 kg/m²       PHYSICAL EXAM    Gen: NAD  CV: RRR  CHEST: Clear  ABD: soft, NT/ND  EXT: no edema  Neuro: AAO      ASSESSMENT/PLAN:  This is a 63y o  year old male here for rectal bleeding change in bowel moved    PLAN:   Procedure:  EGD and colonoscopy

## 2021-06-01 ENCOUNTER — TELEPHONE (OUTPATIENT)
Dept: GASTROENTEROLOGY | Facility: CLINIC | Age: 66
End: 2021-06-01

## 2021-06-01 NOTE — TELEPHONE ENCOUNTER
----- Message from Sebastian Castro MD sent at 5/31/2021 11:18 PM EDT -----  Please inform the patient biopsies from small intestine are negative for celiac sprue  A small nodule in his duodenum was normal benign tissue  Biopsies from stomach were negative for H pylori  Biopsies from his small intestine, his terminal ileum was normal with no signs of Crohn's disease which is good news  Random biopsies throughout the colon were normal, no signs of colitis  The 1 polyp removed was a tubular adenoma, there was no high-grade dysplasia and no cancer  As we discussed, due to the poor prep I recommend repeat colonoscopy with a 2 day bowel prep  Please schedule the next 3 to 6 months at his convenience

## 2021-06-02 ENCOUNTER — TELEPHONE (OUTPATIENT)
Dept: GASTROENTEROLOGY | Facility: CLINIC | Age: 66
End: 2021-06-02

## 2021-06-02 NOTE — TELEPHONE ENCOUNTER
----- Message from Lore Kumar MD sent at 5/31/2021 11:18 PM EDT -----  Please inform the patient biopsies from small intestine are negative for celiac sprue  A small nodule in his duodenum was normal benign tissue  Biopsies from stomach were negative for H pylori  Biopsies from his small intestine, his terminal ileum was normal with no signs of Crohn's disease which is good news  Random biopsies throughout the colon were normal, no signs of colitis  The 1 polyp removed was a tubular adenoma, there was no high-grade dysplasia and no cancer  As we discussed, due to the poor prep I recommend repeat colonoscopy with a 2 day bowel prep  Please schedule the next 3 to 6 months at his convenience

## 2021-06-03 ENCOUNTER — TELEPHONE (OUTPATIENT)
Dept: GASTROENTEROLOGY | Facility: CLINIC | Age: 66
End: 2021-06-03

## 2021-06-03 NOTE — TELEPHONE ENCOUNTER
Pt aware of results and verbalized understanding  Pt is currently on the road, driving and gives verbal consent to sched his repeat colonoscopy with his wife, Ashlee Ingram, at the home phone # [phone]

## 2021-06-03 NOTE — TELEPHONE ENCOUNTER
----- Message from Nic Montalvo MD sent at 5/31/2021 11:18 PM EDT -----  Please inform the patient biopsies from small intestine are negative for celiac sprue  A small nodule in his duodenum was normal benign tissue  Biopsies from stomach were negative for H pylori  Biopsies from his small intestine, his terminal ileum was normal with no signs of Crohn's disease which is good news  Random biopsies throughout the colon were normal, no signs of colitis  The 1 polyp removed was a tubular adenoma, there was no high-grade dysplasia and no cancer  As we discussed, due to the poor prep I recommend repeat colonoscopy with a 2 day bowel prep  Please schedule the next 3 to 6 months at his convenience

## 2021-06-03 NOTE — TELEPHONE ENCOUNTER
----- Message from Dahiana Haynes MD sent at 5/31/2021 11:18 PM EDT -----  Please inform the patient biopsies from small intestine are negative for celiac sprue  A small nodule in his duodenum was normal benign tissue  Biopsies from stomach were negative for H pylori  Biopsies from his small intestine, his terminal ileum was normal with no signs of Crohn's disease which is good news  Random biopsies throughout the colon were normal, no signs of colitis  The 1 polyp removed was a tubular adenoma, there was no high-grade dysplasia and no cancer  As we discussed, due to the poor prep I recommend repeat colonoscopy with a 2 day bowel prep  Please schedule the next 3 to 6 months at his convenience

## 2021-06-04 ENCOUNTER — TELEPHONE (OUTPATIENT)
Dept: GASTROENTEROLOGY | Facility: CLINIC | Age: 66
End: 2021-06-04

## 2021-06-07 ENCOUNTER — TELEPHONE (OUTPATIENT)
Dept: GASTROENTEROLOGY | Facility: CLINIC | Age: 66
End: 2021-06-07

## 2021-06-07 NOTE — TELEPHONE ENCOUNTER
Patient is scheduled for colonoscopy on December 6 , 2021 at [address] [place] [address] with Maria Guadalupe Villalta MD  Patient is aware of pre-procedure prep of MIRALAX/DULCOLAX 2 DAY PREP and they will be called the day prior between 2 and 6 pm for time to report for procedure  Aware to hold plavix 5 days prior  Will fax request closer to the date  Patient aware to COVID test 11/30/21  Prep mailed to patient

## 2021-06-09 ENCOUNTER — TELEPHONE (OUTPATIENT)
Dept: FAMILY MEDICINE CLINIC | Facility: CLINIC | Age: 66
End: 2021-06-09

## 2021-06-09 NOTE — TELEPHONE ENCOUNTER
[facility]     Has a MRI Friday and wants to know if he can get something to keep him calm    He gets claustrophobic     Thank you

## 2021-06-10 DIAGNOSIS — F41.9 ANXIETY: Primary | ICD-10-CM

## 2021-06-10 RX ORDER — ALPRAZOLAM 0.5 MG/1
TABLET ORAL
Qty: 2 TABLET | Refills: 0 | Status: SHIPPED | OUTPATIENT
Start: 2021-06-10 | End: 2022-02-02 | Stop reason: CLARIF

## 2021-06-10 NOTE — TELEPHONE ENCOUNTER
Patient called again asking if you can call something in because he is claustrophobic  Please advise

## 2021-06-10 NOTE — TELEPHONE ENCOUNTER
I s/w wife   He has an MRI ordered by his neurologist Dr Freddy Cervantes  Wife feels he may have forgotten that Dr Cherrie Davis rx xanax for an MRI in Oct 2020 (per records) and called us instead  Advised the should contact his neurologist right away so he can get the test that he ordered    She agrees/understands

## 2021-06-10 NOTE — TELEPHONE ENCOUNTER
Pt's wife called and states that pt's brain MRI is scheduled tmrw  Whenever pt gets imaging done, he needs to take xanax  Requesting script be sent to [facility] pharmacy on file    Rx entered   Pls review and sign off      thanks

## 2021-06-11 ENCOUNTER — HOSPITAL ENCOUNTER (OUTPATIENT)
Dept: RADIOLOGY | Facility: HOSPITAL | Age: 66
Discharge: HOME | End: 2021-06-11
Payer: COMMERCIAL

## 2021-06-11 DIAGNOSIS — I63.89 CEREBROVASCULAR ACCIDENT (CVA) DUE TO OTHER MECHANISM (HCC): ICD-10-CM

## 2021-06-11 PROCEDURE — 70553 MRI BRAIN STEM W/O & W/DYE: CPT

## 2021-06-11 PROCEDURE — A9585 GADOBUTROL INJECTION: HCPCS | Performed by: PSYCHIATRY & NEUROLOGY

## 2021-06-11 PROCEDURE — G1004 CDSM NDSC: HCPCS

## 2021-06-11 RX ADMIN — GADOBUTROL 10 ML: 604.72 INJECTION INTRAVENOUS at 13:18

## 2021-06-16 ENCOUNTER — TELEPHONE (OUTPATIENT)
Dept: NEUROLOGY | Facility: CLINIC | Age: 66
End: 2021-06-16

## 2021-06-16 DIAGNOSIS — I63.89 CEREBROVASCULAR ACCIDENT (CVA) DUE TO OTHER MECHANISM (HCC): Primary | ICD-10-CM

## 2021-06-16 NOTE — TELEPHONE ENCOUNTER
Patient's wife calling in regarding MRI results  She would like a call back from either the provider or nursing staff with results   She is also concerned that the MRI report says "Patient has suspected COVID-19 " She states they have both been vaccinated and that patient was tested prior to a recent colonoscopy and was negative     Please review and advise     cb#: [phone], okay to leave detailed message

## 2021-06-18 NOTE — TELEPHONE ENCOUNTER
I attempted to call patient at the number listed in Epic, but the phone went to voicemail  The MRI did show an acute to subacute infarction in the left cerebellum  This is a new area of injury similar to the finding we discussed before in the right thalamus  Is he having any new symptoms? He is on Plavix and Atorvastatin, so we likely don't need to change anything with his medications currently, but it would be important to get some more tests to try to evaluate for a source of the infarctions  He already had blood vessel testing, but I would like to get an Echocardiogram and a thrombosis panel  If these are okay, given his MRI findings, we may consider doing a loop recorder  It is also important that he continue to follow with his PCP to assure good control of his cholesterol, blood pressure and blood sugar  The previously seen enhancing lesion was no different and was felt to possibly just be a prominent blood vessel, which is nothing to worry about

## 2021-06-21 NOTE — TELEPHONE ENCOUNTER
Patient's wife returning call and made aware of results  Hamzah Gannon that he is not having any new symptoms but is still having balance problems  Said all his symptoms seem to be physical  She is agreeable to plan of care  Transferred to  to schedule tests

## 2021-06-22 ENCOUNTER — TELEPHONE (OUTPATIENT)
Dept: FAMILY MEDICINE CLINIC | Facility: CLINIC | Age: 66
End: 2021-06-22

## 2021-06-22 DIAGNOSIS — G47.33 OSA ON CPAP: Primary | ICD-10-CM

## 2021-06-22 DIAGNOSIS — Z99.89 OSA ON CPAP: Primary | ICD-10-CM

## 2021-06-22 NOTE — TELEPHONE ENCOUNTER
DR Carol Hoffman    Patient needs a pulmonologist order put in the system   He used to see Dr Lesa Gerber

## 2021-06-22 NOTE — TELEPHONE ENCOUNTER
He needs to get this from his pulmonary or sleep medicine specialist   He can be given a referral if needed

## 2021-06-22 NOTE — TELEPHONE ENCOUNTER
Patients wife returned call  Questioning echo order  Patient already had echo done in September of 2020, do we need another echo done?

## 2021-06-22 NOTE — TELEPHONE ENCOUNTER
Patients wife left message on referral hotline stating that her  needs an order for a CPAP machine  She stated that his cardiologist told her to call us his PCP    Do you do this or does Pulmonary handle this?

## 2021-06-30 DIAGNOSIS — I63.89 CEREBROVASCULAR ACCIDENT (CVA) DUE TO OTHER MECHANISM (HCC): Primary | ICD-10-CM

## 2021-06-30 NOTE — TELEPHONE ENCOUNTER
Yes, due to new stroke we will need another ECHO of the heart and previous one was more than 6 months ago  Dr Lashawn Meadows suggests JOSE ELIAS (Transesophageal Echocardiogram) for better view of atrium and it was never done before  I will place the order  Please let patient and his wife know  If they have questions, I am happy to call them back  Thanks!

## 2021-06-30 NOTE — PROGRESS NOTES
Yes, due to new stroke we will need another ECHO of the heart and previous one was more than 6 months ago  Dr Carmen Osorio suggests JOSE ELIAS (Transesophageal Echocardiogram) for better view of atrium and it was never done before  I will place the order  Please let patient and his wife know  If they have questions, I am happy to call them back  Thanks!

## 2021-06-30 NOTE — TELEPHONE ENCOUNTER
[facility] message sent notifying patient  Patient's wife was busy when I called this morning

## 2021-06-30 NOTE — TELEPHONE ENCOUNTER
FYI  Patient's wife made aware  States that he is scheduled for the ECHO in July  She is agreeable to JOSE ELIAS as well  Will call CS to schedule

## 2021-06-30 NOTE — TELEPHONE ENCOUNTER
Thanks! We just need one- JOSE ELIAS (Transesophageal echocardiogram) which will require anesthesia no need for (Transthoracic echocardiogram)

## 2021-07-13 ENCOUNTER — ANESTHESIA (OUTPATIENT)
Dept: NON INVASIVE DIAGNOSTICS | Facility: HOSPITAL | Age: 66
End: 2021-07-13

## 2021-07-13 ENCOUNTER — HOSPITAL ENCOUNTER (OUTPATIENT)
Dept: NON INVASIVE DIAGNOSTICS | Facility: HOSPITAL | Age: 66
Discharge: HOME | End: 2021-07-13
Admitting: INTERNAL MEDICINE
Payer: COMMERCIAL

## 2021-07-13 ENCOUNTER — ANESTHESIA EVENT (OUTPATIENT)
Dept: NON INVASIVE DIAGNOSTICS | Facility: HOSPITAL | Age: 66
End: 2021-07-13

## 2021-07-13 VITALS
DIASTOLIC BLOOD PRESSURE: 65 MMHG | HEART RATE: 66 BPM | SYSTOLIC BLOOD PRESSURE: 108 MMHG | TEMPERATURE: 98.7 F | RESPIRATION RATE: 16 BRPM | OXYGEN SATURATION: 92 %

## 2021-07-13 DIAGNOSIS — I63.89 CEREBROVASCULAR ACCIDENT (CVA) DUE TO OTHER MECHANISM (HCC): ICD-10-CM

## 2021-07-13 PROCEDURE — 93312 ECHO TRANSESOPHAGEAL: CPT

## 2021-07-13 RX ORDER — PROPOFOL 10 MG/ML
INJECTION, EMULSION INTRAVENOUS AS NEEDED
Status: DISCONTINUED | OUTPATIENT
Start: 2021-07-13 | End: 2021-07-14

## 2021-07-13 RX ORDER — SODIUM CHLORIDE 9 MG/ML
INJECTION, SOLUTION INTRAVENOUS CONTINUOUS PRN
Status: DISCONTINUED | OUTPATIENT
Start: 2021-07-13 | End: 2021-07-14

## 2021-07-13 RX ORDER — LIDOCAINE HYDROCHLORIDE 20 MG/ML
INJECTION, SOLUTION EPIDURAL; INFILTRATION; INTRACAUDAL; PERINEURAL AS NEEDED
Status: DISCONTINUED | OUTPATIENT
Start: 2021-07-13 | End: 2021-07-14

## 2021-07-13 RX ADMIN — PROPOFOL 20 MG: 10 INJECTION, EMULSION INTRAVENOUS at 12:35

## 2021-07-13 RX ADMIN — PROPOFOL 20 MG: 10 INJECTION, EMULSION INTRAVENOUS at 12:38

## 2021-07-13 RX ADMIN — SODIUM CHLORIDE: 0.9 INJECTION, SOLUTION INTRAVENOUS at 12:27

## 2021-07-13 RX ADMIN — PROPOFOL 20 MG: 10 INJECTION, EMULSION INTRAVENOUS at 12:32

## 2021-07-13 RX ADMIN — PROPOFOL 20 MG: 10 INJECTION, EMULSION INTRAVENOUS at 12:40

## 2021-07-13 RX ADMIN — PROPOFOL 20 MG: 10 INJECTION, EMULSION INTRAVENOUS at 12:29

## 2021-07-13 RX ADMIN — PROPOFOL 100 MG: 10 INJECTION, EMULSION INTRAVENOUS at 12:28

## 2021-07-13 RX ADMIN — PROPOFOL 30 MG: 10 INJECTION, EMULSION INTRAVENOUS at 12:30

## 2021-07-13 RX ADMIN — LIDOCAINE HYDROCHLORIDE 5 MG: 20 INJECTION, SOLUTION EPIDURAL; INFILTRATION; INTRACAUDAL; PERINEURAL at 12:28

## 2021-07-13 NOTE — DISCHARGE INSTRUCTIONS
Transesophageal Echocardiogram     WHAT YOU NEED TO KNOW:   A transesophageal echocardiogram (JOSE ELIAS) is a procedure used to check for problems with your heart  It will also show any problems in the blood vessels near your heart  Sound waves are sent to the heart through a tube inserted into your throat  The sound waves show the structure and function of your heart through pictures on a monitor  DISCHARGE INSTRUCTIONS:     No driving for 24 hours, because you had sedation  Rest: Rest until you are fully awake  You may be sleepy for a while after your procedure  If your throat was numbed, do not eat or drink until you are able to swallow normally/ or until directed by your healthcare provider: start with small sips of water to assure you do not choke  If you cough, wait a little longer and then try again  Avoid hot liquids for the day  Follow up with your healthcare provider as directed: Write down your questions so you remember to ask them during your visits  Contact your healthcare provider if:   · You have a fever or chills  · You taste blood in your mouth  · You have a severe sore throat or difficulty swallowing  · You have questions or concerns about your condition or care  Seek care immediately or call 911 if:   · You have any of the following signs of a heart attack:    ? Squeezing, pressure, or pain in your chest that lasts longer than 5 minutes or returns   ? Discomfort or pain in your back, neck, jaw, stomach, or arm    ? Trouble breathing   ? Nausea or vomiting   ?  Lightheadedness or a sudden cold sweat, especially with chest pain or trouble breathing

## 2021-07-13 NOTE — ANESTHESIA POSTPROCEDURE EVALUATION
Post-Op Assessment Note    CV Status:  Stable  Pain Score: 0    Pain management: adequate     Mental Status:  Sleepy   PONV Controlled:  Controlled   Airway Patency:  Patent      Post Op Vitals Reviewed: Yes      Staff: Anesthesiologist         No complications documented      BP   102/64   Temp      Pulse  74   Resp   14   SpO2   96

## 2021-07-13 NOTE — SEDATION DOCUMENTATION
JOSE ELIAS completed  Patient tolerated procedure well  IV removed  Discharge instructions given to patient   Patient discharged from cath lab in stable condition

## 2021-07-16 PROCEDURE — 93312 ECHO TRANSESOPHAGEAL: CPT | Performed by: INTERNAL MEDICINE

## 2021-07-16 PROCEDURE — 93325 DOPPLER ECHO COLOR FLOW MAPG: CPT | Performed by: INTERNAL MEDICINE

## 2021-07-16 PROCEDURE — 93321 DOPPLER ECHO F-UP/LMTD STD: CPT | Performed by: INTERNAL MEDICINE

## 2021-07-27 ENCOUNTER — TELEPHONE (OUTPATIENT)
Dept: NEUROLOGY | Facility: CLINIC | Age: 66
End: 2021-07-27

## 2021-07-27 NOTE — TELEPHONE ENCOUNTER
I can call patient's wife this evening to discuss the JOSE ELIAS results and next steps  Dr Jennifer Milligan, do you agree next step will be loop monitor as both atriums are mild dilated to complete the work up for his stroke

## 2021-07-28 ENCOUNTER — TELEPHONE (OUTPATIENT)
Dept: PAIN MEDICINE | Facility: CLINIC | Age: 66
End: 2021-07-28

## 2021-07-28 NOTE — TELEPHONE ENCOUNTER
I called patient's wife [facility] yesterday 7/27/21 around 4:00 pm and left voice mail  as she didn't receive the call

## 2021-07-29 DIAGNOSIS — I63.9 STROKE (HCC): Primary | ICD-10-CM

## 2021-07-29 NOTE — TELEPHONE ENCOUNTER
Patient's wife returning your call regarding JOSE ELIAS  Asking if you could call her again  Said that if she does not  you can leave a detailed message

## 2021-07-29 NOTE — PROGRESS NOTES
I spoke with patient's wife- terri, discussed JOSE ELIAS results with her, EF-60%, b/l atrial enlargement, Aorta shows mild atheroma in transverse and descending aorta, I also discussed next step- Cardiac loop monitor, she verbalized understanding     Follow up in office on 11/11/21

## 2021-08-08 DIAGNOSIS — I63.9 STROKE (HCC): ICD-10-CM

## 2021-08-09 RX ORDER — ATORVASTATIN CALCIUM 40 MG/1
TABLET, FILM COATED ORAL
Qty: 30 TABLET | Refills: 0 | Status: SHIPPED | OUTPATIENT
Start: 2021-08-09 | End: 2021-09-09

## 2021-09-02 ENCOUNTER — OFFICE VISIT (OUTPATIENT)
Dept: PULMONOLOGY | Facility: MEDICAL CENTER | Age: 66
End: 2021-09-02
Payer: COMMERCIAL

## 2021-09-02 DIAGNOSIS — E66.09 CLASS 1 OBESITY DUE TO EXCESS CALORIES WITHOUT SERIOUS COMORBIDITY WITH BODY MASS INDEX (BMI) OF 30.0 TO 30.9 IN ADULT: ICD-10-CM

## 2021-09-02 DIAGNOSIS — G47.33 OBSTRUCTIVE SLEEP APNEA: Primary | ICD-10-CM

## 2021-09-02 DIAGNOSIS — J45.20 MILD INTERMITTENT ASTHMA WITHOUT COMPLICATION: ICD-10-CM

## 2021-09-02 PROCEDURE — 99244 OFF/OP CNSLTJ NEW/EST MOD 40: CPT | Performed by: INTERNAL MEDICINE

## 2021-09-02 PROCEDURE — 1036F TOBACCO NON-USER: CPT | Performed by: INTERNAL MEDICINE

## 2021-09-02 RX ORDER — ALBUTEROL SULFATE 90 UG/1
2 AEROSOL, METERED RESPIRATORY (INHALATION) EVERY 4 HOURS PRN
Qty: 6.7 G | Refills: 7 | Status: SHIPPED | OUTPATIENT
Start: 2021-09-02

## 2021-09-02 RX ORDER — ASPIRIN 81 MG/1
81 TABLET ORAL DAILY
COMMUNITY
End: 2021-11-11 | Stop reason: CLARIF

## 2021-09-02 NOTE — PATIENT INSTRUCTIONS
Check your present auto dream station CPAP machine and see if it is on recall list    I will order a new CPAP machine RESMED and set on auto mode at pressure of 5-20 cm of water just like you were on before    Your able to change out for the new mask during 30 day trial   If you do not like one given to you    My recommended mask is the full face mask  Airfit F30 medium size    I will place order for new CPAP machine and hopefully you will be contacted by next week  When she get new machine call our office for compliance visit 30-60 days later    I placed prescription for albuterol inhaler( Proventil)    Take 2 puffs every 4 hours as needed for wheezing or coughing    Call us if you do not hear from [address]  [employer] in next week or so and also call us to let us know if you got your CPAP machine       ([phone])  Back line

## 2021-09-02 NOTE — PROGRESS NOTES
Assessment/Plan:       Problem List Items Addressed This Visit        Respiratory    Asthma, mild intermittent     Does have mild intermittent asthma but is not have any difficulty at present time  I did give him prescription for rescue albuterol inhaler he can keep on hand at home  He is familiar with how to use an inhaler         Relevant Medications    albuterol (Proventil HFA) 90 mcg/act inhaler    Obstructive sleep apnea - Primary     Initially diagnosed with severe GRACIE by sleep study done December 7, 2011  Overall AHI was 35 3  This increased to 45 9 during REM sleep  His present auto dream states machine has stopped working and was set at 5-20 cm water  I did instruct him on how to check to see if his CPAP machine is on recall list from [employer]  He is due for a new machine anyhow and I will order him new auto CPAP machine set at same settings of 5-20 cm water  This is the present setting of his machine  I did show him different type of face masks and recommended the AirFit F30 fullface mask  He does not like his present fullface mask  I did place order to [facility]  I told Renee Avila to contact us once he gets his new machine and he will need compliance visit 30-60 days later  I did discuss compliance criteria with him and his wife         Relevant Orders    CPAP Auto New DME       Other    Class 1 obesity due to excess calories without serious comorbidity with body mass index (BMI) of 30 0 to 30 9 in adult     I did talk about the benefits of weight loss and this would also help with his GRACIE  Advised him to watch his carbohydrate intake                 No follow-ups on file  All questions are answered to the patient's satisfaction and understanding  He verbalizes understanding  He is encouraged to call with any further questions or concerns  Portions of the record may have been created with voice recognition software    Occasional wrong word or "sound a like" substitutions may have occurred due to the inherent limitations of voice recognition software  Read the chart carefully and recognize, using context, where substitutions have occurred  a    Electronically Signed by Rosa Rush DO    ______________________________________________________________________    Chief Complaint: here for evaluation of GRACIE       Patient ID: Shivani Diaz is a 63 y o  y o  male has a past medical history of Arthropathy of knee, Bacterial pneumonia (02/05/2007), Brain atrophy ([address] ), Colon polyp, CPAP (continuous positive airway pressure) dependence, Disorder of male genital organ (02/12/2008), ED (erectile dysfunction) of organic origin, History of hypertension, Seasonal allergies, Situational anxiety, Sleep apnea, Stroke ([facility] [address] ), Tinnitus, and Wears hearing aid  9/2/2021  Patient presents today for initial visit for GRACIE  HPI    Shivani Diaz has severe obstructive sleep apnea initially diagnosed by in-lab diagnostic sleep study on December 7, 2011  At that time he weighed 225 lb  Overall AHI was 35 3  This increased to 45 9 during REM sleep  He had been on auto CPAP pressure of 5-20 cm water but about 3 months ago his CPAP machine stopped working  He has an auto dream Station machine  He did not see if his CPAP machine is on recall this  He did use a fullface mask before and tolerates CPAP well  Does have some mild daytime fatigue without using the CPAP  Since he is not having Ace functional CPAP machine anymore , He does have loud snoring as noted by his wife  Justyna Portal  Also does have some witnessed apneas by her  He does have history of stroke    Shivani Diaz does have history of exercise-induced asthma for several years but has not used any inhaler therapy  Does have some allergic rhinitis postnasal drainage and he does take Allegra when needed  He also states he is allergic to cats  He used to use albuterol on as-needed basis but does not have any inhaler now    His nose some cough intermittently over past month or so  Not feeling short of breath  Not complain of any postnasal drainage now  He is not having any shortness of breath    Lonni Daniela  does have problems with his memory  He is able to drive but does state his memory is not good  He was accompanied by his wife Ashlee Ingram today        Occupational/Exposure history: disabled    Review of Systems   Constitutional: Positive for fatigue  Negative for chills, fever and unexpected weight change  HENT: Negative for congestion, rhinorrhea and sore throat  Eyes: Negative for discharge and redness  Respiratory: Positive for cough  Negative for shortness of breath  Cardiovascular: Negative for chest pain, palpitations and leg swelling  Gastrointestinal: Negative for abdominal distention, abdominal pain and nausea  Endocrine: Negative for polydipsia and polyphagia  Genitourinary: Negative for dysuria  Musculoskeletal: Negative for joint swelling and myalgias  Skin: Negative for rash  Neurological: Negative for light-headedness  Psychiatric/Behavioral:        Memory not good       Social history: He reports that he has never smoked  He has never used smokeless tobacco  He reports current alcohol use of about 24 0 standard drinks of alcohol per week  He reports that he does not use drugs  Past surgical history:   Past Surgical History:   Procedure Laterality Date    COLONOSCOPY      x3-w/polyps    HERNIA REPAIR      umbilical,hemal inguinal    KNEE ARTHROSCOPY Left     meniscus    MN COLONOSCOPY FLX DX W/COLLJ SPEC WHEN PFRMD N/A 5/7/2018    Procedure: COLONOSCOPY;  Surgeon: Mary Ann Edmond MD;  [facility] GI LAB;   Service: Gastroenterology     Family history:   Family History   Problem Relation Age of Onset    Cancer Mother         breast    Diverticulitis Mother         colostomy    Breast cancer Mother     Heart disease Father         CHF    Cancer Sister         breast    Depression Sister     Cancer Sister skin cancer    Cancer Sister         skin cancer    Colon cancer Neg Hx     Liver disease Neg Hx        Immunization History   Administered Date(s) Administered    Influenza Quadrivalent, 6-35 Months IM 09/26/2016    Influenza, recombinant, quadrivalent,injectable, preservative free 10/17/2018    Influenza, seasonal, injectable 12/11/2006, 03/08/2010    Tdap 08/08/2011     Current Outpatient Medications   Medication Sig Dispense Refill    ALPRAZolam (XANAX) 0 5 mg tablet Take 1 tab 30 minutes prior to MRI  May take another tab if needed  2 tablet 0    aspirin (ECOTRIN LOW STRENGTH) 81 mg EC tablet Take 81 mg by mouth daily      clopidogrel (PLAVIX) 75 mg tablet Take 1 tablet (75 mg total) by mouth daily 90 tablet 0    metoprolol succinate (TOPROL-XL) 50 mg 24 hr tablet Take 50 mg by mouth daily      sildenafil (VIAGRA) 100 mg tablet Take 1 tablet (100 mg total) by mouth daily as needed for erectile dysfunction 12 tablet 5    albuterol (Proventil HFA) 90 mcg/act inhaler Inhale 2 puffs every 4 (four) hours as needed for wheezing or shortness of breath 6 7 g 7    atorvastatin (LIPITOR) 40 mg tablet TAKE 1 TABLET BY MOUTH EVERY DAY IN THE EVENING 30 tablet 0    fexofenadine (ALLEGRA) 180 MG tablet Take 1 tablet (180 mg total) by mouth daily for 30 days (Patient taking differently: Take 180 mg by mouth every morning  ) 30 tablet 0     No current facility-administered medications for this visit  Allergies: Iodine solution [povidone iodine] and Pollen extract    Objective: There were no vitals filed for this visit     Wt Readings from Last 3 Encounters:   05/24/21 101 kg (223 lb)   05/06/21 102 kg (223 lb 12 8 oz)   05/03/21 101 kg (223 lb)     There is no height or weight on file to calculate BMI  Physical Exam  Constitutional:       General: He is not in acute distress  Appearance: He is well-developed  HENT:      Head: Normocephalic        Nose: Nose normal       Mouth/Throat: Mouth: Mucous membranes are moist       Pharynx: Oropharynx is clear  No oropharyngeal exudate  Comments: Mallampati score is 2  Eyes:      Conjunctiva/sclera: Conjunctivae normal       Pupils: Pupils are equal, round, and reactive to light  Neck:      Vascular: No JVD  Trachea: No tracheal deviation  Cardiovascular:      Rate and Rhythm: Normal rate and regular rhythm  Heart sounds: Normal heart sounds  Pulmonary:      Effort: Pulmonary effort is normal       Comments: Had couple faint wheezes in both lower lobes initially on aspiration but these cleared after a couple breaths  No crackles or rhonchi  Abdominal:      General: There is no distension  Palpations: Abdomen is soft  Tenderness: There is no abdominal tenderness  There is no guarding  Musculoskeletal:      Cervical back: Neck supple  Comments: No edema, cyanosis, or clubbing   Lymphadenopathy:      Cervical: No cervical adenopathy  Skin:     General: Skin is warm and dry  Findings: No rash  Neurological:      General: No focal deficit present  Mental Status: He is alert and oriented to person, place, and time  Psychiatric:         Behavior: Behavior normal          Thought Content:  Thought content normal          Lab Review:   Lab Results   Component Value Date     09/07/2016    K 3 6 09/23/2020    K 4 0 09/27/2018     (H) 09/23/2020     09/27/2018    CO2 27 09/23/2020    CO2 24 09/27/2018    BUN 14 09/23/2020    BUN 18 09/27/2018    CREATININE 0 76 09/23/2020    CREATININE 0 72 (L) 09/07/2016    GLUCOSE 109 (H) 09/07/2016    CALCIUM 8 3 09/23/2020    CALCIUM 8 8 09/07/2016     Lab Results   Component Value Date    WBC 5 78 09/22/2020    WBC 5 6 09/07/2016    HGB 14 1 09/22/2020    HGB 16 1 09/07/2016    HCT 40 7 09/22/2020    HCT 47 2 09/07/2016    MCV 88 09/22/2020    MCV 90 09/07/2016     09/22/2020     09/07/2016           ESS: Total score: 15

## 2021-09-09 DIAGNOSIS — I63.9 STROKE (HCC): ICD-10-CM

## 2021-09-09 RX ORDER — ATORVASTATIN CALCIUM 40 MG/1
TABLET, FILM COATED ORAL
Qty: 30 TABLET | Refills: 0 | Status: SHIPPED | OUTPATIENT
Start: 2021-09-09 | End: 2021-11-02

## 2021-09-13 NOTE — ASSESSMENT & PLAN NOTE
Does have mild intermittent asthma but is not have any difficulty at present time  I did give him prescription for rescue albuterol inhaler he can keep on hand at home    He is familiar with how to use an inhaler

## 2021-09-13 NOTE — ASSESSMENT & PLAN NOTE
I did talk about the benefits of weight loss and this would also help with his GRACIE    Advised him to watch his carbohydrate intake

## 2021-09-13 NOTE — ASSESSMENT & PLAN NOTE
Initially diagnosed with severe GRACIE by sleep study done December 7, 2011  Overall AHI was 35 3  This increased to 45 9 during REM sleep  His present auto dream states machine has stopped working and was set at 5-20 cm water  I did instruct him on how to check to see if his CPAP machine is on recall list from [employer]  He is due for a new machine anyhow and I will order him new auto CPAP machine set at same settings of 5-20 cm water  This is the present setting of his machine  I did show him different type of face masks and recommended the AirFit F30 fullface mask  He does not like his present fullface mask  I did place order to [facility]  I told Johan Belle to contact us once he gets his new machine and he will need compliance visit 30-60 days later    I did discuss compliance criteria with him and his wife

## 2021-11-02 ENCOUNTER — TELEPHONE (OUTPATIENT)
Dept: PULMONOLOGY | Facility: MEDICAL CENTER | Age: 66
End: 2021-11-02

## 2021-11-02 DIAGNOSIS — I63.9 STROKE (HCC): ICD-10-CM

## 2021-11-02 RX ORDER — ATORVASTATIN CALCIUM 40 MG/1
TABLET, FILM COATED ORAL
Qty: 30 TABLET | Refills: 0 | Status: SHIPPED | OUTPATIENT
Start: 2021-11-02 | End: 2021-11-05 | Stop reason: SDUPTHER

## 2021-11-05 ENCOUNTER — OFFICE VISIT (OUTPATIENT)
Dept: FAMILY MEDICINE CLINIC | Facility: CLINIC | Age: 66
End: 2021-11-05
Payer: COMMERCIAL

## 2021-11-05 VITALS
BODY MASS INDEX: 31.42 KG/M2 | DIASTOLIC BLOOD PRESSURE: 88 MMHG | HEART RATE: 80 BPM | SYSTOLIC BLOOD PRESSURE: 124 MMHG | HEIGHT: 72 IN | OXYGEN SATURATION: 96 % | TEMPERATURE: 97.5 F | RESPIRATION RATE: 16 BRPM | WEIGHT: 232 LBS

## 2021-11-05 DIAGNOSIS — G31.9 BRAIN ATROPHY (HCC): ICD-10-CM

## 2021-11-05 DIAGNOSIS — Z86.73 HISTORY OF CARDIOEMBOLIC CEREBROVASCULAR ACCIDENT (CVA): ICD-10-CM

## 2021-11-05 DIAGNOSIS — J30.89 SEASONAL ALLERGIC RHINITIS DUE TO OTHER ALLERGIC TRIGGER: ICD-10-CM

## 2021-11-05 DIAGNOSIS — Z23 IMMUNIZATION DUE: ICD-10-CM

## 2021-11-05 DIAGNOSIS — I10 BENIGN ESSENTIAL HYPERTENSION: Primary | ICD-10-CM

## 2021-11-05 DIAGNOSIS — E78.5 HYPERLIPIDEMIA LDL GOAL <130: ICD-10-CM

## 2021-11-05 DIAGNOSIS — R73.03 PRE-DIABETES: ICD-10-CM

## 2021-11-05 DIAGNOSIS — N52.9 ORGANIC IMPOTENCE: ICD-10-CM

## 2021-11-05 DIAGNOSIS — Z12.5 PROSTATE CANCER SCREENING: ICD-10-CM

## 2021-11-05 DIAGNOSIS — E66.9 OBESITY (BMI 30-39.9): ICD-10-CM

## 2021-11-05 PROBLEM — R20.0 NUMBNESS IN FEET: Status: RESOLVED | Noted: 2019-06-06 | Resolved: 2021-11-05

## 2021-11-05 PROBLEM — Z86.0100 HISTORY OF COLON POLYPS: Status: RESOLVED | Noted: 2018-03-02 | Resolved: 2021-11-05

## 2021-11-05 PROBLEM — B35.4 TINEA CORPORIS: Status: RESOLVED | Noted: 2018-10-17 | Resolved: 2021-11-05

## 2021-11-05 PROBLEM — E66.3 OVER WEIGHT: Status: RESOLVED | Noted: 2019-06-06 | Resolved: 2021-11-05

## 2021-11-05 PROBLEM — J30.2 SEASONAL ALLERGIES: Status: RESOLVED | Noted: 2020-09-21 | Resolved: 2021-11-05

## 2021-11-05 PROBLEM — Z86.010 HISTORY OF COLON POLYPS: Status: RESOLVED | Noted: 2018-03-02 | Resolved: 2021-11-05

## 2021-11-05 PROBLEM — R26.81 UNSTEADY GAIT: Status: RESOLVED | Noted: 2020-09-22 | Resolved: 2021-11-05

## 2021-11-05 PROCEDURE — 90682 RIV4 VACC RECOMBINANT DNA IM: CPT

## 2021-11-05 PROCEDURE — 90471 IMMUNIZATION ADMIN: CPT

## 2021-11-05 PROCEDURE — 99214 OFFICE O/P EST MOD 30 MIN: CPT | Performed by: FAMILY MEDICINE

## 2021-11-05 PROCEDURE — 3725F SCREEN DEPRESSION PERFORMED: CPT | Performed by: FAMILY MEDICINE

## 2021-11-05 RX ORDER — ATORVASTATIN CALCIUM 40 MG/1
40 TABLET, FILM COATED ORAL EVERY EVENING
Qty: 90 TABLET | Refills: 1 | Status: SHIPPED | OUTPATIENT
Start: 2021-11-05 | End: 2022-05-28

## 2021-11-05 RX ORDER — CLOPIDOGREL BISULFATE 75 MG/1
75 TABLET ORAL DAILY
Qty: 90 TABLET | Refills: 1 | Status: SHIPPED | OUTPATIENT
Start: 2021-11-05 | End: 2022-02-02 | Stop reason: CLARIF

## 2021-11-05 RX ORDER — SILDENAFIL 100 MG/1
100 TABLET, FILM COATED ORAL DAILY PRN
Qty: 30 TABLET | Refills: 5 | Status: SHIPPED | OUTPATIENT
Start: 2021-11-05

## 2021-11-11 ENCOUNTER — OFFICE VISIT (OUTPATIENT)
Dept: NEUROLOGY | Facility: CLINIC | Age: 66
End: 2021-11-11
Payer: COMMERCIAL

## 2021-11-11 VITALS
TEMPERATURE: 97 F | BODY MASS INDEX: 31.42 KG/M2 | HEIGHT: 72 IN | HEART RATE: 72 BPM | SYSTOLIC BLOOD PRESSURE: 120 MMHG | DIASTOLIC BLOOD PRESSURE: 80 MMHG | WEIGHT: 232 LBS

## 2021-11-11 DIAGNOSIS — F01.50 VASCULAR DEMENTIA WITHOUT BEHAVIORAL DISTURBANCE (HCC): ICD-10-CM

## 2021-11-11 DIAGNOSIS — Z86.73 HISTORY OF STROKE: ICD-10-CM

## 2021-11-11 DIAGNOSIS — R26.81 UNSTEADY GAIT: ICD-10-CM

## 2021-11-11 DIAGNOSIS — I63.9 STROKE (HCC): Primary | ICD-10-CM

## 2021-11-11 PROCEDURE — 3008F BODY MASS INDEX DOCD: CPT | Performed by: PSYCHIATRY & NEUROLOGY

## 2021-11-11 PROCEDURE — 1036F TOBACCO NON-USER: CPT | Performed by: PSYCHIATRY & NEUROLOGY

## 2021-11-11 PROCEDURE — 99215 OFFICE O/P EST HI 40 MIN: CPT | Performed by: PSYCHIATRY & NEUROLOGY

## 2021-11-12 ENCOUNTER — TELEPHONE (OUTPATIENT)
Dept: NEUROLOGY | Facility: CLINIC | Age: 66
End: 2021-11-12

## 2021-11-12 ENCOUNTER — APPOINTMENT (OUTPATIENT)
Dept: LAB | Facility: HOSPITAL | Age: 66
End: 2021-11-12
Attending: FAMILY MEDICINE
Payer: COMMERCIAL

## 2021-11-12 DIAGNOSIS — R73.03 PRE-DIABETES: ICD-10-CM

## 2021-11-12 DIAGNOSIS — Z86.73 HISTORY OF CARDIOEMBOLIC CEREBROVASCULAR ACCIDENT (CVA): ICD-10-CM

## 2021-11-12 DIAGNOSIS — I10 BENIGN ESSENTIAL HYPERTENSION: ICD-10-CM

## 2021-11-12 DIAGNOSIS — E78.5 HYPERLIPIDEMIA LDL GOAL <130: ICD-10-CM

## 2021-11-12 DIAGNOSIS — Z12.5 PROSTATE CANCER SCREENING: ICD-10-CM

## 2021-11-12 LAB
ALBUMIN SERPL BCP-MCNC: 3.7 G/DL (ref 3.5–5)
ALP SERPL-CCNC: 103 U/L (ref 46–116)
ALT SERPL W P-5'-P-CCNC: 30 U/L (ref 12–78)
ANION GAP SERPL CALCULATED.3IONS-SCNC: 10 MMOL/L (ref 4–13)
AST SERPL W P-5'-P-CCNC: 16 U/L (ref 5–45)
BASOPHILS # BLD AUTO: 0.02 THOUSANDS/ΜL (ref 0–0.1)
BASOPHILS NFR BLD AUTO: 0 % (ref 0–1)
BILIRUB SERPL-MCNC: 0.97 MG/DL (ref 0.2–1)
BUN SERPL-MCNC: 13 MG/DL (ref 5–25)
CALCIUM SERPL-MCNC: 8.9 MG/DL (ref 8.3–10.1)
CHLORIDE SERPL-SCNC: 106 MMOL/L (ref 100–108)
CHOLEST SERPL-MCNC: 103 MG/DL (ref 50–200)
CO2 SERPL-SCNC: 24 MMOL/L (ref 21–32)
CREAT SERPL-MCNC: 0.88 MG/DL (ref 0.6–1.3)
EOSINOPHIL # BLD AUTO: 0.21 THOUSAND/ΜL (ref 0–0.61)
EOSINOPHIL NFR BLD AUTO: 4 % (ref 0–6)
ERYTHROCYTE [DISTWIDTH] IN BLOOD BY AUTOMATED COUNT: 12.1 % (ref 11.6–15.1)
EST. AVERAGE GLUCOSE BLD GHB EST-MCNC: 105 MG/DL
GFR SERPL CREATININE-BSD FRML MDRD: 91 ML/MIN/1.73SQ M
GLUCOSE P FAST SERPL-MCNC: 99 MG/DL (ref 65–99)
HBA1C MFR BLD: 5.3 %
HCT VFR BLD AUTO: 49 % (ref 36.5–49.3)
HDLC SERPL-MCNC: 34 MG/DL
HGB BLD-MCNC: 16.7 G/DL (ref 12–17)
IMM GRANULOCYTES # BLD AUTO: 0.04 THOUSAND/UL (ref 0–0.2)
IMM GRANULOCYTES NFR BLD AUTO: 1 % (ref 0–2)
LDLC SERPL CALC-MCNC: 57 MG/DL (ref 0–100)
LYMPHOCYTES # BLD AUTO: 1.03 THOUSANDS/ΜL (ref 0.6–4.47)
LYMPHOCYTES NFR BLD AUTO: 17 % (ref 14–44)
MCH RBC QN AUTO: 30.4 PG (ref 26.8–34.3)
MCHC RBC AUTO-ENTMCNC: 34.1 G/DL (ref 31.4–37.4)
MCV RBC AUTO: 89 FL (ref 82–98)
MONOCYTES # BLD AUTO: 0.62 THOUSAND/ΜL (ref 0.17–1.22)
MONOCYTES NFR BLD AUTO: 11 % (ref 4–12)
NEUTROPHILS # BLD AUTO: 4.01 THOUSANDS/ΜL (ref 1.85–7.62)
NEUTS SEG NFR BLD AUTO: 67 % (ref 43–75)
NRBC BLD AUTO-RTO: 0 /100 WBCS
PLATELET # BLD AUTO: 249 THOUSANDS/UL (ref 149–390)
PMV BLD AUTO: 8.7 FL (ref 8.9–12.7)
POTASSIUM SERPL-SCNC: 3.9 MMOL/L (ref 3.5–5.3)
PROT SERPL-MCNC: 6.6 G/DL (ref 6.4–8.2)
PSA SERPL-MCNC: 1 NG/ML (ref 0–4)
RBC # BLD AUTO: 5.5 MILLION/UL (ref 3.88–5.62)
SODIUM SERPL-SCNC: 140 MMOL/L (ref 136–145)
TRIGL SERPL-MCNC: 58 MG/DL
WBC # BLD AUTO: 5.93 THOUSAND/UL (ref 4.31–10.16)

## 2021-11-12 PROCEDURE — 83036 HEMOGLOBIN GLYCOSYLATED A1C: CPT

## 2021-11-12 PROCEDURE — 80053 COMPREHEN METABOLIC PANEL: CPT

## 2021-11-12 PROCEDURE — 80061 LIPID PANEL: CPT

## 2021-11-12 PROCEDURE — 36415 COLL VENOUS BLD VENIPUNCTURE: CPT

## 2021-11-12 PROCEDURE — G0103 PSA SCREENING: HCPCS

## 2021-11-12 PROCEDURE — 85025 COMPLETE CBC W/AUTO DIFF WBC: CPT

## 2021-11-12 NOTE — TELEPHONE ENCOUNTER
Called patient wife to inform her that Bindu Rodrigues needs to have a visit with Dr Greg Alvarez to have his loop recorder placement  I gave his wife [phone] to call Dr office to schedule appointment with cardiologist, she understand and agree

## 2021-11-30 ENCOUNTER — TELEPHONE (OUTPATIENT)
Dept: GASTROENTEROLOGY | Facility: CLINIC | Age: 66
End: 2021-11-30

## 2021-11-30 ENCOUNTER — TELEPHONE (OUTPATIENT)
Dept: PREADMISSION TESTING | Facility: HOSPITAL | Age: 66
End: 2021-11-30

## 2021-12-03 ENCOUNTER — TELEPHONE (OUTPATIENT)
Dept: GASTROENTEROLOGY | Facility: CLINIC | Age: 66
End: 2021-12-03

## 2021-12-06 ENCOUNTER — CONSULT (OUTPATIENT)
Dept: CARDIOLOGY CLINIC | Facility: CLINIC | Age: 66
End: 2021-12-06
Payer: COMMERCIAL

## 2021-12-06 VITALS
WEIGHT: 233 LBS | HEART RATE: 78 BPM | BODY MASS INDEX: 31.56 KG/M2 | TEMPERATURE: 99.2 F | HEIGHT: 72 IN | SYSTOLIC BLOOD PRESSURE: 158 MMHG | DIASTOLIC BLOOD PRESSURE: 80 MMHG | OXYGEN SATURATION: 96 %

## 2021-12-06 DIAGNOSIS — R26.81 UNSTEADY GAIT: ICD-10-CM

## 2021-12-06 DIAGNOSIS — I10 PRIMARY HYPERTENSION: ICD-10-CM

## 2021-12-06 DIAGNOSIS — I63.9 STROKE (HCC): Primary | ICD-10-CM

## 2021-12-06 DIAGNOSIS — F01.50 VASCULAR DEMENTIA WITHOUT BEHAVIORAL DISTURBANCE (HCC): ICD-10-CM

## 2021-12-06 PROCEDURE — 99243 OFF/OP CNSLTJ NEW/EST LOW 30: CPT | Performed by: INTERNAL MEDICINE

## 2021-12-06 PROCEDURE — 93000 ELECTROCARDIOGRAM COMPLETE: CPT | Performed by: INTERNAL MEDICINE

## 2021-12-06 RX ORDER — METOPROLOL SUCCINATE 25 MG/1
25 TABLET, EXTENDED RELEASE ORAL DAILY
Qty: 90 TABLET | Refills: 2 | Status: SHIPPED | OUTPATIENT
Start: 2021-12-06

## 2021-12-07 ENCOUNTER — APPOINTMENT (OUTPATIENT)
Dept: OCCUPATIONAL THERAPY | Facility: CLINIC | Age: 66
End: 2021-12-07
Payer: COMMERCIAL

## 2021-12-07 ENCOUNTER — TELEPHONE (OUTPATIENT)
Dept: CARDIOLOGY CLINIC | Facility: CLINIC | Age: 66
End: 2021-12-07

## 2021-12-13 ENCOUNTER — EVALUATION (OUTPATIENT)
Dept: SPEECH THERAPY | Facility: CLINIC | Age: 66
End: 2021-12-13
Payer: COMMERCIAL

## 2021-12-13 ENCOUNTER — OFFICE VISIT (OUTPATIENT)
Dept: PULMONOLOGY | Facility: MEDICAL CENTER | Age: 66
End: 2021-12-13
Payer: COMMERCIAL

## 2021-12-13 VITALS
BODY MASS INDEX: 31.83 KG/M2 | HEART RATE: 69 BPM | RESPIRATION RATE: 12 BRPM | OXYGEN SATURATION: 96 % | HEIGHT: 72 IN | SYSTOLIC BLOOD PRESSURE: 160 MMHG | WEIGHT: 235 LBS | DIASTOLIC BLOOD PRESSURE: 98 MMHG

## 2021-12-13 DIAGNOSIS — J45.20 MILD INTERMITTENT ASTHMA WITHOUT COMPLICATION: ICD-10-CM

## 2021-12-13 DIAGNOSIS — R48.8 OTHER SYMBOLIC DYSFUNCTIONS: Primary | ICD-10-CM

## 2021-12-13 DIAGNOSIS — F41.9 ANXIETY: ICD-10-CM

## 2021-12-13 DIAGNOSIS — G47.33 OBSTRUCTIVE SLEEP APNEA: Primary | ICD-10-CM

## 2021-12-13 DIAGNOSIS — F01.50 VASCULAR DEMENTIA WITHOUT BEHAVIORAL DISTURBANCE (HCC): ICD-10-CM

## 2021-12-13 PROCEDURE — 99214 OFFICE O/P EST MOD 30 MIN: CPT | Performed by: INTERNAL MEDICINE

## 2021-12-13 PROCEDURE — 1036F TOBACCO NON-USER: CPT | Performed by: INTERNAL MEDICINE

## 2021-12-13 PROCEDURE — 3008F BODY MASS INDEX DOCD: CPT | Performed by: INTERNAL MEDICINE

## 2021-12-13 PROCEDURE — 96125 COGNITIVE TEST BY HC PRO: CPT

## 2021-12-13 RX ORDER — ALPRAZOLAM 0.25 MG/1
0.25 TABLET ORAL 2 TIMES DAILY PRN
Qty: 30 TABLET | Refills: 1 | Status: SHIPPED | OUTPATIENT
Start: 2021-12-13

## 2021-12-19 PROBLEM — F41.9 ANXIETY: Status: ACTIVE | Noted: 2021-12-19

## 2021-12-21 ENCOUNTER — OFFICE VISIT (OUTPATIENT)
Dept: OCCUPATIONAL THERAPY | Facility: CLINIC | Age: 66
End: 2021-12-21
Payer: COMMERCIAL

## 2021-12-21 DIAGNOSIS — F03.90 DEMENTIA WITHOUT BEHAVIORAL DISTURBANCE, UNSPECIFIED DEMENTIA TYPE (HCC): Primary | ICD-10-CM

## 2021-12-21 PROCEDURE — 97167 OT EVAL HIGH COMPLEX 60 MIN: CPT

## 2021-12-21 PROCEDURE — 96125 COGNITIVE TEST BY HC PRO: CPT

## 2021-12-22 ENCOUNTER — HOSPITAL ENCOUNTER (OUTPATIENT)
Facility: HOSPITAL | Age: 66
Setting detail: OUTPATIENT SURGERY
Discharge: HOME | End: 2021-12-22
Attending: INTERNAL MEDICINE | Admitting: INTERNAL MEDICINE
Payer: COMMERCIAL

## 2021-12-22 ENCOUNTER — TELEPHONE (OUTPATIENT)
Dept: CARDIOLOGY CLINIC | Facility: CLINIC | Age: 66
End: 2021-12-22

## 2021-12-22 VITALS
OXYGEN SATURATION: 96 % | SYSTOLIC BLOOD PRESSURE: 140 MMHG | RESPIRATION RATE: 18 BRPM | HEART RATE: 58 BPM | DIASTOLIC BLOOD PRESSURE: 82 MMHG

## 2021-12-22 DIAGNOSIS — I63.9 STROKE (HCC): ICD-10-CM

## 2021-12-22 PROCEDURE — 33285 INSJ SUBQ CAR RHYTHM MNTR: CPT | Performed by: INTERNAL MEDICINE

## 2021-12-22 PROCEDURE — C1764 EVENT RECORDER, CARDIAC: HCPCS | Performed by: INTERNAL MEDICINE

## 2021-12-22 DEVICE — LOOP RECORDER REVEAL LINQ II SYS DEVICE ONLY: Type: IMPLANTABLE DEVICE | Site: CHEST  WALL | Status: FUNCTIONAL

## 2021-12-22 RX ORDER — LIDOCAINE HYDROCHLORIDE 10 MG/ML
INJECTION, SOLUTION EPIDURAL; INFILTRATION; INTRACAUDAL; PERINEURAL AS NEEDED
Status: DISCONTINUED | OUTPATIENT
Start: 2021-12-22 | End: 2021-12-22 | Stop reason: HOSPADM

## 2021-12-30 ENCOUNTER — REMOTE DEVICE CLINIC VISIT (OUTPATIENT)
Dept: CARDIOLOGY CLINIC | Facility: CLINIC | Age: 66
End: 2021-12-30
Payer: COMMERCIAL

## 2021-12-30 DIAGNOSIS — Z95.818 PRESENCE OF CARDIAC DEVICE: Primary | ICD-10-CM

## 2021-12-30 PROCEDURE — G2066 INTER DEVC REMOTE 30D: HCPCS | Performed by: INTERNAL MEDICINE

## 2021-12-30 PROCEDURE — 93298 REM INTERROG DEV EVAL SCRMS: CPT | Performed by: INTERNAL MEDICINE

## 2022-01-04 ENCOUNTER — TELEPHONE (OUTPATIENT)
Dept: CARDIOLOGY CLINIC | Facility: CLINIC | Age: 67
End: 2022-01-04

## 2022-01-04 NOTE — TELEPHONE ENCOUNTER
----- Message from Ksenia Beck DO sent at 1/4/2022  8:59 AM EST -----  Normal device function  No arrhythmias noted

## 2022-01-31 ENCOUNTER — TELEPHONE (OUTPATIENT)
Dept: CARDIOLOGY CLINIC | Facility: CLINIC | Age: 67
End: 2022-01-31

## 2022-01-31 DIAGNOSIS — I48.0 PAROXYSMAL ATRIAL FIBRILLATION (HCC): Primary | ICD-10-CM

## 2022-01-31 NOTE — TELEPHONE ENCOUNTER
----- Message from Km Ramirez MD sent at 1/28/2022  1:23 PM EST -----  Please inform patient that his cardiac monitor shows that he has episodes of atrial fibrillation  Anticoagulation is recommended  Would recommend Eliquis 5 mg twice a day if patient is agreeable and at that time would stop his Plavix    If patient is undecided he can come to the office to discuss the medications

## 2022-01-31 NOTE — TELEPHONE ENCOUNTER
· Nya Mario([facility])   Forwarded:  [facility])   Manuel Hilda [birth year] paroxysmal At Fib seen on loop recorder Recommend Eliquis Patient wants Neurology clearance    3:14 PM  20 days left      NM   Nya Mario([facility])   Can you guys make sure this patient got started on eliqtus  3:14 PM  20 days left          NM   Nya Mario([facility])   Call and make sure and that neurology is ok with starting eliquis  3:15 PM  20 days left          · I'm on it! Thanks! 3:15 PM  20 days left  2/2 Read    · Nya Lopez([facility])   Or let patient know that we are ok with starting eliquis  Thank you      Nya Lopez([facility])   Hey let patient to also stop plavix  3:17 PM  20 days left      Called and left detailed message for wife (okay per consent form)  FYI - cleared from neuro standpoint  Thank you!

## 2022-01-31 NOTE — TELEPHONE ENCOUNTER
I spoke with patient's wife, agreeable to starting eliquis  Changed pharmacy to [facility] as requested  Also, concerned if this was cleared by neuro

## 2022-02-02 DIAGNOSIS — I63.9 CEREBROVASCULAR ACCIDENT (CVA), UNSPECIFIED MECHANISM (HCC): ICD-10-CM

## 2022-02-02 DIAGNOSIS — I48.0 PAROXYSMAL ATRIAL FIBRILLATION (HCC): Primary | ICD-10-CM

## 2022-02-04 ENCOUNTER — TELEPHONE (OUTPATIENT)
Dept: CARDIOLOGY CLINIC | Facility: CLINIC | Age: 67
End: 2022-02-04

## 2022-02-16 ENCOUNTER — TELEPHONE (OUTPATIENT)
Dept: GASTROENTEROLOGY | Facility: CLINIC | Age: 67
End: 2022-02-16

## 2022-02-16 NOTE — TELEPHONE ENCOUNTER
Our mutual patient is scheduled for procedure: colonoscopy    On: May 23, 2022     With: Dr Dk Calderon MD    He/She is taking the following blood thinner: Eliquis (Apixaban)    Can this be stopped  2 days prior to the procedure    Physician Approving clearance:

## 2022-03-31 ENCOUNTER — REMOTE DEVICE CLINIC VISIT (OUTPATIENT)
Dept: CARDIOLOGY CLINIC | Facility: CLINIC | Age: 67
End: 2022-03-31
Payer: COMMERCIAL

## 2022-03-31 DIAGNOSIS — Z95.818 PRESENCE OF CARDIAC DEVICE: Primary | ICD-10-CM

## 2022-03-31 PROCEDURE — G2066 INTER DEVC REMOTE 30D: HCPCS | Performed by: INTERNAL MEDICINE

## 2022-03-31 PROCEDURE — 93298 REM INTERROG DEV EVAL SCRMS: CPT | Performed by: INTERNAL MEDICINE

## 2022-03-31 NOTE — PROGRESS NOTES
Results for orders placed or performed in visit on 03/31/22   Cardiac EP device report    Narrative     [facility]: BATTERY STATUS "OK " 24 AF EPISODES DETECTED LONGEST 18 MIN, SOME PVCS ALSO NOTED   PATIENT IS ON ELIQUIS---DOAN

## 2022-05-02 ENCOUNTER — TELEPHONE (OUTPATIENT)
Dept: FAMILY MEDICINE CLINIC | Facility: CLINIC | Age: 67
End: 2022-05-02

## 2022-05-02 DIAGNOSIS — I10 PRIMARY HYPERTENSION: Primary | ICD-10-CM

## 2022-05-02 RX ORDER — METOPROLOL SUCCINATE 50 MG/1
50 TABLET, EXTENDED RELEASE ORAL DAILY
Qty: 30 TABLET | Refills: 5 | Status: SHIPPED | OUTPATIENT
Start: 2022-05-02 | End: 2023-08-21

## 2022-05-02 NOTE — TELEPHONE ENCOUNTER
Received a fax from Andre Christine requesting a prior auth on the patients Sildenafil Citrate 100mg  Submitted prior auth through covermymeds  Prior auth approved through 5/2/2023  [facility]      Key: [id number]  RX#: [id number]  PA Case ID: [id number]    Latonya Guzman LPN

## 2022-05-13 ENCOUNTER — TELEPHONE (OUTPATIENT)
Dept: PULMONOLOGY | Facility: MEDICAL CENTER | Age: 67
End: 2022-05-13

## 2022-05-16 NOTE — TELEPHONE ENCOUNTER
Hello  Just following up on this medication clearance  Patient is scheduled for Monday 05/23/22  Thanks

## 2022-05-18 ENCOUNTER — TELEPHONE (OUTPATIENT)
Dept: FAMILY MEDICINE CLINIC | Facility: CLINIC | Age: 67
End: 2022-05-18

## 2022-05-23 ENCOUNTER — HOSPITAL ENCOUNTER (OUTPATIENT)
Dept: GASTROENTEROLOGY | Facility: AMBULARY SURGERY CENTER | Age: 67
Setting detail: OUTPATIENT SURGERY
Discharge: HOME | End: 2022-05-23
Attending: INTERNAL MEDICINE | Admitting: INTERNAL MEDICINE
Payer: COMMERCIAL

## 2022-05-23 ENCOUNTER — ANESTHESIA EVENT (OUTPATIENT)
Dept: GASTROENTEROLOGY | Facility: AMBULARY SURGERY CENTER | Age: 67
End: 2022-05-23

## 2022-05-23 ENCOUNTER — ANESTHESIA (OUTPATIENT)
Dept: GASTROENTEROLOGY | Facility: AMBULARY SURGERY CENTER | Age: 67
End: 2022-05-23

## 2022-05-23 VITALS
SYSTOLIC BLOOD PRESSURE: 114 MMHG | BODY MASS INDEX: 31.83 KG/M2 | HEART RATE: 67 BPM | RESPIRATION RATE: 18 BRPM | OXYGEN SATURATION: 96 % | HEIGHT: 72 IN | WEIGHT: 235 LBS | DIASTOLIC BLOOD PRESSURE: 63 MMHG

## 2022-05-23 DIAGNOSIS — D12.6 TUBULAR ADENOMA OF COLON: ICD-10-CM

## 2022-05-23 PROBLEM — Z86.010 PERSONAL HISTORY OF COLONIC POLYPS: Status: ACTIVE | Noted: 2022-05-23

## 2022-05-23 PROBLEM — Z86.0100 PERSONAL HISTORY OF COLONIC POLYPS: Status: ACTIVE | Noted: 2022-05-23

## 2022-05-23 PROCEDURE — 45385 COLONOSCOPY W/LESION REMOVAL: CPT | Performed by: INTERNAL MEDICINE

## 2022-05-23 PROCEDURE — 88305 TISSUE EXAM BY PATHOLOGIST: CPT | Performed by: PATHOLOGY

## 2022-05-23 PROCEDURE — 45380 COLONOSCOPY AND BIOPSY: CPT | Performed by: INTERNAL MEDICINE

## 2022-05-23 RX ORDER — LIDOCAINE HYDROCHLORIDE 10 MG/ML
INJECTION, SOLUTION EPIDURAL; INFILTRATION; INTRACAUDAL; PERINEURAL AS NEEDED
Status: DISCONTINUED | OUTPATIENT
Start: 2022-05-23 | End: 2022-05-23

## 2022-05-23 RX ORDER — SODIUM CHLORIDE, SODIUM LACTATE, POTASSIUM CHLORIDE, CALCIUM CHLORIDE 600; 310; 30; 20 MG/100ML; MG/100ML; MG/100ML; MG/100ML
125 INJECTION, SOLUTION INTRAVENOUS CONTINUOUS
Status: CANCELLED | OUTPATIENT
Start: 2022-05-23

## 2022-05-23 RX ORDER — PROPOFOL 10 MG/ML
INJECTION, EMULSION INTRAVENOUS AS NEEDED
Status: DISCONTINUED | OUTPATIENT
Start: 2022-05-23 | End: 2022-05-23

## 2022-05-23 RX ORDER — PROPOFOL 10 MG/ML
INJECTION, EMULSION INTRAVENOUS CONTINUOUS PRN
Status: DISCONTINUED | OUTPATIENT
Start: 2022-05-23 | End: 2022-05-23

## 2022-05-23 RX ORDER — SODIUM CHLORIDE, SODIUM LACTATE, POTASSIUM CHLORIDE, CALCIUM CHLORIDE 600; 310; 30; 20 MG/100ML; MG/100ML; MG/100ML; MG/100ML
INJECTION, SOLUTION INTRAVENOUS CONTINUOUS PRN
Status: DISCONTINUED | OUTPATIENT
Start: 2022-05-23 | End: 2022-05-23

## 2022-05-23 RX ADMIN — PROPOFOL 100 MG: 10 INJECTION, EMULSION INTRAVENOUS at 10:45

## 2022-05-23 RX ADMIN — PROPOFOL 150 MCG/KG/MIN: 10 INJECTION, EMULSION INTRAVENOUS at 10:45

## 2022-05-23 RX ADMIN — SODIUM CHLORIDE, SODIUM LACTATE, POTASSIUM CHLORIDE, AND CALCIUM CHLORIDE: .6; .31; .03; .02 INJECTION, SOLUTION INTRAVENOUS at 09:47

## 2022-05-23 RX ADMIN — LIDOCAINE HYDROCHLORIDE 5 ML: 10 INJECTION, SOLUTION EPIDURAL; INFILTRATION; INTRACAUDAL at 10:45

## 2022-05-23 NOTE — ANESTHESIA PREPROCEDURE EVALUATION
Procedure:  COLONOSCOPY    Relevant Problems   CARDIO   (+) Aortic stenosis   (+) Ascending aortic aneurysm (HCC)   (+) Benign essential hypertension   (+) Hyperlipidemia LDL goal <130   (+) Mitral regurgitation      GI/HEPATIC   (+) Rectal bleeding      NEURO/PSYCH   (+) Anxiety   (+) Dementia without behavioral disturbance (HCC)   (+) CAMMIE (generalized anxiety disorder)   (+) History of cardioembolic cerebrovascular accident (CVA)   (+) History of stroke      PULMONARY   (+) Asthma, mild intermittent   (+) Obstructive sleep apnea        Physical Exam    Airway    Mallampati score: II  TM Distance: >3 FB  Neck ROM: full     Dental   No notable dental hx upper dentures and lower dentures,     Cardiovascular      Pulmonary      Other Findings        Anesthesia Plan  ASA Score- 2     Anesthesia Type- IV sedation with anesthesia with ASA Monitors  Additional Monitors:   Airway Plan:           Plan Factors-Exercise tolerance (METS): >4 METS  Chart reviewed  Patient summary reviewed  Patient is not a current smoker  Induction- intravenous  Postoperative Plan- Plan for postoperative opioid use  Informed Consent- Anesthetic plan and risks discussed with patient  I personally reviewed this patient with the CRNA  Discussed and agreed on the Anesthesia Plan with the CRNA  Aakash Hickman

## 2022-05-23 NOTE — ANESTHESIA POSTPROCEDURE EVALUATION
Post-Op Assessment Note    CV Status:  Stable       Mental Status:  Sleepy   Hydration Status:  Stable   PONV Controlled:  Controlled   Airway Patency:  Patent      Post Op Vitals Reviewed: Yes      Staff: CRNA         No complications documented      BP  99/55   Temp      Pulse  69   Resp   20   SpO2   97

## 2022-05-28 DIAGNOSIS — Z86.73 HISTORY OF CARDIOEMBOLIC CEREBROVASCULAR ACCIDENT (CVA): ICD-10-CM

## 2022-05-28 RX ORDER — ATORVASTATIN CALCIUM 40 MG/1
TABLET, FILM COATED ORAL
Qty: 30 TABLET | Refills: 1 | Status: SHIPPED | OUTPATIENT
Start: 2022-05-28 | End: 2022-06-22

## 2022-06-07 ENCOUNTER — HOSPITAL ENCOUNTER (INPATIENT)
Facility: HOSPITAL | Age: 67
LOS: 1 days | Discharge: HOME | DRG: 378 | End: 2022-06-10
Attending: EMERGENCY MEDICINE | Admitting: INTERNAL MEDICINE
Payer: COMMERCIAL

## 2022-06-07 ENCOUNTER — APPOINTMENT (EMERGENCY)
Dept: RADIOLOGY | Facility: HOSPITAL | Age: 67
DRG: 378 | End: 2022-06-07
Payer: COMMERCIAL

## 2022-06-07 DIAGNOSIS — K57.90 DIVERTICULOSIS: ICD-10-CM

## 2022-06-07 DIAGNOSIS — K92.2 GASTROINTESTINAL HEMORRHAGE, UNSPECIFIED GASTROINTESTINAL HEMORRHAGE TYPE: Primary | ICD-10-CM

## 2022-06-07 DIAGNOSIS — R53.1 GENERALIZED WEAKNESS: ICD-10-CM

## 2022-06-07 DIAGNOSIS — K92.2 LOWER GI BLEED: ICD-10-CM

## 2022-06-07 PROBLEM — R65.10 SIRS (SYSTEMIC INFLAMMATORY RESPONSE SYNDROME) (HCC): Status: ACTIVE | Noted: 2022-06-07

## 2022-06-07 PROBLEM — R79.89 ELEVATED TROPONIN: Status: ACTIVE | Noted: 2022-06-07

## 2022-06-07 PROBLEM — R77.8 ELEVATED TROPONIN: Status: ACTIVE | Noted: 2022-06-07

## 2022-06-07 PROBLEM — I48.0 PAROXYSMAL A-FIB (HCC): Status: ACTIVE | Noted: 2022-06-07

## 2022-06-07 LAB
2HR DELTA HS TROPONIN: 28 NG/L
4HR DELTA HS TROPONIN: 49 NG/L
ABO GROUP BLD: NORMAL
ALBUMIN SERPL BCP-MCNC: 3.4 G/DL (ref 3.5–5)
ALP SERPL-CCNC: 110 U/L (ref 46–116)
ALT SERPL W P-5'-P-CCNC: 21 U/L (ref 12–78)
ANION GAP SERPL CALCULATED.3IONS-SCNC: 13 MMOL/L (ref 4–13)
APTT PPP: 24 SECONDS (ref 23–37)
AST SERPL W P-5'-P-CCNC: 12 U/L (ref 5–45)
ATRIAL RATE: 81 BPM
BASOPHILS # BLD AUTO: 0.06 THOUSANDS/ΜL (ref 0–0.1)
BASOPHILS NFR BLD AUTO: 1 % (ref 0–1)
BILIRUB SERPL-MCNC: 0.63 MG/DL (ref 0.2–1)
BILIRUB UR QL STRIP: NEGATIVE
BLD GP AB SCN SERPL QL: NEGATIVE
BUN SERPL-MCNC: 19 MG/DL (ref 5–25)
CALCIUM ALBUM COR SERPL-MCNC: 9.2 MG/DL (ref 8.3–10.1)
CALCIUM SERPL-MCNC: 8.7 MG/DL (ref 8.3–10.1)
CARDIAC TROPONIN I PNL SERPL HS: 17 NG/L
CARDIAC TROPONIN I PNL SERPL HS: 45 NG/L
CARDIAC TROPONIN I PNL SERPL HS: 66 NG/L
CHLORIDE SERPL-SCNC: 112 MMOL/L (ref 100–108)
CLARITY UR: CLEAR
CO2 SERPL-SCNC: 22 MMOL/L (ref 21–32)
COLOR UR: NORMAL
CREAT SERPL-MCNC: 1.32 MG/DL (ref 0.6–1.3)
EOSINOPHIL # BLD AUTO: 0.29 THOUSAND/ΜL (ref 0–0.61)
EOSINOPHIL NFR BLD AUTO: 3 % (ref 0–6)
ERYTHROCYTE [DISTWIDTH] IN BLOOD BY AUTOMATED COUNT: 13.1 % (ref 11.6–15.1)
FLUAV RNA RESP QL NAA+PROBE: NEGATIVE
FLUBV RNA RESP QL NAA+PROBE: NEGATIVE
GFR SERPL CREATININE-BSD FRML MDRD: 56 ML/MIN/1.73SQ M
GLUCOSE SERPL-MCNC: 173 MG/DL (ref 65–140)
GLUCOSE UR STRIP-MCNC: NEGATIVE MG/DL
HCT VFR BLD AUTO: 40.4 % (ref 36.5–49.3)
HGB BLD-MCNC: 13.3 G/DL (ref 12–17)
HGB UR QL STRIP.AUTO: NEGATIVE
IMM GRANULOCYTES # BLD AUTO: 0.07 THOUSAND/UL (ref 0–0.2)
IMM GRANULOCYTES NFR BLD AUTO: 1 % (ref 0–2)
INR PPP: 1.15 (ref 0.84–1.19)
KETONES UR STRIP-MCNC: NEGATIVE MG/DL
LACTATE SERPL-SCNC: 1.3 MMOL/L (ref 0.5–2)
LACTATE SERPL-SCNC: 2.5 MMOL/L (ref 0.5–2)
LEUKOCYTE ESTERASE UR QL STRIP: NEGATIVE
LYMPHOCYTES # BLD AUTO: 2.21 THOUSANDS/ΜL (ref 0.6–4.47)
LYMPHOCYTES NFR BLD AUTO: 20 % (ref 14–44)
MAGNESIUM SERPL-MCNC: 2 MG/DL (ref 1.6–2.6)
MCH RBC QN AUTO: 28.7 PG (ref 26.8–34.3)
MCHC RBC AUTO-ENTMCNC: 32.9 G/DL (ref 31.4–37.4)
MCV RBC AUTO: 87 FL (ref 82–98)
MONOCYTES # BLD AUTO: 1.04 THOUSAND/ΜL (ref 0.17–1.22)
MONOCYTES NFR BLD AUTO: 10 % (ref 4–12)
NEUTROPHILS # BLD AUTO: 7.15 THOUSANDS/ΜL (ref 1.85–7.62)
NEUTS SEG NFR BLD AUTO: 65 % (ref 43–75)
NITRITE UR QL STRIP: NEGATIVE
NRBC BLD AUTO-RTO: 0 /100 WBCS
P AXIS: -6 DEGREES
PH UR STRIP.AUTO: 5.5 [PH]
PLATELET # BLD AUTO: 337 THOUSANDS/UL (ref 149–390)
PMV BLD AUTO: 9.2 FL (ref 8.9–12.7)
POTASSIUM SERPL-SCNC: 3.9 MMOL/L (ref 3.5–5.3)
PR INTERVAL: 142 MS
PROT SERPL-MCNC: 5.9 G/DL (ref 6.4–8.2)
PROT UR STRIP-MCNC: NEGATIVE MG/DL
PROTHROMBIN TIME: 14.5 SECONDS (ref 11.6–14.5)
QRS AXIS: 5 DEGREES
QRSD INTERVAL: 84 MS
QT INTERVAL: 402 MS
QTC INTERVAL: 466 MS
RBC # BLD AUTO: 4.64 MILLION/UL (ref 3.88–5.62)
RH BLD: POSITIVE
RSV RNA RESP QL NAA+PROBE: NEGATIVE
SARS-COV-2 RNA RESP QL NAA+PROBE: NEGATIVE
SODIUM SERPL-SCNC: 147 MMOL/L (ref 136–145)
SP GR UR STRIP.AUTO: 1.02 (ref 1–1.03)
SPECIMEN EXPIRATION DATE: NORMAL
T WAVE AXIS: 40 DEGREES
UROBILINOGEN UR QL STRIP.AUTO: 0.2 E.U./DL
VENTRICULAR RATE: 81 BPM
WBC # BLD AUTO: 10.82 THOUSAND/UL (ref 4.31–10.16)

## 2022-06-07 PROCEDURE — G1004 CDSM NDSC: HCPCS

## 2022-06-07 PROCEDURE — 85610 PROTHROMBIN TIME: CPT | Performed by: EMERGENCY MEDICINE

## 2022-06-07 PROCEDURE — 74178 CT ABD&PLV WO CNTR FLWD CNTR: CPT

## 2022-06-07 PROCEDURE — 81003 URINALYSIS AUTO W/O SCOPE: CPT | Performed by: EMERGENCY MEDICINE

## 2022-06-07 PROCEDURE — 96361 HYDRATE IV INFUSION ADD-ON: CPT

## 2022-06-07 PROCEDURE — 71045 X-RAY EXAM CHEST 1 VIEW: CPT

## 2022-06-07 PROCEDURE — 93010 ELECTROCARDIOGRAM REPORT: CPT | Performed by: INTERNAL MEDICINE

## 2022-06-07 PROCEDURE — 85025 COMPLETE CBC W/AUTO DIFF WBC: CPT | Performed by: EMERGENCY MEDICINE

## 2022-06-07 PROCEDURE — 86850 RBC ANTIBODY SCREEN: CPT | Performed by: EMERGENCY MEDICINE

## 2022-06-07 PROCEDURE — 86900 BLOOD TYPING SEROLOGIC ABO: CPT | Performed by: EMERGENCY MEDICINE

## 2022-06-07 PROCEDURE — 84484 ASSAY OF TROPONIN QUANT: CPT | Performed by: EMERGENCY MEDICINE

## 2022-06-07 PROCEDURE — 87154 CUL TYP ID BLD PTHGN 6+ TRGT: CPT | Performed by: EMERGENCY MEDICINE

## 2022-06-07 PROCEDURE — 93005 ELECTROCARDIOGRAM TRACING: CPT

## 2022-06-07 PROCEDURE — 86901 BLOOD TYPING SEROLOGIC RH(D): CPT | Performed by: EMERGENCY MEDICINE

## 2022-06-07 PROCEDURE — 87040 BLOOD CULTURE FOR BACTERIA: CPT | Performed by: EMERGENCY MEDICINE

## 2022-06-07 PROCEDURE — 96365 THER/PROPH/DIAG IV INF INIT: CPT

## 2022-06-07 PROCEDURE — 99214 OFFICE O/P EST MOD 30 MIN: CPT | Performed by: PHYSICIAN ASSISTANT

## 2022-06-07 PROCEDURE — 99285 EMERGENCY DEPT VISIT HI MDM: CPT

## 2022-06-07 PROCEDURE — 99291 CRITICAL CARE FIRST HOUR: CPT | Performed by: EMERGENCY MEDICINE

## 2022-06-07 PROCEDURE — 85730 THROMBOPLASTIN TIME PARTIAL: CPT | Performed by: EMERGENCY MEDICINE

## 2022-06-07 PROCEDURE — 83735 ASSAY OF MAGNESIUM: CPT | Performed by: EMERGENCY MEDICINE

## 2022-06-07 PROCEDURE — C9113 INJ PANTOPRAZOLE SODIUM, VIA: HCPCS | Performed by: EMERGENCY MEDICINE

## 2022-06-07 PROCEDURE — 36415 COLL VENOUS BLD VENIPUNCTURE: CPT | Performed by: EMERGENCY MEDICINE

## 2022-06-07 PROCEDURE — 96376 TX/PRO/DX INJ SAME DRUG ADON: CPT

## 2022-06-07 PROCEDURE — 0241U HB NFCT DS VIR RESP RNA 4 TRGT: CPT | Performed by: EMERGENCY MEDICINE

## 2022-06-07 PROCEDURE — 83605 ASSAY OF LACTIC ACID: CPT | Performed by: EMERGENCY MEDICINE

## 2022-06-07 PROCEDURE — 80053 COMPREHEN METABOLIC PANEL: CPT | Performed by: EMERGENCY MEDICINE

## 2022-06-07 RX ORDER — OCTREOTIDE ACETATE 50 UG/ML
50 INJECTION, SOLUTION INTRAVENOUS; SUBCUTANEOUS ONCE
Status: COMPLETED | OUTPATIENT
Start: 2022-06-07 | End: 2022-06-07

## 2022-06-07 RX ORDER — ATORVASTATIN CALCIUM 40 MG/1
40 TABLET, FILM COATED ORAL EVERY EVENING
Status: DISCONTINUED | OUTPATIENT
Start: 2022-06-07 | End: 2022-06-10 | Stop reason: HOSPADM

## 2022-06-07 RX ADMIN — SODIUM CHLORIDE 80 MG: 9 INJECTION, SOLUTION INTRAVENOUS at 14:29

## 2022-06-07 RX ADMIN — SODIUM CHLORIDE 1000 ML: 0.9 INJECTION, SOLUTION INTRAVENOUS at 14:12

## 2022-06-07 RX ADMIN — OCTREOTIDE ACETATE 50 MCG: 50 INJECTION, SOLUTION INTRAVENOUS; SUBCUTANEOUS at 14:21

## 2022-06-07 RX ADMIN — ATORVASTATIN CALCIUM 40 MG: 40 TABLET, FILM COATED ORAL at 18:43

## 2022-06-07 RX ADMIN — OCTREOTIDE ACETATE 25 MCG/HR: 500 INJECTION, SOLUTION INTRAVENOUS; SUBCUTANEOUS at 14:42

## 2022-06-07 RX ADMIN — IOHEXOL 70 ML: 350 INJECTION, SOLUTION INTRAVENOUS at 15:12

## 2022-06-07 RX ADMIN — SODIUM CHLORIDE 8 MG/HR: 9 INJECTION, SOLUTION INTRAVENOUS at 14:37

## 2022-06-07 NOTE — PLAN OF CARE
Problem: MOBILITY - ADULT  Goal: Maintain or return to baseline ADL function  Description: INTERVENTIONS:  -  Assess patient's ability to carry out ADLs; assess patient's baseline for ADL function and identify physical deficits which impact ability to perform ADLs (bathing, care of mouth/teeth, toileting, grooming, dressing, etc )  - Assess/evaluate cause of self-care deficits   - Assess range of motion  - Assess patient's mobility; develop plan if impaired  - Assess patient's need for assistive devices and provide as appropriate  - Encourage maximum independence but intervene and supervise when necessary  - Involve family in performance of ADLs  - Assess for home care needs following discharge   - Consider OT consult to assist with ADL evaluation and planning for discharge  - Provide patient education as appropriate  Outcome: Progressing  Goal: Maintains/Returns to pre admission functional level  Description: INTERVENTIONS:  - Perform BMAT or MOVE assessment daily    - Set and communicate daily mobility goal to care team and patient/family/caregiver     - Collaborate with rehabilitation services on mobility goals if consulted  - Out of bed for toileting  - Record patient progress and toleration of activity level   Outcome: Progressing     Problem: PAIN - ADULT  Goal: Verbalizes/displays adequate comfort level or baseline comfort level  Description: Interventions:  - Encourage patient to monitor pain and request assistance  - Assess pain using appropriate pain scale  - Administer analgesics based on type and severity of pain and evaluate response  - Implement non-pharmacological measures as appropriate and evaluate response  - Consider cultural and social influences on pain and pain management  - Notify physician/advanced practitioner if interventions unsuccessful or patient reports new pain  Outcome: Progressing

## 2022-06-07 NOTE — ASSESSMENT & PLAN NOTE
SIRS positive on presentation to ED as evidenced by tachycardia and tachypnea  No evidence of infection    Tachycardia and tachypnea likely secondary to GI bleed  No indication for antibiotics

## 2022-06-07 NOTE — ASSESSMENT & PLAN NOTE
PMH of diverticulitis, multiple prior episodes of diverticular bleeding  · Patient had multiple bloody stools followed by dizziness and diaphoresis starting morning of admission  Patient has history of prior diverticular bleed requiring ICU admission  · In ED heme-positive stool  · High volume CT lower GI bleed negative for active GI bleed  · Hgb 13 3 upon admission  Baseline hemoglobin around 15-16  Patient had another episode of bleeding per rectum last night  Hemoglobin dropped to 10 7-secondary to GI bleeding and possible dilution  Patient was on clear liquid diet which will be advanced to regular diet  Monitor hemoglobin  Restarted back on Eliquis which have been on hold

## 2022-06-07 NOTE — CONSULTS
Consultation -[facility]   Leroy Garcia 61 y o  male MRN: [id number]    Unit/Bed#: ED CT1 Encounter: [id number]      Physician Requesting Consult: Dr Willis Carlin    Reason for Consult / Principal Problem: lower gi bleed    HPI:   60 y/o male who presents with BRBPR  ER contacted me and patient had been doing well until this morning when he had numerous amounts of bloody stool per rectum and then became dizzy and lightheaded and had arrived to the ED pale and diaphoretic with blood pressure 107/72 and had grossly heme-positive stool and was started on fluid bolus  Hemoglobin stable at 13 3 at this time  Patient had recent colonoscopy which showed 6 mm ascending colon polyp removed by cold snare, diminutive sigmoid polyp removed by cold biopsy forceps,extensive diverticulosis, internal hemorrhoids and  in the ascending colon there was tattoo from prior polypectomy with no residual polyp  Patient went for CT high volume lower GI bleed study which showed no evidence of active high volume gastrointestinal hemorrhage  Patient apparently had GI bleeding before and 1 time when he was in [place] he had eaten nuts and then has started having rectal bleeding  Also reports history of post polypectomy bleed  Patient has been taking Eliquis for about a year after CVA  Patient did hold the Eliquis prior to his colonoscopy and then resumed after as directed  He otherwise denies any abdominal pain  Wife states he has had this intermittently over the years  Patient had bleeding from early this morning until about 1:00 p m  And has and no further bleeding since that time  Blood pressure is currently stable, with systolic in the 618N and was 107/72 on admission to ER  Allergies:    Allergies   Allergen Reactions    Pollen Extract Sneezing     Reaction Date: 18Sep2006;     Shellfish-Derived Products - Food Allergy Other (See Comments)     Eye swelling    Iodine Solution [Povidone Iodine] Rash     Eyes swell       Medications:No current facility-administered medications for this encounter      Current Outpatient Medications:     albuterol (Proventil HFA) 90 mcg/act inhaler, Inhale 2 puffs every 4 (four) hours as needed for wheezing or shortness of breath, Disp: 6 7 g, Rfl: 7    ALPRAZolam (XANAX) 0 25 mg tablet, Take 1 tablet (0 25 mg total) by mouth 2 (two) times a day as needed for anxiety or sleep, Disp: 30 tablet, Rfl: 1    apixaban (Eliquis) 5 mg, Take 1 tablet (5 mg total) by mouth 2 (two) times a day, Disp: 60 tablet, Rfl: 5    atorvastatin (LIPITOR) 40 mg tablet, TAKE 1 TABLET BY MOUTH EVERY DAY IN THE EVENING, Disp: 30 tablet, Rfl: 1    fexofenadine (ALLEGRA) 180 MG tablet, Take 1 tablet (180 mg total) by mouth daily for 30 days (Patient taking differently: Take 180 mg by mouth every morning), Disp: 30 tablet, Rfl: 0    metoprolol succinate (TOPROL-XL) 25 mg 24 hr tablet, Take 1 tablet (25 mg total) by mouth daily 25 mg in evening, Disp: 90 tablet, Rfl: 2    metoprolol succinate (TOPROL-XL) 50 mg 24 hr tablet, Take 1 tablet (50 mg total) by mouth daily (Patient taking differently: Take 50 mg by mouth every morning), Disp: 30 tablet, Rfl: 5    sildenafil (VIAGRA) 100 mg tablet, Take 1 tablet (100 mg total) by mouth daily as needed for erectile dysfunction, Disp: 30 tablet, Rfl: 5    Past Medical history:  Past Medical History:   Diagnosis Date    Arthropathy of knee     last assessed 8/19/15    Bacterial pneumonia 02/05/2007    last assessed 2/5/7    Brain atrophy (HCC)     Colon polyp     CPAP (continuous positive airway pressure) dependence     Disorder of male genital organ 02/12/2008    last assessed 2/12/08    ED (erectile dysfunction) of organic origin     last assessed 2/13/17     History of hypertension     Seasonal allergies     Situational anxiety     resolved 3/16/17     Sleep apnea     Stroke ([facility] [address] )     09/2020 TIA    Tinnitus     Wears hearing aid bilateral       Past Surgical History:   Past Surgical History:   Procedure Laterality Date    CARDIAC ELECTROPHYSIOLOGY PROCEDURE N/A 12/22/2021    Procedure: Cardiac Loop Recorder Implant;  Surgeon: Zhane Salvador DO;  Location: [place] [address] CATH LAB; Service: Cardiology    COLONOSCOPY      x3-w/polyps    HERNIA REPAIR      umbilical,hemal inguinal    KNEE ARTHROSCOPY Left     meniscus    IN COLONOSCOPY FLX DX W/COLLJ SPEC WHEN PFRMD N/A 5/7/2018    Procedure: COLONOSCOPY;  Surgeon: Dominic Barton MD;  Location: [facility] GI LAB; Service: Gastroenterology       Family History:   Family History   Problem Relation Age of Onset   Jeral Hose Cancer Mother         breast    Diverticulitis Mother         colostomy   Jeral Hose Breast cancer Mother     Heart disease Father         CHF    Cancer Sister         breast    Depression Sister     Cancer Sister         skin cancer    Cancer Sister         skin cancer    Colon cancer Neg Hx     Liver disease Neg Hx        Social history:   Social History     Socioeconomic History    Marital status: /Civil Union     Spouse name: Not on file    Number of children: Not on file    Years of education: Not on file    Highest education level: Not on file   Occupational History    Not on file   Tobacco Use    Smoking status: Passive Smoke Exposure - Never Smoker    Smokeless tobacco: Never Used   Vaping Use    Vaping Use: Never used   Substance and Sexual Activity    Alcohol use:  Yes     Alcohol/week: 24 0 standard drinks     Types: 2 Glasses of wine, 2 Cans of beer, 20 Standard drinks or equivalent per week     Comment: 2-3 mixed drinks/day    Drug use: No    Sexual activity: Not on file   Other Topics Concern    Not on file   Social History Narrative    Not on file     Social Determinants of Health     Financial Resource Strain: Not on file   Food Insecurity: Not on file   Transportation Needs: Not on file   Physical Activity: Not on file   Stress: Not on file   Social Connections: Not on file   Intimate Partner Violence: Not on file   Housing Stability: Not on file       Review of Systems: All other systems were reviewed and were negative, otherwise please refer to HPI    Physical Exam: /67   Pulse 78   Temp 97 5 °F (36 4 °C) (Tympanic)   Resp 22   Wt 107 kg (235 lb) Comment: last weight used not on weigh bed  SpO2 96%   BMI 31 87 kg/m²     General Appearance:    Alert, cooperative, no distress, appears stated age   Head:    Normocephalic, without obvious abnormality, atraumatic   Eyes:    No scleral icterus           Mouth:  Mucosa moist   Neck:   Supple, symmetrical, trachea midline, no thyromegaly       Lungs:     Clear to auscultation bilaterally, respirations unlabored       Heart:    Regular rate and rhythm, S1 and S2 normal, no murmur, rub   or gallop     Abdomen:     Soft, positive bowel sounds, obese, nontender, nondistended, no rebound rigidity guarding   Genitalia:   deferred   Rectal:   deferred   Extremities:   Extremities normal,no cyanosis or edema       Skin:   Skin color, texture, turgor normal, no rashes or lesions       Neurologic:   Grossly intact, no focal deficit           Lab Results:   Recent Results (from the past 24 hour(s))   CBC and differential    Collection Time: 06/07/22  2:07 PM   Result Value Ref Range    WBC 10 82 (H) 4 31 - 10 16 Thousand/uL    RBC 4 64 3 88 - 5 62 Million/uL    Hemoglobin 13 3 12 0 - 17 0 g/dL    Hematocrit 40 4 36 5 - 49 3 %    MCV 87 82 - 98 fL    MCH 28 7 26 8 - 34 3 pg    MCHC 32 9 31 4 - 37 4 g/dL    RDW 13 1 11 6 - 15 1 %    MPV 9 2 8 9 - 12 7 fL    Platelets 838 975 - 589 Thousands/uL    nRBC 0 /100 WBCs    Neutrophils Relative 65 43 - 75 %    Immat GRANS % 1 0 - 2 %    Lymphocytes Relative 20 14 - 44 %    Monocytes Relative 10 4 - 12 %    Eosinophils Relative 3 0 - 6 %    Basophils Relative 1 0 - 1 %    Neutrophils Absolute 7 15 1 85 - 7 62 Thousands/µL    Immature Grans Absolute 0 07 0 00 - 0 20 Thousand/uL Lymphocytes Absolute 2 21 0 60 - 4 47 Thousands/µL    Monocytes Absolute 1 04 0 17 - 1 22 Thousand/µL    Eosinophils Absolute 0 29 0 00 - 0 61 Thousand/µL    Basophils Absolute 0 06 0 00 - 0 10 Thousands/µL   Protime-INR    Collection Time: 06/07/22  2:07 PM   Result Value Ref Range    Protime 14 5 11 6 - 14 5 seconds    INR 1 15 0 84 - 1 19   APTT    Collection Time: 06/07/22  2:07 PM   Result Value Ref Range    PTT 24 23 - 37 seconds   Comprehensive metabolic panel    Collection Time: 06/07/22  2:07 PM   Result Value Ref Range    Sodium 147 (H) 136 - 145 mmol/L    Potassium 3 9 3 5 - 5 3 mmol/L    Chloride 112 (H) 100 - 108 mmol/L    CO2 22 21 - 32 mmol/L    ANION GAP 13 4 - 13 mmol/L    BUN 19 5 - 25 mg/dL    Creatinine 1 32 (H) 0 60 - 1 30 mg/dL    Glucose 173 (H) 65 - 140 mg/dL    Calcium 8 7 8 3 - 10 1 mg/dL    Corrected Calcium 9 2 8 3 - 10 1 mg/dL    AST 12 5 - 45 U/L    ALT 21 12 - 78 U/L    Alkaline Phosphatase 110 46 - 116 U/L    Total Protein 5 9 (L) 6 4 - 8 2 g/dL    Albumin 3 4 (L) 3 5 - 5 0 g/dL    Total Bilirubin 0 63 0 20 - 1 00 mg/dL    eGFR 56 ml/min/1 73sq m   Magnesium    Collection Time: 06/07/22  2:07 PM   Result Value Ref Range    Magnesium 2 0 1 6 - 2 6 mg/dL   Lactic acid    Collection Time: 06/07/22  2:07 PM   Result Value Ref Range    LACTIC ACID 2 5 (HH) 0 5 - 2 0 mmol/L   Type and screen    Collection Time: 06/07/22  2:07 PM   Result Value Ref Range    ABO Grouping O     Rh Factor Positive     Antibody Screen Negative     Specimen Expiration Date [id number]    HS Troponin 0hr (reflex protocol)    Collection Time: 06/07/22  2:07 PM   Result Value Ref Range    hs TnI 0hr 17 "Refer to ACS Flowchart"- see link ng/L       Imaging Studies: Colonoscopy    Result Date: 5/23/2022  Narrative:  [address] [place] [address] [phone] DATE OF SERVICE: 5/23/22 PHYSICIAN(S): Attending: Jerome Dubois MD Fellow: No Staff Documented INDICATION: Tubular adenoma of colon POST-OP DIAGNOSIS: See the impression below  HISTORY: Prior colonoscopy: Less than 3 years ago  It is being repeated at an interval of less than 3 years because: Last colonoscopy had inadequate bowel prep BOWEL PREPARATION: Miralax/Dulcolax PREPROCEDURE: Informed consent was obtained for the procedure, including sedation  Risks including but not limited to bleeding, infection, perforation, adverse drug reaction and aspiration were explained in detail  Also explained about less than 100% sensitivity with the exam and other alternatives  The patient was placed in the left lateral decubitus position  DETAILS OF PROCEDURE: Patient was taken to the procedure room where a time out was performed to confirm correct patient and correct procedure  The patient underwent monitored anesthesia care, which was administered by an anesthesia professional  The patient's blood pressure, heart rate, level of consciousness, oxygen and respirations were monitored throughout the procedure  A digital rectal exam was performed  The scope was introduced through the anus and advanced to the cecum  Retroflexion was performed in the rectum  The quality of bowel preparation was evaluated using the Robe Bowel Preparation Scale with scores of: right colon = 2, transverse colon = 2, left colon = 2  The total BBPS score was 6  Bowel prep was adequate  The patient experienced no blood loss  The procedure was not difficult  The patient tolerated the procedure well  There were no apparent complications  ANESTHESIA INFORMATION: ASA: II Anesthesia Type: IV Sedation with Anesthesia MEDICATIONS: No administrations occurring from 1036 to 1100 on 05/23/22 FINDINGS: One sessile polyp in the ascending colon; removed en bloc by cold snare and retrieved specimen Polyp measuring smaller than 5 mm in the sigmoid colon; performed cold forceps biopsy Tattoo from prior polypectomy in the ascending colon, no residual polyp  Well-healed scar   Few extensive diverticula in the transverse colon, descending colon and sigmoid colon Internal hemorrhoids Otherwise normal colon EVENTS: Procedure Events Event Event Time ENDO CECUM REACHED 5/23/2022 10:49 AM ENDO SCOPE OUT TIME 5/23/2022 11:00 AM SPECIMENS: ID Type Source Tests Collected by Time Destination 1 : bx- ascending polyp- cold snare Tissue Large Intestine, Right/Ascending Colon TISSUE EXAM Romero Santana MD 5/23/2022 10:52 AM  2 : bx- sigmoid polyp Tissue Large Intestine, Sigmoid Colon TISSUE EXAM Romero Santana MD 5/23/2022 10:58 AM  EQUIPMENT: Colonoscope -     Impression: 6 mm ascending colon polyp removed by cold snare Diminutive sigmoid polyp removed by cold biopsy forceps Extensive diverticulosis Internal hemorrhoids In the ascending colon there was tattoo from prior polypectomy with no residual polyp  RECOMMENDATION: Repeat colonoscopy in 3 years due to a personal history of colon polyps Discharge home Resume regular diet Resume medications, okay to start blood thinners tomorrow Follow-up biopsy results Recommend 2 day bowel prep for future colonoscopy Call with any abdominal pain, bleeding, fevers  Romero Santana MD       Assessment/Plan: This is a 33-year-old male who presents with rectal bleeding  Does have history of suspected diverticular bleeding in the past   Wife reports that previously had eaten nuts and had bleeding and again had eaten nuts prior to this admission  Patient less likely with post polypectomy bleed as these were small colon polyps that were removed by cold snare and cold biopsy and this was done about 2 weeks ago  Agree with holding Eliquis at this time  Likely patient has diverticular bleed and does have extensive diverticulosis noted on recent colonoscopy  At this point, would recommend observation  Patient did have high volume GI bleed study which was negative for active hemorrhage  Patient is requesting liquid diet which we can initiate with no red liquids    If evidence of active bleeding, may need repeat colonoscopy and/or IR evaluation  Thank you for the consultation  Case was discussed with Dr Osiris Leal  Spoke with patient and wife at bedside

## 2022-06-07 NOTE — ASSESSMENT & PLAN NOTE
At home on toprol-xl 50mg QAM, 25mg QHS  Blood pressure initially in the ED was on the soft side which has since improved  Metoprolol has been on hold in the setting of borderline blood pressure but will restart given his history of atrial fibrillation

## 2022-06-07 NOTE — ED PROVIDER NOTES
History  Chief Complaint   Patient presents with    Rectal Bleeding    Dizziness     Arrives pale, extremely diaphoretic, c/o dizziness  Patient had colonscopy last week and 2 polyps removed  Patient on Eliquis, states 6-7 BM's with blood   Cramping abdomen   Unable to obtain BP in triage, placed in hallway bed until CT 1 available     19-year-old male with past history of GRACIE on CPAP, hypertension, seasonal allergies, CVA, presents to the ED for evaluation of multiple episodes of bloody diarrhea, weakness, diaphoresis, dizziness since this morning  Patient states that he had a colonoscopy with some polyps removal about 2 weeks ago  Patient was doing well until today  Patient had multiple episodes of bloody stool  Patient then started to feel diaphoretic and lightheaded  Wife brought patient to the ED for further evaluation  Upon arrival to the ED patient was hypotensive with SBP of 107, diaphoretic and pale  Patient denies any fevers or chills  History provided by:  Patient  Dizziness  Associated symptoms: weakness    Associated symptoms: no chest pain, no diarrhea, no headaches, no nausea, no shortness of breath and no vomiting        Prior to Admission Medications   Prescriptions Last Dose Informant Patient Reported? Taking?    ALPRAZolam (XANAX) 0 25 mg tablet Not Taking at Unknown time  No No   Sig: Take 1 tablet (0 25 mg total) by mouth 2 (two) times a day as needed for anxiety or sleep   Patient not taking: Reported on 6/7/2022   albuterol (Proventil HFA) 90 mcg/act inhaler Not Taking at Unknown time  No No   Sig: Inhale 2 puffs every 4 (four) hours as needed for wheezing or shortness of breath   Patient not taking: Reported on 6/7/2022   apixaban (Eliquis) 5 mg 6/7/2022 at Unknown time  No Yes   Sig: Take 1 tablet (5 mg total) by mouth 2 (two) times a day   atorvastatin (LIPITOR) 40 mg tablet 6/7/2022 at Unknown time  No Yes   Sig: TAKE 1 TABLET BY MOUTH EVERY DAY IN THE EVENING   fexofenadine (ALLEGRA) 180 MG tablet   No No   Sig: Take 1 tablet (180 mg total) by mouth daily for 30 days   Patient taking differently: Take 180 mg by mouth every morning   metoprolol succinate (TOPROL-XL) 25 mg 24 hr tablet 6/6/2022 at Unknown time  No Yes   Sig: Take 1 tablet (25 mg total) by mouth daily 25 mg in evening   metoprolol succinate (TOPROL-XL) 50 mg 24 hr tablet 6/6/2022 at Unknown time  No Yes   Sig: Take 1 tablet (50 mg total) by mouth daily   Patient taking differently: Take 50 mg by mouth every morning   sildenafil (VIAGRA) 100 mg tablet   No No   Sig: Take 1 tablet (100 mg total) by mouth daily as needed for erectile dysfunction      Facility-Administered Medications: None       Past Medical History:   Diagnosis Date    Arthropathy of knee     last assessed 8/19/15    Bacterial pneumonia 02/05/2007    last assessed 2/5/7    [facility] atrophy ([facility] [address]     Colon polyp     CPAP (continuous positive airway pressure) dependence     Disorder of male genital organ 02/12/2008    last assessed 2/12/08    ED (erectile dysfunction) of organic origin     last assessed 2/13/17     History of hypertension     Seasonal allergies     Situational anxiety     resolved 3/16/17     Sleep apnea     Stroke ([address] )     09/2020 TIA    Tinnitus     Wears hearing aid     bilateral       Past Surgical History:   Procedure Laterality Date    CARDIAC ELECTROPHYSIOLOGY PROCEDURE N/A 12/22/2021    Procedure: Cardiac Loop Recorder Implant;  Surgeon: Samuel Menon DO;  Location: [place] [address] CATH LAB; Service: Cardiology    COLONOSCOPY      x3-w/polyps    HERNIA REPAIR      umbilical,hemal inguinal    KNEE ARTHROSCOPY Left     meniscus    [place] COLONOSCOPY FLX DX W/COLLJ SPEC WHEN PFRMD N/A 5/7/2018    Procedure: COLONOSCOPY;  Surgeon: Adele Camejo MD;  Location[facility] GI LAB;   Service: Gastroenterology       Family History   Problem Relation Age of Onset    Cancer Mother         breast    Diverticulitis Mother         colostomy    Breast cancer Mother     Heart disease Father         CHF    Cancer Sister         breast    Depression Sister     Cancer Sister         skin cancer    Cancer Sister         skin cancer    Colon cancer Neg Hx     Liver disease Neg Hx      I have reviewed and agree with the history as documented  E-Cigarette/Vaping    E-Cigarette Use Never User      E-Cigarette/Vaping Substances     Social History     Tobacco Use    Smoking status: Passive Smoke Exposure - Never Smoker    Smokeless tobacco: Never Used   Vaping Use    Vaping Use: Never used   Substance Use Topics    Alcohol use: Yes     Alcohol/week: 24 0 standard drinks     Types: 2 Glasses of wine, 2 Cans of beer, 20 Standard drinks or equivalent per week     Comment: 2-3 mixed drinks/day    Drug use: No       Review of Systems   Constitutional: Negative for activity change, fatigue and fever  HENT: Negative for congestion, ear discharge and sore throat  Eyes: Negative for pain and redness  Respiratory: Negative for cough, chest tightness, shortness of breath and wheezing  Cardiovascular: Negative for chest pain  Gastrointestinal: Negative for abdominal pain, diarrhea, nausea and vomiting  Endocrine: Negative for cold intolerance  Genitourinary: Negative for dysuria and urgency  Musculoskeletal: Negative for arthralgias and back pain  Neurological: Positive for dizziness and weakness  Negative for headaches  Psychiatric/Behavioral: Negative for agitation and behavioral problems  Physical Exam  Physical Exam  Vitals and nursing note reviewed  Constitutional:       Appearance: He is well-developed  HENT:      Head: Normocephalic and atraumatic  Nose: Nose normal    Eyes:      Conjunctiva/sclera: Conjunctivae normal    Cardiovascular:      Rate and Rhythm: Normal rate and regular rhythm  Heart sounds: Normal heart sounds     Pulmonary:      Effort: Pulmonary effort is normal       Breath sounds: Normal breath sounds  Abdominal:      General: Bowel sounds are normal  There is no distension  Palpations: Abdomen is soft  Tenderness: There is no abdominal tenderness  Comments: Abdomen is soft, nondistended, with also present all 4 quadrants  No tenderness noted to palpation of abdomen  Genitourinary:     Rectum: Guaiac result positive  Comments: Doyce Feil blood noted on digital rectal exam   Patient is Hemoccult positive per rectum as well  Musculoskeletal:         General: Normal range of motion  Cervical back: Normal range of motion and neck supple  Skin:     General: Skin is warm  Neurological:      General: No focal deficit present  Mental Status: He is alert and oriented to person, place, and time  Comments: No focal neuro deficits noted  Generalized weakness noted  Psychiatric:         Mood and Affect: Mood normal          Behavior: Behavior normal          Thought Content:  Thought content normal          Judgment: Judgment normal          Vital Signs  ED Triage Vitals [06/07/22 1400]   Temperature Pulse Respirations Blood Pressure SpO2   97 5 °F (36 4 °C) 104 (!) 24 107/72 92 %      Temp Source Heart Rate Source Patient Position - Orthostatic VS BP Location FiO2 (%)   Tympanic Monitor Lying Left arm --      Pain Score       5           Vitals:    06/07/22 1400 06/07/22 1427 06/07/22 1447 06/07/22 1636   BP: 107/72 142/96 105/67    Pulse: 104 74 78 79   Patient Position - Orthostatic VS: Lying Lying  Lying         Visual Acuity      ED Medications  Medications   sodium chloride 0 9 % bolus 1,000 mL (1,000 mL Intravenous New Bag 6/7/22 1412)   pantoprazole (PROTONIX) 80 mg in sodium chloride 0 9 % 100 mL IVPB (0 mg Intravenous Stopped 6/7/22 1524)   octreotide (SandoSTATIN) injection 50 mcg (50 mcg Intravenous Given 6/7/22 1421)   iohexol (OMNIPAQUE) 350 MG/ML injection (MULTI-DOSE) 70 mL (70 mL Intravenous Given 6/7/22 1512)       Diagnostic Studies  Results Reviewed Procedure Component Value Units Date/Time    HS Troponin I 2hr [[id number]]  (Abnormal) Collected: 06/07/22 1639    Lab Status: Final result Specimen: Blood from Arm, Right Updated: 06/07/22 1717     hs TnI 2hr 45 ng/L      Delta 2hr hsTnI 28 ng/L     UA w Reflex to Microscopic w Reflex to Culture [[id number]] Collected: 06/07/22 1636    Lab Status: Final result Specimen: Urine, Clean Catch Updated: 06/07/22 1704     Color, UA Light Yellow     Clarity, UA Clear     Specific [place], UA 1 020     pH, UA 5 5     Leukocytes, UA Negative     Nitrite, UA Negative     Protein, UA Negative mg/dl      Glucose, UA Negative mg/dl      Ketones, UA Negative mg/dl      Urobilinogen, UA 0 2 E U /dl      Bilirubin, UA Negative     Blood, UA Negative    Lactic acid 2 Hours [[id number]] Collected: 06/07/22 1639    Lab Status: In process Specimen: Blood from Arm, Right Updated: 06/07/22 1647    COVID/FLU/RSV - 2 hour TAT [[id number]]  (Normal) Collected: 06/07/22 1420    Lab Status: Final result Specimen: Nares from Nose Updated: 06/07/22 1619     SARS-CoV-2 Negative     INFLUENZA A PCR Negative     INFLUENZA B PCR Negative     RSV PCR Negative    Narrative:      FOR PEDIATRIC PATIENTS - copy/paste COVID Guidelines URL to browser: [email or web address]    SARS-CoV-2 assay is a Nucleic Acid Amplification assay intended for the  qualitative detection of nucleic acid from SARS-CoV-2 in nasopharyngeal  swabs  Results are for the presumptive identification of SARS-CoV-2 RNA  Positive results are indicative of infection with SARS-CoV-2, the virus  causing COVID-19, but do not rule out bacterial infection or co-infection  with other viruses  Laboratories within the [place] and its  territories are required to report all positive results to the appropriate  public health authorities   Negative results do not preclude SARS-CoV-2  infection and should not be used as the sole basis for treatment or other  patient management decisions  Negative results must be combined with  clinical observations, patient history, and epidemiological information  This test has not been FDA cleared or approved  This test has been authorized by FDA under an Emergency Use Authorization  (EUA)  This test is only authorized for the duration of time the  declaration that circumstances exist justifying the authorization of the  emergency use of an in vitro diagnostic tests for detection of SARS-CoV-2  virus and/or diagnosis of COVID-19 infection under section 564(b)(1) of  the Act, 21 U  S C  266RVQ-5(F)(3), unless the authorization is terminated  or revoked sooner  The test has been validated but independent review by FDA  and CLIA is pending  Test performed using [employer] Gene[employer]pert: This RT-PCR assay targets N2,  a region unique to SARS-CoV-2  A conserved region in the E-gene was chosen  for pan-Sarbecovirus detection which includes SARS-CoV-2  HS Troponin I 4hr [[id number]]     Lab Status: No result Specimen: Blood     Lactic acid [[id number]]  (Abnormal) Collected: 06/07/22 1407    Lab Status: Final result Specimen: Blood from Arm, Right Updated: 06/07/22 1445     LACTIC ACID 2 5 mmol/L     Narrative:      Result may be elevated if tourniquet was used during collection      HS Troponin 0hr (reflex protocol) [[id number]]  (Normal) Collected: 06/07/22 1407    Lab Status: Final result Specimen: Blood from Arm, Right Updated: 06/07/22 1439     hs TnI 0hr 17 ng/L     Comprehensive metabolic panel [[id number]]  (Abnormal) Collected: 06/07/22 1407    Lab Status: Final result Specimen: Blood from Arm, Right Updated: 06/07/22 1433     Sodium 147 mmol/L      Potassium 3 9 mmol/L      Chloride 112 mmol/L      CO2 22 mmol/L      ANION GAP 13 mmol/L      BUN 19 mg/dL      Creatinine 1 32 mg/dL      Glucose 173 mg/dL      Calcium 8 7 mg/dL      Corrected Calcium 9 2 mg/dL      AST 12 U/L      ALT 21 U/L      Alkaline Phosphatase 110 U/L      Total Protein 5 9 g/dL      Albumin 3 4 g/dL      Total Bilirubin 0 63 mg/dL      eGFR 56 ml/min/1 73sq m     Narrative:      [place] guidelines for Chronic Kidney Disease (CKD):     Stage 1 with normal or high GFR (GFR > 90 mL/min/1 73 square meters)    Stage 2 Mild CKD (GFR = 60-89 mL/min/1 73 square meters)    Stage 3A Moderate CKD (GFR = 45-59 mL/min/1 73 square meters)    Stage 3B Moderate CKD (GFR = 30-44 mL/min/1 73 square meters)    Stage 4 Severe CKD (GFR = 15-29 mL/min/1 73 square meters)    Stage 5 End Stage CKD (GFR <15 mL/min/1 73 square meters)  Note: GFR calculation is accurate only with a steady state creatinine    Magnesium [[id number]]  (Normal) Collected: 06/07/22 1407    Lab Status: Final result Specimen: Blood from Arm, Right Updated: 06/07/22 1433     Magnesium 2 0 mg/dL     Blood culture #2 [[id number]] Collected: 06/07/22 1420    Lab Status: In process Specimen: Blood from Arm, Left Updated: 06/07/22 410 [facility] [[id number]]  (Normal) Collected: 06/07/22 1407    Lab Status: Final result Specimen: Blood from Arm, Right Updated: 06/07/22 1426     Protime 14 5 seconds      INR 1 15    APTT [[id number]]  (Normal) Collected: 06/07/22 1407    Lab Status: Final result Specimen: Blood from Arm, Right Updated: 06/07/22 1426     PTT 24 seconds     Blood culture #1 [[id number]] Collected: 06/07/22 1413    Lab Status:  In process Specimen: Blood from Arm, Right Updated: 06/07/22 1420    CBC and differential [[id number]]  (Abnormal) Collected: 06/07/22 1407    Lab Status: Final result Specimen: Blood from Arm, Right Updated: 06/07/22 1414     WBC 10 82 Thousand/uL      RBC 4 64 Million/uL      Hemoglobin 13 3 g/dL      Hematocrit 40 4 %      MCV 87 fL      MCH 28 7 pg      MCHC 32 9 g/dL      RDW 13 1 %      MPV 9 2 fL      Platelets 843 Thousands/uL      nRBC 0 /100 WBCs      Neutrophils Relative 65 %      Immat GRANS % 1 %      Lymphocytes Relative 20 % Monocytes Relative 10 %      Eosinophils Relative 3 %      Basophils Relative 1 %      Neutrophils Absolute 7 15 Thousands/µL      Immature Grans Absolute 0 07 Thousand/uL      Lymphocytes Absolute 2 21 Thousands/µL      Monocytes Absolute 1 04 Thousand/µL      Eosinophils Absolute 0 29 Thousand/µL      Basophils Absolute 0 06 Thousands/µL                  XR chest 1 view portable   Final Result by Vanessa Byrnes MD (06/07 1701)      No active pulmonary disease on examination which is somewhat limited secondary to low lung volumes  Workstation performed: [id number]         CT high volume lower GI bleed   Final Result by Prince Levi MD (06/07 1627)      No CT evidence of active high volume gastrointestinal hemorrhage  Colonic diverticulosis without findings of acute diverticulitis  Workstation performed: [id number]                    Procedures  ECG 12 Lead Documentation Only    Date/Time: 6/7/2022 2:07 PM  Performed by: Jeff Peters DO  Authorized by: Jeff Peters DO     Indications / Diagnosis:  Weakness  ECG reviewed by me, the ED Provider: yes    Patient location:  ED  Previous ECG:     Previous ECG:  Unavailable    Comparison to cardiac monitor: Yes    Interpretation:     Interpretation: abnormal    Comments:      Sinus rhythm, rate 81, normal axis, normal intervals, no acute ST/T of his own, frequent PVCs noted, no previous EKG available for comparison    CriticalCare Time  Performed by: Jeff Peters DO  Authorized by: Jeff Peters DO     Critical care provider statement:     Critical care time (minutes):  30    Critical care time was exclusive of:  Separately billable procedures and treating other patients    Critical care was necessary to treat or prevent imminent or life-threatening deterioration of the following conditions:  Cardiac failure    Critical care was time spent personally by me on the following activities:  Blood draw for specimens, obtaining history from patient or surrogate, development of treatment plan with patient or surrogate, discussions with consultants, evaluation of patient's response to treatment, examination of patient, review of old charts, re-evaluation of patient's condition, ordering and review of laboratory studies, ordering and review of radiographic studies, interpretation of cardiac output measurements and ordering and performing treatments and interventions  Comments:      Hypotension, weakness, lower GI bleed             ED Course  ED Course as of 06/07/22 1719   Tu Jun 07, 2022   1675 Spoke with patient's GI team, GI PA, who recommended obtaining high volume GI bleed CT study and then admitting patient with consult to GI  Protonix and octreotide drip can be discontinued as patient's hemoglobin is stable  MDM  Number of Diagnoses or Management Options  Diverticulosis: new and requires workup  Generalized weakness: new and requires workup  Lower GI bleed: new and requires workup  Diagnosis management comments: Obtain blood work, EKG, Protonix bolus and drip, Sandostatin bolus and drip,, type and screen  Give IV fluids, continue to monitor patient for any worsening symptoms  Consult GI and plan for admission         Amount and/or Complexity of Data Reviewed  Clinical lab tests: ordered and reviewed  Tests in the radiology section of CPT®: ordered and reviewed  Tests in the medicine section of CPT®: reviewed and ordered  Review and summarize past medical records: yes  Independent visualization of images, tracings, or specimens: yes    Risk of Complications, Morbidity, and/or Mortality  General comments: Patient's blood work showed hemoglobin within normal range  Blood pressure improved with IV fluids  GI was consulted  GI ordered a CT high volume GI bleed study that did not show any acute source of bleeding  There was diverticulosis    At this time patient's GI bleeding may be diverticular in origin  GI recommended stopping Sandostatin and Protonix drip  At this time patient will be admitted to Medicine with GI consult for further observation and evaluation  Patient agrees with admission plans  Patient Progress  Patient progress: stable      Disposition  Final diagnoses:   Lower GI bleed   Generalized weakness   Diverticulosis     Time reflects when diagnosis was documented in both MDM as applicable and the Disposition within this note     Time User Action Codes Description Comment    6/7/2022  3:51 PM Jay Mcdonaldapp Add [K92 2] Gastrointestinal hemorrhage, unspecified gastrointestinal hemorrhage type     6/7/2022  4:43 PM Jakob Sprain Add [K92 2] Lower GI bleed     6/7/2022  4:43 PM Ferdous, Krupa Clock Add [R53 1] Generalized weakness     6/7/2022  4:43 PM Jakob Sprain Add [K57 90] Diverticulosis       ED Disposition     ED Disposition   Admit    Condition   Stable    Date/Time   Tue Jun 7, 2022  4:43 PM    Comment   Case was discussed with Dr Kaylah Pleitez and the patient's admission status was agreed to be Admission Status: observation status to the service of Dr Dr Kaylah Pleitez  Follow-up Information    None         Patient's Medications   Discharge Prescriptions    No medications on file       No discharge procedures on file      PDMP Review       Value Time User    PDMP Reviewed  Yes 6/10/2021  4:59 PM Bard Marina MD          ED Provider  Electronically Signed by           Rosalinda العراقي DO  06/07/22 3783

## 2022-06-07 NOTE — H&P
History and Physical - [facility]    Patient Information: Miriam Masters 61 y o  male MRN: [id number]  Unit/Bed#: ED CT1 Encounter: [id number]  Admitting Physician: Karen Dukes MD  PCP: Singh Howard DO  Date of Admission:  06/07/22        Hospital Problem List:     Active Problems:    Benign essential hypertension    Obstructive sleep apnea    History of cardioembolic cerebrovascular accident (CVA)    Paroxysmal A-fib ([address] )    GI bleed      Assessment/Plan:    * GI bleed  Assessment & Plan  PMH of diverticulitis, multiple prior episodes of diverticular bleeding  · Patient had multiple bloody stools followed by dizziness and diaphoresis starting morning of admission  Patient has history of prior diverticular bleed requiring ICU admission  · In ED heme-positive stool  · High volume CT lower GI bleed negative for active GI bleed  · Hgb 13 3 (Bl 15-16)    · Repeat cbc in am  · Clear liquid diet  · GI consulted, appreciate recommendations      SIRS (systemic inflammatory response syndrome) (HCC)  Assessment & Plan  SIRS positive on presentation to ED a/e/b tachycardia and tachypnea  No evidence of infection  Tachycardia and tachypnea likely 2/2 abdominal pain from GI bleed  No need to start antibiotics at this time  Monitor for signs of infection, consider antibiotics as indicated    Elevated troponin  Assessment & Plan  Troponin 17->45  No ST changes on EKG  Patient denies any chest pain, SOB  Likely type 2 MI 2/2 acute drop in hemoglobin from GI bleed  · Trend troponins  · AM EKG    Paroxysmal A-fib (HCC)  Assessment & Plan  Paroxysmal afib diagnosed few months ago  Takes eliquis 5mg BID at home    Hold eliquis in setting of GI bleed    History of cardioembolic cerebrovascular accident (CVA)  Assessment & Plan  Hx of CVA   MRI from 9/2020 showed small lacunar infarct in R thalamic region    MRI from 3/2021 showed punctate nodular enhancement in the anterior medial right cerebellum    Loop recorder completed January 2022  Showed paroxysmal afib and started on eliquis  · Eliquis held 2/2 GI bleed    Obstructive sleep apnea  Assessment & Plan  History of GRACIE, on CPAP at home  Home settings auto titrate, pressure range 6-20 cm H20      Continue nocturnal CPAP     Benign essential hypertension  Assessment & Plan  At home on toprol-xl 50mg QAM, 25mg QHS  In ED BP labile 100s-140s/60s-90s  Hold metoprolol given borderline BP  Resume if patient becomes hypertensive  VTE Prophylaxis: Pharmacologic VTE Prophylaxis contraindicated due to GI bleed  / sequential compression device   Code Status: Prior    Anticipated Length of Stay:  Patient will be admitted on an Observation basis with an anticipated length of stay of  less 2 midnights  Justification for Hospital Stay: GI bleed,     Total Time for Visit, including Counseling / Coordination of Care: 30 minutes  Greater than 50% of this total time spent on direct patient counseling and coordination of care  Chief Complaint:     Rectal Bleeding and Dizziness (Arrives pale, extremely diaphoretic, c/o dizziness  Patient had colonscopy last week and 2 polyps removed  Patient on Eliquis, states 6-7 BM's with blood   Cramping abdomen   Unable to obtain BP in triage, placed in hallway bed until CT 1 available)    History of Present Illness:    Leroy Garcia is a 61 y o  male with PMH of GRACIE on CPA, HTN, hx of prior CVA, diverticulitis who presents with multiple episodes of BRBPR occurring this morning  Patient reports having 8 bloody bowel movements this morning, with blood in bowel and mixed into stool  He also had some abdominal pain with bowel movements  Following bowel movements, patient developed light headedness and was diaphoretic  At time of admission, patient reports that his abdominal pain has resolved and he is no longer dizzy or diaphoretic        Review of Systems:    Review of Systems   Constitutional: Positive for diaphoresis  Negative for chills, fever and unexpected weight change  HENT: Negative for congestion and rhinorrhea  Respiratory: Negative for cough and shortness of breath  Cardiovascular: Negative for chest pain, palpitations and leg swelling  Gastrointestinal: Positive for abdominal pain, anal bleeding and blood in stool  Negative for abdominal distention, constipation, nausea and vomiting  Endocrine: Negative for polyuria  Genitourinary: Negative for dysuria, frequency, hematuria and urgency  Musculoskeletal: Negative for arthralgias and myalgias  Skin: Negative for rash and wound  Neurological: Negative for syncope and headaches  Psychiatric/Behavioral: Negative for sleep disturbance  The patient is not nervous/anxious  Past Medical and Surgical History:     Past Medical History:   Diagnosis Date    Arthropathy of knee     last assessed 8/19/15    Bacterial pneumonia 02/05/2007    last assessed 2/5/7    [facility]     Colon polyp     CPAP (continuous positive airway pressure) dependence     Disorder of male genital organ 02/12/2008    last assessed 2/12/08    ED (erectile dysfunction) of organic origin     last assessed 2/13/17     History of hypertension     Seasonal allergies     Situational anxiety     resolved 3/16/17     Sleep apnea     St[facility] )     09/2020 TIA    Tinnitus     Wears hearing aid     bilateral       Past Surgical History:   Procedure Laterality Date    CARDIAC ELECTROPHYSIOLOGY PROCEDURE N/A 12/22/2021    Procedure: Cardiac Loop Recorder Implant;  Surgeon: Marisa Soto DO;  Location: [place] [address] CATH LAB; Service: Cardiology    COLONOSCOPY      x3-w/polyps    HERNIA REPAIR      umbilical,hemal inguinal    KNEE ARTHROSCOPY Left     meniscus    W[place] COLONOSCOPY FLX DX W/COLLJ SPEC WHEN PFRMD N/A 5/7/2018    Procedure: COLONOSCOPY;  Surgeon: Judson Asencio MD;  Location: [place] [address] GI LAB;   Service: Gastroenterology Meds/Allergies:    PTA meds:   Prior to Admission Medications   Prescriptions Last Dose Informant Patient Reported? Taking? ALPRAZolam (XANAX) 0 25 mg tablet Not Taking at Unknown time  No No   Sig: Take 1 tablet (0 25 mg total) by mouth 2 (two) times a day as needed for anxiety or sleep   Patient not taking: Reported on 6/7/2022   albuterol (Proventil HFA) 90 mcg/act inhaler Not Taking at Unknown time  No No   Sig: Inhale 2 puffs every 4 (four) hours as needed for wheezing or shortness of breath   Patient not taking: Reported on 6/7/2022   apixaban (Eliquis) 5 mg 6/6/2022 at Unknown time  No Yes   Sig: Take 1 tablet (5 mg total) by mouth 2 (two) times a day   atorvastatin (LIPITOR) 40 mg tablet 6/6/2022 at Unknown time  No Yes   Sig: TAKE 1 TABLET BY MOUTH EVERY DAY IN THE EVENING   fexofenadine (ALLEGRA) 180 MG tablet   No No   Sig: Take 1 tablet (180 mg total) by mouth daily for 30 days   Patient taking differently: Take 180 mg by mouth every morning   metoprolol succinate (TOPROL-XL) 25 mg 24 hr tablet 6/6/2022 at Unknown time  No Yes   Sig: Take 1 tablet (25 mg total) by mouth daily 25 mg in evening   metoprolol succinate (TOPROL-XL) 50 mg 24 hr tablet 6/6/2022 at Unknown time  No Yes   Sig: Take 1 tablet (50 mg total) by mouth daily   Patient taking differently: Take 50 mg by mouth every morning   sildenafil (VIAGRA) 100 mg tablet   No No   Sig: Take 1 tablet (100 mg total) by mouth daily as needed for erectile dysfunction      Facility-Administered Medications: None       Allergies:    Allergies   Allergen Reactions    Pollen Extract Sneezing     Reaction Date: 18Sep2006;     Shellfish-Derived Products - Food Allergy Other (See Comments)     Eye swelling    Iodine Solution [Povidone Iodine] Rash     Eyes swell     History:     Marital Status: /Civil Union     Substance Use History:   Social History     Substance and Sexual Activity   Alcohol Use Yes    Alcohol/week: 24 0 standard drinks    Types: 2 Glasses of wine, 2 Cans of beer, 20 Standard drinks or equivalent per week    Comment: 2-3 mixed drinks/day     Social History     Tobacco Use   Smoking Status Passive Smoke Exposure - Never Smoker   Smokeless Tobacco Never Used     Social History     Substance and Sexual Activity   Drug Use No       Family History:    Family History   Problem Relation Age of Onset    Cancer Mother         breast    Diverticulitis Mother         colostomy    Breast cancer Mother     Heart disease Father         CHF    Cancer Sister         breast    Depression Sister     Cancer Sister         skin cancer    Cancer Sister         skin cancer    Colon cancer Neg Hx     Liver disease Neg Hx        Physical Exam:     Vitals:   Blood Pressure: 123/67 (06/07/22 1636)  Pulse: 79 (06/07/22 1636)  Temperature: 97 5 °F (36 4 °C) (06/07/22 1400)  Temp Source: Tympanic (06/07/22 1400)  Respirations: 20 (06/07/22 1636)  Weight - Scale: 107 kg (235 lb) (last weight used not on weigh bed) (06/07/22 1400)  SpO2: 98 % (06/07/22 1636)    Physical Exam  Vitals reviewed  Constitutional:       Appearance: Normal appearance  HENT:      Head: Normocephalic and atraumatic  Mouth/Throat:      Mouth: Mucous membranes are moist    Eyes:      Extraocular Movements: Extraocular movements intact  Pupils: Pupils are equal, round, and reactive to light  Cardiovascular:      Rate and Rhythm: Normal rate and regular rhythm  Pulses: Normal pulses  Heart sounds: Normal heart sounds  Pulmonary:      Effort: Pulmonary effort is normal       Breath sounds: Normal breath sounds  Abdominal:      General: Abdomen is flat  Bowel sounds are normal       Palpations: Abdomen is soft  Skin:     General: Skin is warm  Capillary Refill: Capillary refill takes less than 2 seconds  Neurological:      General: No focal deficit present  Mental Status: He is alert     Psychiatric:         Mood and Affect: Mood and affect normal          Speech: Speech normal          Behavior: Behavior normal          Cognition and Memory: Memory is impaired  Comments: Patient reports having memory problems  Asks wife to help provide history of what happened  Lab Results: I have personally reviewed pertinent reports  Results from last 7 days   Lab Units 06/07/22  1407   WBC Thousand/uL 10 82*   HEMOGLOBIN g/dL 13 3   HEMATOCRIT % 40 4   PLATELETS Thousands/uL 337   NEUTROS PCT % 65   LYMPHS PCT % 20   MONOS PCT % 10   EOS PCT % 3     Results from last 7 days   Lab Units 06/07/22  1407   POTASSIUM mmol/L 3 9   CHLORIDE mmol/L 112*   CO2 mmol/L 22   BUN mg/dL 19   CREATININE mg/dL 1 32*   CALCIUM mg/dL 8 7   ALK PHOS U/L 110   ALT U/L 21   AST U/L 12     Results from last 7 days   Lab Units 06/07/22  1407   INR  1 15       Imaging: I have personally reviewed pertinent reports  Colonoscopy    Result Date: 5/23/2022  Narrative: [address] [place] [phone] DATE OF SERVICE: 5/23/22 PHYSICIAN(S): Attending: Marcos Stark MD Fellow: No Staff Documented INDICATION: Tubular adenoma of colon POST-OP DIAGNOSIS: See the impression below  HISTORY: Prior colonoscopy: Less than 3 years ago  It is being repeated at an interval of less than 3 years because: Last colonoscopy had inadequate bowel prep BOWEL PREPARATION: Miralax/Dulcolax PREPROCEDURE: Informed consent was obtained for the procedure, including sedation  Risks including but not limited to bleeding, infection, perforation, adverse drug reaction and aspiration were explained in detail  Also explained about less than 100% sensitivity with the exam and other alternatives  The patient was placed in the left lateral decubitus position  DETAILS OF PROCEDURE: Patient was taken to the procedure room where a time out was performed to confirm correct patient and correct procedure   The patient underwent monitored anesthesia care, which was administered by an anesthesia professional  The patient's blood pressure, heart rate, level of consciousness, oxygen and respirations were monitored throughout the procedure  A digital rectal exam was performed  The scope was introduced through the anus and advanced to the cecum  Retroflexion was performed in the rectum  The quality of bowel preparation was evaluated using the [place] Bowel Preparation Scale with scores of: right colon = 2, transverse colon = 2, left colon = 2  The total BBPS score was 6  Bowel prep was adequate  The patient experienced no blood loss  The procedure was not difficult  The patient tolerated the procedure well  There were no apparent complications  ANESTHESIA INFORMATION: ASA: II Anesthesia Type: IV Sedation with Anesthesia MEDICATIONS: No administrations occurring from 1036 to 1100 on 05/23/22 FINDINGS: One sessile polyp in the ascending colon; removed en bloc by cold snare and retrieved specimen Polyp measuring smaller than 5 mm in the sigmoid colon; performed cold forceps biopsy Tattoo from prior polypectomy in the ascending colon, no residual polyp  Well-healed scar   Few extensive diverticula in the transverse colon, descending colon and sigmoid colon Internal hemorrhoids Otherwise normal colon EVENTS: Procedure Events Event Event Time ENDO CECUM REACHED 5/23/2022 10:49 AM ENDO SCOPE OUT TIME 5/23/2022 11:00 AM SPECIMENS: ID Type Source Tests Collected by Time Destination 1 : bx- ascending polyp- cold snare Tissue Large Intestine, Right/Ascending Colon TISSUE EXAM Jerome Dubois MD 5/23/2022 10:52 AM  2 : bx- sigmoid polyp Tissue Large Intestine, Sigmoid Colon TISSUE EXAM Jerome Dubois MD 5/23/2022 10:58 AM  EQUIPMENT: Colonoscope -     Impression: 6 mm ascending colon polyp removed by cold snare Diminutive sigmoid polyp removed by cold biopsy forceps Extensive diverticulosis Internal hemorrhoids In the ascending colon there was tattoo from prior polypectomy with no residual polyp  RECOMMENDATION: Repeat colonoscopy in 3 years due to a personal history of colon polyps Discharge home Resume regular diet Resume medications, okay to start blood thinners tomorrow Follow-up biopsy results Recommend 2 day bowel prep for future colonoscopy Call with any abdominal pain, bleeding, fevers  Mei Madrigal MD     XR chest 1 view portable    Result Date: 6/7/2022  Narrative: CHEST INDICATION:   Weakness and dizziness  COMPARISON:  2/28/2020 EXAM PERFORMED/VIEWS:  XR CHEST PORTABLE FINDINGS: A loop recorder is present  Cardiac silhouette appears prominent but may be accentuated by the shallow depth of inspiration  Lung markings are crowded secondary to low lung volumes  Within limitations of this examination there is no focal airspace opacity to suggest pneumonia  No pneumothorax or pleural effusion  Osseous structures appear within normal limits for patient age  Impression: No active pulmonary disease on examination which is somewhat limited secondary to low lung volumes  Workstation performed: [id number]     CT high volume lower GI bleed    Result Date: 6/7/2022  Narrative: CT ABDOMEN AND PELVIS - WITHOUT AND WITH IV CONTRAST INDICATION:   lower gi bleed  COMPARISON: CT abdomen pelvis 2/28/2020 TECHNIQUE:  CT examination of the abdomen and pelvis was performed both prior to and after the administration of intravenous contrast  Axial, sagittal, and coronal 2D reformatted images were created from the source data and submitted for interpretation  Radiation dose length product (DLP) for this visit:  [id number] [id number] mGy-cm   This examination, like all CT scans performed in the [facility], was performed utilizing techniques to minimize radiation dose exposure, including the use of iterative reconstruction and automated exposure control  IV Contrast:  70 mL of iohexol (OMNIPAQUE) Enteric Contrast:  Enteric contrast was not administered   FINDINGS: ABDOMEN  BOWEL:  There is no active extravasation of intravenous contrast into the lumen of stomach, small bowel, or large bowel loops  There is no abnormal bowel wall thickening or pathologic bowel wall enhancement  Colonic diverticulosis without findings of acute diverticulitis  Advanced atherosclerotic disease in the mesenteric vessels, without complete occlusion  LIVER/BILIARY TREE:  Scattered hepatic cysts  Otherwise unremarkable  GALLBLADDER:  No calcified gallstones  No pericholecystic inflammatory change  SPLEEN:  Unremarkable  PANCREAS:  Unremarkable  ADRENAL GLANDS:  Unremarkable  KIDNEYS/URETERS:  Right renal cyst   No hydronephrosis  APPENDIX:  Normal  ABDOMINOPELVIC CAVITY:  No ascites  No pneumoperitoneum  No lymphadenopathy  VESSELS:  Unremarkable for patient's age  PELVIS REPRODUCTIVE ORGANS:  Prostatomegaly  URINARY BLADDER:  Unremarkable  ABDOMINAL WALL/INGUINAL REGIONS:  Unremarkable  OSSEOUS STRUCTURES:  No acute fracture or destructive osseous lesion  Impression: No CT evidence of active high volume gastrointestinal hemorrhage  Colonic diverticulosis without findings of acute diverticulitis  Workstation performed: [id number]       XR chest 1 view portable   Final Result      No active pulmonary disease on examination which is somewhat limited secondary to low lung volumes  Workstation performed: [id number]         CT high volume lower GI bleed   Final Result      No CT evidence of active high volume gastrointestinal hemorrhage  Colonic diverticulosis without findings of acute diverticulitis  Workstation performed: [id number]             EKG, Pathology, and Other Studies Reviewed on Admission:   · EKG normal       Allscripts/EPIC Records Reviewed: Yes     ** Please Note: "This note has been constructed using a voice recognition system  Therefore there may be syntax, spelling, and/or grammatical errors   Please call if you have any questions  "**

## 2022-06-07 NOTE — ASSESSMENT & PLAN NOTE
Paroxysmal afib diagnosed few months ago  Continue metoprolol 50 milligrams in a m  And 25 milligrams at night  Restarted on Eliquis 5 milligram p o  B i d  Which has been on hold in the setting of GI bleed

## 2022-06-07 NOTE — ASSESSMENT & PLAN NOTE
Hx of CVA   MRI from 9/2020 showed small lacunar infarct in R thalamic region  MRI from 3/2021 showed punctate nodular enhancement in the anterior medial right cerebellum    Loop recorder completed January 2022  Showed paroxysmal afib and started on eliquis  · Eliquis has been on hold which was restarted

## 2022-06-07 NOTE — ASSESSMENT & PLAN NOTE
Troponin 17->45-66-34  EKG showed no acute ST-T changes  Patient denies any chest pain, SOB    Likely non MI troponin elevation in the setting of GI bleed and soft blood pressures

## 2022-06-07 NOTE — ASSESSMENT & PLAN NOTE
History of GRACIE, on CPAP at home    Home settings auto titrate, pressure range 6-20 cm H20      Continue nocturnal CPAP

## 2022-06-08 PROBLEM — D64.9 ANEMIA: Status: ACTIVE | Noted: 2022-06-08

## 2022-06-08 LAB
ANION GAP SERPL CALCULATED.3IONS-SCNC: 11 MMOL/L (ref 4–13)
BASOPHILS # BLD AUTO: 0.05 THOUSANDS/ΜL (ref 0–0.1)
BASOPHILS NFR BLD AUTO: 1 % (ref 0–1)
BUN SERPL-MCNC: 25 MG/DL (ref 5–25)
CALCIUM SERPL-MCNC: 8.2 MG/DL (ref 8.3–10.1)
CARDIAC TROPONIN I PNL SERPL HS: 37 NG/L (ref 8–18)
CHLORIDE SERPL-SCNC: 111 MMOL/L (ref 100–108)
CO2 SERPL-SCNC: 23 MMOL/L (ref 21–32)
CREAT SERPL-MCNC: 1.03 MG/DL (ref 0.6–1.3)
EOSINOPHIL # BLD AUTO: 0.2 THOUSAND/ΜL (ref 0–0.61)
EOSINOPHIL NFR BLD AUTO: 2 % (ref 0–6)
ERYTHROCYTE [DISTWIDTH] IN BLOOD BY AUTOMATED COUNT: 13.1 % (ref 11.6–15.1)
GFR SERPL CREATININE-BSD FRML MDRD: 76 ML/MIN/1.73SQ M
GLUCOSE SERPL-MCNC: 124 MG/DL (ref 65–140)
HCT VFR BLD AUTO: 31.1 % (ref 36.5–49.3)
HGB BLD-MCNC: 10.4 G/DL (ref 12–17)
IMM GRANULOCYTES # BLD AUTO: 0.05 THOUSAND/UL (ref 0–0.2)
IMM GRANULOCYTES NFR BLD AUTO: 1 % (ref 0–2)
LYMPHOCYTES # BLD AUTO: 1.44 THOUSANDS/ΜL (ref 0.6–4.47)
LYMPHOCYTES NFR BLD AUTO: 13 % (ref 14–44)
MAGNESIUM SERPL-MCNC: 2 MG/DL (ref 1.6–2.6)
MCH RBC QN AUTO: 28.9 PG (ref 26.8–34.3)
MCHC RBC AUTO-ENTMCNC: 33.1 G/DL (ref 31.4–37.4)
MCV RBC AUTO: 87 FL (ref 82–98)
MONOCYTES # BLD AUTO: 1.03 THOUSAND/ΜL (ref 0.17–1.22)
MONOCYTES NFR BLD AUTO: 10 % (ref 4–12)
NEUTROPHILS # BLD AUTO: 7.94 THOUSANDS/ΜL (ref 1.85–7.62)
NEUTS SEG NFR BLD AUTO: 73 % (ref 43–75)
NRBC BLD AUTO-RTO: 0 /100 WBCS
PLATELET # BLD AUTO: 242 THOUSANDS/UL (ref 149–390)
PMV BLD AUTO: 9.6 FL (ref 8.9–12.7)
POTASSIUM SERPL-SCNC: 3.3 MMOL/L (ref 3.5–5.3)
RBC # BLD AUTO: 3.57 MILLION/UL (ref 3.88–5.62)
SODIUM SERPL-SCNC: 145 MMOL/L (ref 136–145)
WBC # BLD AUTO: 10.71 THOUSAND/UL (ref 4.31–10.16)

## 2022-06-08 PROCEDURE — 99225 PR SBSQ OBSERVATION CARE/DAY 25 MINUTES: CPT | Performed by: INTERNAL MEDICINE

## 2022-06-08 PROCEDURE — 83735 ASSAY OF MAGNESIUM: CPT | Performed by: STUDENT IN AN ORGANIZED HEALTH CARE EDUCATION/TRAINING PROGRAM

## 2022-06-08 PROCEDURE — 84484 ASSAY OF TROPONIN QUANT: CPT | Performed by: INTERNAL MEDICINE

## 2022-06-08 PROCEDURE — 80048 BASIC METABOLIC PNL TOTAL CA: CPT | Performed by: STUDENT IN AN ORGANIZED HEALTH CARE EDUCATION/TRAINING PROGRAM

## 2022-06-08 PROCEDURE — 85025 COMPLETE CBC W/AUTO DIFF WBC: CPT | Performed by: STUDENT IN AN ORGANIZED HEALTH CARE EDUCATION/TRAINING PROGRAM

## 2022-06-08 RX ORDER — METOPROLOL SUCCINATE 25 MG/1
25 TABLET, EXTENDED RELEASE ORAL
Status: DISCONTINUED | OUTPATIENT
Start: 2022-06-08 | End: 2022-06-10 | Stop reason: HOSPADM

## 2022-06-08 RX ORDER — METOPROLOL SUCCINATE 25 MG/1
25 TABLET, EXTENDED RELEASE ORAL DAILY
Status: DISCONTINUED | OUTPATIENT
Start: 2022-06-08 | End: 2022-06-08

## 2022-06-08 RX ORDER — METOPROLOL SUCCINATE 50 MG/1
50 TABLET, EXTENDED RELEASE ORAL DAILY
Status: DISCONTINUED | OUTPATIENT
Start: 2022-06-08 | End: 2022-06-10 | Stop reason: HOSPADM

## 2022-06-08 RX ORDER — POTASSIUM CHLORIDE 20 MEQ/1
40 TABLET, EXTENDED RELEASE ORAL ONCE
Status: COMPLETED | OUTPATIENT
Start: 2022-06-08 | End: 2022-06-08

## 2022-06-08 RX ADMIN — APIXABAN 5 MG: 5 TABLET, FILM COATED ORAL at 11:29

## 2022-06-08 RX ADMIN — ATORVASTATIN CALCIUM 40 MG: 40 TABLET, FILM COATED ORAL at 17:01

## 2022-06-08 RX ADMIN — METOPROLOL SUCCINATE 50 MG: 50 TABLET, EXTENDED RELEASE ORAL at 14:11

## 2022-06-08 RX ADMIN — APIXABAN 5 MG: 5 TABLET, FILM COATED ORAL at 17:01

## 2022-06-08 RX ADMIN — METOPROLOL SUCCINATE 25 MG: 25 TABLET, EXTENDED RELEASE ORAL at 22:55

## 2022-06-08 RX ADMIN — POTASSIUM CHLORIDE 40 MEQ: 20 TABLET, EXTENDED RELEASE ORAL at 09:58

## 2022-06-08 NOTE — PROGRESS NOTES
[address]  Progress Note Nisreen Verduzco 1955, 61 y o  male MRN: [id number]  Unit/Bed#: [address] [place] [address] Encounter: [id number]  Primary Care Provider: Ric Read DO   Date and time admitted to hospital: 6/7/2022  1:53 PM    * GI bleed  Assessment & Plan  PMH of diverticulitis, multiple prior episodes of diverticular bleeding  · Patient had multiple bloody stools followed by dizziness and diaphoresis starting morning of admission  Patient has history of prior diverticular bleed requiring ICU admission  · In ED heme-positive stool  · High volume CT lower GI bleed negative for active GI bleed  · Hgb 13 3 upon admission  Baseline hemoglobin around 15-16  Patient had another episode of bleeding per rectum last night  Hemoglobin dropped to 10 7-secondary to GI bleeding and possible dilution  Patient was on clear liquid diet which will be advanced to regular diet  Monitor hemoglobin  Restarted back on Eliquis which have been on hold  Anemia  Assessment & Plan  Secondary to acute blood loss in the setting of GI bleeding along with dilution  Baseline hemoglobin around 15-16  Hemoglobin upon admission was 13 3 which dropped to 10 4  Monitor hemoglobin and transfuse if hemoglobin less than 8    SIRS (systemic inflammatory response syndrome) (HCC)  Assessment & Plan  SIRS positive on presentation to ED as evidenced by tachycardia and tachypnea  No evidence of infection  Tachycardia and tachypnea likely secondary to GI bleed  No indication for antibiotics    Elevated troponin  Assessment & Plan  Troponin 71->68-75-98  EKG showed no acute ST-T changes  Patient denies any chest pain, SOB  Likely non MI troponin elevation in the setting of GI bleed and soft blood pressures        Paroxysmal A-fib (HCC)  Assessment & Plan  Paroxysmal afib diagnosed few months ago  Continue metoprolol 50 milligrams in a m  And 25 milligrams at night    Restarted on Eliquis 5 milligram p o  B i d  Which has been on hold in the setting of GI bleed  History of cardioembolic cerebrovascular accident (CVA)  Assessment & Plan  Hx of CVA   MRI from 2020 showed small lacunar infarct in R thalamic region  MRI from 3/2021 showed punctate nodular enhancement in the anterior medial right cerebellum    Loop recorder completed 2022  Showed paroxysmal afib and started on eliquis  · Eliquis has been on hold which was restarted  Obstructive sleep apnea  Assessment & Plan  History of GRACIE, on CPAP at home  Home settings auto titrate, pressure range 6-20 cm H20      Continue nocturnal CPAP     Benign essential hypertension  Assessment & Plan  At home on toprol-xl 50mg QAM, 25mg QHS  Blood pressure initially in the ED was on the soft side which has since improved  Metoprolol has been on hold in the setting of borderline blood pressure but will restart given his history of atrial fibrillation  VTE Pharmacologic Prophylaxis:   Moderate Risk (Score 3-4) - Pharmacological DVT Prophylaxis Ordered: apixaban (Eliquis)  Patient Centered Rounds: I performed bedside rounds with nursing staff today  Discussions with Specialists or Other Care Team Provider: [facility]    Education and Discussions with Family / Patient: yes    Time Spent for Care: 45 minutes  More than 50% of total time spent on counseling and coordination of care as described above  Current Length of Stay: 0 day(s)  Current Patient Status: Observation   Certification Statement: The patient will continue to require additional inpatient hospital stay due to Rectal bleeding  Discharge Plan: Anticipate discharge in 24-48 hrs to home  Code Status: Level 1 - Full Code    Subjective:   Patient had 1 episode of rectal bleeding last night with bowel movement with alida blood  Denies any abdominal pain  Patient is feeling much better today    Denies any    Objective:     Vitals:   Temp (24hrs), Av 1 °F (36 7 °C), Min:97 5 °F (36 4 °C), Max:98 5 °F (36 9 °C)    Temp:  [97 5 °F (36 4 °C)-98 5 °F (36 9 °C)] 98 4 °F (36 9 °C)  HR:  [] 68  Resp:  [18-24] 18  BP: (105-142)/(62-96) 134/74  SpO2:  [92 %-99 %] 96 %  Body mass index is 31 63 kg/m²  Input and Output Summary (last 24 hours): Intake/Output Summary (Last 24 hours) at 6/8/2022 1227  Last data filed at 6/8/2022 0900  Gross per 24 hour   Intake 1384 5 ml   Output --   Net 1384 5 ml       Physical Exam:   Physical Exam  Constitutional:       Appearance: Normal appearance  HENT:      Head: Normocephalic and atraumatic  Eyes:      Extraocular Movements: Extraocular movements intact  Pupils: Pupils are equal, round, and reactive to light  Cardiovascular:      Rate and Rhythm: Normal rate and regular rhythm  Heart sounds: No murmur heard  No gallop  Pulmonary:      Effort: Pulmonary effort is normal       Breath sounds: Normal breath sounds  Abdominal:      General: Bowel sounds are normal       Palpations: Abdomen is soft  Tenderness: There is no abdominal tenderness  Musculoskeletal:         General: No swelling or deformity  Normal range of motion  Cervical back: Normal range of motion and neck supple  Skin:     General: Skin is warm and dry  Neurological:      General: No focal deficit present  Mental Status: He is alert            Additional Data:     Labs:  Results from last 7 days   Lab Units 06/08/22  0450   WBC Thousand/uL 10 71*   HEMOGLOBIN g/dL 10 4*   HEMATOCRIT % 31 1*   PLATELETS Thousands/uL 242   NEUTROS PCT % 73   LYMPHS PCT % 13*   MONOS PCT % 10   EOS PCT % 2     Results from last 7 days   Lab Units 06/08/22  0450 06/07/22  1407   SODIUM mmol/L 145 147*   POTASSIUM mmol/L 3 3* 3 9   CHLORIDE mmol/L 111* 112*   CO2 mmol/L 23 22   BUN mg/dL 25 19   CREATININE mg/dL 1 03 1 32*   ANION GAP mmol/L 11 13   CALCIUM mg/dL 8 2* 8 7   ALBUMIN g/dL  --  3 4*   TOTAL BILIRUBIN mg/dL  --  0 63   ALK PHOS U/L  --  110   ALT U/L  --  21 AST U/L  --  12   GLUCOSE RANDOM mg/dL 124 173*     Results from last 7 days   Lab Units 06/07/22  1407   INR  1 15             Results from last 7 days   Lab Units 06/07/22  1639 06/07/22  1407   LACTIC ACID mmol/L 1 3 2 5*       Lines/Drains:  Invasive Devices  Report    Peripheral Intravenous Line  Duration           Peripheral IV 06/07/22 Right Antecubital <1 day    Peripheral IV 06/07/22 Right Forearm <1 day                  Telemetry:  Telemetry Orders (From admission, onward)             24 Hour Telemetry Monitoring  Continuous x 24 Hours (Telem)        Expiring   References:    Telemetry Guidelines   Question:  Reason for 24 Hour Telemetry  Answer:  Metabolic/Electrolyte Disturbance with High Probability of Dysrhythmia (K level <3 or >6, or KCL infusion >=10mEq/hr)                 Telemetry Reviewed: Normal Sinus Rhythm  Indication for Continued Telemetry Use: No indication for continued use  Will discontinue  Imaging: Reviewed radiology reports from this admission including: chest xray and abdominal/pelvic CT    Recent Cultures (last 7 days):   Results from last 7 days   Lab Units 06/07/22  1420 06/07/22  1413   BLOOD CULTURE  Received in Microbiology Lab  Culture in Progress  Received in Microbiology Lab  Culture in Progress  Last 24 Hours Medication List:   Current Facility-Administered Medications   Medication Dose Route Frequency Provider Last Rate    apixaban  5 mg Oral BID Aidan Ramirez MD      atorvastatin  40 mg Oral QPM Patricia Xavier MD      metoprolol succinate  25 mg Oral HS Aidan Ramirez MD      metoprolol succinate  50 mg Oral Daily Aidan Ramirez MD          Today, Patient Was Seen By: Aidan Ramirez MD    **Please Note: This note may have been constructed using a voice recognition system  **

## 2022-06-08 NOTE — PLAN OF CARE
Problem: MOBILITY - ADULT  Goal: Maintain or return to baseline ADL function  Description: INTERVENTIONS:  -  Assess patient's ability to carry out ADLs; assess patient's baseline for ADL function and identify physical deficits which impact ability to perform ADLs (bathing, care of mouth/teeth, toileting, grooming, dressing, etc )  - Assess/evaluate cause of self-care deficits   - Assess range of motion  - Assess patient's mobility; develop plan if impaired  - Assess patient's need for assistive devices and provide as appropriate  - Encourage maximum independence but intervene and supervise when necessary  - Involve family in performance of ADLs  - Assess for home care needs following discharge   - Consider OT consult to assist with ADL evaluation and planning for discharge  - Provide patient education as appropriate  Outcome: Progressing  Goal: Maintains/Returns to pre admission functional level  Description: INTERVENTIONS:  - Perform BMAT or MOVE assessment daily    - Set and communicate daily mobility goal to care team and patient/family/caregiver     - Collaborate with rehabilitation services on mobility goals if consulted  - Out of bed for toileting  - Record patient progress and toleration of activity level   Outcome: Progressing     Problem: PAIN - ADULT  Goal: Verbalizes/displays adequate comfort level or baseline comfort level  Description: Interventions:  - Encourage patient to monitor pain and request assistance  - Assess pain using appropriate pain scale  - Administer analgesics based on type and severity of pain and evaluate response  - Implement non-pharmacological measures as appropriate and evaluate response  - Consider cultural and social influences on pain and pain management  - Notify physician/advanced practitioner if interventions unsuccessful or patient reports new pain  Outcome: Progressing     Problem: INFECTION - ADULT  Goal: Absence or prevention of progression during hospitalization  Description: INTERVENTIONS:  - Assess and monitor for signs and symptoms of infection  - Monitor lab/diagnostic results  - Monitor all insertion sites, i e  indwelling lines, tubes, and drains  - Monitor endotracheal if appropriate and nasal secretions for changes in amount and color  - [place] appropriate cooling/warming therapies per order  - Administer medications as ordered  - Instruct and encourage patient and family to use good hand hygiene technique  - Identify and instruct in appropriate isolation precautions for identified infection/condition  Outcome: Progressing  Goal: Absence of fever/infection during neutropenic period  Description: INTERVENTIONS:  - Monitor WBC    Outcome: Progressing     Problem: SAFETY ADULT  Goal: Maintain or return to baseline ADL function  Description: INTERVENTIONS:  -  Assess patient's ability to carry out ADLs; assess patient's baseline for ADL function and identify physical deficits which impact ability to perform ADLs (bathing, care of mouth/teeth, toileting, grooming, dressing, etc )  - Assess/evaluate cause of self-care deficits   - Assess range of motion  - Assess patient's mobility; develop plan if impaired  - Assess patient's need for assistive devices and provide as appropriate  - Encourage maximum independence but intervene and supervise when necessary  - Involve family in performance of ADLs  - Assess for home care needs following discharge   - Consider OT consult to assist with ADL evaluation and planning for discharge  - Provide patient education as appropriate  Outcome: Progressing  Goal: Maintains/Returns to pre admission functional level  Description: INTERVENTIONS:  - Perform BMAT or MOVE assessment daily    - Set and communicate daily mobility goal to care team and patient/family/caregiver     - Collaborate with rehabilitation services on mobility goals if consulted  - Out of bed for toileting  - Record patient progress and toleration of activity level   Outcome: Progressing  Goal: Patient will remain free of falls  Description: INTERVENTIONS:  - Educate patient/family on patient safety including physical limitations  - Instruct patient to call for assistance with activity   - Consult OT/PT to assist with strengthening/mobility   - Keep Call bell within reach  - Keep bed low and locked with side rails adjusted as appropriate  - Keep care items and personal belongings within reach  - Initiate and maintain comfort rounds  - Make Fall Risk Sign visible to staff  - Apply yellow socks and bracelet for high fall risk patients  - Consider moving patient to room near nurses station  Outcome: Progressing     Problem: DISCHARGE PLANNING  Goal: Discharge to home or other facility with appropriate resources  Description: INTERVENTIONS:  - Identify barriers to discharge w/patient and caregiver  - Arrange for needed discharge resources and transportation as appropriate  - Identify discharge learning needs (meds, wound care, etc )  - Arrange for interpretive services to assist at discharge as needed  - Refer to Case Management Department for coordinating discharge planning if the patient needs post-hospital services based on physician/advanced practitioner order or complex needs related to functional status, cognitive ability, or social support system  Outcome: Progressing     Problem: Knowledge Deficit  Goal: Patient/family/caregiver demonstrates understanding of disease process, treatment plan, medications, and discharge instructions  Description: Complete learning assessment and assess knowledge base    Interventions:  - Provide teaching at level of understanding  - Provide teaching via preferred learning methods  Outcome: Progressing     Problem: Prexisting or High Potential for Compromised Skin Integrity  Goal: Skin integrity is maintained or improved  Description: INTERVENTIONS:  - Identify patients at risk for skin breakdown  - Assess and monitor skin integrity  - Assess and monitor nutrition and hydration status  - Monitor labs   - Assess for incontinence   - Turn and reposition patient  - Assist with mobility/ambulation  - Relieve pressure over bony prominences  - Avoid friction and shearing  - Provide appropriate hygiene as needed including keeping skin clean and dry  - Evaluate need for skin moisturizer/barrier cream  - Collaborate with interdisciplinary team   - Patient/family teaching  - Consider wound care consult   Outcome: Progressing

## 2022-06-08 NOTE — PLAN OF CARE
Problem: MOBILITY - ADULT  Goal: Maintain or return to baseline ADL function  Description: INTERVENTIONS:  -  Assess patient's ability to carry out ADLs; assess patient's baseline for ADL function and identify physical deficits which impact ability to perform ADLs (bathing, care of mouth/teeth, toileting, grooming, dressing, etc )  - Assess/evaluate cause of self-care deficits   - Assess range of motion  - Assess patient's mobility; develop plan if impaired  - Assess patient's need for assistive devices and provide as appropriate  - Encourage maximum independence but intervene and supervise when necessary  - Involve family in performance of ADLs  - Assess for home care needs following discharge   - Consider OT consult to assist with ADL evaluation and planning for discharge  - Provide patient education as appropriate  6/7/2022 2320 by Samia Oden RN  Outcome: Progressing  6/7/2022 2320 by Samia Oden RN  Outcome: Progressing  Goal: Maintains/Returns to pre admission functional level  Description: INTERVENTIONS:  - Perform BMAT or MOVE assessment daily    - Set and communicate daily mobility goal to care team and patient/family/caregiver     - Collaborate with rehabilitation services on mobility goals if consulted  - Out of bed for toileting  - Record patient progress and toleration of activity level   6/7/2022 2320 by Samia Oden RN  Outcome: Progressing  6/7/2022 2320 by Samia Oden RN  Outcome: Progressing     Problem: PAIN - ADULT  Goal: Verbalizes/displays adequate comfort level or baseline comfort level  Description: Interventions:  - Encourage patient to monitor pain and request assistance  - Assess pain using appropriate pain scale  - Administer analgesics based on type and severity of pain and evaluate response  - Implement non-pharmacological measures as appropriate and evaluate response  - Consider cultural and social influences on pain and pain management  - Notify physician/advanced practitioner if interventions unsuccessful or patient reports new pain  6/7/2022 2320 by Samia Oden RN  Outcome: Progressing  6/7/2022 2320 by Samia Oden RN  Outcome: Progressing     Problem: INFECTION - ADULT  Goal: Absence or prevention of progression during hospitalization  Description: INTERVENTIONS:  - Assess and monitor for signs and symptoms of infection  - Monitor lab/diagnostic results  - Monitor all insertion sites, i e  indwelling lines, tubes, and drains  - Monitor endotracheal if appropriate and nasal secretions for changes in amount and color  - [place] appropriate cooling/warming therapies per order  - Administer medications as ordered  - Instruct and encourage patient and family to use good hand hygiene technique  - Identify and instruct in appropriate isolation precautions for identified infection/condition  6/7/2022 2320 by Samia Oden RN  Outcome: Progressing  6/7/2022 2320 by Samia Oden RN  Outcome: Progressing  Goal: Absence of fever/infection during neutropenic period  Description: INTERVENTIONS:  - Monitor WBC    6/7/2022 2320 by Samia Oden RN  Outcome: Progressing  6/7/2022 2320 by Samia Oedn RN  Outcome: Progressing     Problem: SAFETY ADULT  Goal: Maintain or return to baseline ADL function  Description: INTERVENTIONS:  -  Assess patient's ability to carry out ADLs; assess patient's baseline for ADL function and identify physical deficits which impact ability to perform ADLs (bathing, care of mouth/teeth, toileting, grooming, dressing, etc )  - Assess/evaluate cause of self-care deficits   - Assess range of motion  - Assess patient's mobility; develop plan if impaired  - Assess patient's need for assistive devices and provide as appropriate  - Encourage maximum independence but intervene and supervise when necessary  - Involve family in performance of ADLs  - Assess for home care needs following discharge   - Consider OT consult to assist with ADL evaluation and planning for discharge  - Provide patient education as appropriate  6/7/2022 2320 by Samia Oden RN  Outcome: Progressing  6/7/2022 2320 by Samia Oden RN  Outcome: Progressing  Goal: Maintains/Returns to pre admission functional level  Description: INTERVENTIONS:  - Perform BMAT or MOVE assessment daily    - Set and communicate daily mobility goal to care team and patient/family/caregiver     - Collaborate with rehabilitation services on mobility goals if consulted  - Out of bed for toileting  - Record patient progress and toleration of activity level   6/7/2022 2320 by Samia Oden RN  Outcome: Progressing  6/7/2022 2320 by Samia Oden RN  Outcome: Progressing  Goal: Patient will remain free of falls  Description: INTERVENTIONS:  - Educate patient/family on patient safety including physical limitations  - Instruct patient to call for assistance with activity   - Consult OT/PT to assist with strengthening/mobility   - Keep Call bell within reach  - Keep bed low and locked with side rails adjusted as appropriate  - Keep care items and personal belongings within reach  - Initiate and maintain comfort rounds  - Make Fall Risk Sign visible to staff  - Apply yellow socks and bracelet for high fall risk patients  - Consider moving patient to room near nurses station  6/7/2022 2320 by Samia Oden RN  Outcome: Progressing  6/7/2022 2320 by Samia Oden RN  Outcome: Progressing     Problem: DISCHARGE PLANNING  Goal: Discharge to home or other facility with appropriate resources  Description: INTERVENTIONS:  - Identify barriers to discharge w/patient and caregiver  - Arrange for needed discharge resources and transportation as appropriate  - Identify discharge learning needs (meds, wound care, etc )  - Arrange for interpretive services to assist at discharge as needed  - Refer to Case Management Department for coordinating discharge planning if the patient needs post-hospital services based on physician/advanced practitioner order or complex needs related to functional status, cognitive ability, or social support system  6/7/2022 2320 by Samia Oden RN  Outcome: Progressing  6/7/2022 2320 by Samia Oden RN  Outcome: Progressing     Problem: Knowledge Deficit  Goal: Patient/family/caregiver demonstrates understanding of disease process, treatment plan, medications, and discharge instructions  Description: Complete learning assessment and assess knowledge base    Interventions:  - Provide teaching at level of understanding  - Provide teaching via preferred learning methods  6/7/2022 2320 by Samia Oden RN  Outcome: Progressing  6/7/2022 2320 by Samia Oden RN  Outcome: Progressing

## 2022-06-08 NOTE — PLAN OF CARE
Problem: MOBILITY - ADULT  Goal: Maintain or return to baseline ADL function  Description: INTERVENTIONS:  -  Assess patient's ability to carry out ADLs; assess patient's baseline for ADL function and identify physical deficits which impact ability to perform ADLs (bathing, care of mouth/teeth, toileting, grooming, dressing, etc )  - Assess/evaluate cause of self-care deficits   - Assess range of motion  - Assess patient's mobility; develop plan if impaired  - Assess patient's need for assistive devices and provide as appropriate  - Encourage maximum independence but intervene and supervise when necessary  - Involve family in performance of ADLs  - Assess for home care needs following discharge   - Consider OT consult to assist with ADL evaluation and planning for discharge  - Provide patient education as appropriate  Outcome: Progressing     Problem: INFECTION - ADULT  Goal: Absence or prevention of progression during hospitalization  Description: INTERVENTIONS:  - Assess and monitor for signs and symptoms of infection  - Monitor lab/diagnostic results  - Monitor all insertion sites, i e  indwelling lines, tubes, and drains  - Monitor endotracheal if appropriate and nasal secretions for changes in amount and color  - [place] appropriate cooling/warming therapies per order  - Administer medications as ordered  - Instruct and encourage patient and family to use good hand hygiene technique  - Identify and instruct in appropriate isolation precautions for identified infection/condition  Outcome: Progressing     Problem: SAFETY ADULT  Goal: Maintain or return to baseline ADL function  Description: INTERVENTIONS:  -  Assess patient's ability to carry out ADLs; assess patient's baseline for ADL function and identify physical deficits which impact ability to perform ADLs (bathing, care of mouth/teeth, toileting, grooming, dressing, etc )  - Assess/evaluate cause of self-care deficits   - Assess range of motion  - Assess patient's mobility; develop plan if impaired  - Assess patient's need for assistive devices and provide as appropriate  - Encourage maximum independence but intervene and supervise when necessary  - Involve family in performance of ADLs  - Assess for home care needs following discharge   - Consider OT consult to assist with ADL evaluation and planning for discharge  - Provide patient education as appropriate  Outcome: Progressing     Problem: SAFETY ADULT  Goal: Maintain or return to baseline ADL function  Description: INTERVENTIONS:  -  Assess patient's ability to carry out ADLs; assess patient's baseline for ADL function and identify physical deficits which impact ability to perform ADLs (bathing, care of mouth/teeth, toileting, grooming, dressing, etc )  - Assess/evaluate cause of self-care deficits   - Assess range of motion  - Assess patient's mobility; develop plan if impaired  - Assess patient's need for assistive devices and provide as appropriate  - Encourage maximum independence but intervene and supervise when necessary  - Involve family in performance of ADLs  - Assess for home care needs following discharge   - Consider OT consult to assist with ADL evaluation and planning for discharge  - Provide patient education as appropriate  Outcome: Progressing     Problem: Knowledge Deficit  Goal: Patient/family/caregiver demonstrates understanding of disease process, treatment plan, medications, and discharge instructions  Description: Complete learning assessment and assess knowledge base    Interventions:  - Provide teaching at level of understanding  - Provide teaching via preferred learning methods  Outcome: Progressing

## 2022-06-08 NOTE — UTILIZATION REVIEW
Initial Clinical Review    Admission: Date/Time/Statement:   Admission Orders (From admission, onward)     Ordered        06/07/22 1643  Place in Observation  Once                      Orders Placed This Encounter   Procedures    Place in Observation     Standing Status:   Standing     Number of Occurrences:   1     Order Specific Question:   Level of Care     Answer:   Med Surg [16]     ED Arrival Information     Expected   -    Arrival   6/7/2022 13:40    Acuity   Emergent            Means of arrival   Walk-In    Escorted by   Spouse    Service   Hospitalist    Admission type   Emergency            Arrival complaint   rectal bleed, dizzine, on thinners           Chief Complaint   Patient presents with    Rectal Bleeding    Dizziness     Arrives pale, extremely diaphoretic, c/o dizziness  Patient had colonscopy last week and 2 polyps removed  Patient on Eliquis, states 6-7 BM's with blood   Cramping abdomen   Unable to obtain BP in triage, placed in hallway bed until CT 1 available       Initial Presentation: 61 y o  male presents to ed from home for evaluation and treatment of diaphoresis, dizziness, abdominal cramping and bloody stool  PMHX:  GRACIE ON CPAP, HTN, CVA, PAF on Eliquis   Clinical assessment significant for LA 2 5, , CR 1 32, HB 13 3  GUAICC + STOOL  Rolm  blood noted on digital rectal exam    Initially treated with iv  9% ns bolus, iv protonix gtt, iv sandostatin gtt  Admit to observation for rectal bleeding  Consult gastroenterology  60 y/o male who presents with BRBPR  Patient had recent colonoscopy which showed 6 mm ascending colon polyp removed by cold snare, diminutive sigmoid polyp removed by cold biopsy forceps,extensive diverticulosis, internal hemorrhoids and  in the ascending colon there was tattoo from prior polypectomy with no residual polyp  Patient went for CT high volume lower GI bleed study which showed no evidence of active high volume gastrointestinal hemorrhage  Patient apparently had GI bleeding before and 1 time when he was in [place] he had eaten nuts and then has started having rectal bleeding  Also reports history of post polypectomy bleed  Patient has been taking Eliquis for about a year after CVA  Patient did hold the Eliquis prior to his colonoscopy and then resumed after as directed  He otherwise denies any abdominal pain  Wife states he has had this intermittently over the years  Patient had bleeding from early this morning until about 1:00 p m  And has and no further bleeding since that time  Blood pressure is currently stable, with systolic in the 208G and was 107/72 on admission to ER  Date: 6-8-22   Day 2: Observation     Rectal bleeding suspected to be diverticular s/p polypectomy 2 weeks ago and Eliquis  Baseline Hb 15-16  Now decreasing 13 to 10  Hold Eliquis  Troponin rising 17 to 45  Likely type 2 MI related to HB decrease  GI plan for continued observation  If bleed continues will consider repeat colonoscopy  No further bloody BM since last night  Trend HH and if remains stable discharge in 24 hours          ED Triage Vitals [06/07/22 1400]   97 5 °F (36 4 °C) 104 (!) 24 107/72 92 %      Tympanic Monitor         Pain Score       5          06/07/22 106 kg (233 lb 4 oz)     Additional Vital Signs:       Date/Time Temp Pulse Resp BP MAP (mmHg) SpO2 O2 Device   06/08/22 0730 98 4 °F (36 9 °C) 68 18 134/74 96 96 % None (Room air)   06/08/22 0315 98 5 °F (36 9 °C) 88 18 124/66 -- 96 % None (Room air)   06/07/22 2300 98 1 °F (36 7 °C) 75 18 108/62 78 95 % None (Room air)   06/07/22 1832 98 2 °F (36 8 °C) 66 20 135/77 100 97 % None (Room air)   06/07/22 1759 -- 70 20 122/67 -- 99 % None (Room air)   06/07/22 1636 -- 79 20 123/67 -- 98 % None (Room air)   06/07/22 1447 -- 78 22 105/67 -- 96 % --   06/07/22 1427 -- 74 -- 142/96 -- -- --   06/07/22 1400 97 5 °F (36 4 °C) 104 24   Abnormal  107/72 -- 92 % None (Room air) Pertinent Labs/Diagnostic Test Results:     Collection Time Result Time Vent R Atrial R ND Int  QRSD Int  QT Int  QTC Int  P Axis QRS Axis T Wave Ax    06/07/22 14:07:41 06/07/22 19:23:52 81 81 142 84 402 466 -6 5 40                       Sinus rhythm with frequent Premature ventricular complexes   Otherwise normal ECG   When compared with ECG of 21-SEP-2020 17:58,   Premature ventricular complexes are now Present   T wave amplitude has increased in Lateral leads                 XR chest 1 view portable   Final (06/07 1701)      No active pulmonary disease on examination which is somewhat limited secondary to low lung volumes  CT high volume lower GI bleed   Final  (06/07 1627)      No CT evidence of active high volume gastrointestinal hemorrhage  Colonic diverticulosis without findings of acute diverticulitis             Results from last 7 days   Lab Units 06/07/22  1420   SARS-COV-2  Negative     Results from last 7 days   Lab Units 06/08/22  0450 06/07/22  1407   WBC Thousand/uL 10 71* 10 82*   HEMOGLOBIN g/dL 10 4* 13 3   HEMATOCRIT % 31 1* 40 4   PLATELETS Thousands/uL 242 337   NEUTROS ABS Thousands/µL 7 94* 7 15         Results from last 7 days   Lab Units 06/08/22  0450 06/07/22  1407   SODIUM mmol/L 145 147*   POTASSIUM mmol/L 3 3* 3 9   CHLORIDE mmol/L 111* 112*   CO2 mmol/L 23 22   ANION GAP mmol/L 11 13   BUN mg/dL 25 19   CREATININE mg/dL 1 03 1 32*   EGFR ml/min/1 73sq m 76 56   CALCIUM mg/dL 8 2* 8 7   MAGNESIUM mg/dL 2 0 2 0     Results from last 7 days   Lab Units 06/07/22  1407   AST U/L 12   ALT U/L 21   ALK PHOS U/L 110   TOTAL PROTEIN g/dL 5 9*   ALBUMIN g/dL 3 4*   TOTAL BILIRUBIN mg/dL 0 63         Results from last 7 days   Lab Units 06/08/22  0450 06/07/22  1407   GLUCOSE RANDOM mg/dL 124 173*       Results from last 7 days   Lab Units 06/07/22  1759 06/07/22  1639 06/07/22  1407   HS TNI 0HR ng/L  --   --  17   HS TNI 2HR ng/L  --  45  --    HSTNI D2 ng/L  --  28* --    HS TNI 4HR ng/L 66*  --   --    HSTNI D4 ng/L 49*  --   --          Results from last 7 days   Lab Units 06/07/22  1407   PROTIME seconds 14 5   INR  1 15   PTT seconds 24       Results from last 7 days   Lab Units 06/07/22  1639 06/07/22  1407   LACTIC ACID mmol/L 1 3 2 5*         Results from last 7 days   Lab Units 06/07/22  1636   CLARITY UA  Clear   COLOR UA  Light Yellow   SPEC GRAV UA  1 020   PH UA  5 5   GLUCOSE UA mg/dl Negative   KETONES UA mg/dl Negative   BLOOD UA  Negative   PROTEIN UA mg/dl Negative   NITRITE UA  Negative   BILIRUBIN UA  Negative   UROBILINOGEN UA E U /dl 0 2   LEUKOCYTES UA  Negative     Results from last 7 days   Lab Units 06/07/22  1420   INFLUENZA A PCR  Negative   INFLUENZA B PCR  Negative   RSV PCR  Negative       Results from last 7 days   Lab Units 06/07/22  1420 06/07/22  1413   BLOOD CULTURE  Received in Microbiology Lab  Culture in Progress  Received in Microbiology Lab  Culture in Progress                 ED Treatment:   Medication Administration from 06/07/2022 1340 to 06/07/2022 1831       Date/Time Order Dose Route Action     06/07/2022 1412 sodium chloride 0 9 % bolus 1,000 mL 1,000 mL Intravenous New Bag     06/07/2022 1429 pantoprazole (PROTONIX) 80 mg in sodium chloride 0 9 % 100 mL IVPB 80 mg Intravenous New Bag     06/07/2022 1437 pantoprazole (PROTONIX) 80 mg in sodium chloride 0 9 % 100 mL infusion 8 mg/hr Intravenous New Bag     06/07/2022 1421 octreotide (SandoSTATIN) injection 50 mcg 50 mcg Intravenous Given     06/07/2022 1442 octreotide (SandoSTATIN) 500 mcg in sodium chloride 0 9 % 250 mL infusion 25 mcg/hr Intravenous New Bag        Past Medical History:   Diagnosis Date    Arthropathy of knee     last assessed 8/19/15    Bacterial pneumonia 02/05/2007    last assessed 2/5/7    Brain atrophy (HCC)     Colon polyp     CPAP (continuous positive airway pressure) dependence     Disorder of male genital organ 02/12/2008    last assessed 2/12/08    ED (erectile dysfunction) of organic origin     last assessed 2/13/17     History of hypertension     Seasonal allergies     Situational anxiety     resolved 3/16/17     Sleep apnea     Stroke [facility])     09/2020 TIA    Tinnitus     Wears hearing aid     bilateral     Present on Admission:   Benign essential hypertension   Obstructive sleep apnea      Admitting Diagnosis:     Dizziness [R42]  Diverticulosis [K57 90]  Rectal bleeding [K62 5]  Lower GI bleed [K92 2]  Generalized weakness [R53 1]  Gastrointestinal hemorrhage, unspecified gastrointestinal hemorrhage type [K92 2]    Age/Sex: 61 y o  male    Scheduled Medications:    apixaban, 5 mg, Oral, BID  atorvastatin, 40 mg, Oral, QPM  metoprolol succinate, 25 mg, Oral, HS  metoprolol succinate, 50 mg, Oral, Daily      Continuous IV Infusions:     PRN Meds:       IP CONSULT TO GASTROENTEROLOGY    Network Utilization Review Department  ATTENTION: Please call with any questions or concerns to [phone] and carefully listen to the prompts so that you are directed to the right person  All voicemails are confidential   Tomy rAevalo all requests for admission clinical reviews, approved or denied determinations and any other requests to dedicated fax number below belonging to the campus where the patient is receiving treatment   List of dedicated fax numbers for the Facilities:  [address] DENIALS (Administrative/Medical Necessity) [phone]   [address] [place] [address] (Maternity/NICU/Pediatrics) 261 [facility],[address] [address]  [address] Medical [place] [address] [id number] [address] 1924 [facility] Sofia Wang 1481 [address] 8916 [facility] 951 [phone]

## 2022-06-08 NOTE — PROGRESS NOTES
Progress Note - Yosvany Shahid 61 y o  male MRN: [id number]    Unit/Bed#: 2 Dorcas Garcia 307-01 Encounter: [id number]        Assessment/Plan: This is a 81-year-old male who presents with rectal bleeding  Does have history of suspected diverticular bleeding in the past according to spouse  Patient less likely with post polypectomy bleed as these were small colon polyps that were removed by cold snare and cold biopsy and this was done about 2 weeks ago  Likely patient has diverticular bleed and does have extensive diverticulosis noted on recent colonoscopy  At this point, would recommend continued observation  Patient did have high volume GI bleed study which was negative for active hemorrhage  Will resume Eliquis and observe  Tolerated clears and diet advanced to low fiber  If evidence of active bleeding, may need repeat colonoscopy and/or IR evaluation pending his course  Hgb did trend to 10 4, but no further bloody Bm since last night  Will continue to trend H&H  We will continue to observe and if no further bleeding then can be discharged tomorrow  Subjective:   Patient is lying in bed  He reports that he had last episode of bleeding around 8 or 9 last night and was in the commode so is hard to quantify how much blood  Denies any abdominal pain      Objective:     Vitals: /74 (BP Location: Left arm)   Pulse 68   Temp 98 4 °F (36 9 °C) (Oral)   Resp 18   Ht 6' (1 829 m)   Wt 106 kg (233 lb 4 oz)   SpO2 96%   BMI 31 63 kg/m²       Physical Exam:  Gen-alert no acute distress  Abd-positive bowel sounds, obese, nontender, no rebound rigidity or guarding       Lab, Imaging and other studies:   Recent Results (from the past 72 hour(s))   CBC and differential    Collection Time: 06/07/22  2:07 PM   Result Value Ref Range    WBC 10 82 (H) 4 31 - 10 16 Thousand/uL    RBC 4 64 3 88 - 5 62 Million/uL    Hemoglobin 13 3 12 0 - 17 0 g/dL    Hematocrit 40 4 36 5 - 49 3 %    MCV 87 82 - 98 fL    MCH 28 7 26 8 - 34 3 pg    MCHC 32 9 31 4 - 37 4 g/dL    RDW 13 1 11 6 - 15 1 %    MPV 9 2 8 9 - 12 7 fL    Platelets 081 528 - 847 Thousands/uL    nRBC 0 /100 WBCs    Neutrophils Relative 65 43 - 75 %    Immat GRANS % 1 0 - 2 %    Lymphocytes Relative 20 14 - 44 %    Monocytes Relative 10 4 - 12 %    Eosinophils Relative 3 0 - 6 %    Basophils Relative 1 0 - 1 %    Neutrophils Absolute 7 15 1 85 - 7 62 Thousands/µL    Immature Grans Absolute 0 07 0 00 - 0 20 Thousand/uL    Lymphocytes Absolute 2 21 0 60 - 4 47 Thousands/µL    Monocytes Absolute 1 04 0 17 - 1 22 Thousand/µL    Eosinophils Absolute 0 29 0 00 - 0 61 Thousand/µL    Basophils Absolute 0 06 0 00 - 0 10 Thousands/µL   Protime-INR    Collection Time: 06/07/22  2:07 PM   Result Value Ref Range    Protime 14 5 11 6 - 14 5 seconds    INR 1 15 0 84 - 1 19   APTT    Collection Time: 06/07/22  2:07 PM   Result Value Ref Range    PTT 24 23 - 37 seconds   Comprehensive metabolic panel    Collection Time: 06/07/22  2:07 PM   Result Value Ref Range    Sodium 147 (H) 136 - 145 mmol/L    Potassium 3 9 3 5 - 5 3 mmol/L    Chloride 112 (H) 100 - 108 mmol/L    CO2 22 21 - 32 mmol/L    ANION GAP 13 4 - 13 mmol/L    BUN 19 5 - 25 mg/dL    Creatinine 1 32 (H) 0 60 - 1 30 mg/dL    Glucose 173 (H) 65 - 140 mg/dL    Calcium 8 7 8 3 - 10 1 mg/dL    Corrected Calcium 9 2 8 3 - 10 1 mg/dL    AST 12 5 - 45 U/L    ALT 21 12 - 78 U/L    Alkaline Phosphatase 110 46 - 116 U/L    Total Protein 5 9 (L) 6 4 - 8 2 g/dL    Albumin 3 4 (L) 3 5 - 5 0 g/dL    Total Bilirubin 0 63 0 20 - 1 00 mg/dL    eGFR 56 ml/min/1 73sq m   Magnesium    Collection Time: 06/07/22  2:07 PM   Result Value Ref Range    Magnesium 2 0 1 6 - 2 6 mg/dL   Lactic acid    Collection Time: 06/07/22  2:07 PM   Result Value Ref Range    LACTIC ACID 2 5 (HH) 0 5 - 2 0 mmol/L   Type and screen    Collection Time: 06/07/22  2:07 PM   Result Value Ref Range    ABO Grouping O     Rh Factor Positive     Antibody Screen Negative     Specimen Expiration Date [id number]    HS Troponin 0hr (reflex protocol)    Collection Time: 06/07/22  2:07 PM   Result Value Ref Range    hs TnI 0hr 17 "Refer to ACS Flowchart"- see link ng/L   ECG 12 lead    Collection Time: 06/07/22  2:07 PM   Result Value Ref Range    Ventricular Rate 81 BPM    Atrial Rate 81 BPM    RI Interval 142 ms    QRSD Interval 84 ms    QT Interval 402 ms    QTC Interval 466 ms    P Axis -6 degrees    QRS Axis 5 degrees    T Wave Axis 40 degrees   Blood culture #1    Collection Time: 06/07/22  2:13 PM    Specimen: Arm, Right; Blood   Result Value Ref Range    Blood Culture Received in Microbiology Lab  Culture in Progress  COVID/FLU/RSV - 2 hour TAT    Collection Time: 06/07/22  2:20 PM    Specimen: Nose; Nares   Result Value Ref Range    SARS-CoV-2 Negative Negative    INFLUENZA A PCR Negative Negative    INFLUENZA B PCR Negative Negative    RSV PCR Negative Negative   Blood culture #2    Collection Time: 06/07/22  2:20 PM    Specimen: Arm, Left; Blood   Result Value Ref Range    Blood Culture Received in Microbiology Lab  Culture in Progress      UA w Reflex to Microscopic w Reflex to Culture    Collection Time: 06/07/22  4:36 PM    Specimen: Urine, Clean Catch   Result Value Ref Range    Color, UA Light Yellow     Clarity, UA Clear     Specific Gravity, UA 1 020 1 000 - 1 030    pH, UA 5 5 5 0, 5 5, 6 0, 6 5, 7 0, 7 5, 8 0, 8 5, 9 0    Leukocytes, UA Negative Negative    Nitrite, UA Negative Negative    Protein, UA Negative Negative mg/dl    Glucose, UA Negative Negative mg/dl    Ketones, UA Negative Negative mg/dl    Urobilinogen, UA 0 2 0 2, 1 0 E U /dl E U /dl    Bilirubin, UA Negative Negative    Blood, UA Negative Negative   HS Troponin I 2hr    Collection Time: 06/07/22  4:39 PM   Result Value Ref Range    hs TnI 2hr 45 "Refer to ACS Flowchart"- see link ng/L    Delta 2hr hsTnI 28 (H) <20 ng/L   Lactic acid 2 Hours    Collection Time: 06/07/22  4:39 PM   Result Value Ref Range    LACTIC ACID 1 3 0 5 - 2 0 mmol/L   HS Troponin I 4hr    Collection Time: 06/07/22  5:59 PM   Result Value Ref Range    hs TnI 4hr 66 (H) "Refer to ACS Flowchart"- see link ng/L    Delta 4hr hsTnI 49 (H) <20 ng/L   CBC and differential    Collection Time: 06/08/22  4:50 AM   Result Value Ref Range    WBC 10 71 (H) 4 31 - 10 16 Thousand/uL    RBC 3 57 (L) 3 88 - 5 62 Million/uL    Hemoglobin 10 4 (L) 12 0 - 17 0 g/dL    Hematocrit 31 1 (L) 36 5 - 49 3 %    MCV 87 82 - 98 fL    MCH 28 9 26 8 - 34 3 pg    MCHC 33 1 31 4 - 37 4 g/dL    RDW 13 1 11 6 - 15 1 %    MPV 9 6 8 9 - 12 7 fL    Platelets 039 678 - 858 Thousands/uL    nRBC 0 /100 WBCs    Neutrophils Relative 73 43 - 75 %    Immat GRANS % 1 0 - 2 %    Lymphocytes Relative 13 (L) 14 - 44 %    Monocytes Relative 10 4 - 12 %    Eosinophils Relative 2 0 - 6 %    Basophils Relative 1 0 - 1 %    Neutrophils Absolute 7 94 (H) 1 85 - 7 62 Thousands/µL    Immature Grans Absolute 0 05 0 00 - 0 20 Thousand/uL    Lymphocytes Absolute 1 44 0 60 - 4 47 Thousands/µL    Monocytes Absolute 1 03 0 17 - 1 22 Thousand/µL    Eosinophils Absolute 0 20 0 00 - 0 61 Thousand/µL    Basophils Absolute 0 05 0 00 - 0 10 Thousands/µL   Basic metabolic panel    Collection Time: 06/08/22  4:50 AM   Result Value Ref Range    Sodium 145 136 - 145 mmol/L    Potassium 3 3 (L) 3 5 - 5 3 mmol/L    Chloride 111 (H) 100 - 108 mmol/L    CO2 23 21 - 32 mmol/L    ANION GAP 11 4 - 13 mmol/L    BUN 25 5 - 25 mg/dL    Creatinine 1 03 0 60 - 1 30 mg/dL    Glucose 124 65 - 140 mg/dL    Calcium 8 2 (L) 8 3 - 10 1 mg/dL    eGFR 76 ml/min/1 73sq m   Magnesium    Collection Time: 06/08/22  4:50 AM   Result Value Ref Range    Magnesium 2 0 1 6 - 2 6 mg/dL   High Sensitivity Troponin I Random    Collection Time: 06/08/22  4:50 AM   Result Value Ref Range    HS TnI random 37 (H) 8 - 18 ng/L

## 2022-06-09 PROBLEM — R78.81 POSITIVE BLOOD CULTURE: Status: ACTIVE | Noted: 2022-06-09

## 2022-06-09 LAB
ANION GAP SERPL CALCULATED.3IONS-SCNC: 11 MMOL/L (ref 4–13)
BASOPHILS # BLD AUTO: 0.04 THOUSANDS/ΜL (ref 0–0.1)
BASOPHILS NFR BLD AUTO: 0 % (ref 0–1)
BUN SERPL-MCNC: 23 MG/DL (ref 5–25)
CALCIUM SERPL-MCNC: 8.1 MG/DL (ref 8.3–10.1)
CHLORIDE SERPL-SCNC: 109 MMOL/L (ref 100–108)
CO2 SERPL-SCNC: 22 MMOL/L (ref 21–32)
CREAT SERPL-MCNC: 0.8 MG/DL (ref 0.6–1.3)
EOSINOPHIL # BLD AUTO: 0.32 THOUSAND/ΜL (ref 0–0.61)
EOSINOPHIL NFR BLD AUTO: 3 % (ref 0–6)
ERYTHROCYTE [DISTWIDTH] IN BLOOD BY AUTOMATED COUNT: 13.3 % (ref 11.6–15.1)
GFR SERPL CREATININE-BSD FRML MDRD: 94 ML/MIN/1.73SQ M
GLUCOSE P FAST SERPL-MCNC: 110 MG/DL (ref 65–99)
GLUCOSE SERPL-MCNC: 110 MG/DL (ref 65–140)
HCT VFR BLD AUTO: 30.8 % (ref 36.5–49.3)
HGB BLD-MCNC: 10.1 G/DL (ref 12–17)
IMM GRANULOCYTES # BLD AUTO: 0.07 THOUSAND/UL (ref 0–0.2)
IMM GRANULOCYTES NFR BLD AUTO: 1 % (ref 0–2)
LYMPHOCYTES # BLD AUTO: 1.93 THOUSANDS/ΜL (ref 0.6–4.47)
LYMPHOCYTES NFR BLD AUTO: 19 % (ref 14–44)
MAGNESIUM SERPL-MCNC: 2.2 MG/DL (ref 1.6–2.6)
MCH RBC QN AUTO: 28.9 PG (ref 26.8–34.3)
MCHC RBC AUTO-ENTMCNC: 32.8 G/DL (ref 31.4–37.4)
MCV RBC AUTO: 88 FL (ref 82–98)
MONOCYTES # BLD AUTO: 0.97 THOUSAND/ΜL (ref 0.17–1.22)
MONOCYTES NFR BLD AUTO: 10 % (ref 4–12)
NEUTROPHILS # BLD AUTO: 6.88 THOUSANDS/ΜL (ref 1.85–7.62)
NEUTS SEG NFR BLD AUTO: 67 % (ref 43–75)
NRBC BLD AUTO-RTO: 0 /100 WBCS
PLATELET # BLD AUTO: 227 THOUSANDS/UL (ref 149–390)
PMV BLD AUTO: 9.3 FL (ref 8.9–12.7)
POTASSIUM SERPL-SCNC: 3.8 MMOL/L (ref 3.5–5.3)
RBC # BLD AUTO: 3.5 MILLION/UL (ref 3.88–5.62)
SODIUM SERPL-SCNC: 142 MMOL/L (ref 136–145)
WBC # BLD AUTO: 10.21 THOUSAND/UL (ref 4.31–10.16)

## 2022-06-09 PROCEDURE — 83735 ASSAY OF MAGNESIUM: CPT | Performed by: STUDENT IN AN ORGANIZED HEALTH CARE EDUCATION/TRAINING PROGRAM

## 2022-06-09 PROCEDURE — 80048 BASIC METABOLIC PNL TOTAL CA: CPT | Performed by: STUDENT IN AN ORGANIZED HEALTH CARE EDUCATION/TRAINING PROGRAM

## 2022-06-09 PROCEDURE — 85025 COMPLETE CBC W/AUTO DIFF WBC: CPT | Performed by: STUDENT IN AN ORGANIZED HEALTH CARE EDUCATION/TRAINING PROGRAM

## 2022-06-09 PROCEDURE — 99232 SBSQ HOSP IP/OBS MODERATE 35: CPT | Performed by: INTERNAL MEDICINE

## 2022-06-09 PROCEDURE — 87040 BLOOD CULTURE FOR BACTERIA: CPT | Performed by: INTERNAL MEDICINE

## 2022-06-09 RX ADMIN — ATORVASTATIN CALCIUM 40 MG: 40 TABLET, FILM COATED ORAL at 17:36

## 2022-06-09 RX ADMIN — METOPROLOL SUCCINATE 50 MG: 50 TABLET, EXTENDED RELEASE ORAL at 08:17

## 2022-06-09 RX ADMIN — Medication 1500 MG: at 02:33

## 2022-06-09 RX ADMIN — METOPROLOL SUCCINATE 25 MG: 25 TABLET, EXTENDED RELEASE ORAL at 21:05

## 2022-06-09 RX ADMIN — POLYETHYLENE GLYCOL 3350, SODIUM SULFATE ANHYDROUS, SODIUM BICARBONATE, SODIUM CHLORIDE, POTASSIUM CHLORIDE 4000 ML: 236; 22.74; 6.74; 5.86; 2.97 POWDER, FOR SOLUTION ORAL at 15:04

## 2022-06-09 RX ADMIN — BISACODYL 20 MG: 5 TABLET, COATED ORAL at 21:05

## 2022-06-09 NOTE — UTILIZATION REVIEW
Initial Clinical Review    Observation 6/7/22 @ [id number], converted to inpatient admission 6/9/22 @ [phone] 89 for continued care & tx for GI bleed  Admission: Date/Time/Statement:   Admission Orders (From admission, onward)     Ordered        06/09/22 1447  Inpatient Admission  Once            06/07/22 1643  Place in Observation  Once                      Orders Placed This Encounter   Procedures    Inpatient Admission     Standing Status:   Standing     Number of Occurrences:   1     Order Specific Question:   Level of Care     Answer:   Med Surg [16]     Order Specific Question:   Estimated length of stay     Answer:   More than 2 Midnights     Order Specific Question:   Certification     Answer:   I certify that inpatient services are medically necessary for this patient for a duration of greater than two midnights  See H&P and MD Progress Notes for additional information about the patient's course of treatment  ED Arrival Information     Expected   -    Arrival   6/7/2022 13:40    Acuity   Emergent            Means of arrival   Walk-In    Escorted by   Spouse    Service   Hospitalist    Admission type   Emergency            Arrival complaint   rectal bleed, dizzine, on thinners           Chief Complaint   Patient presents with    Rectal Bleeding    Dizziness     Arrives pale, extremely diaphoretic, c/o dizziness  Patient had colonscopy last week and 2 polyps removed  Patient on Eliquis, states 6-7 BM's with blood   Cramping abdomen   Unable to obtain BP in triage, placed in hallway bed until CT 1 available     Initial Presentation: 61 y o  male presents to ed from home for evaluation and treatment of diaphoresis, dizziness, abdominal cramping and bloody stool  PMHX:  GRACIE ON CPAP, HTN, CVA, PAF on Eliquis   Clinical assessment significant for LA 2 5, , CR 1 32, HB 13 3   GUAICC + STOOL  [facility] blood noted on digital rectal exam    Initially treated with iv  9% ns bolus, iv protonix gtt, iv sandostatin gtt   Admit to observation for rectal bleeding  Consult gastroenterology  62 y/o male who presents with BRBPR  Patient had recent colonoscopy which showed 6 mm ascending colon polyp removed by cold snare, diminutive sigmoid polyp removed by cold biopsy forceps,extensive diverticulosis, internal hemorrhoids and  in the ascending colon there was tattoo from prior polypectomy with no residual polyp  Patient went for CT high volume lower GI bleed study which showed no evidence of active high volume gastrointestinal hemorrhage  Patient apparently had GI bleeding before and 1 time when he was in [place] he had eaten nuts and then has started having rectal bleeding  Also reports history of post polypectomy bleed  Patient has been taking Eliquis for about a year after CVA  Patient did hold the Eliquis prior to his colonoscopy and then resumed after as directed  He otherwise denies any abdominal pain  Wife states he has had this intermittently over the years  Patient had bleeding from early this morning until about 1:00 p m  And has and no further bleeding since that time  Blood pressure is currently stable, with systolic in the 453C and was 107/72 on admission to ER  Date: 6-8-22 : Observation   Rectal bleeding suspected to be diverticular s/p polypectomy 2 weeks ago and Eliquis  Baseline Hb 15-16  Now decreasing 13 to 10  Hold Eliquis  Troponin rising 17 to 45  Likely type 2 MI related to HB decrease  GI plan for continued observation  If bleed continues will consider repeat colonoscopy  No further bloody BM since last night  Trend HH and if remains stable discharge in 24 hours  Observation to IP admission 6/9/22:  Continued bloody BM today, Hgb 10 1  Abd-positive bowel sounds, nondistended, obese, nontender, no rebound rigidity guarding  Trial clear liquids  Colonoscopy in am per GI  +blood cultures, follow up pending      Per GI: Gi bleed, likely diverticular bleed and does have extensive diverticulosis noted on recent colonoscopy  Patient did have high volume GI bleed study which was negative for active hemorrhage  Resumed Eliquis, but patient now reports that he had rectal bleeding again early this morning  Hemoglobin was 10 4 yesterday and 10 1 today  Day 2- 6/10/22:  Colonoscopy planned for today  Eliquis on hold  Hgb drop to 9 2, monitor  Colonoscopy=  FINDINGS:  · Moderate pancolonic diverticula  · Internal hemorrhoids with no bleeding   IMPRESSION:  No blood or bleeding source identified  Prep inadequate for screening purposes but ascending colon well visualized with flushing and suctioning  Prior to tattoo visualized  Site of recent polypectomy could not be identified, likely healed  Pandiverticulosis concentrated primarily in the sigmoid colon  Likely the source of bleeding  Internal hemorrhoids  No stigmata of hemorrhage  RECOMMENDATION:  Repeat colonoscopy in 3 years due to a personal history of colon polyps  May restart Eliquis     ED Triage Vitals [06/07/22 1400]   97 5 °F (36 4 °C) 104 (!) 24 107/72 92 %      Tympanic Monitor         Pain Score       5          06/07/22 106 kg (233 lb 4 oz)     Additional Vital Signs:   Date/Time Temp Pulse Resp BP MAP (mmHg) SpO2 O2 Device Patient Position - Orthostatic VS   06/09/22 0745 100 °F (37 8 °C) 67 18 124/79 97 95 % None (Room air) Sitting   06/08/22 2255 97 9 °F (36 6 °C) 88 18 125/84 97 96 % None (Room air) Lying   06/08/22 1928 99 1 °F (37 3 °C) 79 18 123/74 92 95 % -- Sitting   06/08/22 1630 98 2 °F (36 8 °C) 84 17 129/74 98 98 % None (Room air) Lying   06/08/22 1400 -- 89 -- 127/76 -- -- -- --   06/08/22 0730 98 4 °F (36 9 °C) 68 18 134/74 96 96 % None (Room air) Lying   06/08/22 0315 98 5 °F (36 9 °C) 88 18 124/66 -- 96 % None (Room air) Lying     Pertinent Labs/Diagnostic Test Results:   Collection Time Result Time Vent R Atrial R KY Int  QRSD Int  QT Int  QTC Int   P Axis QRS Axis T Wave Ax    06/07/22 14:07:41 06/07/22 19:23:52 81 81 142 84 402 466 -6 5 40                       Sinus rhythm with frequent Premature ventricular complexes   Otherwise normal ECG   When compared with ECG of 21-SEP-2020 17:58,   Premature ventricular complexes are now Present   T wave amplitude has increased in Lateral leads                 XR chest 1 view portable   Final (06/07 1701)      No active pulmonary disease on examination which is somewhat limited secondary to low lung volumes  CT high volume lower GI bleed   Final  (06/07 1627)      No CT evidence of active high volume gastrointestinal hemorrhage  Colonic diverticulosis without findings of acute diverticulitis             Results from last 7 days   Lab Units 06/07/22  1420   SARS-COV-2  Negative     Results from last 7 days   Lab Units 06/10/22  0607 06/09/22  0453 06/08/22  0450 06/07/22  1407   WBC Thousand/uL 6 89 10 21* 10 71* 10 82*   HEMOGLOBIN g/dL 9 2* 10 1* 10 4* 13 3   HEMATOCRIT % 28 1* 30 8* 31 1* 40 4   PLATELETS Thousands/uL 204 227 242 337   NEUTROS ABS Thousands/µL 4 76 6 88 7 94* 7 15         Results from last 7 days   Lab Units 06/10/22  0607 06/09/22  0453 06/08/22  0450 06/07/22  1407   SODIUM mmol/L 143 142 145 147*   POTASSIUM mmol/L 3 3* 3 8 3 3* 3 9   CHLORIDE mmol/L 108 109* 111* 112*   CO2 mmol/L 23 22 23 22   ANION GAP mmol/L 12 11 11 13   BUN mg/dL 15 23 25 19   CREATININE mg/dL 0 83 0 80 1 03 1 32*   EGFR ml/min/1 73sq m 92 94 76 56   CALCIUM mg/dL 8 2* 8 1* 8 2* 8 7   MAGNESIUM mg/dL 2 3 2 2 2 0 2 0     Results from last 7 days   Lab Units 06/07/22  1407   AST U/L 12   ALT U/L 21   ALK PHOS U/L 110   TOTAL PROTEIN g/dL 5 9*   ALBUMIN g/dL 3 4*   TOTAL BILIRUBIN mg/dL 0 63         Results from last 7 days   Lab Units 06/10/22  0607 06/09/22  0453 06/08/22  0450 06/07/22  1407   GLUCOSE RANDOM mg/dL 110 110 124 173*       Results from last 7 days   Lab Units 06/07/22  1759 06/07/22  1639 06/07/22  1407   HS TNI 0HR ng/L  --   --  17   HS TNI 2HR ng/L  --  45  --    HSTNI D2 ng/L  --  28*  --    HS TNI 4HR ng/L 66*  --   --    HSTNI D4 ng/L 49*  --   --      Results from last 7 days   Lab Units 06/07/22  1407   PROTIME seconds 14 5   INR  1 15   PTT seconds 24       Results from last 7 days   Lab Units 06/07/22  1639 06/07/22  1407   LACTIC ACID mmol/L 1 3 2 5*     Results from last 7 days   Lab Units 06/07/22  1636   CLARITY UA  Clear   COLOR UA  Light Yellow   SPEC GRAV UA  1 020   PH UA  5 5   GLUCOSE UA mg/dl Negative   KETONES UA mg/dl Negative   BLOOD UA  Negative   PROTEIN UA mg/dl Negative   NITRITE UA  Negative   BILIRUBIN UA  Negative   UROBILINOGEN UA E U /dl 0 2   LEUKOCYTES UA  Negative     Results from last 7 days   Lab Units 06/07/22  1420   INFLUENZA A PCR  Negative   INFLUENZA B PCR  Negative   RSV PCR  Negative       Results from last 7 days   Lab Units 06/09/22  0947 06/07/22  1420 06/07/22  1413   BLOOD CULTURE  Received in Microbiology Lab  Culture in Progress  Received in Microbiology Lab  Culture in Progress  No Growth at 48 hrs   Staphylococcus coagulase negative*   GRAM STAIN RESULT   --   --  Gram positive cocci in clusters*     ED Treatment:   Medication Administration from 06/07/2022 1340 to 06/07/2022 1831       Date/Time Order Dose Route Action     06/07/2022 1412 sodium chloride 0 9 % bolus 1,000 mL 1,000 mL Intravenous New Bag     06/07/2022 1429 pantoprazole (PROTONIX) 80 mg in sodium chloride 0 9 % 100 mL IVPB 80 mg Intravenous New Bag     06/07/2022 1437 pantoprazole (PROTONIX) 80 mg in sodium chloride 0 9 % 100 mL infusion 8 mg/hr Intravenous New Bag     06/07/2022 1421 octreotide (SandoSTATIN) injection 50 mcg 50 mcg Intravenous Given     06/07/2022 1442 octreotide (SandoSTATIN) 500 mcg in sodium chloride 0 9 % 250 mL infusion 25 mcg/hr Intravenous New Bag        Past Medical History:   Diagnosis Date    Arthropathy of knee     last assessed 8/19/15    Bacterial pneumonia 02/05/2007    last assessed 2/5/7    Brain atrophy ([facility] [address] )     Colon polyp     CPAP (continuous positive airway pressure) dependence     Disorder of male genital organ 02/12/2008    last assessed 2/12/08    ED (erectile dysfunction) of organic origin     last assessed 2/13/17     History of hypertension     Seasonal allergies     Situational anxiety     resolved 3/16/17     Sleep apnea     Stroke [facility])     09/2020 TIA    Tinnitus     Wears hearing aid     bilateral     Present on Admission:   Benign essential hypertension   Obstructive sleep apnea    Admitting Diagnosis:   Dizziness [R42]  Diverticulosis [K57 90]  Rectal bleeding [K62 5]  Lower GI bleed [K92 2]  Generalized weakness [R53 1]  Gastrointestinal hemorrhage, unspecified gastrointestinal hemorrhage type [K92 2]    Age/Sex: 61 y o  male  Admission orders:  Telemetry x24hrs>d/c'd 6/8  Scd/foot pumps  Clear liquids  Consult GI    Scheduled Medications:  atorvastatin, 40 mg, Oral, QPM  bisacodyl, 20 mg, Oral, Once  metoprolol succinate, 25 mg, Oral, HS  metoprolol succinate, 50 mg, Oral, Daily  polyethylene glycol, 4,000 mL, Oral, See Admin Instructions  vancomycin (VANCOCIN) 1500 mg in sodium chloride 0 9% 250 mL IVPB, Dose: 15 mg/kg, Weight Dosing Info: 106 kg, Once, 6/9      Network Utilization Review Department  ATTENTION: Please call with any questions or concerns to [phone] and carefully listen to the prompts so that you are directed to the right person  All voicemails are confidential   Kurt Mccormack all requests for admission clinical reviews, approved or denied determinations and any other requests to dedicated fax number below belonging to the campus where the patient is receiving treatment   List of dedicated fax numbers for the Facilities:  [address] [place] [address] DENIALS (Administrative/Medical Necessity) [phone]   [address] (Maternity/NICU/Pediatrics) 261 [facility],7Th Floor 40 [facility] [address]  [phone]   [address] [place] [address] Craig [facility] [address] [address] [place] [address] Sofia Wang [facility] [address] [place] [address] [phone]

## 2022-06-09 NOTE — PHYSICIAN ADVISOR
Current patient class: Observation  The patient is currently on Hospital Day: 3 at [address] [place] [address]        After review of the relevant documentation, labs, vital signs and test results, the patient is appropriate for INPATIENT ADMISSION  Rationale is as follows: The patient is a 61 yrs Male who presented to the ED at 6/7/2022  1:53 PM with a chief complaint of Rectal Bleeding and Dizziness (Arrives pale, extremely diaphoretic, c/o dizziness  Patient had colonscopy last week and 2 polyps removed  Patient on Eliquis, states 6-7 BM's with blood   Cramping abdomen   Unable to obtain BP in triage, placed in hallway bed until CT 1 available) Pt on eliquis with ho diverticular bleed requiring icu admit admitted with gi bleed  hgb was 13 3 on admit with baseline 15-16  He has had recurrent episodes of gi bleeding since admission with hgb down to 10 1  Colonoscopy pending tomorrow  Pt also with elevated troponin likely due to this  Pt also with +BC due to staph epidermidis and fup bc are pending  Given onoing work up and pt pmh, ip seems appropriate      The patients vitals on arrival were   ED Triage Vitals [06/07/22 1400]   Temperature Pulse Respirations Blood Pressure SpO2   97 5 °F (36 4 °C) 104 (!) 24 107/72 92 %      Temp Source Heart Rate Source Patient Position - Orthostatic VS BP Location FiO2 (%)   Tympanic Monitor Lying Left arm --      Pain Score       5           Past Medical History:   Diagnosis Date    Arthropathy of knee     last assessed 8/19/15    Bacterial pneumonia 02/05/2007    last assessed 2/5/7    Brain atrophy (HCC)     Colon polyp     CPAP (continuous positive airway pressure) dependence     Disorder of male genital organ 02/12/2008    last assessed 2/12/08    ED (erectile dysfunction) of organic origin     last assessed 2/13/17     History of hypertension     Seasonal allergies     Situational anxiety     resolved 3/16/17     Sleep apnea     Stroke ([facility] [address] ) 09/2020 TIA    Tinnitus     Wears hearing aid     bilateral     Past Surgical History:   Procedure Laterality Date    CARDIAC ELECTROPHYSIOLOGY PROCEDURE N/A 12/22/2021    Procedure: Cardiac Loop Recorder Implant;  Surgeon: Thee Middleton DO;  Location: [place] [address] CATH LAB; Service: Cardiology    COLONOSCOPY      x3-w/polyps    HERNIA REPAIR      umbilical,hemal inguinal    KNEE ARTHROSCOPY Left     meniscus    PA COLONOSCOPY FLX DX W/COLLJ SPEC WHEN PFRMD N/A 5/7/2018    Procedure: COLONOSCOPY;  Surgeon: Symone Calvin MD;  Location: [facility] GI LAB; Service: Gastroenterology       The patient was admitted to the hospital at N/A on N/A for the following diagnosis:  Dizziness [R42]  Diverticulosis [K57 90]  Rectal bleeding [K62 5]  Lower GI bleed [K92 2]  Generalized weakness [R53 1]  Gastrointestinal hemorrhage, unspecified gastrointestinal hemorrhage type [K92 2]    Consults have been placed to:   IP CONSULT TO GASTROENTEROLOGY    Vitals:    06/08/22 1630 06/08/22 1928 06/08/22 2255 06/09/22 0745   BP: 129/74 123/74 125/84 124/79   BP Location: Left arm Left arm Left arm Left arm   Pulse: 84 79 88 67   Resp: 17 18 18 18   Temp: 98 2 °F (36 8 °C) 99 1 °F (37 3 °C) 97 9 °F (36 6 °C) 100 °F (37 8 °C)   TempSrc: Oral Oral Oral Oral   SpO2: 98% 95% 96% 95%   Weight:       Height:           Most recent labs:    Recent Labs     06/07/22  1407 06/08/22  0450 06/09/22  0453   WBC 10 82*   < > 10 21*   HGB 13 3   < > 10 1*   HCT 40 4   < > 30 8*      < > 227   K 3 9   < > 3 8   CALCIUM 8 7   < > 8 1*   BUN 19   < > 23   CREATININE 1 32*   < > 0 80   INR 1 15  --   --    AST 12  --   --    ALT 21  --   --    ALKPHOS 110  --   --     < > = values in this interval not displayed         Scheduled Meds:  Current Facility-Administered Medications   Medication Dose Route Frequency Provider Last Rate    atorvastatin  40 mg Oral QPM Lyudmila Posada MD      bisacodyl  20 mg Oral Once TWILA Fonseca      metoprolol succinate  25 mg Oral HS Aidan Ramirez MD      metoprolol succinate  50 mg Oral Daily Aidan Ramirez MD      polyethylene glycol  4,000 mL Oral See Admin Instructions Odette Solomon PA-C       Continuous Infusions:   PRN Meds:      Surgical procedures (if appropriate):

## 2022-06-09 NOTE — ASSESSMENT & PLAN NOTE
At home on toprol-xl 50mg QAM, 25mg QHS  Blood pressure initially in the ED was on the soft side which has since improved  Metoprolol initially hold in the setting of borderline blood pressure, restarted given his history of atrial fibrillation    BP stable, continue Toprol

## 2022-06-09 NOTE — ASSESSMENT & PLAN NOTE
One of two blood cultures growing Staph coccal species  No leukocytosis, fever, or other signs of systemic infection    Likely contaminant       Blood Cultures (6/7):  1 of 2 blood cultures with coag-negative staph, 2nd blood culture no growth at 48 hours  Repeat blood cultures (6/9):  Pending  F/U blood culture results  Monitor for signs of infection

## 2022-06-09 NOTE — RESPIRATORY THERAPY NOTE
Pt refused to use pap therapy  He initially accepted to use but changed his mind  States he's going home soon   He refused pap unit in room

## 2022-06-09 NOTE — ASSESSMENT & PLAN NOTE
Hx of CVA   MRI from 9/2020 showed small lacunar infarct in R thalamic region  MRI from 3/2021 showed punctate nodular enhancement in the anterior medial right cerebellum    Loop recorder completed January 2022  Showed paroxysmal afib and started on eliquis       · Eliquis held given recurrent bleeding, planned colonoscopy today Yes

## 2022-06-09 NOTE — ASSESSMENT & PLAN NOTE
Paroxysmal afib diagnosed few months ago  Continue metoprolol 50 milligrams in a m  And 25 milligrams at night  Hold Eliquis given recurrent bloody stools    Plan for colonoscopy today

## 2022-06-09 NOTE — ASSESSMENT & PLAN NOTE
PMH of diverticulitis, multiple prior episodes of diverticular bleeding  · Patient had multiple bloody stools followed by dizziness and diaphoresis starting morning of admission  Patient has history of prior diverticular bleed requiring ICU admission  · In ED heme-positive stool  · High volume CT lower GI bleed negative for active GI bleed  · Hgb 13 3 upon admission  Baseline hemoglobin around 15-16    · 6/8: Patient had another episode of bleeding per rectum last night  Hemoglobin dropped to 10 7-secondary to GI bleeding and possible dilution  · Patient was on clear liquid diet which will be advanced to regular diet  Monitor hemoglobin  Restarted back on Eliquis which have been on hold  · 6/9: Patient had episode of dark bloody stools this morning  Hgb stable at 10 1  No recurrent dizziness or diaphoresis  · Will hold Eliquis, change to clear liquid diet, plan for colonoscopy tomorrow morning  · 6/10:  No bleeding overnight    Colonoscopy scheduled for this afternoon, anticipate discharge following

## 2022-06-09 NOTE — PROGRESS NOTES
[facility] Progress Note  Patient: Jewell Nesbitt 61 y o  male   MRN: [id number]  PCP: Jorge Car DO  Unit/Bed#: [address] [place] [address] Encounter: [id number]  Date Of Visit: 06/09/22    Problem List:    Principal Problem:    GI bleed  Active Problems:    Benign essential hypertension    Obstructive sleep apnea    History of cardioembolic cerebrovascular accident (CVA)    Paroxysmal A-fib (HCC)    Elevated troponin    SIRS (systemic inflammatory response syndrome) (HCC)    Anemia    Positive blood culture      Assessment & Plan:    * GI bleed  Assessment & Plan  PMH of diverticulitis, multiple prior episodes of diverticular bleeding  · Patient had multiple bloody stools followed by dizziness and diaphoresis starting morning of admission  Patient has history of prior diverticular bleed requiring ICU admission  · In ED heme-positive stool  · High volume CT lower GI bleed negative for active GI bleed  · Hgb 13 3 upon admission  Baseline hemoglobin around 15-16    · 6/8: Patient had another episode of bleeding per rectum last night  Hemoglobin dropped to 10 7-secondary to GI bleeding and possible dilution  · Patient was on clear liquid diet which will be advanced to regular diet  Monitor hemoglobin  Restarted back on Eliquis which have been on hold  · 6/9: Patient had episode of dark bloody stools this morning  Hgb stable at 10 1  No recurrent dizziness or diaphoresis  · Will hold Eliquis, change to clear liquid diet, plan for colonoscopy tomorrow morning    Positive blood culture  Assessment & Plan  One of two blood cultures growing Staph coccal species  No leukocytosis, fever, or other signs of systemic infection    Likely contaminant    F/U blood culture results  Repeat blood cultures  Monitor for signs of infection    Anemia  Assessment & Plan  Secondary to acute blood loss in the setting of GI bleeding along with dilution  Baseline hemoglobin around 15-16  Hemoglobin upon admission was 13 3 which dropped to 10 4  Monitor hemoglobin and transfuse if hemoglobin less than 8    SIRS (systemic inflammatory response syndrome) ([facility])  Assessment & Plan  SIRS positive on presentation to ED as evidenced by tachycardia and tachypnea  No evidence of infection  Tachycardia and tachypnea likely secondary to GI bleed  No indication for antibiotics    Elevated troponin  Assessment & Plan  Troponin 79->78-57-25  EKG showed no acute ST-T changes  Patient denies any chest pain, SOB  Likely non MI troponin elevation in the setting of GI bleed and soft blood pressures        Paroxysmal A-fib (HCC)  Assessment & Plan  Paroxysmal afib diagnosed few months ago  Continue metoprolol 50 milligrams in a m  And 25 milligrams at night  Hold Eliquis given recurrent bloody stools  Eliquis was restarted yesterday given two doses  AM dose not given today  History of cardioembolic cerebrovascular accident (CVA)  Assessment & Plan  Hx of CVA   MRI from 9/2020 showed small lacunar infarct in R thalamic region  MRI from 3/2021 showed punctate nodular enhancement in the anterior medial right cerebellum    Loop recorder completed January 2022  Showed paroxysmal afib and started on eliquis  · Eliquis restarted yesterday, will hold again given recurrent GI bleed  Obstructive sleep apnea  Assessment & Plan  History of GRACIE, on CPAP at home  Home settings auto titrate, pressure range 6-20 cm H20       Continue nocturnal CPAP     Benign essential hypertension  Assessment & Plan  At home on toprol-xl 50mg QAM, 25mg QHS  Blood pressure initially in the ED was on the soft side which has since improved  Metoprolol initially hold in the setting of borderline blood pressure, restarted given his history of atrial fibrillation    BP stable, continue troprol          VTE Pharmacologic Prophylaxis:   Pharmacologic: Pharmacologic VTE Prophylaxis contraindicated due to GI bleed    Patient Centered Rounds: I have performed bedside rounds with nursing staff today  Discussions with Specialists or Other Care Team Provider:  GI APMarti  Education and Discussions with Family / Patient:  Updated patient's wife by phone    Time Spent for Care: 30 minutes  More than 50% of total time spent on counseling and coordination of care as described above  Current Length of Stay: 0 day(s)    Current Patient Status: Observation   Certification Statement: The patient will continue to require additional inpatient hospital stay due to Ongoing GI bleeding, plan for colonoscopy tomorrow    Discharge Plan:  Displayed discharge in 48 hours to home    Code Status: Level 1 - Full Code      Subjective:   Patient seen examined at bedside  Patient no acute distress  Patient had 1 episode bloody stool this morning around 4:00 a m  Carlos Yarbrough Patient denies having any recurrent episode of dizziness or diaphoresis  Patient generally feels well, no abdominal pain  Objective:     Vitals:   Temp (24hrs), Av 8 °F (37 1 °C), Min:97 9 °F (36 6 °C), Max:100 °F (37 8 °C)    Temp:  [97 9 °F (36 6 °C)-100 °F (37 8 °C)] 100 °F (37 8 °C)  HR:  [67-89] 67  Resp:  [17-18] 18  BP: (123-129)/(74-84) 124/79  SpO2:  [95 %-98 %] 95 %  Body mass index is 31 63 kg/m²  Input and Output Summary (last 24 hours): Intake/Output Summary (Last 24 hours) at 2022 1216  Last data filed at 2022 0453  Gross per 24 hour   Intake --   Output 3 ml   Net -3 ml       Physical Exam:     Physical Exam  Vitals reviewed  Constitutional:       Appearance: Normal appearance  HENT:      Head: Normocephalic and atraumatic  Mouth/Throat:      Mouth: Mucous membranes are moist    Eyes:      Extraocular Movements: Extraocular movements intact  Pupils: Pupils are equal, round, and reactive to light  Cardiovascular:      Rate and Rhythm: Normal rate and regular rhythm  Pulses: Normal pulses  Heart sounds: Normal heart sounds     Pulmonary:      Effort: Pulmonary effort is normal       Breath sounds: Normal breath sounds  Abdominal:      General: Bowel sounds are normal       Palpations: Abdomen is soft  Musculoskeletal:      Right lower leg: No edema  Left lower leg: No edema  Skin:     General: Skin is warm  Capillary Refill: Capillary refill takes less than 2 seconds  Neurological:      General: No focal deficit present  Mental Status: He is alert  Psychiatric:         Mood and Affect: Mood normal          Behavior: Behavior normal          Additional Data:     Labs:    Results from last 7 days   Lab Units 06/09/22  0453   WBC Thousand/uL 10 21*   HEMOGLOBIN g/dL 10 1*   HEMATOCRIT % 30 8*   PLATELETS Thousands/uL 227   NEUTROS PCT % 67   LYMPHS PCT % 19   MONOS PCT % 10   EOS PCT % 3     Results from last 7 days   Lab Units 06/09/22  0453 06/08/22  0450 06/07/22  1407   POTASSIUM mmol/L 3 8   < > 3 9   CHLORIDE mmol/L 109*   < > 112*   CO2 mmol/L 22   < > 22   BUN mg/dL 23   < > 19   CREATININE mg/dL 0 80   < > 1 32*   CALCIUM mg/dL 8 1*   < > 8 7   ALK PHOS U/L  --   --  110   ALT U/L  --   --  21   AST U/L  --   --  12    < > = values in this interval not displayed  Results from last 7 days   Lab Units 06/07/22  1407   INR  1 15       * I Have Reviewed All Lab Data Listed Above  * Additional Pertinent Lab Tests Reviewed: [facility] Admission Reviewed      Imaging:  Imaging Reports Reviewed Today Include: None    Recent Cultures (last 7 days):     Results from last 7 days   Lab Units 06/07/22  1420 06/07/22  1413   BLOOD CULTURE  No Growth at 24 hrs   Staphylococcus coagulase negative*   GRAM STAIN RESULT   --  Gram positive cocci in clusters*       Last 24 Hours Medication List:   Current Facility-Administered Medications   Medication Dose Route Frequency Provider Last Rate    atorvastatin  40 mg Oral QPM Drew Penny MD      metoprolol succinate  25 mg Oral HS Darshan Graham MD      metoprolol succinate  50 mg Oral Daily Paola Schwartz MD            Today, Patient Was Seen By: Travis Dobbs MD    ** Please Note: "This note has been constructed using a voice recognition system  Therefore there may be syntax, spelling, and/or grammatical errors   Please call if you have any questions  "**

## 2022-06-09 NOTE — ASSESSMENT & PLAN NOTE
Secondary to acute blood loss in the setting of GI bleeding along with dilution  Baseline hemoglobin around 15-16  Hemoglobin upon admission was 13 3  Today hemoglobin 9 2 from 10 1 yesterday, however no episode of bleeding overnight    Monitor hemoglobin and transfuse if hemoglobin less than 8

## 2022-06-09 NOTE — PROGRESS NOTES
Progress Note - Mark Shepard 61 y o  male MRN: [id number]    Unit/Bed#: [facility]01 Encounter: [id number]        Assessment/Plan: This is a 70-year-old male who presents with rectal bleeding   Does have history of suspected diverticular bleeding in the past according to spouse   Patient less likely with post polypectomy bleed as these were small colon polyps that were removed by cold snare and cold biopsy and this was done about 2 weeks ago   Likely patient has diverticular bleed and does have extensive diverticulosis noted on recent colonoscopy  Patient did have high volume GI bleed study which was negative for active hemorrhage  We resumed Eliquis, but patient now reports that he had rectal bleeding again early this morning  Hemoglobin was 10 4 yesterday and 10 1 today  Spoke with resident and advised to resume clear liquid diet and to hold Eliquis  Will plan for colonoscopy tomorrow for further evaluation  Patient is agreeable with plan  Subjective:   Patient is lying in bed  He states that he had bleeding early this morning but is having difficulty remembering the detailed  Denies any abdominal pain  Transitioned to the clear liquid diet      Objective:     Vitals: /79 (BP Location: Left arm)   Pulse 67   Temp 100 °F (37 8 °C) (Oral)   Resp 18   Ht 6' (1 829 m)   Wt 106 kg (233 lb 4 oz)   SpO2 95%   BMI 31 63 kg/m²       Physical Exam:  Gen-alert no acute distress  Abd-positive bowel sounds, nondistended, obese, nontender, no rebound rigidity guarding       Lab, Imaging and other studies:   Recent Results (from the past 72 hour(s))   CBC and differential    Collection Time: 06/07/22  2:07 PM   Result Value Ref Range    WBC 10 82 (H) 4 31 - 10 16 Thousand/uL    RBC 4 64 3 88 - 5 62 Million/uL    Hemoglobin 13 3 12 0 - 17 0 g/dL    Hematocrit 40 4 36 5 - 49 3 %    MCV 87 82 - 98 fL    MCH 28 7 26 8 - 34 3 pg    MCHC 32 9 31 4 - 37 4 g/dL    RDW 13 1 11 6 - 15 1 %    MPV 9 2 8 9 - 12 7 fL    Platelets 270 588 - 768 Thousands/uL    nRBC 0 /100 WBCs    Neutrophils Relative 65 43 - 75 %    Immat GRANS % 1 0 - 2 %    Lymphocytes Relative 20 14 - 44 %    Monocytes Relative 10 4 - 12 %    Eosinophils Relative 3 0 - 6 %    Basophils Relative 1 0 - 1 %    Neutrophils Absolute 7 15 1 85 - 7 62 Thousands/µL    Immature Grans Absolute 0 07 0 00 - 0 20 Thousand/uL    Lymphocytes Absolute 2 21 0 60 - 4 47 Thousands/µL    Monocytes Absolute 1 04 0 17 - 1 22 Thousand/µL    Eosinophils Absolute 0 29 0 00 - 0 61 Thousand/µL    Basophils Absolute 0 06 0 00 - 0 10 Thousands/µL   Protime-INR    Collection Time: 06/07/22  2:07 PM   Result Value Ref Range    Protime 14 5 11 6 - 14 5 seconds    INR 1 15 0 84 - 1 19   APTT    Collection Time: 06/07/22  2:07 PM   Result Value Ref Range    PTT 24 23 - 37 seconds   Comprehensive metabolic panel    Collection Time: 06/07/22  2:07 PM   Result Value Ref Range    Sodium 147 (H) 136 - 145 mmol/L    Potassium 3 9 3 5 - 5 3 mmol/L    Chloride 112 (H) 100 - 108 mmol/L    CO2 22 21 - 32 mmol/L    ANION GAP 13 4 - 13 mmol/L    BUN 19 5 - 25 mg/dL    Creatinine 1 32 (H) 0 60 - 1 30 mg/dL    Glucose 173 (H) 65 - 140 mg/dL    Calcium 8 7 8 3 - 10 1 mg/dL    Corrected Calcium 9 2 8 3 - 10 1 mg/dL    AST 12 5 - 45 U/L    ALT 21 12 - 78 U/L    Alkaline Phosphatase 110 46 - 116 U/L    Total Protein 5 9 (L) 6 4 - 8 2 g/dL    Albumin 3 4 (L) 3 5 - 5 0 g/dL    Total Bilirubin 0 63 0 20 - 1 00 mg/dL    eGFR 56 ml/min/1 73sq m   Magnesium    Collection Time: 06/07/22  2:07 PM   Result Value Ref Range    Magnesium 2 0 1 6 - 2 6 mg/dL   Lactic acid    Collection Time: 06/07/22  2:07 PM   Result Value Ref Range    LACTIC ACID 2 5 (HH) 0 5 - 2 0 mmol/L   Type and screen    Collection Time: 06/07/22  2:07 PM   Result Value Ref Range    ABO Grouping O     Rh Factor Positive     Antibody Screen Negative     Specimen Expiration Date 20220610    HS Troponin 0hr (reflex protocol)    Collection Time: 06/07/22  2:07 PM   Result Value Ref Range    hs TnI 0hr 17 "Refer to ACS Flowchart"- see link ng/L   ECG 12 lead    Collection Time: 06/07/22  2:07 PM   Result Value Ref Range    Ventricular Rate 81 BPM    Atrial Rate 81 BPM    NY Interval 142 ms    QRSD Interval 84 ms    QT Interval 402 ms    QTC Interval 466 ms    P Axis -6 degrees    QRS Axis 5 degrees    T Wave Axis 40 degrees   Blood culture #1    Collection Time: 06/07/22  2:13 PM    Specimen: Arm, Right; Blood   Result Value Ref Range    Gram Stain Result Gram positive cocci in clusters (A)    Blood Culture Identification Panel    Collection Time: 06/07/22  2:13 PM    Specimen: Arm, Right; Blood   Result Value Ref Range    Staphylococcus epidermidis Detected (A) Not Detected   COVID/FLU/RSV - 2 hour TAT    Collection Time: 06/07/22  2:20 PM    Specimen: Nose; Nares   Result Value Ref Range    SARS-CoV-2 Negative Negative    INFLUENZA A PCR Negative Negative    INFLUENZA B PCR Negative Negative    RSV PCR Negative Negative   Blood culture #2    Collection Time: 06/07/22  2:20 PM    Specimen: Arm, Left; Blood   Result Value Ref Range    Blood Culture No Growth at 24 hrs      UA w Reflex to Microscopic w Reflex to Culture    Collection Time: 06/07/22  4:36 PM    Specimen: Urine, Clean Catch   Result Value Ref Range    Color, UA Light Yellow     Clarity, UA Clear     Specific Gravity, UA 1 020 1 000 - 1 030    pH, UA 5 5 5 0, 5 5, 6 0, 6 5, 7 0, 7 5, 8 0, 8 5, 9 0    Leukocytes, UA Negative Negative    Nitrite, UA Negative Negative    Protein, UA Negative Negative mg/dl    Glucose, UA Negative Negative mg/dl    Ketones, UA Negative Negative mg/dl    Urobilinogen, UA 0 2 0 2, 1 0 E U /dl E U /dl    Bilirubin, UA Negative Negative    Blood, UA Negative Negative   HS Troponin I 2hr    Collection Time: 06/07/22  4:39 PM   Result Value Ref Range    hs TnI 2hr 45 "Refer to ACS Flowchart"- see link ng/L    Delta 2hr hsTnI 28 (H) <20 ng/L   Lactic acid 2 Hours Collection Time: 06/07/22  4:39 PM   Result Value Ref Range    LACTIC ACID 1 3 0 5 - 2 0 mmol/L   HS Troponin I 4hr    Collection Time: 06/07/22  5:59 PM   Result Value Ref Range    hs TnI 4hr 66 (H) "Refer to ACS Flowchart"- see link ng/L    Delta 4hr hsTnI 49 (H) <20 ng/L   CBC and differential    Collection Time: 06/08/22  4:50 AM   Result Value Ref Range    WBC 10 71 (H) 4 31 - 10 16 Thousand/uL    RBC 3 57 (L) 3 88 - 5 62 Million/uL    Hemoglobin 10 4 (L) 12 0 - 17 0 g/dL    Hematocrit 31 1 (L) 36 5 - 49 3 %    MCV 87 82 - 98 fL    MCH 28 9 26 8 - 34 3 pg    MCHC 33 1 31 4 - 37 4 g/dL    RDW 13 1 11 6 - 15 1 %    MPV 9 6 8 9 - 12 7 fL    Platelets 792 552 - 108 Thousands/uL    nRBC 0 /100 WBCs    Neutrophils Relative 73 43 - 75 %    Immat GRANS % 1 0 - 2 %    Lymphocytes Relative 13 (L) 14 - 44 %    Monocytes Relative 10 4 - 12 %    Eosinophils Relative 2 0 - 6 %    Basophils Relative 1 0 - 1 %    Neutrophils Absolute 7 94 (H) 1 85 - 7 62 Thousands/µL    Immature Grans Absolute 0 05 0 00 - 0 20 Thousand/uL    Lymphocytes Absolute 1 44 0 60 - 4 47 Thousands/µL    Monocytes Absolute 1 03 0 17 - 1 22 Thousand/µL    Eosinophils Absolute 0 20 0 00 - 0 61 Thousand/µL    Basophils Absolute 0 05 0 00 - 0 10 Thousands/µL   Basic metabolic panel    Collection Time: 06/08/22  4:50 AM   Result Value Ref Range    Sodium 145 136 - 145 mmol/L    Potassium 3 3 (L) 3 5 - 5 3 mmol/L    Chloride 111 (H) 100 - 108 mmol/L    CO2 23 21 - 32 mmol/L    ANION GAP 11 4 - 13 mmol/L    BUN 25 5 - 25 mg/dL    Creatinine 1 03 0 60 - 1 30 mg/dL    Glucose 124 65 - 140 mg/dL    Calcium 8 2 (L) 8 3 - 10 1 mg/dL    eGFR 76 ml/min/1 73sq m   Magnesium    Collection Time: 06/08/22  4:50 AM   Result Value Ref Range    Magnesium 2 0 1 6 - 2 6 mg/dL   High Sensitivity Troponin I Random    Collection Time: 06/08/22  4:50 AM   Result Value Ref Range    HS TnI random 37 (H) 8 - 18 ng/L   CBC and differential    Collection Time: 06/09/22  4:53 AM Result Value Ref Range    WBC 10 21 (H) 4 31 - 10 16 Thousand/uL    RBC 3 50 (L) 3 88 - 5 62 Million/uL    Hemoglobin 10 1 (L) 12 0 - 17 0 g/dL    Hematocrit 30 8 (L) 36 5 - 49 3 %    MCV 88 82 - 98 fL    MCH 28 9 26 8 - 34 3 pg    MCHC 32 8 31 4 - 37 4 g/dL    RDW 13 3 11 6 - 15 1 %    MPV 9 3 8 9 - 12 7 fL    Platelets 795 574 - 639 Thousands/uL    nRBC 0 /100 WBCs    Neutrophils Relative 67 43 - 75 %    Immat GRANS % 1 0 - 2 %    Lymphocytes Relative 19 14 - 44 %    Monocytes Relative 10 4 - 12 %    Eosinophils Relative 3 0 - 6 %    Basophils Relative 0 0 - 1 %    Neutrophils Absolute 6 88 1 85 - 7 62 Thousands/µL    Immature Grans Absolute 0 07 0 00 - 0 20 Thousand/uL    Lymphocytes Absolute 1 93 0 60 - 4 47 Thousands/µL    Monocytes Absolute 0 97 0 17 - 1 22 Thousand/µL    Eosinophils Absolute 0 32 0 00 - 0 61 Thousand/µL    Basophils Absolute 0 04 0 00 - 0 10 Thousands/µL   Basic metabolic panel    Collection Time: 06/09/22  4:53 AM   Result Value Ref Range    Sodium 142 136 - 145 mmol/L    Potassium 3 8 3 5 - 5 3 mmol/L    Chloride 109 (H) 100 - 108 mmol/L    CO2 22 21 - 32 mmol/L    ANION GAP 11 4 - 13 mmol/L    BUN 23 5 - 25 mg/dL    Creatinine 0 80 0 60 - 1 30 mg/dL    Glucose 110 65 - 140 mg/dL    Glucose, Fasting 110 (H) 65 - 99 mg/dL    Calcium 8 1 (L) 8 3 - 10 1 mg/dL    eGFR 94 ml/min/1 73sq m   Magnesium    Collection Time: 06/09/22  4:53 AM   Result Value Ref Range    Magnesium 2 2 1 6 - 2 6 mg/dL

## 2022-06-09 NOTE — PLAN OF CARE
Problem: MOBILITY - ADULT  Goal: Maintain or return to baseline ADL function  Description: INTERVENTIONS:  -  Assess patient's ability to carry out ADLs; assess patient's baseline for ADL function and identify physical deficits which impact ability to perform ADLs (bathing, care of mouth/teeth, toileting, grooming, dressing, etc )  - Assess/evaluate cause of self-care deficits   - Assess range of motion  - Assess patient's mobility; develop plan if impaired  - Assess patient's need for assistive devices and provide as appropriate  - Encourage maximum independence but intervene and supervise when necessary  - Involve family in performance of ADLs  - Assess for home care needs following discharge   - Consider OT consult to assist with ADL evaluation and planning for discharge  - Provide patient education as appropriate  Outcome: Progressing  Goal: Maintains/Returns to pre admission functional level  Description: INTERVENTIONS:  - Perform BMAT or MOVE assessment daily    - Set and communicate daily mobility goal to care team and patient/family/caregiver     - Collaborate with rehabilitation services on mobility goals if consulted  - Perform Range of Motion -self  - Reposition patient every -self  - Dangle patient -self  - Stand patient -self  - Ambulate patient -self  - Out of bed to chair -self  - Out of bed for meals -self  - Out of bed for toileting  - Record patient progress and toleration of activity level   Outcome: Progressing     Problem: PAIN - ADULT  Goal: Verbalizes/displays adequate comfort level or baseline comfort level  Description: Interventions:  - Encourage patient to monitor pain and request assistance  - Assess pain using appropriate pain scale  - Administer analgesics based on type and severity of pain and evaluate response  - Implement non-pharmacological measures as appropriate and evaluate response  - Consider cultural and social influences on pain and pain management  - Notify physician/advanced practitioner if interventions unsuccessful or patient reports new pain  Outcome: Progressing     Problem: INFECTION - ADULT  Goal: Absence or prevention of progression during hospitalization  Description: INTERVENTIONS:  - Assess and monitor for signs and symptoms of infection  - Monitor lab/diagnostic results  - Monitor all insertion sites, i e  indwelling lines, tubes, and drains  - Monitor endotracheal if appropriate and nasal secretions for changes in amount and color  - Gary appropriate cooling/warming therapies per order  - Administer medications as ordered  - Instruct and encourage patient and family to use good hand hygiene technique  - Identify and instruct in appropriate isolation precautions for identified infection/condition  Outcome: Progressing  Goal: Absence of fever/infection during neutropenic period  Description: INTERVENTIONS:  - Monitor WBC    Outcome: Progressing     Problem: SAFETY ADULT  Goal: Maintain or return to baseline ADL function  Description: INTERVENTIONS:  -  Assess patient's ability to carry out ADLs; assess patient's baseline for ADL function and identify physical deficits which impact ability to perform ADLs (bathing, care of mouth/teeth, toileting, grooming, dressing, etc )  - Assess/evaluate cause of self-care deficits   - Assess range of motion  - Assess patient's mobility; develop plan if impaired  - Assess patient's need for assistive devices and provide as appropriate  - Encourage maximum independence but intervene and supervise when necessary  - Involve family in performance of ADLs  - Assess for home care needs following discharge   - Consider OT consult to assist with ADL evaluation and planning for discharge  - Provide patient education as appropriate  Outcome: Progressing  Goal: Maintains/Returns to pre admission functional level  Description: INTERVENTIONS:  - Perform BMAT or MOVE assessment daily    - Set and communicate daily mobility goal to care team and patient/family/caregiver     - Collaborate with rehabilitation services on mobility goals if consulted  - Perform Range of Motion -self  - Reposition patient every -self  - Dangle patient -self  - Stand patient -self  - Ambulate patient -self  - Out of bed to chair -self  - Out of bed for meals -self  - Out of bed for toileting  - Record patient progress and toleration of activity level   Outcome: Progressing  Goal: Patient will remain free of falls  Description: INTERVENTIONS:  - Educate patient/family on patient safety including physical limitations  - Instruct patient to call for assistance with activity   - Consult OT/PT to assist with strengthening/mobility   - Keep Call bell within reach  - Keep bed low and locked with side rails adjusted as appropriate  - Keep care items and personal belongings within reach  - Initiate and maintain comfort rounds  - Make Fall Risk Sign visible to staff  - Offer Toileting every 2 Hours, in advance of need  - Initiate/Maintain bed/chair alarm  - Obtain necessary fall risk management equipment:   - Apply yellow socks and bracelet for high fall risk patients  - Consider moving patient to room near nurses station  Outcome: Progressing     Problem: DISCHARGE PLANNING  Goal: Discharge to home or other facility with appropriate resources  Description: INTERVENTIONS:  - Identify barriers to discharge w/patient and caregiver  - Arrange for needed discharge resources and transportation as appropriate  - Identify discharge learning needs (meds, wound care, etc )  - Arrange for interpretive services to assist at discharge as needed  - Refer to Case Management Department for coordinating discharge planning if the patient needs post-hospital services based on physician/advanced practitioner order or complex needs related to functional status, cognitive ability, or social support system  Outcome: Progressing     Problem: Knowledge Deficit  Goal: Patient/family/caregiver demonstrates understanding of disease process, treatment plan, medications, and discharge instructions  Description: Complete learning assessment and assess knowledge base    Interventions:  - Provide teaching at level of understanding  - Provide teaching via preferred learning methods  Outcome: Progressing     Problem: Prexisting or High Potential for Compromised Skin Integrity  Goal: Skin integrity is maintained or improved  Description: INTERVENTIONS:  - Identify patients at risk for skin breakdown  - Assess and monitor skin integrity  - Assess and monitor nutrition and hydration status  - Monitor labs   - Assess for incontinence   - Turn and reposition patient  - Assist with mobility/ambulation  - Relieve pressure over bony prominences  - Avoid friction and shearing  - Provide appropriate hygiene as needed including keeping skin clean and dry  - Evaluate need for skin moisturizer/barrier cream  - Collaborate with interdisciplinary team   - Patient/family teaching  - Consider wound care consult   Outcome: Progressing     Problem: Potential for Falls  Goal: Patient will remain free of falls  Description: INTERVENTIONS:  - Educate patient/family on patient safety including physical limitations  - Instruct patient to call for assistance with activity   - Consult OT/PT to assist with strengthening/mobility   - Keep Call bell within reach  - Keep bed low and locked with side rails adjusted as appropriate  - Keep care items and personal belongings within reach  - Initiate and maintain comfort rounds  - Make Fall Risk Sign visible to staff  - Offer Toileting every 2 Hours, in advance of need  - Initiate/Maintain bed/chair alarm  - Obtain necessary fall risk management equipment:   - Apply yellow socks and bracelet for high fall risk patients  - Consider moving patient to room near nurses station  Outcome: Progressing     Problem: RESPIRATORY - ADULT  Goal: Achieves optimal ventilation and oxygenation  Description: INTERVENTIONS:  - Assess for changes in respiratory status  - Assess for changes in mentation and behavior  - Position to facilitate oxygenation and minimize respiratory effort  - Oxygen administered by appropriate delivery if ordered  - Initiate smoking cessation education as indicated  - Encourage broncho-pulmonary hygiene including cough, deep breathe, Incentive Spirometry  - Assess the need for suctioning and aspirate as needed  - Assess and instruct to report SOB or any respiratory difficulty  - Respiratory Therapy support as indicated  Outcome: Progressing

## 2022-06-10 ENCOUNTER — ANESTHESIA (INPATIENT)
Dept: PERIOP | Facility: HOSPITAL | Age: 67
DRG: 378 | End: 2022-06-10
Payer: COMMERCIAL

## 2022-06-10 ENCOUNTER — APPOINTMENT (INPATIENT)
Dept: PERIOP | Facility: HOSPITAL | Age: 67
DRG: 378 | End: 2022-06-10
Payer: COMMERCIAL

## 2022-06-10 ENCOUNTER — ANESTHESIA EVENT (INPATIENT)
Dept: PERIOP | Facility: HOSPITAL | Age: 67
DRG: 378 | End: 2022-06-10
Payer: COMMERCIAL

## 2022-06-10 VITALS
RESPIRATION RATE: 20 BRPM | DIASTOLIC BLOOD PRESSURE: 70 MMHG | HEART RATE: 60 BPM | TEMPERATURE: 98.1 F | HEIGHT: 72 IN | OXYGEN SATURATION: 96 % | BODY MASS INDEX: 31.59 KG/M2 | WEIGHT: 233.25 LBS | SYSTOLIC BLOOD PRESSURE: 113 MMHG

## 2022-06-10 LAB
ANION GAP SERPL CALCULATED.3IONS-SCNC: 12 MMOL/L (ref 4–13)
BACTERIA BLD CULT: ABNORMAL
BASOPHILS # BLD AUTO: 0.04 THOUSANDS/ΜL (ref 0–0.1)
BASOPHILS NFR BLD AUTO: 1 % (ref 0–1)
BUN SERPL-MCNC: 15 MG/DL (ref 5–25)
CALCIUM SERPL-MCNC: 8.2 MG/DL (ref 8.3–10.1)
CHLORIDE SERPL-SCNC: 108 MMOL/L (ref 100–108)
CO2 SERPL-SCNC: 23 MMOL/L (ref 21–32)
CREAT SERPL-MCNC: 0.83 MG/DL (ref 0.6–1.3)
EOSINOPHIL # BLD AUTO: 0.29 THOUSAND/ΜL (ref 0–0.61)
EOSINOPHIL NFR BLD AUTO: 4 % (ref 0–6)
ERYTHROCYTE [DISTWIDTH] IN BLOOD BY AUTOMATED COUNT: 13.5 % (ref 11.6–15.1)
GFR SERPL CREATININE-BSD FRML MDRD: 92 ML/MIN/1.73SQ M
GLUCOSE SERPL-MCNC: 110 MG/DL (ref 65–140)
GRAM STN SPEC: ABNORMAL
HCT VFR BLD AUTO: 28.1 % (ref 36.5–49.3)
HGB BLD-MCNC: 9.2 G/DL (ref 12–17)
IMM GRANULOCYTES # BLD AUTO: 0.04 THOUSAND/UL (ref 0–0.2)
IMM GRANULOCYTES NFR BLD AUTO: 1 % (ref 0–2)
LYMPHOCYTES # BLD AUTO: 1.07 THOUSANDS/ΜL (ref 0.6–4.47)
LYMPHOCYTES NFR BLD AUTO: 16 % (ref 14–44)
MAGNESIUM SERPL-MCNC: 2.3 MG/DL (ref 1.6–2.6)
MCH RBC QN AUTO: 29.2 PG (ref 26.8–34.3)
MCHC RBC AUTO-ENTMCNC: 32.7 G/DL (ref 31.4–37.4)
MCV RBC AUTO: 89 FL (ref 82–98)
MONOCYTES # BLD AUTO: 0.69 THOUSAND/ΜL (ref 0.17–1.22)
MONOCYTES NFR BLD AUTO: 10 % (ref 4–12)
NEUTROPHILS # BLD AUTO: 4.76 THOUSANDS/ΜL (ref 1.85–7.62)
NEUTS SEG NFR BLD AUTO: 68 % (ref 43–75)
NRBC BLD AUTO-RTO: 0 /100 WBCS
PLATELET # BLD AUTO: 204 THOUSANDS/UL (ref 149–390)
PMV BLD AUTO: 9.3 FL (ref 8.9–12.7)
POTASSIUM SERPL-SCNC: 3.3 MMOL/L (ref 3.5–5.3)
RBC # BLD AUTO: 3.15 MILLION/UL (ref 3.88–5.62)
S EPIDERMIDIS DNA BLD POS QL NAA+NON-PRB: DETECTED
SODIUM SERPL-SCNC: 143 MMOL/L (ref 136–145)
WBC # BLD AUTO: 6.89 THOUSAND/UL (ref 4.31–10.16)

## 2022-06-10 PROCEDURE — 80048 BASIC METABOLIC PNL TOTAL CA: CPT | Performed by: STUDENT IN AN ORGANIZED HEALTH CARE EDUCATION/TRAINING PROGRAM

## 2022-06-10 PROCEDURE — 85025 COMPLETE CBC W/AUTO DIFF WBC: CPT | Performed by: STUDENT IN AN ORGANIZED HEALTH CARE EDUCATION/TRAINING PROGRAM

## 2022-06-10 PROCEDURE — 0DJD8ZZ INSPECTION OF LOWER INTESTINAL TRACT, VIA NATURAL OR ARTIFICIAL OPENING ENDOSCOPIC: ICD-10-PCS | Performed by: INTERNAL MEDICINE

## 2022-06-10 PROCEDURE — 45378 DIAGNOSTIC COLONOSCOPY: CPT | Performed by: INTERNAL MEDICINE

## 2022-06-10 PROCEDURE — 83735 ASSAY OF MAGNESIUM: CPT | Performed by: STUDENT IN AN ORGANIZED HEALTH CARE EDUCATION/TRAINING PROGRAM

## 2022-06-10 RX ORDER — POTASSIUM CHLORIDE 20 MEQ/1
40 TABLET, EXTENDED RELEASE ORAL ONCE
Status: COMPLETED | OUTPATIENT
Start: 2022-06-10 | End: 2022-06-10

## 2022-06-10 RX ORDER — SODIUM CHLORIDE, SODIUM LACTATE, POTASSIUM CHLORIDE, CALCIUM CHLORIDE 600; 310; 30; 20 MG/100ML; MG/100ML; MG/100ML; MG/100ML
INJECTION, SOLUTION INTRAVENOUS CONTINUOUS PRN
Status: DISCONTINUED | OUTPATIENT
Start: 2022-06-10 | End: 2022-06-10

## 2022-06-10 RX ORDER — PROPOFOL 10 MG/ML
INJECTION, EMULSION INTRAVENOUS CONTINUOUS PRN
Status: DISCONTINUED | OUTPATIENT
Start: 2022-06-10 | End: 2022-06-10

## 2022-06-10 RX ORDER — LIDOCAINE HYDROCHLORIDE 10 MG/ML
INJECTION, SOLUTION EPIDURAL; INFILTRATION; INTRACAUDAL; PERINEURAL AS NEEDED
Status: DISCONTINUED | OUTPATIENT
Start: 2022-06-10 | End: 2022-06-10

## 2022-06-10 RX ORDER — POTASSIUM CHLORIDE 14.9 MG/ML
20 INJECTION INTRAVENOUS ONCE
Status: DISCONTINUED | OUTPATIENT
Start: 2022-06-10 | End: 2022-06-10

## 2022-06-10 RX ORDER — PROPOFOL 10 MG/ML
INJECTION, EMULSION INTRAVENOUS AS NEEDED
Status: DISCONTINUED | OUTPATIENT
Start: 2022-06-10 | End: 2022-06-10

## 2022-06-10 RX ORDER — POTASSIUM CHLORIDE 14.9 MG/ML
20 INJECTION INTRAVENOUS ONCE
Status: COMPLETED | OUTPATIENT
Start: 2022-06-10 | End: 2022-06-10

## 2022-06-10 RX ORDER — SODIUM CHLORIDE 9 MG/ML
75 INJECTION, SOLUTION INTRAVENOUS CONTINUOUS
Status: DISCONTINUED | OUTPATIENT
Start: 2022-06-10 | End: 2022-06-10 | Stop reason: HOSPADM

## 2022-06-10 RX ADMIN — POTASSIUM CHLORIDE 20 MEQ: 14.9 INJECTION, SOLUTION INTRAVENOUS at 09:09

## 2022-06-10 RX ADMIN — LIDOCAINE HYDROCHLORIDE 50 MG: 10 INJECTION, SOLUTION EPIDURAL; INFILTRATION; INTRACAUDAL; PERINEURAL at 13:49

## 2022-06-10 RX ADMIN — ATORVASTATIN CALCIUM 40 MG: 40 TABLET, FILM COATED ORAL at 17:19

## 2022-06-10 RX ADMIN — PROPOFOL 140 MCG/KG/MIN: 10 INJECTION, EMULSION INTRAVENOUS at 13:49

## 2022-06-10 RX ADMIN — PROPOFOL 100 MG: 10 INJECTION, EMULSION INTRAVENOUS at 13:49

## 2022-06-10 RX ADMIN — SODIUM CHLORIDE, SODIUM LACTATE, POTASSIUM CHLORIDE, AND CALCIUM CHLORIDE: .6; .31; .03; .02 INJECTION, SOLUTION INTRAVENOUS at 13:39

## 2022-06-10 RX ADMIN — POTASSIUM CHLORIDE 40 MEQ: 20 TABLET, EXTENDED RELEASE ORAL at 09:09

## 2022-06-10 RX ADMIN — METOPROLOL SUCCINATE 50 MG: 50 TABLET, EXTENDED RELEASE ORAL at 08:08

## 2022-06-10 RX ADMIN — SODIUM CHLORIDE 75 ML/HR: 0.9 INJECTION, SOLUTION INTRAVENOUS at 09:02

## 2022-06-10 NOTE — UTILIZATION REVIEW
Inpatient Admission Authorization Request   NOTIFICATION OF INPATIENT ADMISSION/INPATIENT AUTHORIZATION REQUEST   SERVICING FACILITY:   [facility] [place] [address]  [address] [place] [address]  Tax ID: [id number]  NPI: [id number]  Place of Service: Inpatient [address] [facility] Code: 24     ATTENDING PROVIDER:  Attending Name and NPI#: Dustin Sterling [[id number]]  Address: [address] [place] [address]  Phone: [phone]     UTILIZATION REVIEW CONTACT:  Richie Burnett Utilization   Network Utilization Review Department  Phone: [phone]  Fax [phone]  Email: [email or web address]     PHYSICIAN ADVISORY SERVICES:  FOR [id number] REVIEW - MEDICAL NECESSITY DENIAL  Phone: [phone]  Fax: [phone]  Email: [email or web address]     TYPE OF REQUEST:  Inpatient Status     ADMISSION INFORMATION:  ADMISSION DATE/TIME: 6/9/22  2:47 PM  PATIENT DIAGNOSIS CODE/DESCRIPTION:  Dizziness [R42]  Diverticulosis [K57 90]  Rectal bleeding [K62 5]  Lower GI bleed [K92 2]  Generalized weakness [R53 1]  Gastrointestinal hemorrhage, unspecified gastrointestinal hemorrhage type [K92 2]  DISCHARGE DATE/TIME: No discharge date for patient encounter  IMPORTANT INFORMATION:  Please contact the [facility] directly with any questions or concerns regarding this request  Department voicemails are confidential     Send requests for admission clinical reviews, concurrent reviews, approvals, and administrative denials due to lack of clinical to fax [phone]

## 2022-06-10 NOTE — DISCHARGE SUMMARY
[facility]    Patient Information: Massimo Painter 61 y o  male MRN: [id number]  Unit/Bed#: [address] [place] [address] Encounter: [id number]    Discharging Physician / Practitioner: Jai Candelaria MD  PCP: Alonso Egan DO  Admission Date: 6/7/2022  Discharge Date: 06/10/22    Reason for Admission: Rectal Bleeding and Dizziness (Arrives pale, extremely diaphoretic, c/o dizziness  Patient had colonscopy last week and 2 polyps removed  Patient on Eliquis, states 6-7 BM's with blood   Cramping abdomen   Unable to obtain BP in triage, placed in hallway bed until CT 1 available)      Discharge Diagnoses:     Principal Problem:    GI bleed  Active Problems:    Benign essential hypertension    Obstructive sleep apnea    History of cardioembolic cerebrovascular accident (CVA)    Paroxysmal A-fib (HCC)    Elevated troponin    SIRS (systemic inflammatory response syndrome) (HCC)    Anemia    Positive blood culture  Resolved Problems:    * No resolved hospital problems  *        * GI bleed  Assessment & Plan  PMH of diverticulitis, multiple prior episodes of diverticular bleeding  · Patient had multiple bloody stools followed by dizziness and diaphoresis starting morning of admission  Patient has history of prior diverticular bleed requiring ICU admission  · In ED heme-positive stool  · High volume CT lower GI bleed negative for active GI bleed  · Hgb 13 3 upon admission  Baseline hemoglobin around 15-16    · 6/8: Patient had another episode of bleeding per rectum last night  Hemoglobin dropped to 10 7-secondary to GI bleeding and possible dilution  · Patient was on clear liquid diet which will be advanced to regular diet  Monitor hemoglobin  Restarted back on Eliquis which have been on hold  · 6/9: Patient had episode of dark bloody stools this morning  Hgb stable at 10 1  No recurrent dizziness or diaphoresis      · Will hold Eliquis, change to clear liquid diet, plan for colonoscopy tomorrow morning  · 6/10:  No bleeding overnight  Colonoscopy scheduled for this afternoon, anticipate discharge following    Positive blood culture  Assessment & Plan  One of two blood cultures growing Staph coccal species  No leukocytosis, fever, or other signs of systemic infection  Likely contaminant       Blood Cultures (6/7):  1 of 2 blood cultures with coag-negative staph, 2nd blood culture no growth at 48 hours  Repeat blood cultures (6/9):  Pending  F/U blood culture results  Monitor for signs of infection    Anemia  Assessment & Plan  Secondary to acute blood loss in the setting of GI bleeding along with dilution  Baseline hemoglobin around 15-16  Hemoglobin upon admission was 13 3  Today hemoglobin 9 2 from 10 1 yesterday, however no episode of bleeding overnight  Monitor hemoglobin and transfuse if hemoglobin less than 8    SIRS (systemic inflammatory response syndrome) (HCC)  Assessment & Plan  SIRS positive on presentation to ED as evidenced by tachycardia and tachypnea  No evidence of infection  Tachycardia and tachypnea likely secondary to GI bleed  No indication for antibiotics    Elevated troponin  Assessment & Plan  Troponin 27->22-80-77  EKG showed no acute ST-T changes  Patient denies any chest pain, SOB  Likely non MI troponin elevation in the setting of GI bleed and soft blood pressures        Paroxysmal A-fib (HCC)  Assessment & Plan  Paroxysmal afib diagnosed few months ago  Continue metoprolol 50 milligrams in a m  And 25 milligrams at night  Hold Eliquis given recurrent bloody stools  Plan for colonoscopy today    History of cardioembolic cerebrovascular accident (CVA)  Assessment & Plan  Hx of CVA   MRI from 9/2020 showed small lacunar infarct in R thalamic region  MRI from 3/2021 showed punctate nodular enhancement in the anterior medial right cerebellum    Loop recorder completed January 2022  Showed paroxysmal afib and started on eliquis       · Eliquis held given recurrent bleeding, planned colonoscopy today    Obstructive sleep apnea  Assessment & Plan  History of GRACIE, on CPAP at home  Home settings auto titrate, pressure range 6-20 cm H20       Continue nocturnal CPAP     Benign essential hypertension  Assessment & Plan  At home on toprol-xl 50mg QAM, 25mg QHS  Blood pressure initially in the ED was on the soft side which has since improved  Metoprolol initially hold in the setting of borderline blood pressure, restarted given his history of atrial fibrillation  BP stable, continue Toprol        Consultations During Hospital Stay:  [id number] [facility] [place]    Procedures Performed:     · 6/10- Colonoscopy    Significant Findings:     · Refer to hospital course and above listed diagnosis related plan for details    Imaging while in hospital:    Colonoscopy    Result Date: 6/10/2022  Narrative: [facility] Operating Room [address] [place] [address] [phone] DATE OF SERVICE: 6/10/22 PHYSICIAN(S): Attending: Heather Moseley MD Fellow: No Staff Documented INDICATION: Gastrointestinal hemorrhage, unspecified gastrointestinal hemorrhage type POST-OP DIAGNOSIS: See the impression below  HISTORY: Prior colonoscopy: Less than 3 years ago  It is being repeated at an interval of less than 3 years because: This colonoscopy is being performed for a diagnostic indication BOWEL PREPARATION: Miralax/Dulcolax PREPROCEDURE: Informed consent was obtained for the procedure, including sedation  Risks including but not limited to bleeding, infection, perforation, adverse drug reaction and aspiration were explained in detail  Also explained about less than 100% sensitivity with the exam and other alternatives  The patient was placed in the left lateral decubitus position  DETAILS OF PROCEDURE: Patient was taken to the procedure room where a time out was performed to confirm correct patient and correct procedure   The patient underwent monitored anesthesia care, which was administered by an anesthesia professional  The patient's blood pressure, heart rate, level of consciousness, oxygen and respirations were monitored throughout the procedure  A digital rectal exam was performed  The scope was introduced through the anus and advanced to the terminal ileum  Retroflexion was performed in the rectum  The quality of bowel preparation was evaluated using the [facility] Bowel Preparation Scale with scores of: right colon = 2, transverse colon = 1, left colon = 1  The total BBPS score was 4  Bowel prep was not adequate  The patient experienced no blood loss  The procedure was not difficult  The patient tolerated the procedure well  There were no apparent complications  ANESTHESIA INFORMATION: ASA: IV Anesthesia Type: IV Sedation with Anesthesia MEDICATIONS: No administrations occurring from 1329 to 1405 on 06/10/22 FINDINGS: Moderate pancolonic diverticula Internal hemorrhoids with no bleeding EVENTS: Procedure Events Event Event Time ENDO CECUM REACHED 6/10/2022  1:54 PM ENDO SCOPE OUT TIME 6/10/2022  2:04 PM SPECIMENS: * No specimens in log * EQUIPMENT: Colonoscope - [id number]     Impression: No blood or bleeding source identified  Prep inadequate for screening purposes but ascending colon well visualized with flushing and suctioning  Prior to tattoo visualized  Site of recent polypectomy could not be identified, likely healed  Pandiverticulosis concentrated primarily in the sigmoid colon  Likely the source of bleeding  Internal hemorrhoids  No stigmata of hemorrhage  RECOMMENDATION: Repeat colonoscopy in 3 years due to a personal history of colon polyps May restart Eliquis and discharge home  Adelfo Diaz MD     Colonoscopy    Result Date: 5/23/2022  Narrative:  [address] [place] [phone] DATE OF SERVICE: 5/23/22 PHYSICIAN(S): Attending: Agatha Murillo MD Fellow: No Staff Documented INDICATION: Tubular adenoma of colon POST-OP DIAGNOSIS: See the impression below  HISTORY: Prior colonoscopy: Less than 3 years ago  It is being repeated at an interval of less than 3 years because: Last colonoscopy had inadequate bowel prep BOWEL PREPARATION: Miralax/Dulcolax PREPROCEDURE: Informed consent was obtained for the procedure, including sedation  Risks including but not limited to bleeding, infection, perforation, adverse drug reaction and aspiration were explained in detail  Also explained about less than 100% sensitivity with the exam and other alternatives  The patient was placed in the left lateral decubitus position  DETAILS OF PROCEDURE: Patient was taken to the procedure room where a time out was performed to confirm correct patient and correct procedure  The patient underwent monitored anesthesia care, which was administered by an anesthesia professional  The patient's blood pressure, heart rate, level of consciousness, oxygen and respirations were monitored throughout the procedure  A digital rectal exam was performed  The scope was introduced through the anus and advanced to the cecum  Retroflexion was performed in the rectum  The quality of bowel preparation was evaluated using the [facility] Bowel Preparation Scale with scores of: right colon = 2, transverse colon = 2, left colon = 2  The total BBPS score was 6  Bowel prep was adequate  The patient experienced no blood loss  The procedure was not difficult  The patient tolerated the procedure well  There were no apparent complications  ANESTHESIA INFORMATION: ASA: II Anesthesia Type: IV Sedation with Anesthesia MEDICATIONS: No administrations occurring from 1036 to 1100 on 05/23/22 FINDINGS: One sessile polyp in the ascending colon; removed en bloc by cold snare and retrieved specimen Polyp measuring smaller than 5 mm in the sigmoid colon; performed cold forceps biopsy Tattoo from prior polypectomy in the ascending colon, no residual polyp  Well-healed scar   Few extensive diverticula in the transverse colon, descending colon and sigmoid colon Internal hemorrhoids Otherwise normal colon EVENTS: Procedure Events Event Event Time ENDO CECUM REACHED 5/23/2022 10:49 AM ENDO SCOPE OUT TIME 5/23/2022 11:00 AM SPECIMENS: ID Type Source Tests Collected by Time Destination 1 : bx- ascending polyp- cold snare Tissue Large Intestine, Right/Ascending Colon TISSUE EXAM Ro Kline MD 5/23/2022 10:52 AM  2 : bx- sigmoid polyp Tissue Large Intestine, Sigmoid Colon TISSUE EXAM Ro Kline MD 5/23/2022 10:58 AM  EQUIPMENT: Colonoscope -     Impression: 6 mm ascending colon polyp removed by cold snare Diminutive sigmoid polyp removed by cold biopsy forceps Extensive diverticulosis Internal hemorrhoids In the ascending colon there was tattoo from prior polypectomy with no residual polyp  RECOMMENDATION: Repeat colonoscopy in 3 years due to a personal history of colon polyps Discharge home Resume regular diet Resume medications, okay to start blood thinners tomorrow Follow-up biopsy results Recommend 2 day bowel prep for future colonoscopy Call with any abdominal pain, bleeding, fevers  Ro Kline MD     XR chest 1 view portable    Result Date: 6/7/2022  Narrative: CHEST INDICATION:   Weakness and dizziness  COMPARISON:  2/28/2020 EXAM PERFORMED/VIEWS:  XR CHEST PORTABLE FINDINGS: A loop recorder is present  Cardiac silhouette appears prominent but may be accentuated by the shallow depth of inspiration  Lung markings are crowded secondary to low lung volumes  Within limitations of this examination there is no focal airspace opacity to suggest pneumonia  No pneumothorax or pleural effusion  Osseous structures appear within normal limits for patient age  Impression: No active pulmonary disease on examination which is somewhat limited secondary to low lung volumes   Workstation performed: [id number]     CT high volume lower GI bleed    Result Date: 6/7/2022  Narrative: CT ABDOMEN AND PELVIS - WITHOUT AND WITH IV CONTRAST INDICATION:   lower gi bleed  COMPARISON: CT abdomen pelvis 2/28/2020 TECHNIQUE:  CT examination of the abdomen and pelvis was performed both prior to and after the administration of intravenous contrast  Axial, sagittal, and coronal 2D reformatted images were created from the source data and submitted for interpretation  Radiation dose length product (DLP) for this visit:  3441 44 mGy-cm   This examination, like all CT scans performed in the [facility], was performed utilizing techniques to minimize radiation dose exposure, including the use of iterative reconstruction and automated exposure control  IV Contrast:  70 mL of iohexol (OMNIPAQUE) Enteric Contrast:  Enteric contrast was not administered  FINDINGS: ABDOMEN  BOWEL:  There is no active extravasation of intravenous contrast into the lumen of stomach, small bowel, or large bowel loops  There is no abnormal bowel wall thickening or pathologic bowel wall enhancement  Colonic diverticulosis without findings of acute diverticulitis  Advanced atherosclerotic disease in the mesenteric vessels, without complete occlusion  LIVER/BILIARY TREE:  Scattered hepatic cysts  Otherwise unremarkable  GALLBLADDER:  No calcified gallstones  No pericholecystic inflammatory change  SPLEEN:  Unremarkable  PANCREAS:  Unremarkable  ADRENAL GLANDS:  Unremarkable  KIDNEYS/URETERS:  Right renal cyst   No hydronephrosis  APPENDIX:  Normal  ABDOMINOPELVIC CAVITY:  No ascites  No pneumoperitoneum  No lymphadenopathy  VESSELS:  Unremarkable for patient's age  PELVIS REPRODUCTIVE ORGANS:  Prostatomegaly  URINARY BLADDER:  Unremarkable  ABDOMINAL WALL/INGUINAL REGIONS:  Unremarkable  OSSEOUS STRUCTURES:  No acute fracture or destructive osseous lesion  Impression: No CT evidence of active high volume gastrointestinal hemorrhage  Colonic diverticulosis without findings of acute diverticulitis   Workstation performed: [id number] Incidental Findings:   · None     Test Results Pending at Discharge (will require follow up):   · As per After Visit Summary     Outpatient Tests Requested:  · None    Complications:  Refer to hospital course and above listed diagnosis related plan, if any    Hospital Course:     Quan Smith is a 61 y o  male patient who originally presented to the hospital on 6/7/2022 due to dizziness and diaphoresis following bloody bowel movements  Patient hemoglobin on admission was 13 3 compared to a baseline of 15-16  A CT high volume lower GI scan showed no active bleeding  Home eliquis was held for one day before restarting  After restarting eliquis patient had another bloody bowel movement  Colonoscopy was performed without identifying any source of bleeding  After colonoscopy patient was discharged home with instructions to resume Eliquis  Please see above list of diagnoses and related plan for additional information  Condition at Discharge: stable     Discharge Day Visit / Exam:     Subjective:  No complaints, no recurrent bleeding  Patient hungry after colonoscopy  Vitals: Blood Pressure: 113/70 (06/10/22 1454)  Pulse: 60 (06/10/22 1454)  Temperature: 98 1 °F (36 7 °C) (06/10/22 1454)  Temp Source: Temporal (06/10/22 1318)  Respirations: 20 (06/10/22 1454)  Height: 6' (182 9 cm) (06/07/22 1832)  Weight - Scale: 106 kg (233 lb 4 oz) (06/07/22 1832)  SpO2: 96 % (06/10/22 1454)  Exam:   Physical Exam  Vitals reviewed  Constitutional:       Appearance: Normal appearance  HENT:      Head: Normocephalic and atraumatic  Mouth/Throat:      Mouth: Mucous membranes are moist    Eyes:      Extraocular Movements: Extraocular movements intact  Pupils: Pupils are equal, round, and reactive to light  Cardiovascular:      Rate and Rhythm: Normal rate and regular rhythm  Pulses: Normal pulses  Pulmonary:      Effort: Pulmonary effort is normal       Breath sounds: Normal breath sounds  Abdominal:      General: Bowel sounds are normal       Palpations: Abdomen is soft  Skin:     General: Skin is warm  Capillary Refill: Capillary refill takes less than 2 seconds  Neurological:      General: No focal deficit present  Mental Status: He is alert  Psychiatric:         Mood and Affect: Mood normal          Behavior: Behavior normal          Discharge instructions/Information to patient and family:(Discharge Medications and Follow up):   See after visit summary for information provided to patient and family  Provisions for Follow-Up Care:  See after visit summary for information related to follow-up care and any pertinent home health orders  Disposition: Home    Planned Readmission:  No     Discharge Statement:  I spent 25 minutes discharging the patient  This time was spent on the day of discharge  I had direct contact with the patient on the day of discharge  Greater than 50% of the total time was spent examining patient, answering all patient questions, arranging and discussing plan of care with patient as well as directly providing post-discharge instructions  Additional time then spent on discharge activities  Discharge Medications:  See after visit summary for reconciled discharge medications provided to patient and family  ** Please Note: "This note has been constructed using a voice recognition system  Therefore there may be syntax, spelling, and/or grammatical errors   Please call if you have any questions  "**

## 2022-06-10 NOTE — ANESTHESIA PREPROCEDURE EVALUATION
Procedure:  COLONOSCOPY    Relevant Problems   CARDIO   (+) Aortic stenosis   (+) Ascending aortic aneurysm (HCC)   (+) Benign essential hypertension   (+) Hyperlipidemia LDL goal <130   (+) Mitral regurgitation   (+) Paroxysmal A-fib (HCC)      GI/HEPATIC   (+) GI bleed   (+) Rectal bleeding      HEMATOLOGY   (+) Anemia      NEURO/PSYCH   (+) Anxiety   (+) Dementia without behavioral disturbance (HCC)   (+) CAMMIE (generalized anxiety disorder)   (+) History of cardioembolic cerebrovascular accident (CVA)   (+) History of stroke   (+) Personal history of colonic polyps      PULMONARY   (+) Asthma, mild intermittent   (+) Obstructive sleep apnea        Physical Exam    Airway    Mallampati score: II  TM Distance: >3 FB  Neck ROM: full     Dental   No notable dental hx     Cardiovascular  Cardiovascular exam normal    Pulmonary  Pulmonary exam normal     Other Findings        Anesthesia Plan  ASA Score- 4     Anesthesia Type- IV sedation with anesthesia with ASA Monitors  Additional Monitors:   Airway Plan:           Plan Factors-Exercise tolerance (METS): >4 METS  Chart reviewed  EKG reviewed  Imaging results reviewed  Existing labs reviewed  Patient summary reviewed  Patient is not a current smoker  Obstructive sleep apnea risk education given perioperatively  Induction-     Postoperative Plan-     Informed Consent- Anesthetic plan and risks discussed with patient  I personally reviewed this patient with the CRNA  Discussed and agreed on the Anesthesia Plan with the CRNA  Michelle Glaser

## 2022-06-10 NOTE — PROGRESS NOTES
IV removed  Discharge instructions reviewed with patient  Given opportunity to ask questions  Patient left in stable condition with daughter, walking with a steady gait with his cane  All belongings retrieved by patient

## 2022-06-10 NOTE — PLAN OF CARE
Problem: PAIN - ADULT  Goal: Verbalizes/displays adequate comfort level or baseline comfort level  Description: Interventions:  - Encourage patient to monitor pain and request assistance  - Assess pain using appropriate pain scale  - Administer analgesics based on type and severity of pain and evaluate response  - Implement non-pharmacological measures as appropriate and evaluate response  - Consider cultural and social influences on pain and pain management  - Notify physician/advanced practitioner if interventions unsuccessful or patient reports new pain  Outcome: Progressing     Problem: INFECTION - ADULT  Goal: Absence of fever/infection during neutropenic period  Description: INTERVENTIONS:  - Monitor WBC    Outcome: Progressing     Problem: DISCHARGE PLANNING  Goal: Discharge to home or other facility with appropriate resources  Description: INTERVENTIONS:  - Identify barriers to discharge w/patient and caregiver  - Arrange for needed discharge resources and transportation as appropriate  - Identify discharge learning needs (meds, wound care, etc )  - Arrange for interpretive services to assist at discharge as needed  - Refer to Case Management Department for coordinating discharge planning if the patient needs post-hospital services based on physician/advanced practitioner order or complex needs related to functional status, cognitive ability, or social support system  Outcome: Progressing     Problem: Knowledge Deficit  Goal: Patient/family/caregiver demonstrates understanding of disease process, treatment plan, medications, and discharge instructions  Description: Complete learning assessment and assess knowledge base    Interventions:  - Provide teaching at level of understanding  - Provide teaching via preferred learning methods  Outcome: Progressing     Problem: Prexisting or High Potential for Compromised Skin Integrity  Goal: Skin integrity is maintained or improved  Description: INTERVENTIONS:  - Identify patients at risk for skin breakdown  - Assess and monitor skin integrity  - Assess and monitor nutrition and hydration status  - Monitor labs   - Assess for incontinence   - Turn and reposition patient  - Assist with mobility/ambulation  - Relieve pressure over bony prominences  - Avoid friction and shearing  - Provide appropriate hygiene as needed including keeping skin clean and dry  - Evaluate need for skin moisturizer/barrier cream  - Collaborate with interdisciplinary team   - Patient/family teaching  - Consider wound care consult   Outcome: Progressing     Problem: RESPIRATORY - ADULT  Goal: Achieves optimal ventilation and oxygenation  Description: INTERVENTIONS:  - Assess for changes in respiratory status  - Assess for changes in mentation and behavior  - Position to facilitate oxygenation and minimize respiratory effort  - Oxygen administered by appropriate delivery if ordered  - Initiate smoking cessation education as indicated  - Encourage broncho-pulmonary hygiene including cough, deep breathe, Incentive Spirometry  - Assess the need for suctioning and aspirate as needed  - Assess and instruct to report SOB or any respiratory difficulty  - Respiratory Therapy support as indicated  Outcome: Progressing     Problem: GASTROINTESTINAL - ADULT  Goal: Minimal or absence of nausea and/or vomiting  Description: INTERVENTIONS:  - Administer IV fluids if ordered to ensure adequate hydration  - Maintain NPO status until nausea and vomiting are resolved  - Nasogastric tube if ordered  - Administer ordered antiemetic medications as needed  - Provide nonpharmacologic comfort measures as appropriate  - Advance diet as tolerated, if ordered  - Consider nutrition services referral to assist patient with adequate nutrition and appropriate food choices  Outcome: Progressing     Problem: GASTROINTESTINAL - ADULT  Goal: Maintains or returns to baseline bowel function  Description: INTERVENTIONS:  - Assess bowel function  - Encourage oral fluids to ensure adequate hydration  - Administer IV fluids if ordered to ensure adequate hydration  - Administer ordered medications as needed  - Encourage mobilization and activity  - Consider nutritional services referral to assist patient with adequate nutrition and appropriate food choices  Outcome: Progressing     Problem: HEMATOLOGIC - ADULT  Goal: Maintains hematologic stability  Description: INTERVENTIONS  - Assess for signs and symptoms of bleeding or hemorrhage  - Monitor labs  - Administer supportive blood products/factors as ordered and appropriate  Outcome: Progressing

## 2022-06-10 NOTE — ANESTHESIA POSTPROCEDURE EVALUATION
Post-Op Assessment Note    CV Status:  Stable  Pain Score: 0    Pain management: adequate     Mental Status:  Sleepy and arousable   Hydration Status:  Euvolemic and stable   PONV Controlled:  Controlled   Airway Patency:  Patent      Post Op Vitals Reviewed: Yes      Staff: CRNA, Anesthesiologist   Comments: Report given to recovering RN, VSS, Pt resting comfortably        No complications documented      BP 93/53 (06/10/22 1414)    Temp      Pulse 77 (06/10/22 1414)   Resp 16 (06/10/22 1414)    SpO2 92 % (06/10/22 1414)

## 2022-06-10 NOTE — PROGRESS NOTES
[facility] Progress Note  Patient: Aydee Gauthier 61 y o  male   MRN: [id number]  PCP: Barney Haque DO  Unit/Bed#: [address] [place] [address] Encounter: [id number]  Date Of Visit: 06/10/22    Problem List:    Principal Problem:    GI bleed  Active Problems:    Benign essential hypertension    Obstructive sleep apnea    History of cardioembolic cerebrovascular accident (CVA)    Paroxysmal A-fib (HCC)    Elevated troponin    SIRS (systemic inflammatory response syndrome) (HCC)    Anemia    Positive blood culture      Assessment & Plan:    * GI bleed  Assessment & Plan  PMH of diverticulitis, multiple prior episodes of diverticular bleeding  · Patient had multiple bloody stools followed by dizziness and diaphoresis starting morning of admission  Patient has history of prior diverticular bleed requiring ICU admission  · In ED heme-positive stool  · High volume CT lower GI bleed negative for active GI bleed  · Hgb 13 3 upon admission  Baseline hemoglobin around 15-16    · 6/8: Patient had another episode of bleeding per rectum last night  Hemoglobin dropped to 10 7-secondary to GI bleeding and possible dilution  · Patient was on clear liquid diet which will be advanced to regular diet  Monitor hemoglobin  Restarted back on Eliquis which have been on hold  · 6/9: Patient had episode of dark bloody stools this morning  Hgb stable at 10 1  No recurrent dizziness or diaphoresis  · Will hold Eliquis, change to clear liquid diet, plan for colonoscopy tomorrow morning  · 6/10:  No bleeding overnight  Colonoscopy scheduled for this afternoon, anticipate discharge following    Positive blood culture  Assessment & Plan  One of two blood cultures growing Staph coccal species  No leukocytosis, fever, or other signs of systemic infection    Likely contaminant       Blood Cultures (6/7):  1 of 2 blood cultures with coag-negative staph, 2nd blood culture no growth at 48 hours  Repeat blood cultures (6/9): Pending  F/U blood culture results  Monitor for signs of infection    Anemia  Assessment & Plan  Secondary to acute blood loss in the setting of GI bleeding along with dilution  Baseline hemoglobin around 15-16  Hemoglobin upon admission was 13 3  Today hemoglobin 9 2 from 10 1 yesterday, however no episode of bleeding overnight  Monitor hemoglobin and transfuse if hemoglobin less than 8    SIRS (systemic inflammatory response syndrome) ([facility])  Assessment & Plan  SIRS positive on presentation to ED as evidenced by tachycardia and tachypnea  No evidence of infection  Tachycardia and tachypnea likely secondary to GI bleed  No indication for antibiotics    Elevated troponin  Assessment & Plan  Troponin 65->15-09-13  EKG showed no acute ST-T changes  Patient denies any chest pain, SOB  Likely non MI troponin elevation in the setting of GI bleed and soft blood pressures        Paroxysmal A-fib ([facility])  Assessment & Plan  Paroxysmal afib diagnosed few months ago  Continue metoprolol 50 milligrams in a m  And 25 milligrams at night  Hold Eliquis given recurrent bloody stools  Plan for colonoscopy today    History of cardioembolic cerebrovascular accident (CVA)  Assessment & Plan  Hx of CVA   MRI from 9/2020 showed small lacunar infarct in R thalamic region  MRI from 3/2021 showed punctate nodular enhancement in the anterior medial right cerebellum    Loop recorder completed January 2022  Showed paroxysmal afib and started on eliquis  · Eliquis held given recurrent bleeding, planned colonoscopy today    Obstructive sleep apnea  Assessment & Plan  History of GRACIE, on CPAP at home  Home settings auto titrate, pressure range 6-20 cm H20       Continue nocturnal CPAP     Benign essential hypertension  Assessment & Plan  At home on toprol-xl 50mg QAM, 25mg QHS  Blood pressure initially in the ED was on the soft side which has since improved    Metoprolol initially hold in the setting of borderline blood pressure, restarted given his history of atrial fibrillation  BP stable, continue Toprol          VTE Pharmacologic Prophylaxis:   Pharmacologic: Pharmacologic VTE Prophylaxis contraindicated due to GI bleed  Mechanical VTE Prophylaxis in Place: Yes    Patient Centered Rounds: I have performed bedside rounds with nursing staff today  Discussions with Specialists or Other Care Team Provider: Silvestre Barfield    Education and Discussions with Family / Patient:  Called patient's wife, no answer and left message about plan for care and discharge later today  Time Spent for Care: 30 minutes  More than 50% of total time spent on counseling and coordination of care as described above  Current Length of Stay: 1 day(s)    Current Patient Status: Inpatient   Certification Statement: The patient will continue to require additional inpatient hospital stay due to GI bleed, plan for discharge following colonoscopy this afternoon    Discharge Plan:  Anticipate discharge home today following colonoscopy    Code Status: Level 1 - Full Code      Subjective:   Patient seen examined at bedside  Patient no acute distress  Patient denies any recurrent episodes of GI bleeding overnight  Patient has planned for colonoscopy later this afternoon  Following colonoscopy, plan for discharge if patient stable and cleared by GI  Objective:     Vitals:   Temp (24hrs), Av 9 °F (36 6 °C), Min:97 7 °F (36 5 °C), Max:98 1 °F (36 7 °C)    Temp:  [97 7 °F (36 5 °C)-98 1 °F (36 7 °C)] 98 1 °F (36 7 °C)  HR:  [60-82] 60  Resp:  [17-18] 18  BP: (119-145)/(63-83) 131/63  SpO2:  [94 %-97 %] 96 %  Body mass index is 31 63 kg/m²  Input and Output Summary (last 24 hours): Intake/Output Summary (Last 24 hours) at 6/10/2022 1301  Last data filed at 6/10/2022 0902  Gross per 24 hour   Intake 170 ml   Output --   Net 170 ml       Physical Exam:     Physical Exam  Vitals reviewed  Constitutional:       Appearance: Normal appearance     HENT: Head: Normocephalic and atraumatic  Mouth/Throat:      Mouth: Mucous membranes are moist    Eyes:      Extraocular Movements: Extraocular movements intact  Pupils: Pupils are equal, round, and reactive to light  Cardiovascular:      Rate and Rhythm: Normal rate and regular rhythm  Pulses: Normal pulses  Pulmonary:      Effort: Pulmonary effort is normal       Breath sounds: Normal breath sounds  Abdominal:      General: Bowel sounds are normal       Palpations: Abdomen is soft  Skin:     General: Skin is warm  Capillary Refill: Capillary refill takes less than 2 seconds  Neurological:      General: No focal deficit present  Mental Status: He is alert  Psychiatric:         Mood and Affect: Mood normal          Behavior: Behavior normal          Additional Data:     Labs:    Results from last 7 days   Lab Units 06/10/22  0607   WBC Thousand/uL 6 89   HEMOGLOBIN g/dL 9 2*   HEMATOCRIT % 28 1*   PLATELETS Thousands/uL 204   NEUTROS PCT % 68   LYMPHS PCT % 16   MONOS PCT % 10   EOS PCT % 4     Results from last 7 days   Lab Units 06/10/22  0607 06/08/22  0450 06/07/22  1407   POTASSIUM mmol/L 3 3*   < > 3 9   CHLORIDE mmol/L 108   < > 112*   CO2 mmol/L 23   < > 22   BUN mg/dL 15   < > 19   CREATININE mg/dL 0 83   < > 1 32*   CALCIUM mg/dL 8 2*   < > 8 7   ALK PHOS U/L  --   --  110   ALT U/L  --   --  21   AST U/L  --   --  12    < > = values in this interval not displayed  Results from last 7 days   Lab Units 06/07/22  1407   INR  1 15       * I Have Reviewed All Lab Data Listed Above  * Additional Pertinent Lab Tests Reviewed: All Labs Within Last 24 Hours Reviewed      Imaging:  Imaging Reports Reviewed Today Include:  No new images to review    Recent Cultures (last 7 days):     Results from last 7 days   Lab Units 06/09/22  0947 06/07/22  1420 06/07/22  1413   BLOOD CULTURE  Received in Microbiology Lab  Culture in Progress  Received in Microbiology Lab   Culture in Progress  No Growth at 48 hrs  Staphylococcus coagulase negative*   GRAM STAIN RESULT   --   --  Gram positive cocci in clusters*       Last 24 Hours Medication List:   Current Facility-Administered Medications   Medication Dose Route Frequency Provider Last Rate    atorvastatin  40 mg Oral QPM Slade Daigle MD      metoprolol succinate  25 mg Oral HS Chanelle Baptiste MD      metoprolol succinate  50 mg Oral Daily Chanelle Baptiste MD      polyethylene glycol  4,000 mL Oral See Admin Instructions Xena Triplett PA-C      sodium chloride  75 mL/hr Intravenous Continuous Slade Daigle MD 75 mL/hr (06/10/22 0902)          Today, Patient Was Seen By: Slade Daigle MD    ** Please Note: "This note has been constructed using a voice recognition system  Therefore there may be syntax, spelling, and/or grammatical errors   Please call if you have any questions  "**

## 2022-06-10 NOTE — PLAN OF CARE
Problem: MOBILITY - ADULT  Goal: Maintain or return to baseline ADL function  Description: INTERVENTIONS:  -  Assess patient's ability to carry out ADLs; assess patient's baseline for ADL function and identify physical deficits which impact ability to perform ADLs (bathing, care of mouth/teeth, toileting, grooming, dressing, etc )  - Assess/evaluate cause of self-care deficits   - Assess range of motion  - Assess patient's mobility; develop plan if impaired  - Assess patient's need for assistive devices and provide as appropriate  - Encourage maximum independence but intervene and supervise when necessary  - Involve family in performance of ADLs  - Assess for home care needs following discharge   - Consider OT consult to assist with ADL evaluation and planning for discharge  - Provide patient education as appropriate  Outcome: Progressing  Goal: Maintains/Returns to pre admission functional level  Description: INTERVENTIONS:  - Perform BMAT or MOVE assessment daily    - Set and communicate daily mobility goal to care team and patient/family/caregiver  - Collaborate with rehabilitation services on mobility goals if consulted  - Perform Range of Motion 3 times a day  - Reposition patient every 2 hours    - Dangle patient 3 times a day  - Stand patient 3 times a day  - Ambulate patient 3 times a day  - Out of bed to chair 3 times a day   - Out of bed for meals 3 times a day  - Out of bed for toileting  - Record patient progress and toleration of activity level   Outcome: Progressing     Problem: PAIN - ADULT  Goal: Verbalizes/displays adequate comfort level or baseline comfort level  Description: Interventions:  - Encourage patient to monitor pain and request assistance  - Assess pain using appropriate pain scale  - Administer analgesics based on type and severity of pain and evaluate response  - Implement non-pharmacological measures as appropriate and evaluate response  - Consider cultural and social influences on pain and pain management  - Notify physician/advanced practitioner if interventions unsuccessful or patient reports new pain  Outcome: Progressing     Problem: INFECTION - ADULT  Goal: Absence or prevention of progression during hospitalization  Description: INTERVENTIONS:  - Assess and monitor for signs and symptoms of infection  - Monitor lab/diagnostic results  - Monitor all insertion sites, i e  indwelling lines, tubes, and drains  - Monitor endotracheal if appropriate and nasal secretions for changes in amount and color  - [place] appropriate cooling/warming therapies per order  - Administer medications as ordered  - Instruct and encourage patient and family to use good hand hygiene technique  - Identify and instruct in appropriate isolation precautions for identified infection/condition  Outcome: Progressing  Goal: Absence of fever/infection during neutropenic period  Description: INTERVENTIONS:  - Monitor WBC    Outcome: Progressing     Problem: SAFETY ADULT  Goal: Maintain or return to baseline ADL function  Description: INTERVENTIONS:  -  Assess patient's ability to carry out ADLs; assess patient's baseline for ADL function and identify physical deficits which impact ability to perform ADLs (bathing, care of mouth/teeth, toileting, grooming, dressing, etc )  - Assess/evaluate cause of self-care deficits   - Assess range of motion  - Assess patient's mobility; develop plan if impaired  - Assess patient's need for assistive devices and provide as appropriate  - Encourage maximum independence but intervene and supervise when necessary  - Involve family in performance of ADLs  - Assess for home care needs following discharge   - Consider OT consult to assist with ADL evaluation and planning for discharge  - Provide patient education as appropriate  Outcome: Progressing  Goal: Maintains/Returns to pre admission functional level  Description: INTERVENTIONS:  - Perform BMAT or MOVE assessment daily    - Set and communicate daily mobility goal to care team and patient/family/caregiver  - Collaborate with rehabilitation services on mobility goals if consulted  - Perform Range of Motion 3 times a day  - Reposition patient every 2 hours  - Dangle patient 3 times a day  - Stand patient 3 times a day  - Ambulate patient 3 times a day  - Out of bed to chair 3 times a day   - Out of bed for meals 3 times a day  - Out of bed for toileting  - Record patient progress and toleration of activity level   Outcome: Progressing     Problem: DISCHARGE PLANNING  Goal: Discharge to home or other facility with appropriate resources  Description: INTERVENTIONS:  - Identify barriers to discharge w/patient and caregiver  - Arrange for needed discharge resources and transportation as appropriate  - Identify discharge learning needs (meds, wound care, etc )  - Arrange for interpretive services to assist at discharge as needed  - Refer to Case Management Department for coordinating discharge planning if the patient needs post-hospital services based on physician/advanced practitioner order or complex needs related to functional status, cognitive ability, or social support system  Outcome: Progressing     Problem: Knowledge Deficit  Goal: Patient/family/caregiver demonstrates understanding of disease process, treatment plan, medications, and discharge instructions  Description: Complete learning assessment and assess knowledge base    Interventions:  - Provide teaching at level of understanding  - Provide teaching via preferred learning methods  Outcome: Progressing     Problem: Prexisting or High Potential for Compromised Skin Integrity  Goal: Skin integrity is maintained or improved  Description: INTERVENTIONS:  - Identify patients at risk for skin breakdown  - Assess and monitor skin integrity  - Assess and monitor nutrition and hydration status  - Monitor labs   - Assess for incontinence   - Turn and reposition patient  - Assist with mobility/ambulation  - Relieve pressure over bony prominences  - Avoid friction and shearing  - Provide appropriate hygiene as needed including keeping skin clean and dry  - Evaluate need for skin moisturizer/barrier cream  - Collaborate with interdisciplinary team   - Patient/family teaching  - Consider wound care consult   Outcome: Progressing     Problem: RESPIRATORY - ADULT  Goal: Achieves optimal ventilation and oxygenation  Description: INTERVENTIONS:  - Assess for changes in respiratory status  - Assess for changes in mentation and behavior  - Position to facilitate oxygenation and minimize respiratory effort  - Oxygen administered by appropriate delivery if ordered  - Initiate smoking cessation education as indicated  - Encourage broncho-pulmonary hygiene including cough, deep breathe, Incentive Spirometry  - Assess the need for suctioning and aspirate as needed  - Assess and instruct to report SOB or any respiratory difficulty  - Respiratory Therapy support as indicated  Outcome: Progressing     Problem: GASTROINTESTINAL - ADULT  Goal: Minimal or absence of nausea and/or vomiting  Description: INTERVENTIONS:  - Administer IV fluids if ordered to ensure adequate hydration  - Maintain NPO status until nausea and vomiting are resolved  - Nasogastric tube if ordered  - Administer ordered antiemetic medications as needed  - Provide nonpharmacologic comfort measures as appropriate  - Advance diet as tolerated, if ordered  - Consider nutrition services referral to assist patient with adequate nutrition and appropriate food choices  Outcome: Progressing  Goal: Maintains or returns to baseline bowel function  Description: INTERVENTIONS:  - Assess bowel function  - Encourage oral fluids to ensure adequate hydration  - Administer IV fluids if ordered to ensure adequate hydration  - Administer ordered medications as needed  - Encourage mobilization and activity  - Consider nutritional services referral to assist patient with adequate nutrition and appropriate food choices  Outcome: Progressing     Problem: HEMATOLOGIC - ADULT  Goal: Maintains hematologic stability  Description: INTERVENTIONS  - Assess for signs and symptoms of bleeding or hemorrhage  - Monitor labs  - Administer supportive blood products/factors as ordered and appropriate  Outcome: Progressing

## 2022-06-13 ENCOUNTER — TRANSITIONAL CARE MANAGEMENT (OUTPATIENT)
Dept: FAMILY MEDICINE CLINIC | Facility: CLINIC | Age: 67
End: 2022-06-13

## 2022-06-13 LAB — BACTERIA BLD CULT: NORMAL

## 2022-06-13 NOTE — UTILIZATION REVIEW
Notification of Discharge   This is a Notification of Discharge from our facility [address]  Please be advised that this patient has been discharge from our facility  Below you will find the admission and discharge date and time including the patients disposition  UTILIZATION REVIEW CONTACT:  Joe Ng  Utilization   Network Utilization Review Department  Phone: [phone] x carefully listen to the prompts  All voicemails are confidential   Email: [email or web address]     PHYSICIAN ADVISORY SERVICES:  FOR [facility] REVIEW - MEDICAL NECESSITY DENIAL  Phone: [phone]  Fax: [phone]  Email: [email or web address]     PRESENTATION DATE: 6/7/2022  1:53 PM  OBERVATION ADMISSION DATE:   INPATIENT ADMISSION DATE: 6/9/22  2:47 PM   DISCHARGE DATE: 6/10/2022  6:19 PM  DISPOSITION: Home/Self Care Home/Self Care      IMPORTANT INFORMATION:  Send all requests for admission clinical reviews, approved or denied determinations and any other requests to dedicated fax number below belonging to the campus where the patient is receiving treatment   List of dedicated fax numbers:  [address] DENIALS (Administrative/Medical Necessity) [phone]   [address] (Maternity/NICU/Pediatrics) [phone]   [facility] [phone]   130 [facility] [phone]   [address] [place] [address] [phone]   2000 [facility] [address] [place] [address],[address] [place] [address] [phone]   [facility]  [phone]   2205 [facility], [facility]  2401 [facility] [address] [facility] [phone]

## 2022-06-14 LAB
BACTERIA BLD CULT: NORMAL
BACTERIA BLD CULT: NORMAL

## 2022-06-21 ENCOUNTER — TELEPHONE (OUTPATIENT)
Dept: GASTROENTEROLOGY | Facility: AMBULARY SURGERY CENTER | Age: 67
End: 2022-06-21

## 2022-06-21 ENCOUNTER — OFFICE VISIT (OUTPATIENT)
Dept: FAMILY MEDICINE CLINIC | Facility: CLINIC | Age: 67
End: 2022-06-21
Payer: COMMERCIAL

## 2022-06-21 VITALS
SYSTOLIC BLOOD PRESSURE: 126 MMHG | TEMPERATURE: 98.3 F | RESPIRATION RATE: 18 BRPM | WEIGHT: 242 LBS | OXYGEN SATURATION: 97 % | BODY MASS INDEX: 32.78 KG/M2 | DIASTOLIC BLOOD PRESSURE: 88 MMHG | HEIGHT: 72 IN | HEART RATE: 80 BPM

## 2022-06-21 DIAGNOSIS — D64.9 ANEMIA, UNSPECIFIED TYPE: ICD-10-CM

## 2022-06-21 DIAGNOSIS — I71.2 ASCENDING AORTIC ANEURYSM (HCC): ICD-10-CM

## 2022-06-21 DIAGNOSIS — I48.0 PAROXYSMAL A-FIB (HCC): ICD-10-CM

## 2022-06-21 DIAGNOSIS — E87.6 HYPOKALEMIA: ICD-10-CM

## 2022-06-21 DIAGNOSIS — K57.91 GASTROINTESTINAL HEMORRHAGE ASSOCIATED WITH INTESTINAL DIVERTICULOSIS: Primary | ICD-10-CM

## 2022-06-21 DIAGNOSIS — I10 BENIGN ESSENTIAL HYPERTENSION: ICD-10-CM

## 2022-06-21 PROCEDURE — 99495 TRANSJ CARE MGMT MOD F2F 14D: CPT | Performed by: NURSE PRACTITIONER

## 2022-06-21 PROCEDURE — 1111F DSCHRG MED/CURRENT MED MERGE: CPT | Performed by: NURSE PRACTITIONER

## 2022-06-21 NOTE — PROGRESS NOTES
Assessment/Plan:    1  Gastrointestinal hemorrhage associated with intestinal diverticulosis  Comments:  resolved and back on eliquis and will follow with gastro and will check hemoglobin    2  Hypokalemia  -     Basic metabolic panel; Future  -     Basic metabolic panel    3  Anemia, unspecified type  -     CBC and Platelet; Future  -     CBC and Platelet    4  Benign essential hypertension  Comments:  stable with current regimen  Orders:  -     Ambulatory Referral to Cardiology; Future    5  Ascending aortic aneurysm (HCC)  Comments:  As per PCP last note, it was dilatation and no aneurysm   Orders:  -     Ambulatory Referral to Cardiology; Future    6  Paroxysmal A-fib [facility])  Comments:  managed by cardiologist  Orders:  -     Ambulatory Referral to Cardiology; Future        Patient Instructions:  Supportive care discussed and advised  Advised to RTO for any worsening and no improvement  Follow up for no improvement and worsening of conditions  Patient advised and educated when to see immediate medical care  Return if symptoms worsen or fail to improve  Future Appointments   Date Time Provider Ron Chino   6/23/2022 10:00 AM Chris Scott MD [facility]   7/1/2022  9:00 AM DEVICE REMOTE [facility]   7/28/2022  2:00 PM Arlette Mcclendon MD NEURO [facility]   8/30/2022 10:20 AM DO CRYSTAL [facility]           Subjective:      Patient ID: Areli Dimas is a 61 y o  male  Chief Complaint   Patient presents with    Transition of Care Management     Sas/cma         Vitals:  /88   Pulse 80   Temp 98 3 °F (36 8 °C)   Resp 18   Ht 6' (1 829 m)   Wt 110 kg (242 lb)   SpO2 97%   BMI 32 82 kg/m²     HPI  Patient is here to follow up after recent hospitalization  Patient is doing much better and denies any GI bleeding, abdominal pain, nausea, vomiting and diarrhea  Stated that back to regular diet and tolerating it well   Back to his medications and taking eliquis  Patient stated that gets sob with exertion from last couple of months and had follow up with pulmonologist month ago and was cleared by them and will follow with cardiologist on 6/23/2022  Patient had colonoscopy during hospitalization and as per gastro Pandiverticulosis concentrated primarily in the sigmoid colon  Likely the source of bleeding  Last CBC showed Hgb of 9 2 and low potassium and will repeat BMP and CBC  Patient will be following with neurologist soon as has h/o stroke and sometimes thinks his gait is off occasionally but no other movement issues        TCM Call (since 5/21/2022)     Date and time call was made  6/13/2022  9:54 AM    Hospital care reviewed  Records reviewed    Patient was hospitialized at  [address] [place] [address]    Date of Admission  06/07/22    Date of discharge  06/10/22    Diagnosis  GI Bleed    Disposition  Home    Were the patients medications reviewed and updated  Yes    Current Symptoms  None      TCM Call (since 5/21/2022)     Post hospital issues  None    Should patient be enrolled in anticoag monitoring? No    Scheduled for follow up? Yes    Patients specialists  Other (comment)    Other specialists names  Dr Regis Jin    Did you obtain your prescribed medications  Yes    Do you need help managing your prescriptions or medications  No    Is transportation to your appointment needed  No    I have advised the patient to call PCP with any new or worsening symptoms  [address] [place] or [facility] other    Support System  Family    The type of support provided  Physical; Emotional    Do you have social support  Yes, as much as I need    Are you recieving any outpatient services  No    Are you recieving home care services  No    Have you fallen in the last 12 months  Yes    How many times  twice    Interperter language line needed  No    Counseling  Family    Counseling topics  Activities of daily living;  Importance of RX compliance; patient and family education; instructions for management; Risk factor reduction    Comments  I spoke with Mrs Ramez Lugo who states that Hodan Toney is doing fine  He has no complaints at this time  She knows to take him to the ER if any abd pain, weakness, shortness of breath, pallor, s/s rectal bleeding, palpitations, etc but she states he is completely fine now [facility]              The following portions of the patient's history were reviewed and updated as appropriate: allergies, current medications, past family history, past medical history, past social history, past surgical history and problem list       Review of Systems   HENT: Negative  Respiratory: Positive for shortness of breath  Negative for apnea, cough, choking, chest tightness, wheezing and stridor  As noted in HPI     Cardiovascular: Negative  Gastrointestinal: Negative  Genitourinary: Negative  Musculoskeletal: Positive for gait problem  As noted in HPI     Skin: Negative  Neurological: Negative for dizziness and light-headedness  As noted in HPI           Objective:    Social History     Tobacco Use   Smoking Status Passive Smoke Exposure - Never Smoker   Smokeless Tobacco Never Used       Allergies:    Allergies   Allergen Reactions    Pollen Extract Sneezing     Reaction Date: 18Sep2006;     Shellfish-Derived Products - Food Allergy Other (See Comments)     Eye swelling    Iodine Solution [Povidone Iodine] Rash     Eyes swell         Current Outpatient Medications   Medication Sig Dispense Refill    ALPRAZolam (XANAX) 0 25 mg tablet Take 1 tablet (0 25 mg total) by mouth 2 (two) times a day as needed for anxiety or sleep 30 tablet 1    apixaban (Eliquis) 5 mg Take 1 tablet (5 mg total) by mouth 2 (two) times a day 60 tablet 5    atorvastatin (LIPITOR) 40 mg tablet TAKE 1 TABLET BY MOUTH EVERY DAY IN THE EVENING 30 tablet 1    fexofenadine (ALLEGRA) 180 MG tablet Take 1 tablet (180 mg total) by mouth daily for 30 days (Patient taking differently: Take 180 mg by mouth every morning) 30 tablet 0    metoprolol succinate (TOPROL-XL) 25 mg 24 hr tablet Take 1 tablet (25 mg total) by mouth daily 25 mg in evening 90 tablet 2    metoprolol succinate (TOPROL-XL) 50 mg 24 hr tablet Take 1 tablet (50 mg total) by mouth daily (Patient taking differently: Take 50 mg by mouth every morning) 30 tablet 5    sildenafil (VIAGRA) 100 mg tablet Take 1 tablet (100 mg total) by mouth daily as needed for erectile dysfunction 30 tablet 5    albuterol (Proventil HFA) 90 mcg/act inhaler Inhale 2 puffs every 4 (four) hours as needed for wheezing or shortness of breath 6 7 g 7     No current facility-administered medications for this visit  Physical Exam  Constitutional:       Appearance: Normal appearance  He is well-developed  Cardiovascular:      Rate and Rhythm: Normal rate and regular rhythm  Heart sounds: Murmur heard  Pulmonary:      Effort: Pulmonary effort is normal       Breath sounds: Normal breath sounds  Abdominal:      General: Bowel sounds are normal  There is no distension  Palpations: Abdomen is soft  Tenderness: There is no abdominal tenderness  There is no rebound  Skin:     General: Skin is warm and dry  Neurological:      Mental Status: He is alert and oriented to person, place, and time  GCS: GCS eye subscore is 4  GCS verbal subscore is 5  GCS motor subscore is 6  Cranial Nerves: No facial asymmetry  Motor: No weakness  Coordination: Coordination normal       Gait: Gait normal    Psychiatric:         Behavior: Behavior normal          Thought Content:  Thought content normal          Judgment: Judgment normal                      ELVIS Helm

## 2022-06-21 NOTE — TELEPHONE ENCOUNTER
Spoke with Miguel Angel Francisco  from [facility]  She has been trying to contact patient to see if he needs any appointments or review of AVS from after his hospitalization  She was requesting plan of care or if patient needed follow up visit for after hospitalization  I advised there was no follow up made and patient was to see his PCP  Miguel Angel Francisco will call PCP office to make sure patient was seen in office  No further questions

## 2022-06-21 NOTE — TELEPHONE ENCOUNTER
Светлана Laughter, spoke with rep at [facility] was advised they left message for skylar to return my call

## 2022-06-21 NOTE — TELEPHONE ENCOUNTER
Patients GI provider:  Dr Matthieu Burnett     Number to return call: [phone] confidential  [phone]    Reason for call: Khalida Bernard from [facility] called requesting to speak with someone regarding current situation and next plan of treatment       Scheduled procedure/appointment date if applicable: Apt/procedure n/a

## 2022-06-22 DIAGNOSIS — Z86.73 HISTORY OF CARDIOEMBOLIC CEREBROVASCULAR ACCIDENT (CVA): ICD-10-CM

## 2022-06-22 RX ORDER — ATORVASTATIN CALCIUM 40 MG/1
TABLET, FILM COATED ORAL
Qty: 30 TABLET | Refills: 1 | Status: SHIPPED | OUTPATIENT
Start: 2022-06-22 | End: 2022-07-15

## 2022-06-23 ENCOUNTER — OFFICE VISIT (OUTPATIENT)
Dept: CARDIOLOGY CLINIC | Facility: CLINIC | Age: 67
End: 2022-06-23
Payer: COMMERCIAL

## 2022-06-23 VITALS
HEIGHT: 72 IN | OXYGEN SATURATION: 99 % | BODY MASS INDEX: 32.78 KG/M2 | WEIGHT: 242 LBS | SYSTOLIC BLOOD PRESSURE: 150 MMHG | DIASTOLIC BLOOD PRESSURE: 90 MMHG | TEMPERATURE: 98 F | HEART RATE: 82 BPM

## 2022-06-23 DIAGNOSIS — R06.02 SHORTNESS OF BREATH ON EXERTION: Primary | ICD-10-CM

## 2022-06-23 DIAGNOSIS — I48.0 PAROXYSMAL A-FIB (HCC): ICD-10-CM

## 2022-06-23 DIAGNOSIS — I71.2 ASCENDING AORTIC ANEURYSM (HCC): ICD-10-CM

## 2022-06-23 DIAGNOSIS — I10 BENIGN ESSENTIAL HYPERTENSION: ICD-10-CM

## 2022-06-23 PROCEDURE — 3008F BODY MASS INDEX DOCD: CPT | Performed by: INTERNAL MEDICINE

## 2022-06-23 PROCEDURE — 93000 ELECTROCARDIOGRAM COMPLETE: CPT | Performed by: INTERNAL MEDICINE

## 2022-06-23 PROCEDURE — 99214 OFFICE O/P EST MOD 30 MIN: CPT | Performed by: INTERNAL MEDICINE

## 2022-06-23 PROCEDURE — 1036F TOBACCO NON-USER: CPT | Performed by: INTERNAL MEDICINE

## 2022-06-23 RX ORDER — HYDROCHLOROTHIAZIDE 25 MG/1
25 TABLET ORAL DAILY
Qty: 60 TABLET | Refills: 2 | Status: SHIPPED | OUTPATIENT
Start: 2022-06-23

## 2022-06-23 RX ORDER — POTASSIUM CHLORIDE 750 MG/1
10 CAPSULE, EXTENDED RELEASE ORAL 2 TIMES DAILY
Qty: 60 CAPSULE | Refills: 2 | Status: SHIPPED | OUTPATIENT
Start: 2022-06-23

## 2022-06-23 NOTE — PROGRESS NOTES
Progress Note - Cardiology Office  [facility]    Thais Lind 61 y o  male MRN: [id number]  : 1955  Encounter: [id number]      ASSESSMENT:  Patient complains of dyspnea on exertion and also has lower extremity swelling which he was not aware of    S/p loop recorder implantation on 2021 for AF surveillance and underlying cryptogenic stroke    PAF, detected on loop recorder surveillance   on Eliquis     Hypertension   BP is 150/90 mmHg     Ascending aortic aneurysm, 4 3 cm by echo     Mild Aortic stenosis  Mild Mitral regurgitation     Organic impotence/ED  On Viagra 100 mg     History of TIA-cardioembolic  8188:  MRI of the brain :  suggested 2 small acute areas of infarct  Also possible small hemorrhage versus cavernoma  Echocardiogram in 2020   ejection fraction 60%  Moderate LVH  Mild MR  Mild increased LA size  Mild aortic stenosis  Ascending aorta measured 4 3 cm  Mild aortic stenosis   Mild mitral regurgitation      Obesity, BMI 32 82      RECOMMENDATIONS:  Add hydrochlorothiazide 25 mg and potassium 10 mEq daily  Pro NT BNP  Echocardiogram  Advised on low-salt low-cholesterol diet, regular cardiovascular exercise and weight loss  PCP has also ordered repeat labs and electrolytes  Maintain adequate potassium and magnesium balance    Please call [phone] if any questions  HPI :     Thais Lind is a 61y o  year old male who came for follow up  According with the patient and his wife he has been feeling more dyspneic recently specially with exertion  He also has lower extremity edema which was not aware of  He has a history of mild aortic stenosis and mild mitral regurgitation and moderate LVH therefore possible diastolic dysfunction  He has also not been following a good diet or exercising regularly    We talked about low-salt and low-cholesterol diet regular cardiovascular exercise as tolerated and weight loss    REVIEW OF SYSTEMS:  Review of Systems Respiratory: Positive for shortness of breath  Cardiovascular: Positive for leg swelling  Musculoskeletal: Positive for back pain  All other systems reviewed and are negative  Historical Information   Past Medical History:   Diagnosis Date    Arthropathy of knee     last assessed 8/19/15    Bacterial pneumonia 02/05/2007    last assessed 2/5/7    [facility]     Colon polyp     CPAP (continuous positive airway pressure) dependence     Disorder of male genital organ 02/12/2008    last assessed 2/12/08    ED (erectile dysfunction) of organic origin     last assessed 2/13/17     History of hypertension     Seasonal allergies     Situational anxiety     resolved 3/16/17     Sleep apnea     Stroke [facility]     09/2020 TIA    Tinnitus     Wears hearing aid     bilateral     Past Surgical History:   Procedure Laterality Date    CARDIAC ELECTROPHYSIOLOGY PROCEDURE N/A 12/22/2021    Procedure: Cardiac Loop Recorder Implant;  Surgeon: Kris Hurd DO;  Location: [place] [address] CATH LAB; Service: Cardiology    COLONOSCOPY      x3-w/polyps    HERNIA REPAIR      umbilical,hemal inguinal    KNEE ARTHROSCOPY Left     meniscus    [place] COLONOSCOPY FLX DX W/COLLJ SPEC WHEN PFRMD N/A 5/7/2018    Procedure: COLONOSCOPY;  Surgeon: Blaine Paredes MD;  Location: [facility] GI LAB;   Service: Gastroenterology     Social History     Substance and Sexual Activity   Alcohol Use Yes    Alcohol/week: 14 0 standard drinks    Types: 14 Cans of beer per week     Social History     Substance and Sexual Activity   Drug Use No     Social History     Tobacco Use   Smoking Status Passive Smoke Exposure - Never Smoker   Smokeless Tobacco Never Used     Family History:   Family History   Problem Relation Age of Onset    Cancer Mother         breast    Diverticulitis Mother         colostomy    Breast cancer Mother     Heart disease Father         CHF    Cancer Sister         breast    Depression Sister     Cancer Sister         skin cancer    Cancer Sister         skin cancer    Colon cancer Neg Hx     Liver disease Neg Hx        Meds/Allergies     Allergies   Allergen Reactions    Pollen Extract Sneezing     Reaction Date: 18Sep2006;     Shellfish-Derived Products - Food Allergy Other (See Comments)     Eye swelling    Iodine Solution [Povidone Iodine] Rash     Eyes swell       Current Outpatient Medications:     albuterol (Proventil HFA) 90 mcg/act inhaler, Inhale 2 puffs every 4 (four) hours as needed for wheezing or shortness of breath, Disp: 6 7 g, Rfl: 7    ALPRAZolam (XANAX) 0 25 mg tablet, Take 1 tablet (0 25 mg total) by mouth 2 (two) times a day as needed for anxiety or sleep, Disp: 30 tablet, Rfl: 1    apixaban (Eliquis) 5 mg, Take 1 tablet (5 mg total) by mouth 2 (two) times a day, Disp: 60 tablet, Rfl: 5    atorvastatin (LIPITOR) 40 mg tablet, TAKE 1 TABLET BY MOUTH EVERY DAY IN THE EVENING, Disp: 30 tablet, Rfl: 1    hydrochlorothiazide (HYDRODIURIL) 25 mg tablet, Take 1 tablet (25 mg total) by mouth daily, Disp: 60 tablet, Rfl: 2    metoprolol succinate (TOPROL-XL) 25 mg 24 hr tablet, Take 1 tablet (25 mg total) by mouth daily 25 mg in evening, Disp: 90 tablet, Rfl: 2    metoprolol succinate (TOPROL-XL) 50 mg 24 hr tablet, Take 1 tablet (50 mg total) by mouth daily (Patient taking differently: Take 50 mg by mouth every morning), Disp: 30 tablet, Rfl: 5    potassium chloride (MICRO-K) 10 MEQ CR capsule, Take 1 capsule (10 mEq total) by mouth 2 (two) times a day, Disp: 60 capsule, Rfl: 2    sildenafil (VIAGRA) 100 mg tablet, Take 1 tablet (100 mg total) by mouth daily as needed for erectile dysfunction, Disp: 30 tablet, Rfl: 5    fexofenadine (ALLEGRA) 180 MG tablet, Take 1 tablet (180 mg total) by mouth daily for 30 days (Patient taking differently: Take 180 mg by mouth every morning), Disp: 30 tablet, Rfl: 0    Vitals: Blood pressure 150/90, pulse 82, temperature 98 °F (36 7 °C), height 6' (1 829 m), weight 110 kg (242 lb), SpO2 99 %  ?  Body mass index is 32 82 kg/m²  Vitals:    22 1004   Weight: 110 kg (242 lb)     BP Readings from Last 3 Encounters:   22 150/90   22 126/88   06/10/22 113/70       Physical Exam:    Neurologic:  Alert & oriented x 3, no new focal deficits, Not in any acute distress,  Constitutional:  Obese   Eyes:  Pupil equal and reacting to light, conjunctiva normal,   HENT:  Atraumatic, oropharynx moist, Neck- normal range of motion, no tenderness,  Neck supple, No JVP, No LNP   Respiratory:  Bilateral air entry, mostly clear to auscultation  Cardiovascular: S1-S2 regular with a I/VI ejection systolic murmur   GI:  Soft, nondistended, normal bowel sounds, nontender, no hepatosplenomegaly appreciated  Musculoskeletal: no tenderness, no deformities     Skin:  Well hydrated, no rash   Lymphatic:  No lymphadenopathy noted   Extremities:  Mild lower extremity edema and distal pulses are present        Diagnostic Studies Review Cardio:      EKG:  Sinus rhythm with frequent PVCs, heart rate 82 per minute, possible left atrial enlargement    Cardiac testing:   Results for orders placed during the hospital encounter of 20    Echo complete with contrast if indicated    Narrative  [id number]  [phone]    Transthoracic Echocardiogram  2D, M-mode, Doppler, and Color Doppler    Study date:  22-Sep-2020    Patient: Roberth Garcia  MR number: [id number]  Account number: [de-identified]  : 1955  Age: 60 years  Gender: Male  Status: Inpatient  Location: Bedside  Height: 72 in  Weight: 211 lb  BP: 152/ 96 mmHg    Indications: TIA, Cardiac Murmur    Diagnoses: G45 9 - Transient cerebral ischemic attack, unspecified    Sonographer:  Tameka Medina RDCS  Primary Physician:  Fausto Nunes DO  Referring Physician:  Jeanna Bernal MD  Group:  [facility]  Interpreting Physician:  Junie Mcfarland MD    SUMMARY    LEFT VENTRICLE:  Systolic function was normal  Ejection fraction was estimated to be 60 %  There were no regional wall motion abnormalities  There was moderate concentric hypertrophy  LEFT ATRIUM:  The atrium was dilated  RIGHT ATRIUM:  The atrium was dilated  MITRAL VALVE:  There was mild regurgitation  AORTIC VALVE:  Transaortic velocity was increased due to valvular stenosis  There was mild stenosis  Valve mean gradient was 15 mmHg  AORTA:  There was dilatation of the ascending aorta, with a maximum measured diameter of 4 3 cm  HISTORY: PRIOR HISTORY: HTN, HLD, Sleep Apnea, CPAP    PROCEDURE: The procedure was performed at the bedside  This was a routine study  The transthoracic approach was used  The study included complete 2D imaging, M-mode, complete spectral Doppler, and color Doppler  The heart rate was 70 bpm,  at the start of the study  Images were obtained from the parasternal, apical, subcostal, and suprasternal notch acoustic windows  Image quality was adequate  LEFT VENTRICLE: Size was normal  Systolic function was normal  Ejection fraction was estimated to be 60 %  There were no regional wall motion abnormalities  Wall thickness was mildly increased  There was moderate concentric hypertrophy  DOPPLER: The transmitral flow pattern was normal  Left ventricular diastolic function parameters were normal     RIGHT VENTRICLE: The size was normal  Systolic function was normal  Wall thickness was normal     LEFT ATRIUM: The atrium was dilated  RIGHT ATRIUM: The atrium was dilated  MITRAL VALVE: Valve structure was normal  There was normal leaflet separation  DOPPLER: The transmitral velocity was within the normal range  There was no evidence for stenosis  There was mild regurgitation  AORTIC VALVE: The valve was trileaflet  Leaflets exhibited mild calcification and mildly reduced cuspal separation   DOPPLER: Transaortic velocity was increased due to valvular stenosis  There was mild stenosis  There was no significant  regurgitation  TRICUSPID VALVE: The valve structure was normal  There was normal leaflet separation  DOPPLER: The transtricuspid velocity was within the normal range  There was no evidence for stenosis  There was trace regurgitation  PULMONIC VALVE: Leaflets exhibited normal thickness, no calcification, and normal cuspal separation  DOPPLER: The transpulmonic velocity was within the normal range  There was trace regurgitation  PERICARDIUM: There was no pericardial effusion  The pericardium was normal in appearance  AORTA: The root exhibited normal size  There was dilatation of the ascending aorta, with a maximum measured diameter of 4 3 cm  SYSTEMIC VEINS: IVC: The inferior vena cava was normal in size  Respirophasic changes were normal     MEASUREMENT TABLES    DOPPLER MEASUREMENTS  Aortic valve   (Reference normals)  Peak jassi   250 cm/s   (--)  Peak gradient   25 mmHg   (--)  Mean gradient   15 mmHg   (--)    SYSTEM MEASUREMENT TABLES    2D  %FS: 32 84 %  Ao Diam: 3 98 cm  EDV(Teich): 162 66 ml  EF(Teich): 60 58 %  ESV(Teich): 64 11 ml  IVSd: 1 35 cm  LA Area: 28 26 cm2  LA Diam: 4 24 cm  LVEDV MOD A4C: 148 4 ml  LVEF MOD A4C: 38 21 %  LVESV MOD A4C: 91 69 ml  LVIDd: 5 74 cm  LVIDs: 3 85 cm  LVLd A4C: 8 56 cm  LVLs A4C: 7 84 cm  LVOT Diam: 2 66 cm  LVPWd: 1 21 cm  RA Area: 21 9 cm2  RVIDd: 4 18 cm  SV MOD A4C: 56 71 ml  SV(Teich): 98 55 ml    CW  AV Env  Ti: 299 88 ms  AV VTI: 51 93 cm  AV Vmax: 2 42 m/s  AV Vmean: 1 73 m/s  AV maxP 41 mmHg  AV meanP 59 mmHg    MM  TAPSE: 1 87 cm    PW  ARCELIA (VTI): 1 88 cm2  ARCELIA Vmax: 1 91 cm2  E': 0 09 m/s  E/E': 7 65  LVOT Env  Ti: 295 27 ms  LVOT VTI: 17 63 cm  LVOT Vmax: 0 83 m/s  LVOT Vmean: 0 6 m/s  LVOT maxP 77 mmHg  LVOT meanP 64 mmHg  LVSV Dopp: 97 67 ml  MV A Jassi: 0 64 m/s  MV Dec McDonough: 3 4 m/s2  MV DecT: 192 4 ms  MV E Jassi: 0 65 m/s  MV E/A Ratio: 1 03  MV PHT: 55 8 ms  MVA By PHT: 3 94 cm2    [facility] Accredited Echocardiography Laboratory    Prepared and electronically signed by    Sebastián Perez MD  Signed 22-Sep-2020 15:19:26    Results for orders placed during the hospital encounter of 21    JOSE ELIAS    Narrative  [address]  [address] [facility]  [address]  [phone]    Transesophageal Echocardiogram  2D, Doppler, and Color Doppler    Study date:  2021    Patient: Kirstie Elliott  MR number: [id number]  Account number: [de-identified]  : 1955  Age: 63 years  Gender: Male  Status: Outpatient  Location: Cath lab  Height: 72 in  Weight: 222 6 lb  BP: 16262/ 92 mmHg    Indications: CVA    Diagnoses: I63 9 - Cerebral infarction, unspecified    Sonographer:  CARLOS Hardin  Primary Physician:  Maribel Page DO  Referring Physician:  Cate Bishop MD  Group:  [facility]  Interpreting Physician:  Davina Null MD    SUMMARY    LEFT VENTRICLE:  Size was normal   Ejection fraction was estimated in the range of 50 % to 60 %  Wall thickness was increased  LEFT ATRIUM:  The atrium was dilated  ATRIAL SEPTUM:  There was a trace left-to-right shunt  This is within normal limits for the patient's age  No major shunting to suggest patent ASD/significant PFO    RIGHT ATRIUM:  The atrium was dilated  MITRAL VALVE:  There was mild to moderate regurgitation  AORTIC VALVE:  There was mild stenosis  AORTA:  **Mild diffuse atheroma is noted of the transverse and distal aorta**  Upper normal ascending arota measurement of 3 8-4 1cm    HISTORY: PRIOR HISTORY: HTN,Sleep apnea,stroke  PROCEDURE: The procedure was performed in the catheterization laboratory  This was a routine study  The risks and alternatives of the procedure were explained to the patient and informed consent was obtained  The transesophageal approach  was used   The study included complete 2D imaging, limited spectral Doppler, and color Doppler  The heart rate was 76 bpm, at the start of the study  An adult omniplane probe was inserted by the attending cardiologist  Intubated with ease  One intubation attempt(s)  There was no blood detected on the probe  Image quality was adequate  LEFT VENTRICLE: Size was normal  Ejection fraction was estimated in the range of 50 % to 60 %  Wall thickness was increased  RIGHT VENTRICLE: The size was normal  Systolic function was normal     LEFT ATRIUM: The atrium was dilated  APPENDAGE: The size was normal  No thrombus was identified  ATRIAL SEPTUM: There was a trace left-to-right shunt  This is within normal limits for the patient's age  No major shunting to suggest patent ASD/significant PFO    RIGHT ATRIUM: The atrium was dilated  MITRAL VALVE: There was mild thickening  DOPPLER: There was mild to moderate regurgitation  AORTIC VALVE: The valve was trileaflet  Leaflets exhibited mildly increased thickness, mild to moderate calcification, and sclerosis  DOPPLER: There was mild stenosis  AORTA: **Mild diffuse atheroma is noted of the transverse and distal aorta** Upper normal ascending arota measurement of 3 8-4 1cm    SYSTEM MEASUREMENT TABLES    2D  Ao Diam: 3 88 cm    [facility] Accredited Echocardiography Laboratory    Prepared and electronically signed by    Elly Ferreira MD  Signed 16-Jul-2021 17:07:29    No results found for this or any previous visit  No results found for this or any previous visit  No results found for this or any previous visit  No results found for this or any previous visit  No results found for this or any previous visit  No results found for this or any previous visit  No results found for this or any previous visit  Imaging:  Chest X-Ray:   No Chest XR results available for this patient  CT-scan of the chest:     No CTA results available for this patient    Lab Review   Lab Results Component Value Date    WBC 6 89 06/10/2022    HGB 9 2 (L) 06/10/2022    HCT 28 1 (L) 06/10/2022    MCV 89 06/10/2022    RDW 13 5 06/10/2022     06/10/2022     BMP:  Lab Results   Component Value Date    SODIUM 143 06/10/2022    K 3 3 (L) 06/10/2022     06/10/2022    CO2 23 06/10/2022    BUN 15 06/10/2022    CREATININE 0 83 06/10/2022    GLUC 110 06/10/2022    GLUF 110 (H) 06/09/2022    CALCIUM 8 2 (L) 06/10/2022    CORRECTEDCA 9 2 06/07/2022    EGFR 92 06/10/2022    MG 2 3 06/10/2022     LFT:  Lab Results   Component Value Date    AST 12 06/07/2022    ALT 21 06/07/2022    ALKPHOS 110 06/07/2022    TP 5 9 (L) 06/07/2022    ALB 3 4 (L) 06/07/2022      No components found for: [employer] [facility]  Lab Results   Component Value Date    BWG5XGYENGXL 1 648 10/30/2020     Lab Results   Component Value Date    HGBA1C 5 3 11/12/2021     Lipid Profile:   Lab Results   Component Value Date    CHOLESTEROL 103 11/12/2021    HDL 34 (L) 11/12/2021    LDLCALC 57 11/12/2021    TRIG 58 11/12/2021     Lab Results   Component Value Date    CHOLESTEROL 103 11/12/2021    CHOLESTEROL 126 09/22/2020     Lab Results   Component Value Date    TROPONINI <0 02 09/21/2020     No results found for: NTBNP   Recent Results (from the past 672 hour(s))   CBC and differential    Collection Time: 06/07/22  2:07 PM   Result Value Ref Range    WBC 10 82 (H) 4 31 - 10 16 Thousand/uL    RBC 4 64 3 88 - 5 62 Million/uL    Hemoglobin 13 3 12 0 - 17 0 g/dL    Hematocrit 40 4 36 5 - 49 3 %    MCV 87 82 - 98 fL    MCH 28 7 26 8 - 34 3 pg    MCHC 32 9 31 4 - 37 4 g/dL    RDW 13 1 11 6 - 15 1 %    MPV 9 2 8 9 - 12 7 fL    Platelets 341 991 - 121 Thousands/uL    nRBC 0 /100 WBCs    Neutrophils Relative 65 43 - 75 %    Immat GRANS % 1 0 - 2 %    Lymphocytes Relative 20 14 - 44 %    Monocytes Relative 10 4 - 12 %    Eosinophils Relative 3 0 - 6 %    Basophils Relative 1 0 - 1 %    Neutrophils Absolute 7 15 1 85 - 7 62 Thousands/µL    Immature Grans Absolute 0 07 0 00 - 0 20 Thousand/uL    Lymphocytes Absolute 2 21 0 60 - 4 47 Thousands/µL    Monocytes Absolute 1 04 0 17 - 1 22 Thousand/µL    Eosinophils Absolute 0 29 0 00 - 0 61 Thousand/µL    Basophils Absolute 0 06 0 00 - 0 10 Thousands/µL   Protime-INR    Collection Time: 06/07/22  2:07 PM   Result Value Ref Range    Protime 14 5 11 6 - 14 5 seconds    INR 1 15 0 84 - 1 19   APTT    Collection Time: 06/07/22  2:07 PM   Result Value Ref Range    PTT 24 23 - 37 seconds   Comprehensive metabolic panel    Collection Time: 06/07/22  2:07 PM   Result Value Ref Range    Sodium 147 (H) 136 - 145 mmol/L    Potassium 3 9 3 5 - 5 3 mmol/L    Chloride 112 (H) 100 - 108 mmol/L    CO2 22 21 - 32 mmol/L    ANION GAP 13 4 - 13 mmol/L    BUN 19 5 - 25 mg/dL    Creatinine 1 32 (H) 0 60 - 1 30 mg/dL    Glucose 173 (H) 65 - 140 mg/dL    Calcium 8 7 8 3 - 10 1 mg/dL    Corrected Calcium 9 2 8 3 - 10 1 mg/dL    AST 12 5 - 45 U/L    ALT 21 12 - 78 U/L    Alkaline Phosphatase 110 46 - 116 U/L    Total Protein 5 9 (L) 6 4 - 8 2 g/dL    Albumin 3 4 (L) 3 5 - 5 0 g/dL    Total Bilirubin 0 63 0 20 - 1 00 mg/dL    eGFR 56 ml/min/1 73sq m   Magnesium    Collection Time: 06/07/22  2:07 PM   Result Value Ref Range    Magnesium 2 0 1 6 - 2 6 mg/dL   Lactic acid    Collection Time: 06/07/22  2:07 PM   Result Value Ref Range    LACTIC ACID 2 5 (HH) 0 5 - 2 0 mmol/L   Type and screen    Collection Time: 06/07/22  2:07 PM   Result Value Ref Range    ABO Grouping O     Rh Factor Positive     Antibody Screen Negative     Specimen Expiration Date [id number]    HS Troponin 0hr (reflex protocol)    Collection Time: 06/07/22  2:07 PM   Result Value Ref Range    hs TnI 0hr 17 "Refer to ACS Flowchart"- see link ng/L   ECG 12 lead    Collection Time: 06/07/22  2:07 PM   Result Value Ref Range    Ventricular Rate 81 BPM    Atrial Rate 81 BPM    AL Interval 142 ms    QRSD Interval 84 ms    QT Interval 402 ms    QTC Interval 466 ms    P Axis -6 degrees    QRS Axis 5 degrees    T Wave Axis 40 degrees   Blood culture #1    Collection Time: 06/07/22  2:13 PM    Specimen: Arm, Right; Blood   Result Value Ref Range    Blood Culture Staphylococcus coagulase negative (A)     Gram Stain Result Gram positive cocci in clusters (A)    Blood Culture Identification Panel    Collection Time: 06/07/22  2:13 PM    Specimen: Arm, Right; Blood   Result Value Ref Range    Staphylococcus epidermidis Detected (A) Not Detected   COVID/FLU/RSV - 2 hour TAT    Collection Time: 06/07/22  2:20 PM    Specimen: Nose; Nares   Result Value Ref Range    SARS-CoV-2 Negative Negative    INFLUENZA A PCR Negative Negative    INFLUENZA B PCR Negative Negative    RSV PCR Negative Negative   Blood culture #2    Collection Time: 06/07/22  2:20 PM    Specimen: Arm, Left; Blood   Result Value Ref Range    Blood Culture No Growth After 5 Days      UA w Reflex to Microscopic w Reflex to Culture    Collection Time: 06/07/22  4:36 PM    Specimen: Urine, Clean Catch   Result Value Ref Range    Color, UA Light Yellow     Clarity, UA Clear     Specific Gravity, UA 1 020 1 000 - 1 030    pH, UA 5 5 5 0, 5 5, 6 0, 6 5, 7 0, 7 5, 8 0, 8 5, 9 0    Leukocytes, UA Negative Negative    Nitrite, UA Negative Negative    Protein, UA Negative Negative mg/dl    Glucose, UA Negative Negative mg/dl    Ketones, UA Negative Negative mg/dl    Urobilinogen, UA 0 2 0 2, 1 0 E U /dl E U /dl    Bilirubin, UA Negative Negative    Occult Blood, UA Negative Negative   HS Troponin I 2hr    Collection Time: 06/07/22  4:39 PM   Result Value Ref Range    hs TnI 2hr 45 "Refer to ACS Flowchart"- see link ng/L    Delta 2hr hsTnI 28 (H) <20 ng/L   Lactic acid 2 Hours    Collection Time: 06/07/22  4:39 PM   Result Value Ref Range    LACTIC ACID 1 3 0 5 - 2 0 mmol/L   HS Troponin I 4hr    Collection Time: 06/07/22  5:59 PM   Result Value Ref Range    hs TnI 4hr 66 (H) "Refer to ACS Flowchart"- see link ng/L    Delta 4hr hsTnI 49 (H) <20 ng/L   CBC and differential    Collection Time: 06/08/22  4:50 AM   Result Value Ref Range    WBC 10 71 (H) 4 31 - 10 16 Thousand/uL    RBC 3 57 (L) 3 88 - 5 62 Million/uL    Hemoglobin 10 4 (L) 12 0 - 17 0 g/dL    Hematocrit 31 1 (L) 36 5 - 49 3 %    MCV 87 82 - 98 fL    MCH 28 9 26 8 - 34 3 pg    MCHC 33 1 31 4 - 37 4 g/dL    RDW 13 1 11 6 - 15 1 %    MPV 9 6 8 9 - 12 7 fL    Platelets 348 600 - 038 Thousands/uL    nRBC 0 /100 WBCs    Neutrophils Relative 73 43 - 75 %    Immat GRANS % 1 0 - 2 %    Lymphocytes Relative 13 (L) 14 - 44 %    Monocytes Relative 10 4 - 12 %    Eosinophils Relative 2 0 - 6 %    Basophils Relative 1 0 - 1 %    Neutrophils Absolute 7 94 (H) 1 85 - 7 62 Thousands/µL    Immature Grans Absolute 0 05 0 00 - 0 20 Thousand/uL    Lymphocytes Absolute 1 44 0 60 - 4 47 Thousands/µL    Monocytes Absolute 1 03 0 17 - 1 22 Thousand/µL    Eosinophils Absolute 0 20 0 00 - 0 61 Thousand/µL    Basophils Absolute 0 05 0 00 - 0 10 Thousands/µL   Basic metabolic panel    Collection Time: 06/08/22  4:50 AM   Result Value Ref Range    Sodium 145 136 - 145 mmol/L    Potassium 3 3 (L) 3 5 - 5 3 mmol/L    Chloride 111 (H) 100 - 108 mmol/L    CO2 23 21 - 32 mmol/L    ANION GAP 11 4 - 13 mmol/L    BUN 25 5 - 25 mg/dL    Creatinine 1 03 0 60 - 1 30 mg/dL    Glucose 124 65 - 140 mg/dL    Calcium 8 2 (L) 8 3 - 10 1 mg/dL    eGFR 76 ml/min/1 73sq m   Magnesium    Collection Time: 06/08/22  4:50 AM   Result Value Ref Range    Magnesium 2 0 1 6 - 2 6 mg/dL   High Sensitivity Troponin I Random    Collection Time: 06/08/22  4:50 AM   Result Value Ref Range    HS TnI random 37 (H) 8 - 18 ng/L   CBC and differential    Collection Time: 06/09/22  4:53 AM   Result Value Ref Range    WBC 10 21 (H) 4 31 - 10 16 Thousand/uL    RBC 3 50 (L) 3 88 - 5 62 Million/uL    Hemoglobin 10 1 (L) 12 0 - 17 0 g/dL    Hematocrit 30 8 (L) 36 5 - 49 3 %    MCV 88 82 - 98 fL    MCH 28 9 26 8 - 34 3 pg    MCHC 32 8 31 4 - 37 4 g/dL    RDW 13 3 11 6 - 15 1 % MPV 9 3 8 9 - 12 7 fL    Platelets 760 302 - 461 Thousands/uL    nRBC 0 /100 WBCs    Neutrophils Relative 67 43 - 75 %    Immat GRANS % 1 0 - 2 %    Lymphocytes Relative 19 14 - 44 %    Monocytes Relative 10 4 - 12 %    Eosinophils Relative 3 0 - 6 %    Basophils Relative 0 0 - 1 %    Neutrophils Absolute 6 88 1 85 - 7 62 Thousands/µL    Immature Grans Absolute 0 07 0 00 - 0 20 Thousand/uL    Lymphocytes Absolute 1 93 0 60 - 4 47 Thousands/µL    Monocytes Absolute 0 97 0 17 - 1 22 Thousand/µL    Eosinophils Absolute 0 32 0 00 - 0 61 Thousand/µL    Basophils Absolute 0 04 0 00 - 0 10 Thousands/µL   Basic metabolic panel    Collection Time: 06/09/22  4:53 AM   Result Value Ref Range    Sodium 142 136 - 145 mmol/L    Potassium 3 8 3 5 - 5 3 mmol/L    Chloride 109 (H) 100 - 108 mmol/L    CO2 22 21 - 32 mmol/L    ANION GAP 11 4 - 13 mmol/L    BUN 23 5 - 25 mg/dL    Creatinine 0 80 0 60 - 1 30 mg/dL    Glucose 110 65 - 140 mg/dL    Glucose, Fasting 110 (H) 65 - 99 mg/dL    Calcium 8 1 (L) 8 3 - 10 1 mg/dL    eGFR 94 ml/min/1 73sq m   Magnesium    Collection Time: 06/09/22  4:53 AM   Result Value Ref Range    Magnesium 2 2 1 6 - 2 6 mg/dL   Blood culture    Collection Time: 06/09/22  9:47 AM    Specimen: Arm, Left; Blood   Result Value Ref Range    Blood Culture No Growth After 5 Days  Blood culture    Collection Time: 06/09/22  9:47 AM    Specimen: Arm, Left; Blood   Result Value Ref Range    Blood Culture No Growth After 5 Days      CBC and differential    Collection Time: 06/10/22  6:07 AM   Result Value Ref Range    WBC 6 89 4 31 - 10 16 Thousand/uL    RBC 3 15 (L) 3 88 - 5 62 Million/uL    Hemoglobin 9 2 (L) 12 0 - 17 0 g/dL    Hematocrit 28 1 (L) 36 5 - 49 3 %    MCV 89 82 - 98 fL    MCH 29 2 26 8 - 34 3 pg    MCHC 32 7 31 4 - 37 4 g/dL    RDW 13 5 11 6 - 15 1 %    MPV 9 3 8 9 - 12 7 fL    Platelets 574 740 - 557 Thousands/uL    nRBC 0 /100 WBCs    Neutrophils Relative 68 43 - 75 %    Immat GRANS % 1 0 - 2 % Lymphocytes Relative 16 14 - 44 %    Monocytes Relative 10 4 - 12 %    Eosinophils Relative 4 0 - 6 %    Basophils Relative 1 0 - 1 %    Neutrophils Absolute 4 76 1 85 - 7 62 Thousands/µL    Immature Grans Absolute 0 04 0 00 - 0 20 Thousand/uL    Lymphocytes Absolute 1 07 0 60 - 4 47 Thousands/µL    Monocytes Absolute 0 69 0 17 - 1 22 Thousand/µL    Eosinophils Absolute 0 29 0 00 - 0 61 Thousand/µL    Basophils Absolute 0 04 0 00 - 0 10 Thousands/µL   Basic metabolic panel    Collection Time: 06/10/22  6:07 AM   Result Value Ref Range    Sodium 143 136 - 145 mmol/L    Potassium 3 3 (L) 3 5 - 5 3 mmol/L    Chloride 108 100 - 108 mmol/L    CO2 23 21 - 32 mmol/L    ANION GAP 12 4 - 13 mmol/L    BUN 15 5 - 25 mg/dL    Creatinine 0 83 0 60 - 1 30 mg/dL    Glucose 110 65 - 140 mg/dL    Calcium 8 2 (L) 8 3 - 10 1 mg/dL    eGFR 92 ml/min/1 73sq m   Magnesium    Collection Time: 06/10/22  6:07 AM   Result Value Ref Range    Magnesium 2 3 1 6 - 2 6 mg/dL             Dr Winnie Rubio MD, [facility]      "This note has been constructed using a voice recognition system  Therefore there may be syntax, spelling, and/or grammatical errors   Please call if you have any questions  "

## 2022-07-07 ENCOUNTER — HOSPITAL ENCOUNTER (OUTPATIENT)
Dept: NON INVASIVE DIAGNOSTICS | Facility: CLINIC | Age: 67
Discharge: HOME | End: 2022-07-07
Payer: COMMERCIAL

## 2022-07-07 ENCOUNTER — APPOINTMENT (OUTPATIENT)
Dept: LAB | Facility: CLINIC | Age: 67
End: 2022-07-07
Payer: COMMERCIAL

## 2022-07-07 VITALS
WEIGHT: 242 LBS | HEIGHT: 72 IN | SYSTOLIC BLOOD PRESSURE: 150 MMHG | BODY MASS INDEX: 32.78 KG/M2 | HEART RATE: 82 BPM | DIASTOLIC BLOOD PRESSURE: 90 MMHG

## 2022-07-07 DIAGNOSIS — R06.02 SHORTNESS OF BREATH ON EXERTION: ICD-10-CM

## 2022-07-07 DIAGNOSIS — E78.5 HYPERLIPIDEMIA LDL GOAL <130: ICD-10-CM

## 2022-07-07 DIAGNOSIS — I10 BENIGN ESSENTIAL HYPERTENSION: ICD-10-CM

## 2022-07-07 DIAGNOSIS — R73.03 PRE-DIABETES: ICD-10-CM

## 2022-07-07 LAB
ALBUMIN SERPL BCP-MCNC: 4.2 G/DL (ref 3.5–5)
ALP SERPL-CCNC: 90 U/L (ref 34–104)
ALT SERPL W P-5'-P-CCNC: 11 U/L (ref 7–52)
ANION GAP SERPL CALCULATED.3IONS-SCNC: 7 MMOL/L (ref 4–13)
AST SERPL W P-5'-P-CCNC: 12 U/L (ref 13–39)
BILIRUB SERPL-MCNC: 0.77 MG/DL (ref 0.2–1)
BUN SERPL-MCNC: 21 MG/DL (ref 5–25)
CALCIUM SERPL-MCNC: 9.1 MG/DL (ref 8.4–10.2)
CHLORIDE SERPL-SCNC: 110 MMOL/L (ref 96–108)
CO2 SERPL-SCNC: 25 MMOL/L (ref 21–32)
CREAT SERPL-MCNC: 0.83 MG/DL (ref 0.6–1.3)
ERYTHROCYTE [DISTWIDTH] IN BLOOD BY AUTOMATED COUNT: 12.7 % (ref 11.6–15.1)
EST. AVERAGE GLUCOSE BLD GHB EST-MCNC: 100 MG/DL
GFR SERPL CREATININE-BSD FRML MDRD: 92 ML/MIN/1.73SQ M
GLUCOSE SERPL-MCNC: 104 MG/DL (ref 65–140)
HBA1C MFR BLD: 5.1 %
HCT VFR BLD AUTO: 37.1 % (ref 36.5–49.3)
HGB BLD-MCNC: 11.5 G/DL (ref 12–17)
MCH RBC QN AUTO: 25.8 PG (ref 26.8–34.3)
MCHC RBC AUTO-ENTMCNC: 31 G/DL (ref 31.4–37.4)
MCV RBC AUTO: 83 FL (ref 82–98)
NT-PROBNP SERPL-MCNC: 494 PG/ML
PLATELET # BLD AUTO: 289 THOUSANDS/UL (ref 149–390)
PMV BLD AUTO: 9 FL (ref 8.9–12.7)
POTASSIUM SERPL-SCNC: 3.9 MMOL/L (ref 3.5–5.3)
PROT SERPL-MCNC: 6.5 G/DL (ref 6.4–8.4)
RBC # BLD AUTO: 4.46 MILLION/UL (ref 3.88–5.62)
SODIUM SERPL-SCNC: 142 MMOL/L (ref 135–147)
WBC # BLD AUTO: 6.14 THOUSAND/UL (ref 4.31–10.16)

## 2022-07-07 PROCEDURE — 93306 TTE W/DOPPLER COMPLETE: CPT | Performed by: INTERNAL MEDICINE

## 2022-07-07 PROCEDURE — 80053 COMPREHEN METABOLIC PANEL: CPT

## 2022-07-07 PROCEDURE — 93306 TTE W/DOPPLER COMPLETE: CPT

## 2022-07-07 PROCEDURE — 83880 ASSAY OF NATRIURETIC PEPTIDE: CPT

## 2022-07-07 PROCEDURE — 85027 COMPLETE CBC AUTOMATED: CPT

## 2022-07-07 PROCEDURE — 83036 HEMOGLOBIN GLYCOSYLATED A1C: CPT

## 2022-07-07 PROCEDURE — 36415 COLL VENOUS BLD VENIPUNCTURE: CPT

## 2022-07-08 LAB
AORTIC ROOT: 3.8 CM
AORTIC VALVE MEAN VELOCITY: 17.5 M/S
APICAL FOUR CHAMBER EJECTION FRACTION: 59 %
ASCENDING AORTA: 4.5 CM
AV AREA BY CONTINUOUS VTI: 1.5 CM2
AV AREA PEAK VELOCITY: 1.5 CM2
AV LVOT MEAN GRADIENT: 1 MMHG
AV LVOT PEAK GRADIENT: 2 MMHG
AV MEAN GRADIENT: 14 MMHG
AV PEAK GRADIENT: 22 MMHG
AV VALVE AREA: 1.49 CM2
AV VELOCITY RATIO: 0.29
AVA (PLAN): 1.8 CM2
DOP CALC AO PEAK VEL: 2.37 M/S
DOP CALC AO VTI: 56.46 CM
DOP CALC LVOT AREA: 5.31 CM2
DOP CALC LVOT DIAMETER: 2.6 CM
DOP CALC LVOT PEAK VEL VTI: 15.8 CM
DOP CALC LVOT PEAK VEL: 0.69 M/S
DOP CALC LVOT STROKE INDEX: 35.1 ML/M2
DOP CALC LVOT STROKE VOLUME: 83.84
E WAVE DECELERATION TIME: 260 MS
FRACTIONAL SHORTENING: 32 (ref 28–44)
INTERVENTRICULAR SEPTUM IN DIASTOLE (PARASTERNAL SHORT AXIS VIEW): 1.6 CM
INTERVENTRICULAR SEPTUM: 1.6 CM (ref 0.6–1.1)
LAAS-AP4: 29.2 CM2
LEFT ATRIUM SIZE: 5.3 CM
LEFT INTERNAL DIMENSION IN SYSTOLE: 4 CM (ref 2.1–4)
LEFT VENTRICULAR INTERNAL DIMENSION IN DIASTOLE: 5.9 CM (ref 3.5–6)
LEFT VENTRICULAR POSTERIOR WALL IN END DIASTOLE: 1.5 CM
LEFT VENTRICULAR STROKE VOLUME: 105 ML
LVSV (TEICH): 105 ML
MV E'TISSUE VEL-SEP: 9 CM/S
MV PEAK A VEL: 0.43 M/S
MV PEAK E VEL: 64 CM/S
MV STENOSIS PRESSURE HALF TIME: 75 MS
MV VALVE AREA P 1/2 METHOD: 2.93
PA SYSTOLIC PRESSURE: 30 MMHG
RIGHT ATRIUM AREA SYSTOLE A4C: 28.8 CM2
RIGHT VENTRICLE ID DIMENSION: 5.1 CM
SL CV LV EF: 55
SL CV PED ECHO LEFT VENTRICLE DIASTOLIC VOLUME (MOD BIPLANE) 2D: 175 ML
SL CV PED ECHO LEFT VENTRICLE SYSTOLIC VOLUME (MOD BIPLANE) 2D: 70 ML
TR MAX PG: 21 MMHG
TR PEAK VELOCITY: 2.3 M/S
TRICUSPID VALVE PEAK REGURGITATION VELOCITY: 2.32 M/S

## 2022-07-11 ENCOUNTER — TELEPHONE (OUTPATIENT)
Dept: CARDIOLOGY CLINIC | Facility: CLINIC | Age: 67
End: 2022-07-11

## 2022-07-11 NOTE — TELEPHONE ENCOUNTER
P/C left a message on nurse line, called pt back and advised of results per , pt verbally understood

## 2022-07-11 NOTE — TELEPHONE ENCOUNTER
----- Message from Jenel Galeazzi, MD sent at 7/9/2022 11:05 AM EDT -----  Please call and inform the patient that the Echocardiogram showed normal pumping function of the heart  No significant valve abnormality was seen    Will discuss further at next office visit

## 2022-07-11 NOTE — TELEPHONE ENCOUNTER
----- Message from Marshall Saldaña MD sent at 7/9/2022 11:03 AM EDT -----  Please call and inform patient that I reviewed the lab results    Continue medications as advised and prescribed during the office visit

## 2022-07-15 DIAGNOSIS — Z86.73 HISTORY OF CARDIOEMBOLIC CEREBROVASCULAR ACCIDENT (CVA): ICD-10-CM

## 2022-07-15 RX ORDER — ATORVASTATIN CALCIUM 40 MG/1
TABLET, FILM COATED ORAL
Qty: 30 TABLET | Refills: 1 | Status: SHIPPED | OUTPATIENT
Start: 2022-07-15 | End: 2022-07-23

## 2022-07-18 NOTE — QUICK NOTE
Post admit check:      Patient presented as a transfer from [facility] after outpatient MRI which was reportedly abnormal, concerning for stroke  Admitted to stroke pathway  Neurology and neurosurgery consultation is pending  On statin  AP on hold given CTA 9/21 which was concerning for right parietal hemorrhage  Will replace potassium  Additional plan pending neurology and neurosurgery recommendations  ELVIS Mederos Procedure Start

## 2022-07-23 DIAGNOSIS — Z86.73 HISTORY OF CARDIOEMBOLIC CEREBROVASCULAR ACCIDENT (CVA): ICD-10-CM

## 2022-07-23 RX ORDER — ATORVASTATIN CALCIUM 40 MG/1
TABLET, FILM COATED ORAL
Qty: 90 TABLET | Refills: 1 | Status: ON HOLD | OUTPATIENT
Start: 2022-07-23

## 2022-08-12 ENCOUNTER — EVALUATION (OUTPATIENT)
Dept: PHYSICAL THERAPY | Facility: CLINIC | Age: 67
End: 2022-08-12
Payer: COMMERCIAL

## 2022-08-12 DIAGNOSIS — M54.50 LOW BACK PAIN WITHOUT SCIATICA, UNSPECIFIED BACK PAIN LATERALITY, UNSPECIFIED CHRONICITY: ICD-10-CM

## 2022-08-12 DIAGNOSIS — R26.89 BALANCE DISORDER: ICD-10-CM

## 2022-08-12 DIAGNOSIS — I69.993 CVA, OLD, ATAXIA: Primary | ICD-10-CM

## 2022-08-12 DIAGNOSIS — R53.81 PHYSICAL DECONDITIONING: ICD-10-CM

## 2022-08-12 PROCEDURE — 97162 PT EVAL MOD COMPLEX 30 MIN: CPT

## 2022-08-12 NOTE — PROGRESS NOTES
PT Evaluation          Insurance:  AMA/CMS Eval/ Re-eval POC expires Stepehn Vanegas #/ Referral # Total   Visits  Start date  Expiration date Extension  Visit limitation? PT only or  PT+OT? Co-Insurance   AMA 22  NA Visits NA NA   PT Yes                                                                     Today's date: 2022  Patient name: Adonay Tolentino  : 1955  MRN: [id number]  Referring provider: Jean-Pierre Hamlin MD  Dx:   Encounter Diagnosis     ICD-10-CM    1  CVA, old, ataxia  I69 993    2  Balance disorder  R26 89    3  Physical deconditioning  R53 81    4  Low back pain without sciatica, unspecified back pain laterality, unspecified chronicity  M54 50          Assessment  Assessment details: Patient is a 61 y o  Male who presents to skilled outpatient PT with referral for a chronic R thalamic CVA  He presents to PT with impairments in coordination, strength, proprioception, balance and endurance  Patient's wife is present and confirms that he has mostly been sedentary for the past two years  Patient's score on 5x STS does not correlate with his strength according to MMT testing, as he was approximately 4+/5 B/L, but his score with 5x STS places him below his age matched peers  Patient's TUG, FGA and 10 meter scores place him at a HIGH risk for falls  His gait speed is below his previous measurement from a little over a year and categorizes him as a limited community ambulator and unable to cross the street safely  Patient also demonstrating poor postural awareness and according to testing his generalized proprioception is off, which could be the cause for his ataxic gait  With the 10 Meter walk test noted gait impairments include the following, poor R LE WB, limited trunk ROT/arm swing and a wide NATHANAEL   Based on his presentation today Adonay Tolentino would benefit from skilled outpatient physical therapy in order to address impairments listed above in order to maximize ability to return to life roles within home and community safely and independently  All questions addressed at this time  Impairments: Abnormal coordination, Abnormal gait, Abnormal or restricted ROM, Activity intolerance, Impaired balance, Impaired physical strength, Lacks appropriate HEP, Pain with function and Abnormal movement  Understanding of Dx/Px/POC: Good  Prognosis: Good      Patient verbalized understanding of POC  Please contact me if you have any questions or recommendations  Thank you for the referral and the opportunity to share in [address] [place] [address]          Plan  Plan details: 6 MWT, generalized balance, HIGT, dynamic balance  Patient would benefit from: Skilled PT  Planned modality interventions: Biofeedback and Cryotherapy  Planned therapy interventions: Balance, Body mechanics training, Coordination, Functional ROM exercises, Gait training, HEP, Manual therapy, Motor coordination training, Neuromuscular re-education, Patient education, Strengthening, Stretching, Therapeutic activities and Therapeutic exercises  Frequency: 2x/wk and 3x/wk  Duration in weeks: 12 weeks  Plan of Care beginning date: 8/12/22  Plan of Care expiration date: 3 months - 11/12/22  Treatment plan discussed with: Patient         Goals  Short Term Goals (4 weeks):    - Patient will improve time on TUG by 2 9 seconds from 14 23 seconds to 11 33 seconds to facilitate improved safety in all ambulation  - Patient will be independent in basic HEP 2-3 weeks  - Patient will improve 5xSTS score by 2 3 seconds from 28 76 seconds to 26 46 seconds to promote improved LE functional strength needed for ADLs  - Patient will complete components of CB&M to promote agility necessary for sports related tasks    Long Term Goals (12 weeks):  - Patient will be independent in a comprehensive home exercise program  - Patient will improve gait speed by 0 18 m/s from 0 99 m/s to 1 17 m/s to improve safety with community ambulation  - Patient will improve scoring on FGA by 4 points from 12/30 to 16/30 to progress safety with dynamic tasks  - Patient will be able to demonstrate HT in gait without veering  - Patient will improve 6 Minute Walk Test score by 190 feet from baseline score to promote improved cardiovascular endurance  - Patient will report 50% reduction in near falls in order to improve safety with functional tasks and reduce his risk for falls  - Patient will report going on walks at least 3 days per week to promote independence and improved cardiovascular endurance  - Patient will be able to ascend/descend stairs reciprocally without UE assist to promote independence and safety with ADLs  - Patient will report 50% reduction in near falls when ambulating on uneven terrain      Cut off score    All date taken from APTA Neuro Section or Rehab Measures      Mathew:  Miguel et al , 2018  Sofia Twirl TV Ultr[address] 112: 2 7 pts    Bradley , 2011  Cut-off score: 45/56    Chronic CVA  < 44/56 high risk for falls (Miguel et al , 2018)  < 47 5/56 slow walker status (German soares al , 2011) 5xSTS: Diallo soares al 2010  Sofia Heróis [address] 112: 2 3 sec  Age Norms:  60-69: 11 1 sec  70-79: 12 6 sec  80-89: 14 8 sec    Diallo 2010, Chronic Stroke  Chronic CVA: 12 sec   TUG  Dolores soares al , 2005  MDC: 2 9 sec    Cut off score:  >13 5 sec community dwelling adults  >32 2 frail elderly  <20 I for basic transfers  >30 dependent on transfers 10 Meter Walk Test:   Ermelinda galvan , 2006  Small meaningful change: 0 06 m/s  Substantial meaningful change: 0 14 m/s  MCID: 0 16 m/s    < 0 4 m/s household ambulators  0 4 - 0 8 m/s limited community ambulators  > 0 8 m/s community ambulators   FGA: Isabel et al , 2010  MCID: 4 2 pts  Geriatrics/community < 22/30 fall risk  Geriatrics/community < 20/30 unexplained falls DGI  MDC: vestibular - 4 pts  MDC: geriatric/community - 3 pts  Falls risk <19/24   6 Minute Walk Test  Edgar , 2006  MDC: 60 98 m (200 01 ft)    Nuvia Pastor & Kenyetta Albrecht, 2012  MCID: 34 4 m    Age Norms  61-76: M - 1876 ft   F - 1765 ft  70-79: M - 1729 ft   F - 1545 ft  80-89: M - 1368 ft   F - 1286 ft Modified Amira  0: No increase in tone  1: Catch and release or min resistance at end range  1+: Catch f/b min resistance throughout remainder (< half ROM)  2: Easily moved, but more marked tone throughout most ROM  3: Significant tone, PROM difficult  4: Rigid   MiniBest: Taurus et al , 2013  CVA < 17 5 fall risk Pass (Acute CVA)  MDC: 1 8 points (acute), 3 2 points (chronic)         Subjective    History of Present Illness  Mechanism of injury: Patient has a history of a R Thalamic CVA in   Patient was recently seen in loop recorder implantation on 2021 for AF surveillance and underlying cryptogenic stroke  Patient's wife reports that he is pretty sedentary and is not motivated  Patient reports he uses a cane in unfamiliar territory, but otherwise he does not utilize it      Primary AD: SPC out in the community  Assist level at home: No  WC usage: None  PLOF: Independent     Pain  Current pain ratin-3/10  At best pain ratin-3/10  At worst pain ratin/10  Location: LBP  Aggravating factors: sitting     Social Support  Steps to enter house: a few steps to enter  Stairs in house: , but not [place]   Lives in: two story home  Lives with: wife    Employment status: Retired  Hand dominance: R handed     Treatments  Previous treatment: None  Current treatment: None  Diagnostic Testing: Loop monitor, MRI previous years      Objective     Vitals  - HR: 73 BPM  - BP: 140/90  mmHG  - SPO2: 97%    HR Max  - 207 - (0 7x64)  = 162 2    LE MMT  - R Hip Flexion: 4-/5  - L Hip Flexion: 5/5  - R Hip Extension: 5/5  - L Hip Extension: 5/5  - R Hip Abduction: 4+/5  - L Hip abduction: 4+/5  - R Hip adduction: 4+/5  - L Hip adduction: 4+/5  - R Knee Extension: 4+/5  - L Knee Extension: 4+/5  - R Knee Flexion: 4+/5  - L Knee Flexion: 4+/5  - R Ankle DF: 4+/5  - L Ankle DF: 4+/5  - R Ankle PF: 4+/5  - L Ankle PF: 4+/5    Sensation  - Light touch: Normal   - Deep pressure: Normal  - Pain: Normal  - Temperature: Normal  - Proprioception: Abnormal R side only     Coordination  - Dysmetria: Abnormal  - Dysdiadochokinesia: Abnormal  - Alternating Toe Taps: Normal      HEP  - STS  - Trunk flexion/roll outs at table  - Repeated EXT      Outcome Measures Initial Eval  8/12/22        5xSTS 28 76 sec, + push on knees        TUG 14 23 sec, no AD        10 meter 0 99 m/s        FGA 12/30        6MWT defer ft                               Precautions: A fib  Past Medical History:   Diagnosis Date    Arthropathy of knee     last assessed 8/19/15    Bacterial pneumonia 02/05/2007    last assessed 2/5/7    Brain atrophy (HCC)     Colon polyp     CPAP (continuous positive airway pressure) dependence     Disorder of male genital organ 02/12/2008    last assessed 2/12/08    ED (erectile dysfunction) of organic origin     last assessed 2/13/17     History of hypertension     Seasonal allergies     Situational anxiety     resolved 3/16/17     Sleep apnea     Stroke [facility])     09/2020 TIA    Tinnitus     Wears hearing aid     bilateral

## 2022-08-17 ENCOUNTER — APPOINTMENT (OUTPATIENT)
Dept: PHYSICAL THERAPY | Facility: CLINIC | Age: 67
End: 2022-08-17
Payer: COMMERCIAL

## 2022-08-19 ENCOUNTER — OFFICE VISIT (OUTPATIENT)
Dept: PHYSICAL THERAPY | Facility: CLINIC | Age: 67
End: 2022-08-19
Payer: COMMERCIAL

## 2022-08-19 DIAGNOSIS — I69.993 CVA, OLD, ATAXIA: Primary | ICD-10-CM

## 2022-08-19 DIAGNOSIS — M54.50 LOW BACK PAIN WITHOUT SCIATICA, UNSPECIFIED BACK PAIN LATERALITY, UNSPECIFIED CHRONICITY: ICD-10-CM

## 2022-08-19 DIAGNOSIS — R53.81 PHYSICAL DECONDITIONING: ICD-10-CM

## 2022-08-19 DIAGNOSIS — R26.89 BALANCE DISORDER: ICD-10-CM

## 2022-08-19 PROCEDURE — 97112 NEUROMUSCULAR REEDUCATION: CPT

## 2022-08-19 NOTE — PROGRESS NOTES
Daily Note     Today's date: 2022  Patient name: Rey Estrada  : 1955  MRN: [id number]  Referring provider: Tonio Daniels MD  Dx:   Encounter Diagnosis     ICD-10-CM    1  CVA, old, ataxia  I69 993    2  Balance disorder  R26 89    3  Physical deconditioning  R53 81    4  Low back pain without sciatica, unspecified back pain laterality, unspecified chronicity  M54 50                   Subjective: Patient reports to PT session reporting he had some lightheadedness 2 days ago that led him to cancel his appointment  Objective: See treatment diary below    TA:  - 6 MWT (see below)    NMR (3# ankle weights):  - STS from standard chair (airex) + med ball hold (6#) 2 x 10  - FWD/LAT chantel negotiation (6") x 3 cycles down/back  - Marching + tidal tank carry 2 x 100 ft    Baseline HR: 91 bpm    Treadmill training (focus on heel strike/increased step length)  - Warm up x 2 mins @ 1 2 mph, 0% incline  - 2-8 minutes @ 1 5 mph, 0% incline - Peak HR: 107 bpm  - Cool down x 2 mins @ 1 0-1 2 mph, 0% incline    Assessment: Patient tolerated treatment session well today with focus on concluding outcome measure testing and establishing strength and mobility exercise program  Patient performed below age-matched normative values for the 6 MWT, suggesting deficits in cardiovascular endurance  Addition of ankle weights to assist with increasing proprioceptive awareness  Initiation of treadmill training with focus on increasing step length and heel strike, as patient observed to have forefoot contact and overall poor foot clearance  Consider use of gait  mode next session for external cue on step length  Patient denied any LBP for length of treatment session  Patient will continue to benefit from skilled OPPT services to address deficits in strength, mobility, balance, and gait post-CVA  Plan: Continue per plan of care  Progress treatment as tolerated           Insurance:  AMA/CMS Eval/ Re-eval POC expires Vivienne Barber #/ Referral # Total   Visits  Start date  Expiration date Extension  Visit limitation? PT only or  PT+OT?  Co-Insurance   AMA 8/12/22 11/12/22  NA Visits NA NA   PT Yes                                                               Outcome Measures Initial Eval  8/12/22 & DN 8/19/22        5xSTS 28 76 sec, + push on knees        TUG 14 23 sec, no AD        10 meter 0 99 m/s        FGA 12/30        6MWT 1100 ft

## 2022-08-22 ENCOUNTER — OFFICE VISIT (OUTPATIENT)
Dept: PHYSICAL THERAPY | Facility: CLINIC | Age: 67
End: 2022-08-22
Payer: COMMERCIAL

## 2022-08-22 DIAGNOSIS — I69.993 CVA, OLD, ATAXIA: Primary | ICD-10-CM

## 2022-08-22 DIAGNOSIS — R53.81 PHYSICAL DECONDITIONING: ICD-10-CM

## 2022-08-22 DIAGNOSIS — M54.50 LOW BACK PAIN WITHOUT SCIATICA, UNSPECIFIED BACK PAIN LATERALITY, UNSPECIFIED CHRONICITY: ICD-10-CM

## 2022-08-22 DIAGNOSIS — R26.89 BALANCE DISORDER: ICD-10-CM

## 2022-08-22 PROCEDURE — 97112 NEUROMUSCULAR REEDUCATION: CPT

## 2022-08-22 NOTE — PROGRESS NOTES
Daily Note     Today's date: 2022  Patient name: Digna Walker  : 1955  MRN: [id number]  Referring provider: Sanford Rosales MD  Dx:   Encounter Diagnosis     ICD-10-CM    1  CVA, old, ataxia  I69 993    2  Balance disorder  R26 89    3  Physical deconditioning  R53 81    4  Low back pain without sciatica, unspecified back pain laterality, unspecified chronicity  M54 50                   Subjective: Patient reports to PT session reporting he had some lightheadedness 2 days ago that led him to cancel his appointment  Objective: See treatment diary below    Vitals pre-session: /78 (LUE)    NMR (3# ankle weights):  - STS from standard chair (airex) + med ball hold (6#) 2 x 10  - FWD/LAT chantel negotiation (9") x 3 cycles down/back  - Marching + tidal tank carry 4 x 50 ft  - Sled push (30# added) 2 x 100 ft (facilitation at PSIS/iliac crests for increased pelvic rotation/dissociation)    Baseline HR: 65 bpm    Treadmill training w/ Gait Trainer Mode (focus on heel strike/increased step length)  - Warm up x 2 mins @ 1 2 mph, 0% incline  - 2-9 minutes @ 1 5 mph, 0% incline - Peak HR: 115 bpm  - Cool down x 3 mins @ 1 0-1 2 mph, 0% incline    Assessment: Patient tolerated treatment session well today with focus on gait and functional strength training  Difficulty with sequencing/motor planning when navigating hurdles in lateral negotiation, despite VCs from therapist  Addition of facilitation at B/L PSIS and iliac crest to encourage increased pelvic rotation and dissociation with sled push activity  Difficulty achieving heel strike at initial contact, more frequently illustrating forefoot/shuffling gait pattern  Patient will continue to benefit from skilled OPPT services to address deficits in strength, mobility, balance, and gait post-CVA  Plan: Continue per plan of care  Progress treatment as tolerated    Consider use of weighted vest in addition to distal weighting for increased core activation/stability 2/2 ataxia  Insurance:  AMA/CMS Eval/ Re-eval POC expires Mckinley Boast #/ Referral # Total   Visits  Start date  Expiration date Extension  Visit limitation? PT only or  PT+OT?  Co-Insurance   AMA 8/12/22 11/12/22  NA Visits NA NA   PT Yes                                                               Outcome Measures Initial Eval  8/12/22 & DN 8/19/22        5xSTS 28 76 sec, + push on knees        TUG 14 23 sec, no AD        10 meter 0 99 m/s        FGA 12/30        6MWT 1100 ft

## 2022-08-24 ENCOUNTER — OFFICE VISIT (OUTPATIENT)
Dept: PHYSICAL THERAPY | Facility: CLINIC | Age: 67
End: 2022-08-24
Payer: COMMERCIAL

## 2022-08-24 DIAGNOSIS — R26.89 BALANCE DISORDER: ICD-10-CM

## 2022-08-24 DIAGNOSIS — I69.993 CVA, OLD, ATAXIA: Primary | ICD-10-CM

## 2022-08-24 DIAGNOSIS — M54.50 LOW BACK PAIN WITHOUT SCIATICA, UNSPECIFIED BACK PAIN LATERALITY, UNSPECIFIED CHRONICITY: ICD-10-CM

## 2022-08-24 DIAGNOSIS — R53.81 PHYSICAL DECONDITIONING: ICD-10-CM

## 2022-08-24 PROCEDURE — 97112 NEUROMUSCULAR REEDUCATION: CPT

## 2022-08-24 NOTE — PROGRESS NOTES
Daily Note     Today's date: 2022  Patient name: Jenn Morales  : 1955  MRN: [id number]  Referring provider: Li Richards MD  Dx:   Encounter Diagnosis     ICD-10-CM    1  CVA, old, ataxia  I69 993    2  Balance disorder  R26 89    3  Physical deconditioning  R53 81    4  Low back pain without sciatica, unspecified back pain laterality, unspecified chronicity  M54 50                    GOES BY "PREMA"    Subjective: Patient reports to PT session reporting he had some lightheadedness 2 days ago that led him to cancel his appointment  Objective: See treatment diary below    Vitals pre-session: /78 (LUE)    NMR (3# ankle weights):  - STS from standard chair (airex) + med ball hold (6#): until fatigue (about 20 or so reps) 2 sets  - FWD/LAT chantel negotiation (9") + Tidal Tank 10# w/ trunk ROT x 3 cycles down/back for LAT/FWD ea  - Marching + tidal tank carry 4 x 50 ft  - Sled push (30# added) 2 x 100 ft (facilitation at PSIS/iliac crests for increased pelvic rotation/dissociation)    Baseline HR: 65 bpm    Treadmill training  (focus on heel strike/increased step length- no gait  to allow for grade this date)  - 6 minutes @ 2 0 mph, 5% incline - Peak HR: 123 bpm  - Cool down walk out to the front this date    Assessment: Patient tolerated treatment session well today with focus on gait and functional strength training  Patient demonstrates limited trunk ROT and pelvic/hip disassociation, trialed ROT with Tidal tank to continue to progress this date  Patient challenged with shuffling gait and limited heel strike, able to correct 50% of the time with VC's  Patient with good motivation throughout session  Patient will continue to benefit from skilled OPPT services to address deficits in strength, mobility, balance, and gait post-CVA  Plan: Continue per plan of care  Progress treatment as tolerated    Consider use of weighted vest in addition to distal weighting for increased core activation/stability 2/2 ataxia  Insurance:  AMA/CMS Eval/ Re-eval POC expires Mark Cuevas #/ Referral # Total   Visits  Start date  Expiration date Extension  Visit limitation? PT only or  PT+OT?  Co-Insurance   AMA 8/12/22 11/12/22  NA Visits NA NA   PT Yes                                                               Outcome Measures Initial Eval  8/12/22 & DN 8/19/22        5xSTS 28 76 sec, + push on knees        TUG 14 23 sec, no AD        10 meter 0 99 m/s        FGA 12/30        6MWT 1100 ft

## 2022-08-29 ENCOUNTER — OFFICE VISIT (OUTPATIENT)
Dept: PHYSICAL THERAPY | Facility: CLINIC | Age: 67
End: 2022-08-29
Payer: COMMERCIAL

## 2022-08-29 DIAGNOSIS — I69.993 CVA, OLD, ATAXIA: Primary | ICD-10-CM

## 2022-08-29 DIAGNOSIS — R26.89 BALANCE DISORDER: ICD-10-CM

## 2022-08-29 PROCEDURE — 97112 NEUROMUSCULAR REEDUCATION: CPT | Performed by: PHYSICAL THERAPIST

## 2022-08-29 NOTE — PROGRESS NOTES
Daily Note     Today's date: 2022  Patient name: Laverne Jorge  : 1955  MRN: [id number]  Referring provider: John Jordan MD  Dx:   Encounter Diagnosis     ICD-10-CM    1  CVA, old, ataxia  I69 993    2  Balance disorder  R26 89                GOES BY "PREMA"    Subjective: Patient reports he is feeling well today with no new complaints  Patient co-treated by Physical Therapy Student, Candice Fernandes, under my direct supervision  Objective: See treatment diary below    Vitals pre-session: /79 (LUE)    NMR (3# ankle weights)  - STS from standard chair (airex) + med ball hold (8#): until fatigue 2 sets (30 and 30)    - FWD/LAT chantel negotiation (9") + Tidal Tank 10# w/ trunk ROT x 3 cycles down/back for FWD (20#vest with fwd)  - Marching + tidal tank carry 4 x 50 ft with 20# weight vest  - Sled push (30# added) 2 x 100 ft (facilitation at PSIS/iliac crests for increased pelvic rotation/dissociation) with 20# weight vest      Treadmill training NOT TODAY (focus on heel strike/increased step length- no gait  to allow for grade this date)  - 6 minutes @ 2 0 mph, 5% incline - Peak HR: 123 bpm  - Cool down walk out to the front this date    Assessment: Patient tolerated treatment session well today with focus on functional strength training  Patient able to progress to 8# STS today and became fatigued around 30 repetitions with STS  Patient with good motivation throughout session  Addition of weighted vest used today, and pt was very fatigued by end of session  Patient reported slight dizziness after lateral step overs, however sx subsided after seated rest break and water  Patient will continue to benefit from skilled OPPT services to address deficits in strength, mobility, balance, and gait post-CVA  Plan: Continue per plan of care  Progress treatment as tolerated         Insurance:  AMA/CMS Eval/ Re-eval POC expires Amairani Hamzah #/ Referral # Total   Visits  Start date  Expiration date Extension  Visit limitation? PT only or  PT+OT?  Co-Insurance   AMA 8/12/22 11/12/22  NA Visits NA NA   PT Yes                     Outcome Measures Initial Eval  8/12/22 & DN 8/19/22        5xSTS 28 76 sec, + push on knees        TUG 14 23 sec, no AD        10 meter 0 99 m/s        FGA 12/30        6MWT 1100 ft

## 2022-08-30 ENCOUNTER — OFFICE VISIT (OUTPATIENT)
Dept: PULMONOLOGY | Facility: MEDICAL CENTER | Age: 67
End: 2022-08-30
Payer: COMMERCIAL

## 2022-08-30 VITALS
HEART RATE: 50 BPM | OXYGEN SATURATION: 96 % | WEIGHT: 235 LBS | TEMPERATURE: 98.2 F | BODY MASS INDEX: 31.83 KG/M2 | DIASTOLIC BLOOD PRESSURE: 62 MMHG | RESPIRATION RATE: 12 BRPM | SYSTOLIC BLOOD PRESSURE: 128 MMHG | HEIGHT: 72 IN

## 2022-08-30 DIAGNOSIS — J45.20 MILD INTERMITTENT ASTHMA WITHOUT COMPLICATION: ICD-10-CM

## 2022-08-30 DIAGNOSIS — G47.33 OBSTRUCTIVE SLEEP APNEA: Primary | ICD-10-CM

## 2022-08-30 DIAGNOSIS — R06.02 SHORTNESS OF BREATH: ICD-10-CM

## 2022-08-30 PROCEDURE — 99214 OFFICE O/P EST MOD 30 MIN: CPT | Performed by: INTERNAL MEDICINE

## 2022-08-30 NOTE — PATIENT INSTRUCTIONS
[address]    Try using using CPAP again  Can try during day to get used to it and once you start getting use to it try using for part of the night as I think this would help your fatigue during the day    Try to exercise    Look at tips from book by Dr Paul Goodson in April next year    If he have any questions regarding CPAP machine or other things Call our back telephone number

## 2022-08-30 NOTE — PROGRESS NOTES
Assessment/Plan        Problem List Items Addressed This Visit        Respiratory    Asthma, mild intermittent     Does have some seasonal allergies some mild intermittent asthma  He is not have any wheezing  Does take Allegra for his allergies  Obstructive sleep apnea - Primary     Severe obstructive sleep apnea  Paloma Weber not been using his CPAP has few months  I did talk about the benefits positive effects of CPAP therapy  This does particularly important for him to use it his case because he does have severe GRACIE as well as paroxysmal atrial fibrillation and hypertension history of stroke  He is also having daytime fatigue which has been worse recently  I suspect this is related to his sleep apnea not being treated and also poor sleep hygiene  I did tell him he could try using his CPAP during the day to try to accommodate to again sodium better tolerance for at nighttime    He will call me if he has any problems            Other    Shortness of breath     Paloma Weber does have some shortness of breath with activity  His wife does notice this  I suspect some of this is due to him being overweight and having some deconditioning  He is going to physical therapy  I did talk to his wife about watching his diet and trying to lose weight  Also recommended he try to lose weight                 Cc:  Feels tired  Has some shortness of breath      HPI     Paloma Weber has not used to CPAP for several months  His wife states he usually naps about twice a day  He go to bed at night but often wakes up early in the morning  He does have some snoring  He is fatigued throughout the day  He is not very active  He does like to watch television a lot  He will go for some walks but does not exercise  He is not complaining shortness of breath with activity but wife states he appears to have some shortness of breath with exertion  No chest pain  No cough or wheezing    Does have history of mild intermittent asthma but not needing to uses albuterol inhaler  He is going to physical therapy  Has history of balance disorder  Has history of prior stroke physical deconditioning  Also has lower back pain  Does have some problems with his memory ever since he had a stroke  He was accompanied by his wife jason provide additional history  Does have history of allergy to cats per    Omayra Perez has mild exercise-induced asthma but is not in any wheezing  Also has severe obstructive sleep apnea  Initial diagnostic sleep study was done December 7, 2011  Overall AHI was 35 3 and this increased to 45 9 during REM sleep  He does have a auto CPAP machine at home for which she used to use AirFit F30 full face mask    He did have brief admission to [facility] from 6 7-610  He had colonoscopy week before had  polyp removed  He was on Eliquis on May afterwards and was having some bloody bowel movements and cramping use abdomen  His hemoglobin was down to 9 2 but by time of discharge was 11 5    He does have history of hypertension  Last echocardiogram done July 7th of this year showed LV systolic function be normal with ejection fraction of 55 percent  There were some mild diastolic dysfunction as well as mild mitral regurgitation mild aortic stenosis  Estimated PA pressure was 30 millimeter Hg  Does have paroxysmal atrial fibrillation was was detected on his loop recorder  He is on Eliquis  He did have a loop recorder implantation done December 22, 2021        Past Medical History:   Diagnosis Date    Arthropathy of knee     last assessed 8/19/15    Bacterial pneumonia 02/05/2007    last assessed 2/5/7    Brain atrophy ([facility] [address] )     Colon polyp     CPAP (continuous positive airway pressure) dependence     Disorder of male genital organ 02/12/2008    last assessed 2/12/08    ED (erectile dysfunction) of organic origin     last assessed 2/13/17     History of hypertension     Seasonal allergies     Situational anxiety     resolved 3/16/17     Sleep apnea     Stroke [facility])     09/2020 TIA    Tinnitus     Wears hearing aid     bilateral       Past Surgical History:   Procedure Laterality Date    CARDIAC ELECTROPHYSIOLOGY PROCEDURE N/A 12/22/2021    Procedure: Cardiac Loop Recorder Implant;  Surgeon: Tracy Enriquez DO;  Location: [place] [address] CATH LAB; Service: Cardiology    COLONOSCOPY      x3-w/polyps    HERNIA REPAIR      umbilical,hemal inguinal    KNEE ARTHROSCOPY Left     meniscus    WI COLONOSCOPY FLX DX W/COLLJ SPEC WHEN PFRMD N/A 5/7/2018    Procedure: COLONOSCOPY;  Surgeon: Keo Iniguez MD;  Location[facility];   Service: Gastroenterology         Current Outpatient Medications:     albuterol (Proventil HFA) 90 mcg/act inhaler, Inhale 2 puffs every 4 (four) hours as needed for wheezing or shortness of breath, Disp: 6 7 g, Rfl: 7    ALPRAZolam (XANAX) 0 25 mg tablet, Take 1 tablet (0 25 mg total) by mouth 2 (two) times a day as needed for anxiety or sleep, Disp: 30 tablet, Rfl: 1    apixaban (Eliquis) 5 mg, Take 1 tablet (5 mg total) by mouth 2 (two) times a day, Disp: 60 tablet, Rfl: 5    atorvastatin (LIPITOR) 40 mg tablet, TAKE 1 TABLET BY MOUTH EVERY DAY IN THE EVENING, Disp: 90 tablet, Rfl: 1    hydrochlorothiazide (HYDRODIURIL) 25 mg tablet, Take 1 tablet (25 mg total) by mouth daily, Disp: 60 tablet, Rfl: 2    metoprolol succinate (TOPROL-XL) 25 mg 24 hr tablet, Take 1 tablet (25 mg total) by mouth daily 25 mg in evening, Disp: 90 tablet, Rfl: 2    metoprolol succinate (TOPROL-XL) 50 mg 24 hr tablet, Take 1 tablet (50 mg total) by mouth daily (Patient taking differently: Take 50 mg by mouth every morning), Disp: 30 tablet, Rfl: 5    potassium chloride (MICRO-K) 10 MEQ CR capsule, TAKE 1 CAPSULE BY MOUTH 2 TIMES A DAY , Disp: 180 capsule, Rfl: 2    sildenafil (VIAGRA) 100 mg tablet, Take 1 tablet (100 mg total) by mouth daily as needed for erectile dysfunction, Disp: 30 tablet, Rfl: 5    fexofenadine (ALLEGRA) 180 MG tablet, Take 1 tablet (180 mg total) by mouth daily for 30 days (Patient taking differently: Take 180 mg by mouth every morning), Disp: 30 tablet, Rfl: 0    Allergies   Allergen Reactions    Pollen Extract Sneezing     Reaction Date: 18Sep2006;     Shellfish-Derived Products - Food Allergy Other (See Comments)     Eye swelling    Iodine Solution [Povidone Iodine] Rash     Eyes swell       Social History     Tobacco Use    Smoking status: Passive Smoke Exposure - Never Smoker    Smokeless tobacco: Never Used   Substance Use Topics    Alcohol use: Yes     Alcohol/week: 14 0 standard drinks     Types: 14 Cans of beer per week         Family History   Problem Relation Age of Onset    Cancer Mother         breast    Diverticulitis Mother         colostomy    Breast cancer Mother     Heart disease Father         CHF    Cancer Sister         breast    Depression Sister     Cancer Sister         skin cancer    Cancer Sister         skin cancer    Colon cancer Neg Hx     Liver disease Neg Hx        Review of Systems   Constitutional: Positive for fatigue  Negative for chills, fever and unexpected weight change  HENT: Negative for congestion, rhinorrhea and sore throat  Eyes: Negative for discharge and redness  Respiratory: Positive for shortness of breath  Cardiovascular: Negative for chest pain, palpitations and leg swelling  Gastrointestinal: Negative for abdominal distention, abdominal pain and nausea  Endocrine: Negative for polydipsia and polyphagia  Genitourinary: Negative for dysuria  Musculoskeletal: Negative for joint swelling and myalgias  Skin: Negative for rash  Neurological: Negative for light-headedness  Psychiatric/Behavioral: Negative for agitation         Height 6 feet tall weight 235 pounds BMI 31 87    Vitals:    08/30/22 1031   BP: 128/62   Pulse: (!) 50   Resp: 12   Temp: 98 2 °F (36 8 °C)   SpO2: 96%           Physical Exam  Vitals reviewed  Constitutional:       General: He is not in acute distress  Appearance: Normal appearance  He is well-developed  HENT:      Head: Normocephalic  Nose: Nose normal       Mouth/Throat:      Mouth: Mucous membranes are moist       Pharynx: Oropharynx is clear  No oropharyngeal exudate  Comments: Mallampati score 3  Moderate narrowing of oropharyngeal airway  Eyes:      Conjunctiva/sclera: Conjunctivae normal       Pupils: Pupils are equal, round, and reactive to light  Cardiovascular:      Rate and Rhythm: Normal rate and regular rhythm  Heart sounds: Normal heart sounds  Pulmonary:      Effort: Pulmonary effort is normal       Comments: Lung sounds are clear  No wheezes, crackles or rhonchi  Abdominal:      General: There is no distension  Palpations: Abdomen is soft  Tenderness: There is no abdominal tenderness  Musculoskeletal:      Cervical back: Neck supple  Comments: No edema, cyanosis, or clubbing   Lymphadenopathy:      Cervical: No cervical adenopathy  Skin:     General: Skin is warm and dry  Neurological:      Mental Status: He is alert and oriented to person, place, and time     Psychiatric:         Mood and Affect: Mood normal          Behavior: Behavior normal

## 2022-08-31 ENCOUNTER — OFFICE VISIT (OUTPATIENT)
Dept: PHYSICAL THERAPY | Facility: CLINIC | Age: 67
End: 2022-08-31
Payer: COMMERCIAL

## 2022-08-31 DIAGNOSIS — R53.81 PHYSICAL DECONDITIONING: ICD-10-CM

## 2022-08-31 DIAGNOSIS — M54.50 LOW BACK PAIN WITHOUT SCIATICA, UNSPECIFIED BACK PAIN LATERALITY, UNSPECIFIED CHRONICITY: ICD-10-CM

## 2022-08-31 DIAGNOSIS — I10 BENIGN ESSENTIAL HYPERTENSION: ICD-10-CM

## 2022-08-31 DIAGNOSIS — I69.993 CVA, OLD, ATAXIA: Primary | ICD-10-CM

## 2022-08-31 DIAGNOSIS — R26.89 BALANCE DISORDER: ICD-10-CM

## 2022-08-31 PROCEDURE — 97112 NEUROMUSCULAR REEDUCATION: CPT

## 2022-08-31 RX ORDER — POTASSIUM CHLORIDE 750 MG/1
CAPSULE, EXTENDED RELEASE ORAL
Qty: 60 CAPSULE | Refills: 2 | Status: ON HOLD | OUTPATIENT
Start: 2022-08-31

## 2022-08-31 NOTE — ASSESSMENT & PLAN NOTE
Molly Reyes does have some shortness of breath with activity  His wife does notice this  I suspect some of this is due to him being overweight and having some deconditioning  He is going to physical therapy  I did talk to his wife about watching his diet and trying to lose weight    Also recommended he try to lose weight

## 2022-08-31 NOTE — ASSESSMENT & PLAN NOTE
Does have some seasonal allergies some mild intermittent asthma  He is not have any wheezing  Does take Allegra for his allergies

## 2022-08-31 NOTE — ASSESSMENT & PLAN NOTE
Severe obstructive sleep apnea  Hanna Leung not been using his CPAP has few months  I did talk about the benefits positive effects of CPAP therapy  This does particularly important for him to use it his case because he does have severe GRACIE as well as paroxysmal atrial fibrillation and hypertension history of stroke  He is also having daytime fatigue which has been worse recently  I suspect this is related to his sleep apnea not being treated and also poor sleep hygiene      I did tell him he could try using his CPAP during the day to try to accommodate to again sodium better tolerance for at nighttime    He will call me if he has any problems

## 2022-08-31 NOTE — PROGRESS NOTES
Daily Note     Today's date: 2022  Patient name: Eddie Durant  : 1955  MRN: [id number]  Referring provider: Darrion Bolaños MD  Dx:   Encounter Diagnosis     ICD-10-CM    1  CVA, old, ataxia  I69 993    2  Balance disorder  R26 89    3  Physical deconditioning  R53 81    4  Low back pain without sciatica, unspecified back pain laterality, unspecified chronicity  M54 50                GOES BY "PREMA"    Subjective: Patient reports he is feeling well today with no new complaints, denies any dizziness  Objective: See treatment diary below    Vitals pre-session: /74 (LUE)    NMR (3# ankle weights)  - STS from standard chair + med ball hold (8#) to fatigue (30 reps)  - FWD/LAT chantel negotiation (9") + tidal tank carry x 3 cycles down/back  - Resisted ambulation (2 red TB around each upper thigh to encourage increased hip extension and pelvic dissociation) 2 x 100 ft    Treadmill training (focus on heel strike/increased step length - gait  mode)  - Warm up x 2 mins @ 1 5 mph, 0% incline  - 5 minutes @ 1 8-2 0 mph, 0% incline - Peak HR: 116 bpm   Step length: L 51 cm  R 50 cm   Time spent each foot: L 51%  R 49%  - Cool down x 2 mins @ 1 4 mph, 0% incline    Assessment: Patient tolerated treatment session well today with focus on functional strength training  Withheld weighted vest today due to higher BP reading as well as reports of dizziness at end of last session  Significant difficulty clearing hurdles this date, particularly in lateral direction  Addition of resistance at B/L thighs to encourage increased hip extension and pelvic dissociation  Patient will continue to benefit from skilled OPPT services to address deficits in strength, mobility, balance, and gait post-CVA  Plan: Continue per plan of care  Progress treatment as tolerated  Insurance:  AMA/CMS Eval/ Re-eval POC expires Willa Cuevas #/ Referral # Total   Visits  Start date  Expiration date Extension  Visit limitation?   PT only or  PT+OT?  Co-Insurance   AMA 8/12/22 11/12/22  NA Visits NA NA   PT Yes                     Outcome Measures Initial Eval  8/12/22 & DN 8/19/22        5xSTS 28 76 sec, + push on knees        TUG 14 23 sec, no AD        10 meter 0 99 m/s        FGA 12/30        6MWT 1100 ft

## 2022-09-09 DIAGNOSIS — I10 PRIMARY HYPERTENSION: ICD-10-CM

## 2022-09-09 RX ORDER — METOPROLOL SUCCINATE 25 MG/1
TABLET, EXTENDED RELEASE ORAL
Qty: 90 TABLET | Refills: 3 | Status: SHIPPED | OUTPATIENT
Start: 2022-09-09 | End: 2022-10-27

## 2022-09-12 ENCOUNTER — EVALUATION (OUTPATIENT)
Dept: PHYSICAL THERAPY | Facility: CLINIC | Age: 67
End: 2022-09-12
Payer: COMMERCIAL

## 2022-09-12 DIAGNOSIS — R53.81 PHYSICAL DECONDITIONING: ICD-10-CM

## 2022-09-12 DIAGNOSIS — I69.993 CVA, OLD, ATAXIA: Primary | ICD-10-CM

## 2022-09-12 DIAGNOSIS — M54.50 LOW BACK PAIN WITHOUT SCIATICA, UNSPECIFIED BACK PAIN LATERALITY, UNSPECIFIED CHRONICITY: ICD-10-CM

## 2022-09-12 DIAGNOSIS — R26.89 BALANCE DISORDER: ICD-10-CM

## 2022-09-12 PROCEDURE — 97530 THERAPEUTIC ACTIVITIES: CPT

## 2022-09-12 NOTE — PROGRESS NOTES
PT Re-Evaluation          Insurance:  AMA/CMS Eval/ Re-eval POC expires Cisco Lawson #/ Referral # Total   Visits  Start date  Expiration date Extension  Visit limitation? PT only or  PT+OT? Co-Insurance   AMA 22  NA Visits NA NA  60 PCY PT Yes                                                               Visit/Unit Tracking  AUTH Status: NA Date          60 PCY MAX Used 1 2 3 4 5 6 7         Remaining  59 58 57 56 55 54 48                Today's date: 2022  Patient name: Rae Walker  : 1955  MRN: [id number]  Referring provider: Jose Alejandro Martin MD  Dx:   Encounter Diagnosis     ICD-10-CM    1  CVA, old, ataxia  I69 993    2  Balance disorder  R26 89    3  Physical deconditioning  R53 81    4  Low back pain without sciatica, unspecified back pain laterality, unspecified chronicity  M54 50          Assessment  Assessment details: Patient is a 61 y o  Male who has been presenting to skilled outpatient PT with referral for a chronic R thalamic CVA  He presents to PT with impairments in coordination, strength, proprioception, balance and endurance that in turn have limited safe functional mobility at home and in the community  Patient demonstrated overall improvements in the following outcome measures since IE on 2022: 5 x STS, TUG, 10 MWT, FGA, and 6 MWT, likely indicating overall gains in functional LE strength, safe mobility, ambulation speed, dynamic balance, and cardiovascular endurance, respectively  Despite these improvements, per cutoff scores for the 5 x STS, TUG, and FGA he is classified as HIGH risk for falls  All cutoff scores and normative values taken from the APTA Neuro Section and Rehab Measures  Ambulated length of session no AD with following deficits: WBOS, lack of pelvic and trunk dissociation/rotation, slowed nia, reduced reciprocal arm swing   Due to patient's reports of poor memory and > 10% difference on the TUG regular versus TUG cognitive, plan for referral for OT IE; patient in verbal agreement  He has currently met 2 short term goals  Patient will continue to benefit from skilled OPPT services to address the aforementioned impairments in order to maximize ability to return to life roles within home and community safely and independently  Impairments: Abnormal coordination, Abnormal gait, Abnormal or restricted ROM, Activity intolerance, Impaired balance, Impaired physical strength, Lacks appropriate HEP, Pain with function and Abnormal movement  Understanding of Dx/Px/POC: Good  Prognosis: Good      Patient verbalized understanding of POC  Please contact me if you have any questions or recommendations  Thank you for the referral and the opportunity to share in [address] [place] [address]          Plan  Plan details: 6 MWT, generalized balance, HIGT, dynamic balance  Patient would benefit from: Skilled PT  Planned modality interventions: Biofeedback and Cryotherapy  Planned therapy interventions: Balance, Body mechanics training, Coordination, Functional ROM exercises, Gait training, HEP, Manual therapy, Motor coordination training, Neuromuscular re-education, Patient education, Strengthening, Stretching, Therapeutic activities and Therapeutic exercises  Frequency: 2x/wk and 3x/wk  Duration in weeks: 12 weeks  Plan of Care beginning date: 8/12/22  Plan of Care expiration date: 3 months - 11/12/22  Treatment plan discussed with: Patient         Goals  Short Term Goals (4 weeks):    - Patient will improve time on TUG by 2 9 seconds from 14 23 seconds to 11 33 seconds to facilitate improved safety in all ambulation - NOT MET  - Patient will be independent in basic HEP 2-3 weeks - MET  - Patient will improve 5xSTS score by 2 3 seconds from 28 76 seconds to 26 46 seconds to promote improved LE functional strength needed for ADLs - MET  - Patient will complete components of CB&M to promote agility necessary for sports related tasks (d/c 9/12/2022)    Long Term Goals (12 weeks):  - Patient will be independent in a comprehensive home exercise program  - Patient will improve gait speed by 0 18 m/s from 0 99 m/s to 1 17 m/s to improve safety with community ambulation  - Patient will improve scoring on FGA by 4 points from 12/30 to 16/30 to progress safety with dynamic tasks  - Patient will be able to demonstrate HT in gait without veering  - Patient will improve 6 Minute Walk Test score by 190 feet from baseline score to promote improved cardiovascular endurance  - Patient will report 50% reduction in near falls in order to improve safety with functional tasks and reduce his risk for falls  - Patient will report going on walks at least 3 days per week to promote independence and improved cardiovascular endurance  - Patient will be able to ascend/descend stairs reciprocally without UE assist to promote independence and safety with ADLs  - Patient will report 50% reduction in near falls when ambulating on uneven terrain      Cut off score    All date taken from APTA Neuro Section or Rehab Measures      Mathew:  Miguel et al , 2018  [place] [address]: 2 7 pts    Rigo soares al , 2011  Cut-off score: 45/56    Chronic CVA  < 44/56 high risk for falls (Miguel et al , 2018)  < 47 5/56 slow walker status (German et al , 2011) 5xSTS: Carlos 2010  [address]: 2 3 sec  Age Norms:  60-69: 11 1 sec  70-79: 12 6 sec  80-89: 14 8 sec    Diallo 2010, Chronic Stroke  Chronic CVA: 12 sec   TUG  Dolores soares al , 2005  MDC: 2 9 sec    Cut off score:  >13 5 sec community dwelling adults  >32 2 frail elderly  <20 I for basic transfers  >30 dependent on transfers 10 Meter Walk Test:   Cordell Bee al , 2006  Small meaningful change: 0 06 m/s  Substantial meaningful change: 0 14 m/s  MCID: 0 16 m/s    < 0 4 m/s household ambulators  0 4 - 0 8 m/s limited community ambulators  > 0 8 m/s community ambulators   FGA: Prakash galvan ,   MCID: 4 2 pts  Geriatrics/community < 22/30 fall risk  Geriatrics/community < 20/30 unexplained falls DGI  MDC: vestibular - 4 pts  MDC: geriatric/community - 3 pts  Falls risk <19/24   6 Minute Walk Test  Cordell galvan , 2006  MDC: 60 98 m (200 01 ft)    Tequila Economy, Eng, & Cut off, 2012  MCID: 34 4 m    Age Norms  60-69: M - 1876 ft   F - 1765 ft  70-79: M - 1729 ft   F - 1545 ft  80-89: M - 1368 ft   F - 1286 ft Modified Amira  0: No increase in tone  1: Catch and release or min resistance at end range  1+: Catch f/b min resistance throughout remainder (< half ROM)  2: Easily moved, but more marked tone throughout most ROM  3: Significant tone, PROM difficult  4: Rigid   MiniBest: Taurus et al , 2013  CVA < 17 5 fall risk Pass (Acute CVA)  MDC: 1 8 points (acute), 3 2 points (chronic)         Subjective    History of Present Illness  Mechanism of injury: Patient has a history of a R Thalamic CVA in   Patient was recently seen in loop recorder implantation on 2021 for AF surveillance and underlying cryptogenic stroke  Patient's wife reports that he is pretty sedentary and is not motivated  Patient reports he uses a cane in unfamiliar territory, but otherwise he does not utilize it  Updated (2022): Patient reports that he is satisfied with his progress in PT thus far  Unable to state what specifically has gotten better, but says his wife has noticed improvements  States he cannot remember if he ended up going away to [place] last week and feels his memory has not been great lately      Primary AD: SPC out in the community  Assist level at home: No  WC usage: None  PLOF: Independent     Pain  Current pain ratin-3/10  At best pain ratin-3/10  At worst pain ratin/10  Location: LBP  Aggravating factors: sitting     Social Support  Steps to enter house: a few steps to enter  Stairs in house: , but not [place]   Lives in: two story home  Lives with: wife    Employment status: Retired  Hand dominance: R handed     Treatments  Previous treatment: None  Current treatment: None  Diagnostic Testing: Loop monitor, MRI previous years      Objective     Vitals  - HR: 73 BPM  - BP: 140/90  mmHG  - SPO2: 97%    HR Max  - 207 - (0 7x64)  = 162 2    LE MMT  - R Hip Flexion: 4-/5  - L Hip Flexion: 5/5  - R Hip Extension: 5/5  - L Hip Extension: 5/5  - R Hip Abduction: 4+/5 - L Hip abduction: 4+/5  - R Hip adduction: 4+/5 - L Hip adduction: 4+/5  - R Knee Extension: 4+/5 - L Knee Extension: 4+/5  - R Knee Flexion: 4+/5  - L Knee Flexion: 4+/5  - R Ankle DF: 4+/5  - L Ankle DF: 4+/5  - R Ankle PF: 4+/5  - L Ankle PF: 4+/5    Sensation  - Light touch: Normal   - Deep pressure: Normal  - Pain: Normal  - Temperature: Normal  - Proprioception: Abnormal R side only     Coordination  - Dysmetria: Abnormal  - Dysdiadochokinesia: Abnormal  - Alternating Toe Taps: Normal    HEP  - STS  - Trunk flexion/roll outs at table  - Repeated EXT      Outcome Measures Initial Eval  8/12/22 & DN 8/19/22 PN  9/12/2022       5xSTS 28 76 sec,   + push on knees 19 58 sec,   + push on knees       TUG  - Regular    - Cognitive   14 23 sec, no AD   13 05 sec, no AD  18 75 sec, no AD       10 meter 0 99 m/s 1 15 m/s       FGA 12/30 15/30       6MWT 1100 ft 1220 ft                            DN (9/12/2022):  - STS from standard chair + 8# med ball hold (to fatigue) x 39 reps    Precautions: A fib  Past Medical History:   Diagnosis Date    Arthropathy of knee     last assessed 8/19/15    Bacterial pneumonia 02/05/2007    last assessed 2/5/7    Brain atrophy (HCC)     Colon polyp     CPAP (continuous positive airway pressure) dependence     Disorder of male genital organ 02/12/2008    last assessed 2/12/08    ED (erectile dysfunction) of organic origin     last assessed 2/13/17     History of hypertension     Seasonal allergies     Situational anxiety     resolved 3/16/17     Sleep apnea     Stroke ([address] )     09/2020 TIA    Tinnitus  Wears hearing aid     bilateral

## 2022-09-14 ENCOUNTER — APPOINTMENT (OUTPATIENT)
Dept: OCCUPATIONAL THERAPY | Facility: CLINIC | Age: 67
End: 2022-09-14
Payer: COMMERCIAL

## 2022-09-14 ENCOUNTER — OFFICE VISIT (OUTPATIENT)
Dept: PHYSICAL THERAPY | Facility: CLINIC | Age: 67
End: 2022-09-14
Payer: COMMERCIAL

## 2022-09-14 DIAGNOSIS — R53.81 PHYSICAL DECONDITIONING: ICD-10-CM

## 2022-09-14 DIAGNOSIS — M54.50 LOW BACK PAIN WITHOUT SCIATICA, UNSPECIFIED BACK PAIN LATERALITY, UNSPECIFIED CHRONICITY: ICD-10-CM

## 2022-09-14 DIAGNOSIS — I69.993 CVA, OLD, ATAXIA: Primary | ICD-10-CM

## 2022-09-14 DIAGNOSIS — R26.89 BALANCE DISORDER: ICD-10-CM

## 2022-09-14 PROCEDURE — 97112 NEUROMUSCULAR REEDUCATION: CPT

## 2022-09-14 NOTE — PROGRESS NOTES
Daily Note     Today's date: 2022  Patient name: Vladimir Shelton  : 1955  MRN: [id number]  Referring provider: Adrianne Cates MD  Dx:   Encounter Diagnosis     ICD-10-CM    1  CVA, old, ataxia  I69 993    2  Balance disorder  R26 89    3  Physical deconditioning  R53 81    4  Low back pain without sciatica, unspecified back pain laterality, unspecified chronicity  M54 50                GOES BY "PREMA"    Subjective: Patient reports to session today with no new issues or complaints  Objective: See treatment diary below    Vitals pre-session: BP NA    NMR (3# ankle weights)  - STS from standard chair + tidal tank x 20 reps  - FWD/LAT lunges to BOSU x 20 B/L; light touch support  - Backwards ambulation w/ anterior resistance (red) 4 x 40 ft    Treadmill training (focus on heel strike/increased step length)  - Warm up x 2 mins @ 1 5 mph, 0% incline  - 5 minutes @ 1 8 mph, 3-5% incline - Peak HR: 112 bpm  - Cool down x 2 mins @ 1 4 mph, 0% incline    Assessment: Patient tolerated treatment session well today with focus on functional strength training  Greatest difficulty with backwards ambulation as evidenced by poor step-through gait pattern particularly with increasing levels of fatigue  Mild carryover noted with overground ambulation, with improvements noted in hip extension during stance phase  Appears to assume improved step length and heel strike when ambulating on treadmill at incline  Patient will continue to benefit from skilled OPPT services to address deficits in strength, mobility, balance, and gait post-CVA  Plan: Continue per plan of care  Progress treatment as tolerated  Insurance:  AMA/CMS Eval/ Re-eval POC expires Connie Thompson #/ Referral # Total   Visits  Start date  Expiration date Extension  Visit limitation? PT only or  PT+OT?  Co-Insurance   AMA 22  NA Visits NA NA   PT Yes                     Outcome Measures Initial Eval  22 & DN 22        5xSTS 28 76 sec, + push on knees        TUG 14 23 sec, no AD        10 meter 0 99 m/s        A 12/30        6MWT 1100 ft

## 2022-09-16 ENCOUNTER — EVALUATION (OUTPATIENT)
Dept: OCCUPATIONAL THERAPY | Facility: CLINIC | Age: 67
End: 2022-09-16
Payer: COMMERCIAL

## 2022-09-16 DIAGNOSIS — F01.50 VASCULAR DEMENTIA WITHOUT BEHAVIORAL DISTURBANCE (HCC): Primary | ICD-10-CM

## 2022-09-16 PROCEDURE — 97166 OT EVAL MOD COMPLEX 45 MIN: CPT

## 2022-09-16 NOTE — PROGRESS NOTES
OCCUPATIONAL THERAPY INITIAL EVALUATION    Today's Date: 2022  Patient Name: Osorio Bose  : 1955  MRN: [id number]  Referring Provider: Cayla Schneider MD  Dx: Vascular dementia without behavioral disturbance ([facility] [address] ) [F01 50]    SKILLED ANALYSIS:  Pt is a R hand dominant 61year old malepresenting to OP OT s/p multiple CVAs and dementia  Pt currently requires assistance with IADLs and working tasks  Pt is demonstrating deficits in the following domains: decreased  skills, memory immediate and delayed, sustianed and divided attention  Deficits are noted based on the following assessments: MOCA  8 1, Trail Making Part A and    Recommend OP OT 1-2x/wk for 8-12 weeks with focus on aforementioned deficits to maximize functional recovery s/p CVA and improve QOL  Findings and recommendations discussed with pt, and they are in agreement  PLAN OF CARE START:2022  PLAN OF CARE END: 2022  FREQUENCY: 1-2 times per week   PRECAUTIONS HTN, AFIB     Subjective    Mechanism of Injury  Pt was recommending PT for cognition;  Pt has had multiple CVAs and hx of dementia     Occupational Profile    "pt lives in a house with spouse  Pt reprots independence with ADLs Pt reports having difficulty with memory- forgetting items at home  Pt owns restaurant  Pt reports he is not forgetting names/faces  pt is a retired mailman  Pt reports he continues to drive   "    PATIENT GOAL: "pt wants to improve his memory "    HISTORY OF PRESENT ILLNESS:     Pt is a 61 y o  male who was referred to Occupational Therapy s/p  [facility] [F01 50]       PMH:   Past Medical History:   Diagnosis Date    Arthropathy of knee     last assessed 8/19/15    Bacterial pneumonia 2007    last assessed     Brain atrophy [facility])     Colon polyp     CPAP (continuous positive airway pressure) dependence     Disorder of male genital organ 2008    last assessed 08   [facility] (erectile dysfunction) of organic origin     last assessed 17     History of hypertension     Seasonal allergies     Situational anxiety     resolved 3/16/17     Sleep apnea     [facility]roke [facility])     2020 TIA    Tinnitus     Wears hearing aid     bilateral       Past Surgical Hx:   Past Surgical History:   Procedure Laterality Date    CARDIAC ELECTROPHYSIOLOGY PROCEDURE N/A 2021    Procedure: Cardiac Loop Recorder Implant;  Surgeon: Gina Lee DO;  Location: [place] [address] CATH LAB; Service: Cardiology    COLONOSCOPY      x3-w/polyps    HERNIA REPAIR      umbilical,hemal inguinal    KNEE ARTHROSCOPY Left     meniscus    IN COLONOSCOPY FLX DX W/COLLJ SPEC WHEN PFRMD N/A 2018    Procedure: COLONOSCOPY;  Surgeon: Leia Nolasco MD;  Location: [place] [address] GI LAB; Service: Gastroenterology        Pain:  Location: lumbar     Restin    With Activity:  3    Objective  Functional Cognition:  Highest level of education: high school    [place] Cognitive Assessment Version 8 1 (MoCA V8 1)  Visuospatial/executive functionin/5  Naming:  3/3  Memory: 1st trial:  3/5, 2nd trial:  3/5  Attention/concentration: 2/2  List of letters: 1/1  Serial Seven Subtraction:  0/3 w/ 0 errors  Language/sentence repetition:  2/2  Language Fluency:  0/1  Abstract/Correlational Thinkin/2  Delayed Recall:  0/5  Orientation:  5/6   Memory Index Score: 715  MoCA V1 8 1 Raw Score:  14/30, MIS:  7/15, indicative of moderate neurocognitive impairments  MoCA Scoring        Normal: 26+         Mild Cognitive Impairment: 18-25          Moderate Cognitive Impairment: 10-17         Severe Cognitive Impairment: <10    Trail making Part A and Part B:   Part A: 81 seconds with indepence   Part B: could not complete secondary to difficulty with divided attention- attempted       Indicating deficits: Part A deficit >  seconds and Part B deficit >  seconds      Contextual Memory Test:  Immediate: , 1 confabulations  Delayed: 5/20, 1 confabulations    GOALS:   Short Term Goals:      Pt will follow 2 step verbal directions in multimodal environment for improved role performance and engagement in salient tasks  Pt will increase verbal and written 2step direction following with processing time of <2 min and 80% accuracy for improved life performance, once returned 4 weeks  Pt will increase sustained attention by completing trail making part A in 70 seconds for life roles  Pt will increase memory by completing CMT 7/20 items for life roles  Pt will demo 50% accuracy with clock construction and time identification for improved orientation to daily schedules and for IADL fxn  Pt will demo G recall of 50% of verbal and written information utilizing memory strategy of choice for improved STM/delayed memory   Pt will demo G carryover of use of internal/external memory strategy aides for improved recall of daily events, improved executive functioning with 50% accuracy     Long Term Goals: 8-12 weeks  Pt will be able to complete trail making part B with moderate cuing in less than 4 minutes  Pt will increase sustained attention by completing trail making part A in 50 seconds for life roles     Pt will increase memory by completing CMT 8/20 items for life roles  Pt will demo 75% accuracy with clock construction and time identification for improved orientation to daily schedules and for IADL fxn  Pt will demo G recall of 75% of verbal and written information utilizing memory strategy of choice for improved STM/delayed memory   Pt will demo G carryover of use of internal/external memory strategy aides for improved recall of daily events, improved executive functioning with 75% accuracy    OTHER PLANNED THERAPY INTERVENTIONS:   Supine, seated, and in stance neuro re-ed  Tricep AG  NMES/FES  FMC/prehension  Timed Trials  Manual tx  Hand to target  Sensory re-ed  Seated functional reach: crossing midline  Supine place and hold  WBearing strategies Closed chain activities  Open chain activities  Internal and external memory aides  Multimatrix for saccades/ visual clutter/attention  Hypersensitivity strategies education  Multi-modal environment  Sustained/alternating/divided attention  Tracking tube  Oculomotor control:  saccades, con/divergence  Conv /div   Dynamic tasks  Work stations with timed transitions  Temporal Awareness  Memory and mental manipulation  Auditory processing with immediate recall  Memory retention with immediate and delayed recall  Edu on cog/vision apps      Outcome Measures EVAL  PN  PN PN PN PN   Fugyl suarez         ALYSIA  2 pinch  Lateral  3 pinch         9 hole peg test         Functional dexterity         MOCA Score only          Trail making Part A  Part B

## 2022-09-19 ENCOUNTER — OFFICE VISIT (OUTPATIENT)
Dept: PHYSICAL THERAPY | Facility: CLINIC | Age: 67
End: 2022-09-19
Payer: COMMERCIAL

## 2022-09-19 DIAGNOSIS — M54.50 LOW BACK PAIN WITHOUT SCIATICA, UNSPECIFIED BACK PAIN LATERALITY, UNSPECIFIED CHRONICITY: ICD-10-CM

## 2022-09-19 DIAGNOSIS — R26.89 BALANCE DISORDER: ICD-10-CM

## 2022-09-19 DIAGNOSIS — I69.993 CVA, OLD, ATAXIA: Primary | ICD-10-CM

## 2022-09-19 DIAGNOSIS — R53.81 PHYSICAL DECONDITIONING: ICD-10-CM

## 2022-09-19 PROCEDURE — 97112 NEUROMUSCULAR REEDUCATION: CPT

## 2022-09-19 NOTE — PROGRESS NOTES
Daily Note     Today's date: 2022  Patient name: Eddie Durant  : 1955  MRN: [id number]  Referring provider: Darrion Bolaños MD  Dx:   Encounter Diagnosis     ICD-10-CM    1  CVA, old, ataxia  I69 993    2  Balance disorder  R26 89    3  Physical deconditioning  R53 81    4  Low back pain without sciatica, unspecified back pain laterality, unspecified chronicity  M54 50                GOES BY "PREMA"    Subjective: Patient reports approximately 10 minutes late to session today; no new issues or complaints  Objective: See treatment diary below    Vitals pre-session: BP NA    NMR (3# ankle weights)  - STS from standard chair + tidal tank 2 x 20 reps - no ankle weights  - Backwards ambulation w/ anterior resistance (red) 2 x 50 ft - no ankle weights    Treadmill training (focus on heel strike/increased step length)  - Warm up x 2 mins @ 1 5 mph, 0% incline  - 7 minutes @ 1 8 mph, 3-5% incline - Peak HR: 106 bpm  - Cool down x 2 mins @ 1 5 mph, 0% incline      Assessment: Patient tolerated treatment session well today with focus on dynamic balance and mobility training  Increased levels of fatigued noted today, as evidenced by more frequent seated rest breaks  Mild improvements noted in pelvic dissociation when ambulating on treadmill, particularly at higher inclines and with VCs for heel strike  Patient will continue to benefit from skilled OPPT services to address deficits in strength, mobility, balance, and gait post-CVA  Plan: Continue per plan of care  Progress treatment as tolerated  Insurance:  AMA/CMS Eval/ Re-eval POC expires Willa Cuevas #/ Referral # Total   Visits  Start date  Expiration date Extension  Visit limitation? PT only or  PT+OT?  Co-Insurance   AMA 22  NA Visits NA NA  60 PCY PT Yes                                                               Visit/Unit Tracking  AUTH Status: NA Date        60 PCY MAX Used 1 2 3 4 5 6 7 8 9       Remaining  59 58 57 56 55 54 53 52 51          Outcome Measures Initial Eval  8/12/22 & DN 8/19/22 PN  9/12/2022       5xSTS 28 76 sec,   + push on knees 19 58 sec,   + push on knees       TUG  - Regular    - Cognitive   14 23 sec, no AD   13 05 sec, no AD  18 75 sec, no AD       10 meter 0 99 m/s 1 15 m/s       FGA 12/30 15/30       6MWT 1100 ft 1220 ft

## 2022-09-20 ENCOUNTER — APPOINTMENT (OUTPATIENT)
Dept: OCCUPATIONAL THERAPY | Facility: CLINIC | Age: 67
End: 2022-09-20
Payer: COMMERCIAL

## 2022-09-21 ENCOUNTER — OFFICE VISIT (OUTPATIENT)
Dept: OCCUPATIONAL THERAPY | Facility: CLINIC | Age: 67
End: 2022-09-21
Payer: COMMERCIAL

## 2022-09-21 ENCOUNTER — OFFICE VISIT (OUTPATIENT)
Dept: PHYSICAL THERAPY | Facility: CLINIC | Age: 67
End: 2022-09-21
Payer: COMMERCIAL

## 2022-09-21 DIAGNOSIS — R26.89 BALANCE DISORDER: ICD-10-CM

## 2022-09-21 DIAGNOSIS — M54.50 LOW BACK PAIN WITHOUT SCIATICA, UNSPECIFIED BACK PAIN LATERALITY, UNSPECIFIED CHRONICITY: ICD-10-CM

## 2022-09-21 DIAGNOSIS — F01.50 VASCULAR DEMENTIA WITHOUT BEHAVIORAL DISTURBANCE (HCC): Primary | ICD-10-CM

## 2022-09-21 DIAGNOSIS — I69.993 CVA, OLD, ATAXIA: Primary | ICD-10-CM

## 2022-09-21 DIAGNOSIS — R53.81 PHYSICAL DECONDITIONING: ICD-10-CM

## 2022-09-21 PROCEDURE — 97530 THERAPEUTIC ACTIVITIES: CPT | Performed by: OCCUPATIONAL THERAPIST

## 2022-09-21 PROCEDURE — 97112 NEUROMUSCULAR REEDUCATION: CPT

## 2022-09-21 NOTE — PROGRESS NOTES
Daily Note     Today's date: 2022  Patient name: Cam Anderson  : 1955  MRN: [id number]  Referring provider: Beryl Alonzo MD  Dx:   Encounter Diagnosis     ICD-10-CM    1  CVA, old, ataxia  I69 993    2  Balance disorder  R26 89    3  Physical deconditioning  R53 81    4  Low back pain without sciatica, unspecified back pain laterality, unspecified chronicity  M54 50                GOES BY "PREMA"    Subjective: Patient reports approximately 10 minutes late to session today; no new issues or complaints  Objective: See treatment diary below    NMR (3# ankle weights)  - STS from standard chair + tidal tank 2 x 20 reps - no ankle weights    Treadmill training (focus on heel strike/increased step length)  - Warm up x 2 mins @ 1 5 mph, 0% incline  - 8 minutes @ 1 8 mph, 5% incline  - Cool down x 3 mins @ 1 3 mph, 0% incline    Backwards treadmill training @ 0 6 mph x 3 minutes    BP end of session (LUE): 122/68      Assessment: Patient tolerated treatment session well today with focus on dynamic balance and mobility training  Incorporated backwards treadmill training to encourage increased hip extension; required frequent VCs to maintain large step-through gait pattern  Patient will continue to benefit from skilled OPPT services to address deficits in strength, mobility, balance, and gait post-CVA  Plan: Continue per plan of care  Progress treatment as tolerated  Insurance:  AMA/CMS Eval/ Re-eval POC expires Dorna Said #/ Referral # Total   Visits  Start date  Expiration date Extension  Visit limitation? PT only or  PT+OT?  Co-Insurance   AMA 22  NA Visits NA NA  60 PCY PT Yes                                                               Visit/Unit Tracking  AUTH Status: NA Date        60 PCY MAX Used 1 2 3 4 5 6 7 8 9       Remaining  59 58 57 56 55 54 53 52 51          Outcome Measures Initial Eval  22 & DN 22 PN  2022 5xSTS 28 76 sec,   + push on knees 19 58 sec,   + push on knees       TUG  - Regular    - Cognitive   14 23 sec, no AD   13 05 sec, no AD  18 75 sec, no AD       10 meter 0 99 m/s 1 15 m/s       FGA 12/30 15/30       6MWT 1100 ft 1220 ft

## 2022-09-22 ENCOUNTER — APPOINTMENT (OUTPATIENT)
Dept: OCCUPATIONAL THERAPY | Facility: CLINIC | Age: 67
End: 2022-09-22
Payer: COMMERCIAL

## 2022-09-22 ENCOUNTER — OFFICE VISIT (OUTPATIENT)
Dept: CARDIOLOGY CLINIC | Facility: CLINIC | Age: 67
End: 2022-09-22
Payer: COMMERCIAL

## 2022-09-22 VITALS
HEIGHT: 72 IN | HEART RATE: 81 BPM | SYSTOLIC BLOOD PRESSURE: 110 MMHG | DIASTOLIC BLOOD PRESSURE: 62 MMHG | TEMPERATURE: 97.6 F | BODY MASS INDEX: 32.1 KG/M2 | WEIGHT: 237 LBS | OXYGEN SATURATION: 98 %

## 2022-09-22 DIAGNOSIS — I48.0 PAROXYSMAL A-FIB (HCC): Primary | ICD-10-CM

## 2022-09-22 DIAGNOSIS — I10 BENIGN ESSENTIAL HYPERTENSION: ICD-10-CM

## 2022-09-22 DIAGNOSIS — I71.21 ASCENDING AORTIC ANEURYSM: ICD-10-CM

## 2022-09-22 DIAGNOSIS — I35.0 AORTIC VALVE STENOSIS, ETIOLOGY OF CARDIAC VALVE DISEASE UNSPECIFIED: ICD-10-CM

## 2022-09-22 PROCEDURE — 99214 OFFICE O/P EST MOD 30 MIN: CPT | Performed by: INTERNAL MEDICINE

## 2022-09-22 PROCEDURE — 93000 ELECTROCARDIOGRAM COMPLETE: CPT | Performed by: INTERNAL MEDICINE

## 2022-09-22 NOTE — PROGRESS NOTES
Progress Note - Cardiology Office  [facility]    Felicity Valdivia 61 y o  male MRN: [id number]  : 1955  Encounter: [id number]      ASSESSMENT:   Dyspnea on exertion    Lower extremity edema    Probable chronic diastolic heart failure with G1DD  pro NT BNP on 2022 was mildly elevated at 494    PAF  Detected on loop recorder surveillance  On Eliquis    Cryptogenic stroke  History of TIA-cardioembolic  6421:  MRI of the brain :  suggested 2 small acute areas of infarct  Also possible small hemorrhage versus cavernoma  Echocardiogram in 2020   ejection fraction 60%  Moderate LVH  Mild MR  Mild increased LA size  Mild aortic stenosis  Ascending aorta measured 4 3 cm  Mild aortic stenosis   Mild mitral regurgitation     TTE, 2022:  EF 55%, moderate LVH, G1DD, mild RVE, mild SHAWN, mild AS, mild MR, TR and SD, mildly dilated ascending aorta     Obesity, BMI 32 14    Essential hypertension    Ascending aortic aneurysm, 4 3 cm by echo    Aortic stenosis, mild  Mitral regurgitation, mild    Organic importance/ED  On Viagra 100 mg p r n  RECOMMENDATIONS:  Advised to wear compression stockings regularly during the day  Low-salt low-cholesterol diet, regular cardiovascular exercise and weight loss  Patient is going to physical therapy 2 hours a week  Advised to continue exercises on other 5 days at home      Please call [phone] if any questions  HPI :     Felicity Valdivia is a 61y o  year old male who came for follow up  Patient himself denies any symptoms but the wife states that he gets short of breath  He is undergoing physical therapy with some improvement  He still has mild lower extremity edema and he has been advised to wear compression stockings which he does not comply with    Again re-emphasized to wear compression stockings regularly  His recent echocardiogram did not show any significant change except for grade 1 diastolic dysfunction    REVIEW OF SYSTEMS:  Review of Systems   Respiratory: Positive for shortness of breath (Dyspnea on exertion)  Cardiovascular: Positive for leg swelling  All other systems reviewed and are negative  Historical Information   Past Medical History:   Diagnosis Date    Arthropathy of knee     last assessed 8/19/15    Bacterial pneumonia 02/05/2007    last assessed 2/5/7    Brain atrophy ([facility] [address] )     Colon polyp     CPAP (continuous positive airway pressure) dependence     Disorder of male genital organ 02/12/2008    last assessed 2/12/08    ED (erectile dysfunction) of organic origin     last assessed 2/13/17     History of hypertension     Seasonal allergies     Situational anxiety     resolved 3/16/17     Sleep apnea     Stroke ([facility] [address] )     09/2020 TIA    Tinnitus     Wears hearing aid     bilateral     Past Surgical History:   Procedure Laterality Date    CARDIAC ELECTROPHYSIOLOGY PROCEDURE N/A 12/22/2021    Procedure: Cardiac Loop Recorder Implant;  Surgeon: Yuriy Arellano DO;  Location: [place] [address] CATH LAB; Service: Cardiology    COLONOSCOPY      x3-w/polyps    HERNIA REPAIR      umbilical,hemal inguinal    KNEE ARTHROSCOPY Left     meniscus    GA COLONOSCOPY FLX DX W/COLLJ SPEC WHEN PFRMD N/A 5/7/2018    Procedure: COLONOSCOPY;  Surgeon: Kaye Zavala MD;  Location: [place] [address] GI LAB;   Service: Gastroenterology     Social History     Substance and Sexual Activity   Alcohol Use Yes    Alcohol/week: 14 0 standard drinks    Types: 14 Cans of beer per week     Social History     Substance and Sexual Activity   Drug Use No     Social History     Tobacco Use   Smoking Status Passive Smoke Exposure - Never Smoker   Smokeless Tobacco Never Used     Family History:   Family History   Problem Relation Age of Onset    Cancer Mother         breast    Diverticulitis Mother         colostomy    Breast cancer Mother     Heart disease Father         CHF    Cancer Sister         breast    Depression Sister    [facility] Cancer Sister         skin cancer    Cancer Sister         skin cancer    Colon cancer Neg Hx     Liver disease Neg Hx        Meds/Allergies     Allergies   Allergen Reactions    Pollen Extract Sneezing     Reaction Date: 18Sep2006;     Shellfish-Derived Products - Food Allergy Other (See Comments)     Eye swelling    Iodine Solution [Povidone Iodine] Rash     Eyes swell       Current Outpatient Medications:     albuterol (Proventil HFA) 90 mcg/act inhaler, Inhale 2 puffs every 4 (four) hours as needed for wheezing or shortness of breath, Disp: 6 7 g, Rfl: 7    ALPRAZolam (XANAX) 0 25 mg tablet, Take 1 tablet (0 25 mg total) by mouth 2 (two) times a day as needed for anxiety or sleep, Disp: 30 tablet, Rfl: 1    apixaban (Eliquis) 5 mg, Take 1 tablet (5 mg total) by mouth 2 (two) times a day, Disp: 60 tablet, Rfl: 5    atorvastatin (LIPITOR) 40 mg tablet, TAKE 1 TABLET BY MOUTH EVERY DAY IN THE EVENING, Disp: 90 tablet, Rfl: 1    hydrochlorothiazide (HYDRODIURIL) 25 mg tablet, Take 1 tablet (25 mg total) by mouth daily, Disp: 60 tablet, Rfl: 2    metoprolol succinate (TOPROL-XL) 25 mg 24 hr tablet, TAKE 1 TABLET (25 MG TOTAL) BY MOUTH DAILY IN EVENING, Disp: 90 tablet, Rfl: 3    metoprolol succinate (TOPROL-XL) 50 mg 24 hr tablet, Take 1 tablet (50 mg total) by mouth daily (Patient taking differently: Take 50 mg by mouth every morning), Disp: 30 tablet, Rfl: 5    potassium chloride (MICRO-K) 10 MEQ CR capsule, TAKE 1 CAPSULE BY MOUTH 2 TIMES A DAY , Disp: 60 capsule, Rfl: 2    sildenafil (VIAGRA) 100 mg tablet, Take 1 tablet (100 mg total) by mouth daily as needed for erectile dysfunction, Disp: 30 tablet, Rfl: 5    fexofenadine (ALLEGRA) 180 MG tablet, Take 1 tablet (180 mg total) by mouth daily for 30 days (Patient taking differently: Take 180 mg by mouth every morning), Disp: 30 tablet, Rfl: 0    Vitals: Blood pressure 110/62, pulse 81, temperature 97 6 °F (36 4 °C), height 6' (1 829 m), weight 108 kg (237 lb), SpO2 98 %  ?  Body mass index is 32 14 kg/m²  Vitals:    22 0940   Weight: 108 kg (237 lb)     BP Readings from Last 3 Encounters:   22 110/62   22 128/62   22 150/90       Physical Exam:    Neurologic:  Alert & oriented x 3, no new focal deficits, Not in any acute distress,  Constitutional:  Well developed, well nourished, non-toxic appearance   Eyes:  Pupil equal and reacting to light, conjunctiva normal,   HENT:  Atraumatic, oropharynx moist, Neck- normal range of motion, no tenderness,  Neck supple, No JVP, No LNP   Respiratory:  Bilateral air entry, mostly clear to auscultation  Cardiovascular: S1-S2 regular with a I/VI ejection systolic murmur   GI:  Soft, nondistended, normal bowel sounds, nontender, no hepatosplenomegaly appreciated  Musculoskeletal: no tenderness, no deformities  Skin:  Well hydrated, no rash   Lymphatic:  No lymphadenopathy noted   Extremities:  Mild bilateral lower extremity edema        Diagnostic Studies Review Cardio:      EKG:  Sinus rhythm with occasional PVCs, heart rate 81 per minute    Minimal voltage criteria for LVH, maybe normal variant, possible inferior infarct of undetermined age    Cardiac testing:   Results for orders placed during the hospital encounter of 20    Echo complete with contrast if indicated    Narrative  [facility]  [address] [facility]  [facility] [address]  [phone]    Transthoracic Echocardiogram  2D, M-mode, Doppler, and Color Doppler    Study date:  22-Sep-2020    Patient: Kole Marshall  MR number: [id number]  Account number: [de-identified]  : 1955  Age: 60 years  Gender: Male  Status: Inpatient  Location: Bedside  Height: 72 in  Weight: 211 lb  BP: 152/ 96 mmHg    Indications: TIA, Cardiac Murmur    Diagnoses: G45 9 - Transient cerebral ischemic attack, unspecified    Sonographer:  Yuniel Chapman RDCS  Primary Physician:  Jasbir Shahid DO  Referring Physician: Rashel Brewer MD  Group:  [facility]  Interpreting Physician:  Zerita Boxer, MD    SUMMARY    LEFT VENTRICLE:  Systolic function was normal  Ejection fraction was estimated to be 60 %  There were no regional wall motion abnormalities  There was moderate concentric hypertrophy  LEFT ATRIUM:  The atrium was dilated  RIGHT ATRIUM:  The atrium was dilated  MITRAL VALVE:  There was mild regurgitation  AORTIC VALVE:  Transaortic velocity was increased due to valvular stenosis  There was mild stenosis  Valve mean gradient was 15 mmHg  AORTA:  There was dilatation of the ascending aorta, with a maximum measured diameter of 4 3 cm  HISTORY: PRIOR HISTORY: HTN, HLD, Sleep Apnea, CPAP    PROCEDURE: The procedure was performed at the bedside  This was a routine study  The transthoracic approach was used  The study included complete 2D imaging, M-mode, complete spectral Doppler, and color Doppler  The heart rate was 70 bpm,  at the start of the study  Images were obtained from the parasternal, apical, subcostal, and suprasternal notch acoustic windows  Image quality was adequate  LEFT VENTRICLE: Size was normal  Systolic function was normal  Ejection fraction was estimated to be 60 %  There were no regional wall motion abnormalities  Wall thickness was mildly increased  There was moderate concentric hypertrophy  DOPPLER: The transmitral flow pattern was normal  Left ventricular diastolic function parameters were normal     RIGHT VENTRICLE: The size was normal  Systolic function was normal  Wall thickness was normal     LEFT ATRIUM: The atrium was dilated  RIGHT ATRIUM: The atrium was dilated  MITRAL VALVE: Valve structure was normal  There was normal leaflet separation  DOPPLER: The transmitral velocity was within the normal range  There was no evidence for stenosis  There was mild regurgitation  AORTIC VALVE: The valve was trileaflet   Leaflets exhibited mild calcification and mildly reduced cuspal separation  DOPPLER: Transaortic velocity was increased due to valvular stenosis  There was mild stenosis  There was no significant  regurgitation  TRICUSPID VALVE: The valve structure was normal  There was normal leaflet separation  DOPPLER: The transtricuspid velocity was within the normal range  There was no evidence for stenosis  There was trace regurgitation  PULMONIC VALVE: Leaflets exhibited normal thickness, no calcification, and normal cuspal separation  DOPPLER: The transpulmonic velocity was within the normal range  There was trace regurgitation  PERICARDIUM: There was no pericardial effusion  The pericardium was normal in appearance  AORTA: The root exhibited normal size  There was dilatation of the ascending aorta, with a maximum measured diameter of 4 3 cm  SYSTEMIC VEINS: IVC: The inferior vena cava was normal in size  Respirophasic changes were normal     MEASUREMENT TABLES    DOPPLER MEASUREMENTS  Aortic valve   (Reference normals)  Peak jassi   250 cm/s   (--)  Peak gradient   25 mmHg   (--)  Mean gradient   15 mmHg   (--)    SYSTEM MEASUREMENT TABLES    2D  %FS: 32 84 %  Ao Diam: 3 98 cm  EDV(Teich): 162 66 ml  EF(Teich): 60 58 %  ESV(Teich): 64 11 ml  IVSd: 1 35 cm  LA Area: 28 26 cm2  LA Diam: 4 24 cm  LVEDV MOD A4C: 148 4 ml  LVEF MOD A4C: 38 21 %  LVESV MOD A4C: 91 69 ml  LVIDd: 5 74 cm  LVIDs: 3 85 cm  LVLd A4C: 8 56 cm  LVLs A4C: 7 84 cm  LVOT Diam: 2 66 cm  LVPWd: 1 21 cm  RA Area: 21 9 cm2  RVIDd: 4 18 cm  SV MOD A4C: 56 71 ml  SV(Teich): 98 55 ml    CW  AV Env  Ti: 299 88 ms  AV VTI: 51 93 cm  AV Vmax: 2 42 m/s  AV Vmean: 1 73 m/s  AV maxP 41 mmHg  AV meanP 59 mmHg    MM  TAPSE: 1 87 cm    PW  ARCELIA (VTI): 1 88 cm2  ARCELIA Vmax: 1 91 cm2  E': 0 09 m/s  E/E': 7 65  LVOT Env  Ti: 295 27 ms  LVOT VTI: 17 63 cm  LVOT Vmax: 0 83 m/s  LVOT Vmean: 0 6 m/s  LVOT maxP 77 mmHg  LVOT meanP 64 mmHg  LVSV Dopp: 97 67 ml  MV A Jassi: 0 64 m/s  MV Dec [place]: 3 4 m/s2  MV DecT: 192 4 ms  MV E Jassi: 0 65 m/s  MV E/A Ratio: 1 03  MV PHT: 55 8 ms  MVA By PHT: 3 94 cm2    [facility] in the [place] Commission Accredited Echocardiography Laboratory    Prepared and electronically signed by    Emily Arteaga MD  Signed 22-Sep-2020 15:19:26    Results for orders placed during the hospital encounter of 21    JOSE ELIAS    Narrative  [address]  [address] [facility]  [phone]    Transesophageal Echocardiogram  2D, Doppler, and Color Doppler    Study date:  2021    Patient: Corinna Marroquin  MR number: [id number]  Account number: [de-identified]  : 1955  Age: 63 years  Gender: Male  Status: Outpatient  Location: Cath lab  Height: 72 in  Weight: 222 6 lb  BP: 90583/ 92 mmHg    Indications: CVA    Diagnoses: I63 9 - Cerebral infarction, unspecified    Sonographer:  CARLOS Mosher  Primary Physician:  Obed Becerril DO  Referring Physician:  Elo Mckeon MD  Group:  [facility]  Interpreting Physician:  Codi Jack MD    SUMMARY    LEFT VENTRICLE:  Size was normal   Ejection fraction was estimated in the range of 50 % to 60 %  Wall thickness was increased  LEFT ATRIUM:  The atrium was dilated  ATRIAL SEPTUM:  There was a trace left-to-right shunt  This is within normal limits for the patient's age  No major shunting to suggest patent ASD/significant PFO    RIGHT ATRIUM:  The atrium was dilated  MITRAL VALVE:  There was mild to moderate regurgitation  AORTIC VALVE:  There was mild stenosis  AORTA:  **Mild diffuse atheroma is noted of the transverse and distal aorta**  Upper normal ascending arota measurement of 3 8-4 1cm    HISTORY: PRIOR HISTORY: HTN,Sleep apnea,stroke  PROCEDURE: The procedure was performed in the catheterization laboratory  This was a routine study   The risks and alternatives of the procedure were explained to the patient and informed consent was obtained  The transesophageal approach  was used  The study included complete 2D imaging, limited spectral Doppler, and color Doppler  The heart rate was 76 bpm, at the start of the study  An adult omniplane probe was inserted by the attending cardiologist  Intubated with ease  One intubation attempt(s)  There was no blood detected on the probe  Image quality was adequate  LEFT VENTRICLE: Size was normal  Ejection fraction was estimated in the range of 50 % to 60 %  Wall thickness was increased  RIGHT VENTRICLE: The size was normal  Systolic function was normal     LEFT ATRIUM: The atrium was dilated  APPENDAGE: The size was normal  No thrombus was identified  ATRIAL SEPTUM: There was a trace left-to-right shunt  This is within normal limits for the patient's age  No major shunting to suggest patent ASD/significant PFO    RIGHT ATRIUM: The atrium was dilated  MITRAL VALVE: There was mild thickening  DOPPLER: There was mild to moderate regurgitation  AORTIC VALVE: The valve was trileaflet  Leaflets exhibited mildly increased thickness, mild to moderate calcification, and sclerosis  DOPPLER: There was mild stenosis  AORTA: **Mild diffuse atheroma is noted of the transverse and distal aorta** Upper normal ascending arota measurement of 3 8-4 1cm    SYSTEM MEASUREMENT TABLES    2D  Ao Diam: 3 88 cm    [employer] Commission Accredited Echocardiography Laboratory    Prepared and electronically signed by    Natali Clarke MD  Signed 16-Jul-2021 17:07:29    Results for orders placed during the hospital encounter of 07/07/22    Echo complete w/ contrast if indicated    Interpretation Summary    Left Ventricle: Left ventricular cavity size is normal  Wall thickness is moderately increased  The left ventricular ejection fraction is 55%  Systolic function is normal  Wall motion is normal  Diastolic function is mildly abnormal, consistent with grade I (abnormal) relaxation      Right Ventricle: Right ventricular cavity size is mildly dilated    Left Atrium: The atrium is mildly dilated    Right Atrium: The atrium is mildly dilated    Aortic Valve: The aortic valve is trileaflet  The leaflets are not thickened  The leaflets are moderately calcified  There is mildly reduced mobility  There is mild stenosis    Mitral Valve: There is mild annular calcification  There is mild regurgitation    Tricuspid Valve: There is mild regurgitation    Pulmonic Valve: There is mild regurgitation    Aorta: The aortic root is normal in size  The ascending aorta is mildly dilated  Imaging:  Chest X-Ray:   No Chest XR results available for this patient  CT-scan of the chest:     No CTA results available for this patient    Lab Review   Lab Results   Component Value Date    WBC 6 14 07/07/2022    HGB 11 5 (L) 07/07/2022    HCT 37 1 07/07/2022    MCV 83 07/07/2022    RDW 12 7 07/07/2022     07/07/2022     BMP:  Lab Results   Component Value Date    SODIUM 142 07/07/2022    K 3 9 07/07/2022     (H) 07/07/2022    CO2 25 07/07/2022    BUN 21 07/07/2022    CREATININE 0 83 07/07/2022    GLUC 104 07/07/2022    GLUF 110 (H) 06/09/2022    CALCIUM 9 1 07/07/2022    CORRECTEDCA 9 2 06/07/2022    EGFR 92 07/07/2022    MG 2 3 06/10/2022     LFT:  Lab Results   Component Value Date    AST 12 (L) 07/07/2022    ALT 11 07/07/2022    ALKPHOS 90 07/07/2022    TP 6 5 07/07/2022    ALB 4 2 07/07/2022      No components found for: [facility] [facility]  Lab Results   Component Value Date    [id number] 1 648 10/30/2020     Lab Results   Component Value Date    HGBA1C 5 1 07/07/2022     Lipid Profile:   Lab Results   Component Value Date    CHOLESTEROL 103 11/12/2021    HDL 34 (L) 11/12/2021    LDLCALC 57 11/12/2021    TRIG 58 11/12/2021     Lab Results   Component Value Date    CHOLESTEROL 103 11/12/2021    CHOLESTEROL 126 09/22/2020     Lab Results   Component Value Date    TROPONINI <0 02 09/21/2020     Lab Results   Component Value Date    NTBNP 391 (H) 07/07/2022      No results found for this or any previous visit (from the past 672 hour(s))  Dr Niels Zaidi MD, [facility]      "This note has been constructed using a voice recognition system  Therefore there may be syntax, spelling, and/or grammatical errors   Please call if you have any questions  "

## 2022-09-28 ENCOUNTER — APPOINTMENT (OUTPATIENT)
Dept: PHYSICAL THERAPY | Facility: CLINIC | Age: 67
End: 2022-09-28
Payer: COMMERCIAL

## 2022-09-30 ENCOUNTER — OFFICE VISIT (OUTPATIENT)
Dept: PHYSICAL THERAPY | Facility: CLINIC | Age: 67
End: 2022-09-30
Payer: COMMERCIAL

## 2022-09-30 ENCOUNTER — OFFICE VISIT (OUTPATIENT)
Dept: OCCUPATIONAL THERAPY | Facility: CLINIC | Age: 67
End: 2022-09-30
Payer: COMMERCIAL

## 2022-09-30 DIAGNOSIS — F01.50 VASCULAR DEMENTIA WITHOUT BEHAVIORAL DISTURBANCE (HCC): Primary | ICD-10-CM

## 2022-09-30 DIAGNOSIS — R53.81 PHYSICAL DECONDITIONING: ICD-10-CM

## 2022-09-30 DIAGNOSIS — I69.993 CVA, OLD, ATAXIA: Primary | ICD-10-CM

## 2022-09-30 DIAGNOSIS — R26.89 BALANCE DISORDER: ICD-10-CM

## 2022-09-30 DIAGNOSIS — M54.50 LOW BACK PAIN WITHOUT SCIATICA, UNSPECIFIED BACK PAIN LATERALITY, UNSPECIFIED CHRONICITY: ICD-10-CM

## 2022-09-30 PROCEDURE — 97530 THERAPEUTIC ACTIVITIES: CPT

## 2022-09-30 PROCEDURE — 97112 NEUROMUSCULAR REEDUCATION: CPT

## 2022-09-30 NOTE — PROGRESS NOTES
Daily Note     Today's date: 2022  Patient name: Michael Martinez  : 1955  MRN: [id number]  Referring provider: Luis Esquivel MD  Dx:   Encounter Diagnosis     ICD-10-CM    1  CVA, old, ataxia  I69 993    2  Balance disorder  R26 89    3  Physical deconditioning  R53 81    4  Low back pain without sciatica, unspecified back pain laterality, unspecified chronicity  M54 50                GOES BY "PREMA"    Subjective: Patient reports to PT session no new issues or complaints  Objective: See treatment diary below    Pre-session vitals: /80, HR 80 bpm, SpO2 97%    NMR (3# ankle weights)  - STS from standard chair + tidal tank x 20 reps  - Ambulation w/ posterior resistance (red) 2 x 100 ft  - BWD ambulation w/ anterior resistance (red) 2 x 50 ft    Treadmill training (focus on heel strike/increased step length) - 15 minutes total  - Warm up x 2 mins @ 1 5 mph, 0% incline  - 10 minutes @ 1 8 mph, 3-5% incline - Peak HR: 126 bpm  - Cool down x 3 mins @ 1 3-1 5 mph, 0-2% incline    Backwards treadmill training @ 0 6 mph x 3 minutes - NOT TODAY      Assessment: Patient tolerated treatment session well today with focus on dynamic balance and mobility training  Observed increased dyspnea on exertion today that improved with seated rest break  Frequently reverts to decreased step length when performing resisted backwards ambulation, despite frequent verbal cues  Able to increase treadmill training duration with good patient tolerance  Patient will continue to benefit from skilled OPPT services to address deficits in strength, mobility, balance, and gait post-CVA  Plan: Continue per plan of care  Progress treatment as tolerated  Insurance:  AMA/CMS Eval/ Re-eval POC expires Auth #/ Referral # Total   Visits  Start date  Expiration date Extension  Visit limitation? PT only or  PT+OT?  Co-Insurance   [facility] 22 NA Visits NA NA  60 PCY PT Yes Visit/Unit Tracking  AUTH Status: NA Date 8/12 8/19 8/22 8/24 8/29 8/31 9/12 9/14 9/19 9/21 9/30     60 PCY MAX Used 1 2 3 4 5 6 7 8 9 10 11     Remaining  59 58 57 56 55 54 53 52 51 50 49        Outcome Measures Initial Eval  8/12/22 & DN 8/19/22 PN  9/12/2022       5xSTS 28 76 sec,   + push on knees 19 58 sec,   + push on knees       TUG  - Regular    - Cognitive   14 23 sec, no AD   13 05 sec, no AD  18 75 sec, no AD       10 meter 0 99 m/s 1 15 m/s       FGA 12/30 15/30       6MWT 1100 ft 1220 ft

## 2022-09-30 NOTE — PROGRESS NOTES
Daily Note     Today's date: 2022  Patient name: Lyla Chandra  : 1955  MRN: [id number]  Referring provider: Kevin Spivey MD  Dx:   Encounter Diagnosis   Name Primary?  Vascular dementia without behavioral disturbance ([facility] [address] ) Yes       POC expires Auth Status Total   Visits  Start date  Expiration date PT/OT + Visit Limit? Co-Insurance    Approved  61 PCY PT/OT   yes                                              Visit/Unit Tracking  AUTH Status:  Date              Visits  Authed: 61 PCY OT/PT Used 12 13              Remaining  48 52                  Start Time: 1100  Stop Time: 1145  Total time in clinic (min): 45 minutes    Precautions: mod cog impairment  PN due 10/16    Subjective: Patient reports no functional changes this date  "Is there a time limit for this, I may be here all day  This is a really good challenge"  (Patient referring to the pixy cube activity)      Objective: See treatment below  Therapeutic Activities:  - Pixy cubes, x2 designs; focusing on  skills, sustained and divided attention, FMC/dextrity, following directions and problem solving, required min verbal cues initially to begin activity  Pt completed the remainder of the first round independently with extra time; second round with extra time, pt completed with min A with 2 verbal cues and 1 visual cue  - Spot It; requiring mod A with visual/verbal cueing to point/scan left to right, focusing on  skills (visual attention, visual memory, visual spatial relationships, visual figure ground, visual form constancy), scanning, sustained/divided attention  - Memory Mix: x6 tokens focusing on  skills (visual attention, visual memory, visual spatial relationships, visual figure ground, visual form constancy), scanning, sustained/divided attention; ~100% accuracy without assistance  Assessment: Tolerated treatment well   Pt demonstrating deficits in EF, immediate and delayed recall,  skills, slow processing speed  Pt would benefit from continued OT services to address cognitive function for life roles  Plan: Continued skilled OT per POC

## 2022-10-04 ENCOUNTER — OFFICE VISIT (OUTPATIENT)
Dept: PHYSICAL THERAPY | Facility: CLINIC | Age: 67
End: 2022-10-04
Payer: MEDICARE

## 2022-10-04 ENCOUNTER — OFFICE VISIT (OUTPATIENT)
Dept: OCCUPATIONAL THERAPY | Facility: CLINIC | Age: 67
End: 2022-10-04
Payer: COMMERCIAL

## 2022-10-04 DIAGNOSIS — F01.50 VASCULAR DEMENTIA WITHOUT BEHAVIORAL DISTURBANCE (HCC): Primary | ICD-10-CM

## 2022-10-04 DIAGNOSIS — I69.993 CVA, OLD, ATAXIA: Primary | ICD-10-CM

## 2022-10-04 DIAGNOSIS — M54.50 LOW BACK PAIN WITHOUT SCIATICA, UNSPECIFIED BACK PAIN LATERALITY, UNSPECIFIED CHRONICITY: ICD-10-CM

## 2022-10-04 DIAGNOSIS — R53.81 PHYSICAL DECONDITIONING: ICD-10-CM

## 2022-10-04 DIAGNOSIS — R26.89 BALANCE DISORDER: ICD-10-CM

## 2022-10-04 DIAGNOSIS — Z86.73 HISTORY OF MULTIPLE STROKES: ICD-10-CM

## 2022-10-04 PROCEDURE — 97112 NEUROMUSCULAR REEDUCATION: CPT

## 2022-10-04 PROCEDURE — 97530 THERAPEUTIC ACTIVITIES: CPT

## 2022-10-04 NOTE — PROGRESS NOTES
Daily Note     Today's date: 10/4/2022  Patient name: Shivani Baumann  : 1955  MRN: [id number]  Referring provider: Kiara Jin MD  Dx:   Encounter Diagnoses   Name Primary?  Vascular dementia without behavioral disturbance ([facility] [address] ) Yes    History of multiple strokes        POC expires Auth Status Total   Visits  Start date  Expiration date PT/OT + Visit Limit? Co-Insurance    Approved  61 PCY PT/OT   yes                                              Visit/Unit Tracking  AUTH Status:  Date 9/21 9/30 10/4            Visits  Authed: 61 PCY OT/PT Used 12 13 2             Remaining  48 47 45                            Precautions: mod cog impairment, [place]  PN due 10/16    Subjective: "I feel like the last girl helped me when I was stumbling"    Objective: See treatment below  Therapeutic Activities:  -Following directions, spatial relationships activity with focus on direction following with 2-4 components, sustained attn in semi modal environment, , spatial relationships  Requires inc time and is externally distracted by stimuli in the environment  Requires min cues for ~25% of items 2* difficulty with attending and following all components of directions    -Connecting the dots alphabetical with images with focus on logical and inferential reasoning, sequencing, sustained attention  Requires mod cues throughout     -Number/letter grids alternating in ascending order and providing the name of a place that begins with each letter  Focus on divided attention, working memory with use of internal memory strategies  Requires mod cues for recalling place in sequence and directions    Assessment: Tolerated treatment well  Pt demonstrating deficits in EF, immediate and delayed recall,  skills, slow processing speed  Pt would benefit from continued OT services to address cognitive function for life roles  Plan: Continued skilled OT per POC

## 2022-10-04 NOTE — PROGRESS NOTES
Daily Note     Today's date: 10/4/2022  Patient name: Rae Walker  : 1955  MRN: [id number]  Referring provider: Jose Alejandro Martin MD  Dx:   Encounter Diagnosis     ICD-10-CM    1  CVA, old, ataxia  I69 993    2  Balance disorder  R26 89    3  Physical deconditioning  R53 81    4  Low back pain without sciatica, unspecified back pain laterality, unspecified chronicity  M54 50                GOES BY "PREMA"    Subjective: Patient reports to PT session no new issues or complaints  Objective: See treatment diary below    NMR (3# ankle weights)  - STS from standard chair + tidal tank x 20 reps  - Ambulation w/ posterior resistance (red) x 250 ft + ramp negotiation  - Sidestepping on foam beam x 4 cycles down/back; 0-1 UE    Treadmill training (focus on heel strike/increased step length) - 15 minutes total  - Warm up x 2 mins @ 1 5 mph, 0% incline  - 10 minutes @ 2 0 mph, 0-5% incline   - Cool down x 3 mins @ 1 3-1 5 mph, 0-2% incline    Backwards treadmill training @ 0 6 mph x 3 minutes - NOT TODAY      Assessment: Patient tolerated treatment session well today with focus on dynamic balance and mobility training  Addition of compliant surfaces to today's session, with patient demonstrating increased reliance on UE support  Good reactive balance noted with resisted ambulation today  Patient will continue to benefit from skilled OPPT services to address deficits in strength, mobility, balance, and gait post-CVA  Plan: Continue per plan of care  Progress treatment as tolerated  Insurance:  AMA/CMS Eval/ Re-eval POC expires Auth #/ Referral # Total   Visits  Start date  Expiration date Extension  Visit limitation? PT only or  PT+OT?  Co-Insurance   AMA 22 NA Visits NA NA  60 PCY PT Yes                                                           Visit/Unit Tracking  AUTH Status: NA Date 8/12 8/19 8/22 8/24 8/29 8/31 9/12 9/14 9/19 9/21 9/30 10/4    60 PCY MAX Used 1 2 3 4 5 6 7 8 9 10 11 12 Remaining  59 58 57 56 55 54 53 52 51 50 49 48       Outcome Measures Initial Eval  8/12/22 & DN 8/19/22 PN  9/12/2022       5xSTS 28 76 sec,   + push on knees 19 58 sec,   + push on knees       TUG  - Regular    - Cognitive   14 23 sec, no AD   13 05 sec, no AD  18 75 sec, no AD       10 meter 0 99 m/s 1 15 m/s       FGA 12/30 15/30       6MWT 1100 ft 1220 ft

## 2022-10-06 ENCOUNTER — APPOINTMENT (OUTPATIENT)
Dept: OCCUPATIONAL THERAPY | Facility: CLINIC | Age: 67
End: 2022-10-06

## 2022-10-06 ENCOUNTER — APPOINTMENT (OUTPATIENT)
Dept: PHYSICAL THERAPY | Facility: CLINIC | Age: 67
End: 2022-10-06

## 2022-10-10 ENCOUNTER — APPOINTMENT (OUTPATIENT)
Dept: PHYSICAL THERAPY | Facility: CLINIC | Age: 67
End: 2022-10-10

## 2022-10-10 ENCOUNTER — APPOINTMENT (OUTPATIENT)
Dept: OCCUPATIONAL THERAPY | Facility: CLINIC | Age: 67
End: 2022-10-10

## 2022-10-12 ENCOUNTER — APPOINTMENT (OUTPATIENT)
Dept: PHYSICAL THERAPY | Facility: CLINIC | Age: 67
End: 2022-10-12

## 2022-10-12 ENCOUNTER — APPOINTMENT (OUTPATIENT)
Dept: OCCUPATIONAL THERAPY | Facility: CLINIC | Age: 67
End: 2022-10-12

## 2022-10-17 ENCOUNTER — APPOINTMENT (OUTPATIENT)
Dept: OCCUPATIONAL THERAPY | Facility: CLINIC | Age: 67
End: 2022-10-17

## 2022-10-17 ENCOUNTER — APPOINTMENT (OUTPATIENT)
Dept: PHYSICAL THERAPY | Facility: CLINIC | Age: 67
End: 2022-10-17

## 2022-10-19 ENCOUNTER — APPOINTMENT (OUTPATIENT)
Dept: PHYSICAL THERAPY | Facility: CLINIC | Age: 67
End: 2022-10-19

## 2022-10-19 ENCOUNTER — APPOINTMENT (OUTPATIENT)
Dept: OCCUPATIONAL THERAPY | Facility: CLINIC | Age: 67
End: 2022-10-19

## 2022-10-24 ENCOUNTER — OFFICE VISIT (OUTPATIENT)
Dept: PHYSICAL THERAPY | Facility: CLINIC | Age: 67
End: 2022-10-24
Payer: MEDICARE

## 2022-10-24 ENCOUNTER — APPOINTMENT (OUTPATIENT)
Dept: OCCUPATIONAL THERAPY | Facility: CLINIC | Age: 67
End: 2022-10-24

## 2022-10-24 DIAGNOSIS — R26.89 BALANCE DISORDER: ICD-10-CM

## 2022-10-24 DIAGNOSIS — I69.993 CVA, OLD, ATAXIA: Primary | ICD-10-CM

## 2022-10-24 DIAGNOSIS — R53.81 PHYSICAL DECONDITIONING: ICD-10-CM

## 2022-10-24 DIAGNOSIS — M54.50 LOW BACK PAIN WITHOUT SCIATICA, UNSPECIFIED BACK PAIN LATERALITY, UNSPECIFIED CHRONICITY: ICD-10-CM

## 2022-10-24 PROCEDURE — 97112 NEUROMUSCULAR REEDUCATION: CPT

## 2022-10-24 PROCEDURE — 97530 THERAPEUTIC ACTIVITIES: CPT

## 2022-10-24 NOTE — PROGRESS NOTES
PT Re-Evaluation            Insurance:  AMA/CMS Eval/ Re-eval POC expires Auth #/ Referral # Total   Visits  Start date  Expiration date Extension  Visit limitation? PT only or  PT+OT? Co-Insurance   AMA 22 NA Visits NA NA  60 PCY PT Yes                                                           Visit/Unit Tracking  AUTH Status: NA Date 8/12 8/19 8/22 8/24 8/29 8/31 9/12 9/14 9/19 9/21 9/30 10/4 10/24    60 PCY MAX Used 1 2 3 4 5 6 7 8 9 10 11 12 13    Remaining  59 58 57 56 55 54 53 52 51 50 52 48 52               Today's date: 10/24/2022  Patient name: Jose Manuel Morales  : 1955  MRN: [id number]  Referring provider: Terri Molina MD  Dx:   Encounter Diagnosis     ICD-10-CM    1  CVA, old, ataxia  I69 993    2  Balance disorder  R26 89    3  Physical deconditioning  R53 81    4  Low back pain without sciatica, unspecified back pain laterality, unspecified chronicity  M54 50          Assessment  Assessment details: Patient is a 74 y o  Male who has been presenting to skilled outpatient PT with referral for a chronic R thalamic CVA  He presents to PT with impairments in coordination, strength, proprioception, balance and endurance that in turn have limited safe functional mobility at home and in the community  Patient demonstrated overall improvements in the following outcome measures since IE on 2022: 5 x STS, and TUG Regular, TUG cog, and 10 MWT, likely indicating overall gains in functional LE strength, safe mobility, gait speed, and cardiovascular endurance, respectively  Despite these improvements, per cutoff scores for the 5 x STS, TUG, and FGA he is classified as HIGH risk for falls  Patient with slight plateau in performance with 6 MWT and FGA measurement, likely due to patient being out of town for > a month  All cutoff scores and normative values taken from the APTA Neuro Section and Rehab Measures   Ambulated length of session no AD with following deficits: WBOS, lack of pelvic and trunk dissociation/rotation, slowed nia, reduced reciprocal arm swing  Due to patient's reports of poor memory and > 10% difference on the TUG regular versus TUG cognitive, he is currently on caseload for occupational therapy  He has currently met 2 short term goals  Patient will continue to benefit from skilled OPPT services to address the aforementioned impairments in order to maximize ability to return to life roles within home and community safely and independently  Impairments: Abnormal coordination, Abnormal gait, Abnormal or restricted ROM, Activity intolerance, Impaired balance, Impaired physical strength, Lacks appropriate HEP, Pain with function and Abnormal movement  Understanding of Dx/Px/POC: Good  Prognosis: Good      Patient verbalized understanding of POC  Please contact me if you have any questions or recommendations  Thank you for the referral and the opportunity to share in [address] [place] [address]          Plan  Plan details: 6 MWT, generalized balance, HIGT, dynamic balance  Patient would benefit from: Skilled PT  Planned modality interventions: Biofeedback and Cryotherapy  Planned therapy interventions: Balance, Body mechanics training, Coordination, Functional ROM exercises, Gait training, HEP, Manual therapy, Motor coordination training, Neuromuscular re-education, Patient education, Strengthening, Stretching, Therapeutic activities and Therapeutic exercises  Frequency: 2x/wk and 3x/wk  Duration in weeks: 12 weeks  Plan of Care beginning date: 10/24/22  Plan of Care expiration date: 3 months - 01/24/23  Treatment plan discussed with: Patient         Goals  Short Term Goals (4 weeks):    - Patient will improve time on TUG by 2 9 seconds from 14 23 seconds to 11 33 seconds to facilitate improved safety in all ambulation - NOT MET  - Patient will be independent in basic HEP 2-3 weeks - MET  - Patient will improve 5xSTS score by 2 3 seconds from 28 76 seconds to 26 46 seconds to promote improved LE functional strength needed for ADLs - MET  - Patient will complete components of CB&M to promote agility necessary for sports related tasks (d/c 9/12/2022)    Long Term Goals (12 weeks):  - Patient will be independent in a comprehensive home exercise program  - Patient will improve gait speed by 0 18 m/s from 0 99 m/s to 1 17 m/s to improve safety with community ambulation  - Patient will improve scoring on FGA by 4 points from 12/30 to 16/30 to progress safety with dynamic tasks  - Patient will be able to demonstrate HT in gait without veering  - Patient will improve 6 Minute Walk Test score by 190 feet from baseline score to promote improved cardiovascular endurance  - Patient will report 50% reduction in near falls in order to improve safety with functional tasks and reduce his risk for falls  - Patient will report going on walks at least 3 days per week to promote independence and improved cardiovascular endurance  - Patient will be able to ascend/descend stairs reciprocally without UE assist to promote independence and safety with ADLs  - Patient will report 50% reduction in near falls when ambulating on uneven terrain      Cut off score    All date taken from APTA Neuro Section or Rehab Measures      Mathew:  Miguel et al , 2018  [address]: 2 7 pts    Rigo et al , 2011  Cut-off score: 45/56    Chronic CVA  < 44/56 high risk for falls (Miguel et al , 2018)  < 47 5/56 slow walker status (German et al , 2011) 5xSTS: Carlos 2010  Sofia [address]: 2 3 sec  Age Norms:  60-69: 11 1 sec  70-79: 12 6 sec  80-89: 14 8 sec    Diallo 2010, Chronic Stroke  Chronic CVA: 12 sec   TUG  Jason , 2005  MDC: 2 9 sec    Cut off score:  >13 5 sec community dwelling adults  >32 2 frail elderly  <20 I for basic transfers  >30 dependent on transfers 10 Meter Walk Test:   Iirs galvan , 2006  Small meaningful change: 0 06 m/s  Substantial meaningful change: 0 14 m/s  MCID: 0 16 m/s    < 0 4 m/s household ambulators  0 4 - 0 8 m/s limited community ambulators  > 0 8 m/s community ambulators   FGA: Isabel et al , 2010  MCID: 4 2 pts  Geriatrics/community < 22/30 fall risk  Geriatrics/community < 20/30 unexplained falls DGI  MDC: vestibular - 4 pts  MDC: geriatric/community - 3 pts  Falls risk <19/24   6 Minute Walk Test  Keshia et al , 2006  MDC: 60 98 m (200 01 ft)    Artemio Maurer, Eng, & Cut off, 2012  MCID: 34 4 m    Age Norms  60-69: M - 1876 ft   F - 1765 ft  70-79: M - 1729 ft   F - 1545 ft  80-89: M - 1368 ft   F - 1286 ft Modified Amira  0: No increase in tone  1: Catch and release or min resistance at end range  1+: Catch f/b min resistance throughout remainder (< half ROM)  2: Easily moved, but more marked tone throughout most ROM  3: Significant tone, PROM difficult  4: Rigid   MiniBest: Taurus et al , 2013  CVA < 17 5 fall risk Pass (Acute CVA)  MDC: 1 8 points (acute), 3 2 points (chronic)         Subjective    History of Present Illness  Mechanism of injury: Patient has a history of a R Thalamic CVA in 2020  Patient was recently seen in loop recorder implantation on 12/22/2021 for AF surveillance and underlying cryptogenic stroke  Patient's wife reports that he is pretty sedentary and is not motivated  Patient reports he uses a cane in unfamiliar territory, but otherwise he does not utilize it  Updated (9/12/2022): Patient reports that he is satisfied with his progress in PT thus far  Unable to state what specifically has gotten better, but says his wife has noticed improvements  States he cannot remember if he ended up going away to [place] last week and feels his memory has not been great lately  Update (10/24/22): Patient reports his wife is pleased with his progress  He reports that he has been in [place] the past few months cleaning up his house       Primary AD: SPC out in the community  Assist level at home: No  WC usage: None  PLOF: Independent Pain  Current pain ratin-3/10  At best pain ratin-3/10  At worst pain ratin/10  Location: LBP  Aggravating factors: sitting     Social Support  Steps to enter house: a few steps to enter  Stairs in house: , but not [place]   Lives in: two story home  Lives with: wife    Employment status: Retired  Hand dominance: R handed     Treatments  Previous treatment: None  Current treatment: None  Diagnostic Testing: Loop monitor, MRI previous years      Objective     Vitals  - HR: 73 BPM  - BP: 140/90  mmHG  - SPO2: 97%    HR Max  - 207 - (0 7x64)  = 162 2    LE MMT  - R Hip Flexion: 4-/5  - L Hip Flexion: 5/5  - R Hip Extension: 5/5  - L Hip Extension: 5/5  - R Hip Abduction: 4+/5 - L Hip abduction: 4+/5  - R Hip adduction: 4+/5 - L Hip adduction: 4+/5  - R Knee Extension: 4+/5 - L Knee Extension: 4+/5  - R Knee Flexion: 4+/5  - L Knee Flexion: 4+/5  - R Ankle DF: 4+/5  - L Ankle DF: 4+/5  - R Ankle PF: 4+/5  - L Ankle PF: 4+/5    Sensation  - Light touch: Normal   - Deep pressure: Normal  - Pain: Normal  - Temperature: Normal  - Proprioception: Abnormal R side only     Coordination  - Dysmetria: Abnormal  - Dysdiadochokinesia: Abnormal  - Alternating Toe Taps: Normal    HEP  - STS  - Trunk flexion/roll outs at table  - Repeated EXT      Outcome Measures Initial Eval  22 & DN 22 PN  2022 PN  10/24/22      5xSTS 28 76 sec,   + push on knees 19 58 sec,   + push on knees 18 66 sec, +push on knees      TUG  - Regular    - Cognitive   14 23 sec, no AD   13 05 sec, no AD  18 75 sec, no AD   11 87 sec no AD  14 71 sec no AD      10 meter 0 99 m/s 1 15 m/s 1 04 m/s- self selected    Speed: 1 54 m/s      FGA 12/30 15/30 15/30      6MWT 1100 ft 1220 ft 1090 ft                            DN (2022):   - Varied chantel negotiation + Tidal Tank 10# (wearing R ankle weight for increased step on L side): 3 cycles down/back  - Lateral varied chantel negotiation (wearing R ankle weight for increased step on L side): 3 cycles down/back  - M   Tandem + carrying Tidal Tank 10#: 3 cycles down/back    Precautions: A fib  Past Medical History:   Diagnosis Date   • Arthropathy of knee     last assessed 8/19/15   • Bacterial pneumonia 02/05/2007    last assessed 2/5/7   • Brain atrophy [facility])    • Colon polyp    • CPAP (continuous positive airway pressure) dependence    • Disorder of male genital organ 02/12/2008    last assessed 2/12/08   • ED (erectile dysfunction) of organic origin     last assessed 2/13/17    • History of hypertension    • Seasonal allergies    • Situational anxiety     resolved 3/16/17    • Sleep apnea    • Stroke [facility])     09/2020 TIA   • Tinnitus    • Wears hearing aid     bilateral

## 2022-10-26 ENCOUNTER — OFFICE VISIT (OUTPATIENT)
Dept: OCCUPATIONAL THERAPY | Facility: CLINIC | Age: 67
End: 2022-10-26
Payer: COMMERCIAL

## 2022-10-26 ENCOUNTER — HOSPITAL ENCOUNTER (EMERGENCY)
Facility: HOSPITAL | Age: 67
Discharge: HOME | End: 2022-10-26
Attending: EMERGENCY MEDICINE
Payer: MEDICARE

## 2022-10-26 ENCOUNTER — OFFICE VISIT (OUTPATIENT)
Dept: PHYSICAL THERAPY | Facility: CLINIC | Age: 67
End: 2022-10-26
Payer: MEDICARE

## 2022-10-26 ENCOUNTER — APPOINTMENT (EMERGENCY)
Dept: RADIOLOGY | Facility: HOSPITAL | Age: 67
End: 2022-10-26
Payer: MEDICARE

## 2022-10-26 VITALS
HEART RATE: 60 BPM | BODY MASS INDEX: 31.87 KG/M2 | OXYGEN SATURATION: 96 % | SYSTOLIC BLOOD PRESSURE: 141 MMHG | DIASTOLIC BLOOD PRESSURE: 88 MMHG | WEIGHT: 235 LBS | RESPIRATION RATE: 18 BRPM | TEMPERATURE: 98.4 F

## 2022-10-26 DIAGNOSIS — R77.8 ELEVATED TROPONIN: ICD-10-CM

## 2022-10-26 DIAGNOSIS — I69.993 CVA, OLD, ATAXIA: Primary | ICD-10-CM

## 2022-10-26 DIAGNOSIS — Z86.73 HISTORY OF MULTIPLE STROKES: ICD-10-CM

## 2022-10-26 DIAGNOSIS — F01.50 VASCULAR DEMENTIA WITHOUT BEHAVIORAL DISTURBANCE (HCC): Primary | ICD-10-CM

## 2022-10-26 DIAGNOSIS — R42 DIZZINESS: Primary | ICD-10-CM

## 2022-10-26 DIAGNOSIS — R53.81 PHYSICAL DECONDITIONING: ICD-10-CM

## 2022-10-26 DIAGNOSIS — R26.89 BALANCE DISORDER: ICD-10-CM

## 2022-10-26 DIAGNOSIS — M54.50 LOW BACK PAIN WITHOUT SCIATICA, UNSPECIFIED BACK PAIN LATERALITY, UNSPECIFIED CHRONICITY: ICD-10-CM
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
## 2022-10-26 LAB
2HR DELTA HS TROPONIN: 9 NG/L
ALBUMIN SERPL BCP-MCNC: 3.7 G/DL (ref 3.5–5)
ALP SERPL-CCNC: 110 U/L (ref 46–116)
ALT SERPL W P-5'-P-CCNC: 27 U/L (ref 12–78)
AMORPH URATE CRY URNS QL MICRO: ABNORMAL /HPF
ANION GAP SERPL CALCULATED.3IONS-SCNC: 12 MMOL/L (ref 4–13)
AST SERPL W P-5'-P-CCNC: 21 U/L (ref 5–45)
BACTERIA UR QL AUTO: ABNORMAL /HPF
BASOPHILS # BLD AUTO: 0.04 THOUSANDS/ÂΜL (ref 0–0.1)
BASOPHILS NFR BLD AUTO: 1 % (ref 0–1)
BILIRUB SERPL-MCNC: 0.69 MG/DL (ref 0.2–1)
BILIRUB UR QL STRIP: ABNORMAL
BUN SERPL-MCNC: 24 MG/DL (ref 5–25)
CALCIUM SERPL-MCNC: 8.8 MG/DL (ref 8.3–10.1)
CARDIAC TROPONIN I PNL SERPL HS: 60 NG/L
CARDIAC TROPONIN I PNL SERPL HS: 69 NG/L
CHLORIDE SERPL-SCNC: 108 MMOL/L (ref 96–108)
CLARITY UR: ABNORMAL
CO2 SERPL-SCNC: 24 MMOL/L (ref 21–32)
COLOR UR: ABNORMAL
CREAT SERPL-MCNC: 1.11 MG/DL (ref 0.6–1.3)
EOSINOPHIL # BLD AUTO: 0.15 THOUSAND/ÂΜL (ref 0–0.61)
EOSINOPHIL NFR BLD AUTO: 2 % (ref 0–6)
ERYTHROCYTE [DISTWIDTH] IN BLOOD BY AUTOMATED COUNT: 15.3 % (ref 11.6–15.1)
GFR SERPL CREATININE-BSD FRML MDRD: 69 ML/MIN/1.73SQ M
GLUCOSE SERPL-MCNC: 109 MG/DL (ref 65–140)
GLUCOSE SERPL-MCNC: 115 MG/DL (ref 65–140)
GLUCOSE UR STRIP-MCNC: NEGATIVE MG/DL
HCT VFR BLD AUTO: 34.9 % (ref 36.5–49.3)
HGB BLD-MCNC: 10.2 G/DL (ref 12–17)
HGB UR QL STRIP.AUTO: NEGATIVE
IMM GRANULOCYTES # BLD AUTO: 0.02 THOUSAND/UL (ref 0–0.2)
IMM GRANULOCYTES NFR BLD AUTO: 0 % (ref 0–2)
KETONES UR STRIP-MCNC: ABNORMAL MG/DL
LEUKOCYTE ESTERASE UR QL STRIP: NEGATIVE
LYMPHOCYTES # BLD AUTO: 0.97 THOUSANDS/ÂΜL (ref 0.6–4.47)
LYMPHOCYTES NFR BLD AUTO: 13 % (ref 14–44)
MCH RBC QN AUTO: 22.5 PG (ref 26.8–34.3)
MCHC RBC AUTO-ENTMCNC: 29.2 G/DL (ref 31.4–37.4)
MCV RBC AUTO: 77 FL (ref 82–98)
MONOCYTES # BLD AUTO: 0.81 THOUSAND/ÂΜL (ref 0.17–1.22)
MONOCYTES NFR BLD AUTO: 11 % (ref 4–12)
MUCOUS THREADS UR QL AUTO: ABNORMAL
NEUTROPHILS # BLD AUTO: 5.5 THOUSANDS/ÂΜL (ref 1.85–7.62)
NEUTS SEG NFR BLD AUTO: 73 % (ref 43–75)
NITRITE UR QL STRIP: NEGATIVE
NON-SQ EPI CELLS URNS QL MICRO: ABNORMAL /HPF
NRBC BLD AUTO-RTO: 0 /100 WBCS
PH UR STRIP.AUTO: 6 [PH]
PLATELET # BLD AUTO: 297 THOUSANDS/UL (ref 149–390)
PMV BLD AUTO: 8.4 FL (ref 8.9–12.7)
POTASSIUM SERPL-SCNC: 3.5 MMOL/L (ref 3.5–5.3)
PROT SERPL-MCNC: 6.5 G/DL (ref 6.4–8.4)
PROT UR STRIP-MCNC: ABNORMAL MG/DL
RBC # BLD AUTO: 4.54 MILLION/UL (ref 3.88–5.62)
RBC #/AREA URNS AUTO: ABNORMAL /HPF
SODIUM SERPL-SCNC: 144 MMOL/L (ref 135–147)
SP GR UR STRIP.AUTO: >=1.03 (ref 1–1.03)
UROBILINOGEN UR QL STRIP.AUTO: 1 E.U./DL
WBC # BLD AUTO: 7.49 THOUSAND/UL (ref 4.31–10.16)
WBC #/AREA URNS AUTO: ABNORMAL /HPF

## 2022-10-26 PROCEDURE — 36415 COLL VENOUS BLD VENIPUNCTURE: CPT | Performed by: EMERGENCY MEDICINE

## 2022-10-26 PROCEDURE — 84484 ASSAY OF TROPONIN QUANT: CPT | Performed by: EMERGENCY MEDICINE

## 2022-10-26 PROCEDURE — 81001 URINALYSIS AUTO W/SCOPE: CPT | Performed by: EMERGENCY MEDICINE

## 2022-10-26 PROCEDURE — 70450 CT HEAD/BRAIN W/O DYE: CPT

## 2022-10-26 PROCEDURE — 93005 ELECTROCARDIOGRAM TRACING: CPT

## 2022-10-26 PROCEDURE — 85025 COMPLETE CBC W/AUTO DIFF WBC: CPT | Performed by: EMERGENCY MEDICINE

## 2022-10-26 PROCEDURE — 80053 COMPREHEN METABOLIC PANEL: CPT | Performed by: EMERGENCY MEDICINE

## 2022-10-26 PROCEDURE — 97112 NEUROMUSCULAR REEDUCATION: CPT

## 2022-10-26 PROCEDURE — 97530 THERAPEUTIC ACTIVITIES: CPT | Performed by: OCCUPATIONAL THERAPIST

## 2022-10-26 PROCEDURE — 82948 REAGENT STRIP/BLOOD GLUCOSE: CPT

## 2022-10-26 NOTE — PROGRESS NOTES
Daily Note     Today's date: 10/26/2022  Patient name: Hector Clarke  : 1955  MRN: [id number]  Referring provider: Sylvester Hendricks MD  Dx:   Encounter Diagnosis     ICD-10-CM    1  CVA, old, ataxia  I69 993    2  Balance disorder  R26 89    3  Physical deconditioning  R53 81    4  Low back pain without sciatica, unspecified back pain laterality, unspecified chronicity  M54 50                GOES BY "PREMA"    Subjective: Patient reports to PT session reporting he had a fall yesterday when he was trying on pants and fell backwards into a large mirror or dresser  He denies hitting his head and his wife was present for the fall  Objective: See treatment diary below    NMR (3# ankle weights)  - STS w/ airex pad + tidal tank hold: 2 sets x 1 minute  - Standing trunk rotations w/ tidal tank x 20 reps B/L  - FWD/LAT chantel negotiation (6" and 9") + 9# and 18# KB hold x 3 cycles down/back  - Sidestepping on foam beam x 4 cycles down/back; 0-1 UE  - Ambulation w/ posterior resistance (red) x 150 ft + ramp negotiation  - Backwards ambulation w/ anterior resistance (red) 2 x 50 ft      Assessment: Patient tolerated treatment session well today with focus on dynamic balance and mobility training  Experienced 1 posterior-lateral LOB when navigating hurdles laterally and required PT modA to recover  Continues to demonstrate significant difficult with step-through pattern with resisted backward ambulation  Patient will continue to benefit from skilled OPPT services to address deficits in strength, mobility, balance, and gait post-CVA  Plan: Continue per plan of care  Progress treatment as tolerated  Insurance:  AMA/CMS Eval/ Re-eval POC expires Auth #/ Referral # Total   Visits  Start date  Expiration date Extension  Visit limitation? PT only or  PT+OT?  Co-Insurance   AMA 22 NA Visits NA NA  60 PCY PT Yes                                                           Visit/Unit Tracking  AUTH Status: NA Date 8/12 8/19 8/22 8/24 8/29 8/31 9/12 9/14 9/19 9/21 9/30 10/4 10/24    60 PCY MAX Used 1 2 3 4 5 6 7 8 9 10 11 12 13    Remaining  59 58 57 56 55 54 53 52 51 50 49 48 52     AUTH Status: NA Date 10/26                60 PCY MAX Used 14                Remaining  46                   Outcome Measures Initial Eval  8/12/22 & DN 8/19/22 PN  9/12/2022 PN  10/24/22      5xSTS 28 76 sec,   + push on knees 19 58 sec,   + push on knees 18 66 sec, +push on knees      TUG  - Regular    - Cognitive   14 23 sec, no AD   13 05 sec, no AD  18 75 sec, no AD   11 87 sec no AD  14 71 sec no AD      10 meter 0 99 m/s 1 15 m/s 1 04 m/s- self selected    Speed: 1 54 m/s      FGA 12/30 15/30 15/30      6MWT 1100 ft 1220 ft 1090 ft

## 2022-10-26 NOTE — ED PROCEDURE NOTE
PROCEDURE  ECG 12 Lead Documentation Only    Date/Time: 10/26/2022 6:22 PM  Performed by: William Rosenbaum DO  Authorized by: William Rosenbaum DO     ECG reviewed by me, the ED Provider: yes    Patient location:  ED  Interpretation:     Interpretation: abnormal    Rate:     ECG rate:  99    ECG rate assessment: normal    Rhythm:     Rhythm: sinus rhythm    Ectopy:     Ectopy: PVCs    ST segments:     ST segments:  Non-specific         William Rosenbaum DO  10/26/22 1822

## 2022-10-26 NOTE — PROGRESS NOTES
Daily Note     Today's date: 10/26/2022  Patient name: Richard Alvarez  : 1955  MRN: [id number]  Referring provider: Emily Whatley MD  Dx:   Encounter Diagnoses   Name Primary? • Vascular dementia without behavioral disturbance ([facility] [address] ) Yes   • History of multiple strokes        POC expires Auth Status Total   Visits  Start date  Expiration date PT/OT + Visit Limit? Co-Insurance    Approved  60 PCY PT/OT   yes                                              Visit/Unit Tracking  AUTH Status:  Date 9/21 9/30 10/4 10/26           Visits  Authed: 60 PCY OT/PT Used 12 13 2 2            Remaining  48 47 36 40                Start Time: 6365  Stop Time: 6655  Total time in clinic (min): 36 minutes    Precautions: mod cog impairment, [place]  PN due 10/16    Subjective: "I fell yesterday" (Pt reports falling when trying on a pair of pants  Educated to sit when threading LEs for safety)    Objective: See treatment below  Therapeutic Activities:  -Divided attention activity with 2 step written directions  Pt completed with ~75% accuracy and in a reasonable amount of time    -[place] block puzzle with divided attention component of naming writing items from categories depending on color of piece placed  Completed to address  skills, sustained and divided attention, LT recall  Required intermittent cues to complete writing component, extra time  Graded down after ~20% of puzzle to completing puzzle only d/t needing max cues for writing portion of task   -Provided anagrams worksheet (4-5 letter words) for HEP to address EF  Assessment: Tolerated treatment well  Pt demonstrating deficits in divided attention,  skills, slow processing speed  Pt would benefit from continued OT services to address cognitive function for life roles  Plan: Continued skilled OT per POC

## 2022-10-26 NOTE — ED PROVIDER NOTES
History  Chief Complaint   Patient presents with   • Dizziness     Wife reported that pt was at PT and co of dizziness and fell down to the side  Wife reported that he also fell down yesterday at home and may hit head  On thinner hx of stroke a year ago, Denies dizziness at this time  Patient brought in by wife for evaluation after falls  Patient had a fall yesterday while attempting foot sock where he fell backwards against a dresser  Patient has had difficulty with ambulation since his stroke a year ago and has been going to PT OT  He cannot remember if he hit his head or not  Does not believe he lost consciousness  At PT today he had another fall has felt a little dizzy  PT felt he was often was concerned about the fall yesterday and the fact he is on blood thinners advised him come to the ER for evaluation  Further questioning patient states he did a lot more activity today than normal   He mowed the lawn and walked to PT/OT today  This is more exertion than he normally does  Currently denies any symptoms at this time  History provided by:  Patient   used: No    Dizziness  Associated symptoms: no chest pain, no headaches, no palpitations, no shortness of breath, no vomiting and no weakness        Prior to Admission Medications   Prescriptions Last Dose Informant Patient Reported? Taking?    ALPRAZolam (XANAX) 0 25 mg tablet  Self No No   Sig: Take 1 tablet (0 25 mg total) by mouth 2 (two) times a day as needed for anxiety or sleep   albuterol (Proventil HFA) 90 mcg/act inhaler  Self No No   Sig: Inhale 2 puffs every 4 (four) hours as needed for wheezing or shortness of breath   apixaban (Eliquis) 5 mg  Self No No   Sig: Take 1 tablet (5 mg total) by mouth 2 (two) times a day   atorvastatin (LIPITOR) 40 mg tablet  Self No No   Sig: TAKE 1 TABLET BY MOUTH EVERY DAY IN THE EVENING   fexofenadine (ALLEGRA) 180 MG tablet   No No   Sig: Take 1 tablet (180 mg total) by mouth daily for 30 days   Patient taking differently: Take 180 mg by mouth every morning   hydrochlorothiazide (HYDRODIURIL) 25 mg tablet  Self No No   Sig: Take 1 tablet (25 mg total) by mouth daily   metoprolol succinate (TOPROL-XL) 25 mg 24 hr tablet   No No   Sig: TAKE 1 TABLET (25 MG TOTAL) BY MOUTH DAILY IN EVENING   metoprolol succinate (TOPROL-XL) 50 mg 24 hr tablet   No No   Sig: Take 1 tablet (50 mg total) by mouth daily   Patient taking differently: Take 50 mg by mouth every morning   potassium chloride (MICRO-K) 10 MEQ CR capsule   No No   Sig: TAKE 1 CAPSULE BY MOUTH 2 TIMES A DAY  sildenafil (VIAGRA) 100 mg tablet  Self No No   Sig: Take 1 tablet (100 mg total) by mouth daily as needed for erectile dysfunction      Facility-Administered Medications: None       Past Medical History:   Diagnosis Date   • Arthropathy of knee     last assessed 8/19/15   • Bacterial pneumonia 02/05/2007    last assessed 2/5/7   • Brain atrophy [facility])    • Colon polyp    • CPAP (continuous positive airway pressure) dependence    • Disorder of male genital organ 02/12/2008    last assessed 2/12/08   • ED (erectile dysfunction) of organic origin     last assessed 2/13/17    • History of hypertension    • Seasonal allergies    • Situational anxiety     resolved 3/16/17    • Sleep apnea    • Stroke [facility])     09/2020 TIA   • Tinnitus    • Wears hearing aid     bilateral       Past Surgical History:   Procedure Laterality Date   • CARDIAC ELECTROPHYSIOLOGY PROCEDURE N/A 12/22/2021    Procedure: Cardiac Loop Recorder Implant;  Surgeon: Ryan Gooden DO;  Location: [place] [address] CATH LAB; Service: Cardiology   • COLONOSCOPY      x3-w/polyps   • HERNIA REPAIR      umbilical,hemal inguinal   • KNEE ARTHROSCOPY Left     meniscus   • VA COLONOSCOPY FLX DX W/COLLJ SPEC WHEN PFRMD N/A 5/7/2018    Procedure: COLONOSCOPY;  Surgeon: Julissa Jiménez MD;  Location: [place] [address] GI LAB;   Service: Gastroenterology       Family History   Problem Relation Age of Onset • Cancer Mother         breast   • Diverticulitis Mother         colostomy   • Breast cancer Mother    • Heart disease Father         CHF   • Cancer Sister         breast   • Depression Sister    • Cancer Sister         skin cancer   • Cancer Sister         skin cancer   • Colon cancer Neg Hx    • Liver disease Neg Hx      I have reviewed and agree with the history as documented  E-Cigarette/Vaping   • E-Cigarette Use Never User    • Cartridges/Day n/a      E-Cigarette/Vaping Substances   • Nicotine No    • THC No    • CBD No    • Flavoring No    • Other No    • Unknown No      Social History     Tobacco Use   • Smoking status: Passive Smoke Exposure - Never Smoker   • Smokeless tobacco: Never Used   Vaping Use   • Vaping Use: Never used   Substance Use Topics   • Alcohol use: Yes     Alcohol/week: 14 0 standard drinks     Types: 14 Cans of beer per week   • Drug use: No       Review of Systems   Constitutional: Negative for chills and fever  HENT: Negative for ear pain and sore throat  Eyes: Negative for pain and visual disturbance  Respiratory: Negative for cough and shortness of breath  Cardiovascular: Negative for chest pain and palpitations  Gastrointestinal: Negative for abdominal pain and vomiting  Genitourinary: Negative for dysuria and hematuria  Musculoskeletal: Positive for gait problem  Negative for arthralgias and back pain  Skin: Negative for color change and rash  Neurological: Positive for dizziness  Negative for seizures, syncope, weakness, numbness and headaches  All other systems reviewed and are negative  Physical Exam  Physical Exam  Vitals and nursing note reviewed  Constitutional:       General: He is not in acute distress  HENT:      Head: Atraumatic  Right Ear: External ear normal       Left Ear: External ear normal       Nose: Nose normal       Mouth/Throat:      Mouth: Mucous membranes are moist       Pharynx: Oropharynx is clear     Eyes: General: No scleral icterus  Extraocular Movements: Extraocular movements intact  Conjunctiva/sclera: Conjunctivae normal       Pupils: Pupils are equal, round, and reactive to light  Cardiovascular:      Rate and Rhythm: Normal rate and regular rhythm  Pulses: Normal pulses  Pulmonary:      Effort: Pulmonary effort is normal  No respiratory distress  Breath sounds: Normal breath sounds  Abdominal:      General: Abdomen is flat  Bowel sounds are normal  There is no distension  Palpations: Abdomen is soft  Tenderness: There is no abdominal tenderness  There is no guarding or rebound  Musculoskeletal:         General: No deformity  Normal range of motion  Skin:     Capillary Refill: Capillary refill takes less than 2 seconds  Neurological:      General: No focal deficit present  Mental Status: He is alert and oriented to person, place, and time  Cranial Nerves: No cranial nerve deficit  Sensory: No sensory deficit  Motor: No weakness           Vital Signs  ED Triage Vitals [10/26/22 1625]   Temperature Pulse Respirations Blood Pressure SpO2   97 8 °F (36 6 °C) 77 18 165/81 98 %      Temp Source Heart Rate Source Patient Position - Orthostatic VS BP Location FiO2 (%)   Oral Monitor Sitting Left arm --      Pain Score       No Pain           Vitals:    10/26/22 1625   BP: 165/81   Pulse: 77   Patient Position - Orthostatic VS: Sitting         Visual Acuity      ED Medications  Medications - No data to display    Diagnostic Studies  Results Reviewed     Procedure Component Value Units Date/Time    HS Troponin I 2hr [[id number]]  (Abnormal) Collected: 10/26/22 1918    Lab Status: Final result Specimen: Blood from Arm, Right Updated: 10/26/22 1947     hs TnI 2hr 69 ng/L      Delta 2hr hsTnI 9 ng/L     HS Troponin I 4hr [[id number]]     Lab Status: No result Specimen: Blood     Urine Microscopic [[id number]]  (Abnormal) Collected: 10/26/22 1807    Lab Status: Final result Specimen: Urine, Clean Catch Updated: 10/26/22 1845     RBC, UA 2-4 /hpf      WBC, UA 1-2 /hpf      Epithelial Cells Occasional /hpf      Bacteria, UA Occasional /hpf      AMORPH URATES Occasional /hpf      MUCUS THREADS Innumerable    UA (URINE) with reflex to Scope [[id number]]  (Abnormal) Collected: 10/26/22 1807    Lab Status: Final result Specimen: Urine, Clean Catch Updated: 10/26/22 1819     Color, UA Kiana     Clarity, UA Slightly Cloudy     Specific Gravity, UA >=1 030     pH, UA 6 0     Leukocytes, UA Negative     Nitrite, UA Negative     Protein, UA 30 (1+) mg/dl      Glucose, UA Negative mg/dl      Ketones, UA Trace mg/dl      Urobilinogen, UA 1 0 E U /dl      Bilirubin, UA Small     Occult Blood, UA Negative    HS Troponin 0hr (reflex protocol) [[id number]]  (Abnormal) Collected: 10/26/22 1711    Lab Status: Final result Specimen: Blood from Arm, Left Updated: 10/26/22 1743     hs TnI 0hr 60 ng/L     Comprehensive metabolic panel [[id number]] Collected: 10/26/22 1711    Lab Status: Final result Specimen: Blood from Arm, Left Updated: 10/26/22 1735     Sodium 144 mmol/L      Potassium 3 5 mmol/L      Chloride 108 mmol/L      CO2 24 mmol/L      ANION GAP 12 mmol/L      BUN 24 mg/dL      Creatinine 1 11 mg/dL      Glucose 109 mg/dL      Calcium 8 8 mg/dL      AST 21 U/L      ALT 27 U/L      Alkaline Phosphatase 110 U/L      Total Protein 6 5 g/dL      Albumin 3 7 g/dL      Total Bilirubin 0 69 mg/dL      eGFR 69 ml/min/1 73sq m     Narrative:      Chemo guidelines for Chronic Kidney Disease (CKD):   •  Stage 1 with normal or high GFR (GFR > 90 mL/min/1 73 square meters)  •  Stage 2 Mild CKD (GFR = 60-89 mL/min/1 73 square meters)  •  Stage 3A Moderate CKD (GFR = 45-59 mL/min/1 73 square meters)  •  Stage 3B Moderate CKD (GFR = 30-44 mL/min/1 73 square meters)  •  Stage 4 Severe CKD (GFR = 15-29 mL/min/1 73 square meters)  •  Stage 5 End Stage CKD (GFR <15 mL/min/1 73 square meters)  Note: GFR calculation is accurate only with a steady state creatinine    CBC and differential [[id number]]  (Abnormal) Collected: 10/26/22 1711    Lab Status: Final result Specimen: Blood from Arm, Left Updated: 10/26/22 1719     WBC 7 49 Thousand/uL      RBC 4 54 Million/uL      Hemoglobin 10 2 g/dL      Hematocrit 34 9 %      MCV 77 fL      MCH 22 5 pg      MCHC 29 2 g/dL      RDW 15 3 %      MPV 8 4 fL      Platelets 738 Thousands/uL      nRBC 0 /100 WBCs      Neutrophils Relative 73 %      Immat GRANS % 0 %      Lymphocytes Relative 13 %      Monocytes Relative 11 %      Eosinophils Relative 2 %      Basophils Relative 1 %      Neutrophils Absolute 5 50 Thousands/µL      Immature Grans Absolute 0 02 Thousand/uL      Lymphocytes Absolute 0 97 Thousands/µL      Monocytes Absolute 0 81 Thousand/µL      Eosinophils Absolute 0 15 Thousand/µL      Basophils Absolute 0 04 Thousands/µL     Fingerstick Glucose (POCT) [[id number]]  (Normal) Collected: 10/26/22 1640    Lab Status: Final result Updated: 10/26/22 1641     POC Glucose 115 mg/dl                  CT head without contrast   Final Result by Vannesa Falk MD (10/26 1702)      No acute intracranial abnormality  Workstation performed: [id number]                    Procedures  Procedures         ED Course             HEART Risk Score    Flowsheet Row Most Recent Value   Heart Score Risk Calculator    History 1 Filed at: 10/26/2022 1824   ECG 1 Filed at: 10/26/2022 1824   Age 2 Filed at: 10/26/2022 1824   Risk Factors 2 Filed at: 10/26/2022 1824   Troponin 2 Filed at: 10/26/2022 1824   HEART Score 8 Filed at: 10/26/2022 1824                        SBIRT 20yo+    Flowsheet Row Most Recent Value   SBIRT (25 yo +)    In order to provide better care to our patients, we are screening all of our patients for alcohol and drug use  Would it be okay to ask you these screening questions?  No Filed at: 10/26/2022 8893 MDM  Number of Diagnoses or Management Options  Dizziness  Elevated troponin  Fall, initial encounter  Diagnosis management comments: Pulse ox 98% on room air indicating adequate oxygenation  Discussed finding of elevated troponin on lab work  Patient reports he feels better would like to go home and he is not experiencing chest pain in the ever did  Patient states he has a wedding to attend tomorrow and does not want stay tonight  But did agree to have a 2nd troponin  Second troponin came back at 71 with a delta 9  His put in the indeterminate zone recommends 4 hour troponin observation at this point according to cardiac out rhythm  This was explained to the patient that we could be missing heart attack if he does not stay  Patient again declines admission verbalizes understanding of the risks and that there were leaving against medical advice     Wife is also in agreement with discharge at this time  Advised have a low threshold to return to the ER for any chest pain or shortness of breath  Will also advise outpatient follow-up cardiology  Amount and/or Complexity of Data Reviewed  Clinical lab tests: ordered and reviewed  Tests in the radiology section of CPT®: ordered and reviewed  Decide to obtain previous medical records or to obtain history from someone other than the patient: yes  Review and summarize past medical records: yes        Disposition  Final diagnoses:   Dizziness   Fall, initial encounter   Elevated troponin     Time reflects when diagnosis was documented in both MDM as applicable and the Disposition within this note     Time User Action Codes Description Comment    10/26/2022  7:54 PM Bill Roberson [R42] Dizziness     10/26/2022  7:54 PM Bill White  [id number]] Fall, initial encounter     10/26/2022  7:55 PM Bill Roberson [R77 [id number]] Elevated troponin       ED Disposition     ED Disposition   Discharge    Condition   Stable    Date/Time   Wed Oct 26, 2022  7:54 PM    [address] [facility] discharge to home/self care                 Follow-up Information     Follow up With Specialties Details Why Meredith Olson 89, DO Cardiology In 3 days  [address] [facility]    [address], [address] In 1 week  [address]  [phone]            Patient's Medications   Discharge Prescriptions    No medications on file           PDMP Review       Value Time User    PDMP Reviewed  Yes 6/10/2021  4:59 PM Lashawn Woodard MD          ED Provider  Electronically Signed by           Meaghan Cevallos DO  10/26/22 2002

## 2022-10-27 ENCOUNTER — HOSPITAL ENCOUNTER (INPATIENT)
Facility: HOSPITAL | Age: 67
LOS: 7 days | Discharge: HOME | End: 2022-11-03
Attending: EMERGENCY MEDICINE | Admitting: INTERNAL MEDICINE

## 2022-10-27 ENCOUNTER — OFFICE VISIT (OUTPATIENT)
Dept: CARDIOLOGY CLINIC | Facility: CLINIC | Age: 67
End: 2022-10-27
Payer: COMMERCIAL

## 2022-10-27 VITALS
HEIGHT: 72 IN | HEART RATE: 88 BPM | DIASTOLIC BLOOD PRESSURE: 80 MMHG | TEMPERATURE: 97.8 F | WEIGHT: 239 LBS | SYSTOLIC BLOOD PRESSURE: 160 MMHG | BODY MASS INDEX: 32.37 KG/M2 | OXYGEN SATURATION: 98 %

## 2022-10-27 DIAGNOSIS — I34.0 MITRAL VALVE INSUFFICIENCY, UNSPECIFIED ETIOLOGY: ICD-10-CM

## 2022-10-27 DIAGNOSIS — I47.20 VT (VENTRICULAR TACHYCARDIA): ICD-10-CM

## 2022-10-27 DIAGNOSIS — I47.29 NSVT (NONSUSTAINED VENTRICULAR TACHYCARDIA): ICD-10-CM

## 2022-10-27 DIAGNOSIS — I47.20 SUSTAINED VT (VENTRICULAR TACHYCARDIA) (HCC): Primary | ICD-10-CM

## 2022-10-27 DIAGNOSIS — I25.10 CAD (CORONARY ARTERY DISEASE): ICD-10-CM

## 2022-10-27 DIAGNOSIS — I10 PRIMARY HYPERTENSION: ICD-10-CM

## 2022-10-27 DIAGNOSIS — I71.21 ASCENDING AORTIC ANEURYSM, UNSPECIFIED WHETHER RUPTURED: ICD-10-CM

## 2022-10-27 DIAGNOSIS — I48.0 PAROXYSMAL A-FIB (HCC): Primary | ICD-10-CM

## 2022-10-27 DIAGNOSIS — I10 BENIGN ESSENTIAL HYPERTENSION: ICD-10-CM

## 2022-10-27 DIAGNOSIS — I47.20 SUSTAINED VT (VENTRICULAR TACHYCARDIA): Primary | ICD-10-CM

## 2022-10-27 DIAGNOSIS — R77.8 ELEVATED TROPONIN: ICD-10-CM

## 2022-10-27 DIAGNOSIS — I35.0 AORTIC VALVE STENOSIS, ETIOLOGY OF CARDIAC VALVE DISEASE UNSPECIFIED: ICD-10-CM

## 2022-10-27 PROBLEM — R06.09 DYSPNEA ON EXERTION: Status: ACTIVE | Noted: 2022-08-30

## 2022-10-27 LAB
2HR DELTA HS TROPONIN: 0 NG/L
4HR DELTA HS TROPONIN: 0 NG/L
ALBUMIN SERPL BCP-MCNC: 3.4 G/DL (ref 3.5–5)
ALP SERPL-CCNC: 99 U/L (ref 46–116)
ALT SERPL W P-5'-P-CCNC: 29 U/L (ref 12–78)
ANION GAP SERPL CALCULATED.3IONS-SCNC: 4 MMOL/L (ref 4–13)
APTT PPP: 21 SECONDS (ref 23–37)
AST SERPL W P-5'-P-CCNC: 17 U/L (ref 5–45)
ATRIAL RATE: 72 BPM
BASOPHILS # BLD AUTO: 0.03 THOUSANDS/ÂΜL (ref 0–0.1)
BASOPHILS NFR BLD AUTO: 1 % (ref 0–1)
BILIRUB SERPL-MCNC: 0.7 MG/DL (ref 0.2–1)
BUN SERPL-MCNC: 22 MG/DL (ref 5–25)
CALCIUM ALBUM COR SERPL-MCNC: 9.2 MG/DL (ref 8.3–10.1)
CALCIUM SERPL-MCNC: 8.7 MG/DL (ref 8.3–10.1)
CARDIAC TROPONIN I PNL SERPL HS: 27 NG/L (ref 8–18)
CARDIAC TROPONIN I PNL SERPL HS: 29 NG/L
CHLORIDE SERPL-SCNC: 112 MMOL/L (ref 96–108)
CO2 SERPL-SCNC: 25 MMOL/L (ref 21–32)
CREAT SERPL-MCNC: 1.02 MG/DL (ref 0.6–1.3)
EOSINOPHIL # BLD AUTO: 0.18 THOUSAND/ÂΜL (ref 0–0.61)
EOSINOPHIL NFR BLD AUTO: 3 % (ref 0–6)
ERYTHROCYTE [DISTWIDTH] IN BLOOD BY AUTOMATED COUNT: 15.1 % (ref 11.6–15.1)
GFR SERPL CREATININE-BSD FRML MDRD: 76 ML/MIN/1.73SQ M
GLUCOSE SERPL-MCNC: 110 MG/DL (ref 65–140)
HCT VFR BLD AUTO: 33.6 % (ref 36.5–49.3)
HGB BLD-MCNC: 9.6 G/DL (ref 12–17)
IMM GRANULOCYTES # BLD AUTO: 0.03 THOUSAND/UL (ref 0–0.2)
IMM GRANULOCYTES NFR BLD AUTO: 1 % (ref 0–2)
INR PPP: 0.91 (ref 0.84–1.19)
LYMPHOCYTES # BLD AUTO: 1.13 THOUSANDS/ÂΜL (ref 0.6–4.47)
LYMPHOCYTES NFR BLD AUTO: 20 % (ref 14–44)
MAGNESIUM SERPL-MCNC: 2.3 MG/DL (ref 1.6–2.6)
MCH RBC QN AUTO: 22.5 PG (ref 26.8–34.3)
MCHC RBC AUTO-ENTMCNC: 28.6 G/DL (ref 31.4–37.4)
MCV RBC AUTO: 79 FL (ref 82–98)
MONOCYTES # BLD AUTO: 0.71 THOUSAND/ÂΜL (ref 0.17–1.22)
MONOCYTES NFR BLD AUTO: 13 % (ref 4–12)
NEUTROPHILS # BLD AUTO: 3.49 THOUSANDS/ÂΜL (ref 1.85–7.62)
NEUTS SEG NFR BLD AUTO: 62 % (ref 43–75)
NRBC BLD AUTO-RTO: 0 /100 WBCS
P AXIS: 32 DEGREES
PLATELET # BLD AUTO: 299 THOUSANDS/UL (ref 149–390)
PMV BLD AUTO: 8.7 FL (ref 8.9–12.7)
POTASSIUM SERPL-SCNC: 3.5 MMOL/L (ref 3.5–5.3)
PR INTERVAL: 164 MS
PROT SERPL-MCNC: 6.3 G/DL (ref 6.4–8.4)
PROTHROMBIN TIME: 12.5 SECONDS (ref 11.6–14.5)
QRS AXIS: -6 DEGREES
QRSD INTERVAL: 86 MS
QT INTERVAL: 382 MS
QTC INTERVAL: 418 MS
RBC # BLD AUTO: 4.27 MILLION/UL (ref 3.88–5.62)
SODIUM SERPL-SCNC: 141 MMOL/L (ref 135–147)
T WAVE AXIS: 3 DEGREES
TSH SERPL DL<=0.05 MIU/L-ACNC: 1.03 UIU/ML (ref 0.45–4.5)
VENTRICULAR RATE: 72 BPM
WBC # BLD AUTO: 5.57 THOUSAND/UL (ref 4.31–10.16)

## 2022-10-27 PROCEDURE — 99214 OFFICE O/P EST MOD 30 MIN: CPT | Performed by: INTERNAL MEDICINE

## 2022-10-27 PROCEDURE — 93000 ELECTROCARDIOGRAM COMPLETE: CPT | Performed by: INTERNAL MEDICINE

## 2022-10-27 RX ORDER — HEPARIN SODIUM 10000 [USP'U]/100ML
3-20 INJECTION, SOLUTION INTRAVENOUS
Status: DISCONTINUED | OUTPATIENT
Start: 2022-10-27 | End: 2022-10-28

## 2022-10-27 RX ORDER — HEPARIN SODIUM 1000 [USP'U]/ML
4000 INJECTION, SOLUTION INTRAVENOUS; SUBCUTANEOUS
Status: DISCONTINUED | OUTPATIENT
Start: 2022-10-27 | End: 2022-10-28

## 2022-10-27 RX ORDER — METOPROLOL SUCCINATE 50 MG/1
50 TABLET, EXTENDED RELEASE ORAL
Qty: 180 TABLET | Refills: 2 | Status: SHIPPED | OUTPATIENT
Start: 2022-10-27

## 2022-10-27 RX ORDER — METOPROLOL SUCCINATE 50 MG/1
50 TABLET, EXTENDED RELEASE ORAL DAILY
Qty: 30 TABLET | Refills: 5 | Status: SHIPPED | OUTPATIENT
Start: 2022-10-27 | End: 2022-10-27 | Stop reason: CLARIF

## 2022-10-27 RX ORDER — ATORVASTATIN CALCIUM 40 MG/1
40 TABLET, FILM COATED ORAL EVERY EVENING
Status: DISCONTINUED | OUTPATIENT
Start: 2022-10-27 | End: 2022-11-03 | Stop reason: HOSPADM

## 2022-10-27 RX ORDER — ACETAMINOPHEN 325 MG/1
650 TABLET ORAL EVERY 6 HOURS PRN
Status: DISCONTINUED | OUTPATIENT
Start: 2022-10-27 | End: 2022-11-03 | Stop reason: HOSPADM

## 2022-10-27 RX ORDER — HEPARIN SODIUM 1000 [USP'U]/ML
2000 INJECTION, SOLUTION INTRAVENOUS; SUBCUTANEOUS
Status: DISCONTINUED | OUTPATIENT
Start: 2022-10-27 | End: 2022-10-28

## 2022-10-27 RX ORDER — METOPROLOL SUCCINATE 50 MG/1
50 TABLET, EXTENDED RELEASE ORAL
Status: DISCONTINUED | OUTPATIENT
Start: 2022-10-27 | End: 2022-11-03 | Stop reason: HOSPADM

## 2022-10-27 RX ADMIN — METOPROLOL SUCCINATE 50 MG: 50 TABLET, EXTENDED RELEASE ORAL at 19:56

## 2022-10-27 RX ADMIN — ATORVASTATIN CALCIUM 40 MG: 40 TABLET, FILM COATED ORAL at 18:28

## 2022-10-27 RX ADMIN — HEPARIN SODIUM 11.1 UNITS/KG/HR: 10000 INJECTION, SOLUTION INTRAVENOUS at 18:28

## 2022-10-27 NOTE — ASSESSMENT & PLAN NOTE
· History of dyspnea on exertion, per wife he snores a lot, he has a history of obstructive sleep apnea not wearing CPAP  · Echo in July showed ejection fraction of 40%, grade 1 diastolic dysfunction, mild AS, mild MR, mild TR, mildly dilated ascending aorta  · Encourage CPAP use

## 2022-10-27 NOTE — ASSESSMENT & PLAN NOTE
· Patient patient has a history of stroke, status post loop recorder in December 2021  · Yesterday while doing physical therapy he fell, seems like he lost consciousness, does not remember the episode  Yesterday he was more active than usually, he was mowing the lawn, walked to physical therapy    · His loop recorder was evaluated by EP as outpatient and they recommended patient to be admitted to [facility] for ischemic evaluation, EP consult, he will likely need defibrillator placement  · On loop recorder interrogation he had an SVT for 47 seconds  · He was instructed to come to [facility] for EP eval  · Telemetry  · Continue Metoprolol succinate 50 mg b i d  which was increased by Cardiology yesterday  · Monitor electrolyte

## 2022-10-27 NOTE — ED PROVIDER NOTES
History  Chief Complaint   Patient presents with   • Heart Problem     Pt sent by electrophysiology  Pt has loop recorder which showed Vtach yesterday  Denies cp, sob, dizziness  Had an episode of dizziness with fall yesterday around the time of the Vtach     HPI  Patient 74year old male with history of stroke, HTN, Afib on eliquis, obstructive sleep apnea presents due to episode of VT  Patient had a loop recorder placed December of last year  Patient with balance issues since stroke and has been attending PT  Patient was mowing the lawn yesterday and felt off  He proceeded to go to his PT appointment and lost consciousness resulting in head strike  Due to being on eliquis patient was sent to ED at [facility]  Troponin was trended and eventually left AMA with instructions for close Cardiology follow up  Patient decided to leave because he wanted to attend his nephew's wedding today  Patient was contacted by cardiology noting that he had sustained VT for 47 seconds and that he must go to the ED for further evaluation  Patient currently has no complaints  Denies any light headedness, n/v, chest pain, shortness of breath, abdominal pain, weakness, numbness, leg swelling, leg pain  Prior to Admission Medications   Prescriptions Last Dose Informant Patient Reported? Taking?    ALPRAZolam (XANAX) 0 25 mg tablet Not Taking at Unknown time Self No No   Sig: Take 1 tablet (0 25 mg total) by mouth 2 (two) times a day as needed for anxiety or sleep   Patient not taking: Reported on 10/27/2022   albuterol (Proventil HFA) 90 mcg/act inhaler Not Taking at Unknown time Self No No   Sig: Inhale 2 puffs every 4 (four) hours as needed for wheezing or shortness of breath   Patient not taking: Reported on 10/27/2022   apixaban (Eliquis) 5 mg Past Week at Unknown time Self No Yes   Sig: Take 1 tablet (5 mg total) by mouth 2 (two) times a day   atorvastatin (LIPITOR) 40 mg tablet Past Week at Unknown time Self No Yes   Sig: TAKE 1 TABLET BY MOUTH EVERY DAY IN THE EVENING   fexofenadine (ALLEGRA) 180 MG tablet   No No   Sig: Take 1 tablet (180 mg total) by mouth daily for 30 days   Patient taking differently: Take 180 mg by mouth every morning   metoprolol succinate (TOPROL-XL) 50 mg 24 hr tablet 10/27/2022 at Unknown time  No Yes   Sig: Take 1 tablet (50 mg total) by mouth 2 (two) times a day   potassium chloride (MICRO-K) 10 MEQ CR capsule Past Week at Unknown time  No Yes   Sig: TAKE 1 CAPSULE BY MOUTH 2 TIMES A DAY  sildenafil (VIAGRA) 100 mg tablet More than a month at Unknown time Self No No   Sig: Take 1 tablet (100 mg total) by mouth daily as needed for erectile dysfunction      Facility-Administered Medications: None       Past Medical History:   Diagnosis Date   • Arthropathy of knee     last assessed 8/19/15   • Bacterial pneumonia 02/05/2007    last assessed 2/5/7   • Brain atrophy [facility])    • Colon polyp    • CPAP (continuous positive airway pressure) dependence    • Disorder of male genital organ 02/12/2008    last assessed 2/12/08   • ED (erectile dysfunction) of organic origin     last assessed 2/13/17    • History of hypertension    • Seasonal allergies    • Situational anxiety     resolved 3/16/17    • Sleep apnea    • Stroke [facility])     09/2020 TIA   • Tinnitus    • Wears hearing aid     bilateral       Past Surgical History:   Procedure Laterality Date   • CARDIAC ELECTROPHYSIOLOGY PROCEDURE N/A 12/22/2021    Procedure: Cardiac Loop Recorder Implant;  Surgeon: Edie Renae DO;  Location: [place] [address] CATH LAB; Service: Cardiology   • COLONOSCOPY      x3-w/polyps   • HERNIA REPAIR      umbilical,hemal inguinal   • KNEE ARTHROSCOPY Left     meniscus   • CO COLONOSCOPY FLX DX W/COLLJ SPEC WHEN PFRMD N/A 5/7/2018    Procedure: COLONOSCOPY;  Surgeon: Cathie Zabala MD;  Location: [place] [address] GI LAB;   Service: Gastroenterology       Family History   Problem Relation Age of Onset   • Cancer Mother         breast   • Diverticulitis Mother colostomy   • Breast cancer Mother    • Heart disease Father         CHF   • Cancer Sister         breast   • Depression Sister    • Cancer Sister         skin cancer   • Cancer Sister         skin cancer   • Colon cancer Neg Hx    • Liver disease Neg Hx      I have reviewed and agree with the history as documented  E-Cigarette/Vaping   • E-Cigarette Use Never User    • Cartridges/Day n/a      E-Cigarette/Vaping Substances   • Nicotine No    • THC No    • CBD No    • Flavoring No    • Other No    • Unknown No      Social History     Tobacco Use   • Smoking status: Passive Smoke Exposure - Never Smoker   • Smokeless tobacco: Never Used   Vaping Use   • Vaping Use: Never used   Substance Use Topics   • Alcohol use: Yes     Alcohol/week: 14 0 standard drinks     Types: 14 Cans of beer per week   • Drug use: No        Review of Systems   Constitutional: Negative for chills, diaphoresis, fever and unexpected weight change  HENT: Negative for ear pain and sore throat  Eyes: Negative for visual disturbance  Respiratory: Negative for cough, chest tightness and shortness of breath  Cardiovascular: Negative for chest pain and leg swelling  Gastrointestinal: Negative for abdominal distention, abdominal pain, constipation, diarrhea, nausea and vomiting  Endocrine: Negative  Genitourinary: Negative for difficulty urinating and dysuria  Musculoskeletal: Negative  Skin: Negative  Allergic/Immunologic: Negative  Neurological: Negative  Hematological: Negative  Psychiatric/Behavioral: Negative  All other systems reviewed and are negative        Physical Exam  ED Triage Vitals [10/27/22 1435]   Temperature Pulse Respirations Blood Pressure SpO2   97 5 °F (36 4 °C) 78 18 (!) 157/108 96 %      Temp Source Heart Rate Source Patient Position - Orthostatic VS BP Location FiO2 (%)   Tympanic Monitor Sitting Left arm --      Pain Score       No Pain             Orthostatic Vital Signs  Vitals: 10/28/22 0330 10/28/22 0727 10/28/22 1015 10/28/22 1114   BP: (!) 158/101 135/72 135/72    Pulse:  67 67 (!) 54   Patient Position - Orthostatic VS:  Sitting         Physical Exam  Vitals and nursing note reviewed  Constitutional:       General: He is not in acute distress  Appearance: Normal appearance  He is not ill-appearing  HENT:      Head: Normocephalic and atraumatic  Right Ear: External ear normal       Left Ear: External ear normal       Nose: Nose normal       Mouth/Throat:      Mouth: Mucous membranes are moist       Pharynx: Oropharynx is clear  Eyes:      General: No scleral icterus  Right eye: No discharge  Left eye: No discharge  Extraocular Movements: Extraocular movements intact  Conjunctiva/sclera: Conjunctivae normal       Pupils: Pupils are equal, round, and reactive to light  Cardiovascular:      Rate and Rhythm: Normal rate and regular rhythm  Pulses: Normal pulses  Heart sounds: Normal heart sounds  Pulmonary:      Effort: Pulmonary effort is normal       Breath sounds: Normal breath sounds  Abdominal:      General: Abdomen is flat  Bowel sounds are normal  There is no distension  Palpations: Abdomen is soft  Tenderness: There is no abdominal tenderness  There is no guarding or rebound  Musculoskeletal:         General: Normal range of motion  Cervical back: Normal range of motion and neck supple  Right lower leg: No edema  Left lower leg: No edema  Skin:     General: Skin is warm and dry  Capillary Refill: Capillary refill takes less than 2 seconds  Coloration: Skin is not jaundiced or pale  Neurological:      General: No focal deficit present  Mental Status: He is alert and oriented to person, place, and time  Mental status is at baseline  Cranial Nerves: No cranial nerve deficit  Sensory: No sensory deficit  Motor: No weakness        Gait: Gait normal    Psychiatric: Mood and Affect: Mood normal          Behavior: Behavior normal          Thought Content:  Thought content normal          Judgment: Judgment normal          ED Medications  Medications   atorvastatin (LIPITOR) tablet 40 mg (40 mg Oral Given 10/27/22 1828)   metoprolol succinate (TOPROL-XL) 24 hr tablet 50 mg (50 mg Oral Given 10/28/22 0816)   acetaminophen (TYLENOL) tablet 650 mg (has no administration in time range)   heparin (porcine) 25,000 units in 0 45% NaCl 250 mL infusion (premix) (0 Units/kg/hr × 90 kg (Order-Specific) Intravenous Stopped 10/28/22 1130)   heparin (porcine) injection 4,000 Units (4,000 Units Intravenous Given 10/28/22 0114)   heparin (porcine) injection 2,000 Units (2,000 Units Intravenous Given 10/28/22 0815)   LORazepam (ATIVAN) injection 0 5 mg (0 mg Intravenous Hold 10/28/22 0810)   sodium chloride 0 9 % infusion (125 mL/hr Intravenous New Bag 10/28/22 1011)   aspirin chewable tablet 324 mg (324 mg Oral Given 10/28/22 1014)   ticagrelor (BRILINTA) tablet 180 mg (180 mg Oral Given 10/28/22 1108)       Diagnostic Studies  Results Reviewed     Procedure Component Value Units Date/Time    Basic metabolic panel [[id number]]  (Abnormal) Collected: 10/28/22 0712    Lab Status: Final result Specimen: Blood from Arm, Left Updated: 10/28/22 0743     Sodium 142 mmol/L      Potassium 4 0 mmol/L      Chloride 111 mmol/L      CO2 24 mmol/L      ANION GAP 7 mmol/L      BUN 18 mg/dL      Creatinine 0 97 mg/dL      Glucose 113 mg/dL      Calcium 8 6 mg/dL      eGFR 81 ml/min/1 73sq m     Narrative:      [place] guidelines for Chronic Kidney Disease (CKD):   •  Stage 1 with normal or high GFR (GFR > 90 mL/min/1 73 square meters)  •  Stage 2 Mild CKD (GFR = 60-89 mL/min/1 73 square meters)  •  Stage 3A Moderate CKD (GFR = 45-59 mL/min/1 73 square meters)  •  Stage 3B Moderate CKD (GFR = 30-44 mL/min/1 73 square meters)  •  Stage 4 Severe CKD (GFR = 15-29 mL/min/1 73 square meters)  •  Stage 5 End Stage CKD (GFR <15 mL/min/1 73 square meters)  Note: GFR calculation is accurate only with a steady state creatinine    Magnesium [[id number]]  (Normal) Collected: 10/28/22 0712    Lab Status: Final result Specimen: Blood from Arm, Left Updated: 10/28/22 0743     Magnesium 2 2 mg/dL     CBC (With Platelets) [[id number]]  (Abnormal) Collected: 10/28/22 0712    Lab Status: Final result Specimen: Blood from Arm, Left Updated: 10/28/22 0732     WBC 6 40 Thousand/uL      RBC 4 60 Million/uL      Hemoglobin 10 3 g/dL      Hematocrit 36 1 %      MCV 79 fL      MCH 22 4 pg      MCHC 28 5 g/dL      RDW 15 1 %      Platelets 909 Thousands/uL      MPV 8 9 fL     APTT [[id number]]  (Abnormal) Collected: 10/27/22 1819    Lab Status: Final result Specimen: Blood from Arm, Left Updated: 10/27/22 1923     PTT 21 seconds     Protime-INR [[id number]]  (Normal) Collected: 10/27/22 1819    Lab Status: Final result Specimen: Blood from Arm, Left Updated: 10/27/22 1923     Protime 12 5 seconds      INR 0 91    HS Troponin I 2hr [[id number]]  (Normal) Collected: 10/27/22 1819    Lab Status: Final result Specimen: Blood from Arm, Left Updated: 10/27/22 1917     hs TnI 2hr 29 ng/L      Delta 2hr hsTnI 0 ng/L     HS Troponin I 4hr [[id number]]  (Normal) Collected: 10/27/22 1819    Lab Status: Final result Specimen: Blood from Arm, Left Updated: 10/27/22 1916     hs TnI 4hr 29 ng/L      Delta 4hr hsTnI 0 ng/L     HS Troponin 0hr (reflex protocol) [[id number]]  (Normal) Collected: 10/27/22 1557    Lab Status: Final result Specimen: Blood from Arm, Right Updated: 10/27/22 1640     hs TnI 0hr 29 ng/L     TSH, 3rd generation with Free T4 reflex [[id number]]  (Normal) Collected: 10/27/22 1557    Lab Status: Final result Specimen: Blood from Arm, Right Updated: 10/27/22 1638     TSH 3RD GENERATON 1 030 uIU/mL     Narrative:      Patients undergoing fluorescein dye angiography may retain small amounts of fluorescein in the body for 48-72 hours post procedure  Samples containing fluorescein can produce falsely depressed TSH values  If the patient had this procedure,a specimen should be resubmitted post fluorescein clearance        Comprehensive metabolic panel [[id number]]  (Abnormal) Collected: 10/27/22 1557    Lab Status: Final result Specimen: Blood from Arm, Right Updated: 10/27/22 1632     Sodium 141 mmol/L      Potassium 3 5 mmol/L      Chloride 112 mmol/L      CO2 25 mmol/L      ANION GAP 4 mmol/L      BUN 22 mg/dL      Creatinine 1 02 mg/dL      Glucose 110 mg/dL      Calcium 8 7 mg/dL      Corrected Calcium 9 2 mg/dL      AST 17 U/L      ALT 29 U/L      Alkaline Phosphatase 99 U/L      Total Protein 6 3 g/dL      Albumin 3 4 g/dL      Total Bilirubin 0 70 mg/dL      eGFR 76 ml/min/1 73sq m     Narrative:      [place] guidelines for Chronic Kidney Disease (CKD):   •  Stage 1 with normal or high GFR (GFR > 90 mL/min/1 73 square meters)  •  Stage 2 Mild CKD (GFR = 60-89 mL/min/1 73 square meters)  •  Stage 3A Moderate CKD (GFR = 45-59 mL/min/1 73 square meters)  •  Stage 3B Moderate CKD (GFR = 30-44 mL/min/1 73 square meters)  •  Stage 4 Severe CKD (GFR = 15-29 mL/min/1 73 square meters)  •  Stage 5 End Stage CKD (GFR <15 mL/min/1 73 square meters)  Note: GFR calculation is accurate only with a steady state creatinine    Magnesium [[id number]]  (Normal) Collected: 10/27/22 1557    Lab Status: Final result Specimen: Blood from Arm, Right Updated: 10/27/22 1632     Magnesium 2 3 mg/dL     CBC and differential [[id number]]  (Abnormal) Collected: 10/27/22 1557    Lab Status: Final result Specimen: Blood from Arm, Right Updated: 10/27/22 1613     WBC 5 57 Thousand/uL      RBC 4 27 Million/uL      Hemoglobin 9 6 g/dL      Hematocrit 33 6 %      MCV 79 fL      MCH 22 5 pg      MCHC 28 6 g/dL      RDW 15 1 %      MPV 8 7 fL      Platelets 035 Thousands/uL      nRBC 0 /100 WBCs      Neutrophils Relative 62 %      Immat GRANS % 1 %      Lymphocytes Relative 20 %      Monocytes Relative 13 %      Eosinophils Relative 3 %      Basophils Relative 1 %      Neutrophils Absolute 3 49 Thousands/µL      Immature Grans Absolute 0 03 Thousand/uL      Lymphocytes Absolute 1 13 Thousands/µL      Monocytes Absolute 0 71 Thousand/µL      Eosinophils Absolute 0 18 Thousand/µL      Basophils Absolute 0 03 Thousands/µL                  No orders to display         Procedures  Procedures      ED Course                                       MDM  Number of Diagnoses or Management Options  Sustained VT (ventricular tachycardia)  Diagnosis management comments:    Patient 74year old male with known sustained VT per loop recorder   Patient currently asymptomatic  Cardiac workup with Magnesium and TSH ordered   EP consulted  Instructed to consult cardiology and to admit to medicine   EKG showed PVC with Left axis deviation  Normal QTc and no signs of ischemia   Troponin remains in the 20s   Rest of lab within normal limits   Patient admitted to medicine for further management and treatment     Disposition  Final diagnoses:   Sustained VT (ventricular tachycardia)     Time reflects when diagnosis was documented in both MDM as applicable and the Disposition within this note     Time User Action Codes Description Comment    10/27/2022  3:55 PM Karen Branch Add [I47 20] Sustained VT (ventricular tachycardia)     10/28/2022  9:25 AM Rahul Rick Modify [I47 20] Sustained VT (ventricular tachycardia)     10/28/2022  9:50 AM Danny Stuart Modify [I47 20] Sustained VT (ventricular tachycardia)     10/28/2022 10:04 AM Alfonso LIPSCOMB Add [I47 20] VT (ventricular tachycardia)       ED Disposition     ED Disposition   Admit    Condition   Stable    Date/Time   Thu Oct 27, 2022  3:58 PM    Comment   Case was discussed with Dr Xavi Bales and the patient's admission status was agreed to be Admission Status: inpatient status to the service of Dr Xavi Bales   Follow-up Information    None         Current Discharge Medication List      START taking these medications    Details   dofetilide (TIKOSYN) 500 mcg capsule Take 1 capsule (500 mcg total) by mouth 2 (two) times a day  Qty: 60 capsule, Refills: 3    Comments: PRICE CHECK - PLEASE TIGER TEXT Leonardo Tovar WITH COST  Associated Diagnoses: VT (ventricular tachycardia)         CONTINUE these medications which have NOT CHANGED    Details   apixaban (Eliquis) 5 mg Take 1 tablet (5 mg total) by mouth 2 (two) times a day  Qty: 60 tablet, Refills: 5    Associated Diagnoses: Paroxysmal atrial fibrillation (HCC)      atorvastatin (LIPITOR) 40 mg tablet TAKE 1 TABLET BY MOUTH EVERY DAY IN THE EVENING  Qty: 90 tablet, Refills: 1    Associated Diagnoses: History of cardioembolic cerebrovascular accident (CVA)      metoprolol succinate (TOPROL-XL) 50 mg 24 hr tablet Take 1 tablet (50 mg total) by mouth 2 (two) times a day  Qty: 180 tablet, Refills: 2    Associated Diagnoses: Primary hypertension      potassium chloride (MICRO-K) 10 MEQ CR capsule TAKE 1 CAPSULE BY MOUTH 2 TIMES A DAY  Qty: 60 capsule, Refills: 2    Associated Diagnoses: Benign essential hypertension      albuterol (Proventil HFA) 90 mcg/act inhaler Inhale 2 puffs every 4 (four) hours as needed for wheezing or shortness of breath  Qty: 6 7 g, Refills: 7    Comments: Substitution to a formulary equivalent within the same pharmaceutical class is authorized    Associated Diagnoses: Mild intermittent asthma without complication      ALPRAZolam (XANAX) 0 25 mg tablet Take 1 tablet (0 25 mg total) by mouth 2 (two) times a day as needed for anxiety or sleep  Qty: 30 tablet, Refills: 1    Associated Diagnoses: Anxiety      fexofenadine (ALLEGRA) 180 MG tablet Take 1 tablet (180 mg total) by mouth daily for 30 days  Qty: 30 tablet, Refills: 0    Associated Diagnoses: Upper respiratory tract infection, unspecified type      sildenafil (VIAGRA) 100 mg tablet Take 1 tablet (100 mg total) by mouth daily as needed for erectile dysfunction  Qty: 30 tablet, Refills: 5    Associated Diagnoses: Organic impotence           No discharge procedures on file  PDMP Review       Value Time User    PDMP Reviewed  Yes 6/10/2021  4:59 PM Alberta Duane, MD           ED Provider  Attending physically available and evaluated Consuelo Gaston  PHANI managed the patient along with the ED Attending      Electronically Signed by         Leslie Acuna MD  10/28/22 4287

## 2022-10-27 NOTE — ED ATTENDING ATTESTATION
10/27/2022  ISofy DO, saw and evaluated the patient  I have discussed the patient with the resident/non-physician practitioner and agree with the resident's/non-physician practitioner's findings, Plan of Care, and MDM as documented in the resident's/non-physician practitioner's note, except where noted  All available labs and Radiology studies were reviewed  I was present for key portions of any procedure(s) performed by the resident/non-physician practitioner and I was immediately available to provide assistance  At this point I agree with the current assessment done in the Emergency Department  I have conducted an independent evaluation of this patient a history and physical is as follows:    32-year-old male presents for evaluation  Patient was sent in bilateral physiology because his loop recorder showed V-tach yesterday  Patient had syncopal episode while at physical therapy and loop recorder identified V-tach  Patient was notified EP and told to come to emergency department immediately  Patient currently denies complaints  On exam-no acute distress, heart regular, no respiratory distress  Plan-check labs including TSH and magnesium, discuss with EP   Plan for admission    ED Course         Critical Care Time  Procedures

## 2022-10-27 NOTE — PROGRESS NOTES
Progress Note - Cardiology Office  [facility]    Ivett Nunez 74 y o  male MRN: [id number]  : 1955  Encounter: [id number]      ASSESSMENT:   Dizziness and fall during physical therapy  Was evaluated in the ED,  BP was 160/80  HS troponin was mildly elevated at 60 and 69  Patient declined to stay in the ED  Loop recorder revealed NSVT at 250 bpm for 47 seconds  He was also very active during the day including more the lawn and walk from home to the physical therapy office    Dyspnea on exertion  Improved with therapy     Probable chronic diastolic heart failure with G1DD  pro NT BNP on 2022 was mildly elevated at 494     PAF  Detected on loop recorder surveillance  On Eliquis     Cryptogenic stroke  History of TIA-cardioembolic  4752:  MRI of the brain :  suggested 2 small acute areas of infarct  Also possible small hemorrhage versus cavernoma  Echocardiogram in 2020   ejection fraction 60%  Moderate LVH  Mild MR  Mild increased LA size  Mild aortic stenosis  Ascending aorta measured 4 3 cm  Mild aortic stenosis   Mild mitral regurgitation      TTE, 2022:  EF 55%, moderate LVH, G1DD, mild RVE, mild SHAWN, mild AS, mild MR, TR and AZ, mildly dilated ascending aorta     Obesity, BMI 32 87     Essential hypertension  BP is elevated at 160/80 mmHg with heart rate of 88 per minute     Ascending aortic aneurysm, 4 3 cm by echo     Aortic stenosis, mild  Mitral regurgitation, mild     Organic importance/ED  On Viagra 100 mg p r n            RECOMMENDATIONS:  Increased Toprol XL to 50 mg twice a day  Patient to monitor his blood pressure prior to taking Toprol, and hold it if systolic is less than 185 mmHg  EP evaluation for possible symptomatic ventricular arrhythmia      Please call [phone] if any questions  HPI :     Ivett Nunez is a 74y o  year old male who came for follow up  Yesterday patient had mowed his lawn and then walked to the physical therapy  During PT he got dizzy and fell down  He was evaluated in the ED where his blood pressure was elevated and CT head did not show any acute intracranial abnormality  His loop recorder monitor revealed 1 tachycardic episode of nonsustained VT at 250 beats per minute for 47 seconds  I am going to increase patient's beta blocker and have advised him to be evaluated by EP    REVIEW OF SYSTEMS:  Dizziness and fall yesterday  Denies chest pain, unusual dyspnea or loss of consciousness  Balance abnormality    Historical Information   Past Medical History:   Diagnosis Date   • Arthropathy of knee     last assessed 8/19/15   • Bacterial pneumonia 02/05/2007    last assessed 2/5/7   • Brain atrophy [facility])    • Colon polyp    • CPAP (continuous positive airway pressure) dependence    • Disorder of male genital organ 02/12/2008    last assessed 2/12/08   • ED (erectile dysfunction) of organic origin     last assessed 2/13/17    • History of hypertension    • Seasonal allergies    • Situational anxiety     resolved 3/16/17    • Sleep apnea    • Stroke [facility])     09/2020 TIA   • Tinnitus    • Wears hearing aid     bilateral     Past Surgical History:   Procedure Laterality Date   • CARDIAC ELECTROPHYSIOLOGY PROCEDURE N/A 12/22/2021    Procedure: Cardiac Loop Recorder Implant;  Surgeon: Davion Mann DO;  Location: [place] [address] CATH LAB; Service: Cardiology   • COLONOSCOPY      x3-w/polyps   • HERNIA REPAIR      umbilical,hemal inguinal   • KNEE ARTHROSCOPY Left     meniscus   • GA COLONOSCOPY FLX DX W/COLLJ SPEC WHEN PFRMD N/A 5/7/2018    Procedure: COLONOSCOPY;  Surgeon: Wilbur Lane MD;  Location: [place] [address] GI LAB;   Service: Gastroenterology     Social History     Substance and Sexual Activity   Alcohol Use Yes   • Alcohol/week: 14 0 standard drinks   • Types: 14 Cans of beer per week     Social History     Substance and Sexual Activity   Drug Use No     Social History     Tobacco Use   Smoking Status Passive Smoke Exposure - Never Smoker   Smokeless Tobacco Never Used     Family History:   Family History   Problem Relation Age of Onset   • Cancer Mother         breast   • Diverticulitis Mother         colostomy   • Breast cancer Mother    • Heart disease Father         CHF   • Cancer Sister         breast   • Depression Sister    • Cancer Sister         skin cancer   • Cancer Sister         skin cancer   • Colon cancer Neg Hx    • Liver disease Neg Hx        Meds/Allergies     Allergies   Allergen Reactions   • Pollen Extract Sneezing     Reaction Date: 18Sep2006;    • Shellfish-Derived Products - Food Allergy Other (See Comments)     Eye swelling   • Iodine Solution [Povidone Iodine] Rash     Eyes swell       Current Outpatient Medications:   •  albuterol (Proventil HFA) 90 mcg/act inhaler, Inhale 2 puffs every 4 (four) hours as needed for wheezing or shortness of breath, Disp: 6 7 g, Rfl: 7  •  ALPRAZolam (XANAX) 0 25 mg tablet, Take 1 tablet (0 25 mg total) by mouth 2 (two) times a day as needed for anxiety or sleep, Disp: 30 tablet, Rfl: 1  •  apixaban (Eliquis) 5 mg, Take 1 tablet (5 mg total) by mouth 2 (two) times a day, Disp: 60 tablet, Rfl: 5  •  atorvastatin (LIPITOR) 40 mg tablet, TAKE 1 TABLET BY MOUTH EVERY DAY IN THE EVENING, Disp: 90 tablet, Rfl: 1  •  metoprolol succinate (TOPROL-XL) 50 mg 24 hr tablet, Take 1 tablet (50 mg total) by mouth 2 (two) times a day, Disp: 180 tablet, Rfl: 2  •  potassium chloride (MICRO-K) 10 MEQ CR capsule, TAKE 1 CAPSULE BY MOUTH 2 TIMES A DAY , Disp: 60 capsule, Rfl: 2  •  sildenafil (VIAGRA) 100 mg tablet, Take 1 tablet (100 mg total) by mouth daily as needed for erectile dysfunction, Disp: 30 tablet, Rfl: 5  •  fexofenadine (ALLEGRA) 180 MG tablet, Take 1 tablet (180 mg total) by mouth daily for 30 days (Patient taking differently: Take 180 mg by mouth every morning), Disp: 30 tablet, Rfl: 0    Vitals: Blood pressure 160/80, pulse 88, temperature 97 8 °F (36 6 °C), height 6' (1 829 m), weight 108 kg (239 lb), SpO2 98 %  ?  Body mass index is 32 41 kg/m²  Vitals:    10/27/22 1044   Weight: 108 kg (239 lb)     BP Readings from Last 3 Encounters:   10/27/22 160/80   10/26/22 141/88   22 110/62       Physical Exam:    Neurologic:  Alert & oriented x 3, no new focal deficits, Not in any acute distress,  Constitutional:  Well developed, well nourished, non-toxic appearance   Eyes:  Pupil equal and reacting to light, conjunctiva normal,   HENT:  Atraumatic, oropharynx moist, Neck- normal range of motion, no tenderness,  Neck supple, No JVP, No LNP   Respiratory:  Bilateral air entry, mostly clear to auscultation  Cardiovascular: S1-S2, regularly irregular, with a I/VI systolic murmur   GI:  Soft, nondistended, normal bowel sounds, nontender, no hepatosplenomegaly appreciated  Musculoskeletal:  No tenderness, no deformities     Skin:  Well hydrated, no rash   Lymphatic:  No lymphadenopathy noted   Extremities:  Trace lower extremity edema      Diagnostic Studies Review Cardio:      EKG:  Sinus rhythm with PVCs in quadrigeminy, heart rate 88 per minute, left atrial enlargement, LVH, inferior infarct of undetermined age    Cardiac testing:   Results for orders placed during the hospital encounter of 20    Echo complete with contrast if indicated    Narrative  [facility] [address] [place] [address]  [phone]    Transthoracic Echocardiogram  2D, M-mode, Doppler, and Color Doppler    Study date:  22-Sep-2020    Patient: Christine Tavares  MR number: [id number]  Account number: [de-identified]  : 1955  Age: 60 years  Gender: Male  Status: Inpatient  Location: Bedside  Height: 72 in  Weight: 211 lb  BP: 152/ 96 mmHg    Indications: TIA, Cardiac Murmur    Diagnoses: G45 9 - Transient cerebral ischemic attack, unspecified    Sonographer:  Chaya Borges RDCS  Primary Physician:  Hemalatha Kelley DO  Referring Physician:  Jorge Cortes MD  Group:    [facility]  Interpreting Physician:  Haroon Casillas MD    SUMMARY    LEFT VENTRICLE:  Systolic function was normal  Ejection fraction was estimated to be 60 %  There were no regional wall motion abnormalities  There was moderate concentric hypertrophy  LEFT ATRIUM:  The atrium was dilated  RIGHT ATRIUM:  The atrium was dilated  MITRAL VALVE:  There was mild regurgitation  AORTIC VALVE:  Transaortic velocity was increased due to valvular stenosis  There was mild stenosis  Valve mean gradient was 15 mmHg  AORTA:  There was dilatation of the ascending aorta, with a maximum measured diameter of 4 3 cm  HISTORY: PRIOR HISTORY: HTN, HLD, Sleep Apnea, CPAP    PROCEDURE: The procedure was performed at the bedside  This was a routine study  The transthoracic approach was used  The study included complete 2D imaging, M-mode, complete spectral Doppler, and color Doppler  The heart rate was 70 bpm,  at the start of the study  Images were obtained from the parasternal, apical, subcostal, and suprasternal notch acoustic windows  Image quality was adequate  LEFT VENTRICLE: Size was normal  Systolic function was normal  Ejection fraction was estimated to be 60 %  There were no regional wall motion abnormalities  Wall thickness was mildly increased  There was moderate concentric hypertrophy  DOPPLER: The transmitral flow pattern was normal  Left ventricular diastolic function parameters were normal     RIGHT VENTRICLE: The size was normal  Systolic function was normal  Wall thickness was normal     LEFT ATRIUM: The atrium was dilated  RIGHT ATRIUM: The atrium was dilated  MITRAL VALVE: Valve structure was normal  There was normal leaflet separation  DOPPLER: The transmitral velocity was within the normal range  There was no evidence for stenosis  There was mild regurgitation  AORTIC VALVE: The valve was trileaflet   Leaflets exhibited mild calcification and mildly reduced cuspal separation  DOPPLER: Transaortic velocity was increased due to valvular stenosis  There was mild stenosis  There was no significant  regurgitation  TRICUSPID VALVE: The valve structure was normal  There was normal leaflet separation  DOPPLER: The transtricuspid velocity was within the normal range  There was no evidence for stenosis  There was trace regurgitation  PULMONIC VALVE: Leaflets exhibited normal thickness, no calcification, and normal cuspal separation  DOPPLER: The transpulmonic velocity was within the normal range  There was trace regurgitation  PERICARDIUM: There was no pericardial effusion  The pericardium was normal in appearance  AORTA: The root exhibited normal size  There was dilatation of the ascending aorta, with a maximum measured diameter of 4 3 cm  SYSTEMIC VEINS: IVC: The inferior vena cava was normal in size  Respirophasic changes were normal     MEASUREMENT TABLES    DOPPLER MEASUREMENTS  Aortic valve   (Reference normals)  Peak jassi   250 cm/s   (--)  Peak gradient   25 mmHg   (--)  Mean gradient   15 mmHg   (--)    SYSTEM MEASUREMENT TABLES    2D  %FS: 32 84 %  Ao Diam: 3 98 cm  EDV(Teich): 162 66 ml  EF(Teich): 60 58 %  ESV(Teich): 64 11 ml  IVSd: 1 35 cm  LA Area: 28 26 cm2  LA Diam: 4 24 cm  LVEDV MOD A4C: 148 4 ml  LVEF MOD A4C: 38 21 %  LVESV MOD A4C: 91 69 ml  LVIDd: 5 74 cm  LVIDs: 3 85 cm  LVLd A4C: 8 56 cm  LVLs A4C: 7 84 cm  LVOT Diam: 2 66 cm  LVPWd: 1 21 cm  RA Area: 21 9 cm2  RVIDd: 4 18 cm  SV MOD A4C: 56 71 ml  SV(Teich): 98 55 ml    CW  AV Env  Ti: 299 88 ms  AV VTI: 51 93 cm  AV Vmax: 2 42 m/s  AV Vmean: 1 73 m/s  AV maxP 41 mmHg  AV meanP 59 mmHg    MM  TAPSE: 1 87 cm    PW  ARCELIA (VTI): 1 88 cm2  ARCELIA Vmax: 1 91 cm2  E': 0 09 m/s  E/E': 7 65  LVOT Env  Ti: 295 27 ms  LVOT VTI: 17 63 cm  LVOT Vmax: 0 83 m/s  LVOT Vmean: 0 6 m/s  LVOT maxP 77 mmHg  LVOT meanP 64 mmHg  LVSV Dopp: 97 67 ml  MV A Jassi: 0 64 m/s  MV Dec [place]: 3 4 m/s2  MV DecT: 192 4 ms  MV E Jassi: 0 65 m/s  MV E/A Ratio: 1 03  MV PHT: 55 8 ms  MVA By PHT: 3 94 cm2    [facility] Accredited Echocardiography Laboratory    Prepared and electronically signed by    Yanna Garcia MD  Signed 22-Sep-2020 15:19:26    Results for orders placed during the hospital encounter of 21    JOSE ELIAS    Narrative  [address]  [address] [facility]  [phone]    Transesophageal Echocardiogram  2D, Doppler, and Color Doppler    Study date:  2021    Patient: Crystal Chao  MR number: [id number]  Account number: [de-identified]  : 1955  Age: 63 years  Gender: Male  Status: Outpatient  Location: Cath lab  Height: 72 in  Weight: 222 6 lb  BP: 70796/ 92 mmHg    Indications: CVA    Diagnoses: I63 9 - Cerebral infarction, unspecified    Sonographer:  CARLOS Hsu  Primary Physician:  Bruce Mcpherson DO  Referring Physician:  Tatum Taylor MD  Group:  [facility]  Interpreting Physician:  Taina Daugherty MD    SUMMARY    LEFT VENTRICLE:  Size was normal   Ejection fraction was estimated in the range of 50 % to 60 %  Wall thickness was increased  LEFT ATRIUM:  The atrium was dilated  ATRIAL SEPTUM:  There was a trace left-to-right shunt  This is within normal limits for the patient's age  No major shunting to suggest patent ASD/significant PFO    RIGHT ATRIUM:  The atrium was dilated  MITRAL VALVE:  There was mild to moderate regurgitation  AORTIC VALVE:  There was mild stenosis  AORTA:  **Mild diffuse atheroma is noted of the transverse and distal aorta**  Upper normal ascending arota measurement of 3 8-4 1cm    HISTORY: PRIOR HISTORY: HTN,Sleep apnea,stroke  PROCEDURE: The procedure was performed in the catheterization laboratory  This was a routine study  The risks and alternatives of the procedure were explained to the patient and informed consent was obtained   The transesophageal approach  was used  The study included complete 2D imaging, limited spectral Doppler, and color Doppler  The heart rate was 76 bpm, at the start of the study  An adult omniplane probe was inserted by the attending cardiologist  Intubated with ease  One intubation attempt(s)  There was no blood detected on the probe  Image quality was adequate  LEFT VENTRICLE: Size was normal  Ejection fraction was estimated in the range of 50 % to 60 %  Wall thickness was increased  RIGHT VENTRICLE: The size was normal  Systolic function was normal     LEFT ATRIUM: The atrium was dilated  APPENDAGE: The size was normal  No thrombus was identified  ATRIAL SEPTUM: There was a trace left-to-right shunt  This is within normal limits for the patient's age  No major shunting to suggest patent ASD/significant PFO    RIGHT ATRIUM: The atrium was dilated  MITRAL VALVE: There was mild thickening  DOPPLER: There was mild to moderate regurgitation  AORTIC VALVE: The valve was trileaflet  Leaflets exhibited mildly increased thickness, mild to moderate calcification, and sclerosis  DOPPLER: There was mild stenosis  AORTA: **Mild diffuse atheroma is noted of the transverse and distal aorta** Upper normal ascending arota measurement of 3 8-4 1cm    SYSTEM MEASUREMENT TABLES    2D  Ao Diam: 3 88 cm    [facility] Accredited Echocardiography Laboratory    Prepared and electronically signed by    Lise Brown MD  Signed 16-Jul-2021 17:07:29    Results for orders placed during the hospital encounter of 07/07/22    Echo complete w/ contrast if indicated    Interpretation Summary  •  Left Ventricle: Left ventricular cavity size is normal  Wall thickness is moderately increased  The left ventricular ejection fraction is 55%  Systolic function is normal  Wall motion is normal  Diastolic function is mildly abnormal, consistent with grade I (abnormal) relaxation  •  Right Ventricle: Right ventricular cavity size is mildly dilated    • Left Atrium: The atrium is mildly dilated  •  Right Atrium: The atrium is mildly dilated  •  Aortic Valve: The aortic valve is trileaflet  The leaflets are not thickened  The leaflets are moderately calcified  There is mildly reduced mobility  There is mild stenosis  •  Mitral Valve: There is mild annular calcification  There is mild regurgitation  •  Tricuspid Valve: There is mild regurgitation  •  Pulmonic Valve: There is mild regurgitation  •  Aorta: The aortic root is normal in size  The ascending aorta is mildly dilated  Imaging:  Chest X-Ray:   No Chest XR results available for this patient  CT-scan of the chest:     No CTA results available for this patient    Lab Review   Lab Results   Component Value Date    WBC 7 49 10/26/2022    HGB 10 2 (L) 10/26/2022    HCT 34 9 (L) 10/26/2022    MCV 77 (L) 10/26/2022    RDW 15 3 (H) 10/26/2022     10/26/2022     BMP:  Lab Results   Component Value Date    SODIUM 144 10/26/2022    K 3 5 10/26/2022     10/26/2022    CO2 24 10/26/2022    BUN 24 10/26/2022    CREATININE 1 11 10/26/2022    GLUC 109 10/26/2022    GLUF 110 (H) 06/09/2022    CALCIUM 8 8 10/26/2022    CORRECTEDCA 9 2 06/07/2022    EGFR 69 10/26/2022    MG 2 3 06/10/2022     LFT:  Lab Results   Component Value Date    AST 21 10/26/2022    ALT 27 10/26/2022    ALKPHOS 110 10/26/2022    TP 6 5 10/26/2022    ALB 3 7 10/26/2022      No components found for: [facility]  Lab Results   Component Value Date    PDX2GLPXTQIL 1 648 10/30/2020     Lab Results   Component Value Date    HGBA1C 5 1 07/07/2022     Lipid Profile:   Lab Results   Component Value Date    CHOLESTEROL 103 11/12/2021    HDL 34 (L) 11/12/2021    LDLCALC 57 11/12/2021    TRIG 58 11/12/2021     Lab Results   Component Value Date    CHOLESTEROL 103 11/12/2021    CHOLESTEROL 126 09/22/2020     Lab Results   Component Value Date    TROPONINI <0 02 09/21/2020     Lab Results   Component Value Date    NTBNP 494 (H) 07/07/2022      Recent Results (from the past 672 hour(s))   Fingerstick Glucose (POCT)    Collection Time: 10/26/22  4:40 PM   Result Value Ref Range    POC Glucose 115 65 - 140 mg/dl   CBC and differential    Collection Time: 10/26/22  5:11 PM   Result Value Ref Range    WBC 7 49 4 31 - 10 16 Thousand/uL    RBC 4 54 3 88 - 5 62 Million/uL    Hemoglobin 10 2 (L) 12 0 - 17 0 g/dL    Hematocrit 34 9 (L) 36 5 - 49 3 %    MCV 77 (L) 82 - 98 fL    MCH 22 5 (L) 26 8 - 34 3 pg    MCHC 29 2 (L) 31 4 - 37 4 g/dL    RDW 15 3 (H) 11 6 - 15 1 %    MPV 8 4 (L) 8 9 - 12 7 fL    Platelets 876 556 - 270 Thousands/uL    nRBC 0 /100 WBCs    Neutrophils Relative 73 43 - 75 %    Immat GRANS % 0 0 - 2 %    Lymphocytes Relative 13 (L) 14 - 44 %    Monocytes Relative 11 4 - 12 %    Eosinophils Relative 2 0 - 6 %    Basophils Relative 1 0 - 1 %    Neutrophils Absolute 5 50 1 85 - 7 62 Thousands/µL    Immature Grans Absolute 0 02 0 00 - 0 20 Thousand/uL    Lymphocytes Absolute 0 97 0 60 - 4 47 Thousands/µL    Monocytes Absolute 0 81 0 17 - 1 22 Thousand/µL    Eosinophils Absolute 0 15 0 00 - 0 61 Thousand/µL    Basophils Absolute 0 04 0 00 - 0 10 Thousands/µL   Comprehensive metabolic panel    Collection Time: 10/26/22  5:11 PM   Result Value Ref Range    Sodium 144 135 - 147 mmol/L    Potassium 3 5 3 5 - 5 3 mmol/L    Chloride 108 96 - 108 mmol/L    CO2 24 21 - 32 mmol/L    ANION GAP 12 4 - 13 mmol/L    BUN 24 5 - 25 mg/dL    Creatinine 1 11 0 60 - 1 30 mg/dL    Glucose 109 65 - 140 mg/dL    Calcium 8 8 8 3 - 10 1 mg/dL    AST 21 5 - 45 U/L    ALT 27 12 - 78 U/L    Alkaline Phosphatase 110 46 - 116 U/L    Total Protein 6 5 6 4 - 8 4 g/dL    Albumin 3 7 3 5 - 5 0 g/dL    Total Bilirubin 0 69 0 20 - 1 00 mg/dL    eGFR 69 ml/min/1 73sq m   HS Troponin 0hr (reflex protocol)    Collection Time: 10/26/22  5:11 PM   Result Value Ref Range    hs TnI 0hr 60 (H) "Refer to ACS Flowchart"- see link ng/L   UA (URINE) with reflex to Scope    Collection Time: 10/26/22  6:07 PM   Result Value Ref Range    Color, UA Kiana     Clarity, UA Slightly Cloudy     Specific Gravity, UA >=1 030 1 000 - 1 030    pH, UA 6 0 5 0, 5 5, 6 0, 6 5, 7 0, 7 5, 8 0, 8 5, 9 0    Leukocytes, UA Negative Negative    Nitrite, UA Negative Negative    Protein, UA 30 (1+) (A) Negative mg/dl    Glucose, UA Negative Negative mg/dl    Ketones, UA Trace (A) Negative mg/dl    Urobilinogen, UA 1 0 0 2, 1 0 E U /dl E U /dl    Bilirubin, UA Small (A) Negative    Occult Blood, UA Negative Negative   Urine Microscopic    Collection Time: 10/26/22  6:07 PM   Result Value Ref Range    RBC, UA 2-4 None Seen, 0-1, 1-2, 2-4, 0-5 /hpf    WBC, UA 1-2 None Seen, 0-1, 1-2, 0-5, 2-4 /hpf    Epithelial Cells Occasional None Seen, Occasional /hpf    Bacteria, UA Occasional None Seen, Occasional /hpf    AMORPH URATES Occasional /hpf    MUCUS THREADS Innumerable (A) None Seen   HS Troponin I 2hr    Collection Time: 10/26/22  7:18 PM   Result Value Ref Range    hs TnI 2hr 69 (H) "Refer to ACS Flowchart"- see link ng/L    Delta 2hr hsTnI 9 <20 ng/L             Dr Jina Zaragoza MD, [facility] [place]      "This note has been constructed using a voice recognition system  Therefore there may be syntax, spelling, and/or grammatical errors   Please call if you have any questions  "

## 2022-10-27 NOTE — PROGRESS NOTES
Received office request for STAT appointment. Reviewed case. Patient with likely sustained VT on loop for 47 seconds yesterday during PT. Did fall to the ground during event but did not lose consciousness. Went to the ED, work up negative. Loop not interrogated. He saw Dr. Edwina Garcia with the below loop findings and recommend ASAP EP office appointment. Discussed case with Dr. Dayana Jaramillo. We recommend patient come to [facility] ED for ischemic evaluation + EP consult ASAP. Discussed he likely will need defibrillator placement and possibly AAD. They understand. Wife is driving him to [facility] ED now.

## 2022-10-27 NOTE — ASSESSMENT & PLAN NOTE
· Patient patient has a history of stroke, status post loop recorder in December 2021  · Yesterday while doing physical therapy he fell, seems like he lost consciousness, does not remember the episode  Yesterday he was more active than usually, he was mowing the lawn, walked to physical therapy    · His loop recorder was evaluated by EP as outpatient and they recommended patient to be admitted to [facility] for ischemic evaluation, EP consult, he will likely need defibrillator placement  · On loop recorder interrogation he had NSVT for 47 seconds  · Telemetry no significant events   · Continue Metoprolol succinate 50 mg b i d  which was increased by Cardiology yesterday  · Monitor electrolyte  · Plan for repeat echocardiogram today as well as left heart catheterization

## 2022-10-27 NOTE — H&P
[address] [facility]  H&P- [facility] 1955, 74 y o  male MRN: [id number]  Unit/Bed#: -[place] Encounter: [id number]  Primary Care Provider: Andrei Peterson DO   Date and time admitted to hospital: 10/27/2022  3:08 PM    * VT (ventricular tachycardia)  Assessment & Plan  · Patient patient has a history of stroke, status post loop recorder in December 2021  · Yesterday while doing physical therapy he fell, seems like he lost consciousness, does not remember the episode  Yesterday he was more active than usually, he was mowing the lawn, walked to physical therapy    · His loop recorder was evaluated by EP as outpatient and they recommended patient to be admitted to [facility] for ischemic evaluation, EP consult, he will likely need defibrillator placement  · On loop recorder interrogation he had NSVT for 47 seconds  · Telemetry  · Continue Metoprolol succinate 50 mg b i d  which was increased by Cardiology yesterday  · Monitor electrolyte    Dyspnea on exertion  Assessment & Plan  · History of dyspnea on exertion, per wife he snores a lot, he has a history of obstructive sleep apnea not wearing CPAP  · Echo in July showed ejection fraction of 43%, grade 1 diastolic dysfunction, mild AS, mild MR, mild TR, mildly dilated ascending aorta  · Encourage CPAP use    Paroxysmal A-fib ([address] )  Assessment & Plan  · Seen on loop recorder, on metoprolol and Eliquis  · Patient will possibly undergo procedures  · Start heparin drip    Anemia  Assessment & Plan  · Microcytic anemia  · Check iron panel    Aortic stenosis  Assessment & Plan  · Mild AS on last echo    History of cardioembolic cerebrovascular accident (CVA)  Assessment & Plan  · History of cryptogenic stroke    Asthma, mild intermittent  Assessment & Plan  · Not maintained on inhalers  · Patient is not using albuterol    VTE Pharmacologic Prophylaxis: VTE Score: 3 Moderate Risk (Score 3-4) - Pharmacological DVT Prophylaxis Ordered: heparin drip  Code Status: Level 1 - Full Code   Discussion with family: Updated  (wife) at bedside  Anticipated Length of Stay: Patient will be admitted on an inpatient basis with an anticipated length of stay of greater than 2 midnights secondary to VT  Total Time for Visit, including Counseling / Coordination of Care: 60 minutes Greater than 50% of this total time spent on direct patient counseling and coordination of care  Chief Complaint:  Episode of less responsiveness    History of Present Illness:  Cristine Waggoner is a 74 y o  male with a PMH of hypertension, stroke, paroxysmal atrial fibrillation who presents with VT  Patient has a history of cryptogenic stroke, status post loop recorder placement in 2021  Yesterday he was more active than usually, he was mowing the lawn  He also walked to physical therapy  He is going to physical therapy for balance issues  During physical therapy he fell  He does not remember what happened  He was seen in the ED, CT scan of the head was negative and he was referred to Cardiology and he was seen the next day  Was seen by Cardiology today, reviewed recorder showed 47 seconds of nonsustaine V-tach  Cardiology discussed with EP as outpatient her reviewed loop recorder report and they advised him to come to [facility] for EP eval, ischemic eval, possible defibrillator placement  Upon my evaluation patient is awake and alert, no distress  Denies headache, visual changes, lightheadedness, dizziness chest pain palpitation shortness of breath nausea vomiting abdominal pain or trouble with urination      Review of Systems:  Review of Systems all reviewed and negative except as above    Past Medical and Surgical History:   Past Medical History:   Diagnosis Date   • Arthropathy of knee     last assessed 8/19/15   • Bacterial pneumonia 02/05/2007    last assessed 2/5/7   • Brain atrophy [facility])    • Colon polyp    • CPAP (continuous positive airway pressure) dependence    • Disorder of male genital organ 02/12/2008    last assessed 2/12/08   • ED (erectile dysfunction) of organic origin     last assessed 2/13/17    • History of hypertension    • Seasonal allergies    • Situational anxiety     resolved 3/16/17    • Sleep apnea    • Stroke [facility])     09/2020 TIA   • Tinnitus    • Wears hearing aid     bilateral       Past Surgical History:   Procedure Laterality Date   • CARDIAC ELECTROPHYSIOLOGY PROCEDURE N/A 12/22/2021    Procedure: Cardiac Loop Recorder Implant;  Surgeon: Simran Tapia DO;  Location: [place] [address] CATH LAB; Service: Cardiology   • COLONOSCOPY      x3-w/polyps   • HERNIA REPAIR      umbilical,hemal inguinal   • KNEE ARTHROSCOPY Left     meniscus   • KS COLONOSCOPY FLX DX W/COLLJ SPEC WHEN PFRMD N/A 5/7/2018    Procedure: COLONOSCOPY;  Surgeon: Neeraj Avalos MD;  Location: [facility] GI LAB; Service: Gastroenterology       Meds/Allergies:  Prior to Admission medications    Medication Sig Start Date End Date Taking?  Authorizing Provider   albuterol (Proventil HFA) 90 mcg/act inhaler Inhale 2 puffs every 4 (four) hours as needed for wheezing or shortness of breath 9/2/21   Deuce Floyd DO   ALPRAZolam [place]) 0 25 mg tablet Take 1 tablet (0 25 mg total) by mouth 2 (two) times a day as needed for anxiety or sleep 12/13/21   Deuce Floyd DO   apixaban (Eliquis) 5 mg Take 1 tablet (5 mg total) by mouth 2 (two) times a day 2/2/22   Omar Cherry MD   atorvastatin (LIPITOR) 40 mg tablet TAKE 1 TABLET BY MOUTH EVERY DAY IN THE EVENING 7/23/22   Belle Polanco DO   fexofenadine (ALLEGRA) 180 MG tablet Take 1 tablet (180 mg total) by mouth daily for 30 days  Patient taking differently: Take 180 mg by mouth every morning 2/15/18 6/21/22  Ronald Harmon DO   metoprolol succinate (TOPROL-XL) 50 mg 24 hr tablet Take 1 tablet (50 mg total) by mouth 2 (two) times a day 10/27/22   Omar Cherry MD   potassium chloride (MICRO-K) 10 MEQ CR capsule TAKE 1 CAPSULE BY MOUTH 2 TIMES A DAY  8/31/22   Guillermo Lin MD   sildenafil (VIAGRA) 100 mg tablet Take 1 tablet (100 mg total) by mouth daily as needed for erectile dysfunction 11/5/21   Gayle Stevens DO   hydrochlorothiazide (HYDRODIURIL) 25 mg tablet Take 1 tablet (25 mg total) by mouth daily 6/23/22 10/27/22  Guillermo Lin MD   metoprolol succinate (TOPROL-XL) 25 mg 24 hr tablet TAKE 1 TABLET (25 MG TOTAL) BY MOUTH DAILY IN EVENING 9/9/22 10/27/22  Guillermo Lin MD   metoprolol succinate (TOPROL-XL) 50 mg 24 hr tablet Take 1 tablet (50 mg total) by mouth daily  Patient taking differently: Take 50 mg by mouth every morning 5/2/22 10/27/22  Guillermo Lin MD   metoprolol succinate (TOPROL-XL) 50 mg 24 hr tablet Take 1 tablet (50 mg total) by mouth daily 10/27/22 10/27/22  Guillermo Lin MD     I have reviewed home medications with patient personally  Allergies:    Allergies   Allergen Reactions   • Pollen Extract Sneezing     Reaction Date: 18Sep2006;    • Shellfish-Derived Products - Food Allergy Other (See Comments)     Eye swelling   • Iodine Solution [Povidone Iodine] Rash     Eyes swell       Social History:  Marital Status: /Civil Union   Substance Use History:   Social History     Substance and Sexual Activity   Alcohol Use Yes   • Alcohol/week: 14 0 standard drinks   • Types: 14 Cans of beer per week     Social History     Tobacco Use   Smoking Status Passive Smoke Exposure - Never Smoker   Smokeless Tobacco Never Used     Social History     Substance and Sexual Activity   Drug Use No       Family History:  Family History   Problem Relation Age of Onset   • Cancer Mother         breast   • Diverticulitis Mother         colostomy   • Breast cancer Mother    • Heart disease Father         CHF   • Cancer Sister         breast   • Depression Sister    • Cancer Sister         skin cancer   • Cancer Sister         skin cancer   • Colon cancer Neg Hx    • Liver disease Neg Hx        Physical Exam:     Vitals:   Blood Pressure: (!) 146/104 (10/27/22 1756)  Pulse: 72 (10/27/22 1756)  Temperature: (!) 97 4 °F (36 3 °C) (10/27/22 1756)  Temp Source: Tympanic (10/27/22 1435)  Respirations: 19 (10/27/22 1756)  SpO2: 98 % (10/27/22 1756)    Physical Exam  Constitutional:       General: He is not in acute distress  HENT:      Head: Atraumatic  Cardiovascular:      Rate and Rhythm: Normal rate and regular rhythm  Heart sounds: No murmur heard  No friction rub  No gallop  Pulmonary:      Effort: Pulmonary effort is normal  No respiratory distress  Breath sounds: Normal breath sounds  No wheezing  Abdominal:      General: Bowel sounds are normal  There is no distension  Palpations: Abdomen is soft  Tenderness: There is no abdominal tenderness  Musculoskeletal:         General: No swelling  Cervical back: Neck supple  Skin:     General: Skin is warm and dry  Neurological:      General: No focal deficit present  Mental Status: He is alert     Psychiatric:         Mood and Affect: Mood normal           Additional Data:     Lab Results:  Results from last 7 days   Lab Units 10/27/22  1557   WBC Thousand/uL 5 57   HEMOGLOBIN g/dL 9 6*   HEMATOCRIT % 33 6*   PLATELETS Thousands/uL 299   NEUTROS PCT % 62   LYMPHS PCT % 20   MONOS PCT % 13*   EOS PCT % 3     Results from last 7 days   Lab Units 10/27/22  1557   SODIUM mmol/L 141   POTASSIUM mmol/L 3 5   CHLORIDE mmol/L 112*   CO2 mmol/L 25   BUN mg/dL 22   CREATININE mg/dL 1 02   ANION GAP mmol/L 4   CALCIUM mg/dL 8 7   ALBUMIN g/dL 3 4*   TOTAL BILIRUBIN mg/dL 0 70   ALK PHOS U/L 99   ALT U/L 29   AST U/L 17   GLUCOSE RANDOM mg/dL 110         Results from last 7 days   Lab Units 10/26/22  1640   POC GLUCOSE mg/dl 115               Imaging: Reviewed radiology reports from this admission including: CT head  No orders to display       EKG and Other Studies Reviewed on Admission:   · EKG: Normal sinus rhythm with occasional PVCs     ** Please Note: This note has been constructed using a voice recognition system   **

## 2022-10-27 NOTE — ASSESSMENT & PLAN NOTE
· Mild AS on last echo K/uL    RBC 4.21 4.20 - 5.40 M/uL    Hemoglobin 13.1 12.0 - 16.0 g/dL    Hematocrit 37.7 37.0 - 47.0 %    MCV 89.6 82.0 - 100.0 fL    MCH 31.1 27.0 - 31.3 pg    MCHC 34.7 33.0 - 37.0 %    RDW 14.9 (H) 11.5 - 14.5 %    Platelets 183 472 - 048 K/uL   Basic Metabolic Panel    Collection Time: 11/07/18  6:11 AM   Result Value Ref Range    Sodium 143 132 - 144 mEq/L    Potassium 3.3 (L) 3.5 - 5.1 mEq/L    Chloride 106 98 - 107 mEq/L    CO2 26 22 - 29 mEq/L    Anion Gap 11 7 - 13 mEq/L    Glucose 107 74 - 109 mg/dL    BUN 14 8 - 23 mg/dL    CREATININE 0.94 (H) 0.50 - 0.90 mg/dL    GFR Non-African American >60.0 >60    GFR  >60.0 >60    Calcium 8.6 8.6 - 10.2 mg/dL           Follow-up visits:   No follow-up provider specified. [facility] Problems    Diagnosis Date Noted    NSTEMI (non-ST elevated myocardial infarction) ([facility] [address] [I21.4] 11/06/2018     Priority: Low    Chest pain [R07.9] 11/05/2018     Priority: Low     1. Will obtain Echo and dc after reviewed. 2. Hypokalemia- replace now and daily supplement  3.  HTN stable               Electronically signed by Nelda Alonzo MD on 11/7/2018 at 9:06 AM

## 2022-10-27 NOTE — ASSESSMENT & PLAN NOTE
· History of dyspnea on exertion, per wife he snores a lot, he has a history of obstructive sleep apnea not wearing CPAP  · Echo in July showed ejection fraction of 77%, grade 1 diastolic dysfunction, mild AS, mild MR, mild TR, mildly dilated ascending aorta  · Encourage CPAP use

## 2022-10-27 NOTE — ASSESSMENT & PLAN NOTE
· Seen on loop recorder, on metoprolol and Eliquis  · Patient will possibly undergo procedures  · Start heparin drip

## 2022-10-27 NOTE — Clinical Note
Inserted under fluoro  Pt called asking for new INR slip returned call reached  and told her there is a standing order in [employer] system good through 1/2022

## 2022-10-28 ENCOUNTER — APPOINTMENT (INPATIENT)
Dept: NON INVASIVE DIAGNOSTICS | Facility: HOSPITAL | Age: 67
End: 2022-10-28

## 2022-10-28 PROBLEM — I50.32 CHRONIC DIASTOLIC CONGESTIVE HEART FAILURE (HCC): Status: ACTIVE | Noted: 2022-10-28

## 2022-10-28 LAB
AMMONIA PLAS-SCNC: 16 UMOL/L (ref 11–35)
ANION GAP SERPL CALCULATED.3IONS-SCNC: 6 MMOL/L (ref 4–13)
ANION GAP SERPL CALCULATED.3IONS-SCNC: 7 MMOL/L (ref 4–13)
AORTIC ROOT: 4.5 CM
AORTIC VALVE MEAN VELOCITY: 20.3 M/S
APICAL FOUR CHAMBER EJECTION FRACTION: 60 %
APTT PPP: 39 SECONDS (ref 23–37)
APTT PPP: 54 SECONDS (ref 23–37)
ASCENDING AORTA: 4.6 CM
ATRIAL RATE: 66 BPM
ATRIAL RATE: 69 BPM
ATRIAL RATE: 99 BPM
AV LVOT MEAN GRADIENT: 4 MMHG
AV LVOT PEAK GRADIENT: 6 MMHG
AV MEAN GRADIENT: 17 MMHG
AV PEAK GRADIENT: 25 MMHG
AV VELOCITY RATIO: 0.49
BUN SERPL-MCNC: 15 MG/DL (ref 5–25)
BUN SERPL-MCNC: 18 MG/DL (ref 5–25)
CALCIUM SERPL-MCNC: 8.3 MG/DL (ref 8.3–10.1)
CALCIUM SERPL-MCNC: 8.6 MG/DL (ref 8.3–10.1)
CHLORIDE SERPL-SCNC: 111 MMOL/L (ref 96–108)
CHLORIDE SERPL-SCNC: 112 MMOL/L (ref 96–108)
CO2 SERPL-SCNC: 23 MMOL/L (ref 21–32)
CO2 SERPL-SCNC: 24 MMOL/L (ref 21–32)
CREAT SERPL-MCNC: 0.9 MG/DL (ref 0.6–1.3)
CREAT SERPL-MCNC: 0.97 MG/DL (ref 0.6–1.3)
DOP CALC AO PEAK VEL: 2.48 M/S
DOP CALC AO VTI: 58.07 CM
DOP CALC LVOT PEAK VEL VTI: 32.48 CM
DOP CALC LVOT PEAK VEL: 1.21 M/S
E WAVE DECELERATION TIME: 176 MS
ERYTHROCYTE [DISTWIDTH] IN BLOOD BY AUTOMATED COUNT: 15.1 % (ref 11.6–15.1)
FERRITIN SERPL-MCNC: 11 NG/ML (ref 8–388)
FOLATE SERPL-MCNC: 14.9 NG/ML (ref 3.1–17.5)
FRACTIONAL SHORTENING: 27 (ref 28–44)
GFR SERPL CREATININE-BSD FRML MDRD: 81 ML/MIN/1.73SQ M
GFR SERPL CREATININE-BSD FRML MDRD: 89 ML/MIN/1.73SQ M
GLUCOSE SERPL-MCNC: 113 MG/DL (ref 65–140)
GLUCOSE SERPL-MCNC: 113 MG/DL (ref 65–140)
HCT VFR BLD AUTO: 36.1 % (ref 36.5–49.3)
HGB BLD-MCNC: 10.3 G/DL (ref 12–17)
INTERVENTRICULAR SEPTUM IN DIASTOLE (PARASTERNAL SHORT AXIS VIEW): 1.3 CM
INTERVENTRICULAR SEPTUM: 1.3 CM (ref 0.6–1.1)
IRON SATN MFR SERPL: 7 % (ref 20–50)
IRON SERPL-MCNC: 25 UG/DL (ref 65–175)
IVC: 2.7 MM
LAAS-AP4: 23.4 CM2
LEFT ATRIUM SIZE: 4 CM
LEFT INTERNAL DIMENSION IN SYSTOLE: 3.7 CM (ref 2.1–4)
LEFT VENTRICULAR INTERNAL DIMENSION IN DIASTOLE: 5.1 CM (ref 3.5–6)
LEFT VENTRICULAR POSTERIOR WALL IN END DIASTOLE: 1 CM
LEFT VENTRICULAR STROKE VOLUME: 67 ML
LVSV (TEICH): 67 ML
MAGNESIUM SERPL-MCNC: 2.2 MG/DL (ref 1.6–2.6)
MAGNESIUM SERPL-MCNC: 2.4 MG/DL (ref 1.6–2.6)
MCH RBC QN AUTO: 22.4 PG (ref 26.8–34.3)
MCHC RBC AUTO-ENTMCNC: 28.5 G/DL (ref 31.4–37.4)
MCV RBC AUTO: 79 FL (ref 82–98)
MV PEAK A VEL: 0.59 M/S
MV PEAK E VEL: 48 CM/S
MV STENOSIS PRESSURE HALF TIME: 51 MS
MV VALVE AREA P 1/2 METHOD: 4.31
P AXIS: 16 DEGREES
P AXIS: 40 DEGREES
P AXIS: 7 DEGREES
PLATELET # BLD AUTO: 302 THOUSANDS/UL (ref 149–390)
PMV BLD AUTO: 8.9 FL (ref 8.9–12.7)
POTASSIUM SERPL-SCNC: 3.7 MMOL/L (ref 3.5–5.3)
POTASSIUM SERPL-SCNC: 4 MMOL/L (ref 3.5–5.3)
PR INTERVAL: 154 MS
PR INTERVAL: 156 MS
PR INTERVAL: 166 MS
QRS AXIS: -10 DEGREES
QRS AXIS: -11 DEGREES
QRS AXIS: -8 DEGREES
QRSD INTERVAL: 90 MS
QT INTERVAL: 362 MS
QT INTERVAL: 424 MS
QT INTERVAL: 448 MS
QTC INTERVAL: 444 MS
QTC INTERVAL: 464 MS
QTC INTERVAL: 480 MS
RBC # BLD AUTO: 4.6 MILLION/UL (ref 3.88–5.62)
RIGHT ATRIUM AREA SYSTOLE A4C: 29.5 CM2
RIGHT VENTRICLE ID DIMENSION: 5.4 CM
SL CV LV EF: 60
SL CV PED ECHO LEFT VENTRICLE DIASTOLIC VOLUME (MOD BIPLANE) 2D: 125 ML
SL CV PED ECHO LEFT VENTRICLE SYSTOLIC VOLUME (MOD BIPLANE) 2D: 58 ML
SODIUM SERPL-SCNC: 141 MMOL/L (ref 135–147)
SODIUM SERPL-SCNC: 142 MMOL/L (ref 135–147)
T WAVE AXIS: 3 DEGREES
T WAVE AXIS: 3 DEGREES
T WAVE AXIS: 45 DEGREES
TIBC SERPL-MCNC: 361 UG/DL (ref 250–450)
VENTRICULAR RATE: 66 BPM
VENTRICULAR RATE: 69 BPM
VENTRICULAR RATE: 99 BPM
VIT B12 SERPL-MCNC: 287 PG/ML (ref 100–900)
WBC # BLD AUTO: 6.4 THOUSAND/UL (ref 4.31–10.16)

## 2022-10-28 PROCEDURE — 027134Z DILATION OF CORONARY ARTERY, TWO ARTERIES WITH DRUG-ELUTING INTRALUMINAL DEVICE, PERCUTANEOUS APPROACH: ICD-10-PCS | Performed by: INTERNAL MEDICINE

## 2022-10-28 PROCEDURE — B2111ZZ FLUOROSCOPY OF MULTIPLE CORONARY ARTERIES USING LOW OSMOLAR CONTRAST: ICD-10-PCS | Performed by: INTERNAL MEDICINE

## 2022-10-28 PROCEDURE — 4A023N7 MEASUREMENT OF CARDIAC SAMPLING AND PRESSURE, LEFT HEART, PERCUTANEOUS APPROACH: ICD-10-PCS | Performed by: INTERNAL MEDICINE

## 2022-10-28 DEVICE — EVEROLIMUS-ELUTING PLATINUM CHROMIUM CORONARY STENT SYSTEM
Type: IMPLANTABLE DEVICE | Site: HEART | Status: FUNCTIONAL
Brand: SYNERGY™ XD

## 2022-10-28 DEVICE — STENT ONYXNG22518UX ONYX 2.25X18RX
Type: IMPLANTABLE DEVICE | Site: HEART | Status: FUNCTIONAL
Brand: ONYX FRONTIER™

## 2022-10-28 RX ORDER — NITROGLYCERIN 20 MG/100ML
INJECTION INTRAVENOUS AS NEEDED
Status: DISCONTINUED | OUTPATIENT
Start: 2022-10-28 | End: 2022-10-28 | Stop reason: HOSPADM

## 2022-10-28 RX ORDER — DOFETILIDE 0.5 MG/1
500 CAPSULE ORAL 2 TIMES DAILY
Qty: 60 CAPSULE | Refills: 3 | Status: SHIPPED | OUTPATIENT
Start: 2022-10-28 | End: 2022-11-01

## 2022-10-28 RX ORDER — MIDAZOLAM HYDROCHLORIDE 2 MG/2ML
INJECTION, SOLUTION INTRAMUSCULAR; INTRAVENOUS AS NEEDED
Status: DISCONTINUED | OUTPATIENT
Start: 2022-10-28 | End: 2022-10-28 | Stop reason: HOSPADM

## 2022-10-28 RX ORDER — HEPARIN SODIUM 1000 [USP'U]/ML
INJECTION, SOLUTION INTRAVENOUS; SUBCUTANEOUS AS NEEDED
Status: DISCONTINUED | OUTPATIENT
Start: 2022-10-28 | End: 2022-10-28 | Stop reason: HOSPADM

## 2022-10-28 RX ORDER — FENTANYL CITRATE 50 UG/ML
INJECTION, SOLUTION INTRAMUSCULAR; INTRAVENOUS AS NEEDED
Status: DISCONTINUED | OUTPATIENT
Start: 2022-10-28 | End: 2022-10-28 | Stop reason: HOSPADM

## 2022-10-28 RX ORDER — DOFETILIDE 0.5 MG/1
500 CAPSULE ORAL EVERY 12 HOURS SCHEDULED
Status: DISCONTINUED | OUTPATIENT
Start: 2022-10-28 | End: 2022-10-28

## 2022-10-28 RX ORDER — CLOPIDOGREL BISULFATE 75 MG/1
600 TABLET ORAL ONCE
Status: COMPLETED | OUTPATIENT
Start: 2022-10-28 | End: 2022-10-28

## 2022-10-28 RX ORDER — VERAPAMIL HCL 2.5 MG/ML
AMPUL (ML) INTRAVENOUS AS NEEDED
Status: DISCONTINUED | OUTPATIENT
Start: 2022-10-28 | End: 2022-10-28 | Stop reason: HOSPADM

## 2022-10-28 RX ORDER — DOFETILIDE 0.25 MG/1
250 CAPSULE ORAL EVERY 12 HOURS SCHEDULED
Status: DISCONTINUED | OUTPATIENT
Start: 2022-10-28 | End: 2022-10-31

## 2022-10-28 RX ORDER — ASPIRIN 81 MG/1
81 TABLET, CHEWABLE ORAL DAILY
Status: DISCONTINUED | OUTPATIENT
Start: 2022-10-29 | End: 2022-11-03 | Stop reason: HOSPADM

## 2022-10-28 RX ORDER — ASPIRIN 81 MG/1
324 TABLET, CHEWABLE ORAL ONCE
Status: COMPLETED | OUTPATIENT
Start: 2022-10-28 | End: 2022-10-28

## 2022-10-28 RX ORDER — SODIUM CHLORIDE 9 MG/ML
100 INJECTION, SOLUTION INTRAVENOUS CONTINUOUS
Status: DISPENSED | OUTPATIENT
Start: 2022-10-28 | End: 2022-10-28

## 2022-10-28 RX ORDER — SODIUM CHLORIDE 9 MG/ML
125 INJECTION, SOLUTION INTRAVENOUS CONTINUOUS
Status: DISCONTINUED | OUTPATIENT
Start: 2022-10-28 | End: 2022-10-28

## 2022-10-28 RX ORDER — LIDOCAINE HYDROCHLORIDE 10 MG/ML
INJECTION, SOLUTION EPIDURAL; INFILTRATION; INTRACAUDAL; PERINEURAL AS NEEDED
Status: DISCONTINUED | OUTPATIENT
Start: 2022-10-28 | End: 2022-10-28 | Stop reason: HOSPADM

## 2022-10-28 RX ORDER — CLOPIDOGREL BISULFATE 75 MG/1
75 TABLET ORAL DAILY
Status: DISCONTINUED | OUTPATIENT
Start: 2022-10-29 | End: 2022-11-03 | Stop reason: HOSPADM

## 2022-10-28 RX ORDER — LORAZEPAM 2 MG/ML
0.5 INJECTION INTRAMUSCULAR ONCE
Status: DISCONTINUED | OUTPATIENT
Start: 2022-10-28 | End: 2022-10-30

## 2022-10-28 RX ADMIN — METOPROLOL SUCCINATE 50 MG: 50 TABLET, EXTENDED RELEASE ORAL at 20:52

## 2022-10-28 RX ADMIN — SODIUM CHLORIDE 125 ML/HR: 0.9 INJECTION, SOLUTION INTRAVENOUS at 10:11

## 2022-10-28 RX ADMIN — ATORVASTATIN CALCIUM 40 MG: 40 TABLET, FILM COATED ORAL at 17:44

## 2022-10-28 RX ADMIN — CLOPIDOGREL BISULFATE 600 MG: 75 TABLET ORAL at 22:56

## 2022-10-28 RX ADMIN — DOFETILIDE 250 MCG: 0.25 CAPSULE ORAL at 20:52

## 2022-10-28 RX ADMIN — HEPARIN SODIUM 2000 UNITS: 1000 INJECTION INTRAVENOUS; SUBCUTANEOUS at 08:15

## 2022-10-28 RX ADMIN — METOPROLOL SUCCINATE 50 MG: 50 TABLET, EXTENDED RELEASE ORAL at 08:16

## 2022-10-28 RX ADMIN — HEPARIN SODIUM 4000 UNITS: 1000 INJECTION INTRAVENOUS; SUBCUTANEOUS at 01:14

## 2022-10-28 RX ADMIN — ASPIRIN 81 MG CHEWABLE TABLET 324 MG: 81 TABLET CHEWABLE at 10:14

## 2022-10-28 RX ADMIN — TICAGRELOR 180 MG: 90 TABLET ORAL at 11:08

## 2022-10-28 RX ADMIN — SODIUM CHLORIDE 100 ML/HR: 0.9 INJECTION, SOLUTION INTRAVENOUS at 14:26

## 2022-10-28 NOTE — PLAN OF CARE
Problem: Potential for Falls  Goal: Patient will remain free of falls  Description: INTERVENTIONS:  - Educate patient/family on patient safety including physical limitations  - Instruct patient to call for assistance with activity   - Consult OT/PT to assist with strengthening/mobility   - Keep Call bell within reach  - Keep bed low and locked with side rails adjusted as appropriate  - Keep care items and personal belongings within reach  - Initiate and maintain comfort rounds  - Make Fall Risk Sign visible to staff  - Offer Toileting every 2 Hours, in advance of need  - Initiate/Maintain bed and chair alarm  - Obtain necessary fall risk management equipment  - Apply yellow socks and bracelet for high fall risk patients  - Consider moving patient to room near nurses station  Outcome: Progressing     Problem: CARDIOVASCULAR - ADULT  Goal: Maintains optimal cardiac output and hemodynamic stability  Description: INTERVENTIONS:  - Monitor I/O, vital signs and rhythm  - Monitor for S/S and trends of decreased cardiac output  - Administer and titrate ordered vasoactive medications to optimize hemodynamic stability  - Assess quality of pulses, skin color and temperature  - Assess for signs of decreased coronary artery perfusion  - Instruct patient to report change in severity of symptoms  Outcome: Progressing  Goal: Absence of cardiac dysrhythmias or at baseline rhythm  Description: INTERVENTIONS:  - Continuous cardiac monitoring, vital signs, obtain 12 lead EKG if ordered  - Administer antiarrhythmic and heart rate control medications as ordered  - Monitor electrolytes and administer replacement therapy as ordered  Outcome: Progressing

## 2022-10-28 NOTE — PROGRESS NOTES
Pt returned to the unit in stable condition  Pt oriented to self only at this time, wife at bedside  VSS  Neurovascularly intact at this time  R radial site c/d/i

## 2022-10-28 NOTE — ASSESSMENT & PLAN NOTE
Wt Readings from Last 3 Encounters:   10/28/22 108 kg (239 lb)   10/27/22 108 kg (239 lb)   10/26/22 107 kg (235 lb)     Probable chronic diastolic CHF per cardiology note on 10/27  Echocardiogram with grade 1 diastolic dysfunction  Pending repeat echo today  On Toprol, and HCTZ    Monitor for fluid overload

## 2022-10-28 NOTE — PROGRESS NOTES
Pt ambulated on his own, bed alarm sounded and Pt at side of bed with his IV out  Pt AAOx2--unaware of place  New IV inserted, bed alarm on    Reviewed fall risk procedures with patient-- needs further teaching

## 2022-10-28 NOTE — PROGRESS NOTES
[address] [facility]  Progress Note Juhi Appiah 1955, 74 y o  male MRN: [id number]  Unit/Bed#: BE CATH LAB ROOM Encounter: [id number]  Primary Care Provider: German Reed,    Date and time admitted to hospital: 10/27/2022  3:08 PM    * VT (ventricular tachycardia)  Assessment & Plan  · Patient patient has a history of stroke, status post loop recorder in December 2021  · Yesterday while doing physical therapy he fell, seems like he lost consciousness, does not remember the episode  Yesterday he was more active than usually, he was mowing the lawn, walked to physical therapy  · His loop recorder was evaluated by EP as outpatient and they recommended patient to be admitted to [facility] for ischemic evaluation, EP consult, he will likely need defibrillator placement  · On loop recorder interrogation he had NSVT for 47 seconds  · Telemetry no significant events   · Continue Metoprolol succinate 50 mg b i d  which was increased by Cardiology yesterday  · Monitor electrolyte  · Plan for repeat echocardiogram today as well as left heart catheterization    Paroxysmal A-fib ([facility] [address] )  Assessment & Plan  · Seen on loop recorder, on metoprolol and Eliquis  · Patient will possibly undergo procedures  · Start heparin drip - plan to transition back to Eliquis once cleared by Cardiology    Aortic stenosis  Assessment & Plan  · Mild AS on last echo    Probable Chronic diastolic congestive heart failure ([facility] [address] )  Assessment & Plan  Wt Readings from Last 3 Encounters:   10/28/22 108 kg (239 lb)   10/27/22 108 kg (239 lb)   10/26/22 107 kg (235 lb)     Probable chronic diastolic CHF per cardiology note on 10/27  Echocardiogram with grade 1 diastolic dysfunction  Pending repeat echo today  On Toprol, and HCTZ    Monitor for fluid overload        History of cardioembolic cerebrovascular accident (CVA)  Assessment & Plan  · History of cryptogenic stroke in 2020    Dementia without behavioral disturbance ([facility] [address] )  Assessment & Plan  · Noted history of mild cognitive deficits per review of Neurology notes in 2020 post stroke  · Likely vascular  · Patient with period of agitation early this morning resolved spontaneously  · Continue to monitor, and provide supportive care    Dyspnea on exertion  Assessment & Plan  · History of dyspnea on exertion, per wife he snores a lot, he has a history of obstructive sleep apnea not wearing CPAP  · Echo in July showed ejection fraction of 95%, grade 1 diastolic dysfunction, mild AS, mild MR, mild TR, mildly dilated ascending aorta  · Encourage CPAP use    Anemia  Assessment & Plan  · Microcytic anemia  · Check iron panel    Asthma, mild intermittent  Assessment & Plan  · Not maintained on inhalers  · Patient is not using albuterol      VTE Pharmacologic Prophylaxis: VTE Score: 3 Moderate Risk (Score 3-4) - Pharmacological DVT Prophylaxis Ordered: heparin drip  Patient Centered Rounds: I performed bedside rounds with nursing staff today  Discussions with Specialists or Other Care Team Provider:  Cardiology, cm, RN    Education and Discussions with Family / Patient: Updated  (wife) at bedside  Time Spent for Care: 30 minutes  More than 50% of total time spent on counseling and coordination of care as described above  Current Length of Stay: 1 day(s)  Current Patient Status: Inpatient   Certification Statement: The patient will continue to require additional inpatient hospital stay due to Pending cardiac catheterization, and repeat echocardiogram today  He will also need clearance from Cardiology prior to discharge  Discharge Plan: Anticipate discharge in 24-48 hrs to Pending clinical course    Code Status: Level 1 - Full Code    Subjective:   Patient with brief episode of agitation today ripping out IVs per nursing staff    Discussed this with patient the room, he was frustrated about being in the hospital and not knowing much about his clinical course  Discussed in detail with patient, and wife at bedside today about plans for cardiac catheterization likely defibrillator placement  Patient appreciative of this  Currently he is not experiencing any chest pain, shortness of breath, palpitations  Objective:     Vitals:   Temp (24hrs), Av 7 °F (36 5 °C), Min:97 4 °F (36 3 °C), Max:98 5 °F (36 9 °C)    Temp:  [97 4 °F (36 3 °C)-98 5 °F (36 9 °C)] 97 8 °F (36 6 °C)  HR:  [67-80] 67  Resp:  [18-20] 19  BP: (118-165)/() 135/72  SpO2:  [95 %-98 %] 96 %  Body mass index is 32 41 kg/m²  Input and Output Summary (last 24 hours): Intake/Output Summary (Last 24 hours) at 10/28/2022 1108  Last data filed at 10/28/2022 0900  Gross per 24 hour   Intake 132 72 ml   Output 250 ml   Net -117 28 ml       Physical Exam:   Physical Exam  Vitals and nursing note reviewed  Constitutional:       General: He is not in acute distress  Appearance: He is obese  HENT:      Head: Normocephalic and atraumatic  Eyes:      General:         Right eye: No discharge  Left eye: No discharge  Cardiovascular:      Rate and Rhythm: Normal rate and regular rhythm  Heart sounds: Murmur heard  Pulmonary:      Effort: No respiratory distress  Breath sounds: Normal breath sounds  Abdominal:      General: Abdomen is flat  There is no distension  Palpations: Abdomen is soft  Skin:     General: Skin is warm and dry  Coloration: Skin is not jaundiced  Neurological:      General: No focal deficit present  Mental Status: He is alert and oriented to person, place, and time     Psychiatric:         Mood and Affect: Mood normal           Additional Data:     Labs:  Results from last 7 days   Lab Units 10/28/22  0712 10/27/22  1557   WBC Thousand/uL 6 40 5 57   HEMOGLOBIN g/dL 10 3* 9 6*   HEMATOCRIT % 36 1* 33 6*   PLATELETS Thousands/uL 302 299   NEUTROS PCT %  --  62   LYMPHS PCT %  --  20   MONOS PCT %  --  13*   EOS PCT % --  3     Results from last 7 days   Lab Units 10/28/22  0712 10/27/22  1557   SODIUM mmol/L 142 141   POTASSIUM mmol/L 4 0 3 5   CHLORIDE mmol/L 111* 112*   CO2 mmol/L 24 25   BUN mg/dL 18 22   CREATININE mg/dL 0 97 1 02   ANION GAP mmol/L 7 4   CALCIUM mg/dL 8 6 8 7   ALBUMIN g/dL  --  3 4*   TOTAL BILIRUBIN mg/dL  --  0 70   ALK PHOS U/L  --  99   ALT U/L  --  29   AST U/L  --  17   GLUCOSE RANDOM mg/dL 113 110     Results from last 7 days   Lab Units 10/27/22  1819   INR  0 91     Results from last 7 days   Lab Units 10/26/22  1640   POC GLUCOSE mg/dl 115               Lines/Drains:  Invasive Devices  Report    Peripheral Intravenous Line  Duration           Peripheral IV 10/28/22 Right Antecubital <1 day                  Telemetry:  Telemetry Orders (From admission, onward)             48 Hour Telemetry Monitoring  Continuous x 48 hours        References:    Telemetry Guidelines   Question:  Reason for 48 Hour Telemetry  Answer:  Arrhythmias Requiring Medical Therapy (eg  SVT, Vtach/fib, Bradycardia, Uncontrolled A-fib)                 Telemetry Reviewed: Normal Sinus Rhythm  Indication for Continued Telemetry Use: Awaiting PCI/EP Study/CABG             Imaging: No pertinent imaging reviewed      Recent Cultures (last 7 days):         Last 24 Hours Medication List:   Current Facility-Administered Medications   Medication Dose Route Frequency Provider Last Rate   • acetaminophen  650 mg Oral Q6H PRN Nikia Cabral MD     • atorvastatin  40 mg Oral QPM Nikia Cabral MD     • heparin (porcine)  3-20 Units/kg/hr (Order-Specific) Intravenous Titrated Nikia Cabral MD 15 1 Units/kg/hr (10/28/22 0114)   • heparin (porcine)  2,000 Units Intravenous Q1H PRN Nikia Cabral MD     • heparin (porcine)  4,000 Units Intravenous Q1H PRN Nikia Cabral MD     • LORazepam  0 5 mg Intravenous Once Gurinder Cruz PA-C     • metoprolol succinate  50 mg Oral BID Nikia Cabral MD     • sodium chloride  125 mL/hr Intravenous Continuous Jennyfer Reed  mL/hr (10/28/22 1011)        Today, Patient Was Seen By: Ester Taveras    **Please Note: This note may have been constructed using a voice recognition system  **

## 2022-10-28 NOTE — PROGRESS NOTES
Assumed care of pt at 0700  Pt with new onset confusion, agitated, stating "I am being held hostage here" Oriented to self and place only  Not easily re-oriented  Pt attempting to call wife, friends, family  Pt stating "I want to get out of here"  RN explained to pt the plan of care  Pt placed in recliner with all belongings within reach, chair alarm on and audible

## 2022-10-28 NOTE — UTILIZATION REVIEW
Initial Clinical Review    Admission: Date/Time/Statement:   Admission Orders (From admission, onward)     Ordered        10/27/22 1559  INPATIENT ADMISSION  Once                      Orders Placed This Encounter   Procedures   • INPATIENT ADMISSION     Standing Status:   Standing     Number of Occurrences:   1     Order Specific Question:   Level of Care     Answer:   Med Surg [16]     Order Specific Question:   Estimated length of stay     Answer:   More than 2 Midnights     Order Specific Question:   Certification     Answer:   I certify that inpatient services are medically necessary for this patient for a duration of greater than two midnights  See H&P and MD Progress Notes for additional information about the patient's course of treatment  ED Arrival Information     Expected   10/27/2022 13:59    Arrival   10/27/2022 14:28    Acuity   Urgent            Means of arrival   Walk-In    Escorted by   Family Member    Service   Hospitalist    Admission type   Emergency            Arrival complaint   sustained vt           Chief Complaint   Patient presents with   • Heart Problem     Pt sent by electrophysiology  Pt has loop recorder which showed Vtach yesterday  Denies cp, sob, dizziness  Had an episode of dizziness with fall yesterday around the time of the Vtach       Initial Presentation: 74 y o  male presents to ED  At  EP  Recommendation  PMH  Is  AS, Asthma,  HTN, stroke, PAF, S/P loop recorder  2021  Was  More  Physically  Active the day prior to admission than usual  Was at PT and fell, does not remember incident  Seen in ED, ct normal and referred to cardiology and seen prior to ED  At cardiology office, loop recorder showed 47 second  NSV tach   And referred to ED  Admit  Ip with  V Tach, Dyspnea on exertion and plan is  Monitor labs, tele, IV heparin,  And continue home meds  Date:   10/28        Day 2:   Cardiology consult  V tach on loop recorder, possibly ischemia    Plan coronary angiogram, repeat  TTE  Brief episode of agitation, ripped out  IV  States  He is frustrated in hospital, doesn't  knwo  Much about his condition  Denies chest pain  Situation explained in detail  2 DE  Left Ventricle: Left ventricular cavity size is normal  Wall thickness is moderately increased  The left ventricular ejection fraction is 60%  Systolic function is normal  Wall motion is normal   •  Aortic Valve: The leaflets are moderately thickened  The leaflets are moderately calcified  There is moderately reduced mobility  There is mild to moderate stenosis  The aortic valve peak velocity is 2 48 m/s  The aortic valve peak gradient is 25 mmHg  The aortic valve mean gradient is 17 mmHg  The dimensionless velocity index is 0 49  •  Right Ventricle: Right ventricular cavity size is mildly dilated  Systolic function is normal   •  Left Atrium: The atrium is mildly dilated  •  Right Atrium: The atrium is mildly dilated  •  Mitral Valve: There is mild regurgitation  •  Aorta: The sinus of Valsalva is moderately dilated  The ascending aorta is moderately dilated  The aortic root is 4 50 cm  The ascending aorta is 4 6 cm  Left heart cath  · 1st Diag lesion is 90% stenosed  · Ramus lesion is 90% stenosed  · Mid Cx lesion is 60% stenosed  · RPDA lesion is 80% stenosed  Significant disease of D1 and the ramus intermedius  Both stented successfully  An intermediate stenosis of the circumflex was not flow-significant by DFR (DFR=0 99)  There was a stenosis of the mid PDA; the vessel was too small and supplied too small a region of myocardium to warrant intervention  There was a small aneurysm of the mid LAD  Plan: DAPT for 1 week; then plavix + NOAC; statin, beta blocker   EP evaluation                ED Triage Vitals [10/27/22 1435]   Temperature Pulse Respirations Blood Pressure SpO2   97 5 °F (36 4 °C) 78 18 (!) 157/108 96 %      Temp Source Heart Rate Source Patient Position - Orthostatic VS BP Location FiO2 (%)   Tympanic Monitor Sitting Left arm --      Pain Score       No Pain          Wt Readings from Last 1 Encounters:   10/28/22 108 kg (239 lb)     Additional Vital Signs:    98 °F (36 7 °C) 70 18 155/98 117 96 % -- -- -- Lying   10/28/22 12:45:25 -- -- -- -- -- 98 % 28 2 L/min Nasal cannula --   10/28/22 1114 -- 54 Abnormal  -- -- -- -- -- -- -- --   Comment rows:   OBSERV: Received by jose l with central transporter on telemetry for elective study  Explanations given regarding planned procedure and markie is receptive   Intermitent gaps in memory noted by is oriented in all spheres with direcet questioning     Withotu current complaints   Cooperative and appropraite   at 10/28/22 1114   10/28/22 1015 -- 67 -- 135/72 -- -- -- -- -- --   10/28/22 07:27:26 97 8 °F (36 6 °C) 67 19 135/72 93 96 % -- -- None (Room air) Sitting   10/28/22 0330 -- -- -- 158/101 Abnormal  -- -- -- -- -- --   10/28/22 03:23:23 -- 80 -- 163/120 Abnormal  134 95 % -- -- -- --   10/28/22 03:22:22 -- 73 -- 165/119 Abnormal  134 97 % -- -- -- --   10/27/22 23:33:33 97 5 °F (36 4 °C) 67 18 123/71 88 98 % -- -- -- --   10/27/22 19:43:11 98 5 °F (36 9 °C) 74 20 118/72 87 96 % -- -- -- --   10/27/22 17:56:27 97 4 °F (36 3 °C) Abnormal  72 19 146/104 Abnormal  118 98 % -- -- -- --   10/27/22 1600 -- 74 18 131/77 100 98 % -- -- None (Room air) --   10/27/22 1435 97 5 °F (36 4 °C) 78 18 157/108 Abnormal  -- 96 % -- -- None (Room air) Sitting       Pertinent Labs/Diagnostic Test Results:   EKG  PVC's  Left axis deviation    Normal QTc    No signs of ischemia  MRI cardiac  w wo contrast    (Results Pending)         Results from last 7 days   Lab Units 10/28/22  0712 10/27/22  1557 10/26/22  1711   WBC Thousand/uL 6 40 5 57 7 49   HEMOGLOBIN g/dL 10 3* 9 6* 10 2*   HEMATOCRIT % 36 1* 33 6* 34 9*   PLATELETS Thousands/uL 302 299 297   NEUTROS ABS Thousands/µL  --  3 49 5 50         Results from last 7 days   Lab Units 10/28/22  0712 10/27/22  1557 10/26/22  1711   SODIUM mmol/L 142 141 144   POTASSIUM mmol/L 4 0 3 5 3 5   CHLORIDE mmol/L 111* 112* 108   CO2 mmol/L 24 25 24   ANION GAP mmol/L 7 4 12   BUN mg/dL 18 22 24   CREATININE mg/dL 0 97 1 02 1 11   EGFR ml/min/1 73sq m 81 76 69   CALCIUM mg/dL 8 6 8 7 8 8   MAGNESIUM mg/dL 2 2 2 3  --      Results from last 7 days   Lab Units 10/28/22  0824 10/27/22  1557 10/26/22  1711   AST U/L  --  17 21   ALT U/L  --  29 27   ALK PHOS U/L  --  99 110   TOTAL PROTEIN g/dL  --  6 3* 6 5   ALBUMIN g/dL  --  3 4* 3 7   TOTAL BILIRUBIN mg/dL  --  0 70 0 69   AMMONIA umol/L 16  --   --      Results from last 7 days   Lab Units 10/26/22  1640   POC GLUCOSE mg/dl 115     Results from last 7 days   Lab Units 10/28/22  0712 10/27/22  1557 10/26/22  1711   GLUCOSE RANDOM mg/dL 113 110 109           Results from last 7 days   Lab Units 10/27/22  1819 10/27/22  1557 10/26/22  1918 10/26/22  1711   HS TNI 0HR ng/L  --  29  --  60*   HS TNI 2HR ng/L 29  --  69*  --    HSTNI D2 ng/L 0  --  9  --    HS TNI 4HR ng/L 29  --   --   --    HSTNI D4 ng/L 0  --   --   --          Results from last 7 days   Lab Units 10/28/22  0712 10/28/22  0026 10/27/22  1819   PROTIME seconds  --   --  12 5   INR   --   --  0 91   PTT seconds 54* 39* 21*     Results from last 7 days   Lab Units 10/27/22  1557   TSH 3RD GENERATON uIU/mL 1 030                         Results from last 7 days   Lab Units 10/28/22  0824   FERRITIN ng/mL 11                         Results from last 7 days   Lab Units 10/26/22  1807   CLARITY UA  Slightly Cloudy   COLOR UA  [place]   SPEC GRAV UA  >=1 030   PH UA  6 0   GLUCOSE UA mg/dl Negative   KETONES UA mg/dl Trace*   BLOOD UA  Negative   PROTEIN UA mg/dl 30 (1+)*   NITRITE UA  Negative   BILIRUBIN UA  Small*   UROBILINOGEN UA E U /dl 1 0   LEUKOCYTES UA  Negative   WBC UA /hpf 1-2   RBC UA /hpf 2-4   BACTERIA UA /hpf Occasional   EPITHELIAL CELLS WET PREP /hpf Occasional   MUCUS THREADS  Innumerable* Present on Admission:  • Aortic stenosis  • Asthma, mild intermittent  • Paroxysmal A-fib (HCC)  • Anemia  • Dementia without behavioral disturbance (HCC)      Admitting Diagnosis: Sustained VT (ventricular tachycardia) [I47 20]  Heart problem [I51 9]  Age/Sex: 74 y o  male  Admission Orders:  Scheduled Medications:  [START ON 10/29/2022] aspirin, 81 mg, Oral, Daily  atorvastatin, 40 mg, Oral, QPM  clopidogrel, 600 mg, Oral, Once  [START ON 10/29/2022] clopidogrel, 75 mg, Oral, Daily  LORazepam, 0 5 mg, Intravenous, Once  metoprolol succinate, 50 mg, Oral, BID      Continuous IV Infusions:  sodium chloride, 100 mL/hr, Intravenous, Continuous  IV  Heparin - d/c  10/28       PRN Meds:  acetaminophen, 650 mg, Oral, Q6H PRN        IP CONSULT TO ELECTROPHYSIOLOGY  IP CONSULT TO CARDIOLOGY    Network Utilization Review Department  ATTENTION: Please call with any questions or concerns to [phone] and carefully listen to the prompts so that you are directed to the right person  All voicemails are confidential   Oskar Lyles all requests for admission clinical reviews, approved or denied determinations and any other requests to dedicated fax number below belonging to the campus where the patient is receiving treatment   List of dedicated fax numbers for the Facilities:  [address] DENIALS (Administrative/Medical Necessity) [phone]   [address] (Maternity/NICU/Pediatrics) [phone]   [address] Claudia Grossman [phone]   [facility] [phone]   [facility] [phone]   [facility] [address] - [address] [phone]

## 2022-10-28 NOTE — CONSULTS
Consultation - Electrophysiology Cardiology (EP)   Barbara Chin 74 y o  male MRN: [id number]  Unit/Bed#: -01 Encounter: [id number]      Inpatient consult to Electrophysiology  Consult performed by: Edu Dixon MD  Consult ordered by: Korey Hazel DO        PCP: Zunilda Main DO  Outpatient Cardiologist: Julia Singh    Assessment/Plan     Ventricular Tachycardia on loop recorder (fast ventricular rate with consistent cycle length makes this most likely reentry mechanism for his VT)  This is possibly due to ischemia/ or scar especially with his risk factors for CAD and recent complaints of typical sounding angina at home  The patient also had moderate coronary calcifications on his CT chest in the past about 2 years ago    -would repeat a limited TTE for LV function   -plan for coronary angiogram [facility] today   -patient has some fractionation on his ecg and his VT is most likely anterolateral mitral annulus and he should have a cardiac MRI for evaluation for scar   -okay to continue metoprolol 50 XL BID   -will try to get insurance approval for tikosyn initiation in the hospital  He will need continue hospitalization with QT monitoring to start this    -most likely plan for secondary prevention ICD in the setting of VT/VF with syncope early next week     Case discussed and reviewed with Dr Woody Doing pending attending addendum  Thank you for involving us in the care of your patient  Edu Dixon MD  Cardiology Fellow   PGY-5    ==========================================================================================    History of Present Illness   Physician Requesting Consult: Ed Taylor MD  Reason for Consult / Principal Problem: VT    HPI: Barbara Chin is a 74y o  year old male stroke mri id suggested 2 small acute areas of infarct   Also possible small hemorrhage versus cavernoma, paroxysmal atrial fibrillation low burden, heart failure with preserved ejection fraction, obesity, has alcohol use disorder, essential hypertension, mild aortic stenosis, mild ascending aortic dilation presenting with dizziness and syncopal episode at physical therapy found to have ventricular tachycardia on loop recorder  The patient states recently he was cutting his grass and having chest discomfort that would improve on rest   Subsequently the same day he went to physical therapy and felt a little dizzy and had a syncopal episode  HE Does not remember passing out and he was also quite confused after waking up from the episode  The patient  went to the emergency room but left against medical advice where he then had discussions with his cardiologist outpatient on the seriousness of his condition  The patient was recommended to come to B evaluation for his ventricular tachycardia  Overnight he had no issues and no complaints  Denies fevers/chills, nausea/vomiting, abdominal pain, diarrhea, cough, chest pain, shortness of breath, PND, orthopnea, presyncopal symptoms, syncopal episodes  On telemetry is in normal sinus rhythm with no ectopy  I discussed the ventricular tachycardia potential causes and the further evaluation and hospital stay that he needs  His wife was at bedside and the patient is agreeable to the plan  TELEMETRY: personally reviewed by myself David THACKER in the 60s    ECG:  Sinus rhythm with LVH      DEVICE INTERROGATION      HOLTER  No results found for this or any previous visit  Review of Systems  ROS as noted above in HPI         Historical Information   Past Medical History:   Diagnosis Date   • Arthropathy of knee     last assessed 8/19/15   • Bacterial pneumonia 02/05/2007    last assessed 2/5/7   • Brain atrophy [facility])    • Colon polyp    • CPAP (continuous positive airway pressure) dependence    • Disorder of male genital organ 02/12/2008    last assessed 2/12/08   • ED (erectile dysfunction) of organic origin     last assessed 2/13/17    • History of hypertension    • Seasonal allergies    • Situational anxiety     resolved 3/16/17    • Sleep apnea    • Stroke [facility])     09/2020 TIA   • Tinnitus    • Wears hearing aid     bilateral     Past Surgical History:   Procedure Laterality Date   • CARDIAC ELECTROPHYSIOLOGY PROCEDURE N/A 12/22/2021    Procedure: Cardiac Loop Recorder Implant;  Surgeon: Davion Mann DO;  Location: [place] [address] CATH LAB; Service: Cardiology   • COLONOSCOPY      x3-w/polyps   • HERNIA REPAIR      umbilical,hemal inguinal   • KNEE ARTHROSCOPY Left     meniscus   • DC COLONOSCOPY FLX DX W/COLLJ SPEC WHEN PFRMD N/A 5/7/2018    Procedure: COLONOSCOPY;  Surgeon: Wilbur Lane MD;  Location: [facility] GI LAB;   Service: Gastroenterology     Social History     Substance and Sexual Activity   Alcohol Use Yes   • Alcohol/week: 14 0 standard drinks   • Types: 14 Cans of beer per week     Social History     Substance and Sexual Activity   Drug Use No     Social History     Tobacco Use   Smoking Status Passive Smoke Exposure - Never Smoker   Smokeless Tobacco Never Used     Family History: non-contributory    Meds/Allergies   Hospital Medications:   Current Facility-Administered Medications   Medication Dose Route Frequency   • acetaminophen (TYLENOL) tablet 650 mg  650 mg Oral Q6H PRN   • atorvastatin (LIPITOR) tablet 40 mg  40 mg Oral QPM   • heparin (porcine) 25,000 units in 0 45% NaCl 250 mL infusion (premix)  3-20 Units/kg/hr (Order-Specific) Intravenous Titrated   • heparin (porcine) injection 2,000 Units  2,000 Units Intravenous Q1H PRN   • heparin (porcine) injection 4,000 Units  4,000 Units Intravenous Q1H PRN   • metoprolol succinate (TOPROL-XL) 24 hr tablet 50 mg  50 mg Oral BID     Home Medications:   Medications Prior to Admission   Medication   • apixaban (Eliquis) 5 mg   • atorvastatin (LIPITOR) 40 mg tablet   • metoprolol succinate (TOPROL-XL) 50 mg 24 hr tablet   • potassium chloride (MICRO-K) 10 MEQ CR capsule   • albuterol (Proventil HFA) 90 mcg/act inhaler • ALPRAZolam (XANAX) 0 25 mg tablet   • fexofenadine (ALLEGRA) 180 MG tablet   • sildenafil (VIAGRA) 100 mg tablet       Allergies   Allergen Reactions   • Pollen Extract Sneezing     Reaction Date: 18Sep2006;    • Shellfish-Derived Products - Food Allergy Other (See Comments)     Eye swelling   • Iodine Solution [Povidone Iodine] Rash     Eyes swell       Objective   Vitals: Blood pressure 135/72, pulse 67, temperature 97 8 °F (36 6 °C), resp  rate 19, height 6' (1 829 m), weight 109 kg (239 lb 11 2 oz), SpO2 96 %  Orthostatic Blood Pressures    Flowsheet Row Most Recent Value   Blood Pressure 135/72 filed at 10/28/2022 1624   Patient Position - Orthostatic VS Sitting filed at 10/27/2022 1435            Intake/Output Summary (Last 24 hours) at 10/28/2022 0576  Last data filed at 10/28/2022 0601  Gross per 24 hour   Intake 132 72 ml   Output 250 ml   Net -117 28 ml       Invasive Devices  Report    Peripheral Intravenous Line  Duration           Peripheral IV 10/28/22 Right Antecubital <1 day                Physical Exam    GEN: Matteo Randall appears well, alert and oriented x 3, pleasant and cooperative, slow to respond   HEENT:  Normocephalic, atraumatic, anicteric, moist mucous membranes  NECK: No JVD or carotid bruits   HEART: regular rhythm, regular rate, normal S1 and S2, no murmurs, clicks, gallops or rubs   LUNGS: Clear to auscultation bilaterally; no wheezes, rales, or rhonchi; respiration nonlabored   ABDOMEN:  Normoactive bowel sounds, soft, no tenderness, no distention  EXTREMITIES: peripheral pulses palpable; no edema  NEURO: no gross focal findings; cranial nerves grossly intact   SKIN:  Dry, intact, warm to touch    Lab Results: I have personally reviewed pertinent lab results      Results from last 7 days   Lab Units 10/27/22  2103 10/27/22  1819 10/27/22  1557   HS TNI 0HR ng/L  --   --  29   HS TNI 2HR ng/L  --  29  --    HS TNI 4HR ng/L  --  29  --    HS TNI RAND ng/L 27*  --   -- Results from last 7 days   Lab Units 10/28/22  0712 10/27/22  1557 10/26/22  1711   POTASSIUM mmol/L 4 0 3 5 3 5   CO2 mmol/L 24 25 24   CHLORIDE mmol/L 111* 112* 108   BUN mg/dL 18 22 24   CREATININE mg/dL 0 97 1 02 1 11     Results from last 7 days   Lab Units 10/28/22  0712 10/27/22  1557 10/26/22  1711   HEMOGLOBIN g/dL 10 3* 9 6* 10 2*   HEMATOCRIT % 36 1* 33 6* 34 9*   PLATELETS Thousands/uL 302 299 297         Results from last 7 days   Lab Units 10/28/22  0712 10/28/22  0026 10/27/22  1819   INR   --   --  0 91   PTT seconds 54* 39* 21*       Imaging: I have personally reviewed pertinent reports  Previous STRESS TEST:  No results found for this or any previous visit  No results found for this or any previous visit  No results found for this or any previous visit  Previous Cath/PCI:  No results found for this or any previous visit  No results found for this or any previous visit  No results found for this or any previous visit  ECHO:  Results for orders placed during the hospital encounter of 20    Echo complete with contrast if indicated    Narrative  [facility] [address]  [facility] [address] [facility]  [phone]    Transthoracic Echocardiogram  2D, M-mode, Doppler, and Color Doppler    Study date:  22-Sep-2020    Patient: Chuck Galvez  MR number: [id number]  Account number: [de-identified]  : 1955  Age: 60 years  Gender: Male  Status: Inpatient  Location: Bedside  Height: 72 in  Weight: 211 lb  BP: 152/ 96 mmHg    Indications: TIA, Cardiac Murmur    Diagnoses: G45 9 - Transient cerebral ischemic attack, unspecified    Sonographer:  Paramjit Hilliard RDCS  Primary Physician:  Yasmany Trimble DO  Referring Physician:  Cristina Coley MD  Group:  [facility]  Interpreting Physician:  Ghazal Cadet MD    SUMMARY    LEFT VENTRICLE:  Systolic function was normal  Ejection fraction was estimated to be 60 %    There were no regional wall motion abnormalities  There was moderate concentric hypertrophy  LEFT ATRIUM:  The atrium was dilated  RIGHT ATRIUM:  The atrium was dilated  MITRAL VALVE:  There was mild regurgitation  AORTIC VALVE:  Transaortic velocity was increased due to valvular stenosis  There was mild stenosis  Valve mean gradient was 15 mmHg  AORTA:  There was dilatation of the ascending aorta, with a maximum measured diameter of 4 3 cm  HISTORY: PRIOR HISTORY: HTN, HLD, Sleep Apnea, CPAP    PROCEDURE: The procedure was performed at the bedside  This was a routine study  The transthoracic approach was used  The study included complete 2D imaging, M-mode, complete spectral Doppler, and color Doppler  The heart rate was 70 bpm,  at the start of the study  Images were obtained from the parasternal, apical, subcostal, and suprasternal notch acoustic windows  Image quality was adequate  LEFT VENTRICLE: Size was normal  Systolic function was normal  Ejection fraction was estimated to be 60 %  There were no regional wall motion abnormalities  Wall thickness was mildly increased  There was moderate concentric hypertrophy  DOPPLER: The transmitral flow pattern was normal  Left ventricular diastolic function parameters were normal     RIGHT VENTRICLE: The size was normal  Systolic function was normal  Wall thickness was normal     LEFT ATRIUM: The atrium was dilated  RIGHT ATRIUM: The atrium was dilated  MITRAL VALVE: Valve structure was normal  There was normal leaflet separation  DOPPLER: The transmitral velocity was within the normal range  There was no evidence for stenosis  There was mild regurgitation  AORTIC VALVE: The valve was trileaflet  Leaflets exhibited mild calcification and mildly reduced cuspal separation  DOPPLER: Transaortic velocity was increased due to valvular stenosis  There was mild stenosis  There was no significant  regurgitation      TRICUSPID VALVE: The valve structure was normal  There was normal leaflet separation  DOPPLER: The transtricuspid velocity was within the normal range  There was no evidence for stenosis  There was trace regurgitation  PULMONIC VALVE: Leaflets exhibited normal thickness, no calcification, and normal cuspal separation  DOPPLER: The transpulmonic velocity was within the normal range  There was trace regurgitation  PERICARDIUM: There was no pericardial effusion  The pericardium was normal in appearance  AORTA: The root exhibited normal size  There was dilatation of the ascending aorta, with a maximum measured diameter of 4 3 cm  SYSTEMIC VEINS: IVC: The inferior vena cava was normal in size  Respirophasic changes were normal     MEASUREMENT TABLES    DOPPLER MEASUREMENTS  Aortic valve   (Reference normals)  Peak jassi   250 cm/s   (--)  Peak gradient   25 mmHg   (--)  Mean gradient   15 mmHg   (--)    SYSTEM MEASUREMENT TABLES    2D  %FS: 32 84 %  Ao Diam: 3 98 cm  EDV(Teich): 162 66 ml  EF(Teich): 60 58 %  ESV(Teich): 64 11 ml  IVSd: 1 35 cm  LA Area: 28 26 cm2  LA Diam: 4 24 cm  LVEDV MOD A4C: 148 4 ml  LVEF MOD A4C: 38 21 %  LVESV MOD A4C: 91 69 ml  LVIDd: 5 74 cm  LVIDs: 3 85 cm  LVLd A4C: 8 56 cm  LVLs A4C: 7 84 cm  LVOT Diam: 2 66 cm  LVPWd: 1 21 cm  RA Area: 21 9 cm2  RVIDd: 4 18 cm  SV MOD A4C: 56 71 ml  SV(Teich): 98 55 ml    CW  AV Env  Ti: 299 88 ms  AV VTI: 51 93 cm  AV Vmax: 2 42 m/s  AV Vmean: 1 73 m/s  AV maxP 41 mmHg  AV meanP 59 mmHg    MM  TAPSE: 1 87 cm    PW  ARCELIA (VTI): 1 88 cm2  ARCELIA Vmax: 1 91 cm2  E': 0 09 m/s  E/E': 7 65  LVOT Env  Ti: 295 27 ms  LVOT VTI: 17 63 cm  LVOT Vmax: 0 83 m/s  LVOT Vmean: 0 6 m/s  LVOT maxP 77 mmHg  LVOT meanP 64 mmHg  LVSV Dopp: 97 67 ml  MV A Jassi: 0 64 m/s  MV Dec Hutchinson: 3 4 m/s2  MV DecT: 192 4 ms  MV E Jassi: 0 65 m/s  MV E/A Ratio: 1 03  MV PHT: 55 8 ms  MVA By PHT: 3 94 cm2    [employer] Accredited Echocardiography Laboratory    Prepared and electronically signed by    Juno Lara MD  Signed 22-Sep-2020 15:19:26    Results for orders placed during the hospital encounter of 22    Echo complete w/ contrast if indicated    Interpretation Summary  •  Left Ventricle: Left ventricular cavity size is normal  Wall thickness is moderately increased  The left ventricular ejection fraction is 55%  Systolic function is normal  Wall motion is normal  Diastolic function is mildly abnormal, consistent with grade I (abnormal) relaxation  •  Right Ventricle: Right ventricular cavity size is mildly dilated  •  Left Atrium: The atrium is mildly dilated  •  Right Atrium: The atrium is mildly dilated  •  Aortic Valve: The aortic valve is trileaflet  The leaflets are not thickened  The leaflets are moderately calcified  There is mildly reduced mobility  There is mild stenosis  •  Mitral Valve: There is mild annular calcification  There is mild regurgitation  •  Tricuspid Valve: There is mild regurgitation  •  Pulmonic Valve: There is mild regurgitation  •  Aorta: The aortic root is normal in size  The ascending aorta is mildly dilated        JOSE ELIAS:  Results for orders placed during the hospital encounter of 21    JOSE ELIAS    Narrative  [facility] [address]  [address] [place] [address]  [phone]    Transesophageal Echocardiogram  2D, Doppler, and Color Doppler    Study date:  2021    Patient: Emily Moeller  MR number: [id number]  Account number: [de-identified]  : 1955  Age: 63 years  Gender: Male  Status: Outpatient  Location: Cath lab  Height: 72 in  Weight: 222 6 lb  BP: 30170/ 92 mmHg    Indications: CVA    Diagnoses: I63 9 - Cerebral infarction, unspecified    Sonographer:  CARLOS Sanchez  Primary Physician:  Pee Rooney DO  Referring Physician:  Romy Khan MD  Group:  [facility]  Interpreting Physician:  Lakshmi Hicks MD    SUMMARY    LEFT VENTRICLE:  Size was normal   Ejection fraction was estimated in the range of 50 % to 60 %  Wall thickness was increased  LEFT ATRIUM:  The atrium was dilated  ATRIAL SEPTUM:  There was a trace left-to-right shunt  This is within normal limits for the patient's age  No major shunting to suggest patent ASD/significant PFO    RIGHT ATRIUM:  The atrium was dilated  MITRAL VALVE:  There was mild to moderate regurgitation  AORTIC VALVE:  There was mild stenosis  AORTA:  **Mild diffuse atheroma is noted of the transverse and distal aorta**  Upper normal ascending arota measurement of 3 8-4 1cm    HISTORY: PRIOR HISTORY: HTN,Sleep apnea,stroke  PROCEDURE: The procedure was performed in the catheterization laboratory  This was a routine study  The risks and alternatives of the procedure were explained to the patient and informed consent was obtained  The transesophageal approach  was used  The study included complete 2D imaging, limited spectral Doppler, and color Doppler  The heart rate was 76 bpm, at the start of the study  An adult omniplane probe was inserted by the attending cardiologist  Intubated with ease  One intubation attempt(s)  There was no blood detected on the probe  Image quality was adequate  LEFT VENTRICLE: Size was normal  Ejection fraction was estimated in the range of 50 % to 60 %  Wall thickness was increased  RIGHT VENTRICLE: The size was normal  Systolic function was normal     LEFT ATRIUM: The atrium was dilated  APPENDAGE: The size was normal  No thrombus was identified  ATRIAL SEPTUM: There was a trace left-to-right shunt  This is within normal limits for the patient's age  No major shunting to suggest patent ASD/significant PFO    RIGHT ATRIUM: The atrium was dilated  MITRAL VALVE: There was mild thickening  DOPPLER: There was mild to moderate regurgitation  AORTIC VALVE: The valve was trileaflet  Leaflets exhibited mildly increased thickness, mild to moderate calcification, and sclerosis   DOPPLER: There was mild stenosis  AORTA: **Mild diffuse atheroma is noted of the transverse and distal aorta** Upper normal ascending arota measurement of 3 8-4 1cm    SYSTEM MEASUREMENT TABLES    2D  Ao Diam: 3 88 cm    [facility] Accredited Echocardiography Laboratory    Prepared and electronically signed by    Domo Khan MD  Signed 16-Jul-2021 17:07:29    No results found for this or any previous visit  CMR:  No results found for this or any previous visit  No results found for this or any previous visit  No results found for this or any previous visit  HOLTER  No results found for this or any previous visit  No results found for this or any previous visit  Anticoagulation VTE Prophylaxis: Heparin dvt ppx      Counseling / Coordination of Care  Total floor / unit time spent today 45 minutes minutes  Greater than 50% of total time was spent with the patient and / or family counseling and / or coordination of care  A description of the counseling / coordination of care:          Epic/ Allscripts/Care Everywhere records reviewed:     ** Please Note: Fluency DirectDictation voice to text software may have been used in the creation of this document   **

## 2022-10-28 NOTE — ASSESSMENT & PLAN NOTE
· Noted history of mild cognitive deficits per review of Neurology notes in 2020 post stroke    · Likely vascular  · Patient with period of agitation early this morning resolved spontaneously  · Continue to monitor, and provide supportive care

## 2022-10-29 LAB
ANION GAP SERPL CALCULATED.3IONS-SCNC: 6 MMOL/L (ref 4–13)
ATRIAL RATE: 66 BPM
ATRIAL RATE: 67 BPM
BUN SERPL-MCNC: 14 MG/DL (ref 5–25)
CALCIUM SERPL-MCNC: 8.4 MG/DL (ref 8.3–10.1)
CHLORIDE SERPL-SCNC: 112 MMOL/L (ref 96–108)
CHOLEST SERPL-MCNC: 80 MG/DL
CO2 SERPL-SCNC: 22 MMOL/L (ref 21–32)
CREAT SERPL-MCNC: 0.88 MG/DL (ref 0.6–1.3)
ERYTHROCYTE [DISTWIDTH] IN BLOOD BY AUTOMATED COUNT: 15 % (ref 11.6–15.1)
GFR SERPL CREATININE-BSD FRML MDRD: 90 ML/MIN/1.73SQ M
GLUCOSE SERPL-MCNC: 108 MG/DL (ref 65–140)
HCT VFR BLD AUTO: 35.4 % (ref 36.5–49.3)
HDLC SERPL-MCNC: 27 MG/DL
HGB BLD-MCNC: 10.3 G/DL (ref 12–17)
KCT BLD-ACNC: 250 SEC (ref 89–137)
KCT BLD-ACNC: 270 SEC (ref 89–137)
LDLC SERPL CALC-MCNC: 34 MG/DL (ref 0–100)
LDLC SERPL DIRECT ASSAY-MCNC: 47 MG/DL (ref 0–100)
MAGNESIUM SERPL-MCNC: 2.3 MG/DL (ref 1.6–2.6)
MCH RBC QN AUTO: 22.9 PG (ref 26.8–34.3)
MCHC RBC AUTO-ENTMCNC: 29.1 G/DL (ref 31.4–37.4)
MCV RBC AUTO: 79 FL (ref 82–98)
P AXIS: 23 DEGREES
P AXIS: 23 DEGREES
PLATELET # BLD AUTO: 302 THOUSANDS/UL (ref 149–390)
PMV BLD AUTO: 8.9 FL (ref 8.9–12.7)
POTASSIUM SERPL-SCNC: 3.7 MMOL/L (ref 3.5–5.3)
PR INTERVAL: 156 MS
PR INTERVAL: 162 MS
QRS AXIS: -4 DEGREES
QRS AXIS: -5 DEGREES
QRSD INTERVAL: 92 MS
QRSD INTERVAL: 94 MS
QT INTERVAL: 432 MS
QT INTERVAL: 446 MS
QTC INTERVAL: 456 MS
QTC INTERVAL: 467 MS
RBC # BLD AUTO: 4.49 MILLION/UL (ref 3.88–5.62)
SODIUM SERPL-SCNC: 140 MMOL/L (ref 135–147)
SPECIMEN SOURCE: ABNORMAL
SPECIMEN SOURCE: ABNORMAL
T WAVE AXIS: 0 DEGREES
T WAVE AXIS: 4 DEGREES
TRIGL SERPL-MCNC: 95 MG/DL
VENTRICULAR RATE: 66 BPM
VENTRICULAR RATE: 67 BPM
WBC # BLD AUTO: 8.38 THOUSAND/UL (ref 4.31–10.16)

## 2022-10-29 RX ORDER — POTASSIUM CHLORIDE 20 MEQ/1
40 TABLET, EXTENDED RELEASE ORAL ONCE
Status: COMPLETED | OUTPATIENT
Start: 2022-10-29 | End: 2022-10-29

## 2022-10-29 RX ADMIN — MAGNESIUM OXIDE TAB 400 MG (241.3 MG ELEMENTAL MG) 400 MG: 400 (241.3 MG) TAB at 12:55

## 2022-10-29 RX ADMIN — ATORVASTATIN CALCIUM 40 MG: 40 TABLET, FILM COATED ORAL at 18:20

## 2022-10-29 RX ADMIN — METOPROLOL SUCCINATE 50 MG: 50 TABLET, EXTENDED RELEASE ORAL at 09:13

## 2022-10-29 RX ADMIN — DOFETILIDE 250 MCG: 0.25 CAPSULE ORAL at 09:13

## 2022-10-29 RX ADMIN — POTASSIUM CHLORIDE 40 MEQ: 1500 TABLET, EXTENDED RELEASE ORAL at 12:53

## 2022-10-29 RX ADMIN — CLOPIDOGREL BISULFATE 75 MG: 75 TABLET ORAL at 09:13

## 2022-10-29 RX ADMIN — DOFETILIDE 250 MCG: 0.25 CAPSULE ORAL at 21:21

## 2022-10-29 RX ADMIN — APIXABAN 5 MG: 5 TABLET, FILM COATED ORAL at 18:20

## 2022-10-29 RX ADMIN — ASPIRIN 81 MG CHEWABLE TABLET 81 MG: 81 TABLET CHEWABLE at 09:13

## 2022-10-29 RX ADMIN — METOPROLOL SUCCINATE 50 MG: 50 TABLET, EXTENDED RELEASE ORAL at 21:20

## 2022-10-29 RX ADMIN — APIXABAN 5 MG: 5 TABLET, FILM COATED ORAL at 12:54

## 2022-10-29 NOTE — PROGRESS NOTES
[facility]  Progress Note Elder Her 1955, 74 y o  male MRN: [id number]  Unit/Bed#: -01 Encounter: [id number]  Primary Care Provider: Charan Bryson DO   Date and time admitted to hospital: 10/27/2022  3:08 PM    * VT (ventricular tachycardia)  Assessment & Plan  · Patient patient has a history of stroke, status post loop recorder in December 2021 while doing physical therapy he fell, seems like he lost consciousness, does not remember the episode  · On loop recorder interrogation he had NSVT for 47 seconds  · Telemetry no significant events   · Continue 48 hrs given dofetilide - monitor QTc  · Continue Metoprolol succinate 50 mg b i d  which was increased by Cardiology yesterday  · Monitor electrolyte Cardiac cath showed 1st Diag lesion is 90% stenosed  Ramus lesion is 90% stenosed  Mid Cx lesion is 60% stenosed  RPDA lesion is 80% stenosed  · S/p stenting to D1 and ramus intermedius - DAPT x 1 week then transition to plavix + NOAC, statin, BB, ASA   · ICD postpone 90 days given stenting    Paroxysmal A-fib (HCC)  Assessment & Plan  · Seen on loop recorder, on metoprolol and Eliquis  · Patient will possibly undergo procedures - defibrillator   · On heparin - transition back to eliquis once cleared by EP     Aortic stenosis  Assessment & Plan  · Mild AS on last echo    Probable Chronic diastolic congestive heart failure (HCC)  Assessment & Plan  Wt Readings from Last 3 Encounters:   10/29/22 107 kg (235 lb 6 4 oz)   10/27/22 108 kg (239 lb)   10/26/22 107 kg (235 lb)     · Probable chronic diastolic CHF per cardiology note on 10/27  · Echocardiogram with grade 1 diastolic dysfunction  · Echo with EF 60% moderately calcified, moderately reduced mobility of aortic valve resulting in mild to moderate stenosis  · On Toprol, and HCTZ    · Monitor for fluid overload        History of cardioembolic cerebrovascular accident (CVA)  Assessment & Plan  · History of cryptogenic stroke in 2020    Dementia without behavioral disturbance ([facility] [address] )  Assessment & Plan  · Noted history of mild cognitive deficits per review of Neurology notes in 2020 post stroke  · Likely vascular  · Patient with period of agitation early this morning resolved spontaneously  · Continue to monitor, and provide supportive care    Dyspnea on exertion  Assessment & Plan  · History of dyspnea on exertion, per wife he snores a lot, he has a history of obstructive sleep apnea not wearing CPAP  · Repeat echo as above   · Encourage CPAP use    Anemia  Assessment & Plan  · Microcytic anemia  · Check iron panel    Asthma, mild intermittent  Assessment & Plan  · Not maintained on inhalers  · Patient is not using albuterol      VTE Pharmacologic Prophylaxis: VTE Score: 3 Moderate Risk (Score 3-4) - Pharmacological DVT Prophylaxis Ordered: apixaban (Eliquis)  Patient Centered Rounds: I performed bedside rounds with nursing staff today  Discussions with Specialists or Other Care Team Provider: VIDHYA MCLEAN     Education and Discussions with Family / Patient: Updated  (wife) at bedside  Time Spent for Care: 30 minutes  More than 50% of total time spent on counseling and coordination of care as described above  Current Length of Stay: 2 day(s)  Current Patient Status: Inpatient   Certification Statement: The patient will continue to require additional inpatient hospital stay due to pending dofetilide load  Discharge Plan: Anticipate discharge in 48-72 hrs to pending clinical coarse     Code Status: Level 1 - Full Code    Subjective:   Patient without any symptoms post catheterization yesterday  Denies any chest pain, or shortness of breath  Per wife, a little confused overnight, however back to baseline currently      Objective:     Vitals:   Temp (24hrs), Av 6 °F (36 4 °C), Min:97 3 °F (36 3 °C), Max:98 1 °F (36 7 °C)    Temp:  [97 3 °F (36 3 °C)-98 1 °F (36 7 °C)] 97 3 °F (36 3 °C)  HR:  [60-82] 69  Resp:  [17-19] 19  BP: (115-155)/(75-98) 124/76  SpO2:  [93 %-99 %] 94 %  Body mass index is 31 93 kg/m²  Input and Output Summary (last 24 hours): Intake/Output Summary (Last 24 hours) at 10/29/2022 1220  Last data filed at 10/29/2022 0912  Gross per 24 hour   Intake 180 ml   Output 655 ml   Net -475 ml       Physical Exam:   Physical Exam  Vitals and nursing note reviewed  Constitutional:       General: He is not in acute distress  Appearance: He is obese  HENT:      Head: Normocephalic and atraumatic  Mouth/Throat:      Mouth: Mucous membranes are moist       Pharynx: Oropharynx is clear  Eyes:      General:         Right eye: No discharge  Left eye: No discharge  Cardiovascular:      Rate and Rhythm: Normal rate and regular rhythm  Heart sounds: Normal heart sounds  Pulmonary:      Effort: Respiratory distress present  Breath sounds: Normal breath sounds  Abdominal:      General: Abdomen is flat  Palpations: Abdomen is soft  Musculoskeletal:      Right lower leg: No edema  Left lower leg: No edema  Skin:     General: Skin is warm  Capillary Refill: Capillary refill takes less than 2 seconds  Neurological:      General: No focal deficit present  Mental Status: He is alert  Psychiatric:         Mood and Affect: Mood normal           Additional Data:     Labs:  Results from last 7 days   Lab Units 10/29/22  0444 10/28/22  0712 10/27/22  1557   WBC Thousand/uL 8 38   < > 5 57   HEMOGLOBIN g/dL 10 3*   < > 9 6*   HEMATOCRIT % 35 4*   < > 33 6*   PLATELETS Thousands/uL 302   < > 299   NEUTROS PCT %  --   --  62   LYMPHS PCT %  --   --  20   MONOS PCT %  --   --  13*   EOS PCT %  --   --  3    < > = values in this interval not displayed       Results from last 7 days   Lab Units 10/29/22  0444 10/28/22  0712 10/27/22  1557   SODIUM mmol/L 140   < > 141   POTASSIUM mmol/L 3 7   < > 3 5   CHLORIDE mmol/L 112*   < > 112*   CO2 mmol/L 22   < > 25 BUN mg/dL 14   < > 22   CREATININE mg/dL 0 88   < > 1 02   ANION GAP mmol/L 6   < > 4   CALCIUM mg/dL 8 4   < > 8 7   ALBUMIN g/dL  --   --  3 4*   TOTAL BILIRUBIN mg/dL  --   --  0 70   ALK PHOS U/L  --   --  99   ALT U/L  --   --  29   AST U/L  --   --  17   GLUCOSE RANDOM mg/dL 108   < > 110    < > = values in this interval not displayed  Results from last 7 days   Lab Units 10/27/22  1819   INR  0 91     Results from last 7 days   Lab Units 10/26/22  1640   POC GLUCOSE mg/dl 115               Lines/Drains:  Invasive Devices  Report    Peripheral Intravenous Line  Duration           Peripheral IV 10/28/22 Right Antecubital 1 day                  Telemetry:  Telemetry Orders (From admission, onward)             48 Hour Telemetry Monitoring  Continuous x 48 hours        References:    Telemetry Guidelines   Question:  Reason for 48 Hour Telemetry  Answer:  Arrhythmias Requiring Medical Therapy (eg  SVT, Vtach/fib, Bradycardia, Uncontrolled A-fib)                 Telemetry Reviewed: Normal Sinus Rhythm  Indication for Continued Telemetry Use: Arrthymias requiring medical therapy             Imaging: No pertinent imaging reviewed      Recent Cultures (last 7 days):         Last 24 Hours Medication List:   Current Facility-Administered Medications   Medication Dose Route Frequency Provider Last Rate   • acetaminophen  650 mg Oral Q6H PRN Steffen Stauffer MD     • aspirin  81 mg Oral Daily ELVIS Taylor     • atorvastatin  40 mg Oral QPM Steffen Stauffer MD     • clopidogrel  75 mg Oral Daily ELVIS Taylor     • dofetilide  250 mcg Oral Q12H Abner Diane MD     • LORazepam  0 5 mg Intravenous Once Carlito Marte PA-C     • magnesium oxide  400 mg Oral Daily Jewel Mayorga PA-C     • metoprolol succinate  50 mg Oral BID Setffen Stauffer MD     • potassium chloride  40 mEq Oral Once Jewel Mayorga PA-C          Today, Patient Was Seen By: Carlito Marte    **Please Note: This note may have been constructed using a voice recognition system  **

## 2022-10-29 NOTE — ASSESSMENT & PLAN NOTE
· Seen on loop recorder, on metoprolol and Eliquis  · Patient will possibly undergo procedures - defibrillator   · On heparin - transition back to eliquis once cleared by EP

## 2022-10-29 NOTE — ASSESSMENT & PLAN NOTE
Wt Readings from Last 3 Encounters:   10/29/22 107 kg (235 lb 6 4 oz)   10/27/22 108 kg (239 lb)   10/26/22 107 kg (235 lb)     · Probable chronic diastolic CHF per cardiology note on 10/27  · Echocardiogram with grade 1 diastolic dysfunction  · Echo with EF 60% moderately calcified, moderately reduced mobility of aortic valve resulting in mild to moderate stenosis  · On Toprol, and HCTZ    · Monitor for fluid overload

## 2022-10-29 NOTE — ASSESSMENT & PLAN NOTE
· Patient patient has a history of stroke, status post loop recorder in December 2021  · Yesterday while doing physical therapy he fell, seems like he lost consciousness, does not remember the episode  Yesterday he was more active than usually, he was mowing the lawn, walked to physical therapy  · On loop recorder interrogation he had NSVT for 47 seconds  · Telemetry no significant events   · Continue 48 hrs given dofetilide - monitor QTc  · Continue Metoprolol succinate 50 mg b i d  which was increased by Cardiology yesterday  · Monitor electrolyte Cardiac cath showed 1st Diag lesion is 90% stenosed  Ramus lesion is 90% stenosed  Mid Cx lesion is 60% stenosed  RPDA lesion is 80% stenosed    · S/p stenting to D1 and ramus intermedius - DAPT x 1 week then transition to plavix + NOAC, statin, BB, ASA   · ICD postpone 90 days given stenting

## 2022-10-29 NOTE — ASSESSMENT & PLAN NOTE
· History of dyspnea on exertion, per wife he snores a lot, he has a history of obstructive sleep apnea not wearing CPAP  · Repeat echo as above   · Encourage CPAP use

## 2022-10-29 NOTE — PROGRESS NOTES
Progress Note - Electrophysiology-Cardiology (EP)   Rosa Mcfadden 74 y o  male MRN: [id number]  Unit/Bed#: -01 Encounter: [id number]      Assessment:  1  Sustained VT with associated syncope on 10/26/2022   A ) [employer] loop recorder in Situ    B ) being started on dofetilide antiarrhythmic therapy, also maintained on Toprol-XL  2  CAD status post ABHILASH to D1 and RI 10/28/2022   A ) 60% mid circumflex lesion, negative per IFR   B ) other distal vessel disease noted, too small to intervene   C ) small mid LAD aneurysm noted   D ) currently on aspirin, Plavix, statin, and beta-blocker  3  Preserved LVEF 60% per echo this admission  4  Mild to moderate aortic stenosis  5  Paroxysmal atrial fibrillation, maintained on Eliquis as an outpatient  6  Prior CVA   7  Ascending aortic aneurysm      Plan:  Given his recent PCI, ICD would need to be delayed 90 days while he remains on medical therapy  Recommend monitoring telemetry at least another 48 hours - if he has any pacing indication or recurrent ventricular arrhythmias, then we could proceed with more urgent ICD implantation  Unclear of role of [employer] - his EF is preserved, but this may be beneficial given his sustained VT prior to admission  Will continue to monitor throughout the weekend prior to making any final recommendations  Continue dofetilide 250 mch Q12 hours (cost per [facility] = $10/month, no prior authorization needed)  Thus far, QTc is stable  Continue to monitor ECGs and telemetry closely  Will replete potassium and continue to kalin, ideal goal > 4 5  Will also add daily magnesium  Await results of cardiac MRI  Subjective/Objective   Chief Complaint: no acute complaints  Subjective: No significant events reported overnight  He currently denies all cardiac complaints        Objective:     Vitals: /75   Pulse 67   Temp 98 1 °F (36 7 °C)   Resp 18   Ht 6' (1 829 m)   Wt 107 kg (235 lb 6 4 oz)   SpO2 96%   BMI 31 93 kg/m²   Vitals:    10/28/22 1015 10/29/22 0438   Weight: 108 kg (239 lb) 107 kg (235 lb 6 4 oz)     Orthostatic Blood Pressures    Flowsheet Row Most Recent Value   Blood Pressure 127/75 filed at 10/29/2022 1974   Patient Position - Orthostatic VS Sitting filed at 10/29/2022 0222            Intake/Output Summary (Last 24 hours) at 10/29/2022 0754  Last data filed at 10/29/2022 0455  Gross per 24 hour   Intake 0 ml   Output 655 ml   Net -655 ml       Invasive Devices  Report    Peripheral Intravenous Line  Duration           Peripheral IV 10/28/22 Right Antecubital 1 day                            Scheduled Meds:  Current Facility-Administered Medications   Medication Dose Route Frequency Provider Last Rate   • acetaminophen  650 mg Oral Q6H PRN Emerson Hutchins MD     • aspirin  81 mg Oral Daily ELVIS Martinez     • atorvastatin  40 mg Oral QPM Emerson Hutchins MD     • clopidogrel  75 mg Oral Daily ELVIS Holden     • dofetilide  250 mcg Oral Q12H Amilcar Cleaning MD     • LORazepam  0 5 mg Intravenous Once Tucker Cast PA-C     • metoprolol succinate  50 mg Oral BID Emerson Hutchins MD       Continuous Infusions:   PRN Meds: •  acetaminophen    Review of Systems   Constitutional: Negative for fever and malaise/fatigue  Cardiovascular: Negative for chest pain, dyspnea on exertion, irregular heartbeat, leg swelling, near-syncope, orthopnea, palpitations, paroxysmal nocturnal dyspnea and syncope  All other systems reviewed and are negative          Physical Exam:   GEN: NAD, alert and oriented x 3, well appearing  SKIN: dry without significant lesions or rashes  HEENT: NCAT, PERRL, EOMs intact  NECK: No JVD appreciated  CARDIOVASCULAR: RRR, normal S1, S2 without murmurs, rubs, or gallops appreciated  LUNGS: Clear to auscultation bilaterally without wheezes, rhonchi, or rales  ABDOMEN: Soft, nontender, nondistended  EXTREMITIES/VASCULAR: perfused without clubbing, cyanosis, or LE edema b/l  PSYCH: Normal mood and affect  NEURO: CN ll-Xll grossly intact                Lab Results: I have personally reviewed pertinent lab results  Results from last 7 days   Lab Units 10/29/22  0444 10/28/22  0712 10/27/22  1557   WBC Thousand/uL 8 38 6 40 5 57   HEMOGLOBIN g/dL 10 3* 10 3* 9 6*   HEMATOCRIT % 35 4* 36 1* 33 6*   PLATELETS Thousands/uL 302 302 299     Results from last 7 days   Lab Units 10/29/22  0444 10/28/22  1941 10/28/22  0712   POTASSIUM mmol/L 3 7 3 7 4 0   CHLORIDE mmol/L 112* 112* 111*   CO2 mmol/L 22 23 24   BUN mg/dL 14 15 18   CREATININE mg/dL 0 88 0 90 0 97   CALCIUM mg/dL 8 4 8 3 8 6     Results from last 7 days   Lab Units 10/28/22  0712 10/28/22  0026 10/27/22  1819   INR   --   --  0 91   PTT seconds 54* 39* 21*     Results from last 7 days   Lab Units 10/29/22  0444 10/28/22  1941 10/28/22  0712   MAGNESIUM mg/dL 2 3 2 4 2 2         Imaging: I have personally reviewed pertinent reports  Results for orders placed during the hospital encounter of 20    Echo complete with contrast if indicated    Narrative  [address]  [phone]    Transthoracic Echocardiogram  2D, M-mode, Doppler, and Color Doppler    Study date:  22-Sep-2020    Patient: Amie Gilliam  MR number: [id number]  Account number: [de-identified]  : 1955  Age: 60 years  Gender: Male  Status: Inpatient  Location: Bedside  Height: 72 in  Weight: 211 lb  BP: 152/ 96 mmHg    Indications: TIA, Cardiac Murmur    Diagnoses: G45 9 - Transient cerebral ischemic attack, unspecified    Sonographer:  Sagar Samson RDCS  Primary Physician:  Shiloh Najera DO  Referring Physician:  Prashant Reyes MD  Group:  [facility]  Interpreting Physician:  Con Rocha MD    SUMMARY    LEFT VENTRICLE:  Systolic function was normal  Ejection fraction was estimated to be 60 %    There were no regional wall motion abnormalities  There was moderate concentric hypertrophy  LEFT ATRIUM:  The atrium was dilated  RIGHT ATRIUM:  The atrium was dilated  MITRAL VALVE:  There was mild regurgitation  AORTIC VALVE:  Transaortic velocity was increased due to valvular stenosis  There was mild stenosis  Valve mean gradient was 15 mmHg  AORTA:  There was dilatation of the ascending aorta, with a maximum measured diameter of 4 3 cm  HISTORY: PRIOR HISTORY: HTN, HLD, Sleep Apnea, CPAP    PROCEDURE: The procedure was performed at the bedside  This was a routine study  The transthoracic approach was used  The study included complete 2D imaging, M-mode, complete spectral Doppler, and color Doppler  The heart rate was 70 bpm,  at the start of the study  Images were obtained from the parasternal, apical, subcostal, and suprasternal notch acoustic windows  Image quality was adequate  LEFT VENTRICLE: Size was normal  Systolic function was normal  Ejection fraction was estimated to be 60 %  There were no regional wall motion abnormalities  Wall thickness was mildly increased  There was moderate concentric hypertrophy  DOPPLER: The transmitral flow pattern was normal  Left ventricular diastolic function parameters were normal     RIGHT VENTRICLE: The size was normal  Systolic function was normal  Wall thickness was normal     LEFT ATRIUM: The atrium was dilated  RIGHT ATRIUM: The atrium was dilated  MITRAL VALVE: Valve structure was normal  There was normal leaflet separation  DOPPLER: The transmitral velocity was within the normal range  There was no evidence for stenosis  There was mild regurgitation  AORTIC VALVE: The valve was trileaflet  Leaflets exhibited mild calcification and mildly reduced cuspal separation  DOPPLER: Transaortic velocity was increased due to valvular stenosis  There was mild stenosis  There was no significant  regurgitation      TRICUSPID VALVE: The valve structure was normal  There was normal leaflet separation  DOPPLER: The transtricuspid velocity was within the normal range  There was no evidence for stenosis  There was trace regurgitation  PULMONIC VALVE: Leaflets exhibited normal thickness, no calcification, and normal cuspal separation  DOPPLER: The transpulmonic velocity was within the normal range  There was trace regurgitation  PERICARDIUM: There was no pericardial effusion  The pericardium was normal in appearance  AORTA: The root exhibited normal size  There was dilatation of the ascending aorta, with a maximum measured diameter of 4 3 cm  SYSTEMIC VEINS: IVC: The inferior vena cava was normal in size  Respirophasic changes were normal     MEASUREMENT TABLES    DOPPLER MEASUREMENTS  Aortic valve   (Reference normals)  Peak jassi   250 cm/s   (--)  Peak gradient   25 mmHg   (--)  Mean gradient   15 mmHg   (--)    SYSTEM MEASUREMENT TABLES    2D  %FS: 32 84 %  Ao Diam: 3 98 cm  EDV(Teich): 162 66 ml  EF(Teich): 60 58 %  ESV(Teich): 64 11 ml  IVSd: 1 35 cm  LA Area: 28 26 cm2  LA Diam: 4 24 cm  LVEDV MOD A4C: 148 4 ml  LVEF MOD A4C: 38 21 %  LVESV MOD A4C: 91 69 ml  LVIDd: 5 74 cm  LVIDs: 3 85 cm  LVLd A4C: 8 56 cm  LVLs A4C: 7 84 cm  LVOT Diam: 2 66 cm  LVPWd: 1 21 cm  RA Area: 21 9 cm2  RVIDd: 4 18 cm  SV MOD A4C: 56 71 ml  SV(Teich): 98 55 ml    CW  AV Env  Ti: 299 88 ms  AV VTI: 51 93 cm  AV Vmax: 2 42 m/s  AV Vmean: 1 73 m/s  AV maxP 41 mmHg  AV meanP 59 mmHg    MM  TAPSE: 1 87 cm    PW  ARCELIA (VTI): 1 88 cm2  ARCELIA Vmax: 1 91 cm2  E': 0 09 m/s  E/E': 7 65  LVOT Env  Ti: 295 27 ms  LVOT VTI: 17 63 cm  LVOT Vmax: 0 83 m/s  LVOT Vmean: 0 6 m/s  LVOT maxP 77 mmHg  LVOT meanP 64 mmHg  LVSV Dopp: 97 67 ml  MV A Jassi: 0 64 m/s  MV Dec Dundy: 3 4 m/s2  MV DecT: 192 4 ms  MV E Jassi: 0 65 m/s  MV E/A Ratio: 1 03  MV PHT: 55 8 ms  MVA By PHT: 3 94 cm2    [employer] Accredited Echocardiography Laboratory    Prepared and electronically signed by    Pari Brewster, MD  Signed 22-Sep-2020 15:19:26      EKG/TELEMETRY: normal sinus with occasional PVCs, brief idioventricular rhythm, no sustained arrhythmias        VTE Pharmacologic Prophylaxis: aspirin/Plavix in the post cardiac cath setting  VTE Mechanical Prophylaxis: sequential compression device

## 2022-10-30 ENCOUNTER — APPOINTMENT (INPATIENT)
Dept: RADIOLOGY | Facility: HOSPITAL | Age: 67
End: 2022-10-30

## 2022-10-30 PROBLEM — R42 DIZZINESS: Status: ACTIVE | Noted: 2022-10-30

## 2022-10-30 LAB
ANION GAP SERPL CALCULATED.3IONS-SCNC: 7 MMOL/L (ref 4–13)
BUN SERPL-MCNC: 13 MG/DL (ref 5–25)
CALCIUM SERPL-MCNC: 8.5 MG/DL (ref 8.3–10.1)
CHLORIDE SERPL-SCNC: 116 MMOL/L (ref 96–108)
CO2 SERPL-SCNC: 20 MMOL/L (ref 21–32)
CREAT SERPL-MCNC: 0.88 MG/DL (ref 0.6–1.3)
ERYTHROCYTE [DISTWIDTH] IN BLOOD BY AUTOMATED COUNT: 14.8 % (ref 11.6–15.1)
GFR SERPL CREATININE-BSD FRML MDRD: 90 ML/MIN/1.73SQ M
GLUCOSE SERPL-MCNC: 98 MG/DL (ref 65–140)
HCT VFR BLD AUTO: 33.1 % (ref 36.5–49.3)
HGB BLD-MCNC: 9.8 G/DL (ref 12–17)
MAGNESIUM SERPL-MCNC: 2.4 MG/DL (ref 1.6–2.6)
MCH RBC QN AUTO: 22.6 PG (ref 26.8–34.3)
MCHC RBC AUTO-ENTMCNC: 29.6 G/DL (ref 31.4–37.4)
MCV RBC AUTO: 76 FL (ref 82–98)
PLATELET # BLD AUTO: 268 THOUSANDS/UL (ref 149–390)
PMV BLD AUTO: 8.7 FL (ref 8.9–12.7)
POTASSIUM SERPL-SCNC: 3.7 MMOL/L (ref 3.5–5.3)
RBC # BLD AUTO: 4.33 MILLION/UL (ref 3.88–5.62)
SODIUM SERPL-SCNC: 143 MMOL/L (ref 135–147)
WBC # BLD AUTO: 8.63 THOUSAND/UL (ref 4.31–10.16)

## 2022-10-30 RX ORDER — POTASSIUM CHLORIDE 20 MEQ/1
40 TABLET, EXTENDED RELEASE ORAL EVERY 4 HOURS
Status: COMPLETED | OUTPATIENT
Start: 2022-10-30 | End: 2022-10-30

## 2022-10-30 RX ORDER — LORAZEPAM 2 MG/ML
0.5 INJECTION INTRAMUSCULAR ONCE
Status: COMPLETED | OUTPATIENT
Start: 2022-10-30 | End: 2022-10-30

## 2022-10-30 RX ADMIN — METOPROLOL SUCCINATE 50 MG: 50 TABLET, EXTENDED RELEASE ORAL at 20:31

## 2022-10-30 RX ADMIN — POTASSIUM CHLORIDE 40 MEQ: 1500 TABLET, EXTENDED RELEASE ORAL at 13:07

## 2022-10-30 RX ADMIN — MAGNESIUM OXIDE TAB 400 MG (241.3 MG ELEMENTAL MG) 400 MG: 400 (241.3 MG) TAB at 10:15

## 2022-10-30 RX ADMIN — GADOBUTROL 20 ML: 604.72 INJECTION INTRAVENOUS at 11:24

## 2022-10-30 RX ADMIN — ATORVASTATIN CALCIUM 40 MG: 40 TABLET, FILM COATED ORAL at 18:47

## 2022-10-30 RX ADMIN — APIXABAN 5 MG: 5 TABLET, FILM COATED ORAL at 18:47

## 2022-10-30 RX ADMIN — CLOPIDOGREL BISULFATE 75 MG: 75 TABLET ORAL at 10:15

## 2022-10-30 RX ADMIN — APIXABAN 5 MG: 5 TABLET, FILM COATED ORAL at 10:15

## 2022-10-30 RX ADMIN — DOFETILIDE 250 MCG: 0.25 CAPSULE ORAL at 10:15

## 2022-10-30 RX ADMIN — DOFETILIDE 250 MCG: 0.25 CAPSULE ORAL at 20:31

## 2022-10-30 RX ADMIN — LORAZEPAM 0.5 MG: 2 INJECTION INTRAMUSCULAR; INTRAVENOUS at 10:46

## 2022-10-30 RX ADMIN — POTASSIUM CHLORIDE 40 MEQ: 1500 TABLET, EXTENDED RELEASE ORAL at 10:03

## 2022-10-30 RX ADMIN — METOPROLOL SUCCINATE 50 MG: 50 TABLET, EXTENDED RELEASE ORAL at 10:19

## 2022-10-30 RX ADMIN — ASPIRIN 81 MG CHEWABLE TABLET 81 MG: 81 TABLET CHEWABLE at 10:03

## 2022-10-30 NOTE — ASSESSMENT & PLAN NOTE
· Patient with new dizziness this morning     · Blood pressures are stable, heart rates in the 80s during my exam  · Dizziness is a side effect listed for dofetilide unclear if this is etiology  · Does not appear as though he is significantly dehydrated at this time can consider addition of fluids  · Check orthostatics  · Bed alarm To prevent falls  · Up with assistance

## 2022-10-30 NOTE — PROGRESS NOTES
[address] [facility]  Progress Note Gradie Gear 1955, 74 y o  male MRN: [id number]  Unit/Bed#: -01 Encounter: [id number]  Primary Care Provider: Mery Molina DO   Date and time admitted to hospital: 10/27/2022  3:08 PM    * VT (ventricular tachycardia)  Assessment & Plan  · Patient patient has a history of stroke, status post loop recorder in December 2021  · Yesterday while doing physical therapy he fell, seems like he lost consciousness, does not remember the episode  Yesterday he was more active than usually, he was mowing the lawn, walked to physical therapy  · On loop recorder interrogation he had NSVT for 47 seconds  · Telemetry no significant events   · Continue 48 hrs given dofetilide - monitor QTc  · Continue Metoprolol succinate 50 mg b i d    · Monitor electrolyte Cardiac cath showed 1st Diag lesion is 90% stenosed  Ramus lesion is 90% stenosed  Mid Cx lesion is 60% stenosed  RPDA lesion is 80% stenosed  · S/p stenting to D1 and ramus intermedius - DAPT x 1 week then transition to plavix + NOAC, statin, BB, ASA   · ICD postpone 90 days given stenting may need life vest  · Pending cardiac MRI     Dizziness  Assessment & Plan  · Patient with new dizziness this morning     · Blood pressures are stable, heart rates in the 80s during my exam  · Dizziness is a side effect listed for dofetilide unclear if this is etiology  · Does not appear as though he is significantly dehydrated at this time can consider addition of fluids  · Check orthostatics  · Bed alarm To prevent falls  · Up with assistance    Paroxysmal A-fib (HCC)  Assessment & Plan  · Seen on loop recorder, on metoprolol and Eliquis  · Patient will possibly undergo procedures - defibrillator   · On heparin - transition back to eliquis once cleared by EP     Aortic stenosis  Assessment & Plan  · Mild AS on last echo    Probable Chronic diastolic congestive heart failure ([address] )  Assessment & Plan  Wt Readings from Last 3 Encounters:   10/29/22 107 kg (235 lb 6 4 oz)   10/27/22 108 kg (239 lb)   10/26/22 107 kg (235 lb)     · Probable chronic diastolic CHF per cardiology note on 10/27  · Echocardiogram with grade 1 diastolic dysfunction  · Echo with EF 60% moderately calcified, moderately reduced mobility of aortic valve resulting in mild to moderate stenosis  · On Toprol, and HCTZ  · Monitor for fluid overload        History of cardioembolic cerebrovascular accident (CVA)  Assessment & Plan  · History of cryptogenic stroke in 2020    Dementia without behavioral disturbance ([facility] [address] )  Assessment & Plan  · Noted history of mild cognitive deficits per review of Neurology notes in 2020 post stroke  · Likely vascular  · Patient with period of agitation early this morning resolved spontaneously  · Continue to monitor, and provide supportive care    Dyspnea on exertion  Assessment & Plan  · History of dyspnea on exertion, per wife he snores a lot, he has a history of obstructive sleep apnea not wearing CPAP  · Repeat echo as above   · Encourage CPAP use    Anemia  Assessment & Plan  · Microcytic anemia  · Iron low at 25, iron saturation low at 7 however ferritin normal     Asthma, mild intermittent  Assessment & Plan  · Not maintained on inhalers  · Patient is not using albuterol        VTE Pharmacologic Prophylaxis: VTE Score: 3 Moderate Risk (Score 3-4) - Pharmacological DVT Prophylaxis Ordered: apixaban (Eliquis)  Patient Centered Rounds: I performed bedside rounds with nursing staff today  Discussions with Specialists or Other Care Team Provider: VIDHYA MCLEAN     Education and Discussions with Family / Patient: Updated  (wife) via phone  Time Spent for Care: 30 minutes  More than 50% of total time spent on counseling and coordination of care as described above      Current Length of Stay: 3 day(s)  Current Patient Status: Inpatient   Certification Statement: The patient will continue to require additional inpatient hospital stay due to pend dofetilide load, and cardiac MRI  Discharge Plan: Anticipate discharge in 24-48 hrs to home  Code Status: Level 1 - Full Code    Subjective:   Patient experiencing some dizziness earlier this morning  He also had an episode of diarrhea per nursing staff  Objective:     Vitals:   Temp (24hrs), Av 8 °F (36 6 °C), Min:97 3 °F (36 3 °C), Max:98 7 °F (37 1 °C)    Temp:  [97 3 °F (36 3 °C)-98 7 °F (37 1 °C)] 97 6 °F (36 4 °C)  HR:  [63-78] 76  Resp:  [17-20] 17  BP: (124-154)/() 154/101  SpO2:  [94 %-99 %] 96 %  Body mass index is 31 93 kg/m²  Input and Output Summary (last 24 hours): Intake/Output Summary (Last 24 hours) at 10/30/2022 3503  Last data filed at 10/29/2022 1838  Gross per 24 hour   Intake 1020 ml   Output 250 ml   Net 770 ml       Physical Exam:   Physical Exam  Vitals and nursing note reviewed  Constitutional:       General: He is not in acute distress  Appearance: He is obese  HENT:      Head: Normocephalic and atraumatic  Eyes:      General:         Right eye: No discharge  Left eye: No discharge  Cardiovascular:      Rate and Rhythm: Normal rate and regular rhythm  Heart sounds: Normal heart sounds  Pulmonary:      Effort: No respiratory distress  Breath sounds: Normal breath sounds  Abdominal:      General: Abdomen is flat  Palpations: Abdomen is soft  Musculoskeletal:      Right lower leg: No edema  Left lower leg: No edema  Skin:     General: Skin is warm and dry  Coloration: Skin is not jaundiced  Neurological:      General: No focal deficit present  Mental Status: He is alert and oriented to person, place, and time     Psychiatric:         Mood and Affect: Mood normal           Additional Data:     Labs:  Results from last 7 days   Lab Units 10/30/22  0452 10/28/22  0712 10/27/22  1557   WBC Thousand/uL 8 63   < > 5 57   HEMOGLOBIN g/dL 9 8*   < > 9 6*   HEMATOCRIT % 33 1*   < > 33 6*   PLATELETS Thousands/uL 268   < > 299   NEUTROS PCT %  --   --  62   LYMPHS PCT %  --   --  20   MONOS PCT %  --   --  13*   EOS PCT %  --   --  3    < > = values in this interval not displayed  Results from last 7 days   Lab Units 10/30/22  0452 10/28/22  0712 10/27/22  1557   SODIUM mmol/L 143   < > 141   POTASSIUM mmol/L 3 7   < > 3 5   CHLORIDE mmol/L 116*   < > 112*   CO2 mmol/L 20*   < > 25   BUN mg/dL 13   < > 22   CREATININE mg/dL 0 88   < > 1 02   ANION GAP mmol/L 7   < > 4   CALCIUM mg/dL 8 5   < > 8 7   ALBUMIN g/dL  --   --  3 4*   TOTAL BILIRUBIN mg/dL  --   --  0 70   ALK PHOS U/L  --   --  99   ALT U/L  --   --  29   AST U/L  --   --  17   GLUCOSE RANDOM mg/dL 98   < > 110    < > = values in this interval not displayed  Results from last 7 days   Lab Units 10/27/22  1819   INR  0 91     Results from last 7 days   Lab Units 10/26/22  1640   POC GLUCOSE mg/dl 115               Lines/Drains:  Invasive Devices  Report    Peripheral Intravenous Line  Duration           Peripheral IV 10/28/22 Right Antecubital 2 days                  Telemetry:  Telemetry Orders (From admission, onward)             48 Hour Telemetry Monitoring  Continuous x 48 hours        References:    Telemetry Guidelines   Question:  Reason for 48 Hour Telemetry  Answer:  Arrhythmias Requiring Medical Therapy (eg  SVT, Vtach/fib, Bradycardia, Uncontrolled A-fib)                 Telemetry Reviewed: Normal Sinus Rhythm and NSVT  Indication for Continued Telemetry Use: Metabolic/electrolyte disturbance with high probability of dysrhythmia             Imaging: No pertinent imaging reviewed      Recent Cultures (last 7 days):         Last 24 Hours Medication List:   Current Facility-Administered Medications   Medication Dose Route Frequency Provider Last Rate   • acetaminophen  650 mg Oral Q6H PRN Kathie Tavares MD     • apixaban  5 mg Oral BID Gurinder Cruz PA-C     • aspirin  81 mg Oral Daily Marisela ELVIS Garza     • atorvastatin  40 mg Oral QPM Emeli Espinosa MD     • clopidogrel  75 mg Oral Daily ELVIS Sherwood     • dofetilide  250 mcg Oral Q12H Brice Aguillon MD     • LORazepam  0 5 mg Intravenous Once Kerwin Ni PA-C     • magnesium oxide  400 mg Oral Daily Juve Arevalo PA-C     • metoprolol succinate  50 mg Oral BID Emeli Espinosa MD     • potassium chloride  40 mEq Oral Q4H Juve Arevalo PA-C          Today, Patient Was Seen By: Kerwin Ni    **Please Note: This note may have been constructed using a voice recognition system  **

## 2022-10-30 NOTE — PROGRESS NOTES
Progress Note - Electrophysiology-Cardiology (EP)   Stefanie Buck 74 y o  male MRN: [id number]  Unit/Bed#: -01 Encounter: [id number]      Assessment:  1  Sustained VT with associated syncope on 10/26/2022              A ) [employer] loop recorder in Situ               B ) being started on dofetilide antiarrhythmic therapy, also maintained on Toprol-XL   C ) cardiac MRI pending  2  CAD status post ABHILASH to D1 and RI 10/28/2022              A ) 60% mid circumflex lesion, negative per IFR              B ) other distal vessel disease noted, too small to intervene              C ) small mid LAD aneurysm noted              D ) currently on aspirin, Plavix, statin, and beta-blocker  3  Preserved LVEF 60% per echo this admission  4  Mild to moderate aortic stenosis  5  Paroxysmal atrial fibrillation, maintained on Eliquis as an outpatient  6  Prior CVA   7  Ascending aortic aneurysm       Plan:  A cardiac MRI has been ordered, and is pending  I contacted the MRI Department, he will hopefully be able to be done sometime later this evening  If a significant scar is noted, this may then be a stronger indication to pursue an ICD this admission  Will await the results, the patient understands that a more definitive decision will be made tomorrow  His potassium today is still 3 7 after being given supplementation yesterday  Will therefore give a higher dose, 40 mEq x 2 doses today and recheck BMP in the morning  He reported several episodes of diarrhea, however only 1 yesterday and 1 today thus far  At this time, would recommend continuing his current cardiac regimen, including Tikosyn, and continuing to monitor his symptoms  Unclear if this is truly a side effect of this new medication, but ideally would like to continue given his recent arrhythmias        Subjective/Objective   Chief Complaint:  Episode of diarrhea this morning    Subjective:  He reports an episode of diarrhea this morning, and 1 bowel movement yesterday as well  Mild dizziness at times, does not seem to be orthostatic in nature  He otherwise denies cardiac complaints including chest pain or pressure, worsening shortness of breath, or palpitations  Objective:     Vitals: BP (!) 154/101   Pulse 76   Temp 97 6 °F (36 4 °C)   Resp 17   Ht 6' (1 829 m)   Wt 107 kg (235 lb 6 4 oz)   SpO2 96%   BMI 31 93 kg/m²   Vitals:    10/28/22 1015 10/29/22 0438   Weight: 108 kg (239 lb) 107 kg (235 lb 6 4 oz)     Orthostatic Blood Pressures    Flowsheet Row Most Recent Value   Blood Pressure 154/101 filed at 10/30/2022 9065   Patient Position - Orthostatic VS Lying filed at 10/29/2022 2212            Intake/Output Summary (Last 24 hours) at 10/30/2022 0746  Last data filed at 10/29/2022 5773  Gross per 24 hour   Intake 1200 ml   Output 250 ml   Net 950 ml       Invasive Devices  Report    Peripheral Intravenous Line  Duration           Peripheral IV 10/28/22 Right Antecubital 2 days                            Scheduled Meds:  Current Facility-Administered Medications   Medication Dose Route Frequency Provider Last Rate   • acetaminophen  650 mg Oral Q6H PRN Vic Madrigal MD     • apixaban  5 mg Oral BID Gurinder Cruz PA-C     • aspirin  81 mg Oral Daily ELVIS Pepper     • atorvastatin  40 mg Oral QPM Spring Fisher MD     • clopidogrel  75 mg Oral Daily ELVIS Pepper     • dofetilide  250 mcg Oral Q12H Elan Vega MD     • LORazepam  0 5 mg Intravenous Once Herminio Yanez PA-C     • magnesium oxide  400 mg Oral Daily Kaushik Roque PA-C     • metoprolol succinate  50 mg Oral BID Vic Madrigal MD       Continuous Infusions:   PRN Meds: •  acetaminophen    Review of Systems   Constitutional: Negative for fever and malaise/fatigue  Cardiovascular: Negative for chest pain, dyspnea on exertion, irregular heartbeat, leg swelling, near-syncope, orthopnea, palpitations, paroxysmal nocturnal dyspnea and syncope  Gastrointestinal: Positive for diarrhea  Neurological: Positive for dizziness  All other systems reviewed and are negative  Physical Exam:   GEN: NAD, alert and oriented x 3, well appearing  SKIN: dry without significant lesions or rashes  HEENT: NCAT, PERRL, EOMs intact  NECK: No JVD appreciated  CARDIOVASCULAR: RRR, normal S1, S2 without murmurs, rubs, or gallops appreciated  LUNGS: Clear to auscultation bilaterally without wheezes, rhonchi, or rales  ABDOMEN: Soft, nontender, nondistended  EXTREMITIES/VASCULAR: perfused without clubbing, cyanosis, or LE edema b/l  PSYCH: Normal mood and affect  NEURO: CN ll-Xll grossly intact                Lab Results: I have personally reviewed pertinent lab results  Results from last 7 days   Lab Units 10/30/22  0452 10/29/22  0444 10/28/22  0712   WBC Thousand/uL 8 63 8 38 6 40   HEMOGLOBIN g/dL 9 8* 10 3* 10 3*   HEMATOCRIT % 33 1* 35 4* 36 1*   PLATELETS Thousands/uL 268 302 302     Results from last 7 days   Lab Units 10/30/22  0452 10/29/22  0444 10/28/22  1941   POTASSIUM mmol/L 3 7 3 7 3 7   CHLORIDE mmol/L 116* 112* 112*   CO2 mmol/L 20* 22 23   BUN mg/dL 13 14 15   CREATININE mg/dL 0 88 0 88 0 90   CALCIUM mg/dL 8 5 8 4 8 3     Results from last 7 days   Lab Units 10/28/22  0712 10/28/22  0026 10/27/22  1819   INR   --   --  0 91   PTT seconds 54* 39* 21*     Results from last 7 days   Lab Units 10/30/22  0452 10/29/22  0444 10/28/22  1941   MAGNESIUM mg/dL 2 4 2 3 2 4         Imaging: I have personally reviewed pertinent reports      Results for orders placed during the hospital encounter of 20    Echo complete with contrast if indicated    Narrative  [address]  [address]  [phone]    Transthoracic Echocardiogram  2D, M-mode, Doppler, and Color Doppler    Study date:  22-Sep-2020    Patient: Christine Tavares  MR number: [id number]  Account number: [de-identified]  : 1955  Age: 60 years  Gender: Male  Status: Inpatient  Location: Bedside  Height: 72 in  Weight: 211 lb  BP: 152/ 96 mmHg    Indications: TIA, Cardiac Murmur    Diagnoses: G45 9 - Transient cerebral ischemic attack, unspecified    Sonographer:  Johan Berry RDCS  Primary Physician:  Cristina Rasheed DO  Referring Physician:  Beth Ramires MD  Group:  [facility]  Interpreting Physician:  Shilpa Cook MD    SUMMARY    LEFT VENTRICLE:  Systolic function was normal  Ejection fraction was estimated to be 60 %  There were no regional wall motion abnormalities  There was moderate concentric hypertrophy  LEFT ATRIUM:  The atrium was dilated  RIGHT ATRIUM:  The atrium was dilated  MITRAL VALVE:  There was mild regurgitation  AORTIC VALVE:  Transaortic velocity was increased due to valvular stenosis  There was mild stenosis  Valve mean gradient was 15 mmHg  AORTA:  There was dilatation of the ascending aorta, with a maximum measured diameter of 4 3 cm  HISTORY: PRIOR HISTORY: HTN, HLD, Sleep Apnea, CPAP    PROCEDURE: The procedure was performed at the bedside  This was a routine study  The transthoracic approach was used  The study included complete 2D imaging, M-mode, complete spectral Doppler, and color Doppler  The heart rate was 70 bpm,  at the start of the study  Images were obtained from the parasternal, apical, subcostal, and suprasternal notch acoustic windows  Image quality was adequate  LEFT VENTRICLE: Size was normal  Systolic function was normal  Ejection fraction was estimated to be 60 %  There were no regional wall motion abnormalities  Wall thickness was mildly increased  There was moderate concentric hypertrophy  DOPPLER: The transmitral flow pattern was normal  Left ventricular diastolic function parameters were normal     RIGHT VENTRICLE: The size was normal  Systolic function was normal  Wall thickness was normal     LEFT ATRIUM: The atrium was dilated      RIGHT ATRIUM: The atrium was dilated  MITRAL VALVE: Valve structure was normal  There was normal leaflet separation  DOPPLER: The transmitral velocity was within the normal range  There was no evidence for stenosis  There was mild regurgitation  AORTIC VALVE: The valve was trileaflet  Leaflets exhibited mild calcification and mildly reduced cuspal separation  DOPPLER: Transaortic velocity was increased due to valvular stenosis  There was mild stenosis  There was no significant  regurgitation  TRICUSPID VALVE: The valve structure was normal  There was normal leaflet separation  DOPPLER: The transtricuspid velocity was within the normal range  There was no evidence for stenosis  There was trace regurgitation  PULMONIC VALVE: Leaflets exhibited normal thickness, no calcification, and normal cuspal separation  DOPPLER: The transpulmonic velocity was within the normal range  There was trace regurgitation  PERICARDIUM: There was no pericardial effusion  The pericardium was normal in appearance  AORTA: The root exhibited normal size  There was dilatation of the ascending aorta, with a maximum measured diameter of 4 3 cm  SYSTEMIC VEINS: IVC: The inferior vena cava was normal in size  Respirophasic changes were normal     MEASUREMENT TABLES    DOPPLER MEASUREMENTS  Aortic valve   (Reference normals)  Peak suzanna   250 cm/s   (--)  Peak gradient   25 mmHg   (--)  Mean gradient   15 mmHg   (--)    SYSTEM MEASUREMENT TABLES    2D  %FS: 32 84 %  Ao Diam: 3 98 cm  EDV(Teich): 162 66 ml  EF(Teich): 60 58 %  ESV(Teich): 64 11 ml  IVSd: 1 35 cm  LA Area: 28 26 cm2  LA Diam: 4 24 cm  LVEDV MOD A4C: 148 4 ml  LVEF MOD A4C: 38 21 %  LVESV MOD A4C: 91 69 ml  LVIDd: 5 74 cm  LVIDs: 3 85 cm  LVLd A4C: 8 56 cm  LVLs A4C: 7 84 cm  LVOT Diam: 2 66 cm  LVPWd: 1 21 cm  RA Area: 21 9 cm2  RVIDd: 4 18 cm  SV MOD A4C: 56 71 ml  SV(Teich): 98 55 ml    CW  AV Env  Ti: 299 88 ms  AV VTI: 51 93 cm  AV Vmax: 2 42 m/s  AV Vmean: 1 73 m/s  AV maxP 41 mmHg  AV meanP 59 mmHg    MM  TAPSE: 1 87 cm    PW  ARCELIA (VTI): 1 88 cm2  ARCELIA Vmax: 1 91 cm2  E': 0 09 m/s  E/E': 7 65  LVOT Env  Ti: 295 27 ms  LVOT VTI: 17 63 cm  LVOT Vmax: 0 83 m/s  LVOT Vmean: 0 6 m/s  LVOT maxP 77 mmHg  LVOT meanP 64 mmHg  LVSV Dopp: 97 67 ml  MV A Jassi: 0 64 m/s  MV Dec [place]: 3 4 m/s2  MV DecT: 192 4 ms  MV E Jassi: 0 65 m/s  MV E/A Ratio: 1 03  MV PHT: 55 8 ms  MVA By PHT: 3 94 cm2    [employer] Accredited Echocardiography Laboratory    Prepared and electronically signed by    Nicolle Verduzco MD  Signed 22-Sep-2020 15:19:26      EKG:        TELEMETRY:  Brief run of monomorphic VT, otherwise no sustained arrhythmia        VTE Pharmacologic Prophylaxis: Eliquis

## 2022-10-30 NOTE — ASSESSMENT & PLAN NOTE
· Patient patient has a history of stroke, status post loop recorder in December 2021  · Yesterday while doing physical therapy he fell, seems like he lost consciousness, does not remember the episode  Yesterday he was more active than usually, he was mowing the lawn, walked to physical therapy  · On loop recorder interrogation he had NSVT for 47 seconds  · Telemetry no significant events   · Continue 48 hrs given dofetilide - monitor QTc  · Continue Metoprolol succinate 50 mg b i d  which was increased by Cardiology yesterday  · Monitor electrolyte Cardiac cath showed 1st Diag lesion is 90% stenosed  Ramus lesion is 90% stenosed  Mid Cx lesion is 60% stenosed  RPDA lesion is 80% stenosed    · S/p stenting to D1 and ramus intermedius - DAPT x 1 week then transition to plavix + NOAC, statin, BB, ASA   · ICD postpone 90 days given stenting may need life vest

## 2022-10-31 ENCOUNTER — APPOINTMENT (OUTPATIENT)
Dept: OCCUPATIONAL THERAPY | Facility: CLINIC | Age: 67
End: 2022-10-31

## 2022-10-31 ENCOUNTER — APPOINTMENT (OUTPATIENT)
Dept: PHYSICAL THERAPY | Facility: CLINIC | Age: 67
End: 2022-10-31

## 2022-10-31 LAB
ANION GAP SERPL CALCULATED.3IONS-SCNC: 6 MMOL/L (ref 4–13)
ATRIAL RATE: 63 BPM
ATRIAL RATE: 68 BPM
ATRIAL RATE: 69 BPM
BUN SERPL-MCNC: 17 MG/DL (ref 5–25)
CALCIUM SERPL-MCNC: 8.9 MG/DL (ref 8.3–10.1)
CHLORIDE SERPL-SCNC: 113 MMOL/L (ref 96–108)
CO2 SERPL-SCNC: 21 MMOL/L (ref 21–32)
CREAT SERPL-MCNC: 0.89 MG/DL (ref 0.6–1.3)
ERYTHROCYTE [DISTWIDTH] IN BLOOD BY AUTOMATED COUNT: 14.8 % (ref 11.6–15.1)
GFR SERPL CREATININE-BSD FRML MDRD: 89 ML/MIN/1.73SQ M
GLUCOSE SERPL-MCNC: 106 MG/DL (ref 65–140)
GLUCOSE SERPL-MCNC: 92 MG/DL (ref 65–140)
HCT VFR BLD AUTO: 35.6 % (ref 36.5–49.3)
HGB BLD-MCNC: 10.2 G/DL (ref 12–17)
MAGNESIUM SERPL-MCNC: 2.4 MG/DL (ref 1.6–2.6)
MCH RBC QN AUTO: 22.5 PG (ref 26.8–34.3)
MCHC RBC AUTO-ENTMCNC: 28.7 G/DL (ref 31.4–37.4)
MCV RBC AUTO: 79 FL (ref 82–98)
P AXIS: 11 DEGREES
P AXIS: 2 DEGREES
P AXIS: 8 DEGREES
PLATELET # BLD AUTO: 279 THOUSANDS/UL (ref 149–390)
PMV BLD AUTO: 8.9 FL (ref 8.9–12.7)
POTASSIUM SERPL-SCNC: 4.1 MMOL/L (ref 3.5–5.3)
PR INTERVAL: 156 MS
PR INTERVAL: 160 MS
PR INTERVAL: 166 MS
QRS AXIS: -14 DEGREES
QRS AXIS: -16 DEGREES
QRS AXIS: -20 DEGREES
QRSD INTERVAL: 82 MS
QRSD INTERVAL: 84 MS
QRSD INTERVAL: 92 MS
QT INTERVAL: 418 MS
QT INTERVAL: 424 MS
QT INTERVAL: 430 MS
QTC INTERVAL: 440 MS
QTC INTERVAL: 447 MS
QTC INTERVAL: 450 MS
RBC # BLD AUTO: 4.53 MILLION/UL (ref 3.88–5.62)
SODIUM SERPL-SCNC: 140 MMOL/L (ref 135–147)
T WAVE AXIS: 0 DEGREES
T WAVE AXIS: 14 DEGREES
T WAVE AXIS: 31 DEGREES
VENTRICULAR RATE: 63 BPM
VENTRICULAR RATE: 68 BPM
VENTRICULAR RATE: 69 BPM
WBC # BLD AUTO: 7.26 THOUSAND/UL (ref 4.31–10.16)

## 2022-10-31 RX ADMIN — ATORVASTATIN CALCIUM 40 MG: 40 TABLET, FILM COATED ORAL at 18:26

## 2022-10-31 RX ADMIN — METOPROLOL SUCCINATE 50 MG: 50 TABLET, EXTENDED RELEASE ORAL at 19:55

## 2022-10-31 RX ADMIN — CLOPIDOGREL BISULFATE 75 MG: 75 TABLET ORAL at 08:23

## 2022-10-31 RX ADMIN — APIXABAN 5 MG: 5 TABLET, FILM COATED ORAL at 08:23

## 2022-10-31 RX ADMIN — DOFETILIDE 250 MCG: 0.25 CAPSULE ORAL at 08:22

## 2022-10-31 RX ADMIN — MAGNESIUM OXIDE TAB 400 MG (241.3 MG ELEMENTAL MG) 400 MG: 400 (241.3 MG) TAB at 08:23

## 2022-10-31 RX ADMIN — ASPIRIN 81 MG CHEWABLE TABLET 81 MG: 81 TABLET CHEWABLE at 08:23

## 2022-10-31 RX ADMIN — INFLUENZA A VIRUS A/VICTORIA/2570/2019 IVR-215 (H1N1) ANTIGEN (FORMALDEHYDE INACTIVATED), INFLUENZA A VIRUS A/DARWIN/9/2021 SAN-010 (H3N2) ANTIGEN (FORMALDEHYDE INACTIVATED), INFLUENZA B VIRUS B/PHUKET/3073/2013 ANTIGEN (FORMALDEHYDE INACTIVATED), AND INFLUENZA B VIRUS B/MICHIGAN/01/2021 ANTIGEN (FORMALDEHYDE INACTIVATED) 0.7 ML: 60; 60; 60; 60 INJECTION, SUSPENSION INTRAMUSCULAR at 22:42

## 2022-10-31 RX ADMIN — APIXABAN 5 MG: 5 TABLET, FILM COATED ORAL at 18:26

## 2022-10-31 RX ADMIN — METOPROLOL SUCCINATE 50 MG: 50 TABLET, EXTENDED RELEASE ORAL at 08:23

## 2022-10-31 NOTE — ASSESSMENT & PLAN NOTE
· Patient patient has a history of stroke, status post loop recorder in December 2021  · Yesterday while doing physical therapy he fell, seems like he lost consciousness, does not remember the episode  Yesterday he was more active than usually, he was mowing the lawn, walked to physical therapy  · On loop recorder interrogation he had NSVT for 47 seconds  · Telemetry no significant events   · Continue 48 hrs given dofetilide - monitor QTc  · Continue Metoprolol succinate 50 mg b i d  which was increased by Cardiology  · Monitor electrolyte Cardiac cath showed 1st Diag lesion is 90% stenosed  Ramus lesion is 90% stenosed  Mid Cx lesion is 60% stenosed  RPDA lesion is 80% stenosed    · S/p stenting to D1 and ramus intermedius - DAPT x 1 week then transition to plavix + NOAC, statin, BB, ASA   · ICD postpone 90 days given stenting may need life vest

## 2022-10-31 NOTE — PLAN OF CARE
Problem: Potential for Falls  Goal: Patient will remain free of falls  Description: INTERVENTIONS:  - Educate patient/family on patient safety including physical limitations  - Instruct patient to call for assistance with activity   - Consult OT/PT to assist with strengthening/mobility   - Keep Call bell within reach  - Keep bed low and locked with side rails adjusted as appropriate  - Keep care items and personal belongings within reach  - Initiate and maintain comfort rounds  - Make Fall Risk Sign visible to staff  - Offer Toileting every  Hours, in advance of need  - Initiate/Maintainalarm  - Obtain necessary fall risk management equipment:   - Apply yellow socks and bracelet for high fall risk patients  - Consider moving patient to room near nurses station  Outcome: Progressing     Problem: CARDIOVASCULAR - ADULT  Goal: Maintains optimal cardiac output and hemodynamic stability  Description: INTERVENTIONS:  - Monitor I/O, vital signs and rhythm  - Monitor for S/S and trends of decreased cardiac output  - Administer and titrate ordered vasoactive medications to optimize hemodynamic stability  - Assess quality of pulses, skin color and temperature  - Assess for signs of decreased coronary artery perfusion  - Instruct patient to report change in severity of symptoms  Outcome: Progressing  Goal: Absence of cardiac dysrhythmias or at baseline rhythm  Description: INTERVENTIONS:  - Continuous cardiac monitoring, vital signs, obtain 12 lead EKG if ordered  - Administer antiarrhythmic and heart rate control medications as ordered  - Monitor electrolytes and administer replacement therapy as ordered  Outcome: Progressing     Problem: MOBILITY - ADULT  Goal: Maintain or return to baseline ADL function  Description: INTERVENTIONS:  -  Assess patient's ability to carry out ADLs; assess patient's baseline for ADL function and identify physical deficits which impact ability to perform ADLs (bathing, care of mouth/teeth, toileting, grooming, dressing, etc )  - Assess/evaluate cause of self-care deficits   - Assess range of motion  - Assess patient's mobility; develop plan if impaired  - Assess patient's need for assistive devices and provide as appropriate  - Encourage maximum independence but intervene and supervise when necessary  - Involve family in performance of ADLs  - Assess for home care needs following discharge   - Consider OT consult to assist with ADL evaluation and planning for discharge  - Provide patient education as appropriate  Outcome: Progressing  Goal: Maintains/Returns to pre admission functional level  Description: INTERVENTIONS:  - Perform BMAT or MOVE assessment daily    - Set and communicate daily mobility goal to care team and patient/family/caregiver  - Collaborate with rehabilitation services on mobility goals if consulted  - Perform Range of Motion times a day  - Reposition patient every  hours    - Dangle patient times a day  - Stand patient  times a day  - Ambulate patient times a day  - Out of bed to chair  times a day   - Out of bed for meals times a day  - Out of bed for toileting  - Record patient progress and toleration of activity level   Outcome: Progressing

## 2022-10-31 NOTE — PLAN OF CARE
Problem: CARDIOVASCULAR - ADULT  Goal: Maintains optimal cardiac output and hemodynamic stability  Description: INTERVENTIONS:  - Monitor I/O, vital signs and rhythm  - Monitor for S/S and trends of decreased cardiac output  - Administer and titrate ordered vasoactive medications to optimize hemodynamic stability  - Assess quality of pulses, skin color and temperature  - Assess for signs of decreased coronary artery perfusion  - Instruct patient to report change in severity of symptoms  Outcome: Progressing  Goal: Absence of cardiac dysrhythmias or at baseline rhythm  Description: INTERVENTIONS:  - Continuous cardiac monitoring, vital signs, obtain 12 lead EKG if ordered  - Administer antiarrhythmic and heart rate control medications as ordered  - Monitor electrolytes and administer replacement therapy as ordered  Outcome: Progressing     Problem: MOBILITY - ADULT  Goal: Maintain or return to baseline ADL function  Description: INTERVENTIONS:  -  Assess patient's ability to carry out ADLs; assess patient's baseline for ADL function and identify physical deficits which impact ability to perform ADLs (bathing, care of mouth/teeth, toileting, grooming, dressing, etc )  - Assess/evaluate cause of self-care deficits   - Assess range of motion  - Assess patient's mobility; develop plan if impaired  - Assess patient's need for assistive devices and provide as appropriate  - Encourage maximum independence but intervene and supervise when necessary  - Involve family in performance of ADLs  - Assess for home care needs following discharge   - Consider OT consult to assist with ADL evaluation and planning for discharge  - Provide patient education as appropriate  Outcome: Progressing

## 2022-10-31 NOTE — CASE MANAGEMENT
Case Management Discharge Planning Note    Patient name Sudha Leblanc  Location /-11 MRN [id number]  : 1955 Date 10/31/2022       Current Admission Date: 10/27/2022  Current Admission Diagnosis:VT (ventricular tachycardia)   Patient Active Problem List    Diagnosis Date Noted   • Dizziness 10/30/2022   • Probable Chronic diastolic congestive heart failure ([facility] ) 10/28/2022   • VT (ventricular tachycardia) 10/27/2022   • Dyspnea on exertion 2022   • Positive blood culture 2022   • Anemia 2022   • Paroxysmal A-fib ([facility] ) 2022   • GI bleed 2022   • Elevated troponin 2022   • SIRS (systemic inflammatory response syndrome) ([facility] ) 2022   • Personal history of colonic polyps 2022   • Anxiety 2021   • History of stroke 2021   • Dementia without behavioral disturbance ([facility] ) 10/29/2020   • Aortic stenosis 10/14/2020   • Ascending aortic aneurysm 10/14/2020   • Mitral regurgitation 10/14/2020   • History of cardioembolic cerebrovascular accident (CVA) 2020   • Unsteady gait 2020   • Abnormal MRI of head 2020   • Persistent insomnia 2020   • Brain atrophy ([facility] ) 2020   • Imbalance 2020   • [place] cardiac risk 10-20% in next 10 years 2020   • Neuropathy 2020   • CAMMIE (generalized anxiety disorder) 2020   • Rectal bleeding 2020   • Cubital tunnel syndrome on right 2019   • Healthcare maintenance 10/17/2018   • Immunization due 10/17/2018   • Screening for diabetes mellitus (DM) 2018   • Prostate cancer screening 2018   • Screening for cardiovascular, respiratory, and genitourinary diseases 2018   • Arthropathy 2017   • Internal hemorrhoids 2017   • Obesity (BMI 30-39 9) 2016   • Hyperlipidemia LDL goal <130 2016   • Low HDL (under 40) 2016   • Pre-diabetes 2016   • Benign essential hypertension 2016   • Tubular adenoma of colon 08/22/2014   • Chordee 06/23/2014   • Asthma, mild intermittent 06/22/2014   • Colon, diverticulosis 07/02/2013   • Allergic rhinitis 09/04/2012   • Obstructive sleep apnea 09/04/2012   • 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      LOS (days): 4  Geometric Mean LOS (GMLOS) (days): 2 00  Days to GMLOS:-2     OBJECTIVE:  Risk of Unplanned Readmission Score: 14 59         Current admission status: Inpatient   Preferred Pharmacy:   [address] [facility], [address] [facility] [address]  Phone: [phone] Fax: 21-21-71-75 - [place], [address] [facility]  Phone: [phone] Fax: [phone]    [facility]/pharmacy #[id number]- [facility]  [address] [facility] [address]  Phone: [phone] Fax: [phone]    [address] [place] [address] [place] [address] [facility] [address] [facility] 38 210 [facility]  Phone: [phone] Fax: [phone]    Primary Care Provider: Jaylin Bowser DO    Primary Insurance: MEDICARE  Secondary Insurance: BLUE CROSS    DISCHARGE DETAILS:    Discharge planning discussed with[de-identified] Patient  Freedom of Choice: Yes     CM contacted family/caregiver?: No- see comments (declined)  Were Treatment Team discharge recommendations reviewed with patient/caregiver?: Yes  Did patient/caregiver verbalize understanding of patient care needs?: Yes  Were patient/caregiver advised of the risks associated with not following Treatment Team discharge recommendations?: Yes    Contacts  Patient Contacts: [place]  Relationship to Patient[de-identified] Family  Contact Method: Phone  Phone Number: [phone]  Reason/Outcome: Emergency [address]         Is the patient interested in Harvey Phan at discharge?: No    DME Referral Provided  Referral made for DME?: No    Other Referral/Resources/Interventions Provided:  Interventions: LifeVest  Referral Comments: Patient made aware of lifevest order being sent, process for lifevest fitting and education  Patient declines any discharge needs, stated he is independent with ADLs, walking independently in room and at his baseline functioning level  Henrique Axon with [employer] aware of lifevest order           Treatment Team Recommendation: Home  Discharge Destination Plan[de-identified] Home  Transport at Discharge : Family

## 2022-10-31 NOTE — UTILIZATION REVIEW
Continued Stay Review    Date: 10/29/2022                          Current Patient Class: Inpatient Current Level of Care: Med/Surg    HPI:67 y o  male initially admitted on 10/27/2022    Assessment/Plan: Ventricular Tachycardia  Continue Dofetilide - Monitor QTc  Continue Cardio Pulmonary Monitoring  Monitor & Trend  Electrolyte  Cardiac cath showed 1st Diag lesion is 90% stenosed  Ramus lesion is 90% stenosed  Mid Cx lesion is 60% stenosed  RPDA lesion is 80% stenosed  S/p stenting to D1 and ramus intermedius - DAPT x 1 week then transition to plavix + NOAC, statin, BB, ASA  ICD postpone 90 days given stenting  On heparin - transition back to eliquis once cleared by EP        VTE Pharmacologic Prophylaxis: VTE Score: 3 Moderate Risk (Score 3-4) - Pharmacological DVT Prophylaxis Ordered: apixaban (Eliquis)      Vital Signs:   10/29/22 22:12:06 97 7 °F (36 5 °C) 67 20 144/94 111 97 % Nasal cannula Lying   10/29/22 18:38:15 98 7 °F (37 1 °C) 78 20 149/98 115 94 % Nasal cannula Sitting   10/29/22 15:06:36 97 8 °F (36 6 °C) 63 20 126/81 96 98 % Nasal cannula Lying   10/29/22 11:07:27 97 3 °F (36 3 °C) Abnormal  69 19 124/76 92 94 % -- --   10/29/22 09:12:43 -- 67 -- 125/75 92 95 % -- --   10/29/22 07:17:38 98 1 °F (36 7 °C) 67 18 127/75 92 96 % -- --   10/29/22 02:22:11 97 7 °F (36 5 °C) 78 -- 128/95 106 97 % -- Sitting       Pertinent Labs/Diagnostic Results:     Results from last 7 days   Lab Units 10/31/22  0238 10/30/22  0452 10/29/22  0444 10/28/22  0712 10/27/22  1557 10/26/22  1711   WBC Thousand/uL 7 26 8 63 8 38 6 40 5 57 7 49   HEMOGLOBIN g/dL 10 2* 9 8* 10 3* 10 3* 9 6* 10 2*   HEMATOCRIT % 35 6* 33 1* 35 4* 36 1* 33 6* 34 9*   PLATELETS Thousands/uL 279 268 302 302 299 297   NEUTROS ABS Thousands/µL  --   --   --   --  3 49 5 50     Results from last 7 days   Lab Units 10/31/22  0238 10/30/22  0452 10/29/22  0444 10/28/22  1941 10/28/22  0712   SODIUM mmol/L 140 143 140 141 142   POTASSIUM mmol/L 4 1 3 7 3 7 3 7 4 0   CHLORIDE mmol/L 113* 116* 112* 112* 111*   CO2 mmol/L 21 20* 22 23 24   ANION GAP mmol/L 6 7 6 6 7   BUN mg/dL 17 13 14 15 18   CREATININE mg/dL 0 89 0 88 0 88 0 90 0 97   EGFR ml/min/1 73sq m 89 90 90 89 81   CALCIUM mg/dL 8 9 8 5 8 4 8 3 8 6   MAGNESIUM mg/dL 2 4 2 4 2 3 2 4 2 2     Results from last 7 days   Lab Units 10/28/22  0824 10/27/22  1557 10/26/22  1711   AST U/L  --  17 21   ALT U/L  --  29 27   ALK PHOS U/L  --  99 110   TOTAL PROTEIN g/dL  --  6 3* 6 5   ALBUMIN g/dL  --  3 4* 3 7   TOTAL BILIRUBIN mg/dL  --  0 70 0 69   AMMONIA umol/L 16  --   --      Results from last 7 days   Lab Units 10/31/22  0758 10/26/22  1640   POC GLUCOSE mg/dl 92 115     Results from last 7 days   Lab Units 10/31/22  0238 10/30/22  0452 10/29/22  0444 10/28/22  1941 10/28/22  0712 10/27/22  1557 10/26/22  1711   GLUCOSE RANDOM mg/dL 106 98 108 113 113 110 109       Results from last 7 days   Lab Units 10/27/22  1819 10/27/22  1557 10/26/22  1918 10/26/22  1711   HS TNI 0HR ng/L  --  29  --  60*   HS TNI 2HR ng/L 29  --  69*  --    HSTNI D2 ng/L 0  --  9  --    HS TNI 4HR ng/L 29  --   --   --    HSTNI D4 ng/L 0  --   --   --      Results from last 7 days   Lab Units 10/28/22  0712 10/28/22  0026 10/27/22  1819   PROTIME seconds  --   --  12 5   INR   --   --  0 91   PTT seconds 54* 39* 21*     Results from last 7 days   Lab Units 10/27/22  1557   TSH 3RD GENERATON uIU/mL 1 030     Results from last 7 days   Lab Units 10/28/22  0824   FERRITIN ng/mL 11     Results from last 7 days   Lab Units 10/26/22  1807   CLARITY UA  Slightly Cloudy   COLOR UA  [place]   SPEC GRAV UA  >=1 030   PH UA  6 0   GLUCOSE UA mg/dl Negative   KETONES UA mg/dl Trace*   BLOOD UA  Negative   PROTEIN UA mg/dl 30 (1+)*   NITRITE UA  Negative   BILIRUBIN UA  Small*   UROBILINOGEN UA E U /dl 1 0   LEUKOCYTES UA  Negative   WBC UA /hpf 1-2   RBC UA /hpf 2-4   BACTERIA UA /hpf Occasional   EPITHELIAL CELLS WET PREP /hpf Occasional   MUCUS THREADS  Innumerable*     Medications:   Medication Dose Route Frequency   • acetaminophen  650 mg Oral Q6H PRN   • aspirin  81 mg Oral Daily   • atorvastatin  40 mg Oral QPM   • clopidogrel  75 mg Oral Daily   • dofetilide  250 mcg Oral Q12H [address]   • LORazepam  0 5 mg Intravenous Once   • magnesium oxide  400 mg Oral Daily   • metoprolol succinate  50 mg Oral BID   • potassium chloride  40 mEq Oral Once          Discharge Plan: TBD    Network Utilization Review Department  ATTENTION: Please call with any questions or concerns to [phone] and carefully listen to the prompts so that you are directed to the right person  All voicemails are confidential   Miguel Wilkerson all requests for admission clinical reviews, approved or denied determinations and any other requests to dedicated fax number below belonging to the campus where the patient is receiving treatment   List of dedicated fax numbers for the Facilities:  [address] DENIALS (Administrative/Medical Necessity) [phone]   [address] (Maternity/NICU/Pediatrics) [phone]   4 Claudia Grossman [phone]   [place] [address] [phone]   [facility] [address] [place] [address] [facility] [phone]   [address] [facility] Roel Chapman [facility] 134 815 [facility] [phone]

## 2022-10-31 NOTE — CASE MANAGEMENT
Case Management Discharge Planning Note    Patient name Natty Meadows  Location /-30 MRN [id number]  : 1955 Date 10/31/2022       Current Admission Date: 10/27/2022  Current Admission Diagnosis:VT (ventricular tachycardia)   Patient Active Problem List    Diagnosis Date Noted   • Dizziness 10/30/2022   • Probable Chronic diastolic congestive heart failure ([facility] ) 10/28/2022   • VT (ventricular tachycardia) 10/27/2022   • Dyspnea on exertion 2022   • Positive blood culture 2022   • Anemia 2022   • Paroxysmal A-fib ([place] [address] ) 2022   • GI bleed 2022   • Elevated troponin 2022   • SIRS (systemic inflammatory response syndrome) ([place] [address] ) 2022   • Personal history of colonic polyps 2022   • Anxiety 2021   • History of stroke 2021   • Dementia without behavioral disturbance ([place] [address] ) 10/29/2020   • Aortic stenosis 10/14/2020   • Ascending aortic aneurysm 10/14/2020   • Mitral regurgitation 10/14/2020   • History of cardioembolic cerebrovascular accident (CVA) 2020   • Unsteady gait 2020   • Abnormal MRI of head 2020   • Persistent insomnia 2020   • Brain atrophy ([place] [address] ) 2020   • Imbalance 2020   • [place] cardiac risk 10-20% in next 10 years 2020   • Neuropathy 2020   • CAMMIE (generalized anxiety disorder) 2020   • Rectal bleeding 2020   • Cubital tunnel syndrome on right 2019   • Healthcare maintenance 10/17/2018   • Immunization due 10/17/2018   • Screening for diabetes mellitus (DM) 2018   • Prostate cancer screening 2018   • Screening for cardiovascular, respiratory, and genitourinary diseases 2018   • Arthropathy 2017   • Internal hemorrhoids 2017   • Obesity (BMI 30-39 9) 2016   • Hyperlipidemia LDL goal <130 2016   • Low HDL (under 40) 2016   • Pre-diabetes 2016   • Benign essential hypertension 2016   • Tubular adenoma of colon 08/22/2014   • Chordee 06/23/2014   • Asthma, mild intermittent 06/22/2014   • Colon, diverticulosis 07/02/2013   • Allergic rhinitis 09/04/2012   • Obstructive sleep apnea 09/04/2012   • 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      LOS (days): 4  Geometric Mean LOS (GMLOS) (days): 2 00  Days to GMLOS:-1 9     OBJECTIVE:  Risk of Unplanned Readmission Score: 14 59         Current admission status: Inpatient   Preferred Pharmacy:   [address] [place] [address], [address] [place] [address]  Phone: [phone] Fax: 21-21-71-75 [facility]  [address] [place] [address]  Phone: [phone] Fax: [phone]    [id number]- [address]  [address] [facility] [address] [address]  Phone: [phone] Fax: [phone] [address] [place],[address], [facility] [address] [facility] [address] [place] [address] [facility] 77 [address]  Phone: [phone] Fax: [phone]    Primary Care Provider: Anselmo Galeazzi, DO    Primary Insurance: MEDICARE  Secondary Insurance: BLUE CROSS    DISCHARGE DETAILS:                                          Other Referral/Resources/Interventions Provided:  Interventions: LifeVest  Referral Comments: Lifevest order obtained from Dr Topher Parikh  Order and clinical faxed to Isrrael Reed with Chey Roberts @ [phone]

## 2022-10-31 NOTE — ASSESSMENT & PLAN NOTE
· Seen on loop recorder, on metoprolol and Relinquish  · Eliquis was held for possible procedure however patient resumed back on Eliquis as per AAP recommendations

## 2022-10-31 NOTE — PROGRESS NOTES
[address] [facility]  Progress Note Ana Paula Old 1955, 74 y o  male MRN: [id number]  Unit/Bed#: -01 Encounter: [id number]  Primary Care Provider: Zunilda Main DO   Date and time admitted to hospital: 10/27/2022  3:08 PM    Probable Chronic diastolic congestive heart failure [facility])  Assessment & Plan  Wt Readings from Last 3 Encounters:   10/31/22 99 4 kg (219 lb 2 2 oz)   10/27/22 108 kg (239 lb)   10/26/22 107 kg (235 lb)     · Probable chronic diastolic CHF per cardiology note on 10/27  · Echocardiogram with grade 1 diastolic dysfunction  · Echo with EF 60% moderately calcified, moderately reduced mobility of aortic valve resulting in mild to moderate stenosis  · On Toprol, and HCTZ  · Monitor for fluid overload        Paroxysmal A-fib (HCC)  Assessment & Plan  · Seen on loop recorder, on metoprolol and Relinquish  · Eliquis was held for possible procedure however patient resumed back on Eliquis as per AAP recommendations  Aortic stenosis  Assessment & Plan  · Mild AS on last echo    Dementia without behavioral disturbance ([facility] )  Assessment & Plan  · Noted history of mild cognitive deficits per review of Neurology notes in 2020 post stroke  · Likely vascular  · Patient with period of agitation early this morning resolved spontaneously  · Continue to monitor, and provide supportive care    History of cardioembolic cerebrovascular accident (CVA)  Assessment & Plan  · History of cryptogenic stroke in 2020    Asthma, mild intermittent  Assessment & Plan  · Not maintained on inhalers  · Patient is not using albuterol    * VT (ventricular tachycardia)  Assessment & Plan  · Patient patient has a history of stroke, status post loop recorder in December 2021  · Yesterday while doing physical therapy he fell, seems like he lost consciousness, does not remember the episode    Yesterday he was more active than usually, he was mowing the lawn, walked to physical therapy  · On loop recorder interrogation he had NSVT for 47 seconds  · Telemetry no significant events   · Continue 48 hrs given dofetilide - monitor QTc  · Continue Metoprolol succinate 50 mg b i d  which was increased by Cardiology  · Monitor electrolyte Cardiac cath showed 1st Diag lesion is 90% stenosed  Ramus lesion is 90% stenosed  Mid Cx lesion is 60% stenosed  RPDA lesion is 80% stenosed  · S/p stenting to D1 and ramus intermedius - DAPT x 1 week then transition to plavix + NOAC, statin, BB, ASA   · ICD postpone 90 days given stenting may need life vest          VTE Pharmacologic Prophylaxis: VTE Score: 3 Moderate Risk (Score 3-4) - Pharmacological DVT Prophylaxis Ordered: apixaban (Eliquis)  Patient Centered Rounds: I performed bedside rounds with nursing staff today  Discussions with Specialists or Other Care Team Provider: Cardiology and CM team     Education and Discussions with Family / Patient: Updated  (wife) at bedside  Time Spent for Care: 30 minutes  More than 50% of total time spent on counseling and coordination of care as described above  Current Length of Stay: 4 day(s)  Current Patient Status: Inpatient   Certification Statement: The patient will continue to require additional inpatient hospital stay due to Pending LifeVest  Discharge Plan: Anticipate discharge in 24-48 hrs to home  Code Status: Level 1 - Full Code    Subjective:   Patient examined at bedside, denies any active complaints at this point of time  Objective:     Vitals:   Temp (24hrs), Av 9 °F (36 6 °C), Min:97 3 °F (36 3 °C), Max:98 2 °F (36 8 °C)    Temp:  [97 3 °F (36 3 °C)-98 2 °F (36 8 °C)] 98 2 °F (36 8 °C)  HR:  [60-66] 63  Resp:  [18-20] 18  BP: (121-147)/(74-88) 121/77  SpO2:  [95 %] 95 %  Body mass index is 29 72 kg/m²  Input and Output Summary (last 24 hours):      Intake/Output Summary (Last 24 hours) at 10/31/2022 1702  Last data filed at 10/31/2022 0238  Gross per 24 hour Intake 540 ml   Output 350 ml   Net 190 ml       Physical Exam:   Physical Exam  Constitutional:       Appearance: Normal appearance  HENT:      Head: Normocephalic and atraumatic  Mouth/Throat:      Mouth: Mucous membranes are moist       Pharynx: Oropharynx is clear  Eyes:      Extraocular Movements: Extraocular movements intact  Conjunctiva/sclera: Conjunctivae normal    Cardiovascular:      Rate and Rhythm: Normal rate and regular rhythm  Pulses: Normal pulses  Heart sounds: Normal heart sounds  Pulmonary:      Effort: Pulmonary effort is normal       Breath sounds: Normal breath sounds  Abdominal:      General: Abdomen is flat  Bowel sounds are normal    Musculoskeletal:         General: Normal range of motion  Cervical back: Normal range of motion  Skin:     General: Skin is warm and dry  Neurological:      General: No focal deficit present  Mental Status: He is alert and oriented to person, place, and time  Additional Data:     Labs:  Results from last 7 days   Lab Units 10/31/22  0238 10/28/22  0712 10/27/22  1557   WBC Thousand/uL 7 26   < > 5 57   HEMOGLOBIN g/dL 10 2*   < > 9 6*   HEMATOCRIT % 35 6*   < > 33 6*   PLATELETS Thousands/uL 279   < > 299   NEUTROS PCT %  --   --  62   LYMPHS PCT %  --   --  20   MONOS PCT %  --   --  13*   EOS PCT %  --   --  3    < > = values in this interval not displayed  Results from last 7 days   Lab Units 10/31/22  0238 10/28/22  0712 10/27/22  1557   SODIUM mmol/L 140   < > 141   POTASSIUM mmol/L 4 1   < > 3 5   CHLORIDE mmol/L 113*   < > 112*   CO2 mmol/L 21   < > 25   BUN mg/dL 17   < > 22   CREATININE mg/dL 0 89   < > 1 02   ANION GAP mmol/L 6   < > 4   CALCIUM mg/dL 8 9   < > 8 7   ALBUMIN g/dL  --   --  3 4*   TOTAL BILIRUBIN mg/dL  --   --  0 70   ALK PHOS U/L  --   --  99   ALT U/L  --   --  29   AST U/L  --   --  17   GLUCOSE RANDOM mg/dL 106   < > 110    < > = values in this interval not displayed  Results from last 7 days   Lab Units 10/27/22  1819   INR  0 91     Results from last 7 days   Lab Units 10/31/22  0758 10/26/22  1640   POC GLUCOSE mg/dl 92 115               Lines/Drains:  Invasive Devices  Report    Peripheral Intravenous Line  Duration           Peripheral IV 10/28/22 Right Antecubital 3 days                  Telemetry:  Telemetry Orders (From admission, onward)             48 Hour Telemetry Monitoring  Continuous x 48 hours           References:    Telemetry Guidelines   Question:  Reason for 48 Hour Telemetry  Answer:  Arrhythmias Requiring Medical Therapy (eg  SVT, Vtach/fib, Bradycardia, Uncontrolled A-fib)                 Telemetry Reviewed: Normal Sinus Rhythm  Indication for Continued Telemetry Use: Lifevest (remains on tele entire hospital stay)             Imaging: No pertinent imaging reviewed  Recent Cultures (last 7 days):         Last 24 Hours Medication List:   Current Facility-Administered Medications   Medication Dose Route Frequency Provider Last Rate   • acetaminophen  650 mg Oral Q6H PRN Vero Scruggs MD     • apixaban  5 mg Oral BID Gurinder Cruz PA-C     • aspirin  81 mg Oral Daily ELVIS Martinez     • atorvastatin  40 mg Oral QPM Vero Scruggs MD     • clopidogrel  75 mg Oral Daily ELVIS Martinez     • magnesium oxide  400 mg Oral Daily Hilda Galaviz PA-C     • metoprolol succinate  50 mg Oral BID Vero Scruggs MD          Today, Patient Was Seen By: Brenda Cano    **Please Note: This note may have been constructed using a voice recognition system  **

## 2022-10-31 NOTE — ASSESSMENT & PLAN NOTE
Wt Readings from Last 3 Encounters:   10/31/22 99 4 kg (219 lb 2 2 oz)   10/27/22 108 kg (239 lb)   10/26/22 107 kg (235 lb)     · Probable chronic diastolic CHF per cardiology note on 10/27  · Echocardiogram with grade 1 diastolic dysfunction  · Echo with EF 60% moderately calcified, moderately reduced mobility of aortic valve resulting in mild to moderate stenosis  · On Toprol, and HCTZ    · Monitor for fluid overload

## 2022-11-01 PROBLEM — I25.10 CAD (CORONARY ARTERY DISEASE): Status: ACTIVE | Noted: 2022-11-01

## 2022-11-01 LAB
ANION GAP SERPL CALCULATED.3IONS-SCNC: 3 MMOL/L (ref 4–13)
BUN SERPL-MCNC: 20 MG/DL (ref 5–25)
CALCIUM SERPL-MCNC: 9.1 MG/DL (ref 8.3–10.1)
CHLORIDE SERPL-SCNC: 112 MMOL/L (ref 96–108)
CO2 SERPL-SCNC: 24 MMOL/L (ref 21–32)
CREAT SERPL-MCNC: 0.93 MG/DL (ref 0.6–1.3)
ERYTHROCYTE [DISTWIDTH] IN BLOOD BY AUTOMATED COUNT: 14.8 % (ref 11.6–15.1)
GFR SERPL CREATININE-BSD FRML MDRD: 85 ML/MIN/1.73SQ M
GLUCOSE SERPL-MCNC: 105 MG/DL (ref 65–140)
HCT VFR BLD AUTO: 36.4 % (ref 36.5–49.3)
HGB BLD-MCNC: 10.5 G/DL (ref 12–17)
MAGNESIUM SERPL-MCNC: 2.5 MG/DL (ref 1.6–2.6)
MCH RBC QN AUTO: 22.4 PG (ref 26.8–34.3)
MCHC RBC AUTO-ENTMCNC: 28.8 G/DL (ref 31.4–37.4)
MCV RBC AUTO: 78 FL (ref 82–98)
PLATELET # BLD AUTO: 295 THOUSANDS/UL (ref 149–390)
PMV BLD AUTO: 9.2 FL (ref 8.9–12.7)
POTASSIUM SERPL-SCNC: 3.8 MMOL/L (ref 3.5–5.3)
RBC # BLD AUTO: 4.69 MILLION/UL (ref 3.88–5.62)
SODIUM SERPL-SCNC: 139 MMOL/L (ref 135–147)
WBC # BLD AUTO: 7.57 THOUSAND/UL (ref 4.31–10.16)

## 2022-11-01 RX ORDER — ASPIRIN 81 MG/1
81 TABLET, CHEWABLE ORAL DAILY
Qty: 30 TABLET | Refills: 0
Start: 2022-11-02 | End: 2022-12-02

## 2022-11-01 RX ORDER — CLOPIDOGREL BISULFATE 75 MG/1
75 TABLET ORAL DAILY
Qty: 30 TABLET | Refills: 1 | Status: SHIPPED | OUTPATIENT
Start: 2022-11-02 | End: 2022-12-02

## 2022-11-01 RX ADMIN — APIXABAN 5 MG: 5 TABLET, FILM COATED ORAL at 08:07

## 2022-11-01 RX ADMIN — ASPIRIN 81 MG CHEWABLE TABLET 81 MG: 81 TABLET CHEWABLE at 08:07

## 2022-11-01 RX ADMIN — APIXABAN 5 MG: 5 TABLET, FILM COATED ORAL at 17:40

## 2022-11-01 RX ADMIN — METOPROLOL SUCCINATE 50 MG: 50 TABLET, EXTENDED RELEASE ORAL at 08:07

## 2022-11-01 RX ADMIN — CLOPIDOGREL BISULFATE 75 MG: 75 TABLET ORAL at 08:07

## 2022-11-01 RX ADMIN — ATORVASTATIN CALCIUM 40 MG: 40 TABLET, FILM COATED ORAL at 17:40

## 2022-11-01 RX ADMIN — MAGNESIUM OXIDE TAB 400 MG (241.3 MG ELEMENTAL MG) 400 MG: 400 (241.3 MG) TAB at 08:07

## 2022-11-01 RX ADMIN — METOPROLOL SUCCINATE 50 MG: 50 TABLET, EXTENDED RELEASE ORAL at 19:57

## 2022-11-01 NOTE — CASE MANAGEMENT
Case Management Progress Note    Patient name Dilip Broussard /-62 MRN [id number]  : 1955 Date 2022       LOS (days): 5  Geometric Mean LOS (GMLOS) (days): 2 00  Days to GMLOS:-3        OBJECTIVE:        Current admission status: Inpatient  [facility]   [facility] [address]  Phone: [phone] Fax: 71[address]  [place] [address] [address]   Phone: [phone] Fax: [phone]    [address]  [facility] [place] [address]  Phone: [phone] Fax: [phone]    [address] [place] [address],[facility] [place] [address] [facility] [address] [address]  Phone: [phone] Fax: [phone]    Primary Care Provider: Mery Molina DO    Primary Insurance: MEDICARE  Secondary Insurance: BLUE CROSS    PROGRESS NOTE:    Per Neftali Cotter with [facility], patient now cannot be fit for lifevest as he needs insurance authorization  Patient and Dr Jen Haynes notified

## 2022-11-01 NOTE — ASSESSMENT & PLAN NOTE
Wt Readings from Last 3 Encounters:   11/01/22 105 kg (230 lb 12 8 oz)   10/27/22 108 kg (239 lb)   10/26/22 107 kg (235 lb)     · Probable chronic diastolic CHF per cardiology note on 10/27  · Echocardiogram with grade 1 diastolic dysfunction    · Echo with EF 60% moderately calcified, moderately reduced mobility of aortic valve resulting in mild to moderate stenosis  · Compensated

## 2022-11-01 NOTE — ASSESSMENT & PLAN NOTE
S/p LHC/ABHILASH to D1, radius intermedius  Pt to be on triple therapy x 1 month, then plavix and eliquis only  Cont metoprolol   statin

## 2022-11-01 NOTE — CASE MANAGEMENT
Case Management Discharge Planning Note    Patient name Jd لاعراقي  Location /-36 MRN [id number]  : 1955 Date 2022       Current Admission Date: 10/27/2022  Current Admission Diagnosis:VT (ventricular tachycardia)   Patient Active Problem List    Diagnosis Date Noted   • Dizziness 10/30/2022   • [facility]able [facility] ) 10/28/2022   • VT (ventricular tachycardia) 10/27/2022   • Dyspnea on exertion 2022   • Positive blood culture 2022   • Anemia 2022   • Paroxysmal A-fib ([place] [address] ) 2022   • GI bleed 2022   • Elevated troponin 2022   • SIRS (systemic inflammatory response syndrome) ([place] [address] ) 2022   • Personal history of colonic polyps 2022   • Anxiety 2021   • History of stroke 2021   • Dementia without behavioral disturbance ([place] [address] ) 10/29/2020   • Aortic stenosis 10/14/2020   • Ascending aortic aneurysm 10/14/2020   • Mitral regurgitation 10/14/2020   • History of cardioembolic cerebrovascular accident (CVA) 2020   • Unsteady gait 2020   • Abnormal MRI of head 2020   • Persistent insomnia 2020   • Brain atrophy ([place] [address] ) 2020   • Imbalance 2020   • [place] cardiac risk 10-20% in next 10 years 2020   • Neuropathy 2020   • CAMMIE (generalized anxiety disorder) 2020   • Rectal bleeding 2020   • Cubital tunnel syndrome on right 2019   • Healthcare maintenance 10/17/2018   • Immunization due 10/17/2018   • Screening for diabetes mellitus (DM) 2018   • Prostate cancer screening 2018   • Screening for cardiovascular, respiratory, and genitourinary diseases 2018   • Arthropathy 2017   • Internal hemorrhoids 2017   • Obesity (BMI 30-39 9) 2016   • Hyperlipidemia LDL goal <130 2016   • Low HDL (under 40) 2016   • Pre-diabetes 2016   • Benign essential hypertension 2016   • Tubular adenoma of colon 08/22/2014   • Chordee 06/23/2014   • Asthma, mild intermittent 06/22/2014   • Colon, diverticulosis 07/02/2013   • Allergic rhinitis 09/04/2012   • Obstructive sleep apnea 09/04/2012   • 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      LOS (days): 5  Geometric Mean LOS (GMLOS) (days): 2 00  Days to GMLOS:-2 7     OBJECTIVE:  Risk of Unplanned Readmission Score: 14 62         Current admission status: Inpatient   Preferred Pharmacy:   [facility], [address]  Phone: [phone] Fax: -[phone] [facility], [facility]  Phone: [phone] Fax: [phone]    [address]  [place] [address]  Phone: [phone] Fax: [phone]    [address] [place] [address] [address] 38 [facility]  Phone: [phone] Fax: [phone]    Primary Care Provider: Marshall Mcgowan DO    Primary Insurance: MEDICARE  Secondary Insurance: BLUE CROSS    DISCHARGE DETAILS:             IMM Given (Date):: 11/01/22  IMM Given to[de-identified] Patient  Patient anticipated discharge today after lifevest fitting  IMM reviewed with patient, patient agrees with discharge determination

## 2022-11-01 NOTE — DISCHARGE SUMMARY
[address]  Discharge- [facility] 1955, 74 y o  male MRN: [id number]  Unit/Bed#: -01 Encounter: [id number]  Primary Care Provider: German Reed DO   Date and time admitted to hospital: 10/27/2022  3:08 PM    * VT (ventricular tachycardia)  Assessment & Plan  · Patient with has a history of stroke, status post loop recorder in December 2021  · Presenting with syncope; s/p interrogation of loop recorder noted with NSVT x 47 seconds  · EP on board  Discontinued tikosyn  · Continue Metoprolol succinate 50 mg b i d    · S/p Cardiac cath revealing 1st Diag lesion is 90% stenosed  Ramus lesion is 90% stenosed  Mid Cx lesion is 60% stenosed  RPDA lesion is 80% stenosed  · S/p stenting of D1 and ramus intermedius - DAPT x 4 weeks then transition to plavix + NOAC, statin, BB, ASA   · ICD postponed 90 days given stenting  Pt has been fitted for lifevest  · Ok per EP to discharge    Probable Chronic diastolic congestive heart failure [facility])  Assessment & Plan  Wt Readings from Last 3 Encounters:   11/01/22 105 kg (230 lb 12 8 oz)   10/27/22 108 kg (239 lb)   10/26/22 107 kg (235 lb)     · Probable chronic diastolic CHF per cardiology note on 10/27  · Echocardiogram with grade 1 diastolic dysfunction  · Echo with EF 60% moderately calcified, moderately reduced mobility of aortic valve resulting in mild to moderate stenosis  · Compensated        Paroxysmal A-fib (HCC)  Assessment & Plan  · Rate control with metoprolol  · A/c with eliquis    CAD (coronary artery disease)  Assessment & Plan  S/p LHC/ABHILASH to D1, radius intermedius  Pt to be on triple therapy x 1 month, then plavix and eliquis only  Cont metoprolol  statin    Aortic stenosis  Assessment & Plan  · Mild AS on last echo    Dementia without behavioral disturbance ([facility] )  Assessment & Plan  · Noted history of mild cognitive deficits per review of Neurology notes in 2020 post stroke    · Likely vascular related given hx      History of cardioembolic cerebrovascular accident (CVA)  Assessment & Plan  · History of cryptogenic stroke in 2020    Asthma, mild intermittent  Assessment & Plan  · No sign of exacerbation    Discharging Physician / Practitioner: Hattie Christensen  PCP: Chichi Maurer DO  Admission Date:   Admission Orders (From admission, onward)     Ordered        10/27/22 [address] [facility] [address]  Once                      Discharge Date: 11/01/22    Medical Problems             Resolved Problems  Date Reviewed: 11/1/2022   None                 Consultations During Hospital Stay:  · EP/cardiology    Procedures Performed:   · Left heart catherization    Significant Findings / Test Results:   · See above    Incidental Findings:   ·     Test Results Pending at Discharge (will require follow up):   ·      Outpatient Tests Requested:  ·     Complications:  None    Reason for Admission: Syncope, VT    Hospital Course:     HPI: Tavon Tobin is a 74 y o  male with a PMH of hypertension, stroke, paroxysmal atrial fibrillation who presents with VT  Patient has a history of cryptogenic stroke, status post loop recorder placement in 2021  Yesterday he was more active than usually, he was mowing the lawn  He also walked to physical therapy  He is going to physical therapy for balance issues  During physical therapy he fell  He does not remember what happened  He was seen in the ED, CT scan of the head was negative and he was referred to Cardiology and he was seen the next day  Was seen by Cardiology today, reviewed recorder showed 47 seconds of nonsustaine V-tach  Cardiology discussed with EP as outpatient her reviewed loop recorder report and they advised him to come to [facility] for EP eval, ischemic eval, possible defibrillator placement  Upon my evaluation patient is awake and alert, no distress    Denies headache, visual changes, lightheadedness, dizziness chest pain palpitation shortness of breath nausea vomiting abdominal pain or trouble with urination  Please see above list of diagnoses and related plan for additional information  Condition at Discharge: stable     Discharge Day Visit / Exam:     Subjective:  Pt seen and examined at bedside  Wife also present  No acute events overnight  Has no complaints  Vitals: Blood Pressure: 141/91 (11/01/22 1125)  Pulse: 71 (11/01/22 1125)  Temperature: 97 9 °F (36 6 °C) (11/01/22 1125)  Temp Source: Oral (10/31/22 4412)  Respirations: 16 (11/01/22 0735)  Height: 6' (182 9 cm) (10/28/22 1015)  Weight - Scale: 105 kg (230 lb 12 8 oz) (11/01/22 0540)  SpO2: 95 % (11/01/22 1125)  Exam:   Physical Exam  Constitutional:       Appearance: He is obese  Cardiovascular:      Rate and Rhythm: Normal rate and regular rhythm  Pulses: Normal pulses  Heart sounds: Normal heart sounds  Pulmonary:      Effort: Pulmonary effort is normal  No respiratory distress  Breath sounds: Normal breath sounds  No wheezing or rales  Abdominal:      General: Bowel sounds are normal  There is no distension  Palpations: Abdomen is soft  Tenderness: There is no abdominal tenderness  There is no guarding  Musculoskeletal:         General: Normal range of motion  Cervical back: Normal range of motion and neck supple  Right lower leg: No edema  Left lower leg: No edema  Skin:     General: Skin is warm and dry  Neurological:      General: No focal deficit present  Mental Status: He is alert  Mental status is at baseline  Cranial Nerves: No cranial nerve deficit  Motor: No weakness  Comments: Mild cognitive/memory impairment         Discussion with Family: Discussed with wife at length    Discharge instructions/Information to patient and family:   See after visit summary for information provided to patient and family        Provisions for Follow-Up Care:  See after visit summary for information related to follow-up care and any pertinent home health orders  Disposition:     Home    For Discharges to [facility] SNF:   · Not Applicable to this Patient - Not Applicable to this Patient    Planned Readmission: No     Discharge Statement:  I spent 35 minutes discharging the patient  This time was spent on the day of discharge  I had direct contact with the patient on the day of discharge  Greater than 50% of the total time was spent examining patient, answering all patient questions, arranging and discussing plan of care with patient as well as directly providing post-discharge instructions  Additional time then spent on discharge activities  Discharge Medications:  See after visit summary for reconciled discharge medications provided to patient and family        ** Please Note: This note has been constructed using a voice recognition system **

## 2022-11-01 NOTE — CASE MANAGEMENT
Case Management Discharge Planning Note    Patient name Barbara Chin  Location /-25 MRN [id number]  : 1955 Date 2022       Current Admission Date: 10/27/2022  Current Admission Diagnosis:VT (ventricular tachycardia)   Patient Active Problem List    Diagnosis Date Noted   • Dizziness 10/30/2022   • Probable Chronic diastolic congestive [facility] ) 10/28/2022   • VT (ventricular tachycardia) 10/27/2022   • Dyspnea on exertion 2022   • Positive blood culture 2022   • Anemia 2022   • Paroxysmal A-fib ([place] [address] ) 2022   • GI bleed 2022   • Elevated troponin 2022   • SIRS (systemic inflammatory response syndrome) ([place] [address] ) 2022   • Personal history of colonic polyps 2022   • Anxiety 2021   • History of stroke 2021   • Dementia without behavioral disturbance ([place] [address] ) 10/29/2020   • Aortic stenosis 10/14/2020   • Ascending aortic aneurysm 10/14/2020   • Mitral regurgitation 10/14/2020   • History of cardioembolic cerebrovascular accident (CVA) 2020   • Unsteady gait 2020   • Abnormal MRI of head 2020   • Persistent insomnia 2020   • Brain atrophy ([place] [address] ) 2020   • Imbalance 2020   • [place] cardiac risk 10-20% in next 10 years 2020   • Neuropathy 2020   • CAMMIE (generalized anxiety disorder) 2020   • Rectal bleeding 2020   • Cubital tunnel syndrome on right 2019   • Healthcare maintenance 10/17/2018   • Immunization due 10/17/2018   • Screening for diabetes mellitus (DM) 2018   • Prostate cancer screening 2018   • Screening for cardiovascular, respiratory, and genitourinary diseases 2018   • Arthropathy 2017   • Internal hemorrhoids 2017   • Obesity (BMI 30-39 9) 2016   • Hyperlipidemia LDL goal <130 2016   • Low HDL (under 40) 2016   • Pre-diabetes 2016   • Benign essential hypertension 2016   • Tubular adenoma of colon 08/22/2014   • Chordee 06/23/2014   • Asthma, mild intermittent 06/22/2014   • Colon, diverticulosis 07/02/2013   • Allergic rhinitis 09/04/2012   • Obstructive sleep apnea 09/04/2012   • 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      LOS (days): 5  Geometric Mean LOS (GMLOS) (days): 2 00  Days to GMLOS:-2 8     OBJECTIVE:  Risk of Unplanned Readmission Score: 14 62         Current admission status: Inpatient   Preferred Pharmacy:   [address] [place] [address], [address] [place] [address]  Phone: [phone] Fax: [phone] - [phone], [place] [address]  Phone: [phone] Fax: [phone]    [address], [address]  [address] [place] [address]  Phone: [phone] Fax: [phone]    [address] [place] [address] [address] [facility] 94 [facility] [address] [facility] 77 [address]  Phone: [phone] Fax: [phone]    Primary Care Provider: Nadeem Valdez DO    Primary Insurance: MEDICARE  Secondary Insurance: BLUE CROSS    DISCHARGE DETAILS:                                          Other Referral/Resources/Interventions Provided:  Referral Comments: Per Stephen Mattson with [employer] approved for patient  Patient to be fit at 530pm  Patient, Dr Jen Sharif, and Jen Sharif RN made aware

## 2022-11-01 NOTE — ASSESSMENT & PLAN NOTE
· Patient with has a history of stroke, status post loop recorder in December 2021  · Presenting with syncope; s/p interrogation of loop recorder noted with NSVT x 47 seconds  · EP on board  Discontinued tikosyn  · Continue Metoprolol succinate 50 mg b i d    · S/p Cardiac cath revealing 1st Diag lesion is 90% stenosed  Ramus lesion is 90% stenosed  Mid Cx lesion is 60% stenosed  RPDA lesion is 80% stenosed  · S/p stenting of D1 and ramus intermedius - DAPT x 4 weeks then transition to plavix + NOAC, statin, BB, ASA   · ICD postponed 90 days given stenting   Pt has been fitted for lifevest  · Ok per EP to discharge

## 2022-11-01 NOTE — ASSESSMENT & PLAN NOTE
· Noted history of mild cognitive deficits per review of Neurology notes in 2020 post stroke    · Likely vascular related given hx

## 2022-11-02 RX ORDER — LANOLIN ALCOHOL/MO/W.PET/CERES
3 CREAM (GRAM) TOPICAL
Status: DISCONTINUED | OUTPATIENT
Start: 2022-11-02 | End: 2022-11-03 | Stop reason: HOSPADM

## 2022-11-02 RX ORDER — LANOLIN ALCOHOL/MO/W.PET/CERES
3 CREAM (GRAM) TOPICAL
Status: DISCONTINUED | OUTPATIENT
Start: 2022-11-02 | End: 2022-11-02

## 2022-11-02 RX ADMIN — METOPROLOL SUCCINATE 50 MG: 50 TABLET, EXTENDED RELEASE ORAL at 19:10

## 2022-11-02 RX ADMIN — CLOPIDOGREL BISULFATE 75 MG: 75 TABLET ORAL at 08:31

## 2022-11-02 RX ADMIN — ASPIRIN 81 MG CHEWABLE TABLET 81 MG: 81 TABLET CHEWABLE at 08:31

## 2022-11-02 RX ADMIN — METOPROLOL SUCCINATE 50 MG: 50 TABLET, EXTENDED RELEASE ORAL at 08:31

## 2022-11-02 RX ADMIN — MAGNESIUM OXIDE TAB 400 MG (241.3 MG ELEMENTAL MG) 400 MG: 400 (241.3 MG) TAB at 08:33

## 2022-11-02 RX ADMIN — ATORVASTATIN CALCIUM 40 MG: 40 TABLET, FILM COATED ORAL at 17:16

## 2022-11-02 RX ADMIN — APIXABAN 5 MG: 5 TABLET, FILM COATED ORAL at 08:31

## 2022-11-02 RX ADMIN — APIXABAN 5 MG: 5 TABLET, FILM COATED ORAL at 17:16

## 2022-11-02 RX ADMIN — Medication 3 MG: at 19:30

## 2022-11-02 NOTE — PLAN OF CARE
Problem: Potential for Falls  Goal: Patient will remain free of falls  Description: INTERVENTIONS:  - Educate patient/family on patient safety including physical limitations  - Instruct patient to call for assistance with activity   - Consult OT/PT to assist with strengthening/mobility   - Keep Call bell within reach  - Keep bed low and locked with side rails adjusted as appropriate  - Keep care items and personal belongings within reach  - Initiate and maintain comfort rounds  - Make Fall Risk Sign visible to staff  - Offer Toileting every  Hours, in advance of need  - Initiate/Maintain alarm  - Obtain necessary fall risk management equipment:   - Apply yellow socks and bracelet for high fall risk patients  - Consider moving patient to room near nurses station  Outcome: Progressing     Problem: CARDIOVASCULAR - ADULT  Goal: Maintains optimal cardiac output and hemodynamic stability  Description: INTERVENTIONS:  - Monitor I/O, vital signs and rhythm  - Monitor for S/S and trends of decreased cardiac output  - Administer and titrate ordered vasoactive medications to optimize hemodynamic stability  - Assess quality of pulses, skin color and temperature  - Assess for signs of decreased coronary artery perfusion  - Instruct patient to report change in severity of symptoms  Outcome: Progressing  Goal: Absence of cardiac dysrhythmias or at baseline rhythm  Description: INTERVENTIONS:  - Continuous cardiac monitoring, vital signs, obtain 12 lead EKG if ordered  - Administer antiarrhythmic and heart rate control medications as ordered  - Monitor electrolytes and administer replacement therapy as ordered  Outcome: Progressing     Problem: MOBILITY - ADULT  Goal: Maintain or return to baseline ADL function  Description: INTERVENTIONS:  -  Assess patient's ability to carry out ADLs; assess patient's baseline for ADL function and identify physical deficits which impact ability to perform ADLs (bathing, care of mouth/teeth, toileting, grooming, dressing, etc )  - Assess/evaluate cause of self-care deficits   - Assess range of motion  - Assess patient's mobility; develop plan if impaired  - Assess patient's need for assistive devices and provide as appropriate  - Encourage maximum independence but intervene and supervise when necessary  - Involve family in performance of ADLs  - Assess for home care needs following discharge   - Consider OT consult to assist with ADL evaluation and planning for discharge  - Provide patient education as appropriate  Outcome: Progressing  Goal: Maintains/Returns to pre admission functional level  Description: INTERVENTIONS:  - Perform BMAT or MOVE assessment daily    - Set and communicate daily mobility goal to care team and patient/family/caregiver  - Collaborate with rehabilitation services on mobility goals if consulted  - Perform Range of Motion  times a day  - Reposition patient every  hours    - Dangle patient  times a day  - Stand patient  times a day  - Ambulate patient  times a day  - Out of bed to chair  times a day   - Out of bed for meals times a day  - Out of bed for toileting  - Record patient progress and toleration of activity level   Outcome: Progressing

## 2022-11-02 NOTE — PROGRESS NOTES
[address] [facility]  Progress Note Annette Rogers 1955, 74 y o  male MRN: [id number]  Unit/Bed#: -01 Encounter: [id number]  Primary Care Provider: Darien Zuniga DO   Date and time admitted to hospital: 10/27/2022  3:08 PM    * VT (ventricular tachycardia)  Assessment & Plan  · Patient with has a history of stroke, status post loop recorder in December 2021  · Presenting with syncope; s/p interrogation of loop recorder noted with NSVT x 47 seconds  · EP on board  Discontinued tikosyn  · Continue Metoprolol succinate 50 mg b i d    · S/p Cardiac cath revealing 1st Diag lesion is 90% stenosed  Ramus lesion is 90% stenosed  Mid Cx lesion is 60% stenosed  RPDA lesion is 80% stenosed  · S/p stenting of D1 and ramus intermedius - DAPT x 4 weeks then transition to plavix + NOAC, statin, BB, ASA   · ICD postponed 90 days given stenting  Awaiting insurance authorization for lifevest  · Ok per EP to discharge    Probable Chronic diastolic congestive heart failure [facility])  Assessment & Plan  Wt Readings from Last 3 Encounters:   11/02/22 104 kg (228 lb 6 4 oz)   10/27/22 108 kg (239 lb)   10/26/22 107 kg (235 lb)     · Probable chronic diastolic CHF per cardiology note on 10/27  · Echocardiogram with grade 1 diastolic dysfunction  · Echo with EF 60% moderately calcified, moderately reduced mobility of aortic valve resulting in mild to moderate stenosis  · Compensated        Paroxysmal A-fib (HCC)  Assessment & Plan  · Rate control with metoprolol  · A/c with eliquis    CAD (coronary artery disease)  Assessment & Plan  S/p LHC/ABHILASH to D1, radius intermedius  Pt to be on triple therapy x 1 month, then plavix and eliquis only  Cont metoprolol  statin    Aortic stenosis  Assessment & Plan  · Mild AS on last echo    Dementia without behavioral disturbance ([facility] [address] )  Assessment & Plan  · Noted history of mild cognitive deficits per review of Neurology notes in 2020 post stroke    · Likely vascular related given hx      History of cardioembolic cerebrovascular accident (CVA)  Assessment & Plan  · History of cryptogenic stroke in     Asthma, mild intermittent  Assessment & Plan  · No sign of exacerbation      VTE Pharmacologic Prophylaxis:   Pharmacologic: Apixaban (Eliquis)  Mechanical VTE Prophylaxis in Place: No    Patient Centered Rounds: I have performed bedside rounds with nursing staff today  Discussions with Specialists or Other Care Team Provider:     Education and Discussions with Family / Patient: Patient and called his wife    Time Spent for Care: 30 minutes  More than 50% of total time spent on counseling and coordination of care as described above  Current Length of Stay: 6 day(s)    Current Patient Status: Inpatient   Certification Statement: The patient will continue to require additional inpatient hospital stay due to awaiting insurance authorization    Discharge Plan:     Code Status: Level 1 - Full Code      Subjective:   Thee Cabrera to go home but still awaiting insurance authorization for lifevest  No acute events overnight    Objective:     Vitals:   Temp (24hrs), Av 6 °F (36 4 °C), Min:97 1 °F (36 2 °C), Max:98 1 °F (36 7 °C)    Temp:  [97 1 °F (36 2 °C)-98 1 °F (36 7 °C)] 97 4 °F (36 3 °C)  HR:  [65-77] 77  Resp:  [17-19] 17  BP: (118-136)/(71-99) 136/80  SpO2:  [93 %-97 %] 93 %  Body mass index is 30 98 kg/m²  Input and Output Summary (last 24 hours): Intake/Output Summary (Last 24 hours) at 2022 1516  Last data filed at 2022 1300  Gross per 24 hour   Intake 440 ml   Output 350 ml   Net 90 ml       Physical Exam:     Physical Exam  Constitutional:       Appearance: He is obese  Cardiovascular:      Rate and Rhythm: Normal rate and regular rhythm  Pulses: Normal pulses  Heart sounds: Normal heart sounds  Pulmonary:      Effort: Pulmonary effort is normal  No respiratory distress  Breath sounds: Normal breath sounds   No wheezing or rales    Abdominal:      General: Bowel sounds are normal  There is no distension  Palpations: Abdomen is soft  Tenderness: There is no abdominal tenderness  There is no guarding  Musculoskeletal:         General: Normal range of motion  Cervical back: Normal range of motion and neck supple  Right lower leg: No edema  Left lower leg: No edema  Skin:     General: Skin is warm and dry  Neurological:      General: No focal deficit present  Mental Status: He is alert  Mental status is at baseline  Cranial Nerves: No cranial nerve deficit  Motor: No weakness  Comments: Cognitive/memory impairment           Additional Data:     Labs:    Results from last 7 days   Lab Units 11/01/22  0351 10/28/22  0712 10/27/22  1557   WBC Thousand/uL 7 57   < > 5 57   HEMOGLOBIN g/dL 10 5*   < > 9 6*   HEMATOCRIT % 36 4*   < > 33 6*   PLATELETS Thousands/uL 295   < > 299   NEUTROS PCT %  --   --  62   LYMPHS PCT %  --   --  20   MONOS PCT %  --   --  13*   EOS PCT %  --   --  3    < > = values in this interval not displayed  Results from last 7 days   Lab Units 11/01/22  0318 10/28/22  0712 10/27/22  1557   SODIUM mmol/L 139   < > 141   POTASSIUM mmol/L 3 8   < > 3 5   CHLORIDE mmol/L 112*   < > 112*   CO2 mmol/L 24   < > 25   BUN mg/dL 20   < > 22   CREATININE mg/dL 0 93   < > 1 02   ANION GAP mmol/L 3*   < > 4   CALCIUM mg/dL 9 1   < > 8 7   ALBUMIN g/dL  --   --  3 4*   TOTAL BILIRUBIN mg/dL  --   --  0 70   ALK PHOS U/L  --   --  99   ALT U/L  --   --  29   AST U/L  --   --  17   GLUCOSE RANDOM mg/dL 105   < > 110    < > = values in this interval not displayed  Results from last 7 days   Lab Units 10/27/22  1819   INR  0 91     Results from last 7 days   Lab Units 10/31/22  0758 10/26/22  1640   POC GLUCOSE mg/dl 92 115               * I Have Reviewed All Lab Data Listed Above    * Additional Pertinent Lab Tests Reviewed: No New Labs Available For Today    Imaging:    Imaging Reports Reviewed Today Include:   Imaging Personally Reviewed by Myself Includes:      Recent Cultures (last 7 days):           Last 24 Hours Medication List:   Current Facility-Administered Medications   Medication Dose Route Frequency Provider Last Rate   • acetaminophen  650 mg Oral Q6H PRN Oralia Kapoor MD     • apixaban  5 mg Oral BID Gurinder Cruz PA-C     • aspirin  81 mg Oral Daily ELVIS Morrell     • atorvastatin  40 mg Oral QPM Oralia Kapoor MD     • clopidogrel  75 mg Oral Daily ELVIS Martinez     • magnesium oxide  400 mg Oral Daily Charlette Singh PA-C     • metoprolol succinate  50 mg Oral BID Oralia Kapoor MD          Today, Patient Was Seen By: Eva Gaffney    ** Please Note: Dictation voice to text software may have been used in the creation of this document   **

## 2022-11-02 NOTE — ASSESSMENT & PLAN NOTE
· Patient with has a history of stroke, status post loop recorder in December 2021  · Presenting with syncope; s/p interrogation of loop recorder noted with NSVT x 47 seconds  · EP on board  Discontinued tikosyn  · Continue Metoprolol succinate 50 mg b i d    · S/p Cardiac cath revealing 1st Diag lesion is 90% stenosed  Ramus lesion is 90% stenosed  Mid Cx lesion is 60% stenosed  RPDA lesion is 80% stenosed  · S/p stenting of D1 and ramus intermedius - DAPT x 4 weeks then transition to plavix + NOAC, statin, BB, ASA   · ICD postponed 90 days given stenting   Awaiting insurance authorization for lifevest  · Ok per EP to discharge

## 2022-11-02 NOTE — ASSESSMENT & PLAN NOTE
Wt Readings from Last 3 Encounters:   11/02/22 104 kg (228 lb 6 4 oz)   10/27/22 108 kg (239 lb)   10/26/22 107 kg (235 lb)     · Probable chronic diastolic CHF per cardiology note on 10/27  · Echocardiogram with grade 1 diastolic dysfunction    · Echo with EF 60% moderately calcified, moderately reduced mobility of aortic valve resulting in mild to moderate stenosis  · Compensated

## 2022-11-02 NOTE — CASE MANAGEMENT
Case Management Discharge Planning Note    Patient name Chet Acuna  Location /-33 MRN [id number]  : 1955 Date 2022       Current Admission Date: 10/27/2022  Current Admission Diagnosis:VT (ventricular tachycardia)   Patient Active Problem List    Diagnosis Date Noted   • CAD (coronary artery disease) 2022   • Dizziness 10/30/2022   • Probable Chronic diastolic congestive heart failure ([facility] [address] ) 10/28/2022   • VT (ventricular tachycardia) 10/27/2022   • Dyspnea on exertion 2022   • Positive blood culture 2022   • Anemia 2022   • Paroxysmal A-fib ([facility] [address] ) 2022   • GI bleed 2022   • Elevated troponin 2022   • SIRS (systemic inflammatory response syndrome) ([facility] ) 2022   • Personal history of colonic polyps 2022   • Anxiety 2021   • History of stroke 2021   • Dementia without [facility] disturbance ([facility] ) 10/29/2020   • Aortic stenosis 10/14/2020   • Ascending aortic aneurysm 10/14/2020   • Mitral regurgitation 10/14/2020   • History of cardioembolic cerebrovascular accident (CVA) 2020   • Unsteady gait 2020   • Abnormal MRI of head 2020   • Persistent insomnia 2020   • Brain atrophy ([facility] ) 2020   • Imbalance 2020   • [place] cardiac risk 10-20% in next 10 years 2020   • Neuropathy 2020   • CAMMIE (generalized anxiety disorder) 2020   • Rectal bleeding 2020   • Cubital tunnel syndrome on right 2019   • Healthcare maintenance 10/17/2018   • Immunization due 10/17/2018   • Screening for diabetes mellitus (DM) 2018   • Prostate cancer screening 2018   • Screening for cardiovascular, respiratory, and genitourinary diseases 2018   • Arthropathy 2017   • Internal hemorrhoids 2017   • Obesity (BMI 30-39 9) 2016   • Hyperlipidemia LDL goal <130 2016   • Low HDL (under 40) 2016   • Pre-diabetes 2016   • Benign essential hypertension 08/22/2016   • Tubular adenoma of colon 08/22/2014   • Chordee 06/23/2014   • Asthma, mild intermittent 06/22/2014   • Colon, diverticulosis 07/02/2013   • Allergic rhinitis 09/04/2012   • Obstructive sleep apnea 09/04/2012   • 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      LOS (days): 6  Geometric Mean LOS (GMLOS) (days): 2 00  Days to GMLOS:-3 9     OBJECTIVE:  Risk of Unplanned Readmission Score: 14 88      Current admission status: Inpatient   Preferred Pharmacy:   [address] [facility] [address]  Phone: [phone] Fax: [phone] - [place], [address] [facility]  Phone: [phone] Fax: [phone]    [id number] [address]  [address] [facility] [address]  Phone: [phone] Fax: Dustin Mcmillanie [address] [facility] [address] [facility] [address] [facility]  Phone: [phone] Fax: [phone]    Primary Care Provider: Jerry Ramos DO    Primary Insurance: MEDICARE  Secondary Insurance: BLUE CROSS    DISCHARGE DETAILS:    Additional Comments: Spoke to Priscilla browning  He reports they have not received approval from [facility] for patient's Lifevest so cannot deliver  Met with patient to explain this  Informed RN, SLIM and cardiology

## 2022-11-02 NOTE — UTILIZATION REVIEW
Continued Stay Review    Date: 2022                         Current Patient Class: Inpatient  Current Level of Care: Med/Surg    HPI:67 y o  male initially admitted on 10/27/2022   Ventricular Tachycardia  Assessment/Plan:    Monitor on telemetry  Continue Metoprolol  Continue ASA / Plavix / Eliquis  Awaiting Insurance authorization for LifeVest     VTE Pharmacologic Prophylaxis:   Pharmacologic: Apixaban (Eliquis)  Mechanical VTE Prophylaxis in Place: No  Vital Signs:   22 22:53:27 97 7 °F (36 5 °C) 67 -- 135/79 98 94 % -- Lying   22 -- -- -- -- -- -- None (Room air) --   22 -- 76 -- 136/99 -- -- -- --   22 19:17:01 97 1 °F (36 2 °C) Abnormal  69 -- 118/73 88 95 % -- --   22 16:42:48 97 7 °F (36 5 °C) 73 19 119/71 87 95 % -- --   22 11:25:29 97 9 °F (36 6 °C) 71 -- 141/91 108 95 % -- --   22 11:24:13 97 9 °F (36 6 °C) 72 -- 141/91 108 95 % -- --   22 07:35:22 97 6 °F (36 4 °C) 67 16 134/88 103 96 % -- --   22 07:07:49 97 3 °F (36 3 °C) Abnormal  67 -- 136/88 104 96 % -- --   22 04:17:16 -- 72 -- 139/88 105 97 % -- --   22 02:44:45 97 4 °F (36 3 °C) Abnormal  71 -- 132/85 101 94 % -- --   22 00:11:28 97 2 °F (36 2 °C) Abnormal  74 -- 130/82 98 95 % -- --     Date and Time Eye Opening Best Verbal Response Best Motor Response Lares Coma Scale Score   22 4 5 6 15   22 4 4 6 14     10/31/2022  MRI Cardiac:  Impression:   1  Mildly dilated left ventricle with moderate concentric left ventricular hypertrophy and normal left ventricular systolic function  2  Normal right ventricular size and systolic function  3  No significant valvular abnormalities     4  Small focus of intramyocardial fibrosis within the basal inferolateral wall   This most likely suggests prior myocarditis, but cannot completely exclude cardiac sarcoidosis       10/31/2022  EC, NSR     Pertinent Labs/Diagnostic Results: Results from last 7 days   Lab Units 11/01/22  0351 10/31/22  0238 10/30/22  0452 10/29/22  0444 10/28/22  0712 10/27/22  1557 10/26/22  1711   WBC Thousand/uL 7 57 7 26 8 63 8 38 6 40 5 57 7 49   HEMOGLOBIN g/dL 10 5* 10 2* 9 8* 10 3* 10 3* 9 6* 10 2*   HEMATOCRIT % 36 4* 35 6* 33 1* 35 4* 36 1* 33 6* 34 9*   PLATELETS Thousands/uL 295 279 268 302 302 299 297   NEUTROS ABS Thousands/µL  --   --   --   --   --  3 49 5 50     Results from last 7 days   Lab Units 11/01/22  0318 10/31/22  0238 10/30/22  0452 10/29/22  0444 10/28/22  1941   SODIUM mmol/L 139 140 143 140 141   POTASSIUM mmol/L 3 8 4 1 3 7 3 7 3 7   CHLORIDE mmol/L 112* 113* 116* 112* 112*   CO2 mmol/L 24 21 20* 22 23   ANION GAP mmol/L 3* 6 7 6 6   BUN mg/dL 20 17 13 14 15   CREATININE mg/dL 0 93 0 89 0 88 0 88 0 90   EGFR ml/min/1 73sq m 85 89 90 90 89   CALCIUM mg/dL 9 1 8 9 8 5 8 4 8 3   MAGNESIUM mg/dL 2 5 2 4 2 4 2 3 2 4     Results from last 7 days   Lab Units 10/28/22  0824 10/27/22  1557 10/26/22  1711   AST U/L  --  17 21   ALT U/L  --  29 27   ALK PHOS U/L  --  99 110   TOTAL PROTEIN g/dL  --  6 3* 6 5   ALBUMIN g/dL  --  3 4* 3 7   TOTAL BILIRUBIN mg/dL  --  0 70 0 69   AMMONIA umol/L 16  --   --      Results from last 7 days   Lab Units 10/31/22  0758 10/26/22  1640   POC GLUCOSE mg/dl 92 115     Results from last 7 days   Lab Units 11/01/22  0318 10/31/22  0238 10/30/22  0452 10/29/22  0444 10/28/22  1941 10/28/22  0712 10/27/22  1557 10/26/22  1711   GLUCOSE RANDOM mg/dL 105 106 98 108 113 113 110 109     Results from last 7 days   Lab Units 10/27/22  1819 10/27/22  1557 10/26/22  1918 10/26/22  1711   HS TNI 0HR ng/L  --  29  --  60*   HS TNI 2HR ng/L 29  --  69*  --    HSTNI D2 ng/L 0  --  9  --    HS TNI 4HR ng/L 29  --   --   --    HSTNI D4 ng/L 0  --   --   --      Results from last 7 days   Lab Units 10/28/22  0712 10/28/22  0026 10/27/22  1819   PROTIME seconds  --   --  12 5   INR   --   --  0 91   PTT seconds 54* 39* 21* Results from last 7 days   Lab Units 10/27/22  1557   TSH 3RD GENERATON uIU/mL 1 030     Results from last 7 days   Lab Units 10/28/22  0824   FERRITIN ng/mL 11     Results from last 7 days   Lab Units 10/26/22  1807   CLARITY UA  Slightly Cloudy   COLOR UA  [place]   SPEC GRAV UA  >=1 030   PH UA  6 0   GLUCOSE UA mg/dl Negative   KETONES UA mg/dl Trace*   BLOOD UA  Negative   PROTEIN UA mg/dl 30 (1+)*   NITRITE UA  Negative   BILIRUBIN UA  Small*   UROBILINOGEN UA E U /dl 1 0   LEUKOCYTES UA  Negative   WBC UA /hpf 1-2   RBC UA /hpf 2-4   BACTERIA UA /hpf Occasional   EPITHELIAL CELLS WET PREP /hpf Occasional   MUCUS THREADS  Innumerable*     Medications:   Scheduled Medications:  apixaban, 5 mg, Oral, BID  aspirin, 81 mg, Oral, Daily  atorvastatin, 40 mg, Oral, QPM  clopidogrel, 75 mg, Oral, Daily  magnesium oxide, 400 mg, Oral, Daily  metoprolol succinate, 50 mg, Oral, BID      Continuous IV Infusions:     PRN Meds:  acetaminophen, 650 mg, Oral, Q6H PRN        Discharge Plan: TBD - Nds LifeVest Prior to DC    Network Utilization Review Department  ATTENTION: Please call with any questions or concerns to [phone] and carefully listen to the prompts so that you are directed to the right person  All voicemails are confidential   Tracy Crow all requests for admission clinical reviews, approved or denied determinations and any other requests to dedicated fax number below belonging to the campus where the patient is receiving treatment   List of dedicated fax numbers for the Facilities:  [address] DENIALS (Administrative/Medical Necessity) [phone]   [address] (Maternity/NICU/Pediatrics) [address] [facility] [address] [place] [address] [phone]   [address] [place] [address] Medical [place] [phone]   [facility] [address] [address] [address] [facility] [place] [address] [facility] [phone]

## 2022-11-03 ENCOUNTER — TRANSITIONAL CARE MANAGEMENT (OUTPATIENT)
Dept: FAMILY MEDICINE CLINIC | Facility: CLINIC | Age: 67
End: 2022-11-03

## 2022-11-03 VITALS
SYSTOLIC BLOOD PRESSURE: 112 MMHG | WEIGHT: 227.6 LBS | BODY MASS INDEX: 30.83 KG/M2 | TEMPERATURE: 97.8 F | HEIGHT: 72 IN | OXYGEN SATURATION: 94 % | HEART RATE: 70 BPM | RESPIRATION RATE: 16 BRPM | DIASTOLIC BLOOD PRESSURE: 72 MMHG

## 2022-11-03 LAB
ANION GAP SERPL CALCULATED.3IONS-SCNC: 7 MMOL/L (ref 4–13)
BUN SERPL-MCNC: 18 MG/DL (ref 5–25)
CALCIUM SERPL-MCNC: 8.7 MG/DL (ref 8.3–10.1)
CHLORIDE SERPL-SCNC: 112 MMOL/L (ref 96–108)
CO2 SERPL-SCNC: 22 MMOL/L (ref 21–32)
CREAT SERPL-MCNC: 0.93 MG/DL (ref 0.6–1.3)
GFR SERPL CREATININE-BSD FRML MDRD: 85 ML/MIN/1.73SQ M
GLUCOSE SERPL-MCNC: 112 MG/DL (ref 65–140)
MAGNESIUM SERPL-MCNC: 2.5 MG/DL (ref 1.6–2.6)
PHOSPHATE SERPL-MCNC: 4.1 MG/DL (ref 2.3–4.1)
POTASSIUM SERPL-SCNC: 3.7 MMOL/L (ref 3.5–5.3)
SODIUM SERPL-SCNC: 141 MMOL/L (ref 135–147)

## 2022-11-03 RX ADMIN — APIXABAN 5 MG: 5 TABLET, FILM COATED ORAL at 08:40

## 2022-11-03 RX ADMIN — CLOPIDOGREL BISULFATE 75 MG: 75 TABLET ORAL at 08:40

## 2022-11-03 RX ADMIN — MAGNESIUM OXIDE TAB 400 MG (241.3 MG ELEMENTAL MG) 400 MG: 400 (241.3 MG) TAB at 08:40

## 2022-11-03 RX ADMIN — ASPIRIN 81 MG CHEWABLE TABLET 81 MG: 81 TABLET CHEWABLE at 08:40

## 2022-11-03 RX ADMIN — METOPROLOL SUCCINATE 50 MG: 50 TABLET, EXTENDED RELEASE ORAL at 08:39

## 2022-11-03 NOTE — ASSESSMENT & PLAN NOTE
· Patient with has a history of stroke, status post loop recorder in December 2021  · Presenting with syncope; s/p interrogation of loop recorder noted with NSVT x 47 seconds  · EP on board  Discontinued tikosyn  · Continue Metoprolol succinate 50 mg b i d    · S/p Cardiac cath revealing 1st Diag lesion is 90% stenosed  Ramus lesion is 90% stenosed  Mid Cx lesion is 60% stenosed  RPDA lesion is 80% stenosed  · S/p stenting of D1 and ramus intermedius - DAPT x 4 weeks then transition to plavix + NOAC, statin, BB, ASA   · ICD postponed 90 days given stenting   Patient fitted for life vest s/p insurance authorization  · Ok per EP to discharge

## 2022-11-03 NOTE — ASSESSMENT & PLAN NOTE
Wt Readings from Last 3 Encounters:   11/03/22 103 kg (227 lb 9 6 oz)   10/27/22 108 kg (239 lb)   10/26/22 107 kg (235 lb)     · Probable chronic diastolic CHF per cardiology note on 10/27  · Echocardiogram with grade 1 diastolic dysfunction    · Echo with EF 60% moderately calcified, moderately reduced mobility of aortic valve resulting in mild to moderate stenosis  · Compensated

## 2022-11-03 NOTE — DISCHARGE SUMMARY
[facility]  Discharge- Yanna Moreno 1955, 74 y o  male MRN: [id number]  Unit/Bed#: -01 Encounter: [id number]  Primary Care Provider: Dano Bruce DO   Date and time admitted to hospital: 10/27/2022  3:08 PM    * VT (ventricular tachycardia)  Assessment & Plan  · Patient with has a history of stroke, status post loop recorder in December 2021  · Presenting with syncope; s/p interrogation of loop recorder noted with NSVT x 47 seconds  · EP on board  Discontinued tikosyn  · Continue Metoprolol succinate 50 mg b i d    · S/p Cardiac cath revealing 1st Diag lesion is 90% stenosed  Ramus lesion is 90% stenosed  Mid Cx lesion is 60% stenosed  RPDA lesion is 80% stenosed  · S/p stenting of D1 and ramus intermedius - DAPT x 4 weeks then transition to plavix + NOAC, statin, BB, ASA   · ICD postponed 90 days given stenting  Patient fitted for life vest s/p insurance authorization  · Ok per EP to discharge    Probable Chronic diastolic congestive heart failure ([facility] [address] )  Assessment & Plan  Wt Readings from Last 3 Encounters:   11/03/22 103 kg (227 lb 9 6 oz)   10/27/22 108 kg (239 lb)   10/26/22 107 kg (235 lb)     · Probable chronic diastolic CHF per cardiology note on 10/27  · Echocardiogram with grade 1 diastolic dysfunction  · Echo with EF 60% moderately calcified, moderately reduced mobility of aortic valve resulting in mild to moderate stenosis  · Compensated        Paroxysmal A-fib (HCC)  Assessment & Plan  · Rate control with metoprolol  · A/c with eliquis    CAD (coronary artery disease)  Assessment & Plan  S/p LHC/ABHILASH to D1, radius intermedius  Pt to be on triple therapy x 1 month, then plavix and eliquis only  Cont metoprolol  statin    Aortic stenosis  Assessment & Plan  · Mild AS on last echo    Dementia without behavioral disturbance ([facility] [address] )  Assessment & Plan  · Noted history of mild cognitive deficits per review of Neurology notes in 2020 post stroke    · Likely vascular related given hx      History of cardioembolic cerebrovascular accident (CVA)  Assessment & Plan  · History of cryptogenic stroke in 2020    Asthma, mild intermittent  Assessment & Plan  · No sign of exacerbation      Discharging Physician / Practitioner: Navneet Mcmahan  PCP: Devika Stearns DO  Admission Date:   Admission Orders (From admission, onward)     Ordered        10/27/22 1559  INPATIENT ADMISSION  Once                      Discharge Date: 11/03/22    Medical Problems             Resolved Problems  Date Reviewed: 11/3/2022   None                 Consultations During Hospital Stay:  · Cardiology/EP    Procedures Performed:   · Left heart cardiac catheterization  · Echocardiogram    Significant Findings / Test Results:   · See above    Incidental Findings:   ·      Test Results Pending at Discharge (will require follow up):   ·      Outpatient Tests Requested:  ·     Complications:  None    Reason for Admission:  Syncope, V-tach    Hospital Course:     HPI: Irene Sweeney a 74 y  o  male with a PMH of hypertension, stroke, paroxysmal atrial fibrillation who presents with VT   Patient has a history of cryptogenic stroke, status post loop recorder placement in 2021  Yesterday he was more active than usually, he was mowing the lawn  [facility] also walked to physical therapy  [facility] is going to physical therapy for balance issues   During physical therapy he fell  [facility] does not remember what happened  [facility] was seen in the ED, CT scan of the head was negative and he was referred to Cardiology and he was seen the next day   Was seen by Cardiology today, reviewed recorder showed 47 seconds of nonsustaine V-tach   Cardiology discussed with EP as outpatient her reviewed loop recorder report and they advised him to come to [facility] for EP eval, ischemic eval, possible defibrillator placement    Upon my evaluation patient is awake and alert, no distress   Denies headache, visual changes, lightheadedness, dizziness chest pain palpitation shortness of breath nausea vomiting abdominal pain or trouble with urination      Please see above list of diagnoses and related plan for additional information  Condition at Discharge: stable     Discharge Day Visit / Exam:     Subjective:  No acute events overnight  Looking forward to being discharge  Being fitted for lifevest  Denies any cardiac symptoms    Vitals: Blood Pressure: 112/72 (11/03/22 1125)  Pulse: 70 (11/03/22 1125)  Temperature: 97 8 °F (36 6 °C) (11/03/22 1125)  Temp Source: Oral (11/03/22 1125)  Respirations: 16 (11/03/22 1125)  Height: 6' (182 9 cm) (10/28/22 1015)  Weight - Scale: 103 kg (227 lb 9 6 oz) (11/03/22 0728)  SpO2: 94 % (11/03/22 1125)  Exam:   Physical Exam  Cardiovascular:      Rate and Rhythm: Normal rate and regular rhythm  Pulses: Normal pulses  Heart sounds: Normal heart sounds  Pulmonary:      Effort: Pulmonary effort is normal  No respiratory distress  Breath sounds: Normal breath sounds  No wheezing or rales  Abdominal:      General: Abdomen is flat  Bowel sounds are normal  There is no distension  Palpations: Abdomen is soft  Tenderness: There is no abdominal tenderness  There is no guarding  Musculoskeletal:         General: Normal range of motion  Cervical back: Normal range of motion and neck supple  Right lower leg: No edema  Left lower leg: No edema  Skin:     General: Skin is warm and dry  Coloration: Skin is not jaundiced  Neurological:      General: No focal deficit present  Mental Status: He is alert  Mental status is at baseline  Cranial Nerves: No cranial nerve deficit  Motor: No weakness  Comments: Baseline cognitive/memory impairment         Discussion with Family: D/w pt and his wife at length  Discharge instructions/Information to patient and family:   See after visit summary for information provided to patient and family  Provisions for Follow-Up Care:  See after visit summary for information related to follow-up care and any pertinent home health orders  Disposition:     Home    For Discharges to [facility] SNF:   · Not Applicable to this Patient - Not Applicable to this Patient    Planned Readmission: No     Discharge Statement:  I spent 35 minutes discharging the patient  This time was spent on the day of discharge  I had direct contact with the patient on the day of discharge  Greater than 50% of the total time was spent examining patient, answering all patient questions, arranging and discussing plan of care with patient as well as directly providing post-discharge instructions  Additional time then spent on discharge activities  Discharge Medications:  See after visit summary for reconciled discharge medications provided to patient and family        ** Please Note: This note has been constructed using a voice recognition system **

## 2022-11-04 NOTE — UTILIZATION REVIEW
Hospitalist   Discharge Summary      Signed   Date of Service:  11/3/2022 12:10 PM        [address] [facility]  Discharge- [facility] 1955, 74 y o  male MRN: [id number]  Unit/Bed#: -01 Encounter: [id number]  Primary Care Provider: Marshall Mcgowan DO   Date and time admitted to hospital: 10/27/2022  3:08 PM     * VT (ventricular tachycardia)  Assessment & Plan  · Patient with has a history of stroke, status post loop recorder in December 2021  · Presenting with syncope; s/p interrogation of loop recorder noted with NSVT x 47 seconds  · EP on board  Discontinued tikosyn  · Continue Metoprolol succinate 50 mg b i d    · S/p Cardiac cath revealing 1st Diag lesion is 90% stenosed  Ramus lesion is 90% stenosed  Mid Cx lesion is 60% stenosed  RPDA lesion is 80% stenosed  · S/p stenting of D1 and ramus intermedius - DAPT x 4 weeks then transition to plavix + NOAC, statin, BB, ASA   · ICD postponed 90 days given stenting  Patient fitted for life vest s/p insurance authorization  · Ok per EP to discharge     Probable Chronic diastolic congestive heart failure ([facility] [address] )  Assessment & Plan      Wt Readings from Last 3 Encounters:   11/03/22 103 kg (227 lb 9 6 oz)   10/27/22 108 kg (239 lb)   10/26/22 107 kg (235 lb)      · Probable chronic diastolic CHF per cardiology note on 10/27  · Echocardiogram with grade 1 diastolic dysfunction  · Echo with EF 60% moderately calcified, moderately reduced mobility of aortic valve resulting in mild to moderate stenosis  · Compensated           Paroxysmal A-fib (HCC)  Assessment & Plan  · Rate control with metoprolol  · A/c with eliquis     CAD (coronary artery disease)  Assessment & Plan  S/p LHC/ABHILASH to D1, radius intermedius  Pt to be on triple therapy x 1 month, then plavix and eliquis only  Cont metoprolol   statin     Aortic stenosis  Assessment & Plan  · Mild AS on last echo     Dementia without behavioral disturbance ([facility] [address] )  Assessment & Plan  · Noted history of mild cognitive deficits per review of Neurology notes in 2020 post stroke  · Likely vascular related given hx        History of cardioembolic cerebrovascular accident (CVA)  Assessment & Plan  · History of cryptogenic stroke in 2020     Asthma, mild intermittent  Assessment & Plan  · No sign of exacerbation        Discharging Physician / Practitioner: Nellie Ghosh  PCP: Nadeem Valdez DO  Admission Date:       Admission Orders (From admission, onward)              Ordered          10/27/22 1559   INPATIENT ADMISSION  Once                          Discharge Date: 11/03/22         Medical Problems                  Resolved Problems  Date Reviewed: 11/3/2022   None                      Consultations During Hospital Stay:  · Cardiology/EP     Procedures Performed:   · Left heart cardiac catheterization  · Echocardiogram     Significant Findings / Test Results:   · See above     Incidental Findings:   ·       Test Results Pending at Discharge (will require follow up):   ·       Outpatient Tests Requested:  ·       Complications:  None     Reason for Admission:  Syncope, V-tach     Hospital Course:      HPI: Robin Leventhal a 74 y  o  male with a PMH of hypertension, stroke, paroxysmal atrial fibrillation who presents with VT   Patient has a history of cryptogenic stroke, status post loop recorder placement in 2021   Yesterday he was more active than usually, he was mowing the lawn  Rakan Carpenter also walked to physical therapy  Rakan Carpenter is going to physical therapy for balance issues   During physical therapy he fell  Rakan Carpenter does not remember what happened  Rakan Carpenter was seen in the ED, CT scan of the head was negative and he was referred to Cardiology and he was seen the next day   Was seen by Cardiology today, reviewed recorder showed 47 seconds of nonsustaine V-tach   Cardiology discussed with EP as outpatient her reviewed loop recorder report and they advised him to come to [facility] for EP eval, ischemic eval, possible defibrillator placement  Upon my evaluation patient is awake and alert, no distress   Denies headache, visual changes, lightheadedness, dizziness chest pain palpitation shortness of breath nausea vomiting abdominal pain or trouble with urination      Please see above list of diagnoses and related plan for additional information       Condition at Discharge: stable      Discharge Day Visit / Exam:      Subjective:  No acute events overnight  Looking forward to being discharge  Being fitted for lifevest  Denies any cardiac symptoms     Vitals: Blood Pressure: 112/72 (11/03/22 1125)  Pulse: 70 (11/03/22 1125)  Temperature: 97 8 °F (36 6 °C) (11/03/22 1125)  Temp Source: Oral (11/03/22 1125)  Respirations: 16 (11/03/22 1125)  Height: 6' (182 9 cm) (10/28/22 1015)  Weight - Scale: 103 kg (227 lb 9 6 oz) (11/03/22 0728)  SpO2: 94 % (11/03/22 1125)  Exam:   Physical Exam  Cardiovascular:      Rate and Rhythm: Normal rate and regular rhythm  Pulses: Normal pulses  Heart sounds: Normal heart sounds  Pulmonary:      Effort: Pulmonary effort is normal  No respiratory distress  Breath sounds: Normal breath sounds  No wheezing or rales  Abdominal:      General: Abdomen is flat  Bowel sounds are normal  There is no distension  Palpations: Abdomen is soft  Tenderness: There is no abdominal tenderness  There is no guarding  Musculoskeletal:         General: Normal range of motion  Cervical back: Normal range of motion and neck supple  Right lower leg: No edema  Left lower leg: No edema  Skin:     General: Skin is warm and dry  Coloration: Skin is not jaundiced  Neurological:      General: No focal deficit present  Mental Status: He is alert  Mental status is at baseline  Cranial Nerves: No cranial nerve deficit  Motor: No weakness        Comments: Baseline cognitive/memory impairment          Discussion with Family: D/w pt and his wife at length  Discharge instructions/Information to patient and family:   See after visit summary for information provided to patient and family        Provisions for Follow-Up Care:  See after visit summary for information related to follow-up care and any pertinent home health orders        Disposition:      Home

## 2022-11-04 NOTE — UTILIZATION REVIEW
NOTIFICATION OF ADMISSION DISCHARGE   This is a Notification of Discharge from [address]  Please be advised that this patient has been discharge from our facility  Below you will find the admission and discharge date and time including the patient’s disposition  UTILIZATION REVIEW CONTACT:  [place] [employer]  Utilization   Network Utilization Review Department  Phone: [phone] x carefully listen to the prompts  All voicemails are confidential   Email: [email or web address]     ADMISSION INFORMATION  PRESENTATION DATE: 10/27/2022  3:08 PM  OBERVATION ADMISSION DATE:   INPATIENT ADMISSION DATE: 10/27/22  3:59 PM   DISCHARGE DATE: 11/3/2022  1:24 PM  DISPOSITION: Home/Self Care Home/Self Care      IMPORTANT INFORMATION:  Send all requests for admission clinical reviews, approved or denied determinations and any other requests to dedicated fax number below belonging to the campus where the patient is receiving treatment   List of dedicated fax numbers:  [address] DENIALS (Administrative/Medical Necessity) [phone]   [address] (Maternity/NICU/Pediatrics) [phone]   [facility] [phone]   [facility] 87 [phone]   [address] [phone]   220 [facility] [phone] 90 [facility] [phone]   [facility] 119 [phone]   [facility]  [phone] 4058 [facility] [phone]   412 [facility] [phone]

## 2022-11-04 NOTE — UTILIZATION REVIEW
Continued Stay Review    Date: 11/2/22                       Current Patient Class: Inpatient Current Level of Care: Med Surg    HPI:67 y o  male initially admitted on 10/27/22      11/2 Internal Medicine: No acute events overnight  Awaiting insurance authorization for LifeVest  Case Management: Spoke to [employer] rep  He reports they have not received approval from [facility] for patient's LifeVest so cannot deliver  Met with patient to explain this  Informed RN, SLIM and cardiology          Vital Signs:      Date/Time Temp Pulse Resp BP MAP (mmHg) SpO2 O2 Device   11/02/22 22:52:38 97 5 °F (36 4 °C) 75 -- 128/77 94 94 % --   11/1955 -- -- -- -- -- -- None (Room air)   11/02/22 18:43:27 98 1 °F (36 7 °C) 74 -- 128/79 95 96 % --   11/02/22 15:14:38 97 4 °F (36 3 °C) Abnormal  77 -- 136/80 99 93 % --   11/02/22 10:41:11 97 2 °F (36 2 °C) Abnormal  69 17 131/78 96 95 % --   11/02/22 0831 -- -- -- -- -- 94 % None (Room air)   11/02/22 07:16:03 98 1 °F (36 7 °C) 65 18 131/80 97 95 % --   11/02/22 03:53:35 97 6 °F (36 4 °C) 67 -- 132/80 97 97 % --   11/02/22 00:07:31 97 7 °F (36 5 °C) 65 -- 134/80 98 93 % --   11/01/22 22:53:27 97 7 °F (36 5 °C) 67 -- 135/79 98 94 % --   11/01/22 2000 -- -- -- -- -- -- None (Room air)   11/1955 -- 76 -- 136/99 -- -- --   11/01/22 19:17:01 97 1 °F (36 2 °C) Abnormal  69 -- 118/73 88 95 % --   11/01/22 16:42:48 97 7 °F (36 5 °C) 73 19 119/71 87 95 % --   11/01/22 11:25:29 97 9 °F (36 6 °C) 71 -- 141/91 108 95 % --   11/01/22 11:24:13 97 9 °F (36 6 °C) 72 -- 141/91 108 95 % --       Pertinent Labs/Diagnostic Results:       Results from last 7 days   Lab Units 11/01/22  0351 10/31/22  0238 10/30/22  0452 10/29/22  0444   WBC Thousand/uL 7 57 7 26 8 63 8 38   HEMOGLOBIN g/dL 10 5* 10 2* 9 8* 10 3*   HEMATOCRIT % 36 4* 35 6* 33 1* 35 4*   PLATELETS Thousands/uL 295 279 268 302         Results from last 7 days   Lab Units 11/01/22  0318 10/31/22  0238 10/30/22  0452 10/29/22  0444   SODIUM mmol/L 139 140 143 140   POTASSIUM mmol/L 3 8 4 1 3 7 3 7   CHLORIDE mmol/L 112* 113* 116* 112*   CO2 mmol/L 24 21 20* 22   ANION GAP mmol/L 3* 6 7 6   BUN mg/dL 20 17 13 14   CREATININE mg/dL 0 93 0 89 0 88 0 88   EGFR ml/min/1 73sq m 85 89 90 90   CALCIUM mg/dL 9 1 8 9 8 5 8 4   MAGNESIUM mg/dL 2 5 2 4 2 4 2 3   PHOSPHORUS mg/dL  --   --   --   --          Results from last 7 days   Lab Units 10/31/22  0758   POC GLUCOSE mg/dl 92     Results from last 7 days   Lab Units 11/01/22  0318 10/31/22  0238 10/30/22  0452 10/29/22  0444 10/28/22  1941   GLUCOSE RANDOM mg/dL 105 106 98 108 113         Medications:   Scheduled Medications:       Medication Dose Route Frequency   • acetaminophen  650 mg Oral Q6H PRN   • apixaban  5 mg Oral BID   • aspirin  81 mg Oral Daily   • atorvastatin  40 mg Oral QPM   • clopidogrel  75 mg Oral Daily   • magnesium oxide  400 mg Oral Daily   • metoprolol succinate  50 mg Oral BID           Network Utilization Review Department  ATTENTION: Please call with any questions or concerns to [phone] and carefully listen to the prompts so that you are directed to the right person  All voicemails are confidential   Destinee Sigala all requests for admission clinical reviews, approved or denied determinations and any other requests to dedicated fax number below belonging to the campus where the patient is receiving treatment   List of dedicated fax numbers for the Facilities:  [address] [place] [address] DENIALS (Administrative/Medical Necessity) [phone]   [address] [place] [address] (Maternity/NICU/Pediatrics) [phone]   [address] [phone]   [address] [place] [address] [facility] [address] 1801 [facility] [address] 134 068 [facility] [phone]

## 2022-11-06 PROBLEM — I47.29 NSVT (NONSUSTAINED VENTRICULAR TACHYCARDIA): Status: ACTIVE | Noted: 2022-11-06

## 2022-11-06 PROBLEM — Z95.5 H/O HEART ARTERY STENT: Status: ACTIVE | Noted: 2022-11-06

## 2022-11-06 PROBLEM — I47.20 VT (VENTRICULAR TACHYCARDIA): Status: RESOLVED | Noted: 2022-10-27 | Resolved: 2022-11-06

## 2022-11-08 ENCOUNTER — OFFICE VISIT (OUTPATIENT)
Dept: CARDIOLOGY CLINIC | Facility: CLINIC | Age: 67
End: 2022-11-08

## 2022-11-08 VITALS
HEIGHT: 72 IN | OXYGEN SATURATION: 97 % | TEMPERATURE: 98 F | SYSTOLIC BLOOD PRESSURE: 140 MMHG | HEART RATE: 67 BPM | BODY MASS INDEX: 31.56 KG/M2 | DIASTOLIC BLOOD PRESSURE: 80 MMHG | WEIGHT: 233 LBS

## 2022-11-08 DIAGNOSIS — I48.0 PAROXYSMAL A-FIB (HCC): ICD-10-CM

## 2022-11-08 DIAGNOSIS — I25.10 CORONARY ARTERY DISEASE INVOLVING NATIVE CORONARY ARTERY OF NATIVE HEART WITHOUT ANGINA PECTORIS: ICD-10-CM

## 2022-11-08 DIAGNOSIS — I47.20 SUSTAINED VT (VENTRICULAR TACHYCARDIA): ICD-10-CM

## 2022-11-08 DIAGNOSIS — I10 BENIGN ESSENTIAL HYPERTENSION: Primary | ICD-10-CM

## 2022-11-09 NOTE — PROGRESS NOTES
Cardiology   Kayden Blanc DO, Patricia Aponte MD, Raj Gandara MD, Kymberly Smiley MD, [address]  -------------------------------------------------------------------  Formerly [facility]  [address], [address], [address] [place] [address], [facility]0 [facility]  [phone]    Cardiology Follow Up  Melanie Tirado  1955  [id number]          Assessment/Plan:    1  VT with syncope - records of recent hospitalization was reviewed  Cardiac catheterization was done and he had 2 stents placed  Cause of tachycardia may have been ischemic  He has a LifeVest in place   - will continue monitoring for further arrhythmias  - metoprolol was increased to 50 mg during hospitalization  He was temporarily on Tikosyn which was discontinued  - he has an appointment with the EP in 1 month  Will continue monitoring during this time  After he sees Dr Eufemia Todd, will discuss discontinuation of LifeVest if no further events  2  CAD with recent PCI x2 - continue Plavix  May discontinue aspirin after 30 days post stent  - continue atorvastatin    3  History of CVA  - continue Plavix  - continue Eliquis  4  Paroxysmal atrial fibrillation  - currently in sinus rhythm  Continue Eliquis and metoprolol    - Will follow up in 1 month  Discussed cardiac rehab  Patient may be going to [place] for winter  Will discuss next visit  Interval History:     Melanie Tirado is 74 y o  male here for followup of recent hospitalization  The patient was admitted to [facility] after a syncopal event during PT  He had a loop recorder in place and was noted to have VT at that time  He underwent further workup including cardiac catheterization which showed a 1st diagonal 90% stenosis of ramus with 90% stenosis and circumflex with 60% stenosis in our PDA of 80% stenosis    Subsequently, he underwent drug-eluting stent placement to the 1st diagonal and ramus lesions  Echocardiogram during admission showed ejection fraction of 60% with mild to moderate aortic valve stenosis  Aortic root was dilated at 4 5 cm  He subsequent underwent cardiac MRI which showed ejection fraction of 57%, moderate concentric LVH there was a small focus of intra myocardial fibrosis of the basal inferolateral wall  VT was likely from the anterolateral mitral annulus but there was no evidence of scar in that area  EP recommended increasing Toprol to 50 mg b i d  He was initially started on Tikosyn which was discontinued  He had LifeVest placed prior to going home  He is scheduled for follow-up visit with EP next month  Since hospital discharge, he has been feeling well without any chest pain, shortness of breath, dizziness, lightheadedness, syncope or near syncope  There has been no further alerts on loop recorder or and alarms with life vest     Patient had a loop recorder in place because of history of cryptogenic CVA  He has had episodes of atrial fibrillation noted on loop recorder in the past     The following portions of the patient's history were reviewed and updated as appropriate: allergies, current medications, past family history, past medical history, past social history, past surgical history, and problem list        Current Outpatient Medications:   •  apixaban (Eliquis) 5 mg, Take 1 tablet (5 mg total) by mouth 2 (two) times a day, Disp: 60 tablet, Rfl: 5  •  aspirin 81 mg chewable tablet, Chew 1 tablet (81 mg total) daily Only for 30 days   Discontinue after 30 days Do not start before November 2, 2022 , Disp: 30 tablet, Rfl: 0  •  atorvastatin (LIPITOR) 40 mg tablet, TAKE 1 TABLET BY MOUTH EVERY DAY IN THE EVENING, Disp: 90 tablet, Rfl: 1  •  clopidogrel (PLAVIX) 75 mg tablet, Take 1 tablet (75 mg total) by mouth daily Do not start before November 2, 2022 , Disp: 30 tablet, Rfl: 1  •  fexofenadine (ALLEGRA) 180 MG tablet, Take 1 tablet (180 mg total) by mouth daily for 30 days (Patient taking differently: Take 180 mg by mouth every morning), Disp: 30 tablet, Rfl: 0  •  metoprolol succinate (TOPROL-XL) 50 mg 24 hr tablet, Take 1 tablet (50 mg total) by mouth 2 (two) times a day, Disp: 180 tablet, Rfl: 2  •  potassium chloride (MICRO-K) 10 MEQ CR capsule, TAKE 1 CAPSULE BY MOUTH 2 TIMES A DAY , Disp: 60 capsule, Rfl: 2  •  sildenafil (VIAGRA) 100 mg tablet, Take 1 tablet (100 mg total) by mouth daily as needed for erectile dysfunction, Disp: 30 tablet, Rfl: 5        Review of Systems:  Review of Systems   Respiratory: Negative for shortness of breath  Cardiovascular: Negative for chest pain, palpitations and leg swelling  Musculoskeletal: Positive for arthralgias, gait problem and myalgias  Neurological: Positive for weakness  All other systems reviewed and are negative  Physical Exam:  Vitals:  Vitals:    11/08/22 1130   BP: 140/80   BP Location: Left arm   Patient Position: Sitting   Cuff Size: Large   Pulse: 67   Temp: 98 °F (36 7 °C)   SpO2: 97%   Weight: 106 kg (233 lb)   Height: 6' (1 829 m)     Physical Exam   Constitutional: He appears healthy  No distress  Eyes: Pupils are equal, round, and reactive to light  Conjunctivae are normal    Neck: No JVD present  Cardiovascular: Normal rate, regular rhythm and normal heart sounds  Exam reveals no gallop and no friction rub  No murmur heard  Pulmonary/Chest: Effort normal and breath sounds normal  He has no wheezes  He has no rales  Musculoskeletal:         General: No tenderness, deformity or edema  Cervical back: Normal range of motion and neck supple  Neurological: He is alert and oriented to person, place, and time  Skin: Skin is warm and dry  Cardiographics:  EKG: Personally reviewed Sinus rhythm with PVC  Last known EF: 55%    This note was completed in part utilizing [employer] Direct Software    Grammatical errors, random word insertions, spelling mistakes, and incomplete sentences can be an occasional consequence of this system secondary to software limitations, ambient noise, and hardware issues  If you have any questions or concerns about the content, text, or information contained within the body of this dictation, please contact the provider for clarification

## 2022-11-11 DIAGNOSIS — I10 PRIMARY HYPERTENSION: ICD-10-CM

## 2022-11-15 RX ORDER — METOPROLOL SUCCINATE 50 MG/1
TABLET, EXTENDED RELEASE ORAL
Qty: 30 TABLET | Refills: 1 | Status: SHIPPED | OUTPATIENT
Start: 2022-11-15

## 2022-11-28 ENCOUNTER — OFFICE VISIT (OUTPATIENT)
Dept: FAMILY MEDICINE CLINIC | Facility: CLINIC | Age: 67
End: 2022-11-28

## 2022-11-28 VITALS
WEIGHT: 235 LBS | HEART RATE: 82 BPM | RESPIRATION RATE: 18 BRPM | OXYGEN SATURATION: 94 % | DIASTOLIC BLOOD PRESSURE: 84 MMHG | TEMPERATURE: 98.8 F | SYSTOLIC BLOOD PRESSURE: 126 MMHG | BODY MASS INDEX: 31.87 KG/M2

## 2022-11-28 DIAGNOSIS — Z86.73 HISTORY OF CARDIOEMBOLIC CEREBROVASCULAR ACCIDENT (CVA): ICD-10-CM

## 2022-11-28 DIAGNOSIS — Z95.5 H/O HEART ARTERY STENT: ICD-10-CM

## 2022-11-28 DIAGNOSIS — I10 BENIGN ESSENTIAL HYPERTENSION: ICD-10-CM

## 2022-11-28 DIAGNOSIS — R26.81 UNSTEADY GAIT: ICD-10-CM

## 2022-11-28 DIAGNOSIS — I35.0 AORTIC VALVE STENOSIS, MODERATE: ICD-10-CM

## 2022-11-28 DIAGNOSIS — I47.29 NSVT (NONSUSTAINED VENTRICULAR TACHYCARDIA): Primary | ICD-10-CM

## 2022-11-28 PROBLEM — R65.10 SIRS (SYSTEMIC INFLAMMATORY RESPONSE SYNDROME) (HCC): Status: RESOLVED | Noted: 2022-06-07 | Resolved: 2022-11-28

## 2022-11-28 NOTE — PATIENT INSTRUCTIONS
Please see your neurologist for brain function (neuropsychology) evaluation  Plan is plavix with eliquis with aspirin until 12/3/22 then plavix and eliquis without aspirin thereafter

## 2022-11-28 NOTE — PROGRESS NOTES
Assessment/Plan:    No problem-specific Assessment & Plan notes found for this encounter  Discussed risks of not wearing life vest and aware of risk of syncope or death with VT    htn stable    cva unchanged  F/u neurology for cognitive testing    CAD with stent  Aware to stop asa after 1 month from dc  Continue plavix and eliquis  Cardiology f/u    Unsteady gait  Cont PT and HEP    AS discussed     Diagnoses and all orders for this visit:    NSVT (nonsustained ventricular tachycardia)    History of cardioembolic cerebrovascular accident (CVA)    Benign essential hypertension    Unsteady gait    H/O heart artery stent    Aortic valve stenosis, moderate        Return if symptoms worsen or fail to improve  Subjective:      Patient ID: Thais Lind is a 74 y o  male  Chief Complaint   Patient presents with   • Follow-up   • Atrial Fibrillation   • Coronary Artery Disease     Sas/cma       HPI  Was dc on 11/3/22  Records reviewed  Memory issues ongoing  neuropsych eval in past  No falls  No bleeding seen in stool or urine  Wife states he is not wearing his life vest, aware of risk    TCM Call     Date and time call was made  11/3/2022  2:20 PM    Hospital care reviewed  Records reviewed    Patient was hospitialized at  [facility]    Date of Admission  10/27/22    Date of discharge  11/03/22    Diagnosis  VT (ventricular tachycardia)    Disposition  Home    Were the patients medications reviewed and updated  Yes    Current Symptoms  None      TCM Call     Post hospital issues  None    Should patient be enrolled in anticoag monitoring? No    Scheduled for follow up?   Yes    Patients specialists  Cardiologist    Cardiologist name  Dr Leia Bauer    Neurologist name  [facility]ma [facility]    Other specialists names  Dr Wilber Schaefer    Did you obtain your prescribed medications  Yes    Do you need help managing your prescriptions or medications  No    Is transportation to your appointment needed  No    I have advised the patient to call PCP with any new or worsening symptoms  [address] [place] [address] or Located within [facility] other    Support System  Family    The type of support provided  Physical; Emotional    Do you have social support  Yes, as much as I need    Are you recieving any outpatient services  No    Are you recieving home care services  No    Have you fallen in the last 12 months  Yes    How many times  twice    Interperter language line needed  No    Counseling  Family    Counseling topics  Activities of daily living; Importance of RX compliance; patient and family education; instructions for management; Risk factor reduction    Comments  I spoke with Mrs Darius Ghosh who states that Rusty Mtz is doing fine  He has no complaints at this time  She knows to take him to the ER if any abd pain, weakness, shortness of breath, pallor, s/s rectal bleeding, palpitations, etc but she states he is completely fine now [facility]            The following portions of the patient's history were reviewed and updated as appropriate: allergies, current medications, past family history, past medical history, past social history, past surgical history and problem list     Review of Systems   Constitutional: Negative for chills and fever  Gastrointestinal: Negative for blood in stool  Current Outpatient Medications   Medication Sig Dispense Refill   • apixaban (Eliquis) 5 mg Take 1 tablet (5 mg total) by mouth 2 (two) times a day 60 tablet 5   • aspirin 81 mg chewable tablet Chew 1 tablet (81 mg total) daily Only for 30 days   Discontinue after 30 days Do not start before November 2, 2022  30 tablet 0   • atorvastatin (LIPITOR) 40 mg tablet TAKE 1 TABLET BY MOUTH EVERY DAY IN THE EVENING 90 tablet 1   • clopidogrel (PLAVIX) 75 mg tablet Take 1 tablet (75 mg total) by mouth daily Do not start before November 2, 2022  30 tablet 1   • fexofenadine (ALLEGRA) 180 MG tablet Take 1 tablet (180 mg total) by mouth daily for 30 days (Patient taking differently: Take 180 mg by mouth every morning) 30 tablet 0   • metoprolol succinate (TOPROL-XL) 50 mg 24 hr tablet TAKE 1 TABLET BY MOUTH EVERY DAY 30 tablet 1   • potassium chloride (MICRO-K) 10 MEQ CR capsule TAKE 1 CAPSULE BY MOUTH 2 TIMES A DAY  60 capsule 2   • sildenafil (VIAGRA) 100 mg tablet Take 1 tablet (100 mg total) by mouth daily as needed for erectile dysfunction 30 tablet 5     No current facility-administered medications for this visit  Objective:    /84   Pulse 82   Temp 98 8 °F (37 1 °C)   Resp 18   Wt 107 kg (235 lb)   SpO2 94%   BMI 31 87 kg/m²        Physical Exam  Vitals and nursing note reviewed  Constitutional:       General: He is not in acute distress  Appearance: He is well-developed  He is not ill-appearing  HENT:      Head: Normocephalic  Right Ear: Tympanic membrane normal       Left Ear: Tympanic membrane normal    Eyes:      General: No scleral icterus  Conjunctiva/sclera: Conjunctivae normal    Cardiovascular:      Rate and Rhythm: Normal rate and regular rhythm  Pulses: Intact distal pulses  Pulmonary:      Effort: Pulmonary effort is normal  No respiratory distress  Breath sounds: No wheezing  Abdominal:      Palpations: Abdomen is soft  Musculoskeletal:         General: No deformity or edema  Cervical back: Neck supple  Skin:     General: Skin is warm and dry  Neurological:      Mental Status: He is alert  Gait: Gait abnormal    Psychiatric:         Behavior: Behavior normal          Thought Content:  Thought content normal                 Ivan Connolly DO

## 2022-11-29 DIAGNOSIS — I10 PRIMARY HYPERTENSION: ICD-10-CM

## 2022-11-29 DIAGNOSIS — I25.10 CAD (CORONARY ARTERY DISEASE): ICD-10-CM

## 2022-11-29 RX ORDER — CLOPIDOGREL BISULFATE 75 MG/1
TABLET ORAL
Qty: 30 TABLET | Refills: 5 | Status: SHIPPED | OUTPATIENT
Start: 2022-11-29

## 2022-11-29 RX ORDER — METOPROLOL SUCCINATE 50 MG/1
TABLET, EXTENDED RELEASE ORAL
Qty: 90 TABLET | Refills: 1 | Status: SHIPPED | OUTPATIENT
Start: 2022-11-29

## 2022-11-30 RX ORDER — METOPROLOL SUCCINATE 25 MG/1
TABLET, EXTENDED RELEASE ORAL
Qty: 30 TABLET | Refills: 8 | Status: SHIPPED | OUTPATIENT
Start: 2022-11-30

## 2022-12-13 NOTE — PROGRESS NOTES
Consultation - Electrophysiology-Cardiology (EP)   Matty Kingston 74 y o  male MRN: [id number]  Unit/Bed#:  Encounter: [id number]      4  NSVT (nonsustained ventricular tachycardia)  Ambulatory referral to Cardiac Electrophysiology    POCT ECG      2  Obstructive sleep apnea        3  Paroxysmal A-fib [facility])  Ambulatory referral to Cardiac Electrophysiology      4  Personal history of colonic polyps        5  Obesity (BMI 30-39 9)        6  Hyperlipidemia LDL goal <130        7  History of stroke        8   H/O heart artery stent              Consults  Physician Requesting Consult: Jacky Mukherjee MD   Reason for Consult / Principal Problem:         Summary of my recommendation for the patient  Continue with beta-blocker  Device transmission weekly for the next 4 weeks and then monthly for 3 months  Gradually increase activity  Get PVC count with each transmission      Assessment  Sustained VT with syncope  CAD status post PCI  Preserved LVEF  Mild to moderate aortic stenosis  PAF  Long-term anticoagulation  Prior CVA  Ascending aortic aneurysm  Obesity  Obstructive sleep apnea        Assessment/Plan   Sustained VT with associated syncope on 10/26/2022  Patient does have a loop recorder in place  Patient is on a beta-blocker  Patient originally had a LifeVest in place, he has discontinued wearing it  Advised to continue to wear it as he starts becoming increasingly active  Continue with beta-blocker, clopidogrel and statin      CAD status post ABHILASH to D1 and RI 10/28/2022              A ) 60% mid circumflex lesion, negative per IFR              B ) other distal vessel disease noted, too small to intervene              C ) small mid LAD aneurysm noted              D ) currently on Plavix, statin, and beta-blocker       Mild to moderate aortic stenosis  Currently not complaining of angina and, syncope or dyspnea since stent placement      Paroxysmal atrial fibrillation  maintained on Eliquis as an outpatient  Continue with beta-blocker      Prior CVA   Continue with beta-blocker and statin      Ascending aortic aneurysm  Continue with beta-blocker and statin      Obesity  Obstructive sleep apnea  Nutritional evaluation  Follow change of diet      Mixed hyperlipidemia  Currently on atorvastatin  No myositis or myalgia                History of Present Illness   HPI: Abner Jacob is a 74y o  year old male has been referred to me by PCP for the management of VT post stenting of CAD      The patient has significant medical illnesses which include  Sustained VT with syncope  CAD status post PCI  Preserved LVEF  Mild to moderate aortic stenosis  PAF  Long-term anticoagulation  Prior CVA  Ascending aortic aneurysm    Patient is slowly recovering  He is not complaining of angina, orthopnea, PND, leg swelling  He is not complaining of palpitations, presyncope or syncope  He has become deconditioned    He does have a history of intermittent snoring, occasional morning fatigue and daytime sleepiness    He is not using tobacco alcohol or energy drinks      Historical Information   Past Medical History:   Diagnosis Date   • Arthropathy of knee     last assessed 8/19/15   • Bacterial pneumonia 02/05/2007    last assessed 2/5/7   • Brain atrophy [facility])    • Colon polyp    • CPAP (continuous positive airway pressure) dependence    • Disorder of male genital organ 02/12/2008    last assessed 2/12/08   • ED (erectile dysfunction) of organic origin     last assessed 2/13/17    • History of hypertension    • Seasonal allergies    • Situational anxiety     resolved 3/16/17    • Sleep apnea    • Stroke ([facility] [address] )     09/2020 TIA   • Tinnitus    • Wears hearing aid     bilateral     Past Surgical History:   Procedure Laterality Date   • CARDIAC CATHETERIZATION Left 10/28/2022    Procedure: Cardiac Left Heart Cath;  Surgeon: Rosalino Killian MD;  Location: [facility] CARDIAC CATH LAB;   Service: Cardiology   • CARDIAC CATHETERIZATION  10/28/2022    Procedure: Cardiac catheterization;  Surgeon: Mary Jo Martinez MD;  Location: [facility]; Service: Cardiology   • CARDIAC CATHETERIZATION N/A 10/28/2022    Procedure: Cardiac Coronary Angiogram;  Surgeon: Mary Jo Martinez MD;  Location: [facility]; Service: Cardiology   • CARDIAC ELECTROPHYSIOLOGY PROCEDURE N/A 12/22/2021    Procedure: Cardiac Loop Recorder Implant;  Surgeon: Bharat Peacock DO;  Location: [place] [id number] CATH LAB; Service: Cardiology   • COLONOSCOPY      x3-w/polyps   • HERNIA REPAIR      umbilical,hemal inguinal   • KNEE ARTHROSCOPY Left     meniscus   • [place] COLONOSCOPY FLX DX W/COLLJ SPEC WHEN PFRMD N/A 5/7/2018    Procedure: COLONOSCOPY;  Surgeon: Pedro Reyes MD;  Location: [facility] GI LAB;   Service: Gastroenterology     Social History     Substance and Sexual Activity   Alcohol Use Not Currently   • Alcohol/week: 14 0 standard drinks   • Types: 14 Cans of beer per week     Social History     Substance and Sexual Activity   Drug Use No     Social History     Tobacco Use   Smoking Status Passive Smoke Exposure - Never Smoker   Smokeless Tobacco Never     Social History     Socioeconomic History   • Marital status: /Civil Union     Spouse name: Not on file   • Number of children: Not on file   • Years of education: Not on file   • Highest education level: Not on file   Occupational History   • Not on file   Tobacco Use   • Smoking status: Passive Smoke Exposure - Never Smoker   • Smokeless tobacco: Never   Vaping Use   • Vaping Use: Never used   Substance and Sexual Activity   • Alcohol use: Not Currently     Alcohol/week: 14 0 standard drinks     Types: 14 Cans of beer per week   • Drug use: No   • Sexual activity: Not on file   Other Topics Concern   • Not on file   Social History Narrative   • Not on file     Social Determinants of Health     Financial Resource Strain: Not on file   Food Insecurity: No Food Insecurity   • Worried About Running Out of Food in the Last Year: Never true   • Ran Out of Food in the Last Year: Never true   Transportation Needs: No Transportation Needs   • Lack of Transportation (Medical): No   • Lack of Transportation (Non-Medical): No   Physical Activity: Not on file   Stress: Not on file   Social Connections: Not on file   Intimate Partner Violence: Not on file   Housing Stability: Low Risk    • Unable to Pay for Housing in the Last Year: No   • Number of Places Lived in the Last Year: 1   • Unstable Housing in the Last Year: No        Family History:  Family History   Problem Relation Age of Onset   • Cancer Mother         breast   • Diverticulitis Mother         colostomy   • Breast cancer Mother    • Heart disease Father         CHF   • Cancer Sister         breast   • Depression Sister    • Cancer Sister         skin cancer   • Cancer Sister         skin cancer   • Colon cancer Neg Hx    • Liver disease Neg Hx          Meds/Allergies      No current facility-administered medications for this visit  (Not in a hospital admission)      Allergies   Allergen Reactions   • Pollen Extract Sneezing     Reaction Date: 18Sep2006;    • Shellfish-Derived Products - Food Allergy Other (See Comments)     Eye swelling   • Iodine Solution [Povidone Iodine] Rash     Eyes swell           Objective   Vitals:   Visit Vitals  /78 (BP Location: Left arm, Patient Position: Sitting, Cuff Size: Standard)   Pulse 77   Ht 6' (1 829 m)   Wt 109 kg (239 lb 11 2 oz)   BMI 32 51 kg/m²   Smoking Status Passive Smoke Exposure - Never Smoker   BSA 2 3 m²      Vitals:    12/15/22 1314   Weight: 109 kg (239 lb 11 2 oz)   [unfilled]    Invasive Devices     None                   ROS  Review of Systems   All other systems reviewed and are negative  As described in my history of present illness        PHYSICAL EXAM  Physical Exam  Vitals reviewed  Constitutional:       General: He is not in acute distress  Appearance: Normal appearance  He is obese  HENT:      Head: Normocephalic and atraumatic  Right Ear: External ear normal       Left Ear: External ear normal       Nose: Nose normal       Mouth/Throat:      Comments: Posterior pharynx is crowded  Eyes:      General: No scleral icterus  Extraocular Movements: Extraocular movements intact  Conjunctiva/sclera: Conjunctivae normal       Pupils: Pupils are equal, round, and reactive to light  Neck:      Comments: Large thick neck  Cardiovascular:      Rate and Rhythm: Normal rate and regular rhythm  Heart sounds: Normal heart sounds  No murmur heard  Pulmonary:      Effort: No respiratory distress  Breath sounds: Normal breath sounds  Abdominal:      General: Bowel sounds are normal       Palpations: Abdomen is soft  Comments: Central obesity present   Musculoskeletal:         General: No swelling or deformity  Cervical back: Neck supple  No rigidity  Skin:     Coloration: Skin is not jaundiced  Findings: No bruising  Neurological:      Mental Status: He is oriented to person, place, and time  Mental status is at baseline  Psychiatric:         Mood and Affect: Mood normal          Behavior: Behavior normal          Thought Content:  Thought content normal          Judgment: Judgment normal                LAB RESULTS:      CBC:  Results from Last 12 Months   Lab Units 11/01/22  0351 10/31/22  0238 10/30/22  0452 10/29/22  0444 10/28/22  0712 10/27/22  1557 10/26/22  1711 07/07/22  1527 06/10/22  0607 06/09/22  0453 06/08/22  0450   WBC Thousand/uL 7 57 7 26 8 63 8 38 6 40 5 57 7 49   < > 6 89 10 21* 10 71*   HEMOGLOBIN g/dL 10 5* 10 2* 9 8* 10 3* 10 3* 9 6* 10 2*   < > 9 2* 10 1* 10 4*   HEMATOCRIT % 36 4* 35 6* 33 1* 35 4* 36 1* 33 6* 34 9*   < > 28 1* 30 8* 31 1*   MCV fL 78* 79* 76* 79* 79* 79* 77*   < > 89 88 87   PLATELETS Thousands/uL 295 279 268 302 302 299 297   < > 204 227 242   MCH pg 22 4* 22 5* 22 6* 22 9* 22 4* 22 5* 22 5*   < > 29 2 28 9 28 9   MCHC g/dL 28 8* 28 7* 29 6* 29 1* 28 5* 28 6* 29 2*   < > 32 7 32 8 33 1   RDW % 14 8 14 8 14 8 15 0 15 1 15 1 15 3*   < > 13 5 13 3 13 1   MPV fL 9 2 8 9 8 7* 8 9 8 9 8 7* 8 4*   < > 9 3 9 3 9 6   NRBC AUTO /100 WBCs  --   --   --   --   --  0 0  --  0 0 0    < > = values in this interval not displayed  CMP:  Results from Last 12 Months   Lab Units 11/03/22  0439 11/01/22  0318 10/31/22  0238 10/30/22  0452 10/29/22  0444 10/28/22  0712 10/27/22  1557 10/26/22  1711 07/07/22  1527 06/08/22  0450 06/07/22  1407   POTASSIUM mmol/L 3 7 3 8 4 1 3 7 3 7   < > 3 5 3 5 3 9   < > 3 9   CHLORIDE mmol/L 112* 112* 113* 116* 112*   < > 112* 108 110*   < > 112*   CO2 mmol/L 22 24 21 20* 22   < > 25 24 25   < > 22   BUN mg/dL 18 20 17 13 14   < > 22 24 21   < > 19   CREATININE mg/dL 0 93 0 93 0 89 0 88 0 88   < > 1 02 1 11 0 83   < > 1 32*   CALCIUM mg/dL 8 7 9 1 8 9 8 5 8 4   < > 8 7 8 8 9 1   < > 8 7   AST U/L  --   --   --   --   --   --  17 21 12*  --  12   ALT U/L  --   --   --   --   --   --  29 27 11  --  21   ALK PHOS U/L  --   --   --   --   --   --  99 110 90  --  110   EGFR ml/min/1 73sq m 85 85 89 90 90   < > 76 69 92   < > 56    < > = values in this interval not displayed  Magnesium:   Results from Last 12 Months   Lab Units 11/03/22  0439 11/01/22  0318 10/31/22  0238 10/30/22  0452 10/29/22  0444   MAGNESIUM mg/dL 2 5 2 5 2 4 2 4 2 3       A1C:  Results from Last 12 Months   Lab Units 07/07/22  1527   HEMOGLOBIN A1C % 5 1        TSH:  No results for input(s): TSH in the last 8784 hours  TSH 3rd Gen:  Results from Last 12 Months   Lab Units 10/27/22  1557   TSH 3RD GENERATON uIU/mL 1 030        PT/INR:  Results from Last 12 Months   Lab Units 10/27/22  1819 06/07/22  1407   PROTIME seconds 12 5 14 5   INR  0 91 1 15       Lipid Panel:  Results from Last 12 Months   Lab Units 10/29/22  0444   CHOLESTEROL mg/dL 80   TRIGLYCERIDES mg/dL 95   HDL mg/dL 27*        Troponin:  No results for input(s): TROPONINI in the last 8784 hours  Imaging  10/27/2022    Device Check - LOOP       MDT LNQ22/ ACTIVE SYSTEM IS MRI CONDITIONAL   NON-BILLABLE  CARELINK TRANSMISSION: ALERT FOR 1 TACHY EPISODE W/ EGRAM SHOWING RUN OF NSVT @ 250 BPM FOR 47 SEC  PT WAS IN ER YESTERDAY AFTER FALLING WHILE AT PT WHERE HE  GOT DIZZY  PT ALSO VERY ACTIVE DURING THE DAY MOWED LAWN  CALL IN TO PT TO TRY TO CORRELATE SYMPTOMS WITH NSVT EPISODE  TASK TO NB  Belia Side was @ physical therapy and just fell  pt didn't recall the episode nor why he had to be taken to hospital  unfor device was not checked while in er   they are going to see dr Hang Sprague today    This result has not been signed  Information might be incomplete  Specimen Collected: 10/27/22  6:50 AM              Imaging:    EKG  10/26/2022          JOSE ELIAS  No results found for this or any previous visit  Echo complete w/ contrast if indicated  7/7/2022    Interpretation Summary  •  Left Ventricle: Left ventricular cavity size is normal  Wall thickness is moderately increased  The left ventricular ejection fraction is 55%  Systolic function is normal  Wall motion is normal  Diastolic function is mildly abnormal, consistent with grade I (abnormal) relaxation  •  Right Ventricle: Right ventricular cavity size is mildly dilated  •  Left Atrium: The atrium is mildly dilated  •  Right Atrium: The atrium is mildly dilated  •  Aortic Valve: The aortic valve is trileaflet  The leaflets are not thickened  The leaflets are moderately calcified  There is mildly reduced mobility  There is mild stenosis  •  Mitral Valve: There is mild annular calcification  There is mild regurgitation  •  Tricuspid Valve: There is mild regurgitation  •  Pulmonic Valve: There is mild regurgitation  •  Aorta: The aortic root is normal in size  The ascending aorta is mildly dilated      2    Echo follow up/limited w/ contrast if indicated  10/28/2022    Interpretation Summary  •  Left Ventricle: Left ventricular cavity size is normal  Wall thickness is moderately increased  The left ventricular ejection fraction is 60%  Systolic function is normal  Wall motion is normal   •  Aortic Valve: The leaflets are moderately thickened  The leaflets are moderately calcified  There is moderately reduced mobility  There is mild to moderate stenosis  The aortic valve peak velocity is 2 48 m/s  The aortic valve peak gradient is 25 mmHg  The aortic valve mean gradient is 17 mmHg  The dimensionless velocity index is 0 49  •  Right Ventricle: Right ventricular cavity size is mildly dilated  Systolic function is normal   •  Left Atrium: The atrium is mildly dilated  •  Right Atrium: The atrium is mildly dilated  •  Mitral Valve: There is mild regurgitation  •  Aorta: The sinus of Valsalva is moderately dilated  The ascending aorta is moderately dilated  The aortic root is 4 50 cm  The ascending aorta is 4 6 cm  Stress Test:   No results found for this or any previous visit  No results found for this or any previous visit  Cardiac catheterization   10/28/2022    • 1st Diag lesion is 90% stenosed  • Ramus lesion is 90% stenosed  • Mid Cx lesion is 60% stenosed  • RPDA lesion is 80% stenosed  Significant disease of D1 and the ramus intermedius  Both stented successfully  An intermediate stenosis of the circumflex was not flow-significant by DFR (DFR=0 99)  There was a stenosis of the mid PDA; the vessel was too small and supplied too small a region of myocardium to warrant intervention  There was a small aneurysm of the mid LAD  Plan: DAPT for 1 week; then plavix + NOAC; statin, beta blocker  EP evaluation  Cardiac MRI  10/30/2022    74year old man with ventricular tachycardia for evaluation for cardiomyopathy      Technique:  1  3 plane SSFP localizers  2  SSFP cine imaging in long and short axis plane  3  T2 weighted DIR FSE in short axis plane  4  20 ml gadobutrol power injected    5  2D inversion recovery FGRE for delayed myocardial enhancement  6  The patient tolerated the procedure well without complication      Measurements:   Senthil-septal wall 15 mm  Postero-lateral wall 14 mm  Left ventricular end-diastolic dimension 60 mm  Left ventricular end-systolic dimension 40 mm  Left ventricular end-diastolic volume 450 ml  Left ventricular end-systolic volume  74 ml  Stroke volume 96 ml  Cardiac Output 7 0 L/min  Ejection fraction 57 %  Left atrium 42 mm  Aortic Root 42 mm     Findings:    1  The left ventricle is mildly dilated with moderate concentric left ventricular hypertrophy  Left ventricular systolic function is normal with a measured ejection fraction of 57%  There are no regional left ventricular wall motion abnormalities  2  The right ventricle is normal in size with normal right ventricular systolic function  3  The aortic, mitral, and tricuspid valves open without restriction  There is no significant valvular regurgitation, however cine MRI is inaccurate in the qualitative assessment of valvular regurgitation  4  The left atrium is mildly dilated  The aortic root is mildly dilated  5  There is no evidence of myocardial edema  6  Delayed post-gadolinium imaging demonstrates a small focus of intramyocardial hyperenhancement within the basal inferolateral wall      IMPRESSION:  Impression:  1  Mildly dilated left ventricle with moderate concentric left ventricular hypertrophy and normal left ventricular systolic function  2  Normal right ventricular size and systolic function  3  No significant valvular abnormalities  4  Small focus of intramyocardial fibrosis within the basal inferolateral wall  This most likely suggests prior myocarditis, but cannot completely exclude cardiac sarcoidosis        Workstation performed: [id number]            HOLTER MONITOR: 24 HOUR/48 HOUR MONITORS  No results found for this or any previous visit        AMB extended holter monitor  No results found for this or any previous visit  DEVICE CHECK:       Results for orders placed or performed in visit on 10/27/22   Cardiac EP device report    Narrative     [facility]  NON-BILLABLE  CARELINK TRANSMISSION: ALERT FOR 1 TACHY EPISODE W/ EGRAM SHOWING RUN OF NSVT @ 250 BPM FOR 47 SEC  PT WAS IN ER YESTERDAY AFTER FALLING WHILE AT PT WHERE HE  GOT DIZZY  PT ASLO VERY ACTIVE DURING THE DAY MOWED LAWN  CALL IN TO PT TO TRY TO CORRELATE SYMPTOMS WITH NSVT EPISODE  TASK TO NB  DL     Results for orders placed or performed in visit on 10/27/22   Cardiac EP device report    Narrative     [facility]  NON-BILLABLE  CARELINK TRANSMISSION: ALERT FOR 1 TACHY EPISODE W/ EGRAM SHOWING RUN OF NSVT @ 250 BPM FOR 47 SEC  PT WAS IN ER YESTERDAY AFTER FALLING WHILE AT PT WHERE HE  GOT DIZZY  PT ASLO VERY ACTIVE DURING THE DAY MOWED LAWN  CALL IN TO PT TO TRY TO CORRELATE SYMPTOMS WITH NSVT EPISODE  TASK TO ASHLEE Hullreuben Shepard was @ physical therapy and just fell  pt didn't recall the episode nor why he had to be taken to hospital  unfor device was not checked while in er   they are going to see dr Higinio Martinez today             Code Status: [unfilled]  Advance Directive and Living Will:      Power of :    POLST:      Counseling / Coordination of Care  Detailed discussion done with patient with regards to further management of abnormal rhythm, coronary artery disease      Chris Pacheco MD

## 2022-12-15 ENCOUNTER — CONSULT (OUTPATIENT)
Dept: CARDIOLOGY CLINIC | Facility: CLINIC | Age: 67
End: 2022-12-15

## 2022-12-15 VITALS
WEIGHT: 239.7 LBS | BODY MASS INDEX: 32.47 KG/M2 | DIASTOLIC BLOOD PRESSURE: 78 MMHG | SYSTOLIC BLOOD PRESSURE: 124 MMHG | HEIGHT: 72 IN | HEART RATE: 77 BPM

## 2022-12-15 DIAGNOSIS — E66.9 OBESITY (BMI 30-39.9): ICD-10-CM

## 2022-12-15 DIAGNOSIS — Z95.5 H/O HEART ARTERY STENT: ICD-10-CM

## 2022-12-15 DIAGNOSIS — E78.5 HYPERLIPIDEMIA LDL GOAL <130: ICD-10-CM

## 2022-12-15 DIAGNOSIS — I48.0 PAROXYSMAL A-FIB (HCC): ICD-10-CM

## 2022-12-15 DIAGNOSIS — Z86.010 PERSONAL HISTORY OF COLONIC POLYPS: ICD-10-CM

## 2022-12-15 DIAGNOSIS — Z86.73 HISTORY OF STROKE: ICD-10-CM

## 2022-12-15 DIAGNOSIS — I47.29 NSVT (NONSUSTAINED VENTRICULAR TACHYCARDIA): Primary | ICD-10-CM

## 2022-12-15 DIAGNOSIS — G47.33 OBSTRUCTIVE SLEEP APNEA: ICD-10-CM

## 2022-12-15 NOTE — LETTER
December 26, 2022     Huntley Boas, MD  [facility]  [id number]    Patient: Antonia Potter   YOB: 1955   Date of Visit: 12/15/2022       Dear Dr Hiren Celaya:    Thank you for referring Leticia Menchaca to me for evaluation  Below are my notes for this consultation  If you have questions, please do not hesitate to call me  I look forward to following your patient along with you  Sincerely,        Radha Norris MD        CC: Roberth Zhang MD  12/26/2022  1:43 PM  Sign when Signing Visit   Consultation - Electrophysiology-Cardiology (EP)   Antonia Potter 74 y o  male MRN: [id number]  Unit/Bed#:  Encounter: [id number]      2  NSVT (nonsustained ventricular tachycardia)  Ambulatory referral to Cardiac Electrophysiology    POCT ECG      2  Obstructive sleep apnea        3  Paroxysmal A-fib [facility])  Ambulatory referral to Cardiac Electrophysiology      4  Personal history of colonic polyps        5  Obesity (BMI 30-39 9)        6  Hyperlipidemia LDL goal <130        7  History of stroke        8   H/O heart artery stent              Consults  Physician Requesting Consult: Noy Lu MD   Reason for Consult / Principal Problem:         Summary of my recommendation for the patient  Continue with beta-blocker  Device transmission weekly for the next 4 weeks and then monthly for 3 months  Gradually increase activity  Get PVC count with each transmission      Assessment  Sustained VT with syncope  CAD status post PCI  Preserved LVEF  Mild to moderate aortic stenosis  PAF  Long-term anticoagulation  Prior CVA  Ascending aortic aneurysm  Obesity  Obstructive sleep apnea        Assessment/Plan   Sustained VT with associated syncope on 10/26/2022  Patient does have a loop recorder in place  Patient is on a beta-blocker  Patient originally had a LifeVest in place, he has discontinued wearing it  Advised to continue to wear it as he starts becoming increasingly active  Continue with beta-blocker, clopidogrel and statin      CAD status post ABHILASH to D1 and RI 10/28/2022              A ) 60% mid circumflex lesion, negative per IFR              B ) other distal vessel disease noted, too small to intervene              C ) small mid LAD aneurysm noted              D ) currently on Plavix, statin, and beta-blocker       Mild to moderate aortic stenosis  Currently not complaining of angina and, syncope or dyspnea since stent placement      Paroxysmal atrial fibrillation  maintained on Eliquis as an outpatient  Continue with beta-blocker      Prior CVA   Continue with beta-blocker and statin      Ascending aortic aneurysm  Continue with beta-blocker and statin      Obesity  Obstructive sleep apnea  Nutritional evaluation  Follow change of diet      Mixed hyperlipidemia  Currently on atorvastatin  No myositis or myalgia                History of Present Illness   HPI: Conner Kiser is a 74y o  year old male has been referred to me by PCP for the management of VT post stenting of CAD      The patient has significant medical illnesses which include  Sustained VT with syncope  CAD status post PCI  Preserved LVEF  Mild to moderate aortic stenosis  PAF  Long-term anticoagulation  Prior CVA  Ascending aortic aneurysm    Patient is slowly recovering  He is not complaining of angina, orthopnea, PND, leg swelling  He is not complaining of palpitations, presyncope or syncope  He has become deconditioned    He does have a history of intermittent snoring, occasional morning fatigue and daytime sleepiness    He is not using tobacco alcohol or energy drinks      Historical Information   Past Medical History:   Diagnosis Date   • Arthropathy of knee     last assessed 8/19/15   • Bacterial pneumonia 02/05/2007    last assessed 2/5/7   • Brain atrophy [facility])    • Colon polyp    • CPAP (continuous positive airway pressure) dependence    • Disorder of male genital organ 02/12/2008    last assessed 2/12/08   • ED (erectile dysfunction) of organic origin     last assessed 2/13/17    • History of hypertension    • Seasonal allergies    • Situational anxiety     resolved 3/16/17    • Sleep apnea    • Stroke [facility])     09/2020 TIA   • Tinnitus    • Wears hearing aid     bilateral     Past Surgical History:   Procedure Laterality Date   • CARDIAC CATHETERIZATION Left 10/28/2022    Procedure: Cardiac Left Heart Cath;  Surgeon: Anay Lynn MD;  Location: [facility] CARDIAC C[facility] LAB; Service: Cardiology   • CARDIAC CATHETERIZATION  10/28/2022    Procedure: Cardiac catheterization;  Surgeon: Anay Lynn MD;  Location: [facility]; Service: Cardiology   • CARDIAC CATHETERIZATION N/A 10/28/2022    Procedure: Cardiac Coronary Angiogram;  Surgeon: Anay Lynn MD;  Location:  CARDIAC CATH LAB; Service: Cardiology   • CARDIAC ELECTROPHYSIOLOGY PROCEDURE N/A 12/22/2021    Procedure: Cardiac Loop Recorder Implant;  Surgeon: Cadence Yanez DO;  Location: [place] [address] CATH LAB; Service: Cardiology   • COLONOSCOPY      x3-w/polyps   • HERNIA REPAIR      umbilical,hemal inguinal   • KNEE ARTHROSCOPY Left     meniscus   • PA COLONOSCOPY FLX DX W/COLLJ SPEC WHEN PFRMD N/A 5/7/2018    Procedure: COLONOSCOPY;  Surgeon: Anton Hawley MD;  Location: [facility] GI LAB;   Service: Gastroenterology     Social History     Substance and Sexual Activity   Alcohol Use Not Currently   • Alcohol/week: 14 0 standard drinks   • Types: 14 Cans of beer per week     Social History     Substance and Sexual Activity   Drug Use No     Social History     Tobacco Use   Smoking Status Passive Smoke Exposure - Never Smoker   Smokeless Tobacco Never     Social History     Socioeconomic History   • Marital status: /Civil Union     Spouse name: Not on file   • Number of children: Not on file   • Years of education: Not on file   • Highest education level: Not on file   Occupational History   • Not on file   Tobacco Use   • Smoking status: Passive Smoke Exposure - Never Smoker   • Smokeless tobacco: Never   Vaping Use   • Vaping Use: Never used   Substance and Sexual Activity   • Alcohol use: Not Currently     Alcohol/week: 14 0 standard drinks     Types: 14 Cans of beer per week   • Drug use: No   • Sexual activity: Not on file   Other Topics Concern   • Not on file   Social History Narrative   • Not on file     Social Determinants of Health     Financial Resource Strain: Not on file   Food Insecurity: No Food Insecurity   • Worried About Running Out of Food in the Last Year: Never true   • Ran Out of Food in the Last Year: Never true   Transportation Needs: No Transportation Needs   • Lack of Transportation (Medical): No   • Lack of Transportation (Non-Medical): No   Physical Activity: Not on file   Stress: Not on file   Social Connections: Not on file   Intimate Partner Violence: Not on file   Housing Stability: Low Risk    • Unable to Pay for Housing in the Last Year: No   • Number of Places Lived in the Last Year: 1   • Unstable Housing in the Last Year: No        Family History:  Family History   Problem Relation Age of Onset   • Cancer Mother         breast   • Diverticulitis Mother         colostomy   • Breast cancer Mother    • Heart disease Father         CHF   • Cancer Sister         breast   • Depression Sister    • Cancer Sister         skin cancer   • Cancer Sister         skin cancer   • Colon cancer Neg Hx    • Liver disease Neg Hx          Meds/Allergies       No current facility-administered medications for this visit          (Not in a hospital admission)      Allergies   Allergen Reactions   • Pollen Extract Sneezing     Reaction Date: 18Sep2006;    • Shellfish-Derived Products - Food Allergy Other (See Comments)     Eye swelling   • Iodine Solution [Povidone Iodine] Rash     Eyes swell           Objective    Vitals:   Visit Vitals  /78 (BP Location: Left arm, Patient Position: Sitting, Cuff Size: Standard)   Pulse 77   Ht 6' (1 829 m)   [employer] 109 kg (239 lb 11 2 oz)   BMI 32 51 kg/m²   Smoking Status Passive Smoke Exposure - Never Smoker   BSA 2 3 m²      Vitals:    12/15/22 1314   Weight: 109 kg (239 lb 11 2 oz)   [unfilled]    Invasive Devices     None                   ROS  Review of Systems   All other systems reviewed and are negative  As described in my history of present illness        PHYSICAL EXAM  Physical Exam  Vitals reviewed  Constitutional:       General: He is not in acute distress  Appearance: Normal appearance  He is obese  HENT:      Head: Normocephalic and atraumatic  Right Ear: External ear normal       Left Ear: External ear normal       Nose: Nose normal       Mouth/Throat:      Comments: Posterior pharynx is crowded  Eyes:      General: No scleral icterus  Extraocular Movements: Extraocular movements intact  Conjunctiva/sclera: Conjunctivae normal       Pupils: Pupils are equal, round, and reactive to light  Neck:      Comments: Large thick neck  Cardiovascular:      Rate and Rhythm: Normal rate and regular rhythm  Heart sounds: Normal heart sounds  No murmur heard  Pulmonary:      Effort: No respiratory distress  Breath sounds: Normal breath sounds  Abdominal:      General: Bowel sounds are normal       Palpations: Abdomen is soft  Comments: Central obesity present   Musculoskeletal:         General: No swelling or deformity  Cervical back: Neck supple  No rigidity  Skin:     Coloration: Skin is not jaundiced  Findings: No bruising  Neurological:      Mental Status: He is oriented to person, place, and time  Mental status is at baseline  Psychiatric:         Mood and Affect: Mood normal          Behavior: Behavior normal          Thought Content:  Thought content normal          Judgment: Judgment normal                LAB RESULTS:      CBC:  Results from Last 12 Months   Lab Units 11/01/22  0351 10/31/22  0238 10/30/22  2443 10/29/22  0444 10/28/22  5917 10/27/22  1557 10/26/22  1711 07/07/22  1527 06/10/22  0607 06/09/22  0453 06/08/22  0450   WBC Thousand/uL 7 57 7 26 8 63 8 38 6 40 5 57 7 49   < > 6 89 10 21* 10 71*   HEMOGLOBIN g/dL 10 5* 10 2* 9 8* 10 3* 10 3* 9 6* 10 2*   < > 9 2* 10 1* 10 4*   HEMATOCRIT % 36 4* 35 6* 33 1* 35 4* 36 1* 33 6* 34 9*   < > 28 1* 30 8* 31 1*   MCV fL 78* 79* 76* 79* 79* 79* 77*   < > 89 88 87   PLATELETS Thousands/uL 295 279 268 302 302 299 297   < > 204 227 242   MCH pg 22 4* 22 5* 22 6* 22 9* 22 4* 22 5* 22 5*   < > 29 2 28 9 28 9   MCHC g/dL 28 8* 28 7* 29 6* 29 1* 28 5* 28 6* 29 2*   < > 32 7 32 8 33 1   RDW % 14 8 14 8 14 8 15 0 15 1 15 1 15 3*   < > 13 5 13 3 13 1   MPV fL 9 2 8 9 8 7* 8 9 8 9 8 7* 8 4*   < > 9 3 9 3 9 6   NRBC AUTO /100 WBCs  --   --   --   --   --  0 0  --  0 0 0    < > = values in this interval not displayed  CMP:  Results from Last 12 Months   Lab Units 11/03/22  0439 11/01/22  0318 10/31/22  0238 10/30/22  0452 10/29/22  0444 10/28/22  0712 10/27/22  1557 10/26/22  1711 07/07/22  1527 06/08/22  0450 06/07/22  1407   POTASSIUM mmol/L 3 7 3 8 4 1 3 7 3 7   < > 3 5 3 5 3 9   < > 3 9   CHLORIDE mmol/L 112* 112* 113* 116* 112*   < > 112* 108 110*   < > 112*   CO2 mmol/L 22 24 21 20* 22   < > 25 24 25   < > 22   BUN mg/dL 18 20 17 13 14   < > 22 24 21   < > 19   CREATININE mg/dL 0 93 0 93 0 89 0 88 0 88   < > 1 02 1 11 0 83   < > 1 32*   CALCIUM mg/dL 8 7 9 1 8 9 8 5 8 4   < > 8 7 8 8 9 1   < > 8 7   AST U/L  --   --   --   --   --   --  17 21 12*  --  12   ALT U/L  --   --   --   --   --   --  29 27 11  --  21   ALK PHOS U/L  --   --   --   --   --   --  99 110 90  --  110   EGFR ml/min/1 73sq m 85 85 89 90 90   < > 76 69 92   < > 56    < > = values in this interval not displayed          Magnesium:   Results from Last 12 Months   Lab Units 11/03/22  0439 11/01/22  0318 10/31/22  0238 10/30/22  0452 10/29/22  0444   MAGNESIUM mg/dL 2 5 2 5 2 4 2 4 2 3       A1C:  Results from Last 12 Months   Lab Units 07/07/22  1527   HEMOGLOBIN A1C % 5 1        TSH:  No results for input(s): TSH in the last 8784 hours  TSH 3rd Gen:  Results from Last 12 Months   Lab Units 10/27/22  1557   TSH 3RD GENERATON uIU/mL 1 030        PT/INR:  Results from Last 12 Months   Lab Units 10/27/22  1819 06/07/22  1407   PROTIME seconds 12 5 14 5   INR  0 91 1 15       Lipid Panel:  Results from Last 12 Months   Lab Units 10/29/22  0444   CHOLESTEROL mg/dL 80   TRIGLYCERIDES mg/dL 95   HDL mg/dL 27*        Troponin:  No results for input(s): TROPONINI in the last 8784 hours  Imaging  10/27/2022    Device Check - LOOP       MDT LNQ22/ ACTIVE SYSTEM IS MRI CONDITIONAL   NON-BILLABLE  CARELINK TRANSMISSION: ALERT FOR 1 TACHY EPISODE W/ EGRAM SHOWING RUN OF NSVT @ 250 BPM FOR 47 SEC  PT WAS IN ER YESTERDAY AFTER FALLING WHILE AT PT WHERE HE  GOT DIZZY  PT ALSO VERY ACTIVE DURING THE DAY MOWED LAWN  CALL IN TO PT TO TRY TO CORRELATE SYMPTOMS WITH NSVT EPISODE  TASK TO ASHLEE Brasher Drea was @ physical therapy and just fell  pt didn't recall the episode nor why he had to be taken to hospital  unfor device was not checked while in er   they are going to see dr Higinio Martinez today    This result has not been signed  Information might be incomplete  Specimen Collected: 10/27/22  6:50 AM              Imaging:    EKG  10/26/2022          JOSE ELIAS  No results found for this or any previous visit  Echo complete w/ contrast if indicated  7/7/2022    Interpretation Summary  •  Left Ventricle: Left ventricular cavity size is normal  Wall thickness is moderately increased  The left ventricular ejection fraction is 55%  Systolic function is normal  Wall motion is normal  Diastolic function is mildly abnormal, consistent with grade I (abnormal) relaxation  •  Right Ventricle: Right ventricular cavity size is mildly dilated  •  Left Atrium: The atrium is mildly dilated  •  Right Atrium: The atrium is mildly dilated  •  Aortic Valve:  The aortic valve is trileaflet  The leaflets are not thickened  The leaflets are moderately calcified  There is mildly reduced mobility  There is mild stenosis  •  Mitral Valve: There is mild annular calcification  There is mild regurgitation  •  Tricuspid Valve: There is mild regurgitation  •  Pulmonic Valve: There is mild regurgitation  •  Aorta: The aortic root is normal in size  The ascending aorta is mildly dilated  2    Echo follow up/limited w/ contrast if indicated  10/28/2022    Interpretation Summary  •  Left Ventricle: Left ventricular cavity size is normal  Wall thickness is moderately increased  The left ventricular ejection fraction is 60%  Systolic function is normal  Wall motion is normal   •  Aortic Valve: The leaflets are moderately thickened  The leaflets are moderately calcified  There is moderately reduced mobility  There is mild to moderate stenosis  The aortic valve peak velocity is 2 48 m/s  The aortic valve peak gradient is 25 mmHg  The aortic valve mean gradient is 17 mmHg  The dimensionless velocity index is 0 49  •  Right Ventricle: Right ventricular cavity size is mildly dilated  Systolic function is normal   •  Left Atrium: The atrium is mildly dilated  •  Right Atrium: The atrium is mildly dilated  •  Mitral Valve: There is mild regurgitation  •  Aorta: The sinus of Valsalva is moderately dilated  The ascending aorta is moderately dilated  The aortic root is 4 50 cm  The ascending aorta is 4 6 cm  Stress Test:   No results found for this or any previous visit  No results found for this or any previous visit  Cardiac catheterization   10/28/2022    • 1st Diag lesion is 90% stenosed  • Ramus lesion is 90% stenosed  • Mid Cx lesion is 60% stenosed  • RPDA lesion is 80% stenosed  Significant disease of D1 and the ramus intermedius  Both stented successfully  An intermediate stenosis of the circumflex was not flow-significant by DFR (DFR=0 99)   There was a stenosis of the mid PDA; the vessel was too small and supplied too small a region of myocardium to warrant intervention  There was a small aneurysm of the mid LAD  Plan: DAPT for 1 week; then plavix + NOAC; statin, beta blocker  EP evaluation  Cardiac MRI  10/30/2022    74year old man with ventricular tachycardia for evaluation for cardiomyopathy      Technique:  1  3 plane SSFP localizers  2  SSFP cine imaging in long and short axis plane  3  T2 weighted DIR FSE in short axis plane  4  20 ml gadobutrol power injected  5  2D inversion recovery FGRE for delayed myocardial enhancement  6  The patient tolerated the procedure well without complication      Measurements:   Senthil-septal wall 15 mm  Postero-lateral wall 14 mm  Left ventricular end-diastolic dimension 60 mm  Left ventricular end-systolic dimension 40 mm  Left ventricular end-diastolic volume 905 ml  Left ventricular end-systolic volume  74 ml  Stroke volume 96 ml  Cardiac Output 7 0 L/min  Ejection fraction 57 %  Left atrium 42 mm  Aortic Root 42 mm     Findings:    1  The left ventricle is mildly dilated with moderate concentric left ventricular hypertrophy  Left ventricular systolic function is normal with a measured ejection fraction of 57%  There are no regional left ventricular wall motion abnormalities  2  The right ventricle is normal in size with normal right ventricular systolic function  3  The aortic, mitral, and tricuspid valves open without restriction  There is no significant valvular regurgitation, however cine MRI is inaccurate in the qualitative assessment of valvular regurgitation  4  The left atrium is mildly dilated  The aortic root is mildly dilated  5  There is no evidence of myocardial edema  6  Delayed post-gadolinium imaging demonstrates a small focus of intramyocardial hyperenhancement within the basal inferolateral wall      IMPRESSION:  Impression:  1   Mildly dilated left ventricle with moderate concentric left ventricular hypertrophy and normal left ventricular systolic function  2  Normal right ventricular size and systolic function  3  No significant valvular abnormalities  4  Small focus of intramyocardial fibrosis within the basal inferolateral wall  This most likely suggests prior myocarditis, but cannot completely exclude cardiac sarcoidosis        Workstation performed: [id number]            HOLTER MONITOR: 24 HOUR/48 HOUR MONITORS  No results found for this or any previous visit  AMB extended holter monitor  No results found for this or any previous visit  DEVICE CHECK:       Results for orders placed or performed in visit on 10/27/22   Cardiac EP device report    Narrative     [facility]  NON-BILLABLE  CARELINK TRANSMISSION: ALERT FOR 1 TACHY EPISODE W/ EGRAM SHOWING RUN OF NSVT @ 250 BPM FOR 47 SEC  PT WAS IN ER YESTERDAY AFTER FALLING WHILE AT PT WHERE HE  GOT DIZZY  PT ASLO VERY ACTIVE DURING THE DAY MOWED LAWN  CALL IN TO PT TO TRY TO CORRELATE SYMPTOMS WITH NSVT EPISODE  TASK TO NB  DL     Results for orders placed or performed in visit on 10/27/22   Cardiac EP device report    Narrative     [facility]  NON-BILLABLE  CARELINK TRANSMISSION: ALERT FOR 1 TACHY EPISODE W/ EGRAM SHOWING RUN OF NSVT @ 250 BPM FOR 47 SEC  PT WAS IN ER YESTERDAY AFTER FALLING WHILE AT PT WHERE HE  GOT DIZZY  PT ASLO VERY ACTIVE DURING THE DAY MOWED LAWN  CALL IN TO PT TO TRY TO CORRELATE SYMPTOMS WITH NSVT EPISODE  TASK TO NB  Loida Jacobson was @ physical therapy and just fell  pt didn't recall the episode nor why he had to be taken to hospital  unfor device was not checked while in er   they are going to see dr Rocio Osborn today             Code Status: [unfilled]  Advance Directive and Living Will:      Power of :    POLST:      Counseling / Coordination of Care  Detailed discussion done with patient with regards to further management of abnormal rhythm, coronary artery disease      Paco Hammond MD

## 2022-12-16 ENCOUNTER — OFFICE VISIT (OUTPATIENT)
Dept: CARDIOLOGY CLINIC | Facility: CLINIC | Age: 67
End: 2022-12-16

## 2022-12-16 VITALS
HEART RATE: 52 BPM | BODY MASS INDEX: 31.83 KG/M2 | DIASTOLIC BLOOD PRESSURE: 72 MMHG | TEMPERATURE: 97.8 F | HEIGHT: 72 IN | OXYGEN SATURATION: 99 % | SYSTOLIC BLOOD PRESSURE: 118 MMHG | WEIGHT: 235 LBS

## 2022-12-16 DIAGNOSIS — I25.10 CORONARY ARTERY DISEASE INVOLVING NATIVE CORONARY ARTERY OF NATIVE HEART WITHOUT ANGINA PECTORIS: ICD-10-CM

## 2022-12-16 DIAGNOSIS — I47.20 SUSTAINED VT (VENTRICULAR TACHYCARDIA): ICD-10-CM

## 2022-12-16 DIAGNOSIS — I10 BENIGN ESSENTIAL HYPERTENSION: ICD-10-CM

## 2022-12-16 DIAGNOSIS — I48.0 PAROXYSMAL A-FIB (HCC): Primary | ICD-10-CM

## 2022-12-16 DIAGNOSIS — I63.9 CEREBROVASCULAR ACCIDENT (CVA), UNSPECIFIED MECHANISM (HCC): ICD-10-CM

## 2022-12-16 DIAGNOSIS — I47.29 NSVT (NONSUSTAINED VENTRICULAR TACHYCARDIA): ICD-10-CM

## 2022-12-16 DIAGNOSIS — I34.0 MITRAL VALVE INSUFFICIENCY, UNSPECIFIED ETIOLOGY: ICD-10-CM

## 2022-12-16 DIAGNOSIS — I35.0 AORTIC VALVE STENOSIS, ETIOLOGY OF CARDIAC VALVE DISEASE UNSPECIFIED: ICD-10-CM

## 2022-12-16 NOTE — PROGRESS NOTES
Cardiology   Kaleigh Espinoza DO, Susie Pham MD, Patricia Carvalho MD, Willi Hudson MD, [facility] [place]  -------------------------------------------------------------------  [facility]  [address], [address], [address] [place] [address], [address] [place] [address] Avenue  [phone]    Cardiology Follow Up  Beth Cox  1955  [id number]          Assessment/Plan:    1  VT with syncope - records of recent hospitalization was reviewed  Cardiac catheterization was done and he had 2 stents placed  Cause of tachycardia may have been ischemic  He has a LifeVest which he uses intermittently  He was seen by EP who recommended he use it while exerting himself   -He may start physical therapy but is encouraged to use LifeVest during  therapy sessions  - will continue monitoring for further arrhythmias  Patient will have weekly loop recorder monitoring   - metoprolol was increased to 50 mg during hospitalization  He was temporarily on Tikosyn which was discontinued  2  CAD with recent PCI x2 - continue Plavix  - continue atorvastatin    3  History of CVA  - continue Plavix  - continue Eliquis  4  Paroxysmal atrial fibrillation  - currently in sinus rhythm  Continue Eliquis and metoprolol    - Will follow up in 3 months  Discussed cardiac rehab  Patient will be going to [place] for winter  Interval History:     Beth Cox is 74 y o  male here for followup of CAD  The patient was admitted to [address] [place] [address] after a syncopal event during PT  He had a loop recorder in place and was noted to have VT at that time  He underwent further workup including cardiac catheterization which showed a 1st diagonal 90% stenosis of ramus with 90% stenosis and circumflex with 60% stenosis in our PDA of 80% stenosis  Subsequently, he underwent drug-eluting stent placement to the 1st diagonal and ramus lesions    Echocardiogram during admission showed ejection fraction of 60% with mild to moderate aortic valve stenosis  Aortic root was dilated at 4 5 cm  He subsequent underwent cardiac MRI which showed ejection fraction of 57%, moderate concentric LVH there was a small focus of intra myocardial fibrosis of the basal inferolateral wall  VT was likely from the anterolateral mitral annulus but there was no evidence of scar in that area  EP recommended increasing Toprol to 50 mg b i d  He was initially started on Tikosyn which was discontinued  He had LifeVest placed prior to going home  He saw Dr Gerber Gamboa yesterday who observed frequent ventricular ectopy  He recommended weekly loop recorder interrogations for the next month  Patient is not wearing his LifeVest regularly  He is also not using his CPAP nightly  Patient had a loop recorder in place because of history of cryptogenic CVA  He has had episodes of atrial fibrillation noted on loop recorder in the past and is now on Eliquis  He denies any chest pain, palpitations, syncope or near syncope  He denies any lower extremity edema, orthopnea or paroxysmal nocturnal dyspnea      The following portions of the patient's history were reviewed and updated as appropriate: allergies, current medications, past family history, past medical history, past social history, past surgical history, and problem list        Current Outpatient Medications:   •  apixaban (Eliquis) 5 mg, Take 1 tablet (5 mg total) by mouth 2 (two) times a day, Disp: 60 tablet, Rfl: 5  •  atorvastatin (LIPITOR) 40 mg tablet, TAKE 1 TABLET BY MOUTH EVERY DAY IN THE EVENING, Disp: 90 tablet, Rfl: 1  •  clopidogrel (PLAVIX) 75 mg tablet, TAKE 1 TABLET BY MOUTH EVERY DAY, Disp: 30 tablet, Rfl: 5  •  metoprolol succinate (TOPROL-XL) 50 mg 24 hr tablet, TAKE 1 TABLET BY MOUTH EVERY DAY (Patient taking differently: Take 50 mg by mouth 2 (two) times a day), Disp: 90 tablet, Rfl: 1  •  potassium chloride (MICRO-K) 10 MEQ CR capsule, TAKE 1 CAPSULE BY MOUTH 2 TIMES A DAY , Disp: 60 capsule, Rfl: 2  •  aspirin 81 mg chewable tablet, Chew 1 tablet (81 mg total) daily Only for 30 days  Discontinue after 30 days Do not start before November 2, 2022  (Patient not taking: Reported on 12/15/2022), Disp: 30 tablet, Rfl: 0  •  fexofenadine (ALLEGRA) 180 MG tablet, Take 1 tablet (180 mg total) by mouth daily for 30 days (Patient taking differently: Take 180 mg by mouth every morning), Disp: 30 tablet, Rfl: 0  •  metoprolol succinate (TOPROL-XL) 25 mg 24 hr tablet, TAKE 1 TABLET (25 MG TOTAL) BY MOUTH DAILY IN EVENING (Patient not taking: Reported on 12/16/2022), Disp: 30 tablet, Rfl: 8  •  sildenafil (VIAGRA) 100 mg tablet, Take 1 tablet (100 mg total) by mouth daily as needed for erectile dysfunction (Patient not taking: Reported on 12/16/2022), Disp: 30 tablet, Rfl: 5        Review of Systems:  Review of Systems   Constitutional: Positive for fatigue  Respiratory: Negative for shortness of breath  Cardiovascular: Negative for chest pain, palpitations and leg swelling  Musculoskeletal: Positive for arthralgias and gait problem  Neurological: Positive for weakness and light-headedness  Psychiatric/Behavioral: Positive for confusion and decreased concentration  All other systems reviewed and are negative  Physical Exam:  Vitals:  Vitals:    12/16/22 0934   Pulse: (!) 52   Temp: 97 8 °F (36 6 °C)   SpO2: 99%   Weight: 107 kg (235 lb)   Height: 6' (1 829 m)     Physical Exam   Constitutional: He appears healthy  No distress  Eyes: Pupils are equal, round, and reactive to light  Conjunctivae are normal    Neck: No JVD present  Cardiovascular: Normal rate, regular rhythm and normal heart sounds  Exam reveals no gallop and no friction rub  No murmur heard  Pulmonary/Chest: Effort normal and breath sounds normal  He has no wheezes  He has no rales  Musculoskeletal:         General: No tenderness, deformity or edema        Cervical back: Normal range of motion and neck supple  Neurological: He is alert and oriented to person, place, and time  Skin: Skin is warm and dry  Cardiographics:  EKG: Sinus rhythm with PVCs   Last known EF: 55%    This note was completed in part utilizing M-Modal Fluency Direct Software  Grammatical errors, random word insertions, spelling mistakes, and incomplete sentences can be an occasional consequence of this system secondary to software limitations, ambient noise, and hardware issues  If you have any questions or concerns about the content, text, or information contained within the body of this dictation, please contact the provider for clarification

## 2022-12-26 PROBLEM — Z13.89 SCREENING FOR CARDIOVASCULAR, RESPIRATORY, AND GENITOURINARY DISEASES: Status: RESOLVED | Noted: 2018-08-28 | Resolved: 2022-12-26

## 2022-12-26 PROBLEM — Z13.6 SCREENING FOR CARDIOVASCULAR, RESPIRATORY, AND GENITOURINARY DISEASES: Status: RESOLVED | Noted: 2018-08-28 | Resolved: 2022-12-26

## 2022-12-26 PROBLEM — F41.9 ANXIETY: Status: RESOLVED | Noted: 2021-12-19 | Resolved: 2022-12-26

## 2022-12-26 PROBLEM — Z13.1 SCREENING FOR DIABETES MELLITUS (DM): Status: RESOLVED | Noted: 2018-08-28 | Resolved: 2022-12-26

## 2022-12-26 PROBLEM — Z13.83 SCREENING FOR CARDIOVASCULAR, RESPIRATORY, AND GENITOURINARY DISEASES: Status: RESOLVED | Noted: 2018-08-28 | Resolved: 2022-12-26

## 2022-12-26 PROBLEM — R93.0 ABNORMAL MRI OF HEAD: Status: RESOLVED | Noted: 2020-09-21 | Resolved: 2022-12-26

## 2022-12-26 PROBLEM — R78.81 POSITIVE BLOOD CULTURE: Status: RESOLVED | Noted: 2022-06-09 | Resolved: 2022-12-26

## 2022-12-26 PROBLEM — R42 DIZZINESS: Status: RESOLVED | Noted: 2022-10-30 | Resolved: 2022-12-26

## 2022-12-29 ENCOUNTER — REMOTE DEVICE CLINIC VISIT (OUTPATIENT)
Dept: CARDIOLOGY CLINIC | Facility: CLINIC | Age: 67
End: 2022-12-29

## 2022-12-29 DIAGNOSIS — Z95.818 PRESENCE OF OTHER CARDIAC IMPLANTS AND GRAFTS: Primary | ICD-10-CM

## 2022-12-29 NOTE — PROGRESS NOTES
MDT LNQ22/ ACTIVE SYSTEM IS MRI CONDITIONAL   NON-BILLABLE  CARELINK TRANSMISSION: REQUESTED BY SN   LOOP RECORDER  PRESENTING RHYTHM NSR W/ PVCs @ 66 BPM  BATTERY STATUS "OK " 1 TACHY AND 11 DEVICE CLASSIFIED AF EPISODES PREVIOUSLY ADDRESSED IN ALERTS  NO PATIENT OR NEW DEVICE ACTIVATED EPISODES  NORMAL DEVICE FUNCTION   DL

## 2023-01-04 ENCOUNTER — TELEPHONE (OUTPATIENT)
Dept: CARDIOLOGY CLINIC | Facility: CLINIC | Age: 68
End: 2023-01-04

## 2023-01-05 ENCOUNTER — REMOTE DEVICE CLINIC VISIT (OUTPATIENT)
Dept: CARDIOLOGY CLINIC | Facility: CLINIC | Age: 68
End: 2023-01-05

## 2023-01-05 DIAGNOSIS — Z95.818 PRESENCE OF OTHER CARDIAC IMPLANTS AND GRAFTS: Primary | ICD-10-CM

## 2023-01-07 DIAGNOSIS — I10 PRIMARY HYPERTENSION: ICD-10-CM

## 2023-01-09 ENCOUNTER — TELEPHONE (OUTPATIENT)
Dept: FAMILY MEDICINE CLINIC | Facility: CLINIC | Age: 68
End: 2023-01-09

## 2023-01-09 DIAGNOSIS — I25.10 CORONARY ARTERY DISEASE INVOLVING NATIVE CORONARY ARTERY OF NATIVE HEART WITHOUT ANGINA PECTORIS: ICD-10-CM

## 2023-01-09 DIAGNOSIS — I71.21 ASCENDING AORTIC ANEURYSM, UNSPECIFIED WHETHER RUPTURED: ICD-10-CM

## 2023-01-09 DIAGNOSIS — I47.29 NSVT (NONSUSTAINED VENTRICULAR TACHYCARDIA): ICD-10-CM

## 2023-01-09 DIAGNOSIS — Z95.5 H/O HEART ARTERY STENT: ICD-10-CM

## 2023-01-09 DIAGNOSIS — Z86.73 HISTORY OF STROKE: ICD-10-CM

## 2023-01-09 DIAGNOSIS — I50.32 CHRONIC DIASTOLIC CONGESTIVE HEART FAILURE (HCC): ICD-10-CM

## 2023-01-09 DIAGNOSIS — I35.0 AORTIC VALVE STENOSIS, ETIOLOGY OF CARDIAC VALVE DISEASE UNSPECIFIED: Primary | ICD-10-CM

## 2023-01-09 DIAGNOSIS — I48.0 PAROXYSMAL A-FIB (HCC): ICD-10-CM

## 2023-01-09 RX ORDER — METOPROLOL SUCCINATE 25 MG/1
TABLET, EXTENDED RELEASE ORAL
Qty: 30 TABLET | Refills: 23 | Status: SHIPPED | OUTPATIENT
Start: 2023-01-09

## 2023-01-09 NOTE — TELEPHONE ENCOUNTER
Patient is going to see a cardiologist in [place] and needs a doctor referral   Please call patient when order is done    Thank You

## 2023-01-12 ENCOUNTER — REMOTE DEVICE CLINIC VISIT (OUTPATIENT)
Dept: CARDIOLOGY CLINIC | Facility: CLINIC | Age: 68
End: 2023-01-12

## 2023-01-12 DIAGNOSIS — Z95.818 PRESENCE OF OTHER CARDIAC IMPLANTS AND GRAFTS: Primary | ICD-10-CM

## 2023-01-12 NOTE — PROGRESS NOTES
MDT LNQ22/ ACTIVE SYSTEM IS MRI CONDITIONAL   NON-BILLABLE  CARELINK TRANSMISSION: 1 WEEK RECHECK PER SN   LOOP RECORDER  PRESENTING RHYTHM SB W/ PVCs @ 48 BPM  BATTERY STATUS "OK " NO PATIENT OR DEVICE ACTIVATED EPISODES  HOWEVER THERE IS AN EGRAM SHOWING ST W/ PACs AND PVC  NORMAL DEVICE FUNCTION   DL

## 2023-01-17 DIAGNOSIS — Z86.73 HISTORY OF CARDIOEMBOLIC CEREBROVASCULAR ACCIDENT (CVA): ICD-10-CM

## 2023-01-17 RX ORDER — ATORVASTATIN CALCIUM 40 MG/1
TABLET, FILM COATED ORAL
Qty: 30 TABLET | Refills: 5 | Status: SHIPPED | OUTPATIENT
Start: 2023-01-17

## 2023-02-11 DIAGNOSIS — I48.0 PAROXYSMAL ATRIAL FIBRILLATION (HCC): ICD-10-CM

## 2023-02-13 RX ORDER — APIXABAN 5 MG/1
TABLET, FILM COATED ORAL
Qty: 60 TABLET | Refills: 5 | Status: SHIPPED | OUTPATIENT
Start: 2023-02-13

## 2023-02-16 ENCOUNTER — REMOTE DEVICE CLINIC VISIT (OUTPATIENT)
Dept: CARDIOLOGY CLINIC | Facility: CLINIC | Age: 68
End: 2023-02-16

## 2023-02-16 DIAGNOSIS — Z95.818 PRESENCE OF OTHER CARDIAC IMPLANTS AND GRAFTS: Primary | ICD-10-CM

## 2023-02-16 NOTE — PROGRESS NOTES
Results for orders placed or performed in visit on 02/16/23   Cardiac EP device report    Narrative    MDT LNQ22/ ACTIVE SYSTEM IS MRI CONDITIONAL  1 MONTH CARELINK TRANSMISSION PER DR HAM TO CHECK EPISODES- NB  BATTERY STATUS "GOOD"  NO DEVICE DETECTED & NO PT ACTIVATED EPISODES  PRESENTING RHYTHM NSR W/ ATRIAL TRIGEMINY  NORMAL DEVICE FUNCTION   GV

## 2023-02-19 DIAGNOSIS — I10 BENIGN ESSENTIAL HYPERTENSION: ICD-10-CM

## 2023-02-19 DIAGNOSIS — Z86.73 HISTORY OF CARDIOEMBOLIC CEREBROVASCULAR ACCIDENT (CVA): ICD-10-CM

## 2023-02-19 DIAGNOSIS — I10 PRIMARY HYPERTENSION: ICD-10-CM

## 2023-02-20 RX ORDER — ATORVASTATIN CALCIUM 40 MG/1
TABLET, FILM COATED ORAL
Qty: 30 TABLET | Refills: 11 | Status: SHIPPED | OUTPATIENT
Start: 2023-02-20

## 2023-02-20 RX ORDER — POTASSIUM CHLORIDE 750 MG/1
CAPSULE, EXTENDED RELEASE ORAL
Qty: 60 CAPSULE | Refills: 77 | Status: SHIPPED | OUTPATIENT
Start: 2023-02-20

## 2023-02-20 RX ORDER — METOPROLOL SUCCINATE 50 MG/1
TABLET, EXTENDED RELEASE ORAL
Qty: 30 TABLET | Refills: 11 | Status: SHIPPED | OUTPATIENT
Start: 2023-02-20

## 2023-03-05 DIAGNOSIS — I10 PRIMARY HYPERTENSION: ICD-10-CM

## 2023-03-06 ENCOUNTER — OFFICE VISIT (OUTPATIENT)
Dept: CARDIOLOGY CLINIC | Facility: CLINIC | Age: 68
End: 2023-03-06

## 2023-03-06 ENCOUNTER — TELEPHONE (OUTPATIENT)
Dept: CARDIOLOGY CLINIC | Facility: CLINIC | Age: 68
End: 2023-03-06

## 2023-03-06 VITALS
BODY MASS INDEX: 31.56 KG/M2 | WEIGHT: 233 LBS | DIASTOLIC BLOOD PRESSURE: 82 MMHG | HEIGHT: 72 IN | HEART RATE: 80 BPM | SYSTOLIC BLOOD PRESSURE: 100 MMHG | OXYGEN SATURATION: 95 %

## 2023-03-06 DIAGNOSIS — I35.0 AORTIC VALVE STENOSIS, ETIOLOGY OF CARDIAC VALVE DISEASE UNSPECIFIED: ICD-10-CM

## 2023-03-06 DIAGNOSIS — I10 PRIMARY HYPERTENSION: ICD-10-CM

## 2023-03-06 DIAGNOSIS — Z86.73 HISTORY OF STROKE: ICD-10-CM

## 2023-03-06 DIAGNOSIS — I48.0 PAROXYSMAL ATRIAL FIBRILLATION (HCC): ICD-10-CM

## 2023-03-06 DIAGNOSIS — I50.32 CHRONIC DIASTOLIC CONGESTIVE HEART FAILURE (HCC): ICD-10-CM

## 2023-03-06 DIAGNOSIS — Z95.5 H/O HEART ARTERY STENT: ICD-10-CM

## 2023-03-06 DIAGNOSIS — I71.21 ASCENDING AORTIC ANEURYSM, UNSPECIFIED WHETHER RUPTURED: ICD-10-CM

## 2023-03-06 DIAGNOSIS — I25.10 CORONARY ARTERY DISEASE INVOLVING NATIVE CORONARY ARTERY OF NATIVE HEART WITHOUT ANGINA PECTORIS: ICD-10-CM

## 2023-03-06 DIAGNOSIS — I48.0 PAROXYSMAL A-FIB (HCC): Primary | ICD-10-CM

## 2023-03-06 DIAGNOSIS — I47.29 NSVT (NONSUSTAINED VENTRICULAR TACHYCARDIA): ICD-10-CM

## 2023-03-06 RX ORDER — METOPROLOL SUCCINATE 50 MG/1
50 TABLET, EXTENDED RELEASE ORAL 2 TIMES DAILY
Qty: 180 TABLET | Refills: 3 | Status: SHIPPED | OUTPATIENT
Start: 2023-03-06

## 2023-03-06 NOTE — PROGRESS NOTES
Cardiology   Vanessa Francis DO, Rickey Phoenix, MD, Roxanna Hogan MD, Julianne Ochoa MD, [facility] [place]  -------------------------------------------------------------------  [facility]  [address], [address], [address] [place] [address], [address] [place] [address] Avenue  [phone]    Cardiology Follow Up  Conner Kiser  1955  [id number]          Assessment/Plan:    1  VT with syncope -   Cause of tachycardia may have been ischemic  He had 2 stents placed  - No further events on loop recorder  May resume PT     -Did not wish to use LifeVest   - will continue monitoring for further arrhythmias  - metoprolol was increased to 50 mg twice daily during hospitalization  He was temporarily on Tikosyn which was discontinued  2  CAD with recent PCI x2 - continue Plavix  - continue atorvastatin    3  History of CVA  - continue Plavix  - continue Eliquis  4  Paroxysmal atrial fibrillation  - currently in sinus rhythm  Continue Eliquis and metoprolol          Interval History:     Conner Kiser is 74 y o  male here for followup of CAD  Since his last visit, he has been feeling well without any chest pain,  lower extremity edema, orthopnea or paroxysmal nocturnal dyspnea  He denies any palpitations  No syncope or near syncope  He has some shortness of breath with exertion which is unchanged from previous  Loop recorder was interrogated 2 weeks ago  There were no events noted  In October 2022 he was admitted to [address] [place] [address] after a syncopal event during PT  He had a loop recorder in place and was noted to have VT at that time  He underwent further workup including cardiac catheterization which showed a 1st diagonal 90% stenosis of ramus with 90% stenosis and circumflex with 60% stenosis in our PDA of 80% stenosis  Subsequently, he underwent drug-eluting stent placement to the 1st diagonal and ramus lesions    Echocardiogram during admission showed ejection fraction of 60% with mild to moderate aortic valve stenosis  Aortic root was dilated at 4 5 cm  He subsequent underwent cardiac MRI which showed ejection fraction of 57%, moderate concentric LVH there was a small focus of intra myocardial fibrosis of the basal inferolateral wall  VT was likely from the anterolateral mitral annulus but there was no evidence of scar in that area  EP recommended increasing Toprol to 50 mg b i d  He was initially started on Tikosyn which was discontinued  He had LifeVest placed prior to going home  Which he has returned  Patient had a loop recorder in place because of history of cryptogenic CVA  He has had episodes of atrial fibrillation noted on loop recorder in the past and is now on Eliquis        The following portions of the patient's history were reviewed and updated as appropriate: allergies, current medications, past family history, past medical history, past social history, past surgical history, and problem list        Current Outpatient Medications:   •  atorvastatin (LIPITOR) 40 mg tablet, TAKE 1 TABLET BY MOUTH EVERY DAY IN THE EVENING, Disp: 30 tablet, Rfl: 11  •  clopidogrel (PLAVIX) 75 mg tablet, TAKE 1 TABLET BY MOUTH EVERY DAY, Disp: 30 tablet, Rfl: 5  •  Eliquis 5 MG, TAKE 1 TABLET BY MOUTH TWICE A DAY, Disp: 60 tablet, Rfl: 5  •  metoprolol succinate (TOPROL-XL) 25 mg 24 hr tablet, TAKE 1 TABLET (25 MG TOTAL) BY MOUTH DAILY IN EVENING, Disp: 30 tablet, Rfl: 23  •  metoprolol succinate (TOPROL-XL) 50 mg 24 hr tablet, TAKE 1 TABLET BY MOUTH EVERY DAY, Disp: 30 tablet, Rfl: 11  •  potassium chloride (MICRO-K) 10 MEQ CR capsule, TAKE 1 CAPSULE BY MOUTH TWICE A DAY, Disp: 60 capsule, Rfl: 77  •  sertraline (ZOLOFT) 50 mg tablet, Take 50 mg by mouth in the morning, Disp: , Rfl:   •  fexofenadine (ALLEGRA) 180 MG tablet, Take 1 tablet (180 mg total) by mouth daily for 30 days (Patient taking differently: Take 180 mg by mouth every morning), Disp: 30 tablet, Rfl: 0        Review of Systems:  Review of Systems   Constitutional: Positive for fatigue  Respiratory: Positive for shortness of breath  Cardiovascular: Negative for chest pain, palpitations and leg swelling  Musculoskeletal: Positive for arthralgias, gait problem and myalgias  Neurological: Positive for weakness  All other systems reviewed and are negative  Physical Exam:  Vitals:  Vitals:    03/06/23 0827   BP: 100/82   BP Location: Left arm   Patient Position: Sitting   Cuff Size: Standard   Pulse: 80   SpO2: 95%   Weight: 106 kg (233 lb)   Height: 6' (1 829 m)     Physical Exam   Constitutional: He appears healthy  No distress  Eyes: Pupils are equal, round, and reactive to light  Conjunctivae are normal    Neck: No JVD present  Cardiovascular: Normal rate, regular rhythm and normal heart sounds  Exam reveals no gallop and no friction rub  No murmur heard  Pulmonary/Chest: Effort normal and breath sounds normal  He has no wheezes  He has no rales  Musculoskeletal:         General: No tenderness, deformity or edema  Cervical back: Normal range of motion and neck supple  Neurological: He is alert and oriented to person, place, and time  Skin: Skin is warm and dry  Cardiographics:  EKG: Sinus rhythm with PACs   Last known EF: 55%    This note was completed in part utilizing M-Modal Fluency Direct Software  Grammatical errors, random word insertions, spelling mistakes, and incomplete sentences can be an occasional consequence of this system secondary to software limitations, ambient noise, and hardware issues  If you have any questions or concerns about the content, text, or information contained within the body of this dictation, please contact the provider for clarification

## 2023-03-07 RX ORDER — METOPROLOL SUCCINATE 50 MG/1
TABLET, EXTENDED RELEASE ORAL
Qty: 30 TABLET | Refills: 11 | Status: SHIPPED | OUTPATIENT
Start: 2023-03-07

## 2023-03-16 ENCOUNTER — REMOTE DEVICE CLINIC VISIT (OUTPATIENT)
Dept: CARDIOLOGY CLINIC | Facility: CLINIC | Age: 68
End: 2023-03-16

## 2023-03-16 DIAGNOSIS — Z95.818 PRESENCE OF OTHER CARDIAC IMPLANTS AND GRAFTS: Primary | ICD-10-CM

## 2023-03-16 NOTE — PROGRESS NOTES
MDT LNQ22/ ACTIVE SYSTEM IS MRI CONDITIONAL   CARELINK TRANSMISSION: 1 MON RECHECK PER DT  LOOP RECORDER  PRESENTING RHYTHM NSR @ 66 BPM  BATTERY STATUS "OK " NO PATIENT OR DEVICE ACTIVATED EPISODES  NORMAL DEVICE FUNCTION   DL

## 2023-03-22 DIAGNOSIS — I48.0 PAROXYSMAL A-FIB (HCC): ICD-10-CM

## 2023-03-22 DIAGNOSIS — I10 BENIGN ESSENTIAL HYPERTENSION: ICD-10-CM

## 2023-03-22 DIAGNOSIS — I10 BENIGN ESSENTIAL HYPERTENSION: Primary | ICD-10-CM

## 2023-03-22 DIAGNOSIS — I47.29 NSVT (NONSUSTAINED VENTRICULAR TACHYCARDIA) (HCC): ICD-10-CM

## 2023-03-22 RX ORDER — METOPROLOL SUCCINATE 50 MG/1
TABLET, EXTENDED RELEASE ORAL
Qty: 30 TABLET | Refills: 8 | Status: SHIPPED | OUTPATIENT
Start: 2023-03-22

## 2023-03-22 RX ORDER — POTASSIUM CHLORIDE 750 MG/1
CAPSULE, EXTENDED RELEASE ORAL
Qty: 60 CAPSULE | Refills: 11 | Status: SHIPPED | OUTPATIENT
Start: 2023-03-22

## 2023-04-28 ENCOUNTER — TELEPHONE (OUTPATIENT)
Dept: PULMONOLOGY | Facility: MEDICAL CENTER | Age: 68
End: 2023-04-28

## 2023-04-28 DIAGNOSIS — I25.10 CAD (CORONARY ARTERY DISEASE): ICD-10-CM

## 2023-04-28 RX ORDER — CLOPIDOGREL BISULFATE 75 MG/1
TABLET ORAL
Qty: 30 TABLET | Refills: 0 | Status: SHIPPED | OUTPATIENT
Start: 2023-04-28

## 2023-04-28 NOTE — TELEPHONE ENCOUNTER
LM for patient to call office back to schedule 8m f/u with Dr Korey Cardona, schedule next available  Appointment reminder mailed

## 2023-05-10 ENCOUNTER — TELEPHONE (OUTPATIENT)
Dept: GASTROENTEROLOGY | Facility: CLINIC | Age: 68
End: 2023-05-10

## 2023-05-10 NOTE — TELEPHONE ENCOUNTER
Spoke with patient and his wife, loose stools 3 times a day started a few weeks ago  Last OV was in 2020 and colonoscopy in 2022  I attempted to make ov but no available appt until June  Patients wife declined  She will call PCP  Denies: bloody/ black stool, abdominal pain, fevers

## 2023-05-10 NOTE — TELEPHONE ENCOUNTER
Patients GI provider:  Dr Hiral Mart    Number to return call: [phone]    Reason for call: Pts wife called and stated has been experiencing diarrhea and requested a call back please review and reach out thank you     Scheduled procedure/appointment date if applicable: n/a

## 2023-05-12 ENCOUNTER — OFFICE VISIT (OUTPATIENT)
Dept: FAMILY MEDICINE CLINIC | Facility: CLINIC | Age: 68
End: 2023-05-12

## 2023-05-12 VITALS
SYSTOLIC BLOOD PRESSURE: 120 MMHG | HEART RATE: 68 BPM | BODY MASS INDEX: 31.64 KG/M2 | RESPIRATION RATE: 20 BRPM | TEMPERATURE: 97.7 F | WEIGHT: 233.6 LBS | OXYGEN SATURATION: 97 % | DIASTOLIC BLOOD PRESSURE: 86 MMHG | HEIGHT: 72 IN

## 2023-05-12 DIAGNOSIS — R19.7 DIARRHEA OF PRESUMED INFECTIOUS ORIGIN: Primary | ICD-10-CM

## 2023-05-12 DIAGNOSIS — F03.90 DEMENTIA WITHOUT BEHAVIORAL DISTURBANCE, PSYCHOTIC DISTURBANCE, MOOD DISTURBANCE, OR ANXIETY, UNSPECIFIED DEMENTIA SEVERITY, UNSPECIFIED DEMENTIA TYPE (HCC): ICD-10-CM

## 2023-05-12 DIAGNOSIS — Z86.73 HISTORY OF CARDIOEMBOLIC CEREBROVASCULAR ACCIDENT (CVA): ICD-10-CM

## 2023-05-12 DIAGNOSIS — I48.0 PAROXYSMAL A-FIB (HCC): ICD-10-CM

## 2023-05-12 DIAGNOSIS — I25.10 CORONARY ARTERY DISEASE INVOLVING NATIVE CORONARY ARTERY OF NATIVE HEART WITHOUT ANGINA PECTORIS: ICD-10-CM

## 2023-05-12 PROBLEM — I71.21 ASCENDING AORTIC ANEURYSM (HCC): Status: RESOLVED | Noted: 2020-10-14 | Resolved: 2023-05-12

## 2023-05-12 NOTE — PROGRESS NOTES
Assessment/Plan:    No problem-specific Assessment & Plan notes found for this encounter  Transient chest pain  Hx of CAD  Resolved at present  Suggest cardiology f/u in case stress test needed  ER if recurs and persists    Diarrhea  4w  Not seemingly related to zoloft  Benign abdominal exam  Stool cultures  Possible GI f/u in future  Consider abx pending culture results  Check amylase lipase for colitis or pancreatitis (doubt)    Dementia stable    PAF on NOAC, no reported bleeding    Mood stable w/o zoloft  Can monitor for now     Diagnoses and all orders for this visit:    Diarrhea of presumed infectious origin  -     Comprehensive metabolic panel; Future  -     CBC and differential; Future  -     Amylase; Future  -     Lipase; Future  -     Stool Enteric Bacterial Panel by PCR; Future    Coronary artery disease involving native coronary artery of native heart without angina pectoris    History of cardioembolic cerebrovascular accident (CVA)    Paroxysmal A-fib (HCC)    Dementia without behavioral disturbance, psychotic disturbance, mood disturbance, or anxiety, unspecified dementia severity, unspecified dementia type ([facility] [address] )        Return if symptoms worsen or fail to improve  Subjective:      Patient ID: Lennox Regan is a 74 y o  male  Chief Complaint   Patient presents with   • Diarrhea     Constant diarrhea for 3 weeks  Could not get in with GI   Romana Exon, CMA    • Chest Pain     Started this morning- chest tightness         HPI  Has cardio in [place]  4w of diarrhea  Solid and watery  No blood seen  Has 2-3 bm per day as usual  zoloft new 5 months but still has despite stopping zoloft    No diet changes  No food changes    zoloft started for depression by neurologist  Doing ok w/o at present    Feels his chest pain is better now    No fever    The following portions of the patient's history were reviewed and updated as appropriate: allergies, current medications, past family history, past medical history, past social history, past surgical history and problem list     Review of Systems   Constitutional: Negative for chills and fever  Gastrointestinal: Positive for abdominal pain and diarrhea  Negative for abdominal distention and blood in stool  Current Outpatient Medications   Medication Sig Dispense Refill   • apixaban (Eliquis) 5 mg Take 1 tablet (5 mg total) by mouth 2 (two) times a day 56 tablet 0   • atorvastatin (LIPITOR) 40 mg tablet TAKE 1 TABLET BY MOUTH EVERY DAY IN THE EVENING 30 tablet 11   • clopidogrel (PLAVIX) 75 mg tablet TAKE 1 TABLET BY MOUTH EVERY DAY 30 tablet 0   • fexofenadine (ALLEGRA) 180 MG tablet Take 1 tablet (180 mg total) by mouth daily for 30 days (Patient taking differently: Take 180 mg by mouth every morning) 30 tablet 0   • metoprolol succinate (TOPROL-XL) 50 mg 24 hr tablet Take 1 tablet (50 mg total) by mouth 2 (two) times a day 180 tablet 3     No current facility-administered medications for this visit  Objective:    /86   Pulse 68   Temp 97 7 °F (36 5 °C)   Resp 20   Ht 6' (1 829 m)   Wt 106 kg (233 lb 9 6 oz)   SpO2 97%   BMI 31 68 kg/m²        Physical Exam  Vitals and nursing note reviewed  Constitutional:       General: He is not in acute distress  Appearance: He is well-developed  He is not ill-appearing  HENT:      Head: Normocephalic  Right Ear: Tympanic membrane normal       Left Ear: Tympanic membrane normal    Eyes:      General: No scleral icterus  Conjunctiva/sclera: Conjunctivae normal    Cardiovascular:      Rate and Rhythm: Normal rate and regular rhythm  Heart sounds: No murmur heard  Pulmonary:      Effort: Pulmonary effort is normal  No respiratory distress  Breath sounds: No wheezing  Abdominal:      General: There is no distension  Palpations: Abdomen is soft  There is no mass  Tenderness: There is abdominal tenderness  There is no guarding or rebound        Comments: Mild epigastric tenderness   Musculoskeletal:         General: No deformity  Cervical back: Neck supple  Right lower leg: No edema  Left lower leg: No edema  Skin:     General: Skin is warm and dry  Coloration: Skin is not pale  Neurological:      Mental Status: He is alert  Motor: No weakness  Gait: Gait normal    Psychiatric:         Mood and Affect: Mood normal          Behavior: Behavior normal          Thought Content:  Thought content normal                 Luly Reveal, DO

## 2023-05-13 ENCOUNTER — APPOINTMENT (OUTPATIENT)
Dept: LAB | Facility: HOSPITAL | Age: 68
End: 2023-05-13
Attending: FAMILY MEDICINE

## 2023-05-13 DIAGNOSIS — R19.7 DIARRHEA OF PRESUMED INFECTIOUS ORIGIN: ICD-10-CM

## 2023-05-13 LAB
ALBUMIN SERPL BCP-MCNC: 4.2 G/DL (ref 3.5–5)
ALP SERPL-CCNC: 98 U/L (ref 34–104)
ALT SERPL W P-5'-P-CCNC: 16 U/L (ref 7–52)
AMYLASE SERPL-CCNC: 71 IU/L (ref 29–103)
ANION GAP SERPL CALCULATED.3IONS-SCNC: 7 MMOL/L (ref 4–13)
AST SERPL W P-5'-P-CCNC: 14 U/L (ref 13–39)
BASOPHILS # BLD AUTO: 0.05 THOUSANDS/ÂΜL (ref 0–0.1)
BASOPHILS NFR BLD AUTO: 1 % (ref 0–1)
BILIRUB SERPL-MCNC: 0.95 MG/DL (ref 0.2–1)
BUN SERPL-MCNC: 11 MG/DL (ref 5–25)
CALCIUM SERPL-MCNC: 9.2 MG/DL (ref 8.4–10.2)
CHLORIDE SERPL-SCNC: 108 MMOL/L (ref 96–108)
CO2 SERPL-SCNC: 24 MMOL/L (ref 21–32)
CREAT SERPL-MCNC: 0.89 MG/DL (ref 0.6–1.3)
EOSINOPHIL # BLD AUTO: 0.25 THOUSAND/ÂΜL (ref 0–0.61)
EOSINOPHIL NFR BLD AUTO: 4 % (ref 0–6)
ERYTHROCYTE [DISTWIDTH] IN BLOOD BY AUTOMATED COUNT: 15.6 % (ref 11.6–15.1)
GFR SERPL CREATININE-BSD FRML MDRD: 89 ML/MIN/1.73SQ M
GLUCOSE P FAST SERPL-MCNC: 97 MG/DL (ref 65–99)
HCT VFR BLD AUTO: 45.1 % (ref 36.5–49.3)
HGB BLD-MCNC: 14.6 G/DL (ref 12–17)
IMM GRANULOCYTES # BLD AUTO: 0.01 THOUSAND/UL (ref 0–0.2)
IMM GRANULOCYTES NFR BLD AUTO: 0 % (ref 0–2)
LIPASE SERPL-CCNC: 110 U/L (ref 11–82)
LYMPHOCYTES # BLD AUTO: 1.52 THOUSANDS/ÂΜL (ref 0.6–4.47)
LYMPHOCYTES NFR BLD AUTO: 22 % (ref 14–44)
MCH RBC QN AUTO: 25.9 PG (ref 26.8–34.3)
MCHC RBC AUTO-ENTMCNC: 32.4 G/DL (ref 31.4–37.4)
MCV RBC AUTO: 80 FL (ref 82–98)
MONOCYTES # BLD AUTO: 0.83 THOUSAND/ÂΜL (ref 0.17–1.22)
MONOCYTES NFR BLD AUTO: 12 % (ref 4–12)
NEUTROPHILS # BLD AUTO: 4.16 THOUSANDS/ÂΜL (ref 1.85–7.62)
NEUTS SEG NFR BLD AUTO: 61 % (ref 43–75)
NRBC BLD AUTO-RTO: 0 /100 WBCS
PLATELET # BLD AUTO: 265 THOUSANDS/UL (ref 149–390)
PMV BLD AUTO: 8.8 FL (ref 8.9–12.7)
POTASSIUM SERPL-SCNC: 3.9 MMOL/L (ref 3.5–5.3)
PROT SERPL-MCNC: 6.8 G/DL (ref 6.4–8.4)
RBC # BLD AUTO: 5.64 MILLION/UL (ref 3.88–5.62)
SODIUM SERPL-SCNC: 139 MMOL/L (ref 135–147)
WBC # BLD AUTO: 6.82 THOUSAND/UL (ref 4.31–10.16)

## 2023-05-15 LAB
CAMPYLOBACTER DNA SPEC NAA+PROBE: NORMAL
SALMONELLA DNA SPEC QL NAA+PROBE: NORMAL
SHIGA TOXIN STX GENE SPEC NAA+PROBE: NORMAL
SHIGELLA DNA SPEC QL NAA+PROBE: NORMAL

## 2023-05-22 ENCOUNTER — EVALUATION (OUTPATIENT)
Dept: PHYSICAL THERAPY | Facility: CLINIC | Age: 68
End: 2023-05-22

## 2023-05-22 DIAGNOSIS — I63.9 CEREBROVASCULAR ACCIDENT (CVA), UNSPECIFIED MECHANISM (HCC): Primary | ICD-10-CM

## 2023-05-22 NOTE — PROGRESS NOTES
PT Evaluation            Insurance:  AMA/CMS Eval/ Re-eval POC expires Auth #/ Referral # Total   Visits  Start date  Expiration date Extension  Visit limitation? PT only or  PT+OT? Co-Insurance   CMS 23  NA Visits NA NA  [facility] PT No, $30 copay                                                           Visit/Unit Tracking  AUTH Status: NA Date                  Used                 Remaining                             Today's date: 2023  Patient name: Maged Moffett  : 1955  MRN: [id number]  Referring provider: Lore Light DO  Dx:   Encounter Diagnosis     ICD-10-CM    1  Cerebrovascular accident (CVA), unspecified mechanism ([facility] [address] )  I63 9             Assessment  Assessment details: Patient is a 74 y o  Male who presents to skilled outpatient PT for deficits in his gait, balance, safety, and endurance due to his CVA in  and his recent cardiac complications on 2022  Fair functional strength as indicated by MMT  Sensation screen unremarkable  Patient with moderate difficulty with coordination tasks consistent following CVA  Due to cardiac complications resulting in hospitalization/stent placement, patient has had a 7 month hiatus from PT and he has demonstrated significant regression in his gait, balance, safety, and endurance  He is at HIGH risk for falls per scores associated with 5xSTS, TUG cognitive, and FGA with most difficulty during dual tasking  Significant limitations in functional endurance as patient falls below age matched normative values for 6MWT with cut off scores taken from APTA and Rehab Measures  Patient able to ambulate for duration of session without AD and demonstrated the following gait abnormalities: WBoS, lack of pelvic and trunk dissociation/rotation, slowed nia, reduced reciprocal arm swing  Due to high copay, patient to attend PT 1x per week with 1 additional session of HEP at the gym   He will benefit from skilled outpatient PT in order to maximize his function and reduce his risk for falls within his home and the community  Impairments: Abnormal coordination, Abnormal gait, Abnormal or restricted ROM, Activity intolerance, Impaired balance, Impaired physical strength, Lacks appropriate HEP, Pain with function and Abnormal movement  Understanding of Dx/Px/POC: Good  Prognosis: Good      Patient verbalized understanding of POC  Please contact me if you have any questions or recommendations  Thank you for the referral and the opportunity to share in [address] [place] [address]          Plan  Plan details: 6 MWT, generalized balance, HIGT, dynamic balance  Patient would benefit from: Skilled PT  Planned modality interventions: Biofeedback and Cryotherapy  Planned therapy interventions: Balance, Body mechanics training, Coordination, Functional ROM exercises, Gait training, HEP, Manual therapy, Motor coordination training, Neuromuscular re-education, Patient education, Strengthening, Stretching, Therapeutic activities and Therapeutic exercises  Frequency: 2x/wk and 3x/wk  Duration in weeks: 12 weeks  Plan of Care beginning date: 5/22/23  Plan of Care expiration date: 12 weeks - 8-14-23  Treatment plan discussed with: Patient         Goals  Short Term Goals (4 weeks):    - Patient will improve time on TUG by 2 9 seconds to facilitate improved safety in all ambulation  - Patient will be independent in basic HEP 2-3 weeks  - Patient will improve 5xSTS score by 2 3 seconds from to promote improved LE functional strength needed for ADLs  - Patient will complete components of CB&M to promote agility necessary for sports related tasks  - Patient will go to the gym 1x per week in order to address strength/power deficits to reduce his risk for falls    Long Term Goals (12 weeks):  - Patient will be independent in a comprehensive home exercise program  - Patient will improve gait speed by 0 18 m/s to improve safety with community ambulation  - Patient will improve scoring on FGA by 4 points to progress safety with dynamic tasks  - Patient will be able to demonstrate HT in gait without veering  - Patient will improve 6 Minute Walk Test score by 190 feet from baseline score to promote improved cardiovascular endurance  - Patient will report 50% reduction in near falls in order to improve safety with functional tasks and reduce his risk for falls  - Patient will report going on walks at least 3 days per week to promote independence and improved cardiovascular endurance  - Patient will be able to ascend/descend stairs reciprocally without UE assist to promote independence and safety with ADLs  - Patient will report 50% reduction in near falls when ambulating on uneven terrain      Cut off score    All date taken from APTA Neuro Section or Rehab Measures      Mathew:  Miguel et al , 2018  Sofia [address]: 2 7 pts    Rigo soares al , 2011  Cut-off score: 45/56    Chronic CVA  < 44/56 high risk for falls (Miguel et al , 2018)  < 47 5/56 slow walker status (German soares al , 2011) 5xSTS: Diallo soares al 2010  Sofia [address]: 2 3 sec  Age Norms:  60-69: 11 1 sec  70-79: 12 6 sec  80-89: 14 8 sec    Diallo 2010, Chronic Stroke  Chronic CVA: 12 sec   TUG  Jason , 2005  MDC: 2 9 sec    Cut off score:  >13 5 sec community dwelling adults  >32 2 frail elderly  <20 I for basic transfers  >30 dependent on transfers 10 Meter Walk Test:   Green Brow al , 2006  Small meaningful change: 0 06 m/s  Substantial meaningful change: 0 14 m/s  MCID: 0 16 m/s    < 0 4 m/s household ambulators  0 4 - 0 8 m/s limited community ambulators  > 0 8 m/s community ambulators   FGA: Isabel et al , 2010  MCID: 4 2 pts  Geriatrics/community < 22/30 fall risk  Geriatrics/community < 20/30 unexplained falls DGI  MDC: vestibular - 4 pts  MDC: geriatric/community - 3 pts  Falls risk <19/24   6 Minute Walk Test  Joseph galvan 2006  MDC: 60 98 m (200 01 ft)    Jesus Huitron, Lizandro, & Cut off, 2012  MCID: 34 4 m    Age Norms  61-76: M - 1876 ft   F - 1765 ft  70-79: M - 1729 ft   F - 1545 ft  80-89: M - 1368 ft   F - 1286 ft Modified Amira  0: No increase in tone  1: Catch and release or min resistance at end range  1+: Catch f/b min resistance throughout remainder (< half ROM)  2: Easily moved, but more marked tone throughout most ROM  3: Significant tone, PROM difficult  4: Rigid   MiniBest: Taurus et al , 2013  CVA < 17 5 fall risk Pass (Acute CVA)  MDC: 1 8 points (acute), 3 2 points (chronic)         Subjective    History of Present Illness  Mechanism of injury: Patient has a history of a R Thalamic CVA in   Patient was recently seen in loop recorder implantation on 2021 for AF surveillance and underlying cryptogenic stroke  Patient's wife reports that he is pretty sedentary and is not motivated  Patient reports he uses a cane in unfamiliar territory, but otherwise he does not utilize it  Updated (2022): Patient reports that he is satisfied with his progress in PT thus far  Unable to state what specifically has gotten better, but says his wife has noticed improvements  States he cannot remember if he ended up going away to [place] last week and feels his memory has not been great lately  Update (10/24/22): Patient reports his wife is pleased with his progress  He reports that he has been in [place] the past few months cleaning up his house  Update (2023): Patient reported that he was previously in PT here back in 2022 which is when he collapsed, went into V-Tach, and had 2 stents placed  Patient cleared by cardiology and is back for gait and balance PT for his R Thalamic CVA  He has no precautions at this time      Primary AD: SPC out in the community  Assist level at home: No  WC usage: None  PLOF: Independent     Pain  Current pain ratin/10  At best pain ratin/10  At worst pain ratin/10  Location: N/A  Aggravating factors: N/A    Social Support  Steps to enter house: a few steps to enter  Stairs in house: , but not [place]   Lives in: two story home  Lives with: wife    Employment status: Retired  Hand dominance: R handed     Treatments  Previous treatment: None  Current treatment: None  Diagnostic Testing: Loop monitor, MRI previous years      Objective     Vitals  - HR: 77 BPM  - BP: 134/90 mmHg (RUE sitting)  - SPO2: 94%    HR Max  - 207 - (0 7x64)  = 162 2    LE MMT  - R Hip Flexion: 4/5  - L Hip Flexion: 4+/5  - R Hip Extension: 4+/5 - L Hip Extension: 4+/5  - R Hip Abduction: 4/5  - L Hip abduction: 4+/5  - R Hip adduction: 4/5  - L Hip adduction: 4+/5  - R Knee Extension: 5/5 - L Knee Extension: 5/5  - R Knee Flexion: 4+/5  - L Knee Flexion: 4+/5  - R Ankle DF: 4+/5  - L Ankle DF: 4+/5  - R Ankle PF: 4/5  - L Ankle PF: 4/5    Sensation  - Light touch: Normal   - Deep pressure: Normal  - Pain: Normal  - Temperature: Normal  - Proprioception: Abnormal R side only     Coordination  - Dysmetria: Abnormal  - Dysdiadochokinesia: Abnormal  - Alternating Toe Taps: Normal      Outcome Measures Initial Eval  8/12/22 & DN 8/19/22 PN  9/12/2022 PN  10/24/22 IE  5-     5xSTS 28 76 sec,   + push on knees 19 58 sec,   + push on knees 18 66 sec, +push on knees 19 99 sec,   1 UE, CS/Daxa     TUG  - Regular    - Cognitive   14 23 sec, no AD   13 05 sec, no AD  18 75 sec, no AD   11 87 sec no AD  14 71 sec no AD   12 76 sec,   0 AD  17 63 sec,  0 AD (by 2s)     10 meter 0 99 m/s 1 15 m/s 1 04 m/s- self selected    Speed: 1 54 m/s 1 29 m/s     FGA 12/30 15/30 15/30 13/15     6MWT 1100 ft 1220 ft 1090 ft  975 ft                            Precautions: A fib, 2 cardiac stents placed in October 2022  Past Medical History:   Diagnosis Date   • Arthropathy of knee     last assessed 8/19/15   • Bacterial pneumonia 02/05/2007    last assessed 2/5/7   • Brain atrophy [facility])    • Colon polyp    • CPAP (continuous positive airway pressure) dependence    • Disorder of male genital organ 02/12/2008    last assessed 2/12/08   • ED (erectile dysfunction) of organic origin     last assessed 2/13/17    • History of hypertension    • Seasonal allergies    • Situational anxiety     resolved 3/16/17    • Sleep apnea    • Stroke [facility])     09/2020 TIA   • Tinnitus    • Wears hearing aid     bilateral

## 2023-05-25 ENCOUNTER — APPOINTMENT (OUTPATIENT)
Dept: PHYSICAL THERAPY | Facility: CLINIC | Age: 68
End: 2023-05-25
Payer: COMMERCIAL

## 2023-05-31 DIAGNOSIS — I48.0 PAROXYSMAL ATRIAL FIBRILLATION (HCC): ICD-10-CM

## 2023-05-31 NOTE — PROGRESS NOTES
Consultation - Electrophysiology Cardiology (EP)   Juju Diamond 74 y o  male MRN: [id number]  Unit/Bed#: -01 Encounter: [id number]      Consults  PCP: Hieu Cotter DO  Outpatient Cardiologist:     Assessment/Plan     Ventricular Tachycardia and V FIB on loop recorder (fast ventricular rate with consistent cycle length makes this most likely reentry mechanism for his VT)  The patient had an episode of stable angina the day before his syncopal event while cutting the grass  The patient had significant diagonal and ramus disease now s/p ABHILASH  His cariac MRI did not show any scar and this is most likely VT even though appearing mostly monomorphic and VF from ischemia    - VT is most likely anterolateral mitral annulus  Cardiac MRI however with no evidence of scar  -okay to continue metoprolol 50 XL BID   -would stop tikosyn   -no indication for an ICD at this time   -patient has a loop recorder in place  -plan for lifevest fitting before going home  The form I personally filled out today, gave to CM, and contacted Christo browning from [place]      CAD s/p ABHILASH Ramus and Diagonal with DFR negative circumflex and 80% small vessl RPDA  -continue dapt asa 81 mg and plavix 75 mg daily  -triple therapy for 1 month and then can transition to eliquis and plavix 75 mg daily   -lipitor 40 mg qhs   -BB as above       pAF  -eliquis 5 mg BID   -bb as above   -low burden on loop recorder     To Do:  -follow-up with Cardiology in [place]   -plan as above and possible discharge from cardiology standpoint if the patient for fitted lifevest      Case discussed and reviewed with Dr Karmen Putnam pending attending addendum  Thank you for involving us in the care of your patient  Eric Astorga MD  Cardiology Fellow   PGY-5    ==========================================================================================    Subjective:  No events overnight   Denies fevers/chills, nausea/vomiting, abdominal pain, diarrhea, cough, chest pain, shortness of breath, PND, orthopnea, presyncopal symptoms, syncopal episodes  I discussed with the patient and his wife at bedside about the recommendation of Lifevest, loop monitoring and follow-up outpatient considering that his VT may be due to ischemia  The patient was revascularized and has no evidence of scar on MRI  They are understanding and agreeable to the plan  Objective:     TELEMETRY: personally reviewed by myself Darius Rodriguez     ECG:      DEVICE INTERROGATION      HOLTER  No results found for this or any previous visit  Review of Systems  ROS as noted above in HPI  Historical Information   Past Medical History:   Diagnosis Date   • Arthropathy of knee     last assessed 8/19/15   • Bacterial pneumonia 02/05/2007    last assessed 2/5/7   • Brain atrophy [facility])    • Colon polyp    • CPAP (continuous positive airway pressure) dependence    • Disorder of male genital organ 02/12/2008    last assessed 2/12/08   • ED (erectile dysfunction) of organic origin     last assessed 2/13/17    • History of hypertension    • Seasonal allergies    • Situational anxiety     resolved 3/16/17    • Sleep apnea    • Stroke ([facility] )     09/2020 TIA   • Tinnitus    • Wears hearing aid     bilateral     Past Surgical History:   Procedure Laterality Date   • CARDIAC CATHETERIZATION Left 10/28/2022    Procedure: Cardiac Left Heart Cath;  Surgeon: Asmita Norris MD;  Location: [facility] CARDIAC CATH LAB; Service: Cardiology   • CARDIAC CATHETERIZATION  10/28/2022    Procedure: Cardiac catheterization;  Surgeon: Asmita Norris MD;  Location:  CARDIAC CATH LAB; Service: Cardiology   • CARDIAC CATHETERIZATION N/A 10/28/2022    Procedure: Cardiac Coronary Angiogram;  Surgeon: Asmita Norris MD;  Location: [facility] CARDIAC CATH LAB; Service: Cardiology   • CARDIAC ELECTROPHYSIOLOGY PROCEDURE N/A 12/22/2021    Procedure: Cardiac Loop Recorder Implant;  Surgeon: Myesha Sheridan DO;  Location: [place] [address] CATH LAB;   Service: Cardiology   • COLONOSCOPY      x3-w/polyps   • HERNIA REPAIR      umbilical,hemal inguinal   • KNEE ARTHROSCOPY Left     meniscus   • MN COLONOSCOPY FLX DX W/COLLJ SPEC WHEN PFRMD N/A 5/7/2018    Procedure: COLONOSCOPY;  Surgeon: Marylen Spalding, MD;  Location: [place] [address] GI LAB;   Service: Gastroenterology     Social History     Substance and Sexual Activity   Alcohol Use Yes   • Alcohol/week: 14 0 standard drinks   • Types: 14 Cans of beer per week     Social History     Substance and Sexual Activity   Drug Use No     Social History     Tobacco Use   Smoking Status Passive Smoke Exposure - Never Smoker   Smokeless Tobacco Never Used     Family History: non-contributory    Meds/Allergies   Hospital Medications:   Current Facility-Administered Medications   Medication Dose Route Frequency   • acetaminophen (TYLENOL) tablet 650 mg  650 mg Oral Q6H PRN   • apixaban (ELIQUIS) tablet 5 mg  5 mg Oral BID   • aspirin chewable tablet 81 mg  81 mg Oral Daily   • atorvastatin (LIPITOR) tablet 40 mg  40 mg Oral QPM   • clopidogrel (PLAVIX) tablet 75 mg  75 mg Oral Daily   • dofetilide (TIKOSYN) capsule 250 mcg  250 mcg Oral Q12H [facility] & long term   • magnesium oxide (MAG-OX) tablet 400 mg  400 mg Oral Daily   • metoprolol succinate (TOPROL-XL) 24 hr tablet 50 mg  50 mg Oral BID     Home Medications:   Medications Prior to Admission   Medication   • apixaban (Eliquis) 5 mg   • atorvastatin (LIPITOR) 40 mg tablet   • metoprolol succinate (TOPROL-XL) 50 mg 24 hr tablet   • potassium chloride (MICRO-K) 10 MEQ CR capsule   • albuterol (Proventil HFA) 90 mcg/act inhaler   • ALPRAZolam (XANAX) 0 25 mg tablet   • fexofenadine (ALLEGRA) 180 MG tablet   • sildenafil (VIAGRA) 100 mg tablet       Allergies   Allergen Reactions   • Pollen Extract Sneezing     Reaction Date: 18Sep2006;    • Shellfish-Derived Products - Food Allergy Other (See Comments)     Eye swelling   • Iodine Solution [Povidone Iodine] Rash     Eyes swell       Objective   Vitals: Blood pressure 127/82, pulse 66, temperature 98 2 °F (36 8 °C), resp  rate 18, height 6' (1 829 m), weight 99 4 kg (219 lb 2 2 oz), SpO2 95 %  Orthostatic Blood Pressures    Flowsheet Row Most Recent Value   Blood Pressure 127/82 filed at 10/31/2022 1153   Patient Position - Orthostatic VS Lying filed at 10/31/2022 0238            Intake/Output Summary (Last 24 hours) at 10/31/2022 1416  Last data filed at 10/31/2022 5635  Gross per 24 hour   Intake 540 ml   Output 350 ml   Net 190 ml       Invasive Devices  Report    Peripheral Intravenous Line  Duration           Peripheral IV 10/28/22 Right Antecubital 3 days                Physical Exam    GEN: Barbara Chin appears well, alert and oriented x 3, pleasant and cooperative   HEENT:  Normocephalic, atraumatic, anicteric, moist mucous membranes  NECK: No JVD or carotid bruits   HEART: regular rhythm, regular rate, normal S1 and S2, no murmurs, clicks, gallops or rubs   LUNGS: Clear to auscultation bilaterally; no wheezes, rales, or rhonchi; respiration nonlabored   ABDOMEN:  Normoactive bowel sounds, soft, no tenderness, no distention  EXTREMITIES: peripheral pulses palpable; no edema  NEURO: no gross focal findings; cranial nerves grossly intact   SKIN:  Dry, intact, warm to touch    Lab Results: I have personally reviewed pertinent lab results      Results from last 7 days   Lab Units 10/27/22  2103 10/27/22  1819 10/27/22  1557   HS TNI 0HR ng/L  --   --  29   HS TNI 2HR ng/L  --  29  --    HS TNI 4HR ng/L  --  29  --    HS TNI RAND ng/L 27*  --   --          Results from last 7 days   Lab Units 10/31/22  0238 10/30/22  0452 10/29/22  0444   POTASSIUM mmol/L 4 1 3 7 3 7   CO2 mmol/L 21 20* 22   CHLORIDE mmol/L 113* 116* 112*   BUN mg/dL 17 13 14   CREATININE mg/dL 0 89 0 88 0 88     Results from last 7 days   Lab Units 10/31/22  0238 10/30/22  0452 10/29/22  0444   HEMOGLOBIN g/dL 10 2* 9 8* 10 3*   HEMATOCRIT % 35 6* 33 1* 35 4*   PLATELETS Thousands/uL 279 843 783 Results from last 7 days   Lab Units 10/29/22  0444   TRIGLYCERIDES mg/dL 95   HDL mg/dL 27*   LDL CALC mg/dL 34   LDL DIRECT mg/dl 47     Results from last 7 days   Lab Units 10/28/22  0712 10/28/22  0026 10/27/22  1819   INR   --   --  0 91   PTT seconds 54* 39* 21*       Imaging: I have personally reviewed pertinent reports  Previous STRESS TEST:  No results found for this or any previous visit  No results found for this or any previous visit  No results found for this or any previous visit  Previous Cath/PCI:  No results found for this or any previous visit  No results found for this or any previous visit  No results found for this or any previous visit  ECHO:  Results for orders placed during the hospital encounter of 20    Echo complete with contrast if indicated    Narrative  [facility]  [facility] [place] [address] [facility]  [phone]    Transthoracic Echocardiogram  2D, M-mode, Doppler, and Color Doppler    Study date:  22-Sep-2020    Patient: Emily Moeller  MR number: [id number]  Account number: [de-identified]  : 1955  Age: 60 years  Gender: Male  Status: Inpatient  Location: Bedside  Height: 72 in  Weight: 211 lb  BP: 152/ 96 mmHg    Indications: TIA, Cardiac Murmur    Diagnoses: G45 9 - Transient cerebral ischemic attack, unspecified    Sonographer:  Jovany Connolly RDCS  Primary Physician:  Pee Rooney DO  Referring Physician:  Case Burnett MD  Group:  [facility]  Interpreting Physician:  Juno Lara MD    SUMMARY    LEFT VENTRICLE:  Systolic function was normal  Ejection fraction was estimated to be 60 %  There were no regional wall motion abnormalities  There was moderate concentric hypertrophy  LEFT ATRIUM:  The atrium was dilated  RIGHT ATRIUM:  The atrium was dilated  MITRAL VALVE:  There was mild regurgitation      AORTIC VALVE:  Transaortic velocity was increased due to valvular stenosis  There was mild stenosis  Valve mean gradient was 15 mmHg  AORTA:  There was dilatation of the ascending aorta, with a maximum measured diameter of 4 3 cm  HISTORY: PRIOR HISTORY: HTN, HLD, Sleep Apnea, CPAP    PROCEDURE: The procedure was performed at the bedside  This was a routine study  The transthoracic approach was used  The study included complete 2D imaging, M-mode, complete spectral Doppler, and color Doppler  The heart rate was 70 bpm,  at the start of the study  Images were obtained from the parasternal, apical, subcostal, and suprasternal notch acoustic windows  Image quality was adequate  LEFT VENTRICLE: Size was normal  Systolic function was normal  Ejection fraction was estimated to be 60 %  There were no regional wall motion abnormalities  Wall thickness was mildly increased  There was moderate concentric hypertrophy  DOPPLER: The transmitral flow pattern was normal  Left ventricular diastolic function parameters were normal     RIGHT VENTRICLE: The size was normal  Systolic function was normal  Wall thickness was normal     LEFT ATRIUM: The atrium was dilated  RIGHT ATRIUM: The atrium was dilated  MITRAL VALVE: Valve structure was normal  There was normal leaflet separation  DOPPLER: The transmitral velocity was within the normal range  There was no evidence for stenosis  There was mild regurgitation  AORTIC VALVE: The valve was trileaflet  Leaflets exhibited mild calcification and mildly reduced cuspal separation  DOPPLER: Transaortic velocity was increased due to valvular stenosis  There was mild stenosis  There was no significant  regurgitation  TRICUSPID VALVE: The valve structure was normal  There was normal leaflet separation  DOPPLER: The transtricuspid velocity was within the normal range  There was no evidence for stenosis  There was trace regurgitation      PULMONIC VALVE: Leaflets exhibited normal thickness, no calcification, and normal cuspal separation  DOPPLER: The transpulmonic velocity was within the normal range  There was trace regurgitation  PERICARDIUM: There was no pericardial effusion  The pericardium was normal in appearance  AORTA: The root exhibited normal size  There was dilatation of the ascending aorta, with a maximum measured diameter of 4 3 cm  SYSTEMIC VEINS: IVC: The inferior vena cava was normal in size  Respirophasic changes were normal     MEASUREMENT TABLES    DOPPLER MEASUREMENTS  Aortic valve   (Reference normals)  Peak jassi   250 cm/s   (--)  Peak gradient   25 mmHg   (--)  Mean gradient   15 mmHg   (--)    SYSTEM MEASUREMENT TABLES    2D  %FS: 32 84 %  Ao Diam: 3 98 cm  EDV(Teich): 162 66 ml  EF(Teich): 60 58 %  ESV(Teich): 64 11 ml  IVSd: 1 35 cm  LA Area: 28 26 cm2  LA Diam: 4 24 cm  LVEDV MOD A4C: 148 4 ml  LVEF MOD A4C: 38 21 %  LVESV MOD A4C: 91 69 ml  LVIDd: 5 74 cm  LVIDs: 3 85 cm  LVLd A4C: 8 56 cm  LVLs A4C: 7 84 cm  LVOT Diam: 2 66 cm  LVPWd: 1 21 cm  RA Area: 21 9 cm2  RVIDd: 4 18 cm  SV MOD A4C: 56 71 ml  SV(Teich): 98 55 ml    CW  AV Env  Ti: 299 88 ms  AV VTI: 51 93 cm  AV Vmax: 2 42 m/s  AV Vmean: 1 73 m/s  AV maxP 41 mmHg  AV meanP 59 mmHg    MM  TAPSE: 1 87 cm    PW  ARCELIA (VTI): 1 88 cm2  ARCELIA Vmax: 1 91 cm2  E': 0 09 m/s  E/E': 7 65  LVOT Env  Ti: 295 27 ms  LVOT VTI: 17 63 cm  LVOT Vmax: 0 83 m/s  LVOT Vmean: 0 6 m/s  LVOT maxP 77 mmHg  LVOT meanP 64 mmHg  LVSV Dopp: 97 67 ml  MV A Jassi: 0 64 m/s  MV Dec [place]: 3 4 m/s2  MV DecT: 192 4 ms  MV E Jassi: 0 65 m/s  MV E/A Ratio: 1 03  MV PHT: 55 8 ms  MVA By PHT: 3 94 cm2    [employer] Accredited Echocardiography Laboratory    Prepared and electronically signed by    Son Pardo MD  Signed 22-Sep-2020 15:19:26    Results for orders placed during the hospital encounter of 22    Echo complete w/ contrast if indicated    Interpretation Summary  •  Left Ventricle: Left ventricular cavity size is normal  Wall thickness is moderately increased  The left ventricular ejection fraction is 55%  Systolic function is normal  Wall motion is normal  Diastolic function is mildly abnormal, consistent with grade I (abnormal) relaxation  •  Right Ventricle: Right ventricular cavity size is mildly dilated  •  Left Atrium: The atrium is mildly dilated  •  Right Atrium: The atrium is mildly dilated  •  Aortic Valve: The aortic valve is trileaflet  The leaflets are not thickened  The leaflets are moderately calcified  There is mildly reduced mobility  There is mild stenosis  •  Mitral Valve: There is mild annular calcification  There is mild regurgitation  •  Tricuspid Valve: There is mild regurgitation  •  Pulmonic Valve: There is mild regurgitation  •  Aorta: The aortic root is normal in size  The ascending aorta is mildly dilated  JOSE ELIAS:  Results for orders placed during the hospital encounter of 21    JOSE ELIAS    Narrative  [facility] [address]  [address] [place] [address] [facility], [place] [address]  [phone]    Transesophageal Echocardiogram  2D, Doppler, and Color Doppler    Study date:  2021    Patient: Miguel Bates  MR number: [id number]  Account number: [de-identified]  : 1955  Age: 63 years  Gender: Male  Status: Outpatient  Location: Cath lab  Height: 72 in  Weight: 222 6 lb  BP: 60709/ 92 mmHg    Indications: CVA    Diagnoses: I63 9 - Cerebral infarction, unspecified    Sonographer:  CARLOS Chirinos  Primary Physician:  Raisa Higgins DO  Referring Physician:  Fede Alvarado MD  Group:  [facility]  Interpreting Physician:  Domo Khan MD    SUMMARY    LEFT VENTRICLE:  Size was normal   Ejection fraction was estimated in the range of 50 % to 60 %  Wall thickness was increased  LEFT ATRIUM:  The atrium was dilated  ATRIAL SEPTUM:  There was a trace left-to-right shunt  This is within normal limits for the patient's age   No major shunting to suggest patent ASD/significant PFO    RIGHT ATRIUM:  The atrium was dilated  MITRAL VALVE:  There was mild to moderate regurgitation  AORTIC VALVE:  There was mild stenosis  AORTA:  **Mild diffuse atheroma is noted of the transverse and distal aorta**  Upper normal ascending arota measurement of 3 8-4 1cm    HISTORY: PRIOR HISTORY: HTN,Sleep apnea,stroke  PROCEDURE: The procedure was performed in the catheterization laboratory  This was a routine study  The risks and alternatives of the procedure were explained to the patient and informed consent was obtained  The transesophageal approach  was used  The study included complete 2D imaging, limited spectral Doppler, and color Doppler  The heart rate was 76 bpm, at the start of the study  An adult omniplane probe was inserted by the attending cardiologist  Intubated with ease  One intubation attempt(s)  There was no blood detected on the probe  Image quality was adequate  LEFT VENTRICLE: Size was normal  Ejection fraction was estimated in the range of 50 % to 60 %  Wall thickness was increased  RIGHT VENTRICLE: The size was normal  Systolic function was normal     LEFT ATRIUM: The atrium was dilated  APPENDAGE: The size was normal  No thrombus was identified  ATRIAL SEPTUM: There was a trace left-to-right shunt  This is within normal limits for the patient's age  No major shunting to suggest patent ASD/significant PFO    RIGHT ATRIUM: The atrium was dilated  MITRAL VALVE: There was mild thickening  DOPPLER: There was mild to moderate regurgitation  AORTIC VALVE: The valve was trileaflet  Leaflets exhibited mildly increased thickness, mild to moderate calcification, and sclerosis  DOPPLER: There was mild stenosis      AORTA: **Mild diffuse atheroma is noted of the transverse and distal aorta** Upper normal ascending arota measurement of 3 8-4 1cm    SYSTEM MEASUREMENT TABLES    2D  Ao Diam: 3 88 cm    [facility]    Prepared and electronically signed by    Ekaterina Wheeler MD  Signed 16-Jul-2021 17:07:29    No results found for this or any previous visit  CMR:  No results found for this or any previous visit  Results for orders placed during the hospital encounter of 10/27/22    MRI cardiac  w wo contrast    Narrative  74year old man with ventricular tachycardia for evaluation for cardiomyopathy  Technique:  1  3 plane SSFP localizers  2  SSFP cine imaging in long and short axis plane  3  T2 weighted DIR FSE in short axis plane  4  20 ml gadobutrol power injected  5  2D inversion recovery FGRE for delayed myocardial enhancement  6  The patient tolerated the procedure well without complication  Measurements:  Senthil-septal wall 15 mm  Postero-lateral wall 14 mm  Left ventricular end-diastolic dimension 60 mm  Left ventricular end-systolic dimension 40 mm  Left ventricular end-diastolic volume 678 ml  Left ventricular end-systolic volume  74 ml  Stroke volume 96 ml  Cardiac Output 7 0 L/min  Ejection fraction 57 %  Left atrium 42 mm  Aortic Root 42 mm    Findings:  1  The left ventricle is mildly dilated with moderate concentric left ventricular hypertrophy  Left ventricular systolic function is normal with a measured ejection fraction of 57%  There are no regional left ventricular wall motion abnormalities  2  The right ventricle is normal in size with normal right ventricular systolic function  3  The aortic, mitral, and tricuspid valves open without restriction  There is no significant valvular regurgitation, however cine MRI is inaccurate in the qualitative assessment of valvular regurgitation  4  The left atrium is mildly dilated  The aortic root is mildly dilated  5  There is no evidence of myocardial edema  6  Delayed post-gadolinium imaging demonstrates a small focus of intramyocardial hyperenhancement within the basal inferolateral wall  Impression  Impression:  1   Mildly dilated left ventricle with moderate concentric left ventricular hypertrophy and normal left ventricular systolic function  2  Normal right ventricular size and systolic function  3  No significant valvular abnormalities  4  Small focus of intramyocardial fibrosis within the basal inferolateral wall  This most likely suggests prior myocarditis, but cannot completely exclude cardiac sarcoidosis  Workstation performed: [id number]    No results found for this or any previous visit  HOLTER  No results found for this or any previous visit  No results found for this or any previous visit  Anticoagulation VTE Prophylaxis: Sequential compression device (Venodyne)  and Eliquis       Counseling / Coordination of Care  Total floor / unit time spent today 45 minutes minutes  Greater than 50% of total time was spent with the patient and / or family counseling and / or coordination of care  A description of the counseling / coordination of care:          Epic/ Allscripts/Care Everywhere records reviewed:     ** Please Note: Fluency DirectDictation voice to text software may have been used in the creation of this document   ** show

## 2023-06-01 ENCOUNTER — OFFICE VISIT (OUTPATIENT)
Dept: PHYSICAL THERAPY | Facility: CLINIC | Age: 68
End: 2023-06-01

## 2023-06-01 DIAGNOSIS — I63.9 CEREBROVASCULAR ACCIDENT (CVA), UNSPECIFIED MECHANISM (HCC): Primary | ICD-10-CM

## 2023-06-01 NOTE — PROGRESS NOTES
Daily Note     Insurance:  AMA/CMS Eval/ Re-eval POC expires Auth #/ Referral # Total   Visits  Start date  Expiration date Extension  Visit limitation? PT only or  PT+OT? Co-Insurance   CMS 23  NA Visits NA NA  BOMN PT No, $30 copay                                                           Visit/Unit Tracking  AUTH Status: NA Date Eval 6-1                Used 1 1               Remaining    5   4                  Today's date: 2023  Patient name: Chuck Gurrola  : 1955  MRN: [id number]  Referring provider: Ana Mckee DO  Dx:   Encounter Diagnosis     ICD-10-CM    1  Cerebrovascular accident (CVA), unspecified mechanism ([facility] )  I63 9                      Subjective: Patient denies any recent falls  He presents with breonna this date for a good [facility]        Objective: See treatment diary below      NMR  - Speed walk to/from the gym: 500 ft ea   - Speed suicides: 10 cycles total  - Side stepping + 10# MB slams: 5 cycles down/back  - Step up/down w/ 10# MB: 12 cycles focus on ant WS    TE  - Knee ext:    30#: 12 reps, 3 sets  - Hamstring curls:   30#: 12 reps, 3 sets  - Leg press:   110#: 12 reps, 3 sets  - Nustep: 5 mins total         Assessment: Tolerated treatment well with focus today on establishing a good exercise program for patients gym  Patient and wife educated on various exercises that would benefit improve patient's LE strength and muscular endurance  Patient and wife report good understanding at the end of the session  Education for potential need for bracing due to consistent L foot catching, they reported interest  Would follow up in next session  Patient would benefit from continued PT to enable him to reach his maximal level of function  Plan: Continue per plan of care                Outcome Measures Initial Eval  22 & DN 22 PN  2022 PN  10/24/22 IE  2023     5xSTS 28 76 sec,   + push on knees 19 58 sec,   + push on knees 18 66 sec, +push on knees 19 99 sec,   1 UE, CS/Daxa     TUG  - Regular    - Cognitive   14 23 sec, no AD   13 05 sec, no AD  18 75 sec, no AD   11 87 sec no AD  14 71 sec no AD   12 76 sec,   0 AD  17 63 sec,  0 AD (by 2s)     10 meter 0 99 m/s 1 15 m/s 1 04 m/s- self selected    Speed: 1 54 m/s 1 29 m/s     FGA 12/30 15/30 15/30 13/15     6MWT 1100 ft 1220 ft 1090 ft  975 ft                            Precautions: A fib, 2 cardiac stents placed in October 2022  Past Medical History:   Diagnosis Date   • Arthropathy of knee     last assessed 8/19/15   • Bacterial pneumonia 02/05/2007    last assessed 2/5/7   • Brain atrophy [facility])    • Colon polyp    • CPAP (continuous positive airway pressure) dependence    • Disorder of male genital organ 02/12/2008    last assessed 2/12/08   • ED (erectile dysfunction) of organic origin     last assessed 2/13/17    • History of hypertension    • Seasonal allergies    • Situational anxiety     resolved 3/16/17    • Sleep apnea    • Stroke [facility])     09/2020 TIA   • Tinnitus    • Wears hearing aid     bilateral

## 2023-06-05 ENCOUNTER — APPOINTMENT (OUTPATIENT)
Dept: PHYSICAL THERAPY | Facility: CLINIC | Age: 68
End: 2023-06-05
Payer: COMMERCIAL

## 2023-06-07 ENCOUNTER — OFFICE VISIT (OUTPATIENT)
Dept: PHYSICAL THERAPY | Facility: CLINIC | Age: 68
End: 2023-06-07
Payer: COMMERCIAL

## 2023-06-07 DIAGNOSIS — I63.9 CEREBROVASCULAR ACCIDENT (CVA), UNSPECIFIED MECHANISM (HCC): Primary | ICD-10-CM

## 2023-06-07 PROCEDURE — 97112 NEUROMUSCULAR REEDUCATION: CPT | Performed by: PHYSICAL THERAPIST

## 2023-06-07 NOTE — PROGRESS NOTES
Daily Note     Insurance:  AMA/CMS Eval/ Re-eval POC expires Auth #/ Referral # Total   Visits  Start date  Expiration date Extension  Visit limitation? PT only or  PT+OT? Co-Insurance   CMS 23  NA Visits NA NA  BOMN PT No, $30 copay                                                           Visit/Unit Tracking  AUTH Status: NA Date Eval 6-1 6-7               Used 1 1 1              Remaining 5 4 3                 Today's date: 2023  Patient name: Debby Joshi  : 1955  MRN: [id number]  Referring provider: Power Enciso DO  Dx:   Encounter Diagnosis     ICD-10-CM    1  Cerebrovascular accident (CVA), unspecified mechanism ([facility] [address] )  I63 9                      Subjective: Patient reports no new changes, complaints, or falls  Objective: See treatment diary below    Vitals:  - BP (LUE sitting): 128/86 mmHg    NMR:  - Sidestepping on foam beam: 10 laps, 5 ft down/back, 0 UE  - STS w/ posterior resistance (red) to fatigue: 2 sets, 5/10 reps, 0 UE  - Ambulation w/ posterior resistance: 200 ft  - Ambulation w/ posterior resistance and random PT release for reactive balance: 200 ft  - Fwd/bwd stepping on small/medium river rocks: 10 reps, 0-1 UE      Assessment: Patient able to tolerate treatment session fairly today with continued focus on balance exercises  He displayed significant difficulty with activities utilizing posterior resistance and good ability to self correct with reactive balance challenge  Patient fatigued quickly with STS and occasionally required UE support  She will continue to benefit from skilled outpatient PT in order to enable him to reach his maximal level of function  Plan: Continue per plan of care          Outcome Measures Initial Eval  22 & DN 22 PN  2022 PN  10/24/22 IE  2023     5xSTS 28 76 sec,   + push on knees 19 58 sec,   + push on knees 18 66 sec, +push on knees 19 99 sec,   1 UE, CS/Daxa     TUG  - Regular    - Cognitive   14 23 sec, no AD 13 05 sec, no AD  18 75 sec, no AD   11 87 sec no AD  14 71 sec no AD   12 76 sec,   0 AD  17 63 sec,  0 AD (by 2s)     10 meter 0 99 m/s 1 15 m/s 1 04 m/s- self selected    Speed: 1 54 m/s 1 29 m/s     FGA 12/30 15/30 15/30 13/15     6MWT 1100 ft 1220 ft 1090 ft  975 ft                            Precautions: A fib, 2 cardiac stents placed in October 2022  Past Medical History:   Diagnosis Date   • Arthropathy of knee     last assessed 8/19/15   • Bacterial pneumonia 02/05/2007    last assessed 2/5/7   • Brain atrophy [facility])    • Colon polyp    • CPAP (continuous positive airway pressure) dependence    • Disorder of male genital organ 02/12/2008    last assessed 2/12/08   • ED (erectile dysfunction) of organic origin     last assessed 2/13/17    • History of hypertension    • Seasonal allergies    • Situational anxiety     resolved 3/16/17    • Sleep apnea    • Stroke [facility])     09/2020 TIA   • Tinnitus    • Wears hearing aid     bilateral

## 2023-06-12 ENCOUNTER — APPOINTMENT (OUTPATIENT)
Dept: PHYSICAL THERAPY | Facility: CLINIC | Age: 68
End: 2023-06-12
Payer: COMMERCIAL

## 2023-06-14 ENCOUNTER — OFFICE VISIT (OUTPATIENT)
Dept: PHYSICAL THERAPY | Facility: CLINIC | Age: 68
End: 2023-06-14
Payer: COMMERCIAL

## 2023-06-14 DIAGNOSIS — I63.9 CEREBROVASCULAR ACCIDENT (CVA), UNSPECIFIED MECHANISM (HCC): Primary | ICD-10-CM

## 2023-06-14 PROCEDURE — 97112 NEUROMUSCULAR REEDUCATION: CPT

## 2023-06-14 NOTE — PROGRESS NOTES
Daily Note     Insurance:  AMA/CMS Eval/ Re-eval POC expires Auth #/ Referral # Total   Visits  Start date  Expiration date Extension  Visit limitation? PT only or  PT+OT? Co-Insurance   CMS 23  NA Visits NA NA  BOMN PT No, $30 copay                                                           Visit/Unit Tracking  AUTH Status: NA Date Eval 6-1 6-7               Used 1 1 1              Remaining 5 4 3                 Today's date: 2023  Patient name: Kendrick Peterson  : 1955  MRN: [id number]  Referring provider: Chandu Montes DO  Dx:   Encounter Diagnosis     ICD-10-CM    1  Cerebrovascular accident (CVA), unspecified mechanism ([facility] [address] )  I63 9           Start Time: 1330  Stop Time: 1415  Total time in clinic (min): 45 minutes    Subjective: Patient reports no new changes, complaints, or falls  Objective: See treatment diary below    Vitals:  - BP (LUE sitting): 150/100 mmHg  HR 71 bpm   PO2 97%    NMR:  - Sidestepping on foam beam: 10 laps, 5 ft down/back, 0 UE  - STS w/ posterior resistance (red) to fatigue: 2 sets, 5/10 reps, 0 UE  - Ambulation w/ posterior resistance: 200 ft  - not today: Ambulation w/ posterior resistance and random PT release for reactive balance: 200 ft  - Fwd/bwd stepping on small/medium river rocks: 10 reps x 3 sets, 0 UE close guard      Assessment: Patient able to tolerate treatment session fairly today with continued focus on balance exercises  He displayed significant difficulty with activities utilizing posterior resistance  Patient fatigued quickly with STS and occasionally required UE support  PT was advised to use a small pause and regain balance instead of rushing through  She will continue to benefit from skilled outpatient PT in order to enable him to reach his maximal level of function  Plan: Continue per plan of care          Outcome Measures Initial Eval  22 & DN 22 PN  2022 PN  10/24/22 IE  2023     5xSTS 28 76 sec,   + push on knees 19 58 sec,   + push on knees 18 66 sec, +push on knees 19 99 sec,   1 UE, CS/Daxa     TUG  - Regular    - Cognitive   14 23 sec, no AD   13 05 sec, no AD  18 75 sec, no AD   11 87 sec no AD  14 71 sec no AD   12 76 sec,   0 AD  17 63 sec,  0 AD (by 2s)     10 meter 0 99 m/s 1 15 m/s 1 04 m/s- self selected    Speed: 1 54 m/s 1 29 m/s     FGA 12/30 15/30 15/30 13/15     6MWT 1100 ft 1220 ft 1090 ft  975 ft                            Precautions: A fib, 2 cardiac stents placed in October 2022  Past Medical History:   Diagnosis Date   • Arthropathy of knee     last assessed 8/19/15   • Bacterial pneumonia 02/05/2007    last assessed 2/5/7   • Brain atrophy [facility])    • Colon polyp    • CPAP (continuous positive airway pressure) dependence    • Disorder of male genital organ 02/12/2008    last assessed 2/12/08   • ED (erectile dysfunction) of organic origin     last assessed 2/13/17    • History of hypertension    • Seasonal allergies    • Situational anxiety     resolved 3/16/17    • Sleep apnea    • Stroke [facility])     09/2020 TIA   • Tinnitus    • Wears hearing aid     bilateral

## 2023-06-20 ENCOUNTER — EVALUATION (OUTPATIENT)
Dept: PHYSICAL THERAPY | Facility: CLINIC | Age: 68
End: 2023-06-20
Payer: COMMERCIAL

## 2023-06-20 DIAGNOSIS — I63.9 CEREBROVASCULAR ACCIDENT (CVA), UNSPECIFIED MECHANISM (HCC): Primary | ICD-10-CM

## 2023-06-20 PROCEDURE — 97530 THERAPEUTIC ACTIVITIES: CPT | Performed by: PHYSICAL THERAPIST

## 2023-06-20 NOTE — PROGRESS NOTES
PT Re-Evaluation /Progress Note           Insurance:  AMA/CMS Eval/ Re-eval POC expires Auth #/ Referral # Total   Visits  Start date  Expiration date Extension  Visit limitation? PT only or  PT+OT? Co-Insurance   CMS 23  NA Visits NA NA  BOMN PT No, $30 copay                                                           Visit/Unit Tracking  AUTH Status: NA Date Eval 6-1 6-7 6-14 6-20             Used 1 1 1 1 1            Remaining 5 4 3 2 1                     Today's date:   Patient name: Jerica Pantoja  : 1955  MRN: [id number]  Referring provider: Alexis Stanley DO  Dx:   Encounter Diagnosis     ICD-10-CM    1  Cerebrovascular accident (CVA), unspecified mechanism ([facility] [address] )  I63 9             Assessment  Assessment details: Patient is a 74 y o  Male who has been reporting to skilled outpatient PT for deficits in his gait, balance, safety, and endurance due to his CVA in  and his recent cardiac complications on 2022  He has demonstrated significant improvements in these areas as indicated by scores associated with 5xSTS, TUG variations, 10 meter, FGA, and 6MWT  He remains HIGH risk for falls per results of FGA and > 10% difference between TUG and TUG cognitive indicating difficulty with dual tasking and divided attention  Further, he continued to demonstrate the following gait abnormalities that degrade further as he fatigued which can perpetuate his increased risk for falls: wide Manuel, decreased pelvic/trunk dissociation/roation, slowed nia, decreased reciprocal arm swing, and decreased step height  Due to high copay, patient to attend PT 1x per week with 1 additional session of HEP at the gym  He will continue to benefit from skilled outpatient PT in order to maximize his function and reduce his risk for falls within his home and the community           Impairments: Abnormal coordination, Abnormal gait, Abnormal or restricted ROM, Activity intolerance, Impaired balance, Impaired physical strength, Lacks appropriate HEP, Pain with function and Abnormal movement  Understanding of Dx/Px/POC: Good  Prognosis: Good      Patient verbalized understanding of POC  Please contact me if you have any questions or recommendations  Thank you for the referral and the opportunity to share in [address] [place] [address]          Plan  Plan details: 6 MWT, generalized balance, HIGT, dynamic balance  Patient would benefit from: Skilled PT  Planned modality interventions: Biofeedback and Cryotherapy  Planned therapy interventions: Balance, Body mechanics training, Coordination, Functional ROM exercises, Gait training, HEP, Manual therapy, Motor coordination training, Neuromuscular re-education, Patient education, Strengthening, Stretching, Therapeutic activities and Therapeutic exercises  Frequency: 2x/wk and 3x/wk  Duration in weeks: 12 weeks  Plan of Care beginning date: 5/22/23  Plan of Care expiration date: 12 weeks - 8-14-23  Treatment plan discussed with: Patient         Goals  Short Term Goals (4 weeks):    - Patient will improve time on TUG by 2 9 seconds to facilitate improved safety in all ambulation - MET  - Patient will be independent in basic HEP 2-3 weeks - MET  - Patient will improve 5xSTS score by 2 3 seconds from to promote improved LE functional strength needed for ADLs - MET  - Patient will complete components of CB&M to promote agility necessary for sports related tasks - MET  - Patient will go to the gym 1x per week in order to address strength/power deficits to reduce his risk for falls - Partially Met    Long Term Goals (12 weeks):  - Patient will be independent in a comprehensive home exercise program  - Patient will improve gait speed by 0 18 m/s to improve safety with community ambulation  - Patient will improve scoring on FGA by 4 points to progress safety with dynamic tasks  - Patient will be able to demonstrate HT in gait without veering  - Patient will improve 6 Minute Walk Test score by 190 feet from baseline score to promote improved cardiovascular endurance  - Patient will report 50% reduction in near falls in order to improve safety with functional tasks and reduce his risk for falls  - Patient will report going on walks at least 3 days per week to promote independence and improved cardiovascular endurance  - Patient will be able to ascend/descend stairs reciprocally without UE assist to promote independence and safety with ADLs  - Patient will report 50% reduction in near falls when ambulating on uneven terrain      Cut off score    All date taken from [facility] Neuro Section or Rehab Measures      Mathew:  Miguel et al , 2018  Sofia [address]: 2 7 pts    Rigo et al , 2011  Cut-off score: 45/56    Chronic CVA  < 44/56 high risk for falls (Miguel et al , 2018)  < 47 5/56 slow walker status (German soares al , 2011) 5xSTS: Carlos 2010  MDC: 2 3 sec  Age Norms:  60-69: 11 1 sec  70-79: 12 6 sec  80-89: 14 8 sec    Diallo 2010, Chronic Stroke  Chronic CVA: 12 sec   TUG  Jason , 2005  MDC: 2 9 sec    Cut off score:  >13 5 sec community dwelling adults  >32 2 frail elderly  <20 I for basic transfers  >30 dependent on transfers 10 Meter Walk Test:   Can Hunting al , 2006  Small meaningful change: 0 06 m/s  Substantial meaningful change: 0 14 m/s  MCID: 0 16 m/s    < 0 4 m/s household ambulators  0 4 - 0 8 m/s limited community ambulators  > 0 8 m/s community ambulators   FGA: Isabel et al , 2010  MCID: 4 2 pts  Geriatrics/community < 22/30 fall risk  Geriatrics/community < 20/30 unexplained falls DGI  MDC: vestibular - 4 pts  MDC: geriatric/community - 3 pts  Falls risk <19/24   6 Minute Walk Test  Keshia soares al , 2006  MDC: 60 98 m (200 01 ft)    Lizandro Holliday, & Cut off, 2012  MCID: 34 4 m    Age Norms  60-69: M - 1876 ft   F - 1765 ft  70-79: M - 1729 ft   F - 1545 ft  80-89: M - 1368 ft   F - 1286 ft Modified Amira  0: No increase in tone  1: Catch and release or min resistance at end range  1+: Catch f/b min resistance throughout remainder (< half ROM)  2: Easily moved, but more marked tone throughout most ROM  3: Significant tone, PROM difficult  4: Rigid   MiniBest: Taurus et al , 2013  CVA < 17 5 fall risk Pass (Acute CVA)  MDC: 1 8 points (acute), 3 2 points (chronic)         Subjective    History of Present Illness  Mechanism of injury: Patient has a history of a R Thalamic CVA in   Patient was recently seen in loop recorder implantation on 2021 for AF surveillance and underlying cryptogenic stroke  Patient's wife reports that he is pretty sedentary and is not motivated  Patient reports he uses a cane in unfamiliar territory, but otherwise he does not utilize it  Updated (2022): Patient reports that he is satisfied with his progress in PT thus far  Unable to state what specifically has gotten better, but says his wife has noticed improvements  States he cannot remember if he ended up going away to [place] last week and feels his memory has not been great lately  Update (10/24/22): Patient reports his wife is pleased with his progress  He reports that he has been in [place] the past few months cleaning up his house  Update (2023): Patient reported that he was previously in PT here back in 2022 which is when he collapsed, went into V-Tach, and had 2 stents placed  Patient cleared by cardiology and is back for gait and balance PT for his R Thalamic CVA  He has no precautions at this time  Update (2023): Patient and his wife report that he has been doing much better at home and stated that they have noticed a difference in his walking and stamina  He stated he remains with balance difficulty and would like to try running      Primary AD: SPC out in the community  Assist level at home: No  WC usage: None  PLOF: Independent     Pain  Current pain ratin/10  At best pain ratin/10  At worst pain ratin/10  Location: N/A  Aggravating factors: N/A    Social Support  Steps to enter house: a few steps to enter  Stairs in house: FF, but not [employer] in: two story home  Lives with: wife    Employment status: Retired  Hand dominance: R handed     Treatments  Previous treatment: None  Current treatment: None  Diagnostic Testing: Loop monitor, MRI previous years      Objective     Vitals  - HR: 77 BPM  - BP: 134/90 mmHg (RUE sitting)  - SPO2: 94%    HR Max  - 207 - (0 7x64)  = 162 2    LE MMT  - R Hip Flexion: 4/5  - L Hip Flexion: 4+/5  - R Hip Extension: 4+/5 - L Hip Extension: 4+/5  - R Hip Abduction: 4/5  - L Hip abduction: 4+/5  - R Hip adduction: 4/5  - L Hip adduction: 4+/5  - R Knee Extension: 5/5 - L Knee Extension: 5/5  - R Knee Flexion: 4+/5  - L Knee Flexion: 4+/5  - R Ankle DF: 4+/5  - L Ankle DF: 4+/5  - R Ankle PF: 4/5  - L Ankle PF: 4/5    Sensation  - Light touch: Normal   - Deep pressure: Normal  - Pain: Normal  - Temperature: Normal  - Proprioception: Abnormal R side only     Coordination  - Dysmetria: Abnormal  - Dysdiadochokinesia: Abnormal  - Alternating Toe Taps: Normal      Outcome Measures Initial Eval  22 & DN 22 PN  2022 PN  10/24/22 IE  2023 PN  2023    5xSTS 28 76 sec,   + push on knees 19 58 sec,   + push on knees 18 66 sec, +push on knees 19 99 sec,   1 UE, CS/Daxa 16 51 sec, 0 UE (hands on knees)    TUG  - Regular    - Cognitive   14 23 sec, no AD   13 05 sec, no AD  18 75 sec, no AD   11 87 sec no AD  14 71 sec no AD   12 76 sec,   0 AD  17 63 sec,  0 AD (by 2s)   9 82 sec, 0 AD  12 49 sec, 0 AD (by 2s)    10 meter 0 99 m/s 1 15 m/s 1 04 m/s- self selected    Speed: 1 54 m/s 1 29 m/s 1 58 m/s    FGA 12/30 15/30 15/30 13/30 17/30    6MWT 1100 ft 1220 ft 1090 ft  975 ft 1150 ft                           Precautions: A fib, 2 cardiac stents placed in 2022  Past Medical History:   Diagnosis Date   • Arthropathy of knee     last assessed 8/19/15 • Bacterial pneumonia 02/05/2007    last assessed 2/5/7   • Brain atrophy [facility])    • Colon polyp    • CPAP (continuous positive airway pressure) dependence    • Disorder of male genital organ 02/12/2008    last assessed 2/12/08   • ED (erectile dysfunction) of organic origin     last assessed 2/13/17    • History of hypertension    • Seasonal allergies    • Situational anxiety     resolved 3/16/17    • Sleep apnea    • Stroke [facility])     09/2020 TIA   • Tinnitus    • Wears hearing aid     bilateral

## 2023-06-22 ENCOUNTER — APPOINTMENT (OUTPATIENT)
Dept: PHYSICAL THERAPY | Facility: CLINIC | Age: 68
End: 2023-06-22
Payer: COMMERCIAL

## 2023-06-26 ENCOUNTER — APPOINTMENT (OUTPATIENT)
Dept: PHYSICAL THERAPY | Facility: CLINIC | Age: 68
End: 2023-06-26
Payer: COMMERCIAL

## 2023-06-28 ENCOUNTER — APPOINTMENT (OUTPATIENT)
Dept: PHYSICAL THERAPY | Facility: CLINIC | Age: 68
End: 2023-06-28
Payer: COMMERCIAL

## 2023-06-30 ENCOUNTER — OFFICE VISIT (OUTPATIENT)
Dept: PHYSICAL THERAPY | Facility: CLINIC | Age: 68
End: 2023-06-30
Payer: COMMERCIAL

## 2023-06-30 DIAGNOSIS — I63.9 CEREBROVASCULAR ACCIDENT (CVA), UNSPECIFIED MECHANISM (HCC): Primary | ICD-10-CM

## 2023-06-30 PROCEDURE — 97112 NEUROMUSCULAR REEDUCATION: CPT | Performed by: PHYSICAL THERAPIST

## 2023-06-30 NOTE — PROGRESS NOTES
"Daily Note     Insurance:  AMA/CMS Eval/ Re-eval POC expires Auth #/ Referral # Total   Visits  Start date  Expiration date Extension  Visit limitation? PT only or  PT+OT? Co-Insurance   CMS 23  NA Visits NA NA  BOMN PT No, $30 copay                                                           Visit/Unit Tracking  AUTH Status: NA Date Eval 6-1 6-7               Used 1 1 1              Remaining 5 4 3                 Today's date: 2023  Patient name: Shelly Adams  : 1955  MRN: [id number]  Referring provider: Clarissa Lindsey DO  Dx:   Encounter Diagnosis     ICD-10-CM    1  Cerebrovascular accident (CVA), unspecified mechanism ([facility] [address] )  I63 9                      Subjective: Patient reports no new changes since his last treatment      Objective: See treatment diary below      NMR:  - FWD step: 20x 6\" step w/ 0 UE  - FWD cone taps: 10x  - LAT step up: 20x 6\" step  - FWD/BWD ambulation: 10 laps x 10 ft  - FWD navigation over uneven river rocks: 10 laps x 10 ft  - Sidestepping on foam beam: 10 laps x 10 ft  - STS: 20x 0 UE      Assessment: Patient able to tolerate treatment session fairly today with continued focus on balance exercises  Patient challenged with 6\" step clearance when moving lateral, displaying poor step placement leading to narrow NATHANAEL need to UE assist  With uneven river rocks and foam beam, patient demonstrates reduced stepping strategy with LOB requiring occasional PT assist  She will continue to benefit from skilled outpatient PT in order to enable him to reach his maximal level of function  Plan: Continue per plan of care          Outcome Measures Initial Eval  22 & DN 22 PN  2022 PN  10/24/22 IE  2023     5xSTS 28 76 sec,   + push on knees 19 58 sec,   + push on knees 18 66 sec, +push on knees 19 99 sec,   1 UE, CS/Daxa     TUG  - Regular    - Cognitive   14 23 sec, no AD   13 05 sec, no AD  18 75 sec, no AD   11 87 sec no AD  14 71 sec no AD   12 76 sec,   0 " AD  17 63 sec,  0 AD (by 2s)     10 meter 0 99 m/s 1 15 m/s 1 04 m/s- self selected    Speed: 1 54 m/s 1 29 m/s     FGA 12/30 15/30 15/30 13/15     6MWT 1100 ft 1220 ft 1090 ft  975 ft                            Precautions: A fib, 2 cardiac stents placed in October 2022  Past Medical History:   Diagnosis Date   • Arthropathy of knee     last assessed 8/19/15   • Bacterial pneumonia 02/05/2007    last assessed 2/5/7   • Brain atrophy [facility])    • Colon polyp    • CPAP (continuous positive airway pressure) dependence    • Disorder of male genital organ 02/12/2008    last assessed 2/12/08   • ED (erectile dysfunction) of organic origin     last assessed 2/13/17    • History of hypertension    • Seasonal allergies    • Situational anxiety     resolved 3/16/17    • Sleep apnea    • Stroke [facility])     09/2020 TIA   • Tinnitus    • Wears hearing aid     bilateral

## 2023-07-11 ENCOUNTER — TELEPHONE (OUTPATIENT)
Dept: FAMILY MEDICINE CLINIC | Facility: CLINIC | Age: 68
End: 2023-07-11

## 2023-07-11 PROBLEM — R19.7 DIARRHEA OF PRESUMED INFECTIOUS ORIGIN: Status: RESOLVED | Noted: 2023-05-12 | Resolved: 2023-07-11

## 2023-07-13 ENCOUNTER — APPOINTMENT (OUTPATIENT)
Dept: LAB | Facility: HOSPITAL | Age: 68
End: 2023-07-13
Payer: COMMERCIAL

## 2023-07-13 DIAGNOSIS — R26.0 STAGGERING GAIT: ICD-10-CM

## 2023-07-13 LAB
ANION GAP SERPL CALCULATED.3IONS-SCNC: 7 MMOL/L
BUN SERPL-MCNC: 16 MG/DL (ref 5–25)
CALCIUM SERPL-MCNC: 8.9 MG/DL (ref 8.4–10.2)
CHLORIDE SERPL-SCNC: 112 MMOL/L (ref 96–108)
CO2 SERPL-SCNC: 25 MMOL/L (ref 21–32)
CREAT SERPL-MCNC: 1.17 MG/DL (ref 0.6–1.3)
GFR SERPL CREATININE-BSD FRML MDRD: 65 ML/MIN/1.73SQ M
GLUCOSE SERPL-MCNC: 99 MG/DL (ref 65–140)
POTASSIUM SERPL-SCNC: 3.8 MMOL/L (ref 3.5–5.3)
SODIUM SERPL-SCNC: 144 MMOL/L (ref 135–147)

## 2023-07-13 PROCEDURE — 36415 COLL VENOUS BLD VENIPUNCTURE: CPT

## 2023-07-13 PROCEDURE — 80048 BASIC METABOLIC PNL TOTAL CA: CPT

## 2023-07-17 ENCOUNTER — TELEPHONE (OUTPATIENT)
Dept: CARDIOLOGY CLINIC | Facility: CLINIC | Age: 68
End: 2023-07-17

## 2023-07-17 ENCOUNTER — OFFICE VISIT (OUTPATIENT)
Dept: CARDIOLOGY CLINIC | Facility: CLINIC | Age: 68
End: 2023-07-17
Payer: COMMERCIAL

## 2023-07-17 VITALS
OXYGEN SATURATION: 98 % | HEART RATE: 69 BPM | WEIGHT: 235 LBS | SYSTOLIC BLOOD PRESSURE: 140 MMHG | BODY MASS INDEX: 31.83 KG/M2 | DIASTOLIC BLOOD PRESSURE: 84 MMHG | HEIGHT: 72 IN

## 2023-07-17 DIAGNOSIS — I48.0 PAROXYSMAL ATRIAL FIBRILLATION (HCC): Primary | ICD-10-CM

## 2023-07-17 DIAGNOSIS — Z86.73 HISTORY OF STROKE: ICD-10-CM

## 2023-07-17 DIAGNOSIS — I71.21 ASCENDING AORTIC ANEURYSM, UNSPECIFIED WHETHER RUPTURED (HCC): ICD-10-CM

## 2023-07-17 DIAGNOSIS — I10 BENIGN ESSENTIAL HYPERTENSION: ICD-10-CM

## 2023-07-17 DIAGNOSIS — I47.29 NSVT (NONSUSTAINED VENTRICULAR TACHYCARDIA) (HCC): ICD-10-CM

## 2023-07-17 DIAGNOSIS — F40.240 CLAUSTROPHOBIA: ICD-10-CM

## 2023-07-17 DIAGNOSIS — I35.0 AORTIC VALVE STENOSIS, ETIOLOGY OF CARDIAC VALVE DISEASE UNSPECIFIED: ICD-10-CM

## 2023-07-17 PROCEDURE — 99214 OFFICE O/P EST MOD 30 MIN: CPT | Performed by: INTERNAL MEDICINE

## 2023-07-17 RX ORDER — ALPRAZOLAM 0.5 MG/1
0.5 TABLET ORAL
Qty: 1 TABLET | Refills: 0 | Status: SHIPPED | OUTPATIENT
Start: 2023-07-17

## 2023-07-17 RX ORDER — MEMANTINE HYDROCHLORIDE 5 MG/1
5 TABLET ORAL 2 TIMES DAILY
COMMUNITY
Start: 2023-07-08

## 2023-07-18 PROCEDURE — 93000 ELECTROCARDIOGRAM COMPLETE: CPT | Performed by: INTERNAL MEDICINE

## 2023-07-18 NOTE — PROGRESS NOTES
Cardiology   Belkys Davis DO, Ros Dhaliwal MD, Edwin Proctor MD, Joan Alcaraz MD, [facility]  -------------------------------------------------------------------  [facility]  [address] [place] [address], [address] [place] [phone]    Cardiology Follow Up  Ellie Crowley  1955  [id number]          Assessment/Plan:    1. VT with syncope -   Cause of tachycardia may have been ischemic. He had 2 stents placed. - No further events on loop recorder. Will recheck today. -Did not wish to use LifeVest.  - will continue monitoring for further arrhythmias. - metoprolol was increased to 50 mg twice daily during hospitalization. He was temporarily on Tikosyn which was discontinued. 2. CAD with recent PCI x2 - continue Plavix. - continue atorvastatin    3. History of CVA  - continue Plavix  - continue Eliquis. 4. Paroxysmal atrial fibrillation  - currently in sinus rhythm. Continue Eliquis and metoprolol      Interval History:     Ellie Crowley is 74 y.o. male here for followup of CAD. Since his last visit, he has been feeling well.  he denies any palpitations, chest pain, shortness of breath, LE edema, orthopnea or PND. He denies any syncope or near syncope. Diet is overall unchanged. There has not been a significant change in weight. No events on last loop recorder interrogation. He is due for another check soon. In October 2022 he was admitted to [facility] [address] after a syncopal event during PT. He had a loop recorder in place and was noted to have VT at that time. He underwent further workup including cardiac catheterization which showed a 1st diagonal 90% stenosis of ramus with 90% stenosis and circumflex with 60% stenosis in our PDA of 80% stenosis. Subsequently, he underwent drug-eluting stent placement to the 1st diagonal and ramus lesions.   Echocardiogram during admission showed ejection fraction of 60% with mild to moderate aortic valve stenosis. Aortic root was dilated at 4.5 cm. He subsequent underwent cardiac MRI which showed ejection fraction of 57%, moderate concentric LVH there was a small focus of intra myocardial fibrosis of the basal inferolateral wall. VT was likely from the anterolateral mitral annulus but there was no evidence of scar in that area. EP recommended increasing Toprol to 50 mg b.i.d.. He was initially started on Tikosyn which was discontinued. He had LifeVest placed prior to going home  Which he has returned. Patient had a loop recorder in place because of history of cryptogenic CVA. He has had episodes of atrial fibrillation noted on loop recorder in the past and is now on Eliquis.       The following portions of the patient's history were reviewed and updated as appropriate: allergies, current medications, past family history, past medical history, past social history, past surgical history, and problem list.       Current Outpatient Medications:   •  ALPRAZolam (XANAX) 0.5 mg tablet, Take 1 tablet (0.5 mg total) by mouth 30 min pre-procedure, Disp: 1 tablet, Rfl: 0  •  apixaban (Eliquis) 5 mg, Take 1 tablet (5 mg total) by mouth 2 (two) times a day, Disp: 180 tablet, Rfl: 3  •  atorvastatin (LIPITOR) 40 mg tablet, TAKE 1 TABLET BY MOUTH EVERY DAY IN THE EVENING, Disp: 30 tablet, Rfl: 11  •  clopidogrel (PLAVIX) 75 mg tablet, TAKE 1 TABLET BY MOUTH EVERY DAY, Disp: 30 tablet, Rfl: 0  •  metoprolol succinate (TOPROL-XL) 50 mg 24 hr tablet, Take 1 tablet (50 mg total) by mouth 2 (two) times a day, Disp: 180 tablet, Rfl: 3  •  fexofenadine (ALLEGRA) 180 MG tablet, Take 1 tablet (180 mg total) by mouth daily for 30 days (Patient taking differently: Take 180 mg by mouth every morning), Disp: 30 tablet, Rfl: 0  •  memantine (NAMENDA) 5 mg tablet, Take 5 mg by mouth 2 (two) times a day, Disp: , Rfl:         Review of Systems:  Review of Systems   Respiratory: Negative for shortness of breath. Cardiovascular: Negative for chest pain, palpitations and leg swelling. All other systems reviewed and are negative. Physical Exam:  Vitals:  Vitals:    07/17/23 0809   BP: 140/84   BP Location: Right arm   Patient Position: Sitting   Cuff Size: Large   Pulse: 69   SpO2: 98%   Weight: 107 kg (235 lb)   Height: 6' (1.829 m)     Physical Exam   Constitutional: He appears healthy. No distress. Eyes: Pupils are equal, round, and reactive to light. Conjunctivae are normal.   Neck: No JVD present. Cardiovascular: Normal rate, regular rhythm and normal heart sounds. Exam reveals no gallop and no friction rub. No murmur heard. Pulmonary/Chest: Effort normal and breath sounds normal. He has no wheezes. He has no rales. Musculoskeletal:         General: No tenderness, deformity or edema. Cervical back: Normal range of motion and neck supple. Neurological: He is alert and oriented to person, place, and time. Skin: Skin is warm and dry. Cardiographics:  EKG: Sinus rhythm    Last known EF: 55%    This note was completed in part utilizing M-Modal Fluency Direct Software. Grammatical errors, random word insertions, spelling mistakes, and incomplete sentences can be an occasional consequence of this system secondary to software limitations, ambient noise, and hardware issues. If you have any questions or concerns about the content, text, or information contained within the body of this dictation, please contact the provider for clarification.

## 2023-07-21 ENCOUNTER — APPOINTMENT (OUTPATIENT)
Dept: PHYSICAL THERAPY | Facility: CLINIC | Age: 68
End: 2023-07-21
Payer: COMMERCIAL

## 2023-07-28 ENCOUNTER — EVALUATION (OUTPATIENT)
Facility: CLINIC | Age: 68
End: 2023-07-28
Payer: COMMERCIAL

## 2023-07-28 DIAGNOSIS — I63.9 CEREBROVASCULAR ACCIDENT (CVA), UNSPECIFIED MECHANISM (HCC): Primary | ICD-10-CM

## 2023-07-28 PROCEDURE — 97530 THERAPEUTIC ACTIVITIES: CPT

## 2023-07-28 NOTE — PROGRESS NOTES
PT Re-Evaluation            Insurance:  AMA/CMS Eval/ Re-eval POC expires Auth #/ Referral # Total   Visits  Start date  Expiration date Extension  Visit limitation? PT only or  PT+OT? Co-Insurance   CMS 23  NA Visits NA NA  BOMN PT No, $30 copay      Submitted Auth 23                                                     Visit/Unit Tracking  AUTH Status: NA Date Eval 6-1 6-7 6-14 6-20            Used 1 1 1 1 1 1 1          Remaining 5 4 3 2 1 0 -1                       Today's date:   Patient name: Lalo Fuentes  : 1955  MRN: [id number]  Referring provider: Soha Singer DO  Dx:   Encounter Diagnosis     ICD-10-CM    1. Cerebrovascular accident (CVA), unspecified mechanism ([address])  I63.9             Assessment  Assessment details: Patient is a 74 y.o. Male who has been reporting to skilled outpatient PT for deficits in his gait, balance, safety, and endurance due to his CVA in  and his recent cardiac complications on 2022. Patient has not been seen for the past month as he has been in [place] during this time. Since then, he has had another fall when attempting to negotiate the steps independently. Re-tested this date with patient demonstrating a significant change in balance, speed of walking and lower extremity strength. This was indicated by significant regression of the following tests 5x STS, TUG Regular/Cog, 10 MWT, and FGA measurement. Based on his results from today's testing he remains a HIGH risk for falls. Discussed with both Alcon Cisneros and his wife to utilize a [facility] for all community ambulation. They both report understanding. However, based on his history of poor utilization of AD worry that without further reinforcement of gait sequencing he will continue to have regression and possible falls. The other major difficulty, is that his previous CVA affects his overall processing of information.  However, in order to achieve a real difference in mobility he will require a more lengthened time to ensure his learning within skilled physical therapy sessions, can move into a long term processing versus short term only. Due to poor ability to negotiate safely in the community would hold on gym program. At this time it is imperative that patient continue with skilled PT services to enable him to reach his maximal level of function. He will continue to benefit from skilled outpatient PT in order to maximize his function and reduce his risk for falls within his home and the community. Impairments: Abnormal coordination, Abnormal gait, Abnormal or restricted ROM, Activity intolerance, Impaired balance, Impaired physical strength, Lacks appropriate HEP, Pain with function and Abnormal movement  Understanding of Dx/Px/POC: Good  Prognosis: Good      Patient verbalized understanding of POC. Please contact me if you have any questions or recommendations. Thank you for the referral and the opportunity to share in [address] [place] [address].         Plan  Plan details: 6 MWT, generalized balance, HIGT, dynamic balance  Patient would benefit from: Skilled PT  Planned modality interventions: Biofeedback and Cryotherapy  Planned therapy interventions: Balance, Body mechanics training, Coordination, Functional ROM exercises, Gait training, HEP, Manual therapy, Motor coordination training, Neuromuscular re-education, Patient education, Strengthening, Stretching, Therapeutic activities and Therapeutic exercises  Frequency: 1-3x/wk  Duration in weeks: 12 weeks  Plan of Care beginning date: 7/28/23  Plan of Care expiration date: 12 weeks - 10-28-23  Treatment plan discussed with: Patient         Goals  Short Term Goals (4 weeks):    - Patient will improve time on TUG by 2.9 seconds to facilitate improved safety in all ambulation - MET  - Patient will be independent in basic HEP 2-3 weeks - MET  - Patient will improve 5xSTS score by 2.3 seconds from to promote improved LE functional strength needed for ADLs - MET  - Patient will go to the gym 1x per week in order to address strength/power deficits to reduce his risk for falls - Partially Met    Long Term Goals (12 weeks):  - Patient will be independent in a comprehensive home exercise program- not met  - Patient will improve gait speed by 0.18 m/s to improve safety with community ambulation- not met  - Patient will improve scoring on FGA by 4 points to progress safety with dynamic tasks - not met  - Patient will be able to demonstrate HT in gait without veering - not met  - Patient will improve 6 Minute Walk Test score by 190 feet from baseline score to promote improved cardiovascular endurance - not met  - Patient will report 50% reduction in near falls in order to improve safety with functional tasks and reduce his risk for falls - not met  - Patient will report going on walks at least 3 days per week to promote independence and improved cardiovascular endurance - not met  - Patient will be able to ascend/descend stairs reciprocally without UE assist to promote independence and safety with ADLs - not met  - Patient will report 50% reduction in near falls when ambulating on uneven terrain - not met      Cut off score    All date taken from APTA Neuro Section or Rehab Measures      Mathew:  Miguel et al., 2018  [facility]: 2.7 pts    Rigo et al., 2011  Cut-off score: 45/56    Chronic CVA  < 44/56 high risk for falls (Miguel et al., 2018)  < 47.5/56 slow walker status (German et al., 2011) 5xSTS: Carlos 2010  MDC: 2.3 sec  Age Norms:  60-69: 11.1 sec  70-79: 12.6 sec  80-89: 14.8 sec    Diallo 2010, Chronic Stroke  Chronic CVA: 12 sec   TUG  Dolores soraes al., 2005  MDC: 2.9 sec    Cut off score:  >13.5 sec community dwelling adults  >32.2 frail elderly  <20 I for basic transfers  >30 dependent on transfers 10 Meter Walk Test:   Grant smith, 2006  Small meaningful change: 0.06 m/s  Substantial meaningful change: 0.14 m/s  MCID: 0.16 m/s    < 0.4 m/s household ambulators  0.4 - 0.8 m/s limited community ambulators  > 0.8 m/s community ambulators   FGA: Isabel et al., 2010  MCID: 4.2 pts  Geriatrics/community < 22/30 fall risk  Geriatrics/community < 20/30 unexplained falls DGI  MDC: vestibular - 4 pts  MDC: geriatric/community - 3 pts  Falls risk <19/24   6 Minute Walk Test  Keshia et al., 2006  MDC: 60.98 m (200.01 ft)    Lizandro Buenrostro, & [place], 2012  MCID: 34.4 m    Age Norms  60-69: M - 1876 ft   F - 1765 ft  70-79: M - 1729 ft   F - 1545 ft  80-89: M - 1368 ft   F - 1286 ft Modified Amira  0: No increase in tone  1: Catch and release or min resistance at end range  1+: Catch f/b min resistance throughout remainder (< half ROM)  2: Easily moved, but more marked tone throughout most ROM  3: Significant tone, PROM difficult  4: Rigid   MiniBest: Taurus et al., 2013  CVA < 17.5 fall risk Pass (Acute CVA)  MDC: 1.8 points (acute), 3.2 points (chronic)         Subjective    History of Present Illness  Mechanism of injury: Patient has a history of a R Thalamic CVA in 2020. Patient was recently seen in loop recorder implantation on 12/22/2021 for AF surveillance and underlying cryptogenic stroke. Patient's wife reports that he is pretty sedentary and is not motivated. Patient reports he uses a cane in unfamiliar territory, but otherwise he does not utilize it. Updated (9/12/2022): Patient reports that he is satisfied with his progress in PT thus far. Unable to state what specifically has gotten better, but says his wife has noticed improvements. States he cannot remember if he ended up going away to [place] last week and feels his memory has not been great lately. Update (10/24/22): Patient reports his wife is pleased with his progress. He reports that he has been in [place] the past few months cleaning up his house.     Update (5-): Patient reported that he was previously in PT here back in 2022 which is when he collapsed, went into [place]-T[place], and had 2 stents placed. Patient cleared by cardiology and is back for gait and balance PT for his R Thalamic CVA. He has no precautions at this time. Update (2023): Patient and his wife report that he has been doing much better at home and stated that they have noticed a difference in his walking and stamina. He stated he remains with balance difficulty and would like to try running. Update (23): Patient reports that he had a fall backwards on the step. Discussed utilizing a SPC for future mobility at this time.      Primary AD: SPC out in the community  Assist level at home: No  WC usage: None  PLOF: Independent     Pain  Current pain ratin/10  At best pain ratin/10  At worst pain ratin/10  Location: N/A  Aggravating factors: N/A    Social Support  Steps to enter house: a few steps to enter  Stairs in house: FF, but not [employer] in: two story home  Lives with: wife    Employment status: Retired  Hand dominance: R handed     Treatments  Previous treatment: None  Current treatment: None  Diagnostic Testing: Loop monitor, MRI previous years      Objective     Vitals  - HR: 77 BPM  - BP: 134/90 mmHg (RUE sitting)  - SPO2: 94%    HR Max  - 207 - (0.7x64)  = 162.2    LE MMT  - R Hip Flexion: 4/5  - L Hip Flexion: 4+/5  - R Hip Extension: 4+/5 - L Hip Extension: 4+/5  - R Hip Abduction: 4/5  - L Hip abduction: 4+/5  - R Hip adduction: 4/5  - L Hip adduction: 4+/5  - R Knee Extension: 5/5 - L Knee Extension: 5/5  - R Knee Flexion: 4+/5  - L Knee Flexion: 4+/5  - R Ankle DF: 4+/5  - L Ankle DF: 4+/5  - R Ankle PF: 4/5  - L Ankle PF: 4/5    Sensation  - Light touch: Normal   - Deep pressure: Normal  - Pain: Normal  - Temperature: Normal  - Proprioception: Abnormal R side only     Coordination  - Dysmetria: Abnormal  - Dysdiadochokinesia: Abnormal  - Alternating Toe Taps: Normal      Outcome Measures Initial Eval  22 & DN 8/19/22 PN  9/12/2022 PN  10/24/22 IE  5- PN  6- PN  7-28-23   5xSTS 28.76 sec,   + push on knees 19.58 sec,   + push on knees 18.66 sec, +push on knees 19.99 sec,   1 UE, CS/Daxa 16.51 sec, 0 UE (hands on knees) 18.37 sec, 1 UE push at thighs   TUG  - Regular    - Cognitive   14.23 sec, no AD   13.05 sec, no AD  18.75 sec, no AD   11.87 sec no AD  14.71 sec no AD   12.76 sec,   0 AD  17.63 sec,  0 AD (by 2s)   9.82 sec, 0 AD  12.49 sec, 0 AD (by 2s)   12.62 sec, 0 AD  13.99 sec, 0 AD (incorrect math)   10 meter 0.99 m/s 1.15 m/s 1.04 m/s- self selected    Speed: 1.54 m/s 1.29 m/s 1.58 m/s 1.13 m/s   FGA 12/30 15/30 15/30 13/30 17/30 12/30   6MWT 1100 ft 1220 ft 1090 ft  975 ft 1150 ft 1145 ft                          Precautions: A fib, 2 cardiac stents placed in October 2022  Past Medical History:   Diagnosis Date   • Arthropathy of knee     last assessed 8/19/15   • Bacterial pneumonia 02/05/2007    last assessed 2/5/7   • Brain atrophy [facility])    • Colon polyp    • CPAP (continuous positive airway pressure) dependence    • Disorder of male genital organ 02/12/2008    last assessed 2/12/08   • ED (erectile dysfunction) of organic origin     last assessed 2/13/17    • History of hypertension    • Seasonal allergies    • Situational anxiety     resolved 3/16/17    • Sleep apnea    • Stroke [facility])     09/2020 TIA   • Tinnitus    • Wears hearing aid     bilateral

## 2023-08-04 ENCOUNTER — TELEPHONE (OUTPATIENT)
Dept: FAMILY MEDICINE CLINIC | Facility: CLINIC | Age: 68
End: 2023-08-04

## 2023-08-08 ENCOUNTER — OFFICE VISIT (OUTPATIENT)
Facility: CLINIC | Age: 68
End: 2023-08-08
Payer: COMMERCIAL

## 2023-08-08 DIAGNOSIS — I63.9 CEREBROVASCULAR ACCIDENT (CVA), UNSPECIFIED MECHANISM (HCC): Primary | ICD-10-CM

## 2023-08-08 PROCEDURE — 97112 NEUROMUSCULAR REEDUCATION: CPT | Performed by: PHYSICAL THERAPIST

## 2023-08-08 NOTE — PROGRESS NOTES
Daily Note     Insurance:  AMA/CMS Eval/ Re-eval POC expires Auth #/ Referral # Total   Visits  Start date  Expiration date Extension  Visit limitation? PT only or  PT+OT? Co-Insurance   CMS 23  NA Visits NA NA  BOMN PT No, $30 copay      Submitted Auth 23                                                     Visit/Unit Tracking  AUTH Status: NA Date Eval 6-1 6-7 6-14 6-20             Used 1 1 1 1 1            Remaining 5 4 3 2 1             AUTH Status: NA Date  8-8               Used 1 1 1              Remaining 5 4 3                 Today's date: 2023  Patient name: Carlos Mcfadden  : 1955  MRN: [id number]  Referring provider: Oseas Chaidez DO  Dx:   Encounter Diagnosis     ICD-10-CM    1. Cerebrovascular accident (CVA), unspecified mechanism ([address])  I63.9                      Subjective: Patient reports no new changes since his last treatment      Objective: See treatment diary below      NMR: (3# ankle weights)  - STS: 28x 0 UE  - FWD step taps: 40x  - LAT step taps: 40x 2 sets  - Sidestepping: 10 laps x 10 ft   - FWD cone taps: 20x 1 UE  - LAT chantel: 20x 6" chantel w/ 1 UE        Assessment: Patient able to tolerate treatment session fairly today with continued focus on balance exercises. When standing patient frequently utilizes 1 UE support to maintain balance and prevent posterior LOB. Increased difficulty observed during step and cone taps with LLE coordination despite ankle weight addition to improve patient LE proprioception. Patient will continue to benefit from skilled outpatient PT in order to enable him to reach his maximal level of function. Plan: Continue per plan of care.         Outcome Measures Initial Eval  22 & DN 22 PN  2022 PN  10/24/22 IE  2023     5xSTS 28.76 sec,   + push on knees 19.58 sec,   + push on knees 18.66 sec, +push on knees 19.99 sec,   1 UE, CS/Daxa     TUG  - Regular    - Cognitive   14.23 sec, no AD   13.05 sec, no AD  18.75 sec, no AD   11.87 sec no AD  14.71 sec no AD   12.76 sec,   0 AD  17.63 sec,  0 AD (by 2s)     10 meter 0.99 m/s 1.15 m/s 1.04 m/s- self selected    Speed: 1.54 m/s 1.29 m/s     FGA 12/30 15/30 15/30 13/15     6MWT 1100 ft 1220 ft 1090 ft  975 ft                            Precautions: A fib, 2 cardiac stents placed in October 2022  Past Medical History:   Diagnosis Date   • Arthropathy of knee     last assessed 8/19/15   • Bacterial pneumonia 02/05/2007    last assessed 2/5/7   • Brain atrophy [facility])    • Colon polyp    • CPAP (continuous positive airway pressure) dependence    • Disorder of male genital organ 02/12/2008    last assessed 2/12/08   • ED (erectile dysfunction) of organic origin     last assessed 2/13/17    • History of hypertension    • Seasonal allergies    • Situational anxiety     resolved 3/16/17    • Sleep apnea    • Stroke [facility])     09/2020 TIA   • Tinnitus    • Wears hearing aid     bilateral

## 2023-08-10 ENCOUNTER — OFFICE VISIT (OUTPATIENT)
Facility: CLINIC | Age: 68
End: 2023-08-10
Payer: COMMERCIAL

## 2023-08-10 DIAGNOSIS — I63.9 CEREBROVASCULAR ACCIDENT (CVA), UNSPECIFIED MECHANISM (HCC): Primary | ICD-10-CM

## 2023-08-10 PROCEDURE — 97112 NEUROMUSCULAR REEDUCATION: CPT

## 2023-08-10 NOTE — PROGRESS NOTES
Daily Note     Insurance:  AMA/CMS Eval/ Re-eval POC expires Auth #/ Referral # Total   Visits  Start date  Expiration date Extension  Visit limitation? PT only or  PT+OT? Co-Insurance   CMS 23  NA Visits NA NA  BOMN PT No, $30 copay      Submitted Auth 23                                                       AUTH Status: NA Date  8-8 8/10              Used 1 1 1 1             Remaining 5 4 3 2                Today's date: 8/10/2023  Patient name: Evelyn Garay  : 1955  MRN: [id number]  Referring provider: Ajay Roa DO  Dx:   Encounter Diagnosis     ICD-10-CM    1. Cerebrovascular accident (CVA), unspecified mechanism ([address])  I63.9                      Subjective: Patient reports no new changes since his last treatment      Objective: See treatment diary below      NMR: (3# ankle weights)  - STS: 49 reps, 0 UE  - Walking marches + B/L 5.5# TT: 40 ft x 6 cycles ea way  - FWD step ups 6'' step: 40x  - LAT step up/over 6'' step + cog task: 40 reps  - Sidestepping: 10 laps x 10 ft   - FWD cone taps: 20x 1 UE  - LAT chantel: 20x 6" chantel w/ 1 UE        Assessment: Patient able to tolerate treatment session fairly today with continued focus on balance exercises. Patient has noticeable muscle fatigue as duration of the exercises continues. He requires verbal prompting to remember exercises when incorporating dual tasks. Patient will continue to benefit from skilled outpatient PT in order to enable him to reach his maximal level of function. Plan: Continue per plan of care.         Outcome Measures Initial Eval  22 & DN 22 PN  2022 PN  10/24/22 IE  2023     5xSTS 28.76 sec,   + push on knees 19.58 sec,   + push on knees 18.66 sec, +push on knees 19.99 sec,   1 UE, CS/Daxa     TUG  - Regular    - Cognitive   14.23 sec, no AD   13.05 sec, no AD  18.75 sec, no AD   11.87 sec no AD  14.71 sec no AD   12.76 sec,   0 AD  17.63 sec,  0 AD (by 2s)     10 meter 0.99 m/s 1.15 m/s 1.04 m/s- self selected    Speed: 1.54 m/s 1.29 m/s     FGA 12/30 15/30 15/30 13/15     6MWT 1100 ft 1220 ft 1090 ft  975 ft                            Precautions: A fib, 2 cardiac stents placed in October 2022  Past Medical History:   Diagnosis Date   • Arthropathy of knee     last assessed 8/19/15   • Bacterial pneumonia 02/05/2007    last assessed 2/5/7   • Brain atrophy [facility])    • Colon polyp    • CPAP (continuous positive airway pressure) dependence    • Disorder of male genital organ 02/12/2008    last assessed 2/12/08   • ED (erectile dysfunction) of organic origin     last assessed 2/13/17    • History of hypertension    • Seasonal allergies    • Situational anxiety     resolved 3/16/17    • Sleep apnea    • Stroke [facility])     09/2020 TIA   • Tinnitus    • Wears hearing aid     bilateral

## 2023-08-15 ENCOUNTER — EVALUATION (OUTPATIENT)
Facility: CLINIC | Age: 68
End: 2023-08-15
Payer: COMMERCIAL

## 2023-08-15 DIAGNOSIS — I63.9 CEREBROVASCULAR ACCIDENT (CVA), UNSPECIFIED MECHANISM (HCC): Primary | ICD-10-CM

## 2023-08-15 PROCEDURE — 97112 NEUROMUSCULAR REEDUCATION: CPT

## 2023-08-15 PROCEDURE — 97530 THERAPEUTIC ACTIVITIES: CPT

## 2023-08-15 NOTE — PROGRESS NOTES
PT Re-Evaluation            Insurance:  AMA/CMS Eval/ Re-eval POC expires Auth #/ Referral # Total   Visits  Start date  Expiration date Extension  Visit limitation? PT only or  PT+OT? Co-Insurance   CMS 10/28/23  Submitted to [address] 8/15 - AS Visits NA NA  [facility] PT No, $30 copay                                                             AUTH Status: NA Date 6-30 7-28 8-8 8/10 8/15             Used 1 1 1 1 1            Remaining 5 4 3 2 1                           Today's date:   Patient name: De Offer  : 1955  MRN: [id number]  Referring provider: Ashleigh An DO  Dx:   Encounter Diagnosis     ICD-10-CM    1. Cerebrovascular accident (CVA), unspecified mechanism ([address])  I63.9             Assessment  Assessment details: Patient is a 74 y.o. Male who has been reporting to skilled outpatient PT for deficits in his gait, balance, safety, and endurance due to his CVA in  and his recent cardiac complications on 2022. Patient has returned for 2-3 months of therapy after returning from [place]. He demonstrated overall improvements in the following outcome measures since last reassessment: 5 x STS, TUG (regular), and FGA, likely indicating overall gains in functional LE strength, safe mobility, and dynamic balance, respectively. Demonstrated mild regressions in 10 MWT and 6 MWT. Per cutoff scores for the 5 x STS, TUG, and FGA he is classified as HIGH risk for falls. Patient has history of regression associated with falls during hiatus from PT services. Continued to reinforce importance of AD use for home and community ambulation. Due to cognitive and motor planning deficits as a result of patient's CVA, patient will likely require longer term skilled PT in order to maximize degree of motor learning and neuroplastic changes.  At this time it is imperative that patient continue with skilled PT services to enable him to reach his maximal level of function as well as reduce risk of falls, subsequent injury, and increased healthcare costs. He has met 3 short term goals at this time. He will continue to benefit from skilled outpatient PT in order to maximize his function and reduce his risk for falls within his home and the community. Impairments: Abnormal coordination, Abnormal gait, Abnormal or restricted ROM, Activity intolerance, Impaired balance, Impaired physical strength, Lacks appropriate HEP, Pain with function and Abnormal movement  Understanding of Dx/Px/POC: Good  Prognosis: Good      Patient verbalized understanding of POC. Please contact me if you have any questions or recommendations. Thank you for the referral and the opportunity to share in [address] [place] [address].         Plan  Plan details: 6 MWT, generalized balance, HIGT, dynamic balance  Patient would benefit from: Skilled PT  Planned modality interventions: Biofeedback and Cryotherapy  Planned therapy interventions: Balance, Body mechanics training, Coordination, Functional ROM exercises, Gait training, HEP, Manual therapy, Motor coordination training, Neuromuscular re-education, Patient education, Strengthening, Stretching, Therapeutic activities and Therapeutic exercises  Frequency: 1-3x/wk  Duration in weeks: 12 weeks  Plan of Care beginning date: 7/28/23  Plan of Care expiration date: 12 weeks - 10-28-23  Treatment plan discussed with: Patient         Goals  Short Term Goals (4 weeks):    - Patient will improve time on TUG by 2.9 seconds to facilitate improved safety in all ambulation - MET  - Patient will be independent in basic HEP 2-3 weeks - MET  - Patient will improve 5xSTS score by 2.3 seconds from to promote improved LE functional strength needed for ADLs - MET  - Patient will go to the gym 1x per week in order to address strength/power deficits to reduce his risk for falls - Partially Met    Long Term Goals (12 weeks):  - Patient will be independent in a comprehensive home exercise program- NOT MET  - Patient will improve gait speed by 0.18 m/s to improve safety with community ambulation- NOT MET  - Patient will improve scoring on FGA by 4 points to progress safety with dynamic tasks - ONGOING  - Patient will be able to demonstrate HT in gait without veering - NOT MET  - Patient will improve 6 Minute Walk Test score by 190 feet from baseline score to promote improved cardiovascular endurance - NOT MET  - Patient will report 50% reduction in near falls in order to improve safety with functional tasks and reduce his risk for falls - NOT MET  - Patient will report going on walks at least 3 days per week to promote independence and improved cardiovascular endurance - NOT MET  - Patient will be able to ascend/descend stairs reciprocally without UE assist to promote independence and safety with ADLs - NOT MET  - Patient will report 50% reduction in near falls when ambulating on uneven terrain - NOT MET      Cut off score    All date taken from APTA Neuro Section or Rehab Measures      Mathew:  Miguel et al., 2018  [facility]: 2.7 pts    Rigo et al., 2011  Cut-off score: 45/56    Chronic CVA  < 44/56 high risk for falls (Miguel et al., 2018)  < 47.5/56 slow walker status (German et al., 2011) 5xSTS: Carlos 2010  MDC: 2.3 sec  Age Norms:  60-69: 11.1 sec  70-79: 12.6 sec  80-89: 14.8 sec    Diallo 2010, Chronic Stroke  Chronic CVA: 12 sec   TUG  Dolores et al., 2005  MDC: 2.9 sec    Cut off score:  >13.5 sec community dwelling adults  >32.2 frail elderly  <20 I for basic transfers  >30 dependent on transfers 10 Meter Walk Test:   Puma Dickinson al., 2006  Small meaningful change: 0.06 m/s  Substantial meaningful change: 0.14 m/s  MCID: 0.16 m/s    < 0.4 m/s household ambulators  0.4 - 0.8 m/s limited community ambulators  > 0.8 m/s community ambulators   FGA: Isabel et al., 2010  MCID: 4.2 pts  Geriatrics/community < 22/30 fall risk  Geriatrics/community < 20/30 unexplained falls DGI  MDC: vestibular - 4 pts  MDC: geriatric/community - 3 pts  Falls risk <19/24   6 Minute Walk Test  Fabián Ness al., 2006  MDC: 60.98 m (200.01 ft)    Lizandro Tan, & Quinter Alexis, 2012  MCID: 34.4 m    Age Norms  60-69: M - 1876 ft   F - 1765 ft  70-79: M - 1729 ft   F - 1545 ft  80-89: M - 1368 ft   F - 1286 ft Modified Amira  0: No increase in tone  1: Catch and release or min resistance at end range  1+: Catch f/b min resistance throughout remainder (< half ROM)  2: Easily moved, but more marked tone throughout most ROM  3: Significant tone, PROM difficult  4: Rigid   MiniBest: Taurus et al., 2013  CVA < 17.5 fall risk Pass (Acute CVA)  MDC: 1.8 points (acute), 3.2 points (chronic)         Subjective    History of Present Illness  Mechanism of injury: Patient has a history of a R Thalamic CVA in 2020. Patient was recently seen in loop recorder implantation on 12/22/2021 for AF surveillance and underlying cryptogenic stroke. Patient's wife reports that he is pretty sedentary and is not motivated. Patient reports he uses a cane in unfamiliar territory, but otherwise he does not utilize it. Updated (9/12/2022): Patient reports that he is satisfied with his progress in PT thus far. Unable to state what specifically has gotten better, but says his wife has noticed improvements. States he cannot remember if he ended up going away to [place] last week and feels his memory has not been great lately. Update (10/24/22): Patient reports his wife is pleased with his progress. He reports that he has been in [place] the past few months cleaning up his house. Update (5-): Patient reported that he was previously in PT here back in October of 2022 which is when he collapsed, went into V-Tach, and had 2 stents placed. Patient cleared by cardiology and is back for gait and balance PT for his R Thalamic CVA. He has no precautions at this time.     Update (6-): Patient and his wife report that he has been doing much better at home and stated that they have noticed a difference in his walking and stamina. He stated he remains with balance difficulty and would like to try running. Update (23): Patient reports that he had a fall backwards on the step. Discussed utilizing a SPC for future mobility at this time. Updated (8/15/2023): Patient reports continued satisfaction with PT services thus far, feels his balance has been getting better.  Notes he is using his cane "most of the time."    Primary AD: SPC out in the community  Assist level at home: No  WC usage: None  PLOF: Independent     Pain  Current pain ratin/10  At best pain ratin/10  At worst pain ratin/10  Location: N/A  Aggravating factors: N/A    Social Support  Steps to enter house: a few steps to enter  Stairs in house: FF, but not [employer] in: two story home  Lives with: wife    Employment status: Retired  Hand dominance: R handed     Treatments  Previous treatment: None  Current treatment: None  Diagnostic Testing: Loop monitor, MRI previous years      Objective     Vitals  - HR: 77 BPM  - BP: 134/90 mmHg (RUE sitting)  - SPO2: 94%    HR Max  - 207 - (0.7x64)  = 162.2    LE MMT  - R Hip Flexion: 4/5  - L Hip Flexion: 4+/5  - R Hip Extension: 4+/5 - L Hip Extension: 4+/5  - R Hip Abduction: 4/5  - L Hip abduction: 4+/5  - R Hip adduction: 4/5  - L Hip adduction: 4+/5  - R Knee Extension: 5/5 - L Knee Extension: 5/5  - R Knee Flexion: 4+/5  - L Knee Flexion: 4+/5  - R Ankle DF: 4+/5  - L Ankle DF: 4+/5  - R Ankle PF: 4/5  - L Ankle PF: 4/5    Sensation  - Light touch: Normal   - Deep pressure: Normal  - Pain: Normal  - Temperature: Normal  - Proprioception: Abnormal R side only     Coordination  - Dysmetria: Abnormal  - Dysdiadochokinesia: Abnormal  - Alternating Toe Taps: Normal      Outcome Measures Initial Eval  22 & DN 22 PN  2022 PN  10/24/22 IE  2023 PN  2023 PN  23 PN  8/15/2023   5xSTS 28.76 sec,   + push on knees 19.58 sec,   + push on knees 18.66 sec, +push on knees 19.99 sec,   1 UE, CS/Daxa 16.51 sec, 0 UE (hands on knees) 18.37 sec, 1 UE push at thighs 15.77 sec, 1 UE push at thighs   TUG  - Regular    - Cognitive   14.23 sec, no AD   13.05 sec, no AD  18.75 sec, no AD   11.87 sec no AD  14.71 sec no AD   12.76 sec,   0 AD  17.63 sec,  0 AD (by 2s)   9.82 sec, 0 AD  12.49 sec, 0 AD (by 2s)   12.62 sec, 0 AD  13.99 sec, 0 AD (incorrect math)   11.57 sec, no AD  15.24 sec, 0 AD (difficulty counting)   10 meter 0.99 m/s 1.15 m/s 1.04 m/s- self selected    Speed: 1.54 m/s 1.29 m/s 1.58 m/s 1.13 m/s 0.92 m/s   FGA 12/30 15/30 15/30 13/30 17/30 12/30 15/30   6MWT 1100 ft 1220 ft 1090 ft  975 ft 1150 ft 1145 ft 895 ft                            Precautions: A fib, 2 cardiac stents placed in October 2022  Past Medical History:   Diagnosis Date   • Arthropathy of knee     last assessed 8/19/15   • Bacterial pneumonia 02/05/2007    last assessed 2/5/7   • Brain atrophy [facility])    • Colon polyp    • CPAP (continuous positive airway pressure) dependence    • Disorder of male genital organ 02/12/2008    last assessed 2/12/08   • ED (erectile dysfunction) of organic origin     last assessed 2/13/17    • History of hypertension    • Seasonal allergies    • Situational anxiety     resolved 3/16/17    • Sleep apnea    • Stroke [facility])     09/2020 TIA   • Tinnitus    • Wears hearing aid     bilateral       DN (8/15/2023):   - Walking marches + B/L 5.5# TT: 40 ft x 6 cycles ea way  - Sidestepping on foam beam x 6 cycles down/back; intermittent light touch as needed

## 2023-08-17 ENCOUNTER — HOSPITAL ENCOUNTER (OUTPATIENT)
Dept: RADIOLOGY | Facility: HOSPITAL | Age: 68
Discharge: HOME | End: 2023-08-17
Payer: COMMERCIAL

## 2023-08-17 DIAGNOSIS — R26.0 STAGGERING GAIT: ICD-10-CM

## 2023-08-17 PROCEDURE — A9585 GADOBUTROL INJECTION: HCPCS | Performed by: RADIOLOGY

## 2023-08-17 PROCEDURE — G1004 CDSM NDSC: HCPCS

## 2023-08-17 PROCEDURE — 70553 MRI BRAIN STEM W/O & W/DYE: CPT

## 2023-08-17 RX ORDER — GADOBUTROL 604.72 MG/ML
11 INJECTION INTRAVENOUS
Status: COMPLETED | OUTPATIENT
Start: 2023-08-17 | End: 2023-08-17

## 2023-08-17 RX ADMIN — GADOBUTROL 11 ML: 604.72 INJECTION INTRAVENOUS at 16:58

## 2023-08-18 DIAGNOSIS — Z86.73 HISTORY OF CARDIOEMBOLIC CEREBROVASCULAR ACCIDENT (CVA): ICD-10-CM

## 2023-08-18 RX ORDER — ATORVASTATIN CALCIUM 40 MG/1
TABLET, FILM COATED ORAL
Qty: 30 TABLET | Refills: 1 | Status: SHIPPED | OUTPATIENT
Start: 2023-08-18

## 2023-08-22 ENCOUNTER — APPOINTMENT (OUTPATIENT)
Facility: CLINIC | Age: 68
End: 2023-08-22
Payer: COMMERCIAL

## 2023-08-24 ENCOUNTER — TELEPHONE (OUTPATIENT)
Dept: CARDIOLOGY CLINIC | Facility: CLINIC | Age: 68
End: 2023-08-24

## 2023-08-24 NOTE — TELEPHONE ENCOUNTER
Loop recorder/Geovany spoke to his wife, they did receive the transmission and there was no irregularity at that time. Now if he has sent a another transmission we will not get that till tomorrow.

## 2023-08-28 DIAGNOSIS — I25.10 CAD (CORONARY ARTERY DISEASE): ICD-10-CM

## 2023-08-29 ENCOUNTER — APPOINTMENT (OUTPATIENT)
Facility: CLINIC | Age: 68
End: 2023-08-29
Payer: COMMERCIAL

## 2023-08-29 RX ORDER — CLOPIDOGREL BISULFATE 75 MG/1
TABLET ORAL
Qty: 30 TABLET | Refills: 5 | Status: SHIPPED | OUTPATIENT
Start: 2023-08-29

## 2023-08-31 ENCOUNTER — APPOINTMENT (OUTPATIENT)
Facility: CLINIC | Age: 68
End: 2023-08-31
Payer: COMMERCIAL

## 2023-09-28 NOTE — PROGRESS NOTES
MDT LNQ22/ ACTIVE SYSTEM IS MRI CONDITIONAL   NON-BILLABLE  REQUESTED BY   [employer] TRANSMISSION: LOOP RECORDER  PRESENTING RHYTHM SB @ 48 BPM  BATTERY STATUS "OK " NO PATIENT OR DEVICE ACTIVATED EPISODES  HOWEVER THERE IS AN EGRAM SHOWING SR W/ PVCs  NORMAL DEVICE FUNCTION  No

## 2023-10-02 ENCOUNTER — OFFICE VISIT (OUTPATIENT)
Dept: CARDIOLOGY CLINIC | Facility: CLINIC | Age: 68
End: 2023-10-02
Payer: COMMERCIAL

## 2023-10-02 VITALS
SYSTOLIC BLOOD PRESSURE: 142 MMHG | DIASTOLIC BLOOD PRESSURE: 82 MMHG | WEIGHT: 239 LBS | HEART RATE: 74 BPM | BODY MASS INDEX: 32.37 KG/M2 | OXYGEN SATURATION: 95 % | HEIGHT: 72 IN

## 2023-10-02 DIAGNOSIS — I48.0 PAROXYSMAL ATRIAL FIBRILLATION (HCC): ICD-10-CM

## 2023-10-02 DIAGNOSIS — I48.0 PAROXYSMAL A-FIB (HCC): Primary | ICD-10-CM

## 2023-10-02 DIAGNOSIS — I35.0 AORTIC VALVE STENOSIS, ETIOLOGY OF CARDIAC VALVE DISEASE UNSPECIFIED: ICD-10-CM

## 2023-10-02 DIAGNOSIS — I10 BENIGN ESSENTIAL HYPERTENSION: ICD-10-CM

## 2023-10-02 DIAGNOSIS — I71.21 ASCENDING AORTIC ANEURYSM, UNSPECIFIED WHETHER RUPTURED (HCC): ICD-10-CM

## 2023-10-02 PROCEDURE — 93000 ELECTROCARDIOGRAM COMPLETE: CPT | Performed by: INTERNAL MEDICINE

## 2023-10-02 PROCEDURE — 99214 OFFICE O/P EST MOD 30 MIN: CPT | Performed by: INTERNAL MEDICINE

## 2023-10-02 NOTE — PROGRESS NOTES
Cardiology   Bassem Botello DO, Danielle Abad MD, Roberto Hagan MD, Lee Nicholson MD, [facility] [place]  -------------------------------------------------------------------  [facility]  [address] [place] [address], [address] [place] [phone]    Cardiology Follow Up  Luis Rowley  1955  [id number]          Assessment/Plan:    1. VT with syncope -   Cause of tachycardia may have been ischemic. He had 2 stents placed. - Loop recorder has overall been free of arrhythmias. There was an episode of tachycardia noted which was likely due to artifact and PACs. - will continue monitoring for further arrhythmias. - metoprolol was increased to 50 mg twice daily during hospitalization. He was temporarily on Tikosyn which was discontinued. 2. CAD with recent PCI x2 - continue Plavix. - continue atorvastatin    3. History of CVA  - continue Plavix  - continue Eliquis. 4. Paroxysmal atrial fibrillation  - currently in sinus rhythm. Continue Eliquis and metoprolol      Interval History:     Luis Rowley is 74 y.o. male here for followup of CAD. Since his last visit, he has been feeling well.  he denies any palpitations, chest pain, shortness of breath, LE edema, orthopnea or PND. Diet is overall unchanged. He is primarily sedentary. He does not use his CPAP. He had 1 episode of tachycardia noted on loop recorder. Electrogram was reviewed. There was a large amount of artifact at that time. In October 2022 he was admitted to [address] after a syncopal event during PT. He had a loop recorder in place and was noted to have VT at that time. He underwent further workup including cardiac catheterization which showed a 1st diagonal 90% stenosis of ramus with 90% stenosis and circumflex with 60% stenosis in our PDA of 80% stenosis.   Subsequently, he underwent drug-eluting stent placement to the 1st diagonal and ramus lesions. Echocardiogram during admission showed ejection fraction of 60% with mild to moderate aortic valve stenosis. Aortic root was dilated at 4.5 cm. He subsequent underwent cardiac MRI which showed ejection fraction of 57%, moderate concentric LVH there was a small focus of intra myocardial fibrosis of the basal inferolateral wall. VT was likely from the anterolateral mitral annulus but there was no evidence of scar in that area. EP recommended increasing Toprol to 50 mg b.i.d.. He was initially started on Tikosyn which was discontinued. He had LifeVest placed prior to going home  Which he has returned. Patient had a loop recorder in place because of history of cryptogenic CVA. He has had episodes of atrial fibrillation noted on loop recorder in the past and is now on Eliquis.       The following portions of the patient's history were reviewed and updated as appropriate: allergies, current medications, past family history, past medical history, past social history, past surgical history, and problem list.       Current Outpatient Medications:   •  ALPRAZolam (XANAX) 0.5 mg tablet, Take 1 tablet (0.5 mg total) by mouth 30 min pre-procedure, Disp: 1 tablet, Rfl: 0  •  apixaban (Eliquis) 5 mg, Take 1 tablet (5 mg total) by mouth 2 (two) times a day, Disp: 180 tablet, Rfl: 3  •  atorvastatin (LIPITOR) 40 mg tablet, TAKE 1 TABLET BY MOUTH EVERY DAY IN THE EVENING, Disp: 30 tablet, Rfl: 1  •  clopidogrel (PLAVIX) 75 mg tablet, TAKE 1 TABLET BY MOUTH EVERY DAY, Disp: 30 tablet, Rfl: 5  •  memantine (NAMENDA) 5 mg tablet, Take 5 mg by mouth 2 (two) times a day, Disp: , Rfl:   •  metoprolol succinate (TOPROL-XL) 50 mg 24 hr tablet, Take 1 tablet (50 mg total) by mouth 2 (two) times a day, Disp: 180 tablet, Rfl: 3  •  fexofenadine (ALLEGRA) 180 MG tablet, Take 1 tablet (180 mg total) by mouth daily for 30 days (Patient taking differently: Take 180 mg by mouth every morning), Disp: 30 tablet, Rfl: 0        Review of Systems:  Review of Systems   Constitutional: Positive for fatigue. Respiratory: Negative for shortness of breath. Cardiovascular: Negative for chest pain, palpitations and leg swelling. Musculoskeletal: Positive for arthralgias and myalgias. All other systems reviewed and are negative. Physical Exam:  Vitals:  Vitals:    10/02/23 0804   BP: 142/82   BP Location: Right arm   Patient Position: Sitting   Cuff Size: Large   Pulse: 74   SpO2: 95%   Weight: 108 kg (239 lb)   Height: 6' (1.829 m)     Physical Exam   Constitutional: He appears healthy. No distress. Eyes: Pupils are equal, round, and reactive to light. Conjunctivae are normal.   Neck: No JVD present. Cardiovascular: Normal rate, regular rhythm and normal heart sounds. Exam reveals no gallop and no friction rub. No murmur heard. Pulmonary/Chest: Effort normal and breath sounds normal. He has no wheezes. He has no rales. Musculoskeletal:         General: No tenderness, deformity or edema. Cervical back: Normal range of motion and neck supple. Neurological: He is alert and oriented to person, place, and time. Skin: Skin is warm and dry. Cardiographics:  EKG: Sinus rhythm with LVH  Last known EF: 55%    This note was completed in part utilizing M-Modal Fluency Direct Software. Grammatical errors, random word insertions, spelling mistakes, and incomplete sentences can be an occasional consequence of this system secondary to software limitations, ambient noise, and hardware issues. If you have any questions or concerns about the content, text, or information contained within the body of this dictation, please contact the provider for clarification.

## 2023-10-18 NOTE — ASSESSMENT & PLAN NOTE
Secondary to acute blood loss in the setting of GI bleeding along with dilution  Baseline hemoglobin around 15-16  Hemoglobin upon admission was 13 3 which dropped to 10 4  Monitor hemoglobin and transfuse if hemoglobin less than 8 Adult

## 2023-10-19 ENCOUNTER — TELEPHONE (OUTPATIENT)
Dept: FAMILY MEDICINE CLINIC | Facility: CLINIC | Age: 68
End: 2023-10-19

## 2023-10-19 NOTE — TELEPHONE ENCOUNTER
Left message for patient to call back to schedule AWV    If he calls back, please schedule    [address]

## 2023-10-20 ENCOUNTER — REMOTE DEVICE CLINIC VISIT (OUTPATIENT)
Dept: CARDIOLOGY CLINIC | Facility: CLINIC | Age: 68
End: 2023-10-20
Payer: COMMERCIAL

## 2023-10-20 DIAGNOSIS — Z95.818 PRESENCE OF CARDIAC DEVICE: Primary | ICD-10-CM

## 2023-10-20 PROCEDURE — G2066 INTER DEVC REMOTE 30D: HCPCS | Performed by: INTERNAL MEDICINE

## 2023-10-20 PROCEDURE — 93298 REM INTERROG DEV EVAL SCRMS: CPT | Performed by: INTERNAL MEDICINE

## 2023-10-20 NOTE — PROGRESS NOTES
Results for orders placed or performed in visit on 10/20/23   Cardiac EP device report    Narrative     Medical [address]: BATTERY STATUS "OK." NO PATIENT OR DEVICE ACTIVATED EPISODES.  ---DOAN

## 2023-10-26 DIAGNOSIS — I48.0 PAROXYSMAL ATRIAL FIBRILLATION (HCC): ICD-10-CM

## 2023-12-31 DIAGNOSIS — Z86.73 HISTORY OF CARDIOEMBOLIC CEREBROVASCULAR ACCIDENT (CVA): ICD-10-CM

## 2024-01-02 RX ORDER — ATORVASTATIN CALCIUM 40 MG/1
TABLET, FILM COATED ORAL
Qty: 30 TABLET | Refills: 0 | Status: SHIPPED | OUTPATIENT
Start: 2024-01-02

## 2024-01-03 ENCOUNTER — TELEPHONE (OUTPATIENT)
Dept: FAMILY MEDICINE CLINIC | Facility: CLINIC | Age: 69
End: 2024-01-03

## 2024-01-18 ENCOUNTER — TELEPHONE (OUTPATIENT)
Dept: FAMILY MEDICINE CLINIC | Facility: CLINIC | Age: 69
End: 2024-01-18

## 2024-01-26 ENCOUNTER — REMOTE DEVICE CLINIC VISIT (OUTPATIENT)
Dept: CARDIOLOGY CLINIC | Facility: CLINIC | Age: 69
End: 2024-01-26
Payer: COMMERCIAL

## 2024-01-26 DIAGNOSIS — Z95.818 PRESENCE OF CARDIAC DEVICE: Primary | ICD-10-CM

## 2024-01-26 PROCEDURE — 93298 REM INTERROG DEV EVAL SCRMS: CPT | Performed by: INTERNAL MEDICINE

## 2024-01-26 NOTE — PROGRESS NOTES
"Results for orders placed or performed in visit on 01/26/24   Cardiac EP device report    Narrative    MDT LNQ22/ ACTIVE SYSTEM IS MRI CONDITIONAL  CARELINK TRANSMISSION: BATTERY STATUS \"OK.\" NO PATIENT ACTIVATED EPISODES. ---DOAN        "

## 2024-01-29 ENCOUNTER — TELEPHONE (OUTPATIENT)
Age: 69
End: 2024-01-29

## 2024-01-29 DIAGNOSIS — I47.29 NSVT (NONSUSTAINED VENTRICULAR TACHYCARDIA) (HCC): ICD-10-CM

## 2024-01-29 DIAGNOSIS — Z86.73 HISTORY OF CARDIOEMBOLIC CEREBROVASCULAR ACCIDENT (CVA): ICD-10-CM

## 2024-01-29 DIAGNOSIS — Z95.5 H/O HEART ARTERY STENT: ICD-10-CM

## 2024-01-29 DIAGNOSIS — I25.10 CORONARY ARTERY DISEASE INVOLVING NATIVE CORONARY ARTERY OF NATIVE HEART WITHOUT ANGINA PECTORIS: Primary | ICD-10-CM

## 2024-01-29 DIAGNOSIS — I35.0 AORTIC VALVE STENOSIS, ETIOLOGY OF CARDIAC VALVE DISEASE UNSPECIFIED: ICD-10-CM

## 2024-01-29 DIAGNOSIS — I50.32 CHRONIC DIASTOLIC CONGESTIVE HEART FAILURE (HCC): ICD-10-CM

## 2024-01-29 DIAGNOSIS — I48.0 PAROXYSMAL A-FIB (HCC): ICD-10-CM

## 2024-01-29 NOTE — TELEPHONE ENCOUNTER
Wife called and asked that Dr. Varela send a referral for a [place] cardiologist, Dr. Adalid Gutierrez, FAX #[phone].  Wife stated they have cardiologist in [place] but while they are in [place], he will need to see cardiology in [place].

## 2024-02-07 DIAGNOSIS — Z86.73 HISTORY OF CARDIOEMBOLIC CEREBROVASCULAR ACCIDENT (CVA): ICD-10-CM

## 2024-02-08 RX ORDER — ATORVASTATIN CALCIUM 40 MG/1
TABLET, FILM COATED ORAL
Qty: 30 TABLET | Refills: 0 | OUTPATIENT
Start: 2024-02-08

## 2024-02-08 RX ORDER — ATORVASTATIN CALCIUM 40 MG/1
40 TABLET, FILM COATED ORAL EVERY EVENING
Qty: 30 TABLET | Refills: 0 | Status: SHIPPED | OUTPATIENT
Start: 2024-02-08

## 2024-02-21 PROBLEM — Z12.5 PROSTATE CANCER SCREENING: Status: RESOLVED | Noted: 2018-08-28 | Resolved: 2024-02-21

## 2024-02-21 PROBLEM — Z00.00 HEALTHCARE MAINTENANCE: Status: RESOLVED | Noted: 2018-10-17 | Resolved: 2024-02-21

## 2024-03-23 DIAGNOSIS — I25.10 CAD (CORONARY ARTERY DISEASE): ICD-10-CM

## 2024-03-24 DIAGNOSIS — I47.29 NSVT (NONSUSTAINED VENTRICULAR TACHYCARDIA) (HCC): ICD-10-CM

## 2024-03-24 DIAGNOSIS — I25.10 CORONARY ARTERY DISEASE INVOLVING NATIVE CORONARY ARTERY OF NATIVE HEART WITHOUT ANGINA PECTORIS: ICD-10-CM

## 2024-03-25 RX ORDER — CLOPIDOGREL BISULFATE 75 MG/1
TABLET ORAL
Qty: 30 TABLET | Refills: 5 | Status: SHIPPED | OUTPATIENT
Start: 2024-03-25

## 2024-03-25 RX ORDER — METOPROLOL SUCCINATE 50 MG/1
50 TABLET, EXTENDED RELEASE ORAL 2 TIMES DAILY
Qty: 60 TABLET | Refills: 14 | Status: SHIPPED | OUTPATIENT
Start: 2024-03-25

## 2024-03-29 DIAGNOSIS — Z86.73 HISTORY OF CARDIOEMBOLIC CEREBROVASCULAR ACCIDENT (CVA): ICD-10-CM

## 2024-03-29 RX ORDER — ATORVASTATIN CALCIUM 40 MG/1
40 TABLET, FILM COATED ORAL EVERY EVENING
Qty: 30 TABLET | Refills: 0 | Status: SHIPPED | OUTPATIENT
Start: 2024-03-29

## 2024-04-09 DIAGNOSIS — I48.0 PAROXYSMAL ATRIAL FIBRILLATION (HCC): ICD-10-CM

## 2024-04-10 RX ORDER — APIXABAN 5 MG/1
5 TABLET, FILM COATED ORAL 2 TIMES DAILY
Qty: 180 TABLET | Refills: 1 | Status: SHIPPED | OUTPATIENT
Start: 2024-04-10

## 2024-04-13 PROBLEM — K92.2 GI BLEED: Status: RESOLVED | Noted: 2022-06-07 | Resolved: 2024-04-13

## 2024-04-13 PROBLEM — R79.89 ELEVATED TROPONIN: Status: RESOLVED | Noted: 2022-06-07 | Resolved: 2024-04-13

## 2024-04-13 PROBLEM — K62.5 RECTAL BLEEDING: Status: RESOLVED | Noted: 2020-03-05 | Resolved: 2024-04-13

## 2024-04-15 ENCOUNTER — OFFICE VISIT (OUTPATIENT)
Dept: FAMILY MEDICINE CLINIC | Facility: CLINIC | Age: 69
End: 2024-04-15
Payer: COMMERCIAL

## 2024-04-15 VITALS
DIASTOLIC BLOOD PRESSURE: 92 MMHG | RESPIRATION RATE: 18 BRPM | HEIGHT: 71 IN | TEMPERATURE: 98.7 F | HEART RATE: 71 BPM | WEIGHT: 235 LBS | SYSTOLIC BLOOD PRESSURE: 144 MMHG | BODY MASS INDEX: 32.9 KG/M2

## 2024-04-15 DIAGNOSIS — G47.33 OBSTRUCTIVE SLEEP APNEA: ICD-10-CM

## 2024-04-15 DIAGNOSIS — Z11.59 NEED FOR HEPATITIS C SCREENING TEST: ICD-10-CM

## 2024-04-15 DIAGNOSIS — Z23 ENCOUNTER FOR IMMUNIZATION: ICD-10-CM

## 2024-04-15 DIAGNOSIS — Z99.89 USE OF CANE AS AMBULATORY AID: ICD-10-CM

## 2024-04-15 DIAGNOSIS — Z13.220 SCREENING FOR LIPID DISORDERS: ICD-10-CM

## 2024-04-15 DIAGNOSIS — R73.03 PREDIABETES: ICD-10-CM

## 2024-04-15 DIAGNOSIS — Z13.1 SCREENING FOR DIABETES MELLITUS (DM): ICD-10-CM

## 2024-04-15 DIAGNOSIS — Z00.00 WELCOME TO MEDICARE PREVENTIVE VISIT: Primary | ICD-10-CM

## 2024-04-15 DIAGNOSIS — I48.0 PAROXYSMAL A-FIB (HCC): ICD-10-CM

## 2024-04-15 DIAGNOSIS — I71.21 ASCENDING AORTIC ANEURYSM, UNSPECIFIED WHETHER RUPTURED (HCC): ICD-10-CM

## 2024-04-15 DIAGNOSIS — Z12.5 PROSTATE CANCER SCREENING: ICD-10-CM

## 2024-04-15 DIAGNOSIS — R73.03 PRE-DIABETES: ICD-10-CM

## 2024-04-15 DIAGNOSIS — Z86.73 HISTORY OF CARDIOEMBOLIC CEREBROVASCULAR ACCIDENT (CVA): ICD-10-CM

## 2024-04-15 DIAGNOSIS — F03.90 DEMENTIA WITHOUT BEHAVIORAL DISTURBANCE, PSYCHOTIC DISTURBANCE, MOOD DISTURBANCE, OR ANXIETY, UNSPECIFIED DEMENTIA SEVERITY, UNSPECIFIED DEMENTIA TYPE (HCC): ICD-10-CM

## 2024-04-15 DIAGNOSIS — I25.10 CAD (CORONARY ARTERY DISEASE): ICD-10-CM

## 2024-04-15 PROCEDURE — 99214 OFFICE O/P EST MOD 30 MIN: CPT | Performed by: FAMILY MEDICINE

## 2024-04-15 PROCEDURE — G0009 ADMIN PNEUMOCOCCAL VACCINE: HCPCS

## 2024-04-15 PROCEDURE — G0402 INITIAL PREVENTIVE EXAM: HCPCS | Performed by: FAMILY MEDICINE

## 2024-04-15 PROCEDURE — 90677 PCV20 VACCINE IM: CPT

## 2024-04-15 RX ORDER — CLOPIDOGREL BISULFATE 75 MG/1
75 TABLET ORAL DAILY
Qty: 90 TABLET | Refills: 1 | Status: SHIPPED | OUTPATIENT
Start: 2024-04-15

## 2024-04-15 RX ORDER — ATORVASTATIN CALCIUM 40 MG/1
40 TABLET, FILM COATED ORAL EVERY EVENING
Qty: 90 TABLET | Refills: 1 | Status: SHIPPED | OUTPATIENT
Start: 2024-04-15

## 2024-04-15 NOTE — PROGRESS NOTES
Assessment/Plan:    No problem-specific Assessment & Plan notes found for this encounter.    Lacunar infarcts on MRI, on plavix but also for CAD  Risks of use with eliquis advised    PAF stable  F/u cardiology  On NOAC  Need for plavix?    CVA/tia hx  Continue statin    Dementia   F/u neurology  Candidate for infusion Leqembi?  They will speak to his neurologist     Diagnoses and all orders for this visit:    Welcome to Medicare preventive visit    Screening for diabetes mellitus (DM)  -     Comprehensive metabolic panel; Future    Prostate cancer screening  -     PSA, Total Screen; Future    Screening for lipid disorders  -     Lipid Panel with Direct LDL reflex; Future    Obstructive sleep apnea    Paroxysmal A-fib (HCC)    Pre-diabetes    Use of cane as ambulatory aid    Encounter for immunization  -     Pneumococcal Conjugate Vaccine 20-valent (Pcv20)    Need for hepatitis C screening test  -     Hepatitis C antibody; Future    CAD (coronary artery disease)  -     clopidogrel (PLAVIX) 75 mg tablet; Take 1 tablet (75 mg total) by mouth daily    History of cardioembolic cerebrovascular accident (CVA)  -     atorvastatin (LIPITOR) 40 mg tablet; Take 1 tablet (40 mg total) by mouth every evening    Prediabetes  -     Hemoglobin A1C; Future    Dementia without behavioral disturbance, psychotic disturbance, mood disturbance, or anxiety, unspecified dementia severity, unspecified dementia type (HCC)    Ascending aortic aneurysm, unspecified whether ruptured (HCC)              Return in about 6 months (around 10/15/2024) for Recheck.    Subjective:      Patient ID: Christopher Razo is a 68 y.o. male.    Chief Complaint   Patient presents with    Follow-up    Hypertension     Sas/cma       HPI    [place] from Nov to April  Had labs there  Neurologist and cardio in [place]    Nonfasting labs recently  Fbs 117  Creat 0.7    Lfts wnl    Hgb 17  Has asthma, no inhalers  No ex tob use  Has GRACIE, not using his cpap  Plt  "229    Has loop recorder    Uses cane  No exercise lately  No diet followed  Has some sweets    No bleeding noted  On eliquis for PAF and plavix for stents    Namenda not helping much    The following portions of the patient's history were reviewed and updated as appropriate: allergies, current medications, past family history, past medical history, past social history, past surgical history and problem list.    Review of Systems   Constitutional:  Negative for fever.   Respiratory:  Negative for shortness of breath.          Current Outpatient Medications   Medication Sig Dispense Refill    ALPRAZolam (XANAX) 0.5 mg tablet Take 1 tablet (0.5 mg total) by mouth 30 min pre-procedure 1 tablet 0    atorvastatin (LIPITOR) 40 mg tablet Take 1 tablet (40 mg total) by mouth every evening 90 tablet 1    clopidogrel (PLAVIX) 75 mg tablet Take 1 tablet (75 mg total) by mouth daily 90 tablet 1    Eliquis 5 MG TAKE 1 TABLET BY MOUTH TWICE A  tablet 1    fexofenadine (ALLEGRA) 180 MG tablet Take 1 tablet (180 mg total) by mouth daily for 30 days (Patient taking differently: Take 180 mg by mouth every morning) 30 tablet 0    memantine (NAMENDA) 5 mg tablet Take 5 mg by mouth 2 (two) times a day      metoprolol succinate (TOPROL-XL) 50 mg 24 hr tablet TAKE 1 TABLET BY MOUTH TWICE A DAY 60 tablet 14     No current facility-administered medications for this visit.       Objective:    /92   Pulse 71   Temp 98.7 °F (37.1 °C)   Resp 18   Ht 5' 10.5\" (1.791 m)   Wt 107 kg (235 lb)   BMI 33.24 kg/m²        Physical Exam  Vitals and nursing note reviewed.   Constitutional:       General: He is not in acute distress.     Appearance: He is well-developed. He is not ill-appearing.   HENT:      Head: Normocephalic.      Right Ear: Tympanic membrane normal.      Left Ear: Tympanic membrane normal.   Eyes:      General: No scleral icterus.     Conjunctiva/sclera: Conjunctivae normal.   Neck:      Vascular: No carotid bruit. "   Cardiovascular:      Rate and Rhythm: Normal rate and regular rhythm.      Heart sounds: No murmur heard.  Pulmonary:      Effort: Pulmonary effort is normal. No respiratory distress.      Breath sounds: No wheezing.   Abdominal:      Palpations: Abdomen is soft.      Tenderness: There is no abdominal tenderness.   Musculoskeletal:         General: No deformity.      Cervical back: Neck supple.      Right lower leg: No edema.      Left lower leg: No edema.   Skin:     General: Skin is warm and dry.      Coloration: Skin is not pale.   Neurological:      Mental Status: He is alert.      Motor: No weakness.      Gait: Gait normal.   Psychiatric:         Mood and Affect: Mood normal.         Behavior: Behavior normal.         Thought Content: Thought content normal.                Richard Varela DO

## 2024-04-16 NOTE — PROGRESS NOTES
Assessment and Plan:     Problem List Items Addressed This Visit          Active Problems    Welcome to Medicare preventive visit - Primary    Dementia without behavioral disturbance, psychotic disturbance, mood disturbance, or anxiety (HCC)    History of cardioembolic cerebrovascular accident (CVA)    Relevant Medications    atorvastatin (LIPITOR) 40 mg tablet    Paroxysmal A-fib (HCC)    Relevant Medications    clopidogrel (PLAVIX) 75 mg tablet    Obstructive sleep apnea    Pre-diabetes     Other Visit Diagnoses       Screening for diabetes mellitus (DM)        Relevant Orders    Comprehensive metabolic panel    Prostate cancer screening        Relevant Orders    PSA, Total Screen    Screening for lipid disorders        Relevant Orders    Lipid Panel with Direct LDL reflex    Use of cane as ambulatory aid        Encounter for immunization        Relevant Orders    Pneumococcal Conjugate Vaccine 20-valent (Pcv20) (Completed)    Need for hepatitis C screening test        Relevant Orders    Hepatitis C antibody    CAD (coronary artery disease)        Relevant Medications    clopidogrel (PLAVIX) 75 mg tablet    Prediabetes        Relevant Orders    Hemoglobin A1C    Ascending aortic aneurysm, unspecified whether ruptured (HCC)                 Preventive health issues were discussed with patient, and age appropriate screening tests were ordered as noted in patient's After Visit Summary.  Personalized health advice and appropriate referrals for health education or preventive services given if needed, as noted in patient's After Visit Summary.     History of Present Illness:     Patient presents for a Medicare Wellness Visit    Hypertension       Patient Care Team:  Richard Varela DO as PCP - General  Richard Varela DO as PCP - PCP-[facility] ([facility])  DO Thiago Donahue MD as Endoscopist     Review of Systems:     Review of Systems     Problem List:     Patient Active Problem List   Diagnosis     Chronic rhinitis    Arthropathy    Asthma, mild intermittent    Benign essential hypertension    [place]    Colon, diverticulosis    Internal hemorrhoids    Low HDL (under 40)    Obesity (BMI 30-39.9)    Obstructive sleep apnea    Organic impotence    Pre-diabetes    Tubular adenoma of colon    Immunization due    Cubital tunnel syndrome on right    Neuropathy    CAMMIE (generalized anxiety disorder)    Hutchins cardiac risk 10-20% in next 10 years    Imbalance    Brain atrophy (HCC)    Persistent insomnia    History of cardioembolic cerebrovascular accident (CVA)    Unsteady gait    Dementia without behavioral disturbance (HCC)    Aortic stenosis    Mitral regurgitation    History of stroke    Personal history of colonic polyps    Paroxysmal A-fib (HCC)    Anemia    Dyspnea on exertion    Probable Chronic diastolic congestive heart failure (HCC)    Coronary artery disease involving native coronary artery of native heart without angina pectoris    NSVT (nonsustained ventricular tachycardia) ([facility])    H/O heart artery stent    Dementia without behavioral disturbance, psychotic disturbance, mood disturbance, or anxiety ([facility])    Welcome to Medicare preventive visit      Past Medical and Surgical History:     Past Medical History:   Diagnosis Date    Arthropathy of knee     last assessed 8/19/15    Bacterial pneumonia 02/05/2007    last assessed 2/5/7    Brain atrophy (HCC)     Colon polyp     CPAP (continuous positive airway pressure) dependence     Disorder of male genital organ 02/12/2008    last assessed 2/12/08    ED (erectile dysfunction) of organic origin     last assessed 2/13/17     History of hypertension     Seasonal allergies     Situational anxiety     resolved 3/16/17     Sleep apnea     Stroke ([facility])     09/2020 TIA    Tinnitus     Wears hearing aid     bilateral     Past Surgical History:   Procedure Laterality Date    CARDIAC CATHETERIZATION Left 10/28/2022    Procedure: Cardiac Left Heart Cath;   Surgeon: Christian Burr MD;  Location:  CARDIAC CATH LAB;  Service: Cardiology    CARDIAC CATHETERIZATION  10/28/2022    Procedure: Cardiac catheterization;  Surgeon: Christian Burr MD;  Location:  CARDIAC CATH LAB;  Service: Cardiology    CARDIAC CATHETERIZATION N/A 10/28/2022    Procedure: Cardiac Coronary Angiogram;  Surgeon: Christian Burr MD;  Location:  CARDIAC CATH LAB;  Service: Cardiology    CARDIAC ELECTROPHYSIOLOGY PROCEDURE N/A 12/22/2021    Procedure: Cardiac Loop Recorder Implant;  Surgeon: Judy Norris DO;  Location: [place] CARDIAC CATH LAB;  Service: Cardiology    COLONOSCOPY      x3-w/polyps    HERNIA REPAIR      umbilical,hemal inguinal    KNEE ARTHROSCOPY Left     meniscus    AL COLONOSCOPY FLX DX W/COLLJ SPEC WHEN PFRMD N/A 5/7/2018    Procedure: COLONOSCOPY;  Surgeon: Thiago Lopes MD;  Location: [facility] GI LAB;  Service: Gastroenterology      Family History:     Family History   Problem Relation Age of Onset    Cancer Mother         breast    Diverticulitis Mother         colostomy    Breast cancer Mother     Heart disease Father         CHF    Cancer Sister         breast    Depression Sister     Cancer Sister         skin cancer    Cancer Sister         skin cancer    Colon cancer Neg Hx     Liver disease Neg Hx       Social History:     Social History     Socioeconomic History    Marital status: /Civil Union     Spouse name: None    Number of children: None    Years of education: None    Highest education level: None   Occupational History    None   Tobacco Use    Smoking status: Never     Passive exposure: Past    Smokeless tobacco: Never   Vaping Use    Vaping status: Never Used   Substance and Sexual Activity    Alcohol use: Not Currently     Alcohol/week: 14.0 standard drinks of alcohol     Types: 14 Cans of beer per week     Comment: occ    Drug use: No    Sexual activity: None   Other Topics Concern    None   Social History Narrative    None     Social Determinants of Health      Financial Resource Strain: Not on file   Food Insecurity: No Food Insecurity (4/16/2024)    Hunger Vital Sign     Worried About Running Out of Food in the Last Year: Never true     Ran Out of Food in the Last Year: Never true   Transportation Needs: No Transportation Needs (4/16/2024)    PRAPARE - Transportation     Lack of Transportation (Medical): No     Lack of Transportation (Non-Medical): No   Physical Activity: Not on file   Stress: Not on file   Social Connections: Not on file   Intimate Partner Violence: Not on file   Housing Stability: High Risk (4/16/2024)    Housing Stability Vital Sign     Unable to Pay for Housing in the Last Year: Yes     Number of Places Lived in the Last Year: 1     Unstable Housing in the Last Year: No      Medications and Allergies:     Current Outpatient Medications   Medication Sig Dispense Refill    ALPRAZolam (XANAX) 0.5 mg tablet Take 1 tablet (0.5 mg total) by mouth 30 min pre-procedure 1 tablet 0    atorvastatin (LIPITOR) 40 mg tablet Take 1 tablet (40 mg total) by mouth every evening 90 tablet 1    clopidogrel (PLAVIX) 75 mg tablet Take 1 tablet (75 mg total) by mouth daily 90 tablet 1    Eliquis 5 MG TAKE 1 TABLET BY MOUTH TWICE A  tablet 1    fexofenadine (ALLEGRA) 180 MG tablet Take 1 tablet (180 mg total) by mouth daily for 30 days (Patient taking differently: Take 180 mg by mouth every morning) 30 tablet 0    memantine (NAMENDA) 5 mg tablet Take 5 mg by mouth 2 (two) times a day      metoprolol succinate (TOPROL-XL) 50 mg 24 hr tablet TAKE 1 TABLET BY MOUTH TWICE A DAY 60 tablet 14     No current facility-administered medications for this visit.     Allergies   Allergen Reactions    Pollen Extract Sneezing     Reaction Date: 18Sep2006;     Shellfish-Derived Products - Food Allergy Other (See Comments)     Eye swelling    Iodine Solution [Povidone Iodine] Rash     Eyes swell      Immunizations:     Immunization History   Administered Date(s) Administered     COVID-19 MODERNA VACC 0.5 ML IM 04/14/2021, 05/20/2021, 12/23/2021    INFLUENZA 10/31/2022    Influenza Quadrivalent, 6-35 Months IM 09/26/2016    Influenza, high dose seasonal 0.7 mL 10/31/2022    Influenza, recombinant, quadrivalent,injectable, preservative free 10/17/2018, 11/05/2021    Influenza, seasonal, injectable 12/11/2006, 03/08/2010    Pneumococcal Conjugate Vaccine 20-valent (Pcv20), Polysace 04/15/2024    Tdap 08/08/2011      Health Maintenance:         Topic Date Due    Hepatitis C Screening  Never done    Colorectal Cancer Screening  06/09/2025         Topic Date Due    Influenza Vaccine (1) 09/01/2023    COVID-19 Vaccine (4 - 2023-24 season) 09/01/2023      Medicare Screening Tests and Risk Assessments:     Christopher is here for his Welcome to Medicare visit.     Health Risk Assessment:   Patient rates overall health as good. Patient feels that their physical health rating is slightly worse. Patient is very satisfied with their life. Eyesight was rated as slightly worse. Hearing was rated as slightly worse. Patient feels that their emotional and mental health rating is slightly better. Patients states they are never, rarely angry. Patient states they are often unusually tired/fatigued. Pain experienced in the last 7 days has been some. Patient's pain rating has been 2/10. Patient states that he has experienced no weight loss or gain in last 6 months.     Depression Screening:   PHQ-2 Score: 0      Fall Risk Screening:   In the past year, patient has experienced: history of falling in past year    Number of falls: 2 or more  Injured during fall?: No    Feels unsteady when standing or walking?: Yes    Worried about falling?: Yes      Home Safety:  Patient has trouble with stairs inside or outside of their home. Patient has working smoke alarms and has working carbon monoxide detector. Home safety hazards include: none.     Nutrition:   Current diet is Regular.     Medications:   Patient is not currently  taking any over-the-counter supplements. Patient is not able to manage medications.     Activities of Daily Living (ADLs)/Instrumental Activities of Daily Living (IADLs):   Walk and transfer into and out of bed and chair?: Yes  Dress and groom yourself?: Yes    Bathe or shower yourself?: Yes    Feed yourself? Yes  Do your laundry/housekeeping?: Yes  Manage your money, pay your bills and track your expenses?: No  Make your own meals?: No    Do your own shopping?: No    Previous Hospitalizations:   Any hospitalizations or ED visits within the last 12 months?: No      Advance Care Planning:   Living will: Yes    Durable POA for healthcare: Yes    Advanced directive: Yes    End of Life Decisions reviewed with patient: No      Cognitive Screening:   Provider or family/friend/caregiver concerned regarding cognition?: No    PREVENTIVE SCREENINGS      Cardiovascular Screening:    General: Screening Not Indicated and History Lipid Disorder      Diabetes Screening:     General: Screening Not Indicated and History Diabetes      Colorectal Cancer Screening:     General: Screening Current      Prostate Cancer Screening:    General: History Prostate Cancer and Screening Not Indicated      Osteoporosis Screening:    General: Screening Not Indicated      Abdominal Aortic Aneurysm (AAA) Screening:    Risk factors include: age between 65-74 yo and tobacco use        General: Screening Not Indicated      Lung Cancer Screening:     General: Screening Not Indicated      Hepatitis C Screening:    General: Screening Current    Hep C Screening Accepted: No     Screening, Brief Intervention, and Referral to Treatment (SBIRT)    Screening  Typical number of drinks in a day: 1  Typical number of drinks in a week: 5  Interpretation: Low risk drinking behavior.    Single Item Drug Screening:  How often have you used an illegal drug (including marijuana) or a prescription medication for non-medical reasons in the past year? never    Single Item  "Drug Screen Score: 0  Interpretation: Negative screen for possible drug use disorder    SDOH Risk Assessment  Social determinants of health (SDOH) risk assesment tool was completed. The tool at a minimum covered housing stability, food insecurity, transportation needs, and utility difficulty. Patient had at risk responses for the following SDOH domains: housing stability.     Other Counseling Topics:   Car/seat belt/driving safety and regular weightbearing exercise.     Vision Screening    Right eye Left eye Both eyes   Without correction 20/40 20/40 20/25   With correction           Physical Exam:     /92   Pulse 71   Temp 98.7 °F (37.1 °C)   Resp 18   Ht 5' 10.5\" (1.791 m)   Wt 107 kg (235 lb)   BMI 33.24 kg/m²     Physical Exam  Constitutional:       Appearance: Normal appearance. He is not diaphoretic.   HENT:      Head: Atraumatic.   Cardiovascular:      Pulses: Normal pulses.   Genitourinary:     Penis: Normal.       Testes: Normal.      Prostate: Normal.   Skin:     Findings: No bruising.   Psychiatric:         Thought Content: Thought content normal.         Judgment: Judgment normal.          Richard Varela, DO  "

## 2024-04-16 NOTE — PATIENT INSTRUCTIONS
Medicare Preventive Visit Patient Instructions  Thank you for completing your Welcome to Medicare Visit or Medicare Annual Wellness Visit today. Your next wellness visit will be due in one year (4/17/2025).  The screening/preventive services that you may require over the next 5-10 years are detailed below. Some tests may not apply to you based off risk factors and/or age. Screening tests ordered at today's visit but not completed yet may show as past due. Also, please note that scanned in results may not display below.  Preventive Screenings:  Service Recommendations Previous Testing/Comments   Colorectal Cancer Screening  Colonoscopy    Fecal Occult Blood Test (FOBT)/Fecal Immunochemical Test (FIT)  Fecal DNA/Cologuard Test  Flexible Sigmoidoscopy Age: 45-75 years old   Colonoscopy: every 10 years (May be performed more frequently if at higher risk)  OR  FOBT/FIT: every 1 year  OR  Cologuard: every 3 years  OR  Sigmoidoscopy: every 5 years  Screening may be recommended earlier than age 45 if at higher risk for colorectal cancer. Also, an individualized decision between you and your healthcare provider will decide whether screening between the ages of 76-85 would be appropriate. Colonoscopy: 06/10/2022  FOBT/FIT: Not on file  Cologuard: Not on file  Sigmoidoscopy: Not on file          Prostate Cancer Screening Individualized decision between patient and health care provider in men between ages of 55-69   Medicare will cover every 12 months beginning on the day after your 50th birthday PSA: 1.0 ng/mL           Hepatitis C Screening Once for adults born between 1945 and 1965  More frequently in patients at high risk for Hepatitis C Hep C Antibody: Not on file        Diabetes Screening 1-2 times per year if you're at risk for diabetes or have pre-diabetes Fasting glucose: 97 mg/dL (5/13/2023)  A1C: 5.1 % (7/7/2022)      Cholesterol Screening Once every 5 years if you don't have a lipid disorder. May order more often  based on risk factors. Lipid panel: 10/29/2022         Other Preventive Screenings Covered by Medicare:  Abdominal Aortic Aneurysm (AAA) Screening: covered once if your at risk. You're considered to be at risk if you have a family history of AAA or a male between the age of 65-75 who smoking at least 100 cigarettes in your lifetime.  Lung Cancer Screening: covers low dose CT scan once per year if you meet all of the following conditions: (1) Age 55-77; (2) No signs or symptoms of lung cancer; (3) Current smoker or have quit smoking within the last 15 years; (4) You have a tobacco smoking history of at least 20 pack years (packs per day x number of years you smoked); (5) You get a written order from a healthcare provider.  Glaucoma Screening: covered annually if you're considered high risk: (1) You have diabetes OR (2) Family history of glaucoma OR (3)  aged 50 and older OR (4)  American aged 65 and older  Osteoporosis Screening: covered every 2 years if you meet one of the following conditions: (1) Have a vertebral abnormality; (2) On glucocorticoid therapy for more than 3 months; (3) Have primary hyperparathyroidism; (4) On osteoporosis medications and need to assess response to drug therapy.  HIV Screening: covered annually if you're between the age of 15-65. Also covered annually if you are younger than 15 and older than 65 with risk factors for HIV infection. For pregnant patients, it is covered up to 3 times per pregnancy.    Immunizations:  Immunization Recommendations   Influenza Vaccine Annual influenza vaccination during flu season is recommended for all persons aged >= 6 months who do not have contraindications   Pneumococcal Vaccine   * Pneumococcal conjugate vaccine = PCV13 (Prevnar 13), PCV15 (Vaxneuvance), PCV20 (Prevnar 20)  * Pneumococcal polysaccharide vaccine = PPSV23 (Pneumovax) Adults 19-65 yo with certain risk factors or if 65+ yo  If never received any pneumonia vaccine:  recommend Prevnar 20 (PCV20)  Give PCV20 if previously received 1 dose of PCV13 or PPSV23   Hepatitis B Vaccine 3 dose series if at intermediate or high risk (ex: diabetes, end stage renal disease, liver disease)   Respiratory syncytial virus (RSV) Vaccine - COVERED BY MEDICARE PART D  * RSVPreF3 (Arexvy) CDC recommends that adults 60 years of age and older may receive a single dose of RSV vaccine using shared clinical decision-making (SCDM)   Tetanus (Td) Vaccine - COST NOT COVERED BY MEDICARE PART B Following completion of primary series, a booster dose should be given every 10 years to maintain immunity against tetanus. Td may also be given as tetanus wound prophylaxis.   Tdap Vaccine - COST NOT COVERED BY MEDICARE PART B Recommended at least once for all adults. For pregnant patients, recommended with each pregnancy.   Shingles Vaccine (Shingrix) - COST NOT COVERED BY MEDICARE PART B  2 shot series recommended in those 19 years and older who have or will have weakened immune systems or those 50 years and older     Health Maintenance Due:      Topic Date Due   • Hepatitis C Screening  Never done   • Colorectal Cancer Screening  06/09/2025     Immunizations Due:      Topic Date Due   • Influenza Vaccine (1) 09/01/2023   • COVID-19 Vaccine (4 - 2023-24 season) 09/01/2023     Advance Directives   What are advance directives?  Advance directives are legal documents that state your wishes and plans for medical care. These plans are made ahead of time in case you lose your ability to make decisions for yourself. Advance directives can apply to any medical decision, such as the treatments you want, and if you want to donate organs.   What are the types of advance directives?  There are many types of advance directives, and each state has rules about how to use them. You may choose a combination of any of the following:  Living will:  This is a written record of the treatment you want. You can also choose which  treatments you do not want, which to limit, and which to stop at a certain time. This includes surgery, medicine, IV fluid, and tube feedings.   Durable power of  for healthcare (DPAHC):  This is a written record that states who you want to make healthcare choices for you when you are unable to make them for yourself. This person, called a proxy, is usually a family member or a friend. You may choose more than 1 proxy.  Do not resuscitate (DNR) order:  A DNR order is used in case your heart stops beating or you stop breathing. It is a request not to have certain forms of treatment, such as CPR. A DNR order may be included in other types of advance directives.  Medical directive:  This covers the care that you want if you are in a coma, near death, or unable to make decisions for yourself. You can list the treatments you want for each condition. Treatment may include pain medicine, surgery, blood transfusions, dialysis, IV or tube feedings, and a ventilator (breathing machine).  Values history:  This document has questions about your views, beliefs, and how you feel and think about life. This information can help others choose the care that you would choose.  Why are advance directives important?  An advance directive helps you control your care. Although spoken wishes may be used, it is better to have your wishes written down. Spoken wishes can be misunderstood, or not followed. Treatments may be given even if you do not want them. An advance directive may make it easier for your family to make difficult choices about your care.   Weight Management   Why it is important to manage your weight:  Being overweight increases your risk of health conditions such as heart disease, high blood pressure, type 2 diabetes, and certain types of cancer. It can also increase your risk for osteoarthritis, sleep apnea, and other respiratory problems. Aim for a slow, steady weight loss. Even a small amount of weight loss can  lower your risk of health problems.  How to lose weight safely:  A safe and healthy way to lose weight is to eat fewer calories and get regular exercise. You can lose up about 1 pound a week by decreasing the number of calories you eat by 500 calories each day.   Healthy meal plan for weight management:  A healthy meal plan includes a variety of foods, contains fewer calories, and helps you stay healthy. A healthy meal plan includes the following:  Eat whole-grain foods more often.  A healthy meal plan should contain fiber. Fiber is the part of grains, fruits, and vegetables that is not broken down by your body. Whole-grain foods are healthy and provide extra fiber in your diet. Some examples of whole-grain foods are whole-wheat breads and pastas, oatmeal, brown rice, and bulgur.  Eat a variety of vegetables every day.  Include dark, leafy greens such as spinach, kale, leilani greens, and mustard greens. Eat yellow and orange vegetables such as carrots, sweet potatoes, and winter squash.   Eat a variety of fruits every day.  Choose fresh or canned fruit (canned in its own juice or light syrup) instead of juice. Fruit juice has very little or no fiber.  Eat low-fat dairy foods.  Drink fat-free (skim) milk or 1% milk. Eat fat-free yogurt and low-fat cottage cheese. Try low-fat cheeses such as mozzarella and other reduced-fat cheeses.  Choose meat and other protein foods that are low in fat.  Choose beans or other legumes such as split peas or lentils. Choose fish, skinless poultry (chicken or turkey), or lean cuts of red meat (beef or pork). Before you cook meat or poultry, cut off any visible fat.   Use less fat and oil.  Try baking foods instead of frying them. Add less fat, such as margarine, sour cream, regular salad dressing and mayonnaise to foods. Eat fewer high-fat foods. Some examples of high-fat foods include french fries, doughnuts, ice cream, and cakes.  Eat fewer sweets.  Limit foods and drinks that are  high in sugar. This includes candy, cookies, regular soda, and sweetened drinks.  Exercise:  Exercise at least 30 minutes per day on most days of the week. Some examples of exercise include walking, biking, dancing, and swimming. You can also fit in more physical activity by taking the stairs instead of the elevator or parking farther away from stores. Ask your healthcare provider about the best exercise plan for you.      © Copyright [employer] 2018 Information is for End User's use only and may not be sold, redistributed or otherwise used for commercial purposes. All illustrations and images included in CareNotes® are the copyrighted property of [employer] or [employer]

## 2024-04-17 ENCOUNTER — APPOINTMENT (OUTPATIENT)
Dept: LAB | Facility: HOSPITAL | Age: 69
End: 2024-04-17
Payer: COMMERCIAL

## 2024-04-17 DIAGNOSIS — R73.03 PREDIABETES: ICD-10-CM

## 2024-04-17 DIAGNOSIS — Z11.59 NEED FOR HEPATITIS C SCREENING TEST: ICD-10-CM

## 2024-04-17 DIAGNOSIS — Z13.1 SCREENING FOR DIABETES MELLITUS (DM): ICD-10-CM

## 2024-04-17 DIAGNOSIS — Z12.5 PROSTATE CANCER SCREENING: ICD-10-CM

## 2024-04-17 DIAGNOSIS — Z13.220 SCREENING FOR LIPID DISORDERS: ICD-10-CM

## 2024-04-17 LAB
ALBUMIN SERPL BCP-MCNC: 4.1 G/DL (ref 3.5–5)
ALP SERPL-CCNC: 99 U/L (ref 34–104)
ALT SERPL W P-5'-P-CCNC: 22 U/L (ref 7–52)
ANION GAP SERPL CALCULATED.3IONS-SCNC: 5 MMOL/L (ref 4–13)
AST SERPL W P-5'-P-CCNC: 15 U/L (ref 13–39)
BILIRUB SERPL-MCNC: 0.92 MG/DL (ref 0.2–1)
BUN SERPL-MCNC: 16 MG/DL (ref 5–25)
CALCIUM SERPL-MCNC: 8.9 MG/DL (ref 8.4–10.2)
CHLORIDE SERPL-SCNC: 111 MMOL/L (ref 96–108)
CHOLEST SERPL-MCNC: 102 MG/DL
CO2 SERPL-SCNC: 26 MMOL/L (ref 21–32)
CREAT SERPL-MCNC: 0.87 MG/DL (ref 0.6–1.3)
GFR SERPL CREATININE-BSD FRML MDRD: 89 ML/MIN/1.73SQ M
GLUCOSE P FAST SERPL-MCNC: 101 MG/DL (ref 65–99)
HCV AB SER QL: NORMAL
HDLC SERPL-MCNC: 29 MG/DL
LDLC SERPL CALC-MCNC: 60 MG/DL (ref 0–100)
POTASSIUM SERPL-SCNC: 4.1 MMOL/L (ref 3.5–5.3)
PROT SERPL-MCNC: 6.5 G/DL (ref 6.4–8.4)
PSA SERPL-MCNC: 1.04 NG/ML (ref 0–4)
SODIUM SERPL-SCNC: 142 MMOL/L (ref 135–147)
TRIGL SERPL-MCNC: 67 MG/DL

## 2024-04-17 PROCEDURE — 80053 COMPREHEN METABOLIC PANEL: CPT

## 2024-04-17 PROCEDURE — 83036 HEMOGLOBIN GLYCOSYLATED A1C: CPT

## 2024-04-17 PROCEDURE — 86803 HEPATITIS C AB TEST: CPT

## 2024-04-17 PROCEDURE — G0103 PSA SCREENING: HCPCS

## 2024-04-17 PROCEDURE — 80061 LIPID PANEL: CPT

## 2024-04-17 PROCEDURE — 36415 COLL VENOUS BLD VENIPUNCTURE: CPT

## 2024-04-18 LAB
EST. AVERAGE GLUCOSE BLD GHB EST-MCNC: 100 MG/DL
HBA1C MFR BLD: 5.1 %

## 2024-04-23 ENCOUNTER — OFFICE VISIT (OUTPATIENT)
Dept: CARDIOLOGY CLINIC | Facility: CLINIC | Age: 69
End: 2024-04-23
Payer: COMMERCIAL

## 2024-04-23 VITALS
HEART RATE: 67 BPM | DIASTOLIC BLOOD PRESSURE: 92 MMHG | BODY MASS INDEX: 33.46 KG/M2 | SYSTOLIC BLOOD PRESSURE: 122 MMHG | HEIGHT: 71 IN | WEIGHT: 239 LBS | OXYGEN SATURATION: 97 %

## 2024-04-23 DIAGNOSIS — I35.0 AORTIC VALVE STENOSIS, ETIOLOGY OF CARDIAC VALVE DISEASE UNSPECIFIED: ICD-10-CM

## 2024-04-23 DIAGNOSIS — I71.21 ASCENDING AORTIC ANEURYSM, UNSPECIFIED WHETHER RUPTURED (HCC): ICD-10-CM

## 2024-04-23 DIAGNOSIS — I25.10 CORONARY ARTERY DISEASE INVOLVING NATIVE CORONARY ARTERY OF NATIVE HEART WITHOUT ANGINA PECTORIS: ICD-10-CM

## 2024-04-23 DIAGNOSIS — I48.0 PAROXYSMAL A-FIB (HCC): Primary | ICD-10-CM

## 2024-04-23 DIAGNOSIS — I47.29 NSVT (NONSUSTAINED VENTRICULAR TACHYCARDIA) (HCC): ICD-10-CM

## 2024-04-23 DIAGNOSIS — I10 BENIGN ESSENTIAL HYPERTENSION: ICD-10-CM

## 2024-04-23 PROCEDURE — 93000 ELECTROCARDIOGRAM COMPLETE: CPT | Performed by: INTERNAL MEDICINE

## 2024-04-23 PROCEDURE — 99214 OFFICE O/P EST MOD 30 MIN: CPT | Performed by: INTERNAL MEDICINE

## 2024-04-23 NOTE — PROGRESS NOTES
Cardiology   Judy Norris DO, [facility]  Terrence Velasco MD, [facility]  Chandler Saunders MD, [facility]  Calista Gleason MD, [facility]  -------------------------------------------------------------------  [facility]  [address], [address], [address]  [place], [place], [address]  [phone]    Cardiology Follow Up  Christopher Razo  1955  [id number]          Assessment/Plan:    1. VT with syncope -   Cause of tachycardia may have been ischemic.  He had 2 stents placed.  - Loop recorder has overall been free of arrhythmias.  There was an episode of tachycardia noted which was likely due to artifact and PACs.    - will continue monitoring for further arrhythmias.    - metoprolol was increased to 50 mg twice daily during hospitalization.  He was temporarily on Tikosyn which was discontinued.    2. CAD with recent PCI x2 - continue Plavix.    - continue atorvastatin    3. History of CVA  - continue Plavix  - continue Eliquis.    4. Paroxysmal atrial fibrillation  - currently in sinus rhythm.  Continue Eliquis and metoprolol      Interval History:     Christopher Razo is 68 y.o. male here for followup of CAD and atrial fibrillation.    Since his last visit, he has been feeling well.  he denies any palpitations, chest pain, shortness of breath, LE edema, orthopnea or PND.   Diet is overall unchanged.  There has not been a significant change in weight.    No events on last pacemaker check.       In October 2022 he was admitted to [facility] after a syncopal event during PT.  He had a loop recorder in place and was noted to have VT at that time.  He underwent further workup including cardiac catheterization which showed a 1st diagonal 90% stenosis of ramus with 90% stenosis and circumflex with 60% stenosis in our PDA of 80% stenosis.  Subsequently, he underwent drug-eluting stent placement to the 1st diagonal and ramus lesions.  Echocardiogram during admission showed ejection  fraction of 60% with mild to moderate aortic valve stenosis.  Aortic root was dilated at 4.5 cm.  He subsequent underwent cardiac MRI which showed ejection fraction of 57%, moderate concentric LVH there was a small focus of intra myocardial fibrosis of the basal inferolateral wall.  VT was likely from the anterolateral mitral annulus but there was no evidence of scar in that area.  EP recommended increasing Toprol to 50 mg b.i.d..  He was initially started on Tikosyn which was discontinued.  He had LifeVest placed prior to going home  Which he has returned.    Patient had a loop recorder in place because of history of cryptogenic CVA.  He has had episodes of atrial fibrillation noted on loop recorder in the past and is now on Eliquis.      The following portions of the patient's history were reviewed and updated as appropriate: allergies, current medications, past family history, past medical history, past social history, past surgical history, and problem list.       Current Outpatient Medications:     ALPRAZolam (XANAX) 0.5 mg tablet, Take 1 tablet (0.5 mg total) by mouth 30 min pre-procedure, Disp: 1 tablet, Rfl: 0    atorvastatin (LIPITOR) 40 mg tablet, Take 1 tablet (40 mg total) by mouth every evening, Disp: 90 tablet, Rfl: 1    clopidogrel (PLAVIX) 75 mg tablet, Take 1 tablet (75 mg total) by mouth daily, Disp: 90 tablet, Rfl: 1    Eliquis 5 MG, TAKE 1 TABLET BY MOUTH TWICE A DAY, Disp: 180 tablet, Rfl: 1    memantine (NAMENDA) 5 mg tablet, Take 5 mg by mouth 2 (two) times a day, Disp: , Rfl:     metoprolol succinate (TOPROL-XL) 50 mg 24 hr tablet, TAKE 1 TABLET BY MOUTH TWICE A DAY, Disp: 60 tablet, Rfl: 14    fexofenadine (ALLEGRA) 180 MG tablet, Take 1 tablet (180 mg total) by mouth daily for 30 days (Patient taking differently: Take 180 mg by mouth every morning), Disp: 30 tablet, Rfl: 0        Review of Systems:  Review of Systems   Respiratory:  Negative for shortness of breath.    Cardiovascular:   "Negative for chest pain, palpitations and leg swelling.   Musculoskeletal:  Positive for arthralgias.   All other systems reviewed and are negative.        Physical Exam:  Vitals:  Vitals:    04/23/24 0801   BP: 122/92   BP Location: Left arm   Patient Position: Sitting   Cuff Size: Standard   Pulse: 67   SpO2: 97%   Weight: 108 kg (239 lb)   Height: 5' 10.5\" (1.791 m)     Physical Exam   Constitutional: He appears healthy. No distress.   Eyes: Pupils are equal, round, and reactive to light. Conjunctivae are normal.   Neck: No JVD present.   Cardiovascular: Normal rate, regular rhythm and normal heart sounds. Exam reveals no gallop and no friction rub.   No murmur heard.  Pulmonary/Chest: Effort normal and breath sounds normal. He has no wheezes. He has no rales.   Musculoskeletal:         General: No tenderness, deformity or edema.      Cervical back: Normal range of motion and neck supple.   Neurological: He is alert and oriented to person, place, and time.   Skin: Skin is warm and dry.        Cardiographics:  EKG: Sinus rhythm with LVH  Last known EF: 55%    This note was completed in part utilizing M-Modal Fluency Direct Software.  Grammatical errors, random word insertions, spelling mistakes, and incomplete sentences can be an occasional consequence of this system secondary to software limitations, ambient noise, and hardware issues.  If you have any questions or concerns about the content, text, or information contained within the body of this dictation, please contact the provider for clarification.      "

## 2024-04-26 ENCOUNTER — TELEPHONE (OUTPATIENT)
Dept: CARDIOLOGY CLINIC | Facility: CLINIC | Age: 69
End: 2024-04-26

## 2024-04-26 ENCOUNTER — REMOTE DEVICE CLINIC VISIT (OUTPATIENT)
Dept: CARDIOLOGY CLINIC | Facility: CLINIC | Age: 69
End: 2024-04-26
Payer: COMMERCIAL

## 2024-04-26 DIAGNOSIS — Z95.818 PRESENCE OF OTHER CARDIAC IMPLANTS AND GRAFTS: Primary | ICD-10-CM

## 2024-04-26 PROCEDURE — 93298 REM INTERROG DEV EVAL SCRMS: CPT | Performed by: INTERNAL MEDICINE

## 2024-04-26 NOTE — PROGRESS NOTES
"Results for orders placed or performed in visit on 04/26/24   Cardiac EP device report    Narrative    MDT LNQ22/ ACTIVE SYSTEM IS MRI CONDITIONAL  CARELINK TRANSMISSION: BATTERY STATUS \"OK.\" NO PATIENT OR DEVICE ACTIVATED EPISODES. ---DOAN         "

## 2024-05-01 ENCOUNTER — TELEPHONE (OUTPATIENT)
Dept: FAMILY MEDICINE CLINIC | Facility: CLINIC | Age: 69
End: 2024-05-01

## 2024-05-01 NOTE — TELEPHONE ENCOUNTER
"----- Message from Gilda Lobo sent at 5/1/2024 12:08 PM EDT -----  Regarding: FW: Non Medical ID Form needed  Contact: [phone]    ----- Message -----  From: Christopher Razo \"Mohit\"  Sent: 5/1/2024  10:26 AM EDT  To: [facility]  Subject: Non Medical ID Form needed                       Hi,  Can you complete the attached form so that Mohit can get a non- id.  Since he had his stroke, he is not allowed to drive.   I can pick it up when ready.  Call me at [phone]    Thank you.  Harriet Razo    "

## 2024-05-02 NOTE — TELEPHONE ENCOUNTER
Pt's wife, Harriet, called to check on form.  Please call her when it is ready to be picked up.  [phone]

## 2024-05-15 PROBLEM — Z00.00 WELCOME TO MEDICARE PREVENTIVE VISIT: Status: RESOLVED | Noted: 2024-04-15 | Resolved: 2024-05-15

## 2024-05-28 ENCOUNTER — APPOINTMENT (EMERGENCY)
Dept: RADIOLOGY | Facility: HOSPITAL | Age: 69
DRG: 379 | End: 2024-05-28
Payer: COMMERCIAL

## 2024-05-28 ENCOUNTER — HOSPITAL ENCOUNTER (INPATIENT)
Facility: HOSPITAL | Age: 69
LOS: 1 days | Discharge: HOME | DRG: 379 | End: 2024-05-30
Attending: EMERGENCY MEDICINE | Admitting: STUDENT IN AN ORGANIZED HEALTH CARE EDUCATION/TRAINING PROGRAM
Payer: COMMERCIAL

## 2024-05-28 DIAGNOSIS — K62.5 BRBPR (BRIGHT RED BLOOD PER RECTUM): Primary | ICD-10-CM

## 2024-05-28 DIAGNOSIS — Z95.5 H/O HEART ARTERY STENT: ICD-10-CM

## 2024-05-28 DIAGNOSIS — K62.5 BRIGHT RED RECTAL BLEEDING: ICD-10-CM

## 2024-05-28 LAB
2HR DELTA HS TROPONIN: -1 NG/L
ALBUMIN SERPL BCP-MCNC: 3.8 G/DL (ref 3.5–5)
ALP SERPL-CCNC: 81 U/L (ref 34–104)
ALT SERPL W P-5'-P-CCNC: 16 U/L (ref 7–52)
ANION GAP SERPL CALCULATED.3IONS-SCNC: 6 MMOL/L (ref 4–13)
APTT PPP: 30 SECONDS (ref 23–37)
AST SERPL W P-5'-P-CCNC: 14 U/L (ref 13–39)
BASOPHILS # BLD AUTO: 0.04 THOUSANDS/ÂΜL (ref 0–0.1)
BASOPHILS NFR BLD AUTO: 1 % (ref 0–1)
BILIRUB SERPL-MCNC: 0.67 MG/DL (ref 0.2–1)
BUN SERPL-MCNC: 20 MG/DL (ref 5–25)
CALCIUM SERPL-MCNC: 8.9 MG/DL (ref 8.4–10.2)
CARDIAC TROPONIN I PNL SERPL HS: 10 NG/L
CARDIAC TROPONIN I PNL SERPL HS: 9 NG/L
CHLORIDE SERPL-SCNC: 116 MMOL/L (ref 96–108)
CO2 SERPL-SCNC: 19 MMOL/L (ref 21–32)
CREAT SERPL-MCNC: 0.95 MG/DL (ref 0.6–1.3)
EOSINOPHIL # BLD AUTO: 0.28 THOUSAND/ÂΜL (ref 0–0.61)
EOSINOPHIL NFR BLD AUTO: 4 % (ref 0–6)
ERYTHROCYTE [DISTWIDTH] IN BLOOD BY AUTOMATED COUNT: 12.7 % (ref 11.6–15.1)
GFR SERPL CREATININE-BSD FRML MDRD: 83 ML/MIN/1.73SQ M
GLUCOSE SERPL-MCNC: 120 MG/DL (ref 65–140)
HCT VFR BLD AUTO: 40.3 % (ref 36.5–49.3)
HCT VFR BLD AUTO: 45 % (ref 36.5–49.3)
HGB BLD-MCNC: 14 G/DL (ref 12–17)
HGB BLD-MCNC: 15.2 G/DL (ref 12–17)
IMM GRANULOCYTES # BLD AUTO: 0.03 THOUSAND/UL (ref 0–0.2)
IMM GRANULOCYTES NFR BLD AUTO: 0 % (ref 0–2)
INR PPP: 1.06 (ref 0.84–1.19)
LYMPHOCYTES # BLD AUTO: 1.22 THOUSANDS/ÂΜL (ref 0.6–4.47)
LYMPHOCYTES NFR BLD AUTO: 17 % (ref 14–44)
MCH RBC QN AUTO: 30.2 PG (ref 26.8–34.3)
MCHC RBC AUTO-ENTMCNC: 33.8 G/DL (ref 31.4–37.4)
MCV RBC AUTO: 89 FL (ref 82–98)
MONOCYTES # BLD AUTO: 0.69 THOUSAND/ÂΜL (ref 0.17–1.22)
MONOCYTES NFR BLD AUTO: 10 % (ref 4–12)
NEUTROPHILS # BLD AUTO: 4.77 THOUSANDS/ÂΜL (ref 1.85–7.62)
NEUTS SEG NFR BLD AUTO: 68 % (ref 43–75)
NRBC BLD AUTO-RTO: 0 /100 WBCS
PLATELET # BLD AUTO: 247 THOUSANDS/UL (ref 149–390)
PMV BLD AUTO: 8.7 FL (ref 8.9–12.7)
POTASSIUM SERPL-SCNC: 3.7 MMOL/L (ref 3.5–5.3)
PROT SERPL-MCNC: 6.1 G/DL (ref 6.4–8.4)
PROTHROMBIN TIME: 14 SECONDS (ref 11.6–14.5)
RBC # BLD AUTO: 5.04 MILLION/UL (ref 3.88–5.62)
SODIUM SERPL-SCNC: 141 MMOL/L (ref 135–147)
WBC # BLD AUTO: 7.03 THOUSAND/UL (ref 4.31–10.16)

## 2024-05-28 PROCEDURE — C9113 INJ PANTOPRAZOLE SODIUM, VIA: HCPCS

## 2024-05-28 PROCEDURE — 84484 ASSAY OF TROPONIN QUANT: CPT | Performed by: EMERGENCY MEDICINE

## 2024-05-28 PROCEDURE — 85014 HEMATOCRIT: CPT

## 2024-05-28 PROCEDURE — 36415 COLL VENOUS BLD VENIPUNCTURE: CPT | Performed by: EMERGENCY MEDICINE

## 2024-05-28 PROCEDURE — 80053 COMPREHEN METABOLIC PANEL: CPT | Performed by: EMERGENCY MEDICINE

## 2024-05-28 PROCEDURE — 96374 THER/PROPH/DIAG INJ IV PUSH: CPT

## 2024-05-28 PROCEDURE — 99285 EMERGENCY DEPT VISIT HI MDM: CPT

## 2024-05-28 PROCEDURE — C9113 INJ PANTOPRAZOLE SODIUM, VIA: HCPCS | Performed by: EMERGENCY MEDICINE

## 2024-05-28 PROCEDURE — 74178 CT ABD&PLV WO CNTR FLWD CNTR: CPT

## 2024-05-28 PROCEDURE — 85610 PROTHROMBIN TIME: CPT | Performed by: EMERGENCY MEDICINE

## 2024-05-28 PROCEDURE — 99285 EMERGENCY DEPT VISIT HI MDM: CPT | Performed by: EMERGENCY MEDICINE

## 2024-05-28 PROCEDURE — 93005 ELECTROCARDIOGRAM TRACING: CPT

## 2024-05-28 PROCEDURE — 96361 HYDRATE IV INFUSION ADD-ON: CPT

## 2024-05-28 PROCEDURE — 85730 THROMBOPLASTIN TIME PARTIAL: CPT | Performed by: EMERGENCY MEDICINE

## 2024-05-28 PROCEDURE — 85025 COMPLETE CBC W/AUTO DIFF WBC: CPT | Performed by: EMERGENCY MEDICINE

## 2024-05-28 PROCEDURE — 83735 ASSAY OF MAGNESIUM: CPT

## 2024-05-28 PROCEDURE — 99223 1ST HOSP IP/OBS HIGH 75: CPT

## 2024-05-28 PROCEDURE — 85018 HEMOGLOBIN: CPT

## 2024-05-28 RX ORDER — ATORVASTATIN CALCIUM 40 MG/1
40 TABLET, FILM COATED ORAL EVERY EVENING
Status: DISCONTINUED | OUTPATIENT
Start: 2024-05-28 | End: 2024-05-30 | Stop reason: HOSPADM

## 2024-05-28 RX ORDER — LORATADINE 10 MG/1
10 TABLET ORAL DAILY
Status: DISCONTINUED | OUTPATIENT
Start: 2024-05-29 | End: 2024-05-30 | Stop reason: HOSPADM

## 2024-05-28 RX ORDER — POLYETHYLENE GLYCOL 3350 17 G/17G
238 POWDER, FOR SOLUTION ORAL ONCE
Status: COMPLETED | OUTPATIENT
Start: 2024-05-28 | End: 2024-05-28

## 2024-05-28 RX ORDER — PANTOPRAZOLE SODIUM 40 MG/10ML
40 INJECTION, POWDER, LYOPHILIZED, FOR SOLUTION INTRAVENOUS EVERY 12 HOURS
Status: DISCONTINUED | OUTPATIENT
Start: 2024-05-28 | End: 2024-05-30 | Stop reason: HOSPADM

## 2024-05-28 RX ORDER — ACETAMINOPHEN 325 MG/1
650 TABLET ORAL EVERY 6 HOURS PRN
Status: DISCONTINUED | OUTPATIENT
Start: 2024-05-28 | End: 2024-05-30 | Stop reason: HOSPADM

## 2024-05-28 RX ORDER — METOPROLOL SUCCINATE 50 MG/1
50 TABLET, EXTENDED RELEASE ORAL 2 TIMES DAILY
Status: DISCONTINUED | OUTPATIENT
Start: 2024-05-28 | End: 2024-05-30 | Stop reason: HOSPADM

## 2024-05-28 RX ORDER — MEMANTINE HYDROCHLORIDE 5 MG/1
5 TABLET ORAL 2 TIMES DAILY
Status: DISCONTINUED | OUTPATIENT
Start: 2024-05-28 | End: 2024-05-30 | Stop reason: HOSPADM

## 2024-05-28 RX ORDER — SODIUM CHLORIDE 9 MG/ML
125 INJECTION, SOLUTION INTRAVENOUS CONTINUOUS
Status: DISCONTINUED | OUTPATIENT
Start: 2024-05-28 | End: 2024-05-29

## 2024-05-28 RX ADMIN — PANTOPRAZOLE SODIUM 80 MG: 40 INJECTION, POWDER, FOR SOLUTION INTRAVENOUS at 17:20

## 2024-05-28 RX ADMIN — SODIUM CHLORIDE 500 ML: 0.9 INJECTION, SOLUTION INTRAVENOUS at 17:12

## 2024-05-28 RX ADMIN — PANTOPRAZOLE SODIUM 40 MG: 40 INJECTION, POWDER, FOR SOLUTION INTRAVENOUS at 22:16

## 2024-05-28 RX ADMIN — Medication 2000 UNITS: at 22:20

## 2024-05-28 RX ADMIN — MEMANTINE 5 MG: 5 TABLET ORAL at 22:16

## 2024-05-28 RX ADMIN — POLYETHYLENE GLYCOL 3350 238 G: 17 POWDER, FOR SOLUTION ORAL at 22:24

## 2024-05-28 RX ADMIN — SODIUM CHLORIDE 125 ML/HR: 0.9 INJECTION, SOLUTION INTRAVENOUS at 22:33

## 2024-05-28 RX ADMIN — METOPROLOL SUCCINATE 50 MG: 50 TABLET, EXTENDED RELEASE ORAL at 22:16

## 2024-05-28 RX ADMIN — IOHEXOL 100 ML: 350 INJECTION, SOLUTION INTRAVENOUS at 18:28

## 2024-05-28 RX ADMIN — ATORVASTATIN CALCIUM 40 MG: 40 TABLET, FILM COATED ORAL at 22:16

## 2024-05-29 ENCOUNTER — ANESTHESIA EVENT (INPATIENT)
Dept: PERIOP | Facility: HOSPITAL | Age: 69
DRG: 379 | End: 2024-05-29
Payer: COMMERCIAL

## 2024-05-29 ENCOUNTER — APPOINTMENT (OUTPATIENT)
Dept: PERIOP | Facility: HOSPITAL | Age: 69
DRG: 379 | End: 2024-05-29
Payer: COMMERCIAL

## 2024-05-29 ENCOUNTER — ANESTHESIA (INPATIENT)
Dept: PERIOP | Facility: HOSPITAL | Age: 69
DRG: 379 | End: 2024-05-29
Payer: COMMERCIAL

## 2024-05-29 LAB
ABO GROUP BLD: NORMAL
ATRIAL RATE: 80 BPM
BLD GP AB SCN SERPL QL: NEGATIVE
BNP SERPL-MCNC: 36 PG/ML (ref 0–100)
HGB BLD-MCNC: 12.1 G/DL (ref 12–17)
HGB BLD-MCNC: 12.6 G/DL (ref 12–17)
MAGNESIUM SERPL-MCNC: 2 MG/DL (ref 1.9–2.7)
P AXIS: -4 DEGREES
PR INTERVAL: 158 MS
QRS AXIS: -23 DEGREES
QRSD INTERVAL: 88 MS
QT INTERVAL: 380 MS
QTC INTERVAL: 438 MS
RH BLD: POSITIVE
SPECIMEN EXPIRATION DATE: NORMAL
T WAVE AXIS: 18 DEGREES
VENTRICULAR RATE: 80 BPM

## 2024-05-29 PROCEDURE — 99222 1ST HOSP IP/OBS MODERATE 55: CPT | Performed by: INTERNAL MEDICINE

## 2024-05-29 PROCEDURE — 99223 1ST HOSP IP/OBS HIGH 75: CPT | Performed by: INTERNAL MEDICINE

## 2024-05-29 PROCEDURE — 93010 ELECTROCARDIOGRAM REPORT: CPT | Performed by: INTERNAL MEDICINE

## 2024-05-29 PROCEDURE — 0DBH8ZZ EXCISION OF CECUM, VIA NATURAL OR ARTIFICIAL OPENING ENDOSCOPIC: ICD-10-PCS | Performed by: INTERNAL MEDICINE

## 2024-05-29 PROCEDURE — 85018 HEMOGLOBIN: CPT | Performed by: STUDENT IN AN ORGANIZED HEALTH CARE EDUCATION/TRAINING PROGRAM

## 2024-05-29 PROCEDURE — 99232 SBSQ HOSP IP/OBS MODERATE 35: CPT | Performed by: STUDENT IN AN ORGANIZED HEALTH CARE EDUCATION/TRAINING PROGRAM

## 2024-05-29 PROCEDURE — 45378 DIAGNOSTIC COLONOSCOPY: CPT | Performed by: INTERNAL MEDICINE

## 2024-05-29 PROCEDURE — 83880 ASSAY OF NATRIURETIC PEPTIDE: CPT

## 2024-05-29 PROCEDURE — C9113 INJ PANTOPRAZOLE SODIUM, VIA: HCPCS

## 2024-05-29 PROCEDURE — 85018 HEMOGLOBIN: CPT

## 2024-05-29 PROCEDURE — 86900 BLOOD TYPING SEROLOGIC ABO: CPT

## 2024-05-29 PROCEDURE — 86901 BLOOD TYPING SEROLOGIC RH(D): CPT

## 2024-05-29 PROCEDURE — 86850 RBC ANTIBODY SCREEN: CPT

## 2024-05-29 RX ORDER — ONDANSETRON 2 MG/ML
4 INJECTION INTRAMUSCULAR; INTRAVENOUS ONCE AS NEEDED
Status: CANCELLED | OUTPATIENT
Start: 2024-05-29

## 2024-05-29 RX ORDER — PROPOFOL 10 MG/ML
INJECTION, EMULSION INTRAVENOUS AS NEEDED
Status: DISCONTINUED | OUTPATIENT
Start: 2024-05-29 | End: 2024-05-29

## 2024-05-29 RX ORDER — SODIUM CHLORIDE, SODIUM LACTATE, POTASSIUM CHLORIDE, CALCIUM CHLORIDE 600; 310; 30; 20 MG/100ML; MG/100ML; MG/100ML; MG/100ML
INJECTION, SOLUTION INTRAVENOUS CONTINUOUS PRN
Status: DISCONTINUED | OUTPATIENT
Start: 2024-05-29 | End: 2024-05-29

## 2024-05-29 RX ORDER — PROPOFOL 10 MG/ML
INJECTION, EMULSION INTRAVENOUS CONTINUOUS PRN
Status: DISCONTINUED | OUTPATIENT
Start: 2024-05-29 | End: 2024-05-29

## 2024-05-29 RX ORDER — LIDOCAINE HYDROCHLORIDE 10 MG/ML
INJECTION, SOLUTION EPIDURAL; INFILTRATION; INTRACAUDAL; PERINEURAL AS NEEDED
Status: DISCONTINUED | OUTPATIENT
Start: 2024-05-29 | End: 2024-05-29

## 2024-05-29 RX ORDER — PHENYLEPHRINE HCL IN 0.9% NACL 1 MG/10 ML
SYRINGE (ML) INTRAVENOUS AS NEEDED
Status: DISCONTINUED | OUTPATIENT
Start: 2024-05-29 | End: 2024-05-29

## 2024-05-29 RX ADMIN — PANTOPRAZOLE SODIUM 40 MG: 40 INJECTION, POWDER, FOR SOLUTION INTRAVENOUS at 21:00

## 2024-05-29 RX ADMIN — Medication 50 MCG: at 15:30

## 2024-05-29 RX ADMIN — ACETAMINOPHEN 650 MG: 325 TABLET ORAL at 20:59

## 2024-05-29 RX ADMIN — PROPOFOL 70 MCG/KG/MIN: 10 INJECTION, EMULSION INTRAVENOUS at 15:31

## 2024-05-29 RX ADMIN — LORATADINE 10 MG: 10 TABLET ORAL at 08:08

## 2024-05-29 RX ADMIN — PANTOPRAZOLE SODIUM 40 MG: 40 INJECTION, POWDER, FOR SOLUTION INTRAVENOUS at 08:08

## 2024-05-29 RX ADMIN — LIDOCAINE HYDROCHLORIDE 50 MG: 10 INJECTION, SOLUTION EPIDURAL; INFILTRATION; INTRACAUDAL; PERINEURAL at 15:26

## 2024-05-29 RX ADMIN — METOPROLOL SUCCINATE 50 MG: 50 TABLET, EXTENDED RELEASE ORAL at 17:28

## 2024-05-29 RX ADMIN — PROPOFOL 100 MG: 10 INJECTION, EMULSION INTRAVENOUS at 15:26

## 2024-05-29 RX ADMIN — MEMANTINE 5 MG: 5 TABLET ORAL at 08:08

## 2024-05-29 RX ADMIN — PROPOFOL 50 MG: 10 INJECTION, EMULSION INTRAVENOUS at 15:29

## 2024-05-29 RX ADMIN — MEMANTINE 5 MG: 5 TABLET ORAL at 17:28

## 2024-05-29 RX ADMIN — Medication 100 MCG: at 15:33

## 2024-05-29 RX ADMIN — ATORVASTATIN CALCIUM 40 MG: 40 TABLET, FILM COATED ORAL at 17:28

## 2024-05-29 RX ADMIN — SODIUM CHLORIDE, SODIUM LACTATE, POTASSIUM CHLORIDE, AND CALCIUM CHLORIDE: .6; .31; .03; .02 INJECTION, SOLUTION INTRAVENOUS at 15:20

## 2024-05-29 RX ADMIN — Medication 100 MCG: at 15:39

## 2024-05-29 NOTE — CONSULTS
Consultation - Cardiology   Christopher Razo 68 y.o. male MRN: [id number]  Unit/Bed#: [address] [place] [address] Encounter: [id number]        Assessment:     Bright red rectal bleeding  Benign essential hypertension  Obstructive sleep apnea  History of cardioembolic cerebrovascular accident (CVA)  Paroxysmal A-fib (HCC)  CAD (coronary artery disease)  VT with syncope, Loop recorder in situ    Plan:   Continue to hold Eliquis and Plavix in light of recent events of BRBPR.  Can resume Eliquis at discharge contingent upon identification and treatment of GI pathology.  Continue home dose of metoprolol, atorvastatin.  Continue to monitor CMP, magnesium.  Continue to monitor hemodynamics.    History of Present Illness   Physician Requesting Consult: Enoc Mane MD  Reason for Consult / Principal Problem: Recommendations on DOACs  HPI: Christopher Razo is a 68 y.o. year old male who presents with BRBPR.  As per patient, he had multiple bowel movements with bright red blood at home as well as in the hospital.  While we were talking, he also pointed out to his most recent bowel movement which was witnessed by the nurse and was bright red blood with occasional clots.  The symptoms have been ongoing for the last 12 to 15 hours, associated with left lower quadrant, suprapubic heaviness/pain.  Not associated with dizziness, syncope, presyncopal prodrome, palpitations, chest pain, dyspnea on exertion.  Cardiology was consulted for recommendations on continuation of Plavix and Eliquis in this acute scenario.    Review of Systems   Constitutional:  Positive for activity change and fatigue. Negative for chills and fever.   HENT:  Negative for ear pain and sore throat.    Eyes:  Negative for pain and visual disturbance.   Respiratory:  Negative for cough and shortness of breath.    Cardiovascular:  Negative for chest pain and palpitations.   Gastrointestinal:  Positive for abdominal pain, blood in stool and constipation. Negative for vomiting.    Genitourinary:  Negative for dysuria and hematuria.   Musculoskeletal:  Negative for arthralgias and back pain.   Skin:  Negative for color change and rash.   Neurological:  Positive for light-headedness. Negative for dizziness, seizures, syncope and headaches.   All other systems reviewed and are negative.      Historical Information   Past Medical History:   Diagnosis Date    Arthropathy of knee     last assessed 8/19/15    Bacterial pneumonia 02/05/2007    last assessed 2/5/7    Brain atrophy (HCC)     Colon polyp     CPAP (continuous positive airway pressure) dependence     Disorder of male genital organ 02/12/2008    last assessed 2/12/08    ED (erectile dysfunction) of organic origin     last assessed 2/13/17     History of hypertension     Seasonal allergies     Situational anxiety     resolved 3/16/17     Sleep apnea     Stroke (HCC)     09/2020 TIA    Tinnitus     Wears hearing aid     bilateral     Past Surgical History:   Procedure Laterality Date    CARDIAC CATHETERIZATION Left 10/28/2022    Procedure: Cardiac Left Heart Cath;  Surgeon: Christian Burr MD;  Location:  CARDIAC CATH LAB;  Service: Cardiology    CARDIAC CATHETERIZATION  10/28/2022    Procedure: Cardiac catheterization;  Surgeon: Christian Burr MD;  Location:  CARDIAC CATH LAB;  Service: Cardiology    CARDIAC CATHETERIZATION N/A 10/28/2022    Procedure: Cardiac Coronary Angiogram;  Surgeon: Christian Burr MD;  Location:  CARDIAC CATH LAB;  Service: Cardiology    CARDIAC ELECTROPHYSIOLOGY PROCEDURE N/A 12/22/2021    Procedure: Cardiac Loop Recorder Implant;  Surgeon: Judy Norris DO;  Location: [facility] CARDIAC CATH LAB;  Service: Cardiology    COLONOSCOPY      x3-w/polyps    HERNIA REPAIR      umbilical,hemal inguinal    KNEE ARTHROSCOPY Left     meniscus    OR COLONOSCOPY FLX DX W/COLLJ SPEC WHEN PFRMD N/A 5/7/2018    Procedure: COLONOSCOPY;  Surgeon: Thiago Lopes MD;  Location: [facility] GI LAB;  Service: Gastroenterology     Social History      Substance and Sexual Activity   Alcohol Use Not Currently    Alcohol/week: 14.0 standard drinks of alcohol    Types: 14 Cans of beer per week    Comment: occ     Social History     Substance and Sexual Activity   Drug Use No     Social History     Tobacco Use   Smoking Status Never    Passive exposure: Past   Smokeless Tobacco Never     Family History   Problem Relation Age of Onset    Cancer Mother         breast    Diverticulitis Mother         colostomy    Breast cancer Mother     Heart disease Father         CHF    Cancer Sister         breast    Depression Sister     Cancer Sister         skin cancer    Cancer Sister         skin cancer    Colon cancer Neg Hx     Liver disease Neg Hx        Allergies:  Allergies   Allergen Reactions    Pollen Extract Sneezing     Reaction Date: 18Sep2006;     Shellfish-Derived Products - Food Allergy Other (See Comments)     Eye swelling    Iodine Solution [Povidone Iodine] Rash     Eyes swell       Medications:   Scheduled Meds:  Current Facility-Administered Medications   Medication Dose Route Frequency Provider Last Rate    acetaminophen  650 mg Oral Q6H PRN Kajal A DiannvarJ LUIS nielsenNP      atorvastatin  40 mg Oral QPM Kajal A DiannvarJ LUIS nielsenNP      loratadine  10 mg Oral Daily Kajal A ELVIS Barrett      memantine  5 mg Oral BID Kajal A ELVIS Barrett      metoprolol succinate  50 mg Oral BID Kajal A Upatriciavary CRNP      pantoprazole  40 mg Intravenous Q12H Kajal A Upatriciavargia CRNP      sodium chloride  125 mL/hr Intravenous Continuous Kajal A Arnold CRNP 125 mL/hr (05/28/24 2233)     Continuous Infusions:sodium chloride, 125 mL/hr, Last Rate: 125 mL/hr (05/28/24 2233)      PRN Meds:  acetaminophen, 650 mg, Q6H PRN        Objective:   Vitals:   Vitals:    05/29/24 0758   BP: 106/62   Pulse: 75   Resp: 18   Temp: 97.8 °F (36.6 °C)   SpO2: 95%     Body mass index is 33.46 kg/m².  Orthostatic Blood Pressures      Flowsheet Row Most Recent Value   Blood Pressure  106/62 filed at 2024 0758   Patient Position - Orthostatic VS Lying filed at 2024 0758          Systolic (24hrs), Av , Min:106 , Max:161     Diastolic (24hrs), Av, Min:62, Max:105      Intake/Output Summary (Last 24 hours) at 2024 0808  Last data filed at 2024 1812  Gross per 24 hour   Intake 600 ml   Output --   Net 600 ml     Weight (last 2 days)       Date/Time Weight    24 2315 106 (233.2)    24 1644 106 (233.8)          Invasive Devices       Peripheral Intravenous Line  Duration             Peripheral IV 24 Left;Ventral (anterior) Forearm <1 day    Peripheral IV 24 Right;Ventral (anterior) Forearm <1 day    Peripheral IV 24 Right;Ventral (anterior) Forearm <1 day                    Physical Exam  Vitals and nursing note reviewed.   Constitutional:       General: He is not in acute distress.     Appearance: He is well-developed.   HENT:      Head: Normocephalic and atraumatic.   Eyes:      Conjunctiva/sclera: Conjunctivae normal.   Cardiovascular:      Rate and Rhythm: Normal rate and regular rhythm.      Pulses: Normal pulses.      Heart sounds: Normal heart sounds. No murmur heard.     No friction rub. No gallop.   Pulmonary:      Effort: Pulmonary effort is normal. No respiratory distress.      Breath sounds: Normal breath sounds.   Abdominal:      Palpations: Abdomen is soft.      Tenderness: There is abdominal tenderness.   Musculoskeletal:         General: No swelling.      Cervical back: Neck supple.      Right lower leg: Edema (Trace) present.      Left lower leg: Edema (Trace) present.   Skin:     General: Skin is warm and dry.      Capillary Refill: Capillary refill takes less than 2 seconds.   Neurological:      Mental Status: He is alert.   Psychiatric:         Mood and Affect: Mood normal.         Labs:       CBC with diff:   Results from last 7 days   Lab Units 24  0324 24  2145 24  1712   WBC Thousand/uL  --   --  7.03    HEMOGLOBIN g/dL 12.6 14.0 15.2   HEMATOCRIT %  --  40.3 45.0   MCV fL  --   --  89   PLATELETS Thousands/uL  --   --  247   RBC Million/uL  --   --  5.04   MCH pg  --   --  30.2   MCHC g/dL  --   --  33.8   RDW %  --   --  12.7   MPV fL  --   --  8.7*   NRBC AUTO /100 WBCs  --   --  0     CMP:  Results from last 7 days   Lab Units 05/28/24  1712   SODIUM mmol/L 141   POTASSIUM mmol/L 3.7   CHLORIDE mmol/L 116*   CO2 mmol/L 19*   BUN mg/dL 20   CREATININE mg/dL 0.95   CALCIUM mg/dL 8.9   AST U/L 14   ALT U/L 16   ALK PHOS U/L 81   EGFR ml/min/1.73sq m 83         Coags:  Results from last 7 days   Lab Units 05/28/24  1712   PTT seconds 30   INR  1.06       Lipid Profile:   Lab Results   Component Value Date    CHOL 181 09/07/2016     Lab Results   Component Value Date    HDL 29 (L) 04/17/2024    HDL 27 (L) 10/29/2022    HDL 34 (L) 11/12/2021     Lab Results   Component Value Date    LDLCALC 60 04/17/2024    LDLCALC 34 10/29/2022    LDLCALC 57 11/12/2021     Lab Results   Component Value Date    TRIG 67 04/17/2024    TRIG 95 10/29/2022    TRIG 58 11/12/2021       Imaging & Testing   Cardiac testing:   EKG reviewed personally: NSR, HR 80bpm    Imaging:   I have personally reviewed pertinent reports.    CT high volume bleeding scan abdomen pelvis    Result Date: 5/28/2024  Narrative: CT ABDOMEN AND PELVIS - WITHOUT AND WITH IV CONTRAST INDICATION:   BRBPR. History of diverticular bleed. On Plavix and Eliquis. Patient complains of weakness. COMPARISON: 7/7/2020 TECHNIQUE: CT examination of the abdomen and pelvis was performed both prior to and after the administration of intravenous contrast. Multiplanar 2D reformatted images were created from the source data. Radiation dose length product (DLP) for this visit: 3671 mGy-cm . This examination, like all CT scans performed in the [facility], was performed utilizing techniques to minimize radiation dose exposure, including the use of iterative  "reconstruction and automated exposure control. IV Contrast: 100 mL of iohexol (OMNIPAQUE) Enteric Contrast: Not administered. FINDINGS: ABDOMEN BOWEL: Chronic marked colonic diverticulosis. No new wall thickening, dilation, fluid levels or secondary signs of inflammation. No contrast extravasation identified. LOWER CHEST: Dependent atelectasis. Cardiomegaly and atherosclerosis. LIVER/BILIARY TREE: Chronic cysts. Normal hepatic morphology and bile duct caliber. GALLBLADDER: Within normal limits. SPLEEN: Within normal limits. PANCREAS: Within normal limits. ADRENAL GLANDS: Chronic mild nodular thickening. KIDNEYS/URETERS: Chronic simple right cortical cyst and tiny hyperdense left cortical cyst. APPENDIX: Within normal limits. ABDOMINOPELVIC CAVITY: No abnormal fluid, air or lymphadenopathy. VESSELS: Chronic atherosclerosis. Normal aortic caliber. Patent mesenteric vessels. PELVIS REPRODUCTIVE ORGANS: Chronic prostate enlargement. URINARY BLADDER: Chronic trabeculation. ABDOMINAL WALL/INGUINAL REGIONS: Within normal limits. BONES: Degenerative changes.     Impression: No evidence of active high-volume gastrointestinal hemorrhage. No acute abnormality identified in the abdomen or pelvis. Workstation performed: [id number]       Mak Gleason MD    Portions of the record may have been created with voice recognition software.  Occasional wrong word or \"sound a like\" substitutions may have occurred due to the inherent limitations of voice recognition software.  Read the chart carefully and recognize, using context, where substitutions have occurred.   "

## 2024-05-29 NOTE — ASSESSMENT & PLAN NOTE
POA with couple bloody stools followed by weakness and dizziness starting this morning prior to admission.  Patient has a history of diverticulitis with multiple episodes of bleeding  Hemoglobin on admission stable at 15, denies abdominal pain and cramping  States he is dizzy and has fatigue  Noted to have a large bright red bloody liquid stool in the ED  Patient is on Eliquis and Plavix for A-fib, on hold  CT high-volume bleeding abdomen and pelvis: Negative for active high-volume GI hemorrhaging, no acute abnormalities in the abdomen or pelvis  On-call GI Dr. Bolivar made aware, recommendations is to give reversal agents now and prep patient with MiraLAX and Gatorade for possible colonoscopy  Monitor H&H every 6 hours  Monitor vital signs  Monitor stools  Continue with IV hydration,  normal saline at 125 ml/hr  Obtain type and screen  Patient is n.p.o. after midnight with sips with meds

## 2024-05-29 NOTE — CONSULTS
"    Consultation -  [facility]  Christopher Razo 68 y.o. male MRN: [id number]  Unit/Bed#: [address] [place] [address] Encounter: [id number]            Reason for Consult / Principal Problem:   Bright red blood per rectum      ASSESSMENT AND PLAN:      68-year-old male who presents with rectal bleeding, with hx of diverticular bleed.  Does have history of the same, and underwent colonoscopy 2 years ago which did reveal pandiverticulosis mostly concentrated in the sigmoid.  Suspect diverticular bleed.  Patient was taking Eliquis and Plavix which have been held and was given Kcentra on admission.  -Prep was ordered for potential colonoscopy, message sent to nurse to see if patient completed prep, patient did complete prep and still is passing bright red blood per rectum  -Hemoglobin did trend down to 12.1 on last blood work, was 15.2 on admission  -Continue to monitor serial H&H  -Plan is for colonoscopy today  -Further recommendations be made pending his course  -Continue n.p.o.    Thank you for the consultation.  Case will be discussed with Dr. Carbone.  ______________________________________________________________________    HPI:    68 y.o. male with a PMH of possible A-fib, CVA, brain atrophy, diverticulosis with multiple episodes of bleeding, hypertension, CAD who presents with bright red blood.  Patient states that yesterday morning he started having several episodes of bright red blood per rectum that \" filled up the toilet\".  States that he is on Eliquis and Plavix.  States he has episodes of diverticulitis in the past with rectal bleeding.  Denies nausea vomiting.  Does state that he is feeling fatigued and weak since the bleeding started. States that he has no abdominal pain or cramping.   Patient does have history of GI bleeding and did have colonoscopy in June 2022 and at that time no blood or bleeding source was identified but prep was inadequate for screening purposes and he did have recent polypectomy " at that time but site could not be identified with pandiverticulosis primarily concentrated in the sigmoid colon which is likely the source of bleeding and internal hemorrhoids were noted.  Patient was prepped last night for colonoscopy today.  Hemoglobin today was 12.6/12.1 upon repeat.  He reports that he had bleeding every time that he moves his bowels and it is bright red.  Denies any significant dizziness.  Reports he has had multiple diverticular bleeds and also reports distant history of post polypectomy bleeding.  Patient denies any abdominal pain currently, he reports he drank the prep.      REVIEW OF SYSTEMS:    CONSTITUTIONAL: Denies any fever, chills, rigors, and weight loss.  HEENT: No earache or tinnitus. Denies hearing loss or visual disturbances.  CARDIOVASCULAR: No chest pain or palpitations.   RESPIRATORY: Denies any cough, hemoptysis, shortness of breath or dyspnea on exertion.  GASTROINTESTINAL: As noted in the History of Present Illness.   GENITOURINARY: No problems with urination. Denies any hematuria or dysuria.  NEUROLOGIC: No dizziness or vertigo, denies headaches.   MUSCULOSKELETAL: Denies any muscle or joint pain.   SKIN: Denies skin rashes or itching.   ENDOCRINE: Denies excessive thirst. Denies intolerance to heat or cold.  PSYCHOSOCIAL: Denies depression or anxiety. Denies any recent memory loss.       Historical Information   Past Medical History:   Diagnosis Date    Arthropathy of knee     last assessed 8/19/15    Bacterial pneumonia 02/05/2007    last assessed 2/5/7    Brain atrophy (HCC)     Colon polyp     CPAP (continuous positive airway pressure) dependence     Disorder of male genital organ 02/12/2008    last assessed 2/12/08    ED (erectile dysfunction) of organic origin     last assessed 2/13/17     History of hypertension     Seasonal allergies     Situational anxiety     resolved 3/16/17     Sleep apnea     Stroke (HCC)     09/2020 TIA    Tinnitus     Wears hearing aid      bilateral     Past Surgical History:   Procedure Laterality Date    CARDIAC CATHETERIZATION Left 10/28/2022    Procedure: Cardiac Left Heart Cath;  Surgeon: Christian Burr MD;  Location:  CARDIAC CATH LAB;  Service: Cardiology    CARDIAC CATHETERIZATION  10/28/2022    Procedure: Cardiac catheterization;  Surgeon: Christian Burr MD;  Location:  CARDIAC CATH LAB;  Service: Cardiology    CARDIAC CATHETERIZATION N/A 10/28/2022    Procedure: Cardiac Coronary Angiogram;  Surgeon: Christian Burr MD;  Location:  CARDIAC CATH LAB;  Service: Cardiology    CARDIAC ELECTROPHYSIOLOGY PROCEDURE N/A 12/22/2021    Procedure: Cardiac Loop Recorder Implant;  Surgeon: Judy Norris DO;  Location: [facility]ARDIAC CATH LAB;  Service: Cardiology    COLONOSCOPY      x3-w/polyps    HERNIA REPAIR      umbilical,hemal inguinal    KNEE ARTHROSCOPY Left     meniscus    N[place] COLONOSCOPY FLX DX W/COLLJ SPEC WHEN PFRMD N/A 5/7/2018    Procedure: COLONOSCOPY;  Surgeon: Thiago Lopes MD;  Location: [facility] GI LAB;  Service: Gastroenterology     Social History   Social History     Substance and Sexual Activity   Alcohol Use Not Currently    Alcohol/week: 14.0 standard drinks of alcohol    Types: 14 Cans of beer per week    Comment: occ     Social History     Substance and Sexual Activity   Drug Use No     Social History     Tobacco Use   Smoking Status Never    Passive exposure: Past   Smokeless Tobacco Never     Family History   Problem Relation Age of Onset    Cancer Mother         breast    Diverticulitis Mother         colostomy    Breast cancer Mother     Heart disease Father         CHF    Cancer Sister         breast    Depression Sister     Cancer Sister         skin cancer    Cancer Sister         skin cancer    Colon cancer Neg Hx     Liver disease Neg Hx        Meds/Allergies       Medications Prior to Admission:     atorvastatin (LIPITOR) 40 mg tablet    clopidogrel (PLAVIX) 75 mg tablet    Eliquis 5 MG    memantine (NAMENDA) 5 mg tablet     "metoprolol succinate (TOPROL-XL) 50 mg 24 hr tablet    ALPRAZolam (XANAX) 0.5 mg tablet    fexofenadine (ALLEGRA) 180 MG tablet  Current Facility-Administered Medications   Medication Dose Route Frequency    acetaminophen (TYLENOL) tablet 650 mg  650 mg Oral Q6H PRN    atorvastatin (LIPITOR) tablet 40 mg  40 mg Oral QPM    loratadine (CLARITIN) tablet 10 mg  10 mg Oral Daily    memantine (NAMENDA) tablet 5 mg  5 mg Oral BID    metoprolol succinate (TOPROL-XL) 24 hr tablet 50 mg  50 mg Oral BID    pantoprazole (PROTONIX) injection 40 mg  40 mg Intravenous Q12H    sodium chloride 0.9 % infusion  125 mL/hr Intravenous Continuous       Allergies   Allergen Reactions    Pollen Extract Sneezing     Reaction Date: 18Sep2006;     Shellfish-Derived Products - Food Allergy Other (See Comments)     Eye swelling    Iodine Solution [Povidone Iodine] Rash     Eyes swell           Objective     Blood pressure 111/68, pulse 76, temperature 97.5 °F (36.4 °C), resp. rate 17, height 5' 10\" (1.778 m), weight 106 kg (233 lb 3.2 oz), SpO2 97%. Body mass index is 33.46 kg/m².      Intake/Output Summary (Last 24 hours) at 5/29/2024 0751  Last data filed at 5/28/2024 1812  Gross per 24 hour   Intake 600 ml   Output --   Net 600 ml         PHYSICAL EXAM:      General Appearance:   Alert, cooperative, no distress   HEENT:   Normocephalic, atraumatic, anicteric.     Neck:  Supple, symmetrical, trachea midline   Lungs:   Clear to auscultation bilaterally; no rales, rhonchi or wheezing; respirations unlabored    Heart::   Regular rate and rhythm; no murmur, rub, or gallop.   Abdomen:   Soft, non-tender, non-distended; normal bowel sounds; no r/r/g   Genitalia:   Deferred    Rectal:   Deferred    Extremities:  No cyanosis, clubbing or edema    Pulses:  2+ and symmetric all extremities    Skin:  No jaundice, rashes, or lesions    Lymph nodes:  No palpable cervical lymphadenopathy        Lab Results:   Admission on 05/28/2024   Component Date Value "    Sodium 05/28/2024 141     Potassium 05/28/2024 3.7     Chloride 05/28/2024 116 (H)     CO2 05/28/2024 19 (L)     ANION GAP 05/28/2024 6     BUN 05/28/2024 20     Creatinine 05/28/2024 0.95     Glucose 05/28/2024 120     Calcium 05/28/2024 8.9     AST 05/28/2024 14     ALT 05/28/2024 16     Alkaline Phosphatase 05/28/2024 81     Total Protein 05/28/2024 6.1 (L)     Albumin 05/28/2024 3.8     Total Bilirubin 05/28/2024 0.67     eGFR 05/28/2024 83     WBC 05/28/2024 7.03     RBC 05/28/2024 5.04     Hemoglobin 05/28/2024 15.2     Hematocrit 05/28/2024 45.0     MCV 05/28/2024 89     MCH 05/28/2024 30.2     MCHC 05/28/2024 33.8     RDW 05/28/2024 12.7     MPV 05/28/2024 8.7 (L)     Platelets 05/28/2024 247     nRBC 05/28/2024 0     Segmented % 05/28/2024 68     Immature Grans % 05/28/2024 0     Lymphocytes % 05/28/2024 17     Monocytes % 05/28/2024 10     Eosinophils Relative 05/28/2024 4     Basophils Relative 05/28/2024 1     Absolute Neutrophils 05/28/2024 4.77     Absolute Immature Grans 05/28/2024 0.03     Absolute Lymphocytes 05/28/2024 1.22     Absolute Monocytes 05/28/2024 0.69     Eosinophils Absolute 05/28/2024 0.28     Basophils Absolute 05/28/2024 0.04     Protime 05/28/2024 14.0     INR 05/28/2024 1.06     PTT 05/28/2024 30     hs TnI 0hr 05/28/2024 10     hs TnI 2hr 05/28/2024 9     Delta 2hr hsTnI 05/28/2024 -1     Hemoglobin 05/28/2024 14.0     Hematocrit 05/28/2024 40.3     ABO Grouping 05/29/2024 O     Rh Factor 05/29/2024 Positive     Antibody Screen 05/29/2024 Negative     Specimen Expiration Date 05/29/2024 [id number]     Hemoglobin 05/29/2024 12.6        Imaging Studies: CT high volume bleeding scan abdomen pelvis    Result Date: 5/28/2024  Narrative: CT ABDOMEN AND PELVIS - WITHOUT AND WITH IV CONTRAST INDICATION:   BRBPR. History of diverticular bleed. On Plavix and Eliquis. Patient complains of weakness. COMPARISON: 7/7/2020 TECHNIQUE: CT examination of the abdomen and pelvis was performed both  prior to and after the administration of intravenous contrast. Multiplanar 2D reformatted images were created from the source data. Radiation dose length product (DLP) for this visit: 3671 mGy-cm . This examination, like all CT scans performed in the [facility], was performed utilizing techniques to minimize radiation dose exposure, including the use of iterative reconstruction and automated exposure control. IV Contrast: 100 mL of iohexol (OMNIPAQUE) Enteric Contrast: Not administered. FINDINGS: ABDOMEN BOWEL: Chronic marked colonic diverticulosis. No new wall thickening, dilation, fluid levels or secondary signs of inflammation. No contrast extravasation identified. LOWER CHEST: Dependent atelectasis. Cardiomegaly and atherosclerosis. LIVER/BILIARY TREE: Chronic cysts. Normal hepatic morphology and bile duct caliber. GALLBLADDER: Within normal limits. SPLEEN: Within normal limits. PANCREAS: Within normal limits. ADRENAL GLANDS: Chronic mild nodular thickening. KIDNEYS/URETERS: Chronic simple right cortical cyst and tiny hyperdense left cortical cyst. APPENDIX: Within normal limits. ABDOMINOPELVIC CAVITY: No abnormal fluid, air or lymphadenopathy. VESSELS: Chronic atherosclerosis. Normal aortic caliber. Patent mesenteric vessels. PELVIS REPRODUCTIVE ORGANS: Chronic prostate enlargement. URINARY BLADDER: Chronic trabeculation. ABDOMINAL WALL/INGUINAL REGIONS: Within normal limits. BONES: Degenerative changes.     Impression: No evidence of active high-volume gastrointestinal hemorrhage. No acute abnormality identified in the abdomen or pelvis. Workstation performed: [id number]

## 2024-05-29 NOTE — ASSESSMENT & PLAN NOTE
CVA diagnosed 2020, loop recorder showed paroxysmal afib & A-fib and started on Eliquis  Eliquis  & Plavix on hold in the setting of GI bleed

## 2024-05-29 NOTE — H&P
[facility]  H&P  Name: Christopher Razo 68 y.o. male I MRN: [id number]  Unit/Bed#: [address] [place] [address] I Date of Admission: 5/28/2024   Date of Service: 5/28/2024 I Hospital Day: 0      Assessment & Plan   * Bright red rectal bleeding  Assessment & Plan  POA with couple bloody stools followed by weakness and dizziness starting this morning prior to admission.  Patient has a history of diverticulitis with multiple episodes of bleeding  Hemoglobin on admission stable at 15, denies abdominal pain and cramping  States he is dizzy and has fatigue  Noted to have a large bright red bloody liquid stool in the ED  Patient is on Eliquis and Plavix for A-fib, on hold  CT high-volume bleeding abdomen and pelvis: Negative for active high-volume GI hemorrhaging, no acute abnormalities in the abdomen or pelvis  On-call GI Dr. Bolivar made aware, recommendations is to give reversal agents now and prep patient with MiraLAX and Gatorade for possible colonoscopy  Monitor H&H every 6 hours  Monitor vital signs  Monitor stools  Continue with IV hydration,  normal saline at 125 ml/hr  Obtain type and screen  Patient is n.p.o. after midnight with sips with meds      CAD (coronary artery disease)  Assessment & Plan  With recent PCI x 2  Plavix on hold in the setting of recent GI bleeding  Continue atorvastatin    Paroxysmal A-fib (HCC)  Assessment & Plan  [id number] 5  EKG personally interpreted: Normal sinus rhythm rate 80 no ST-T changes noted  Eliquis  & Plavix on hold in setting of GI bleed  Continue metoprolol  Cardiology is consulted appreciate recommendations DOAC management in setting of recent GI bleed    History of cardioembolic cerebrovascular accident (CVA)  Assessment & Plan  CVA diagnosed 2020, loop recorder showed paroxysmal afib & A-fib and started on Eliquis  Eliquis  & Plavix on hold in the setting of GI bleed      Brain atrophy (HCC)  Assessment & Plan  Chronic  Home regimen: Namenda continue  "    Obstructive sleep apnea  Assessment & Plan  Chronic  Noncompliant with CPAP use at home  Encouraged CPAP use          Benign essential hypertension  Assessment & Plan  Blood pressure stable  Home regimen: Metoprolol succinate BID continue  Monitor blood pressure           VTE Pharmacologic Prophylaxis:   High Risk (Score >/= 5) - Pharmacological DVT Prophylaxis Contraindicated. Sequential Compression Devices Ordered.  The setting of GI bleed will continue SCDs for now  Code Status: Level 1 - Full Code with patient  Discussion with family: Patient declined call to .     Anticipated Length of Stay: Patient will be admitted on an observation basis with an anticipated length of stay of less than 2 midnights secondary to rectal bleeding requiring reversal agents and holding of his Eliquis and Plavix.  GI is consulted patient is being monitored for GI bleeding possibly colonoscopy in the a.m.    Total Time Spent on Date of Encounter in care of patient: 77 mins. This time was spent on one or more of the following: performing physical exam; counseling and coordination of care; obtaining or reviewing history; documenting in the medical record; reviewing/ordering tests, medications or procedures; communicating with other healthcare professionals and discussing with patient's family/caregivers.    Chief Complaint: Bright red blood per rectum    History of Present Illness:  Christopher Razo is a 68 y.o. male with a PMH of possible A-fib, CVA, brain atrophy, diverticulitis with multiple episodes of bleeding, hypertension, CAD who presents with bright red blood.  Patient states that this morning he started having several episodes of bright red blood per rectum that \" filled up the toilet\".  States that he is on Eliquis and Plavix for pr A-fib.  States he has episodes of diverticulitis in the past with rectal bleeding.  Denies nausea vomiting.  Does state that he is feeling fatigued and weak since the bleeding " started.  Denies dizziness.  States that he has no abdominal pain or cramping.    Review of Systems:  Review of Systems   Constitutional:  Positive for activity change.   Eyes: Negative.    Respiratory: Negative.     Cardiovascular: Negative.    Gastrointestinal:  Positive for blood in stool.   Endocrine: Negative.    Genitourinary: Negative.    Musculoskeletal: Negative.    Skin: Negative.    Allergic/Immunologic: Negative.    Neurological:  Positive for light-headedness.   Hematological: Negative.    Psychiatric/Behavioral: Negative.         Past Medical and Surgical History:   Past Medical History:   Diagnosis Date    Arthropathy of knee     last assessed 8/19/15    Bacterial pneumonia 02/05/2007    last assessed 2/5/7    Brain atrophy (HCC)     Colon polyp     CPAP (continuous positive airway pressure) dependence     Disorder of male genital organ 02/12/2008    last assessed 2/12/08    ED (erectile dysfunction) of organic origin     last assessed 2/13/17     History of hypertension     Seasonal allergies     Situational anxiety     resolved 3/16/17     Sleep apnea     Stroke (HCC)     09/2020 TIA    Tinnitus     Wears hearing aid     bilateral       Past Surgical History:   Procedure Laterality Date    CARDIAC CATHETERIZATION Left 10/28/2022    Procedure: Cardiac Left Heart Cath;  Surgeon: Christian Burr MD;  Location:  CARDIAC CATH LAB;  Service: Cardiology    CARDIAC CATHETERIZATION  10/28/2022    Procedure: Cardiac catheterization;  Surgeon: Christian Burr MD;  Location:  CARDIAC CATH LAB;  Service: Cardiology    CARDIAC CATHETERIZATION N/A 10/28/2022    Procedure: Cardiac Coronary Angiogram;  Surgeon: Christian Burr MD;  Location:  CARDIAC CATH LAB;  Service: Cardiology    CARDIAC ELECTROPHYSIOLOGY PROCEDURE N/A 12/22/2021    Procedure: Cardiac Loop Recorder Implant;  Surgeon: Judy Norris DO;  Location: [facility]ARDIAC CATH LAB;  Service: Cardiology    COLONOSCOPY      x3-w/polyps    HERNIA REPAIR       umbilical,hemal inguinal    KNEE ARTHROSCOPY Left     meniscus    [place] COLONOSCOPY FLX DX W/COLLJ SPEC WHEN PFRMD N/A 5/7/2018    Procedure: COLONOSCOPY;  Surgeon: Thiago Lopes MD;  Location: [facility] GI LAB;  Service: Gastroenterology       Meds/Allergies:  Prior to Admission medications    Medication Sig Start Date End Date Taking? Authorizing Provider   ALPRAZolam (XANAX) 0.5 mg tablet Take 1 tablet (0.5 mg total) by mouth 30 min pre-procedure 7/17/23   Judy Norris,    atorvastatin (LIPITOR) 40 mg tablet Take 1 tablet (40 mg total) by mouth every evening 4/15/24   Richrad Varela, DO   clopidogrel (PLAVIX) 75 mg tablet Take 1 tablet (75 mg total) by mouth daily 4/15/24   Richard Varela,    Eliquis 5 MG TAKE 1 TABLET BY MOUTH TWICE A DAY 4/10/24   [facility],    fexofenadine (ALLEGRA) 180 MG tablet Take 1 tablet (180 mg total) by mouth daily for 30 days  Patient taking differently: Take 180 mg by mouth every morning 2/15/18 4/15/24  Frank Lombardi,    memantine (NAMENDA) 5 mg tablet Take 5 mg by mouth 2 (two) times a day 7/8/23   Historical Provider, MD   metoprolol succinate (TOPROL-XL) 50 mg 24 hr tablet TAKE 1 TABLET BY MOUTH TWICE A DAY 3/25/24   [facility],    dofetilide (TIKOSYN) 500 mcg capsule Take 1 capsule (500 mcg total) by mouth 2 (two) times a day 10/28/22 11/1/22  Ceci Lee PA-C   magnesium oxide (MAG-OX) 400 mg Take 1 tablet (400 mg total) by mouth daily Do not start before November 2, 2022. 11/2/22 11/3/22  Shruthi Youngblood DO     I have reviewed home medications using recent Epic encounter.    Allergies:   Allergies   Allergen Reactions    Pollen Extract Sneezing     Reaction Date: 18Sep2006;     Shellfish-Derived Products - Food Allergy Other (See Comments)     Eye swelling    Iodine Solution [Povidone Iodine] Rash     Eyes swell       Social History:  Marital Status: /Civil Union   Occupation: retired   Patient Pre-hospital Living Situation: Home  Patient  Pre-hospital Level of Mobility: walks with cane  Patient Pre-hospital Diet Restrictions: none  Substance Use History:   Social History     Substance and Sexual Activity   Alcohol Use Not Currently    Alcohol/week: 14.0 standard drinks of alcohol    Types: 14 Cans of beer per week    Comment: occ     Social History     Tobacco Use   Smoking Status Never    Passive exposure: Past   Smokeless Tobacco Never     Social History     Substance and Sexual Activity   Drug Use No       Family History:  Family History   Problem Relation Age of Onset    Cancer Mother         breast    Diverticulitis Mother         colostomy    Breast cancer Mother     Heart disease Father         CHF    Cancer Sister         breast    Depression Sister     Cancer Sister         skin cancer    Cancer Sister         skin cancer    Colon cancer Neg Hx     Liver disease Neg Hx        Physical Exam:     Vitals:   Blood Pressure: 161/96 (05/28/24 2204)  Pulse: 80 (05/28/24 2204)  Temperature: 97.5 °F (36.4 °C) (05/28/24 2204)  Temp Source: Tympanic (05/28/24 1644)  Respirations: 17 (05/28/24 2204)  Weight - Scale: 106 kg (233 lb 12.8 oz) (05/28/24 1644)  SpO2: 97 % (05/28/24 2204)    Physical Exam  Vitals and nursing note reviewed.   Constitutional:       General: He is not in acute distress.     Appearance: He is well-developed.   HENT:      Head: Normocephalic and atraumatic.   Eyes:      Conjunctiva/sclera: Conjunctivae normal.   Cardiovascular:      Rate and Rhythm: Normal rate and regular rhythm.      Heart sounds: No murmur heard.  Pulmonary:      Effort: Pulmonary effort is normal. No respiratory distress.      Breath sounds: Normal breath sounds.   Abdominal:      Palpations: Abdomen is soft.      Tenderness: There is no abdominal tenderness.   Musculoskeletal:         General: No swelling.      Cervical back: Neck supple.   Skin:     General: Skin is warm and dry.      Capillary Refill: Capillary refill takes less than 2 seconds.    Neurological:      Mental Status: He is alert.   Psychiatric:         Mood and Affect: Mood normal.          Additional Data:     Lab Results:  Results from last 7 days   Lab Units 05/28/24  2145 05/28/24  1712   WBC Thousand/uL  --  7.03   HEMOGLOBIN g/dL 14.0 15.2   HEMATOCRIT % 40.3 45.0   PLATELETS Thousands/uL  --  247   SEGS PCT %  --  68   LYMPHO PCT %  --  17   MONO PCT %  --  10   EOS PCT %  --  4     Results from last 7 days   Lab Units 05/28/24  1712   SODIUM mmol/L 141   POTASSIUM mmol/L 3.7   CHLORIDE mmol/L 116*   CO2 mmol/L 19*   BUN mg/dL 20   CREATININE mg/dL 0.95   ANION GAP mmol/L 6   CALCIUM mg/dL 8.9   ALBUMIN g/dL 3.8   TOTAL BILIRUBIN mg/dL 0.67   ALK PHOS U/L 81   ALT U/L 16   AST U/L 14   GLUCOSE RANDOM mg/dL 120     Results from last 7 days   Lab Units 05/28/24  1712   INR  1.06                   Lines/Drains:  Invasive Devices       Peripheral Intravenous Line  Duration             Peripheral IV 05/28/24 Left;Ventral (anterior) Forearm <1 day    Peripheral IV 05/28/24 Right;Ventral (anterior) Forearm <1 day    Peripheral IV 05/28/24 Right;Ventral (anterior) Forearm <1 day                        Imaging: Reviewed radiology reports from this admission including: abdominal/pelvic CT  CT high volume bleeding scan abdomen pelvis   Final Result by Blanca Escamilla MD (05/28 1955)      No evidence of active high-volume gastrointestinal hemorrhage.      No acute abnormality identified in the abdomen or pelvis.         Workstation performed: [id number]             EKG and Other Studies Reviewed on Admission:   EKG: NSR. HR 80.    ** Please Note: This note has been constructed using a voice recognition system. **

## 2024-05-29 NOTE — ANESTHESIA POSTPROCEDURE EVALUATION
Post-Op Assessment Note    CV Status:  Stable  Pain Score: 0    Pain management: adequate       Mental Status:  Sleepy   Hydration Status:  Stable   PONV Controlled:  None   Airway Patency:  Patent     Post Op Vitals Reviewed: Yes    No anethesia notable event occurred.    Staff: Anesthesiologist, CRNA               BP   135/65   Temp      Pulse  70   Resp   14   SpO2   98

## 2024-05-29 NOTE — ASSESSMENT & PLAN NOTE
POA with couple bloody stools followed by weakness and dizziness starting this morning prior to admission.  Patient has a history of diverticulitis with multiple episodes of bleeding  Hemoglobin on admission stable at 15, denies abdominal pain and cramping  States he is dizzy and has fatigue  Noted to have a large bright red bloody liquid stool in the ED  Patient is on Eliquis and Plavix for A-fib, on hold  CT high-volume bleeding abdomen and pelvis: Negative for active high-volume GI hemorrhaging, no acute abnormalities in the abdomen or pelvis  Status post colonoscopy on 4/29/2024 showing diverticulosis with poor prep  Will need repeat colonoscopy   1 polyp removed follow-up biopsy results  Will monitor overnight and repeat hemoglobin in the morning if stable plan for discharge with outpatient GI follow-up

## 2024-05-29 NOTE — ASSESSMENT & PLAN NOTE
[id number]  EKG personally interpreted: Normal sinus rhythm rate 80 no ST-T changes noted  Eliquis  & Plavix on hold in setting of GI bleed  Continue metoprolol  Cardiology is consulted appreciate recommendations DOAC management in setting of recent GI bleed

## 2024-05-29 NOTE — UTILIZATION REVIEW
"Initial Clinical Review    Observation 5/28/24 @ 2047, converted to inpatient admission 5/29/24 @ 1349 for continued care & tx for BRBPR.    Admission: Date/Time/Statement:   Admission Orders (From admission, onward)       Ordered        05/29/24 1349  INPATIENT ADMISSION  Once            05/28/24 2047  Place in Observation  Once                          Orders Placed This Encounter   Procedures    INPATIENT ADMISSION     Standing Status:   Standing     Number of Occurrences:   1     Order Specific Question:   Level of Care     Answer:   Med Surg [16]     Order Specific Question:   Estimated length of stay     Answer:   More than 2 Midnights     Order Specific Question:   Certification     Answer:   I certify that inpatient services are medically necessary for this patient for a duration of greater than two midnights. See H&P and MD Progress Notes for additional information about the patient's course of treatment.     ED Arrival Information       Expected   -    Arrival   5/28/2024 16:26    Acuity   Urgent              Means of arrival   Walk-In    Escorted by   Spouse    Service   Hospitalist    Admission type   Emergency              Arrival complaint   rectal bleed             Chief Complaint   Patient presents with    Rectal Bleeding     Patient with hx of GI bleed from diverticulosis per wife. Patient on Plavix and Eliquis. Started this morning with several episodes of bright red rectal bleeding \" that fills the toilet \". States he feels weak, denies abdominal pain        Initial Presentation:   68 yom to ER from home for multiple episodes of BRBPR, no clots. Reports feeling weak & lightheaded. Pt on Plavix. Hx A-fib, CVA, brain atrophy, diverticulitis with multiple episodes of bleeding, hypertension, CAD. Presents with low grade fever. Admission CT neg. Labs: Cl 116, CO2 19, tprot 6.1. placed in observation status for BRBPR. GI recommendations: recommendations is to give reversal agents now and prep patient " with MiraLAX and Gatorade for possible colonoscopy. Monitor H&H, stools. IVF, IV PPI.      Observation to IP admission 5/29/24:  BRBPR POA. Hgb drop from 15.2 to 12.6 today. GI consulted, colonoscopy planned for suspected diverticular bleed. Continue to follow H&H.     Per GI: BRBPR  Suspect diverticular bleed.  Patient was taking Eliquis and Plavix which have been held and was given Kcentra on admission.  -Prep was ordered for potential colonoscopy, message sent to nurse to see if patient completed prep, patient did complete prep and still is passing bright red blood per rectum  -Hemoglobin did trend down to 12.1 on last blood work, was 15.2 on admission  -Continue to monitor serial H&H  -Plan is for colonoscopy today    Per cardio: GI bleed, recommendations on DOACs   Continue to hold Eliquis and Plavix in light of recent events of BRBPR.  Can resume Eliquis at discharge contingent upon identification and treatment of GI pathology.  Continue home dose of metoprolol, atorvastatin.  Continue to monitor CMP, magnesium    ED Triage Vitals [05/28/24 1644]   Temperature Pulse Respirations Blood Pressure SpO2   99.7 °F (37.6 °C) 87 18 125/81 97 %      Temp Source Heart Rate Source Patient Position - Orthostatic VS BP Location FiO2 (%)   Tympanic Monitor Sitting Left arm --      Pain Score       No Pain          Wt Readings from Last 1 Encounters:   05/28/24 106 kg (233 lb 3.2 oz)     Additional Vital Signs:   Date/Time Temp Pulse Resp BP MAP (mmHg) SpO2 O2 Device Patient Position - Orthostatic VS   05/29/24 0758 97.8 °F (36.6 °C) 75 18 106/62 -- 95 % None (Room air) Lying   05/29/24 0401 -- -- -- 111/68 84 -- -- Lying   05/29/24 0016 -- 76 -- 131/74 -- -- -- --   05/28/24 2204 97.5 °F (36.4 °C) 80 17 161/96 -- 97 % None (Room air) --   05/28/24 2115 -- 80 16 131/90 107 98 % -- --   05/28/24 1948 -- 73 16 156/105 Abnormal  -- 96 % None (Room air) Lying   05/28/24 1644 99.7 °F (37.6 °C) 87 18 125/81 -- 97 % None (Room air)  Sitting     Pertinent Labs/Diagnostic Test Results:   CT high volume bleeding scan abdomen pelvis   Final Result (05/28 1955)      No evidence of active high-volume gastrointestinal hemorrhage.      No acute abnormality identified in the abdomen or pelvis.     5/29 Colonoscopy=  FINDINGS:  Significant amount of pancolonic hematin. Old blood in stool throughout the entire colon but no active bleeding or fresh blood visualized.  There was no evidence of blood within the terminal ileum.  Multiple pancolonic diverticula. Pandiverticulosis but concentrated primarily in the left colon.  Sessile polyp measuring 5-9 mm in the cecum. Single polyp visualized in the cecum, not removed at this time   IMPRESSION:  Significant amount of pancolonic hematin  Diverticulosis  Subcentimeter polyp in the cecum        Results from last 7 days   Lab Units 05/30/24  0429 05/29/24  0936 05/29/24  0324 05/28/24  2145 05/28/24  1712   WBC Thousand/uL 7.01  --   --   --  7.03   HEMOGLOBIN g/dL 10.4* 12.1 12.6 14.0 15.2   HEMATOCRIT % 30.1*  --   --  40.3 45.0   PLATELETS Thousands/uL 185  --   --   --  247   TOTAL NEUT ABS Thousands/µL  --   --   --   --  4.77     Results from last 7 days   Lab Units 05/30/24 0429 05/28/24  1712   SODIUM mmol/L 140 141   POTASSIUM mmol/L 3.2* 3.7   CHLORIDE mmol/L 114* 116*   CO2 mmol/L 21 19*   ANION GAP mmol/L 5 6   BUN mg/dL 14 20   CREATININE mg/dL 0.74 0.95   EGFR ml/min/1.73sq m 96 83   CALCIUM mg/dL 7.9* 8.9   MAGNESIUM mg/dL 1.9 2.0   PHOSPHORUS mg/dL 3.0  --      Results from last 7 days   Lab Units 05/30/24  0429 05/28/24  1712   AST U/L  --  14   ALT U/L  --  16   ALK PHOS U/L  --  81   TOTAL PROTEIN g/dL  --  6.1*   ALBUMIN g/dL 3.3* 3.8   TOTAL BILIRUBIN mg/dL  --  0.67     Results from last 7 days   Lab Units 05/30/24  0429 05/28/24  1712   GLUCOSE RANDOM mg/dL 105 120     Results from last 7 days   Lab Units 05/28/24  1936 05/28/24  1712   HS TNI 0HR ng/L  --  10   HS TNI 2HR ng/L 9  --     HSTNI D2 ng/L -1  --      Results from last 7 days   Lab Units 05/28/24  1712   PROTIME seconds 14.0   INR  1.06   PTT seconds 30     Results from last 7 days   Lab Units 05/29/24  0324   BNP pg/mL 36     ED Treatment:   Medication Administration from 05/28/2024 1626 to 05/28/2024 2201         Date/Time Order Dose Route Action     05/28/2024 1712 EDT sodium chloride 0.9 % bolus 500 mL 500 mL Intravenous New Bag     05/28/2024 1720 EDT pantoprazole (PROTONIX) 80 mg in sodium chloride 0.9 % 100 mL IVPB 80 mg Intravenous New Bag     05/28/2024 1828 EDT iohexol (OMNIPAQUE) 350 MG/ML injection (MULTI-DOSE) 100 mL 100 mL Intravenous Given          Past Medical History:   Diagnosis Date    Arthropathy of knee     last assessed 8/19/15    Bacterial pneumonia 02/05/2007    last assessed 2/5/7    Brain atrophy (HCC)     Colon polyp     CPAP (continuous positive airway pressure) dependence     Disorder of male genital organ 02/12/2008    last assessed 2/12/08    ED (erectile dysfunction) of organic origin     last assessed 2/13/17     History of hypertension     Seasonal allergies     Situational anxiety     resolved 3/16/17     Sleep apnea     Stroke (HCC)     09/2020 TIA    Tinnitus     Wears hearing aid     bilateral     Present on Admission:   Paroxysmal A-fib (HCC)   Brain atrophy (HCC)   Bright red rectal bleeding   Benign essential hypertension   Obstructive sleep apnea      Admitting Diagnosis: BRBPR (bright red blood per rectum) [K62.5]  Rectal bleed [K62.5]  H/O heart artery stent [Z95.5]  Age/Sex: 68 y.o. male  Admission Orders:  Consult GI  Consult cardio  Scd    Scheduled Medications:  Medications 05/21 05/22 05/23 05/24 05/25 05/26 05/27 05/28 05/29 05/30   atorvastatin (LIPITOR) tablet 40 mg  Dose: 40 mg  Freq: Every evening Route: PO  Start: 05/28/24 2200           2216      1728      1800        loratadine (CLARITIN) tablet 10 mg  Dose: 10 mg  Freq: Daily Route: PO  Start: 05/29/24 0900            0808       0900        memantine (NAMENDA) tablet 5 mg  Dose: 5 mg  Freq: 2 times daily Route: PO  Start: 05/28/24 2200           2216      0808     1728      0900     1800        metoprolol succinate (TOPROL-XL) 24 hr tablet 50 mg  Dose: 50 mg  Freq: 2 times daily Route: PO  Start: 05/28/24 2200   Admin Instructions:      Order specific questions:              2216      0808)     1728      0900     1800        pantoprazole (PROTONIX) 80 mg in sodium chloride 0.9 % 100 mL IVPB  Dose: 80 mg  Freq: Once Route: IV  Last Dose: Stopped (05/28/24 1735)  Start: 05/28/24 1715 End: 05/28/24 1735   Admin Instructions:              1720     1735          pantoprazole (PROTONIX) injection 40 mg  Dose: 40 mg  Freq: Every 12 hours Route: IV  Start: 05/28/24 2200   Admin Instructions:              2216      0808          2100      1000     2200        polyethylene glycol (GLYCOLAX) bowel prep 238 g  Dose: 238 g  Freq: Once Route: PO  Start: 05/28/24 2200 End: 05/28/24 2224   Admin Instructions:              2224          potassium chloride (Klor-Con M20) CR tablet 40 mEq  Dose: 40 mEq  Freq: Once Route: PO  Start: 05/30/24 0815   Admin Instructions:                0815        prothrombin complex concentrate (human) (Kcentra) 2,000 Units  Dose: 2,000 Units  Freq: Once Route: IV  Start: 05/28/24 2200 End: 05/28/24 2220   Admin Instructions:      Order specific questions:              2220          sodium chloride 0.9 % bolus 500 mL  Dose: 500 mL  Freq: Once Route: IV  Last Dose: Stopped (05/28/24 1812)  Start: 05/28/24 1715 End: 05/28/24 1812 1712 1812                      Continuous Meds Sorted by Name  for Mohit aRzo as of 05/21/24 through 5/30/24  Legend:       Medications 05/21 05/22 05/23 05/24 05/25 05/26 05/27 05/28 05/29 05/30   lactated ringers infusion  Freq: Continuous PRN Route: IV  Start: 05/29/24 1520 End: 05/29/24 1543            1520     1530     1543-D/C'd       propofol (DIPRIVAN) 1000 mg in 100 mL infusion  (premix)  Freq: Continuous PRN Route: IV  Last Dose: Stopped (05/29/24 1543)  Start: 05/29/24 1531 End: 05/29/24 1543            1531     1533     1543     1543-D/C'd       sodium chloride 0.9 % infusion  Rate: 125 mL/hr Dose: 125 mL/hr  Freq: Continuous Route: IV  Indications of Use: IV Hydration  Last Dose: 125 mL/hr (05/28/24 2233)  Start: 05/28/24 2200 End: 05/29/24 1828 2233      1828-D/C'd                   PRN Meds Sorted by Name  for Mohit Razo as of 05/21/24 through 5/30/24  Legend:         Medications 05/21 05/22 05/23 05/24 05/25 05/26 05/27 05/28 05/29 05/30   acetaminophen (TYLENOL) tablet 650 mg  Dose: 650 mg  Freq: Every 6 hours PRN Route: PO  PRN Reasons: mild pain,fever,headaches  Indications of Use: FEVER,HEADACHE,MILD PAIN  Start: 05/28/24 2154 2059         iohexol (OMNIPAQUE) 350 MG/ML injection (MULTI-DOSE) 100 mL  Dose: 100 mL  Freq: Once in imaging Route: IV  PRN Reason: contrast  Start: 05/28/24 1828 End: 05/28/24 1828 1828          lactated ringers infusion  Freq: Continuous PRN Route: IV  Start: 05/29/24 1520 End: 05/29/24 1543            1520     1530     1543-D/C'd       lidocaine (PF) (XYLOCAINE-MPF) 1 % injection  Freq: As needed Route: IV  Start: 05/29/24 1526 End: 05/29/24 1543            1526     1543-D/C'd       phenylephrine 1,000 mcg/10 mL prefilled syringe  Freq: As needed Route: IV  Start: 05/29/24 1530 End: 05/29/24 1543            1530     1533     1539     1543-D/C'd       propofol (DIPRIVAN) 1000 mg in 100 mL infusion (premix)  Freq: Continuous PRN Route: IV  Start: 05/29/24 1531 End: 05/29/24 1543            1531     1533     1543     1543-D/C'd       propofol (DIPRIVAN) 200 MG/20ML bolus injection  Freq: As needed Route: IV  Start: 05/29/24 1526 End: 05/29/24 1543            1526     1529     1543-D/C'd           Network Utilization Review Department  ATTENTION: Please call with any questions or concerns to [phone] and carefully listen  to the prompts so that you are directed to the right person. All voicemails are confidential.   For Discharge needs, contact Care Management DC Support Team at [phone] opt. 2  Send all requests for admission clinical reviews, approved or denied determinations and any other requests to dedicated fax number below belonging to the campus where the patient is receiving treatment. List of dedicated fax numbers for the Facilities:  FACILITY NAME UR FAX NUMBER   ADMISSION DENIALS (Administrative/Medical Necessity) [phone]   DISCHARGE SUPPORT TEAM (NETWORK) [phone]   PARENT CHILD HEALTH (Maternity/NICU/Pediatrics) [phone]   [facility] [phone]   [facility] [phone]   [facility] [phone]   [facility] [phone]   [facility] [phone]   [facility] [phone]   [facility] [phone]   [facility] [phone]   [facility] [phone]   [facility] [phone]   [facility] [phone]   [facility] [phone]

## 2024-05-29 NOTE — PLAN OF CARE
Problem: INFECTION - ADULT  Goal: Absence or prevention of progression during hospitalization  Description: INTERVENTIONS:  - Assess and monitor for signs and symptoms of infection  - Monitor lab/diagnostic results  - Monitor all insertion sites, i.e. indwelling lines, tubes, and drains  - Monitor endotracheal if appropriate and nasal secretions for changes in amount and color  - [place] appropriate cooling/warming therapies per order  - Administer medications as ordered  - Instruct and encourage patient and family to use good hand hygiene technique  - Identify and instruct in appropriate isolation precautions for identified infection/condition  Outcome: Progressing     Problem: PAIN - ADULT  Goal: Verbalizes/displays adequate comfort level or baseline comfort level  Description: Interventions:  - Encourage patient to monitor pain and request assistance  - Assess pain using appropriate pain scale  - Administer analgesics based on type and severity of pain and evaluate response  - Implement non-pharmacological measures as appropriate and evaluate response  - Consider cultural and social influences on pain and pain management  - Notify physician/advanced practitioner if interventions unsuccessful or patient reports new pain  Outcome: Progressing

## 2024-05-29 NOTE — PROGRESS NOTES
[facility]  Progress Note  Name: Christopher Razo I  MRN: [id number]  Unit/Bed#: [address] [place] [address]-01 I Date of Admission: 5/28/2024   Date of Service: 5/29/2024 I Hospital Day: 0    Assessment & Plan   * Bright red rectal bleeding  Assessment & Plan  POA with couple bloody stools followed by weakness and dizziness starting this morning prior to admission.  Patient has a history of diverticulitis with multiple episodes of bleeding  Hemoglobin on admission stable at 15, denies abdominal pain and cramping  States he is dizzy and has fatigue  Noted to have a large bright red bloody liquid stool in the ED  Patient is on Eliquis and Plavix for A-fib, on hold  CT high-volume bleeding abdomen and pelvis: Negative for active high-volume GI hemorrhaging, no acute abnormalities in the abdomen or pelvis  Status post colonoscopy on 4/29/2024 showing diverticulosis with poor prep  Will need repeat colonoscopy   1 polyp removed follow-up biopsy results  Will monitor overnight and repeat hemoglobin in the morning if stable plan for discharge with outpatient GI follow-up    Paroxysmal A-fib (HCC)  Assessment & Plan  [id number] 5  EKG personally interpreted: Normal sinus rhythm rate 80 no ST-T changes noted  Will discuss with GI regarding resuming Eliquis and Plavix    Brain atrophy (HCC)  Assessment & Plan  Chronic  Home regimen: Namenda continue     Obstructive sleep apnea  Assessment & Plan  Chronic  Noncompliant with CPAP use at home  Encouraged CPAP use          Benign essential hypertension  Assessment & Plan  Blood pressure stable  Home regimen: Metoprolol succinate BID continue  Monitor blood pressure               VTE Pharmacologic Prophylaxis:   Moderate Risk (Score 3-4) - Pharmacological DVT Prophylaxis Ordered: apixaban (Eliquis).    Mobility:   Basic Mobility Inpatient Raw Score: 23  JH-HLM Goal: 7: Walk 25 feet or more  JH-HLM Achieved: 7: Walk 25 feet or more  JH-HLM Goal achieved. Continue to  encourage appropriate mobility.    Patient Centered Rounds: I performed bedside rounds with nursing staff today.   Discussions with Specialists or Other Care Team Provider: Gi    Education and Discussions with Family / Patient: Updated  (wife) at bedside.    Total Time Spent on Date of Encounter in care of patient: 35 mins. This time was spent on one or more of the following: performing physical exam; counseling and coordination of care; obtaining or reviewing history; documenting in the medical record; reviewing/ordering tests, medications or procedures; communicating with other healthcare professionals and discussing with patient's family/caregivers.    Current Length of Stay: 0 day(s)  Current Patient Status: Inpatient   Certification Statement: The patient will continue to require additional inpatient hospital stay due to monitoring hg  Discharge Plan: Anticipate discharge tomorrow to home.    Code Status: Level 1 - Full Code    Subjective:   Patient seen examined at bedside nausea.  Currently has no acute complaints states that he is feeling well    Objective:     Vitals:   Temp (24hrs), Av.2 °F (36.8 °C), Min:97.5 °F (36.4 °C), Max:99.7 °F (37.6 °C)    Temp:  [97.5 °F (36.4 °C)-99.7 °F (37.6 °C)] 97.8 °F (36.6 °C)  HR:  [65-87] 65  Resp:  [16-20] 19  BP: (106-161)/() 131/70  SpO2:  [95 %-99 %] 98 %  Body mass index is 33.46 kg/m².     Input and Output Summary (last 24 hours):     Intake/Output Summary (Last 24 hours) at 2024 1621  Last data filed at 2024 1530  Gross per 24 hour   Intake 800 ml   Output --   Net 800 ml       Physical Exam:   Physical Exam  Vitals and nursing note reviewed.   Constitutional:       General: He is not in acute distress.     Appearance: He is well-developed.   HENT:      Head: Normocephalic and atraumatic.   Eyes:      Conjunctiva/sclera: Conjunctivae normal.   Cardiovascular:      Rate and Rhythm: Normal rate and regular rhythm.      Heart sounds:  No murmur heard.  Pulmonary:      Effort: Pulmonary effort is normal. No respiratory distress.      Breath sounds: Normal breath sounds.   Abdominal:      Palpations: Abdomen is soft.      Tenderness: There is no abdominal tenderness.   Musculoskeletal:         General: No swelling.      Cervical back: Neck supple.   Skin:     General: Skin is warm and dry.   Neurological:      Mental Status: He is alert. Mental status is at baseline.   Psychiatric:         Mood and Affect: Mood normal.          Additional Data:     Labs:  Results from last 7 days   Lab Units 05/29/24  0936 05/29/24  0324 05/28/24  2145 05/28/24  1712   WBC Thousand/uL  --   --   --  7.03   HEMOGLOBIN g/dL 12.1   < > 14.0 15.2   HEMATOCRIT %  --   --  40.3 45.0   PLATELETS Thousands/uL  --   --   --  247   SEGS PCT %  --   --   --  68   LYMPHO PCT %  --   --   --  17   MONO PCT %  --   --   --  10   EOS PCT %  --   --   --  4    < > = values in this interval not displayed.     Results from last 7 days   Lab Units 05/28/24  1712   SODIUM mmol/L 141   POTASSIUM mmol/L 3.7   CHLORIDE mmol/L 116*   CO2 mmol/L 19*   BUN mg/dL 20   CREATININE mg/dL 0.95   ANION GAP mmol/L 6   CALCIUM mg/dL 8.9   ALBUMIN g/dL 3.8   TOTAL BILIRUBIN mg/dL 0.67   ALK PHOS U/L 81   ALT U/L 16   AST U/L 14   GLUCOSE RANDOM mg/dL 120     Results from last 7 days   Lab Units 05/28/24  1712   INR  1.06                   Lines/Drains:  Invasive Devices       Peripheral Intravenous Line  Duration             Peripheral IV 05/28/24 Left;Ventral (anterior) Forearm <1 day    Peripheral IV 05/28/24 Right;Ventral (anterior) Forearm <1 day    Peripheral IV 05/28/24 Right;Ventral (anterior) Forearm <1 day                          Imaging: Reviewed radiology reports from this admission including: abdominal/pelvic CT    Recent Cultures (last 7 days):         Last 24 Hours Medication List:   Current Facility-Administered Medications   Medication Dose Route Frequency Provider Last Rate     acetaminophen  650 mg Oral Q6H PRN Kajal A Ujvary, CRNP      atorvastatin  40 mg Oral QPM Kajal A Ujvary, CRNP      loratadine  10 mg Oral Daily Kajal A Ujvary, CRNP      memantine  5 mg Oral BID Kajal A Ujvary, CRNP      metoprolol succinate  50 mg Oral BID Kajal A Ujvary, CRNP      pantoprazole  40 mg Intravenous Q12H Kajal A Ujvary, CRNP      sodium chloride  125 mL/hr Intravenous Continuous Kajal A Ujvary, CRNP 125 mL/hr (05/28/24 8987)        Today, Patient Was Seen By: Mc Angeles DO    **Please Note: This note may have been constructed using a voice recognition system.**

## 2024-05-29 NOTE — ED PROVIDER NOTES
"History  Chief Complaint   Patient presents with    Rectal Bleeding     Patient with hx of GI bleed from diverticulosis per wife. Patient on Plavix and Eliquis. Started this morning with several episodes of bright red rectal bleeding \" that fills the toilet \". States he feels weak, denies abdominal pain      67 yo male with multiple episodes of BRBPR all day today.  No pain.  He does feel weak and a little lightheaded.  He is not passing clots.  No chest pain.  No syncope.  Pt. Is on plavix.      History provided by:  Patient   used: No        Prior to Admission Medications   Prescriptions Last Dose Informant Patient Reported? Taking?   ALPRAZolam (XANAX) 0.5 mg tablet   No No   Sig: Take 1 tablet (0.5 mg total) by mouth 30 min pre-procedure   Eliquis 5 MG   No No   Sig: TAKE 1 TABLET BY MOUTH TWICE A DAY   atorvastatin (LIPITOR) 40 mg tablet   No No   Sig: Take 1 tablet (40 mg total) by mouth every evening   clopidogrel (PLAVIX) 75 mg tablet   No No   Sig: Take 1 tablet (75 mg total) by mouth daily   fexofenadine (ALLEGRA) 180 MG tablet  Self No No   Sig: Take 1 tablet (180 mg total) by mouth daily for 30 days   Patient taking differently: Take 180 mg by mouth every morning   memantine (NAMENDA) 5 mg tablet   Yes No   Sig: Take 5 mg by mouth 2 (two) times a day   metoprolol succinate (TOPROL-XL) 50 mg 24 hr tablet   No No   Sig: TAKE 1 TABLET BY MOUTH TWICE A DAY      Facility-Administered Medications: None       Past Medical History:   Diagnosis Date    Arthropathy of knee     last assessed 8/19/15    Bacterial pneumonia 02/05/2007    last assessed 2/5/7    Brain atrophy (HCC)     Colon polyp     CPAP (continuous positive airway pressure) dependence     Disorder of male genital organ 02/12/2008    last assessed 2/12/08    ED (erectile dysfunction) of organic origin     last assessed 2/13/17     History of hypertension     Seasonal allergies     Situational anxiety     resolved 3/16/17     Sleep " apnea     Stroke (HCC)     09/2020 TIA    Tinnitus     Wears hearing aid     bilateral       Past Surgical History:   Procedure Laterality Date    CARDIAC CATHETERIZATION Left 10/28/2022    Procedure: Cardiac Left Heart Cath;  Surgeon: Christian Burr MD;  Location:  CARDIAC CATH LAB;  Service: Cardiology    CARDIAC CATHETERIZATION  10/28/2022    Procedure: Cardiac catheterization;  Surgeon: Christian Burr MD;  Location:  CARDIAC CATH LAB;  Service: Cardiology    CARDIAC CATHETERIZATION N/A 10/28/2022    Procedure: Cardiac Coronary Angiogram;  Surgeon: Christian Burr MD;  Location:  CARDIAC CATH LAB;  Service: Cardiology    CARDIAC ELECTROPHYSIOLOGY PROCEDURE N/A 12/22/2021    Procedure: Cardiac Loop Recorder Implant;  Surgeon: Judy Norris DO;  Location: [facility];  Service: Cardiology    COLONOSCOPY      x3-w/polyps    HERNIA REPAIR      umbilical,hemal inguinal    KNEE ARTHROSCOPY Left     meniscus    KS COLONOSCOPY FLX DX W/COLLJ SPEC WHEN PFRMD N/A 5/7/2018    Procedure: COLONOSCOPY;  Surgeon: Thiago Lopes MD;  Location: [facility];  Service: Gastroenterology       Family History   Problem Relation Age of Onset    Cancer Mother         breast    Diverticulitis Mother         colostomy    Breast cancer Mother     Heart disease Father         CHF    Cancer Sister         breast    Depression Sister     Cancer Sister         skin cancer    Cancer Sister         skin cancer    Colon cancer Neg Hx     Liver disease Neg Hx      I have reviewed and agree with the history as documented.    E-Cigarette/Vaping    E-Cigarette Use Never User     Cartridges/Day n/a      E-Cigarette/Vaping Substances    Nicotine No     THC No     CBD No     Flavoring No     Other No     Unknown No      Social History     Tobacco Use    Smoking status: Never     Passive exposure: Past    Smokeless tobacco: Never   Vaping Use    Vaping status: Never Used   Substance Use Topics    Alcohol use: Not Currently     Alcohol/week: 14.0  standard drinks of alcohol     Types: 14 Cans of beer per week     Comment: occ    Drug use: No       Review of Systems   Constitutional:  Negative for fever.   Respiratory:  Negative for cough.    Gastrointestinal:  Positive for anal bleeding and blood in stool. Negative for diarrhea and vomiting.   Neurological:  Positive for weakness and light-headedness.   All other systems reviewed and are negative.      Physical Exam  Physical Exam  Vitals and nursing note reviewed.   Constitutional:       General: He is not in acute distress.     Appearance: He is well-developed. He is not ill-appearing or diaphoretic.   HENT:      Head: Normocephalic and atraumatic.      Mouth/Throat:      Mouth: Mucous membranes are dry.   Eyes:      General: No scleral icterus.     Conjunctiva/sclera: Conjunctivae normal.   Cardiovascular:      Rate and Rhythm: Normal rate and regular rhythm.      Heart sounds: Normal heart sounds. No murmur heard.  Pulmonary:      Effort: Pulmonary effort is normal. No respiratory distress.      Breath sounds: Normal breath sounds.   Abdominal:      General: Bowel sounds are normal. There is no distension.      Palpations: Abdomen is soft.      Tenderness: There is no abdominal tenderness.   Musculoskeletal:         General: No deformity. Normal range of motion.      Cervical back: Normal range of motion and neck supple.      Right lower leg: No edema.      Left lower leg: No edema.   Skin:     General: Skin is warm and dry.      Coloration: Skin is not pale.      Findings: No erythema or rash.   Neurological:      General: No focal deficit present.      Mental Status: He is alert and oriented to person, place, and time.      Cranial Nerves: No cranial nerve deficit.   Psychiatric:         Mood and Affect: Mood normal.         Behavior: Behavior normal.         Vital Signs  ED Triage Vitals [05/28/24 1644]   Temperature Pulse Respirations Blood Pressure SpO2   99.7 °F (37.6 °C) 87 18 125/81 97 %      Temp  Source Heart Rate Source Patient Position - Orthostatic VS BP Location FiO2 (%)   Tympanic Monitor Sitting Left arm --      Pain Score       No Pain           Vitals:    05/28/24 1644 05/28/24 1948   BP: 125/81 (!) 156/105   Pulse: 87 73   Patient Position - Orthostatic VS: Sitting Lying         Visual Acuity      ED Medications  Medications   sodium chloride 0.9 % bolus 500 mL (0 mL Intravenous Stopped 5/28/24 1812)   pantoprazole (PROTONIX) 80 mg in sodium chloride 0.9 % 100 mL IVPB (0 mg Intravenous Stopped 5/28/24 1735)   iohexol (OMNIPAQUE) 350 MG/ML injection (MULTI-DOSE) 100 mL (100 mL Intravenous Given 5/28/24 1828)       Diagnostic Studies  Results Reviewed       Procedure Component Value Units Date/Time    HS Troponin I 2hr [[id number]]  (Normal) Collected: 05/28/24 1936    Lab Status: Final result Specimen: Blood from Arm, Left Updated: 05/28/24 2006     hs TnI 2hr 9 ng/L      Delta 2hr hsTnI -1 ng/L     HS Troponin I 4hr [[id number]]     Lab Status: No result Specimen: Blood     HS Troponin 0hr (reflex protocol) [[id number]]  (Normal) Collected: 05/28/24 1712    Lab Status: Final result Specimen: Blood from Arm, Left Updated: 05/28/24 1746     hs TnI 0hr 10 ng/L     Comprehensive metabolic panel [[id number]]  (Abnormal) Collected: 05/28/24 1712    Lab Status: Final result Specimen: Blood from Arm, Left Updated: 05/28/24 1739     Sodium 141 mmol/L      Potassium 3.7 mmol/L      Chloride 116 mmol/L      CO2 19 mmol/L      ANION GAP 6 mmol/L      BUN 20 mg/dL      Creatinine 0.95 mg/dL      Glucose 120 mg/dL      Calcium 8.9 mg/dL      AST 14 U/L      ALT 16 U/L      Alkaline Phosphatase 81 U/L      Total Protein 6.1 g/dL      Albumin 3.8 g/dL      Total Bilirubin 0.67 mg/dL      eGFR 83 ml/min/1.73sq m     Narrative:      National Kid[employer] Disease Foundation guidelines for Chronic Kidney Disease (CKD):     Stage 1 with normal or high GFR (GFR > 90 mL/min/1.73 square meters)    Stage 2 Mild CKD (GFR = 60-89  mL/min/1.73 square meters)    Stage 3A Moderate CKD (GFR = 45-59 mL/min/1.73 square meters)    Stage 3B Moderate CKD (GFR = 30-44 mL/min/1.73 square meters)    Stage 4 Severe CKD (GFR = 15-29 mL/min/1.73 square meters)    Stage 5 End Stage CKD (GFR <15 mL/min/1.73 square meters)  Note: GFR calculation is accurate only with a steady state creatinine    Protime-INR [[id number]]  (Normal) Collected: 05/28/24 1712    Lab Status: Final result Specimen: Blood from Arm, Left Updated: 05/28/24 1735     Protime 14.0 seconds      INR 1.06    APTT [[id number]]  (Normal) Collected: 05/28/24 1712    Lab Status: Final result Specimen: Blood from Arm, Left Updated: 05/28/24 1735     PTT 30 seconds     CBC and differential [[id number]]  (Abnormal) Collected: 05/28/24 1712    Lab Status: Final result Specimen: Blood from Arm, Left Updated: 05/28/24 1723     WBC 7.03 Thousand/uL      RBC 5.04 Million/uL      Hemoglobin 15.2 g/dL      Hematocrit 45.0 %      MCV 89 fL      MCH 30.2 pg      MCHC 33.8 g/dL      RDW 12.7 %      MPV 8.7 fL      Platelets 247 Thousands/uL      nRBC 0 /100 WBCs      Segmented % 68 %      Immature Grans % 0 %      Lymphocytes % 17 %      Monocytes % 10 %      Eosinophils Relative 4 %      Basophils Relative 1 %      Absolute Neutrophils 4.77 Thousands/µL      Absolute Immature Grans 0.03 Thousand/uL      Absolute Lymphocytes 1.22 Thousands/µL      Absolute Monocytes 0.69 Thousand/µL      Eosinophils Absolute 0.28 Thousand/µL      Basophils Absolute 0.04 Thousands/µL                    CT high volume bleeding scan abdomen pelvis   Final Result by Blanca Escamilla MD (05/28 1955)      No evidence of active high-volume gastrointestinal hemorrhage.      No acute abnormality identified in the abdomen or pelvis.         Workstation performed: [id number]                    Procedures  ECG 12 Lead Documentation Only    Date/Time: 5/28/2024 5:19 PM    Performed by: Kathleen Boyle MD  Authorized by: Kathleen Boyle MD     Indications / Diagnosis:  Rectal bleeding  ECG reviewed by me, the ED Provider: yes    Patient location:  ED  Previous ECG:     Previous ECG:  Unavailable  Interpretation:     Interpretation: abnormal    Rate:     ECG rate:  80    ECG rate assessment: normal    Rhythm:     Rhythm: sinus rhythm    Ectopy:     Ectopy: none    QRS:     QRS axis:  Left  Conduction:     Conduction: normal    ST segments:     ST segments:  Normal  T waves:     T waves: normal    Q waves:     Q waves:  III and aVF           ED Course                               SBIRT 22yo+      Flowsheet Row Most Recent Value   Initial Alcohol Screen: US AUDIT-C     1. How often do you have a drink containing alcohol? 0 Filed at: 05/28/2024 1646   Audit-C Score 0 Filed at: 05/28/2024 1646   PIERO: How many times in the past year have you...    Used an illegal drug or used a prescription medication for non-medical reasons? Never Filed at: 05/28/2024 1646                      Medical Decision Making  Differential diagnosis includes but not limited to AVM, bleeding diverticula, hemorrhoids, colitis.  2030 - initial hemoglobin 15, CT scan negative for acute process/active bleeding.  Pt. Continues to have episodes though of passing bright red blood while in the ER.  Will admit for observation and further evaluation.  Prior records reviewed - admitted June 2022 for GI bleed due to bleeding diverticula.  Discussed with SLIM.    Amount and/or Complexity of Data Reviewed  Labs: ordered.  Radiology: ordered.    Risk  Prescription drug management.             Disposition  Final diagnoses:   BRBPR (bright red blood per rectum)     Time reflects when diagnosis was documented in both MDM as applicable and the Disposition within this note       Time User Action Codes Description Comment    5/28/2024  8:43 PM Kathleen Boyle Add [K62.5] BRBPR (bright red blood per rectum)           ED Disposition       ED Disposition   Admit    Condition   Stable    Date/Time   Tue  May 28, 2024 2039    Comment   Case was discussed with JOSE LUIS and the patient's admission status was agreed to be Admission Status: observation status                Follow-up Information    None         Patient's Medications   Discharge Prescriptions    No medications on file       No discharge procedures on file.    PDMP Review         Value Time User    PDMP Reviewed  Yes 6/10/2021  4:59 PM Jadiel George MD            ED Provider  Electronically Signed by             Kathleen Boyle MD  05/28/24 2044

## 2024-05-29 NOTE — ANESTHESIA PREPROCEDURE EVALUATION
Procedure:  COLONOSCOPY    Relevant Problems   ANESTHESIA (within normal limits)      CARDIO   (+) Aortic stenosis   (+) Benign essential hypertension   (+) CAD (coronary artery disease)   (+) Dyspnea on exertion   (+) Mitral regurgitation   (+) Paroxysmal A-fib (HCC)   (+) Probable Chronic diastolic congestive heart failure (HCC)      GI/HEPATIC   (+) Bright red rectal bleeding      HEMATOLOGY   (+) Anemia      NEURO/PSYCH   (+) Dementia without behavioral disturbance (HCC)   (+) Dementia without behavioral disturbance, psychotic disturbance, mood disturbance, or anxiety (HCC)   (+) CAMMIE (generalized anxiety disorder)      PULMONARY   (+) Asthma, mild intermittent   (+) Dyspnea on exertion   (+) Obstructive sleep apnea        Physical Exam    Airway    Mallampati score: II  TM Distance: >3 FB  Neck ROM: full     Dental   No notable dental hx     Cardiovascular  Cardiovascular exam normal    Pulmonary  Pulmonary exam normal     Other Findings        Anesthesia Plan  ASA Score- 3     Anesthesia Type- IV sedation with anesthesia with ASA Monitors.         Additional Monitors:     Airway Plan:            Plan Factors-Exercise tolerance (METS): >4 METS.    Chart reviewed. EKG reviewed.  Existing labs reviewed. Patient summary reviewed.    Patient is not a current smoker.              Induction-     Postoperative Plan-     Perioperative Resuscitation Plan - Level 1 - Full Code.       Informed Consent- Anesthetic plan and risks discussed with patient.  I personally reviewed this patient with the CRNA. Discussed and agreed on the Anesthesia Plan with the CRNA..

## 2024-05-29 NOTE — PLAN OF CARE
Problem: PAIN - ADULT  Goal: Verbalizes/displays adequate comfort level or baseline comfort level  Description: Interventions:  - Encourage patient to monitor pain and request assistance  - Assess pain using appropriate pain scale  - Administer analgesics based on type and severity of pain and evaluate response  - Implement non-pharmacological measures as appropriate and evaluate response  - Consider cultural and social influences on pain and pain management  - Notify physician/advanced practitioner if interventions unsuccessful or patient reports new pain  Outcome: Progressing     Problem: INFECTION - ADULT  Goal: Absence or prevention of progression during hospitalization  Description: INTERVENTIONS:  - Assess and monitor for signs and symptoms of infection  - Monitor lab/diagnostic results  - Monitor all insertion sites, i.e. indwelling lines, tubes, and drains  - Monitor endotracheal if appropriate and nasal secretions for changes in amount and color  - [place] appropriate cooling/warming therapies per order  - Administer medications as ordered  - Instruct and encourage patient and family to use good hand hygiene technique  - Identify and instruct in appropriate isolation precautions for identified infection/condition  Outcome: Progressing     Problem: DISCHARGE PLANNING  Goal: Discharge to home or other facility with appropriate resources  Description: INTERVENTIONS:  - Identify barriers to discharge w/patient and caregiver  - Arrange for needed discharge resources and transportation as appropriate  - Identify discharge learning needs (meds, wound care, etc.)  - Arrange for interpretive services to assist at discharge as needed  - Refer to Case Management Department for coordinating discharge planning if the patient needs post-hospital services based on physician/advanced practitioner order or complex needs related to functional status, cognitive ability, or social support system  Outcome: Progressing      Problem: Knowledge Deficit  Goal: Patient/family/caregiver demonstrates understanding of disease process, treatment plan, medications, and discharge instructions  Description: Complete learning assessment and assess knowledge base.  Interventions:  - Provide teaching at level of understanding  - Provide teaching via preferred learning methods  Outcome: Progressing

## 2024-05-29 NOTE — ASSESSMENT & PLAN NOTE
[id number] 5  EKG personally interpreted: Normal sinus rhythm rate 80 no ST-T changes noted  Will discuss with GI regarding resuming Eliquis and Plavix

## 2024-05-30 ENCOUNTER — TELEPHONE (OUTPATIENT)
Dept: FAMILY MEDICINE CLINIC | Facility: CLINIC | Age: 69
End: 2024-05-30

## 2024-05-30 VITALS
SYSTOLIC BLOOD PRESSURE: 115 MMHG | BODY MASS INDEX: 33.39 KG/M2 | RESPIRATION RATE: 18 BRPM | HEIGHT: 70 IN | HEART RATE: 70 BPM | TEMPERATURE: 98.1 F | WEIGHT: 233.2 LBS | OXYGEN SATURATION: 96 % | DIASTOLIC BLOOD PRESSURE: 72 MMHG

## 2024-05-30 LAB
ALBUMIN SERPL BCP-MCNC: 3.3 G/DL (ref 3.5–5)
ANION GAP SERPL CALCULATED.3IONS-SCNC: 5 MMOL/L (ref 4–13)
ATRIAL RATE: 72 BPM
ATRIAL RATE: 73 BPM
BUN SERPL-MCNC: 14 MG/DL (ref 5–25)
CALCIUM ALBUM COR SERPL-MCNC: 8.5 MG/DL (ref 8.3–10.1)
CALCIUM SERPL-MCNC: 7.9 MG/DL (ref 8.4–10.2)
CHLORIDE SERPL-SCNC: 114 MMOL/L (ref 96–108)
CO2 SERPL-SCNC: 21 MMOL/L (ref 21–32)
CREAT SERPL-MCNC: 0.74 MG/DL (ref 0.6–1.3)
ERYTHROCYTE [DISTWIDTH] IN BLOOD BY AUTOMATED COUNT: 12.6 % (ref 11.6–15.1)
FERRITIN SERPL-MCNC: 24 NG/ML (ref 24–336)
FOLATE SERPL-MCNC: 8.5 NG/ML
GFR SERPL CREATININE-BSD FRML MDRD: 96 ML/MIN/1.73SQ M
GLUCOSE SERPL-MCNC: 105 MG/DL (ref 65–140)
HCT VFR BLD AUTO: 30.1 % (ref 36.5–49.3)
HGB BLD-MCNC: 10.4 G/DL (ref 12–17)
IRON SATN MFR SERPL: 21 % (ref 15–50)
IRON SERPL-MCNC: 49 UG/DL (ref 50–212)
MAGNESIUM SERPL-MCNC: 1.9 MG/DL (ref 1.9–2.7)
MCH RBC QN AUTO: 30.6 PG (ref 26.8–34.3)
MCHC RBC AUTO-ENTMCNC: 34.6 G/DL (ref 31.4–37.4)
MCV RBC AUTO: 89 FL (ref 82–98)
P AXIS: 66 DEGREES
P AXIS: 83 DEGREES
PHOSPHATE SERPL-MCNC: 3 MG/DL (ref 2.3–4.1)
PLATELET # BLD AUTO: 185 THOUSANDS/UL (ref 149–390)
PMV BLD AUTO: 9 FL (ref 8.9–12.7)
POTASSIUM SERPL-SCNC: 3.2 MMOL/L (ref 3.5–5.3)
PR INTERVAL: 166 MS
PR INTERVAL: 172 MS
QRS AXIS: -12 DEGREES
QRS AXIS: -14 DEGREES
QRSD INTERVAL: 90 MS
QRSD INTERVAL: 92 MS
QT INTERVAL: 406 MS
QT INTERVAL: 408 MS
QTC INTERVAL: 444 MS
QTC INTERVAL: 449 MS
RBC # BLD AUTO: 3.4 MILLION/UL (ref 3.88–5.62)
RETICS # AUTO: ABNORMAL 10*3/UL (ref 14356–105094)
RETICS # CALC: 2.59 % (ref 0.37–1.87)
SODIUM SERPL-SCNC: 140 MMOL/L (ref 135–147)
T WAVE AXIS: 19 DEGREES
T WAVE AXIS: 20 DEGREES
TIBC SERPL-MCNC: 231 UG/DL (ref 250–450)
UIBC SERPL-MCNC: 182 UG/DL (ref 155–355)
VENTRICULAR RATE: 72 BPM
VENTRICULAR RATE: 73 BPM
VIT B12 SERPL-MCNC: 108 PG/ML (ref 180–914)
WBC # BLD AUTO: 7.01 THOUSAND/UL (ref 4.31–10.16)

## 2024-05-30 PROCEDURE — 80069 RENAL FUNCTION PANEL: CPT | Performed by: STUDENT IN AN ORGANIZED HEALTH CARE EDUCATION/TRAINING PROGRAM

## 2024-05-30 PROCEDURE — 83550 IRON BINDING TEST: CPT | Performed by: STUDENT IN AN ORGANIZED HEALTH CARE EDUCATION/TRAINING PROGRAM

## 2024-05-30 PROCEDURE — 99232 SBSQ HOSP IP/OBS MODERATE 35: CPT | Performed by: PHYSICIAN ASSISTANT

## 2024-05-30 PROCEDURE — 93005 ELECTROCARDIOGRAM TRACING: CPT

## 2024-05-30 PROCEDURE — 82607 VITAMIN B-12: CPT | Performed by: STUDENT IN AN ORGANIZED HEALTH CARE EDUCATION/TRAINING PROGRAM

## 2024-05-30 PROCEDURE — 82746 ASSAY OF FOLIC ACID SERUM: CPT | Performed by: STUDENT IN AN ORGANIZED HEALTH CARE EDUCATION/TRAINING PROGRAM

## 2024-05-30 PROCEDURE — 83540 ASSAY OF IRON: CPT | Performed by: STUDENT IN AN ORGANIZED HEALTH CARE EDUCATION/TRAINING PROGRAM

## 2024-05-30 PROCEDURE — 85045 AUTOMATED RETICULOCYTE COUNT: CPT | Performed by: STUDENT IN AN ORGANIZED HEALTH CARE EDUCATION/TRAINING PROGRAM

## 2024-05-30 PROCEDURE — C9113 INJ PANTOPRAZOLE SODIUM, VIA: HCPCS

## 2024-05-30 PROCEDURE — 99232 SBSQ HOSP IP/OBS MODERATE 35: CPT | Performed by: INTERNAL MEDICINE

## 2024-05-30 PROCEDURE — 93010 ELECTROCARDIOGRAM REPORT: CPT | Performed by: INTERNAL MEDICINE

## 2024-05-30 PROCEDURE — 99239 HOSP IP/OBS DSCHRG MGMT >30: CPT | Performed by: STUDENT IN AN ORGANIZED HEALTH CARE EDUCATION/TRAINING PROGRAM

## 2024-05-30 PROCEDURE — 83735 ASSAY OF MAGNESIUM: CPT | Performed by: STUDENT IN AN ORGANIZED HEALTH CARE EDUCATION/TRAINING PROGRAM

## 2024-05-30 PROCEDURE — 82728 ASSAY OF FERRITIN: CPT | Performed by: STUDENT IN AN ORGANIZED HEALTH CARE EDUCATION/TRAINING PROGRAM

## 2024-05-30 PROCEDURE — 85027 COMPLETE CBC AUTOMATED: CPT | Performed by: STUDENT IN AN ORGANIZED HEALTH CARE EDUCATION/TRAINING PROGRAM

## 2024-05-30 RX ORDER — POTASSIUM CHLORIDE 20 MEQ/1
40 TABLET, EXTENDED RELEASE ORAL ONCE
Status: COMPLETED | OUTPATIENT
Start: 2024-05-30 | End: 2024-05-30

## 2024-05-30 RX ADMIN — METOPROLOL SUCCINATE 50 MG: 50 TABLET, EXTENDED RELEASE ORAL at 09:11

## 2024-05-30 RX ADMIN — LORATADINE 10 MG: 10 TABLET ORAL at 09:11

## 2024-05-30 RX ADMIN — PANTOPRAZOLE SODIUM 40 MG: 40 INJECTION, POWDER, FOR SOLUTION INTRAVENOUS at 09:10

## 2024-05-30 RX ADMIN — MEMANTINE 5 MG: 5 TABLET ORAL at 09:11

## 2024-05-30 RX ADMIN — POTASSIUM CHLORIDE 40 MEQ: 1500 TABLET, EXTENDED RELEASE ORAL at 09:11

## 2024-05-30 NOTE — PLAN OF CARE
Problem: PAIN - ADULT  Goal: Verbalizes/displays adequate comfort level or baseline comfort level  Description: Interventions:  - Encourage patient to monitor pain and request assistance  - Assess pain using appropriate pain scale  - Administer analgesics based on type and severity of pain and evaluate response  - Implement non-pharmacological measures as appropriate and evaluate response  - Consider cultural and social influences on pain and pain management  - Notify physician/advanced practitioner if interventions unsuccessful or patient reports new pain  Outcome: Progressing     Problem: INFECTION - ADULT  Goal: Absence or prevention of progression during hospitalization  Description: INTERVENTIONS:  - Assess and monitor for signs and symptoms of infection  - Monitor lab/diagnostic results  - Monitor all insertion sites, i.e. indwelling lines, tubes, and drains  - Monitor endotracheal if appropriate and nasal secretions for changes in amount and color  - [place] appropriate cooling/warming therapies per order  - Administer medications as ordered  - Instruct and encourage patient and family to use good hand hygiene technique  - Identify and instruct in appropriate isolation precautions for identified infection/condition  Outcome: Progressing

## 2024-05-30 NOTE — ASSESSMENT & PLAN NOTE
POA with couple bloody stools followed by weakness and dizziness starting this morning prior to admission.  Patient has a history of diverticulitis with multiple episodes of bleeding  Hemoglobin on admission stable at 15, denies abdominal pain and cramping  States he is dizzy and has fatigue  Noted to have a large bright red bloody liquid stool in the ED  Patient is on Eliquis and Plavix for A-fib, on hold  CT high-volume bleeding abdomen and pelvis: Negative for active high-volume GI hemorrhaging, no acute abnormalities in the abdomen or pelvis    COLONOSCOPY IMPRESSION:  Significant amount of pancolonic hematin  Diverticulosis  Subcentimeter polyp in the cecum  RECOMMENDATION:  Repeat colonoscopy in 3 months, due: 8/27/2024  Inadequate bowel preparation  Personal history of colon polyps     Hg stable at 10  Patient requesting to be discharged.   Discussed with Gi and Cardiology will continue eliquis and plavix on d/c  Outpatient Gi f/u

## 2024-05-30 NOTE — DISCHARGE SUMMARY
[facility]  Discharge- Christopher Razo 1955, 68 y.o. male MRN: [id number]  Unit/Bed#: [address] [place] [address] Encounter: [id number]  Primary Care Provider: Richard Varela DO   Date and time admitted to hospital: 5/28/2024  4:59 PM    * Bright red rectal bleeding  Assessment & Plan  POA with couple bloody stools followed by weakness and dizziness starting this morning prior to admission.  Patient has a history of diverticulitis with multiple episodes of bleeding  Hemoglobin on admission stable at 15, denies abdominal pain and cramping  States he is dizzy and has fatigue  Noted to have a large bright red bloody liquid stool in the ED  Patient is on Eliquis and Plavix for A-fib, on hold  CT high-volume bleeding abdomen and pelvis: Negative for active high-volume GI hemorrhaging, no acute abnormalities in the abdomen or pelvis    COLONOSCOPY IMPRESSION:  Significant amount of pancolonic hematin  Diverticulosis  Subcentimeter polyp in the cecum  RECOMMENDATION:  Repeat colonoscopy in 3 months, due: 8/27/2024  Inadequate bowel preparation  Personal history of colon polyps     Hg stable at 10  Patient requesting to be discharged.   Discussed with Gi and Cardiology will continue eliquis and plavix on d/c  Outpatient Gi f/u    Paroxysmal A-fib (HCC)  Assessment & Plan  [id number]  EKG personally interpreted: Normal sinus rhythm rate 80 no ST-T changes noted  Resumed eliquis    Brain atrophy (HCC)  Assessment & Plan  Chronic  Home regimen: Namenda continue     Obstructive sleep apnea  Assessment & Plan  Chronic  Noncompliant with CPAP use at home  Encouraged CPAP use          Benign essential hypertension  Assessment & Plan  Blood pressure stable  Home regimen: Metoprolol succinate BID continue  Monitor blood pressure        Medical Problems       Resolved Problems  Date Reviewed: 5/30/2024   None       Discharging Physician / Practitioner: Mc Angeles DO  PCP: Richard Varela DO  Admission Date:    Admission Orders (From admission, onward)       Ordered        05/29/24 1349  INPATIENT ADMISSION  Once            05/28/24 2047  Place in Observation  Once                          Discharge Date: 05/30/24    Consultations During Hospital Stay:  Cardiology  Gi    Procedures Performed:     COLONOSCOPY IMPRESSION:  Significant amount of pancolonic hematin  Diverticulosis  Subcentimeter polyp in the cecum  RECOMMENDATION:  Repeat colonoscopy in 3 months, due: 8/27/2024  Inadequate bowel preparation  Personal history of colon polyps       Significant Findings / Test Results:   Anemia    Incidental Findings:   N/a       Test Results Pending at Discharge (will require follow up):   Biopsy results from colonoscopy     Outpatient Tests Requested:  Hemoglobin    Complications:  none    Reason for Admission: Rectal bleeding    Hospital Course:   Christopher Razo is a 68 y.o. male patient who originally presented to the hospital on 5/28/2024 due to bright red rectal bleeding.  CT high-volume bleeding scan in the ED was negative for active bleeding.  Patient was admitted with cardiology consult and GI consult.  Cardiology cleared patient for colonoscopy. Eliquis and Plavix were held on admission. Patient underwent colonoscopy on 4/29/2024 which showed diverticulosis with poor prep and no active bleeding.  1 polyp was removed and pathology is pending.  Hemoglobin trended down from 12.1-10.4 on day of discharge.  Discussed case with cardiology and GI who cleared patient for discharge on Eliquis and Plavix with outpatient GI follow-up.          Please see above list of diagnoses and related plan for additional information.     Condition at Discharge: stable    Discharge Day Visit / Exam:   Subjective:  Pt states he feels well denies any bleeding  Vitals: Blood Pressure: 115/72 (05/30/24 0913)  Pulse: 70 (05/30/24 0913)  Temperature: 98.1 °F (36.7 °C) (05/30/24 0913)  Temp Source: Oral (05/30/24 0913)  Respirations: 18 (05/30/24  "0913)  Height: 5' 10\" (177.8 cm) (05/28/24 2315)  Weight - Scale: 106 kg (233 lb 3.2 oz) (05/28/24 2315)  SpO2: 96 % (05/30/24 0913)  Exam:   Physical Exam  Vitals and nursing note reviewed.   Constitutional:       General: He is not in acute distress.     Appearance: He is well-developed.   HENT:      Head: Normocephalic and atraumatic.   Eyes:      Conjunctiva/sclera: Conjunctivae normal.   Cardiovascular:      Rate and Rhythm: Normal rate and regular rhythm.      Heart sounds: No murmur heard.  Pulmonary:      Effort: Pulmonary effort is normal. No respiratory distress.      Breath sounds: Normal breath sounds.   Abdominal:      Palpations: Abdomen is soft.      Tenderness: There is no abdominal tenderness.   Musculoskeletal:         General: No swelling.      Cervical back: Neck supple.   Skin:     General: Skin is warm and dry.   Neurological:      Mental Status: He is alert. Mental status is at baseline.   Psychiatric:         Mood and Affect: Mood normal.          Discussion with Family: Updated  (wife) at bedside.    Discharge instructions/Information to patient and family:   See after visit summary for information provided to patient and family.      Provisions for Follow-Up Care:  See after visit summary for information related to follow-up care and any pertinent home health orders.      Mobility at time of Discharge:   Basic Mobility Inpatient Raw Score: 23  JH-HLM Goal: 7: Walk 25 feet or more  JH-HLM Achieved: 7: Walk 25 feet or more  HLM Goal achieved. Continue to encourage appropriate mobility.     Disposition:   Home    Planned Readmission: no     Discharge Statement:  I spent 45 minutes discharging the patient. This time was spent on the day of discharge. I had direct contact with the patient on the day of discharge. Greater than 50% of the total time was spent examining patient, answering all patient questions, arranging and discussing plan of care with patient as well as directly " providing post-discharge instructions.  Additional time then spent on discharge activities.    Discharge Medications:  See after visit summary for reconciled discharge medications provided to patient and/or family.      **Please Note: This note may have been constructed using a voice recognition system**

## 2024-05-30 NOTE — DISCHARGE INSTR - AVS FIRST PAGE
Continue taking home meds as prescribed.  Please follow up with Gi on discharge    Please see below regarding your colonoscopy results    COLONOSCOPY IMPRESSION:  Significant amount of pancolonic hematin  Diverticulosis  Subcentimeter polyp in the cecum           RECOMMENDATION:  Repeat colonoscopy in 3 months, due: 8/27/2024  Inadequate bowel preparation  Personal history of colon polyps

## 2024-05-30 NOTE — TELEPHONE ENCOUNTER
Patients wife just called to schedule hospital follow up. Appt made with Dr Varela on 06/05/24 please call for TCM

## 2024-05-30 NOTE — ASSESSMENT & PLAN NOTE
[id number] 5  EKG personally interpreted: Normal sinus rhythm rate 80 no ST-T changes noted  Resumed eliquis

## 2024-05-30 NOTE — PROGRESS NOTES
"General Cardiology Progress Note   Christopher Razo 68 y.o. male MRN: [id number]  Unit/Bed#: [address] [place] [address] Encounter: [id number]      Assessment:    Bright red rectal bleeding  History of diverticulitis, hemorrhoids and colonic polyps  Benign essential hypertension  Obstructive sleep apnea  History of cardioembolic cerebrovascular accident (CVA)  Paroxysmal A-fib (HCC)  Currently in sinus rhythm, on home dose of Eliquis.  CAD (coronary artery disease)  S/p PCI asked to of D1 and ramus intermedius in 2022.  VT with syncope, Loop recorder in situ      Impression:    Patient mentions no further BRBPR.  Patient had a colonoscopy yesterday, inadequate bowel prep, no active bleeding, old blood seen throughout the colon.  Follow-up colonoscopy in 3 to 6 months recommended by GI.    Plan:    Can resume Eliquis and Plavix contingent upon GI's recommendation and plan for follow-up colonoscopy.    Subjective:   Patient seen and examined.      Subjectively feels better, does not report any further episodes of BRBPR.  Does not endorse any chest pain, shortness of breath, palpitations, headaches, dizziness, pain abdomen numbness, tingling, weakness.  Does not endorse any constipation, dysuria and other symptoms of lower urinary tract infection.    Objective   Vitals: Blood pressure 115/72, pulse 70, temperature 98.1 °F (36.7 °C), temperature source Oral, resp. rate 18, height 5' 10\" (1.778 m), weight 106 kg (233 lb 3.2 oz), SpO2 96%., Body mass index is 33.46 kg/m²., I/O last 3 completed shifts:  In: 200 [I.V.:200]  Out: -   No intake/output data recorded.  Wt Readings from Last 3 Encounters:   05/28/24 106 kg (233 lb 3.2 oz)   04/23/24 108 kg (239 lb)   04/15/24 107 kg (235 lb)       Intake/Output Summary (Last 24 hours) at 5/30/2024 1022  Last data filed at 5/29/2024 1530  Gross per 24 hour   Intake 200 ml   Output --   Net 200 ml     I/O last 3 completed shifts:  In: 200 [I.V.:200]  Out: -     No significant arrhythmias seen on " telemetry review.     Physical Exam  Vitals and nursing note reviewed.   Constitutional:       General: He is not in acute distress.     Appearance: Normal appearance. He is not ill-appearing.   HENT:      Right Ear: There is no impacted cerumen.      Left Ear: There is no impacted cerumen.      Mouth/Throat:      Mouth: Mucous membranes are moist.      Pharynx: No oropharyngeal exudate or posterior oropharyngeal erythema.   Eyes:      General:         Right eye: No discharge.         Left eye: No discharge.      Extraocular Movements: Extraocular movements intact.      Pupils: Pupils are equal, round, and reactive to light.   Cardiovascular:      Rate and Rhythm: Regular rhythm. Tachycardia present.      Pulses: Normal pulses.      Heart sounds: Normal heart sounds. No murmur heard.     No friction rub. No gallop.   Pulmonary:      Effort: Pulmonary effort is normal. No respiratory distress.      Breath sounds: Normal breath sounds. No wheezing, rhonchi or rales.   Abdominal:      General: Abdomen is flat. Bowel sounds are normal.      Palpations: Abdomen is soft. There is no mass.      Tenderness: There is no abdominal tenderness.   Musculoskeletal:      Right lower leg: Edema present.      Left lower leg: Edema present.      Comments: Trace edema in bilateral lower limbs.   Neurological:      General: No focal deficit present.      Mental Status: He is alert and oriented to person, place, and time.      Cranial Nerves: No cranial nerve deficit.      Sensory: No sensory deficit.      Motor: No weakness.         Meds/Allergies   Allergies   Allergen Reactions    Pollen Extract Sneezing     Reaction Date: 18Sep2006;     Shellfish-Derived Products - Food Allergy Other (See Comments)     Eye swelling    Iodine Solution [Povidone Iodine] Rash     Eyes swell       Current Facility-Administered Medications:     acetaminophen (TYLENOL) tablet 650 mg, 650 mg, Oral, Q6H PRN, ELVIS Humphries, 650 mg at 05/29/24  "2059    atorvastatin (LIPITOR) tablet 40 mg, 40 mg, Oral, QPM, Kajal A Ujvary, CRNP, 40 mg at 05/29/24 1728    loratadine (CLARITIN) tablet 10 mg, 10 mg, Oral, Daily, Kajal A Ujvary, CRNP, 10 mg at 05/30/24 0911    memantine (NAMENDA) tablet 5 mg, 5 mg, Oral, BID, Kajal A Ujvary, CRNP, 5 mg at 05/30/24 0911    metoprolol succinate (TOPROL-XL) 24 hr tablet 50 mg, 50 mg, Oral, BID, Kajal A Ujvary, CRNP, 50 mg at 05/30/24 0911    pantoprazole (PROTONIX) injection 40 mg, 40 mg, Intravenous, Q12H, Kajal A Ujvary, CRNP, 40 mg at 05/30/24 0910    Laboratory Results:        CBC with diff:   Results from last 7 days   Lab Units 05/30/24  0429 05/29/24  0936 05/29/24 0324 05/28/24 2145 05/28/24  1712   WBC Thousand/uL 7.01  --   --   --  7.03   HEMOGLOBIN g/dL 10.4* 12.1 12.6 14.0 15.2   HEMATOCRIT % 30.1*  --   --  40.3 45.0   MCV fL 89  --   --   --  89   PLATELETS Thousands/uL 185  --   --   --  247   RBC Million/uL 3.40*  --   --   --  5.04   MCH pg 30.6  --   --   --  30.2   MCHC g/dL 34.6  --   --   --  33.8   RDW % 12.6  --   --   --  12.7   MPV fL 9.0  --   --   --  8.7*   NRBC AUTO /100 WBCs  --   --   --   --  0       CMP:  Results from last 7 days   Lab Units 05/30/24 0429 05/28/24  1712   SODIUM mmol/L 140 141   POTASSIUM mmol/L 3.2* 3.7   CHLORIDE mmol/L 114* 116*   CO2 mmol/L 21 19*   BUN mg/dL 14 20   CREATININE mg/dL 0.74 0.95   CALCIUM mg/dL 7.9* 8.9   AST U/L  --  14   ALT U/L  --  16   ALK PHOS U/L  --  81   EGFR ml/min/1.73sq m 96 83       BMP:  Results from last 7 days   Lab Units 05/30/24  0429 05/28/24  1712   SODIUM mmol/L 140 141   POTASSIUM mmol/L 3.2* 3.7   CHLORIDE mmol/L 114* 116*   CO2 mmol/L 21 19*   BUN mg/dL 14 20   CREATININE mg/dL 0.74 0.95   CALCIUM mg/dL 7.9* 8.9       NT-proBNP: No results for input(s): \"NTBNP\" in the last 72 hours.     Magnesium:   Results from last 7 days   Lab Units 05/30/24 0429 05/28/24  1712   MAGNESIUM mg/dL 1.9 2.0       Coags:   Results " "from last 7 days   Lab Units 05/28/24  1712   PTT seconds 30   INR  1.06       Lipid Profile:   Lab Results   Component Value Date    CHOL 181 09/07/2016     Lab Results   Component Value Date    HDL 29 (L) 04/17/2024    HDL 27 (L) 10/29/2022    HDL 34 (L) 11/12/2021     Lab Results   Component Value Date    LDLCALC 60 04/17/2024    LDLCALC 34 10/29/2022    LDLCALC 57 11/12/2021     Lab Results   Component Value Date    LDLDIRECT 47 10/29/2022     Lab Results   Component Value Date    TRIG 67 04/17/2024    TRIG 95 10/29/2022    TRIG 58 11/12/2021       Cardiac testing:   EKG personally reviewed, normal sinus rhythm with a heart rate of 73 bpm      Mak Gleason MD    Portions of the record may have been created with voice recognition software.  Occasional wrong word or \"sound a like\" substitutions may have occurred due to the inherent limitations of voice recognition software.  Read the chart carefully and recognize, using context, where substitutions have occurred.       "

## 2024-05-30 NOTE — PLAN OF CARE
Problem: INFECTION - ADULT  Goal: Absence or prevention of progression during hospitalization  Description: INTERVENTIONS:  - Assess and monitor for signs and symptoms of infection  - Monitor lab/diagnostic results  - Monitor all insertion sites, i.e. indwelling lines, tubes, and drains  - Monitor endotracheal if appropriate and nasal secretions for changes in amount and color  - [place] appropriate cooling/warming therapies per order  - Administer medications as ordered  - Instruct and encourage patient and family to use good hand hygiene technique  - Identify and instruct in appropriate isolation precautions for identified infection/condition  Outcome: Progressing     Problem: PAIN - ADULT  Goal: Verbalizes/displays adequate comfort level or baseline comfort level  Description: Interventions:  - Encourage patient to monitor pain and request assistance  - Assess pain using appropriate pain scale  - Administer analgesics based on type and severity of pain and evaluate response  - Implement non-pharmacological measures as appropriate and evaluate response  - Consider cultural and social influences on pain and pain management  - Notify physician/advanced practitioner if interventions unsuccessful or patient reports new pain  Outcome: Progressing

## 2024-05-30 NOTE — PROGRESS NOTES
"Progress Note - Christopher Razo 68 y.o. male MRN: [id number]    Unit/Bed#: [address] [place] [address] Encounter: [id number]        Assessment/Plan:  68-year-old male who presents with rectal bleeding, with hx of diverticular bleed.  Patient was taking Eliquis and Plavix which have been held and was given Kcentra on admission.  -Pt had colonoscopy yesterday-Significant amount of pancolonic hematin, Diverticulosis,  Subcentimeter polyp in the cecum  -Maintain large-bore IV access.    -Follow hemoglobin and transfuse to maintain greater than 7.    -Repeat colonoscopy in 3 to 6 months off Eliquis and Plavix for reevaluation and polypectom  -Hgb today is 10.4  -Patient cleared to resume the Eliquis and Plavix, plan is for discharge today  -Patient reports that if any recurrent bleeding then he will return to hospital    Subjective:   Patient is lying in bed.  Patient reports he had no further bleeding and he was dressed and ready to go home.  Wife at bedside.  Denies any abdominal pain.  Tolerating diet.    Objective:     Vitals: /72 (BP Location: Right arm)   Pulse 70   Temp 98.1 °F (36.7 °C) (Oral)   Resp 18   Ht 5' 10\" (1.778 m)   Wt 106 kg (233 lb 3.2 oz)   SpO2 96%   BMI 33.46 kg/m²       Physical Exam:  Gen-alert acute distress  Abd-soft positive bowel sounds nontender nondistended       Lab, Imaging and other studies:   Recent Results (from the past 72 hour(s))   ECG 12 lead    Collection Time: 05/28/24  5:10 PM   Result Value Ref Range    Ventricular Rate 80 BPM    Atrial Rate 80 BPM    NC Interval 158 ms    QRSD Interval 88 ms    QT Interval 380 ms    QTC Interval 438 ms    P Axis -4 degrees    QRS Axis -23 degrees    T Wave Axis 18 degrees   Comprehensive metabolic panel    Collection Time: 05/28/24  5:12 PM   Result Value Ref Range    Sodium 141 135 - 147 mmol/L    Potassium 3.7 3.5 - 5.3 mmol/L    Chloride 116 (H) 96 - 108 mmol/L    CO2 19 (L) 21 - 32 mmol/L    ANION GAP 6 4 - 13 mmol/L    BUN 20 5 - 25 mg/dL " "   Creatinine 0.95 0.60 - 1.30 mg/dL    Glucose 120 65 - 140 mg/dL    Calcium 8.9 8.4 - 10.2 mg/dL    AST 14 13 - 39 U/L    ALT 16 7 - 52 U/L    Alkaline Phosphatase 81 34 - 104 U/L    Total Protein 6.1 (L) 6.4 - 8.4 g/dL    Albumin 3.8 3.5 - 5.0 g/dL    Total Bilirubin 0.67 0.20 - 1.00 mg/dL    eGFR 83 ml/min/1.73sq m   CBC and differential    Collection Time: 05/28/24  5:12 PM   Result Value Ref Range    WBC 7.03 4.31 - 10.16 Thousand/uL    RBC 5.04 3.88 - 5.62 Million/uL    Hemoglobin 15.2 12.0 - 17.0 g/dL    Hematocrit 45.0 36.5 - 49.3 %    MCV 89 82 - 98 fL    MCH 30.2 26.8 - 34.3 pg    MCHC 33.8 31.4 - 37.4 g/dL    RDW 12.7 11.6 - 15.1 %    MPV 8.7 (L) 8.9 - 12.7 fL    Platelets 247 149 - 390 Thousands/uL    nRBC 0 /100 WBCs    Segmented % 68 43 - 75 %    Immature Grans % 0 0 - 2 %    Lymphocytes % 17 14 - 44 %    Monocytes % 10 4 - 12 %    Eosinophils Relative 4 0 - 6 %    Basophils Relative 1 0 - 1 %    Absolute Neutrophils 4.77 1.85 - 7.62 Thousands/µL    Absolute Immature Grans 0.03 0.00 - 0.20 Thousand/uL    Absolute Lymphocytes 1.22 0.60 - 4.47 Thousands/µL    Absolute Monocytes 0.69 0.17 - 1.22 Thousand/µL    Eosinophils Absolute 0.28 0.00 - 0.61 Thousand/µL    Basophils Absolute 0.04 0.00 - 0.10 Thousands/µL   Protime-INR    Collection Time: 05/28/24  5:12 PM   Result Value Ref Range    Protime 14.0 11.6 - 14.5 seconds    INR 1.06 0.84 - 1.19   APTT    Collection Time: 05/28/24  5:12 PM   Result Value Ref Range    PTT 30 23 - 37 seconds   HS Troponin 0hr (reflex protocol)    Collection Time: 05/28/24  5:12 PM   Result Value Ref Range    hs TnI 0hr 10 \"Refer to ACS Flowchart\"- see link ng/L   Magnesium    Collection Time: 05/28/24  5:12 PM   Result Value Ref Range    Magnesium 2.0 1.9 - 2.7 mg/dL   HS Troponin I 2hr    Collection Time: 05/28/24  7:36 PM   Result Value Ref Range    hs TnI 2hr 9 \"Refer to ACS Flowchart\"- see link ng/L    Delta 2hr hsTnI -1 <20 ng/L   Hemoglobin and hematocrit, blood    " Collection Time: 05/28/24  9:45 PM   Result Value Ref Range    Hemoglobin 14.0 12.0 - 17.0 g/dL    Hematocrit 40.3 36.5 - 49.3 %   Type and screen    Collection Time: 05/29/24  3:24 AM   Result Value Ref Range    ABO Grouping O     Rh Factor Positive     Antibody Screen Negative     Specimen Expiration Date [id number]    Serial Hemoglobin Q6hrs    Collection Time: 05/29/24  3:24 AM   Result Value Ref Range    Hemoglobin 12.6 12.0 - 17.0 g/dL   B-Type Natriuretic Peptide(BNP)    Collection Time: 05/29/24  3:24 AM   Result Value Ref Range    BNP 36 0 - 100 pg/mL   Serial Hemoglobin Q6hrs    Collection Time: 05/29/24  9:36 AM   Result Value Ref Range    Hemoglobin 12.1 12.0 - 17.0 g/dL   CBC    Collection Time: 05/30/24  4:29 AM   Result Value Ref Range    WBC 7.01 4.31 - 10.16 Thousand/uL    RBC 3.40 (L) 3.88 - 5.62 Million/uL    Hemoglobin 10.4 (L) 12.0 - 17.0 g/dL    Hematocrit 30.1 (L) 36.5 - 49.3 %    MCV 89 82 - 98 fL    MCH 30.6 26.8 - 34.3 pg    MCHC 34.6 31.4 - 37.4 g/dL    RDW 12.6 11.6 - 15.1 %    Platelets 185 149 - 390 Thousands/uL    MPV 9.0 8.9 - 12.7 fL   Renal function panel    Collection Time: 05/30/24  4:29 AM   Result Value Ref Range    Albumin 3.3 (L) 3.5 - 5.0 g/dL    Calcium 7.9 (L) 8.4 - 10.2 mg/dL    Corrected Calcium 8.5 8.3 - 10.1 mg/dL    Phosphorus 3.0 2.3 - 4.1 mg/dL    Glucose 105 65 - 140 mg/dL    BUN 14 5 - 25 mg/dL    Creatinine 0.74 0.60 - 1.30 mg/dL    Sodium 140 135 - 147 mmol/L    Potassium 3.2 (L) 3.5 - 5.3 mmol/L    Chloride 114 (H) 96 - 108 mmol/L    CO2 21 21 - 32 mmol/L    ANION GAP 5 4 - 13 mmol/L    eGFR 96 ml/min/1.73sq m   Magnesium    Collection Time: 05/30/24  4:29 AM   Result Value Ref Range    Magnesium 1.9 1.9 - 2.7 mg/dL   Retic Count    Collection Time: 05/30/24  4:29 AM   Result Value Ref Range    Retic Ct Abs 88,600 14,356 - 105,094    Retic Ct Pct 2.59 (H) 0.37 - 1.87 %   ECG 12 lead    Collection Time: 05/30/24  9:31 AM   Result Value Ref Range    Ventricular Rate  72 BPM    Atrial Rate 72 BPM    [place] Interval 172 ms    QRSD Interval 92 ms    QT Interval 406 ms    QTC Interval 444 ms    P Axis 83 degrees    QRS Axis -14 degrees    T Wave Axis 19 degrees   ECG 12 lead    Collection Time: 05/30/24  9:31 AM   Result Value Ref Range    Ventricular Rate 73 BPM    Atrial Rate 73 BPM    [place] Interval 166 ms    QRSD Interval 90 ms    QT Interval 408 ms    QTC Interval 449 ms    P Axis 66 degrees    QRS Axis -12 degrees    T Wave Axis 20 degrees

## 2024-05-31 ENCOUNTER — TRANSITIONAL CARE MANAGEMENT (OUTPATIENT)
Dept: FAMILY MEDICINE CLINIC | Facility: CLINIC | Age: 69
End: 2024-05-31

## 2024-05-31 ENCOUNTER — TELEPHONE (OUTPATIENT)
Dept: FAMILY MEDICINE CLINIC | Facility: CLINIC | Age: 69
End: 2024-05-31

## 2024-05-31 DIAGNOSIS — Z59.819 HOUSING INSECURITY: Primary | ICD-10-CM

## 2024-05-31 SDOH — ECONOMIC STABILITY - HOUSING INSECURITY: HOUSING INSTABILITY UNSPECIFIED: Z59.819

## 2024-05-31 NOTE — TELEPHONE ENCOUNTER
----- Message from Mc Angeles DO sent at 5/30/2024 10:38 AM EDT -----  Thank you for allowing us to participate in the care of your patient, Christopher Razo, who was hospitalized from 5/28/2024 through 5/30/2024 with the admitting diagnosis of bright red blood per rectum.  Poor prep, need repeat colonoscopy    Medication Changes:  None.    Outpatient testing recommended:  Monitor hemoglobin    If you have any additional questions or would like to discuss further, please feel free to contact me.    Mc Angeles DO  [facility] Internal Medicine, Hospitalist  [phone]

## 2024-05-31 NOTE — TELEPHONE ENCOUNTER
First attempt to reach Mohit. Message left to call back on 5/31/34 so I can schedule a TCM visit Syeda BOLANOS

## 2024-05-31 NOTE — TELEPHONE ENCOUNTER
Devon called and left message for patient to return call for TCM questions. Closing this task, other task open in patient calls.     Kalpana Schmitt LPN

## 2024-06-03 ENCOUNTER — PATIENT OUTREACH (OUTPATIENT)
Dept: FAMILY MEDICINE CLINIC | Facility: CLINIC | Age: 69
End: 2024-06-03

## 2024-06-03 ENCOUNTER — PREP FOR PROCEDURE (OUTPATIENT)
Dept: GASTROENTEROLOGY | Facility: CLINIC | Age: 69
End: 2024-06-03

## 2024-06-03 ENCOUNTER — TELEPHONE (OUTPATIENT)
Dept: GASTROENTEROLOGY | Facility: CLINIC | Age: 69
End: 2024-06-03

## 2024-06-03 DIAGNOSIS — Z53.8 PROCEDURE NOT CARRIED OUT FOR OTHER REASONS: ICD-10-CM

## 2024-06-03 DIAGNOSIS — Z86.010 HISTORY OF COLON POLYPS: Primary | ICD-10-CM

## 2024-06-03 NOTE — TELEPHONE ENCOUNTER
----- Message from Mira JERONIMO sent at 6/3/2024  1:46 PM EDT -----    ----- Message -----  From: Odette Solomon PA-C  Sent: 5/30/2024  12:54 PM EDT  To: [place] [id number] [place]    Needs colonoscopy in 3 to 6 months off Eliquis and Plavix for polypectomy

## 2024-06-03 NOTE — PROGRESS NOTES
Covering JENNY KEE received referral for patient who is experiencing housing insecurity. JENNY KEE reviewed patient's chart and called patient ([phone]). Patient did not answer, JENNY KEE left a message including JENNY KEE contact information and requested a call back. JENNY KEE will forward to JENNY Maddox to follow up with patient again at a later date.

## 2024-06-03 NOTE — TELEPHONE ENCOUNTER
Call pt to schedule colonoscopy with Dr. Bolivar for hx of polyps and inadequate prep somewhere between 8/28 and 11/28. Pt will need to be off Eliquis 2 days and Plavix 5. Order in chart.

## 2024-06-04 ENCOUNTER — TELEPHONE (OUTPATIENT)
Dept: FAMILY MEDICINE CLINIC | Facility: CLINIC | Age: 69
End: 2024-06-04

## 2024-06-04 NOTE — TELEPHONE ENCOUNTER
----- Message from Mc Angeles DO sent at 5/30/2024 10:38 AM EDT -----  Thank you for allowing us to participate in the care of your patient, Christopher Razo, who was hospitalized from 5/28/2024 through 5/30/2024 with the admitting diagnosis of bright red blood per rectum.  Poor prep, need repeat colonoscopy    Medication Changes:  None.    Outpatient testing recommended:  Monitor hemoglobin    If you have any additional questions or would like to discuss further, please feel free to contact me.    Mc Angeles DO  [facility] Internal Medicine, Hospitalist  [phone]

## 2024-06-04 NOTE — TELEPHONE ENCOUNTER
Left message for pt to call and schedule. Will call patient again in 1 wk if he doesn't return call to schedule.

## 2024-06-05 ENCOUNTER — APPOINTMENT (OUTPATIENT)
Dept: LAB | Facility: HOSPITAL | Age: 69
DRG: 379 | End: 2024-06-05
Payer: COMMERCIAL

## 2024-06-05 ENCOUNTER — OFFICE VISIT (OUTPATIENT)
Dept: FAMILY MEDICINE CLINIC | Facility: CLINIC | Age: 69
End: 2024-06-05
Payer: COMMERCIAL

## 2024-06-05 VITALS
WEIGHT: 237 LBS | TEMPERATURE: 97.9 F | DIASTOLIC BLOOD PRESSURE: 64 MMHG | HEIGHT: 70 IN | BODY MASS INDEX: 33.93 KG/M2 | SYSTOLIC BLOOD PRESSURE: 118 MMHG | RESPIRATION RATE: 20 BRPM | HEART RATE: 88 BPM

## 2024-06-05 DIAGNOSIS — R48.8 PERSEVERATION: ICD-10-CM

## 2024-06-05 DIAGNOSIS — I10 BENIGN ESSENTIAL HYPERTENSION: ICD-10-CM

## 2024-06-05 DIAGNOSIS — F03.90 DEMENTIA WITHOUT BEHAVIORAL DISTURBANCE, PSYCHOTIC DISTURBANCE, MOOD DISTURBANCE, OR ANXIETY, UNSPECIFIED DEMENTIA SEVERITY, UNSPECIFIED DEMENTIA TYPE (HCC): ICD-10-CM

## 2024-06-05 DIAGNOSIS — D50.0 IRON DEFICIENCY ANEMIA DUE TO CHRONIC BLOOD LOSS: Primary | ICD-10-CM

## 2024-06-05 DIAGNOSIS — Z87.19 HISTORY OF GI DIVERTICULAR BLEED: ICD-10-CM

## 2024-06-05 DIAGNOSIS — Z12.11 COLON CANCER SCREENING: ICD-10-CM

## 2024-06-05 DIAGNOSIS — D64.9 ANEMIA, UNSPECIFIED TYPE: ICD-10-CM

## 2024-06-05 DIAGNOSIS — K57.31 DIVERTICULOSIS OF LARGE INTESTINE WITHOUT PERFORATION OR ABSCESS WITH BLEEDING: ICD-10-CM

## 2024-06-05 DIAGNOSIS — I48.0 PAROXYSMAL A-FIB (HCC): ICD-10-CM

## 2024-06-05 DIAGNOSIS — D62 ANEMIA DUE TO ACUTE BLOOD LOSS: Primary | ICD-10-CM

## 2024-06-05 DIAGNOSIS — G31.9 BRAIN ATROPHY (HCC): ICD-10-CM

## 2024-06-05 PROBLEM — K62.5 BRIGHT RED RECTAL BLEEDING: Status: RESOLVED | Noted: 2024-05-28 | Resolved: 2024-06-05

## 2024-06-05 LAB
BASOPHILS # BLD AUTO: 0.04 THOUSANDS/ÂΜL (ref 0–0.1)
BASOPHILS NFR BLD AUTO: 1 % (ref 0–1)
EOSINOPHIL # BLD AUTO: 0.18 THOUSAND/ÂΜL (ref 0–0.61)
EOSINOPHIL NFR BLD AUTO: 2 % (ref 0–6)
ERYTHROCYTE [DISTWIDTH] IN BLOOD BY AUTOMATED COUNT: 13.2 % (ref 11.6–15.1)
HCT VFR BLD AUTO: 26.6 % (ref 36.5–49.3)
HGB BLD-MCNC: 8.7 G/DL (ref 12–17)
IMM GRANULOCYTES # BLD AUTO: 0.05 THOUSAND/UL (ref 0–0.2)
IMM GRANULOCYTES NFR BLD AUTO: 1 % (ref 0–2)
IRON SATN MFR SERPL: 7 % (ref 15–50)
IRON SERPL-MCNC: 21 UG/DL (ref 50–212)
LYMPHOCYTES # BLD AUTO: 1.3 THOUSANDS/ÂΜL (ref 0.6–4.47)
LYMPHOCYTES NFR BLD AUTO: 18 % (ref 14–44)
MCH RBC QN AUTO: 29.7 PG (ref 26.8–34.3)
MCHC RBC AUTO-ENTMCNC: 32.7 G/DL (ref 31.4–37.4)
MCV RBC AUTO: 91 FL (ref 82–98)
MONOCYTES # BLD AUTO: 1 THOUSAND/ÂΜL (ref 0.17–1.22)
MONOCYTES NFR BLD AUTO: 14 % (ref 4–12)
NEUTROPHILS # BLD AUTO: 4.82 THOUSANDS/ÂΜL (ref 1.85–7.62)
NEUTS SEG NFR BLD AUTO: 64 % (ref 43–75)
NRBC BLD AUTO-RTO: 0 /100 WBCS
PLATELET # BLD AUTO: 266 THOUSANDS/UL (ref 149–390)
PMV BLD AUTO: 8.4 FL (ref 8.9–12.7)
RBC # BLD AUTO: 2.93 MILLION/UL (ref 3.88–5.62)
TIBC SERPL-MCNC: 290 UG/DL (ref 250–450)
UIBC SERPL-MCNC: 269 UG/DL (ref 155–355)
WBC # BLD AUTO: 7.39 THOUSAND/UL (ref 4.31–10.16)

## 2024-06-05 PROCEDURE — 83550 IRON BINDING TEST: CPT

## 2024-06-05 PROCEDURE — 85025 COMPLETE CBC W/AUTO DIFF WBC: CPT

## 2024-06-05 PROCEDURE — 36415 COLL VENOUS BLD VENIPUNCTURE: CPT

## 2024-06-05 PROCEDURE — 99496 TRANSJ CARE MGMT HIGH F2F 7D: CPT | Performed by: FAMILY MEDICINE

## 2024-06-05 PROCEDURE — 83540 ASSAY OF IRON: CPT

## 2024-06-05 RX ORDER — FERROUS SULFATE 324(65)MG
324 TABLET, DELAYED RELEASE (ENTERIC COATED) ORAL
Status: ON HOLD
Start: 2024-06-05

## 2024-06-05 NOTE — TELEPHONE ENCOUNTER
Patients GI provider:  Dr. Bolivar    Number to return call: [phone]    Reason for call: Pt's wife calling to sched f/u colonoscopy. Would like clarification on something Dr. Carbone mentioned: he mentioned that pt has hundreds over diverticula and wife wanted to know if that was normal. Asked wife if Dr. Carbone said they were inflamed and she advised he did not say they were. Please call wife back to let her know if that is normal. Wife is also concerned that pt may have food allergies that are causing him to have 5or more loose Bms a day. Would like to know if testing can be done to determine any allergy/intolerance. Please review and call wife back to advise.    Scheduled procedure/appointment date if applicable: procedure 08/30/2024

## 2024-06-05 NOTE — PROGRESS NOTES
Assessment/Plan:    No problem-specific Assessment & Plan notes found for this encounter.    Dementia unchanged, with perseveration    Gi bleed, f/u with gi, no path report from polyp removal found in chart    PAF/CVA  Continue meds, bleeding caution    Anemia  I called wife about hgb 8.7 from 10.4  Should go to ER if any sign of repeat GI bleeding or dizziness understood  Can start iron po but watch for constipation    Iron low at 7%  Message sent to pt     Diagnoses and all orders for this visit:    Iron deficiency anemia due to chronic blood loss  -     ferrous sulfate 324 (65 Fe) mg; Take 1 tablet (324 mg total) by mouth 2 (two) times a day before meals    Colon cancer screening  -     Ambulatory referral to Gastroenterology; Future    History of GI diverticular bleed  -     CBC and differential; Future  -     TIBC Panel (incl. Iron, TIBC, % Iron Saturation); Future    Diverticulosis of large intestine without perforation or abscess with bleeding  -     TIBC Panel (incl. Iron, TIBC, % Iron Saturation); Future    Anemia, unspecified type    Benign essential hypertension    Brain atrophy (HCC)    Dementia without behavioral disturbance, psychotic disturbance, mood disturbance, or anxiety, unspecified dementia severity, unspecified dementia type (HCC)    Paroxysmal A-fib (HCC)    Perseveration        Return if symptoms worsen or fail to improve.    Subjective:      Patient ID: Christopher Razo is a 68 y.o. male.    Chief Complaint   Patient presents with    Follow-up     Hospital follow up JMoyleLPN       HPI  Diverticulosis extensive  Hgb 15 at arrival to hospital, 10.4 at DC, was dehydrated, did get fluids  No recent bleeding noted  Back on eliquis and plavix  Always with diarrhea  On lipitor  Seems sob and LUNA  No bruising  No syncope  Trying to drink fluids  Iron in diet, not on supplements    Wife states he is OCD like at times  Does have dementia  May be perseveration  No repetitive behaviors    TCM Call        Date and time call was made  5/31/2024  8:40 AM    Hospital care reviewed  Records reviewed    Patient was hospitialized at  [facility]    Date of Admission  05/28/24    Date of discharge  05/30/24    Diagnosis  Bright red rectal bleeding    Disposition  Home    Were the patients medications reviewed and updated  Yes    Current Symptoms  None          TCM Call       Post hospital issues  None    Should patient be enrolled in anticoag monitoring?  No    Scheduled for follow up?  Yes  pt never returned call so we were unable to go over questions with him lw cma    Patients specialists  Cardiologist    Cardiologist name  Dr Reza    Neurologist name  [facility]    Other specialists names  Dr Bolivar    Did you obtain your prescribed medications  Yes    Do you need help managing your prescriptions or medications  No    Is transportation to your appointment needed  No    I have advised the patient to call PCP with any new or worsening symptoms  Devon Sánchez LPN    Living Arrangements  Spouse or Significiant other    Support System  Family    The type of support provided  Physical; Emotional    Do you have social support  Yes, as much as I need    Are you recieving any outpatient services  No    Are you recieving home care services  No    Have you fallen in the last 12 months  Yes    How many times  twice    Interperter language line needed  No    Counseling  Family    Counseling topics  Activities of daily living; Importance of RX compliance; patient and family education; instructions for management; Risk factor reduction    Comments  I spoke with Mrs Razo who states that Christopher is doing fine. He has no complaints at this time. She knows to take him to the ER if any abd pain, weakness, shortness of breath, pallor, s/s rectal bleeding, palpitations, etc but she states he is completely fine now JMoyleLPN              The following portions of the patient's history were reviewed and updated as appropriate:  "allergies, current medications, past family history, past medical history, past social history, past surgical history and problem list.    Review of Systems   Gastrointestinal:  Negative for anal bleeding and blood in stool.   Neurological:  Negative for dizziness.         Current Outpatient Medications   Medication Sig Dispense Refill    atorvastatin (LIPITOR) 40 mg tablet Take 1 tablet (40 mg total) by mouth every evening 90 tablet 1    clopidogrel (PLAVIX) 75 mg tablet Take 1 tablet (75 mg total) by mouth daily 90 tablet 1    Eliquis 5 MG TAKE 1 TABLET BY MOUTH TWICE A  tablet 1    ferrous sulfate 324 (65 Fe) mg Take 1 tablet (324 mg total) by mouth 2 (two) times a day before meals      fexofenadine (ALLEGRA) 180 MG tablet Take 1 tablet (180 mg total) by mouth daily for 30 days (Patient taking differently: Take 180 mg by mouth every morning) 30 tablet 0    memantine (NAMENDA) 5 mg tablet Take 5 mg by mouth 2 (two) times a day      metoprolol succinate (TOPROL-XL) 50 mg 24 hr tablet TAKE 1 TABLET BY MOUTH TWICE A DAY 60 tablet 14     No current facility-administered medications for this visit.       Objective:    /64   Pulse 88   Temp 97.9 °F (36.6 °C)   Resp 20   Ht 5' 10\" (1.778 m)   Wt 108 kg (237 lb)   BMI 34.01 kg/m²        Physical Exam  Vitals and nursing note reviewed.   Constitutional:       General: He is not in acute distress.     Appearance: He is well-developed. He is not ill-appearing.   HENT:      Head: Normocephalic.      Right Ear: Tympanic membrane normal.      Left Ear: Tympanic membrane normal.   Eyes:      General: No scleral icterus.     Conjunctiva/sclera: Conjunctivae normal.   Cardiovascular:      Rate and Rhythm: Normal rate.      Heart sounds: No murmur heard.  Pulmonary:      Effort: Pulmonary effort is normal. No respiratory distress.      Breath sounds: No wheezing.   Abdominal:      General: There is no distension.      Palpations: Abdomen is soft.      Tenderness: " There is no abdominal tenderness. There is no rebound.   Musculoskeletal:         General: No deformity.      Cervical back: Neck supple.   Skin:     General: Skin is warm and dry.      Coloration: Skin is not jaundiced or pale.   Neurological:      Mental Status: He is alert.      Motor: No weakness.      Gait: Gait abnormal.   Psychiatric:         Mood and Affect: Mood normal.         Behavior: Behavior normal.         Thought Content: Thought content normal.                Richard Varela DO

## 2024-06-05 NOTE — TELEPHONE ENCOUNTER
Please let patient's wife know that diverticula are very common.  These are outpouchings of the colon.  There was no evidence of active inflammation or diverticulitis during previous colonoscopy.  The best thing he can do for this is take a daily fiber supplement such as Metamucil or Benefiber 1 tablespoon daily.  In regards to food allergies, there is no specific testing for this.  There is food allergy testing which looks more for true allergic reactions to food but instead it appears she is talking about food intolerances.  There is no well studied test for this.  Instead, could consider using the low FODMAP diet as a guide which consists of foods that typically cause some GI upset and looser stools.  Common intolerances include dairy therefore could try dairy free diet for a week or 2 to see if any improvement.  I would also see if he has any improvement of his bowels after starting a fiber supplement if he is not on 1 already. Thank you

## 2024-06-06 DIAGNOSIS — Z12.11 SCREENING FOR COLON CANCER: Primary | ICD-10-CM

## 2024-06-06 RX ORDER — BISACODYL 5 MG/1
20 TABLET, DELAYED RELEASE ORAL ONCE
Qty: 4 TABLET | Refills: 0 | Status: ON HOLD | OUTPATIENT
Start: 2024-06-06 | End: 2024-06-06

## 2024-06-06 RX ORDER — POLYETHYLENE GLYCOL 3350 17 G/17G
119 POWDER, FOR SOLUTION ORAL ONCE
Qty: 119 G | Refills: 0 | Status: ON HOLD | OUTPATIENT
Start: 2024-06-06 | End: 2024-06-06

## 2024-06-06 NOTE — TELEPHONE ENCOUNTER
Scheduled date of colonoscopy (as of today):08/30/24  Physician performing colonoscopy:Dr. Bolivar  Location of colonoscopy:[facility]  Bowel prep reviewed with patient:2 day golytely  Instructions reviewed with patient by:ti  Clearances: Dr. Varela 5 day hold, Dr. Jr Gonzales 2 day hold.    Will request 1 month prior to procedure. Mailing 2 day Golytely instructions to patient.

## 2024-06-07 ENCOUNTER — TELEPHONE (OUTPATIENT)
Age: 69
End: 2024-06-07

## 2024-06-07 ENCOUNTER — APPOINTMENT (EMERGENCY)
Dept: RADIOLOGY | Facility: HOSPITAL | Age: 69
DRG: 379 | End: 2024-06-07
Payer: COMMERCIAL

## 2024-06-07 ENCOUNTER — HOSPITAL ENCOUNTER (INPATIENT)
Facility: HOSPITAL | Age: 69
LOS: 6 days | Discharge: HOME | DRG: 379 | End: 2024-06-13
Attending: STUDENT IN AN ORGANIZED HEALTH CARE EDUCATION/TRAINING PROGRAM | Admitting: STUDENT IN AN ORGANIZED HEALTH CARE EDUCATION/TRAINING PROGRAM
Payer: COMMERCIAL

## 2024-06-07 ENCOUNTER — APPOINTMENT (OUTPATIENT)
Dept: LAB | Facility: HOSPITAL | Age: 69
DRG: 379 | End: 2024-06-07
Payer: COMMERCIAL

## 2024-06-07 DIAGNOSIS — K92.1 BLACK STOOL: ICD-10-CM

## 2024-06-07 DIAGNOSIS — K62.5 BRBPR (BRIGHT RED BLOOD PER RECTUM): ICD-10-CM

## 2024-06-07 DIAGNOSIS — D62 ANEMIA DUE TO ACUTE BLOOD LOSS: ICD-10-CM

## 2024-06-07 DIAGNOSIS — D64.9 ANEMIA: Primary | ICD-10-CM

## 2024-06-07 LAB
2HR DELTA HS TROPONIN: 0 NG/L
4HR DELTA HS TROPONIN: 1 NG/L
ABO GROUP BLD: NORMAL
ALBUMIN SERPL BCP-MCNC: 3.5 G/DL (ref 3.5–5)
ALP SERPL-CCNC: 77 U/L (ref 34–104)
ALT SERPL W P-5'-P-CCNC: 9 U/L (ref 7–52)
ANION GAP SERPL CALCULATED.3IONS-SCNC: 7 MMOL/L (ref 4–13)
AST SERPL W P-5'-P-CCNC: 8 U/L (ref 13–39)
BASOPHILS # BLD AUTO: 0.03 THOUSANDS/ÂΜL (ref 0–0.1)
BASOPHILS # BLD AUTO: 0.04 THOUSANDS/ÂΜL (ref 0–0.1)
BASOPHILS NFR BLD AUTO: 0 % (ref 0–1)
BASOPHILS NFR BLD AUTO: 1 % (ref 0–1)
BILIRUB SERPL-MCNC: 0.43 MG/DL (ref 0.2–1)
BLD GP AB SCN SERPL QL: NEGATIVE
BUN SERPL-MCNC: 15 MG/DL (ref 5–25)
CALCIUM SERPL-MCNC: 8.1 MG/DL (ref 8.4–10.2)
CARDIAC TROPONIN I PNL SERPL HS: 10 NG/L
CARDIAC TROPONIN I PNL SERPL HS: 10 NG/L
CARDIAC TROPONIN I PNL SERPL HS: 11 NG/L
CHLORIDE SERPL-SCNC: 113 MMOL/L (ref 96–108)
CO2 SERPL-SCNC: 22 MMOL/L (ref 21–32)
CREAT SERPL-MCNC: 0.84 MG/DL (ref 0.6–1.3)
EOSINOPHIL # BLD AUTO: 0.2 THOUSAND/ÂΜL (ref 0–0.61)
EOSINOPHIL # BLD AUTO: 0.21 THOUSAND/ÂΜL (ref 0–0.61)
EOSINOPHIL NFR BLD AUTO: 3 % (ref 0–6)
EOSINOPHIL NFR BLD AUTO: 3 % (ref 0–6)
ERYTHROCYTE [DISTWIDTH] IN BLOOD BY AUTOMATED COUNT: 13.2 % (ref 11.6–15.1)
ERYTHROCYTE [DISTWIDTH] IN BLOOD BY AUTOMATED COUNT: 13.2 % (ref 11.6–15.1)
GFR SERPL CREATININE-BSD FRML MDRD: 91 ML/MIN/1.73SQ M
GLUCOSE SERPL-MCNC: 115 MG/DL (ref 65–140)
HCT VFR BLD AUTO: 23.1 % (ref 36.5–49.3)
HCT VFR BLD AUTO: 23.3 % (ref 36.5–49.3)
HCT VFR BLD AUTO: 23.4 % (ref 36.5–49.3)
HGB BLD-MCNC: 7.2 G/DL (ref 12–17)
HGB BLD-MCNC: 7.4 G/DL (ref 12–17)
HGB BLD-MCNC: 7.4 G/DL (ref 12–17)
IMM GRANULOCYTES # BLD AUTO: 0.06 THOUSAND/UL (ref 0–0.2)
IMM GRANULOCYTES # BLD AUTO: 0.07 THOUSAND/UL (ref 0–0.2)
IMM GRANULOCYTES NFR BLD AUTO: 1 % (ref 0–2)
IMM GRANULOCYTES NFR BLD AUTO: 1 % (ref 0–2)
LYMPHOCYTES # BLD AUTO: 0.99 THOUSANDS/ÂΜL (ref 0.6–4.47)
LYMPHOCYTES # BLD AUTO: 1.16 THOUSANDS/ÂΜL (ref 0.6–4.47)
LYMPHOCYTES NFR BLD AUTO: 14 % (ref 14–44)
LYMPHOCYTES NFR BLD AUTO: 17 % (ref 14–44)
MCH RBC QN AUTO: 29.1 PG (ref 26.8–34.3)
MCH RBC QN AUTO: 29.2 PG (ref 26.8–34.3)
MCHC RBC AUTO-ENTMCNC: 31.6 G/DL (ref 31.4–37.4)
MCHC RBC AUTO-ENTMCNC: 31.8 G/DL (ref 31.4–37.4)
MCV RBC AUTO: 92 FL (ref 82–98)
MCV RBC AUTO: 92 FL (ref 82–98)
MONOCYTES # BLD AUTO: 0.8 THOUSAND/ÂΜL (ref 0.17–1.22)
MONOCYTES # BLD AUTO: 0.97 THOUSAND/ÂΜL (ref 0.17–1.22)
MONOCYTES NFR BLD AUTO: 11 % (ref 4–12)
MONOCYTES NFR BLD AUTO: 14 % (ref 4–12)
NEUTROPHILS # BLD AUTO: 4.51 THOUSANDS/ÂΜL (ref 1.85–7.62)
NEUTROPHILS # BLD AUTO: 5.17 THOUSANDS/ÂΜL (ref 1.85–7.62)
NEUTS SEG NFR BLD AUTO: 65 % (ref 43–75)
NEUTS SEG NFR BLD AUTO: 70 % (ref 43–75)
NRBC BLD AUTO-RTO: 0 /100 WBCS
NRBC BLD AUTO-RTO: 1 /100 WBCS
PLATELET # BLD AUTO: 266 THOUSANDS/UL (ref 149–390)
PLATELET # BLD AUTO: 270 THOUSANDS/UL (ref 149–390)
PMV BLD AUTO: 8.5 FL (ref 8.9–12.7)
PMV BLD AUTO: 8.6 FL (ref 8.9–12.7)
POTASSIUM SERPL-SCNC: 3.6 MMOL/L (ref 3.5–5.3)
PROT SERPL-MCNC: 5 G/DL (ref 6.4–8.4)
RBC # BLD AUTO: 2.53 MILLION/UL (ref 3.88–5.62)
RBC # BLD AUTO: 2.54 MILLION/UL (ref 3.88–5.62)
RH BLD: POSITIVE
SODIUM SERPL-SCNC: 142 MMOL/L (ref 135–147)
SPECIMEN EXPIRATION DATE: NORMAL
WBC # BLD AUTO: 6.93 THOUSAND/UL (ref 4.31–10.16)
WBC # BLD AUTO: 7.28 THOUSAND/UL (ref 4.31–10.16)

## 2024-06-07 PROCEDURE — 99291 CRITICAL CARE FIRST HOUR: CPT | Performed by: STUDENT IN AN ORGANIZED HEALTH CARE EDUCATION/TRAINING PROGRAM

## 2024-06-07 PROCEDURE — P9016 RBC LEUKOCYTES REDUCED: HCPCS

## 2024-06-07 PROCEDURE — 71045 X-RAY EXAM CHEST 1 VIEW: CPT

## 2024-06-07 PROCEDURE — 36415 COLL VENOUS BLD VENIPUNCTURE: CPT

## 2024-06-07 PROCEDURE — 85014 HEMATOCRIT: CPT | Performed by: NURSE PRACTITIONER

## 2024-06-07 PROCEDURE — 99223 1ST HOSP IP/OBS HIGH 75: CPT | Performed by: STUDENT IN AN ORGANIZED HEALTH CARE EDUCATION/TRAINING PROGRAM

## 2024-06-07 PROCEDURE — 85025 COMPLETE CBC W/AUTO DIFF WBC: CPT

## 2024-06-07 PROCEDURE — 86923 COMPATIBILITY TEST ELECTRIC: CPT

## 2024-06-07 PROCEDURE — 36430 TRANSFUSION BLD/BLD COMPNT: CPT

## 2024-06-07 PROCEDURE — 85018 HEMOGLOBIN: CPT | Performed by: NURSE PRACTITIONER

## 2024-06-07 PROCEDURE — 86850 RBC ANTIBODY SCREEN: CPT | Performed by: STUDENT IN AN ORGANIZED HEALTH CARE EDUCATION/TRAINING PROGRAM

## 2024-06-07 PROCEDURE — 86901 BLOOD TYPING SEROLOGIC RH(D): CPT | Performed by: STUDENT IN AN ORGANIZED HEALTH CARE EDUCATION/TRAINING PROGRAM

## 2024-06-07 PROCEDURE — 93005 ELECTROCARDIOGRAM TRACING: CPT

## 2024-06-07 PROCEDURE — 99285 EMERGENCY DEPT VISIT HI MDM: CPT

## 2024-06-07 PROCEDURE — 80053 COMPREHEN METABOLIC PANEL: CPT | Performed by: STUDENT IN AN ORGANIZED HEALTH CARE EDUCATION/TRAINING PROGRAM

## 2024-06-07 PROCEDURE — 86900 BLOOD TYPING SEROLOGIC ABO: CPT | Performed by: STUDENT IN AN ORGANIZED HEALTH CARE EDUCATION/TRAINING PROGRAM

## 2024-06-07 PROCEDURE — 96365 THER/PROPH/DIAG IV INF INIT: CPT

## 2024-06-07 PROCEDURE — 85025 COMPLETE CBC W/AUTO DIFF WBC: CPT | Performed by: STUDENT IN AN ORGANIZED HEALTH CARE EDUCATION/TRAINING PROGRAM

## 2024-06-07 PROCEDURE — C9113 INJ PANTOPRAZOLE SODIUM, VIA: HCPCS | Performed by: STUDENT IN AN ORGANIZED HEALTH CARE EDUCATION/TRAINING PROGRAM

## 2024-06-07 PROCEDURE — 84484 ASSAY OF TROPONIN QUANT: CPT | Performed by: STUDENT IN AN ORGANIZED HEALTH CARE EDUCATION/TRAINING PROGRAM

## 2024-06-07 RX ORDER — LANOLIN ALCOHOL/MO/W.PET/CERES
3 CREAM (GRAM) TOPICAL
Status: DISCONTINUED | OUTPATIENT
Start: 2024-06-07 | End: 2024-06-13 | Stop reason: HOSPADM

## 2024-06-07 RX ORDER — ATORVASTATIN CALCIUM 40 MG/1
40 TABLET, FILM COATED ORAL EVERY EVENING
Status: DISCONTINUED | OUTPATIENT
Start: 2024-06-07 | End: 2024-06-13 | Stop reason: HOSPADM

## 2024-06-07 RX ORDER — UREA 10 %
5 LOTION (ML) TOPICAL DAILY
COMMUNITY

## 2024-06-07 RX ORDER — FERROUS SULFATE 325(65) MG
325 TABLET ORAL
Status: DISCONTINUED | OUTPATIENT
Start: 2024-06-08 | End: 2024-06-13 | Stop reason: HOSPADM

## 2024-06-07 RX ORDER — DIPHENHYDRAMINE HCL 25 MG
25 TABLET ORAL ONCE
Status: COMPLETED | OUTPATIENT
Start: 2024-06-07 | End: 2024-06-08

## 2024-06-07 RX ORDER — CLOPIDOGREL BISULFATE 75 MG/1
75 TABLET ORAL DAILY
COMMUNITY

## 2024-06-07 RX ORDER — MEMANTINE HYDROCHLORIDE 5 MG/1
5 TABLET ORAL 2 TIMES DAILY
Status: DISCONTINUED | OUTPATIENT
Start: 2024-06-07 | End: 2024-06-13 | Stop reason: HOSPADM

## 2024-06-07 RX ORDER — ONDANSETRON 2 MG/ML
4 INJECTION INTRAMUSCULAR; INTRAVENOUS EVERY 6 HOURS PRN
Status: DISCONTINUED | OUTPATIENT
Start: 2024-06-07 | End: 2024-06-13 | Stop reason: HOSPADM

## 2024-06-07 RX ORDER — ACETAMINOPHEN 325 MG/1
650 TABLET ORAL ONCE
Status: COMPLETED | OUTPATIENT
Start: 2024-06-07 | End: 2024-06-08

## 2024-06-07 RX ORDER — ACETAMINOPHEN 325 MG/1
650 TABLET ORAL EVERY 6 HOURS PRN
Status: DISCONTINUED | OUTPATIENT
Start: 2024-06-07 | End: 2024-06-13 | Stop reason: HOSPADM

## 2024-06-07 RX ORDER — METOPROLOL SUCCINATE 50 MG/1
50 TABLET, EXTENDED RELEASE ORAL 2 TIMES DAILY
Status: DISCONTINUED | OUTPATIENT
Start: 2024-06-07 | End: 2024-06-13 | Stop reason: HOSPADM

## 2024-06-07 RX ADMIN — METOPROLOL SUCCINATE 50 MG: 50 TABLET, EXTENDED RELEASE ORAL at 20:20

## 2024-06-07 RX ADMIN — ATORVASTATIN CALCIUM 40 MG: 40 TABLET, FILM COATED ORAL at 20:20

## 2024-06-07 RX ADMIN — Medication 3 MG: at 22:26

## 2024-06-07 RX ADMIN — PANTOPRAZOLE SODIUM 8 MG/HR: 40 INJECTION, POWDER, FOR SOLUTION INTRAVENOUS at 22:31

## 2024-06-07 RX ADMIN — PANTOPRAZOLE SODIUM 8 MG/HR: 40 INJECTION, POWDER, FOR SOLUTION INTRAVENOUS at 15:26

## 2024-06-07 NOTE — TELEPHONE ENCOUNTER
Patients wife wanted to know about iron infusions that you guys talked about and if you were going to order them.      Patient is dizzy.      Spoke to provider and he stated the pt should go to the Er    Wife is bringing him now to the ER      Sending now as an bertoi    Emer[facility] Matt  CMA

## 2024-06-07 NOTE — TELEPHONE ENCOUNTER
Harriet (wife) called states patient is very dizzy and wanted to know if Iron infusion order has been placed. After review of chart no order, called and spoke to [facility]-clinical staff member and warm transfer completed.

## 2024-06-07 NOTE — Clinical Note
Case was discussed with  and the patient's admission status was agreed to be Admission Status: observation status to the service of Dr. Bryson

## 2024-06-07 NOTE — TELEPHONE ENCOUNTER
Received call from patient's spouse Harriet post lab results this AM. Hgb=7.4. Please follow up with provider response; go to hospital as PCP indicated?

## 2024-06-07 NOTE — ED PROVIDER NOTES
History  Chief Complaint   Patient presents with    Abnormal Lab     Patient was admitted on 5/28 for rectal bleeding. Having black stools. Labs today HGB 7.4. HCT 23.3 . Patient states he is dizzy. Patient on Eliquis and Plavix. C/O LUNA    Dizziness     Patient is a 68-year-old male who presents the emergency department for decreased hemoglobin.  Patient states he was recently admitted for rectal bleeding.  Hemoglobin was found to be decreased from baseline.  He underwent colonoscopy which did not show any obvious source of bleeding.  He was discharged.  At the time of discharge he was instructed to resume his anticoagulation.  He has recently began to feel dizzy.  He had his hemoglobin rechecked and was found to be significantly lower than when he was discharged.  He was referred to the ED for further evaluation.  Besides some mild dizziness patient also endorses dark stool (however he was recently started on iron).  Denies any abdominal pain.  No fevers or chills.  No shortness of breath.  No other complaints or concerns.    Chart reviewed.  Patient initially presented to the emergency department on 5/28/2024 with complaints of bright red rectal bleeding.  CT study done in the ED was negative.  He was admitted.  Ultimately underwent colonoscopy which showed diverticulosis with poor prep but no active bleeding.  1 polyp was removed.  Hemoglobin did downtrend from 12.1-10.4.  He was discharged.      Dizziness  Associated symptoms: blood in stool        Prior to Admission Medications   Prescriptions Last Dose Informant Patient Reported? Taking?   apixaban (Eliquis) 5 mg 6/7/2024 at 0800 Self, Spouse/Significant Other Yes Yes   Sig: Take 5 mg by mouth 2 (two) times a day   atorvastatin (LIPITOR) 40 mg tablet 6/6/2024 at 1900  No Yes   Sig: Take 1 tablet (40 mg total) by mouth every evening   bisacodyl (DULCOLAX) 5 mg EC tablet   No No   Sig: Take 4 tablets (20 mg total) by mouth 1 (one) time for 1 dose   clopidogrel  (PLAVIX) 75 mg tablet 6/6/2024 at 2100 Spouse/Significant Other, Self Yes Yes   Sig: Take 75 mg by mouth daily   ferrous sulfate 324 (65 Fe) mg 6/7/2024 at 0800  No Yes   Sig: Take 1 tablet (324 mg total) by mouth 2 (two) times a day before meals   fexofenadine (ALLEGRA) 180 MG tablet 6/7/2024 at 0800 Self No Yes   Sig: Take 1 tablet (180 mg total) by mouth daily for 30 days   Patient taking differently: Take 180 mg by mouth every morning   memantine (NAMENDA) 5 mg tablet 6/7/2024 at 0800  Yes Yes   Sig: Take 5 mg by mouth daily   metoprolol succinate (TOPROL-XL) 50 mg 24 hr tablet 6/7/2024 at 0800  No Yes   Sig: TAKE 1 TABLET BY MOUTH TWICE A DAY   polyethylene glycol (GLYCOLAX) 17 GM/SCOOP powder   No No   Sig: Take 119 g by mouth once for 1 dose   polyethylene glycol (GOLYTELY) 4000 mL solution   No No   Sig: Take 4,000 mL by mouth once for 1 dose      Facility-Administered Medications: None       Past Medical History:   Diagnosis Date    Arthropathy of knee     last assessed 8/19/15    Bacterial pneumonia 02/05/2007    last assessed 2/5/7    Brain atrophy (HCC)     Colon polyp     CPAP (continuous positive airway pressure) dependence     Disorder of male genital organ 02/12/2008    last assessed 2/12/08    ED (erectile dysfunction) of organic origin     last assessed 2/13/17     History of hypertension     Seasonal allergies     Situational anxiety     resolved 3/16/17     Sleep apnea     Stroke (HCC)     09/2020 TIA    Tinnitus     Wears hearing aid     bilateral       Past Surgical History:   Procedure Laterality Date    CARDIAC CATHETERIZATION Left 10/28/2022    Procedure: Cardiac Left Heart Cath;  Surgeon: Christian Burr MD;  Location: [facility];  Service: Cardiology    CARDIAC CATHETERIZATION  10/28/2022    Procedure: Cardiac catheterization;  Surgeon: Christian Burr MD;  Location: [facility];  Service: Cardiology    CARDIAC CATHETERIZATION N/A 10/28/2022    Procedure: Cardiac Coronary  Angiogram;  Surgeon: Christian Burr MD;  Location:  C[facility] CATH [facility];  Service: Cardiology    CARDIAC ELECTROPHYSIOLOGY PROCEDURE N/A 12/22/2021    Procedure: Cardiac Loop Recorder Implant;  Surgeon: Judy Norris DO;  Location: [facility];  Service: Cardiology    COLONOSCOPY      x3-w/polyps    HERNIA REPAIR      umbilical,hemal inguinal    KNEE ARTHROSCOPY Left     meniscus    MI COLONOSCOPY FLX DX W/COLLJ SPEC WHEN PFRMD N/A 5/7/2018    Procedure: COLONOSCOPY;  Surgeon: Thiago Lopes MD;  Location: [facility];  Service: Gastroenterology       Family History   Problem Relation Age of Onset    Cancer Mother         breast    Diverticulitis Mother         colostomy    Breast cancer Mother     Heart disease Father         CHF    Cancer Sister         breast    Depression Sister     Cancer Sister         skin cancer    Cancer Sister         skin cancer    Colon cancer Neg Hx     Liver disease Neg Hx      I have reviewed and agree with the history as documented.    E-Cigarette/Vaping    E-Cigarette Use Never User     Cartridges/Day n/a      E-Cigarette/Vaping Substances    Nicotine No     THC No     CBD No     Flavoring No     Other No     Unknown No      Social History     Tobacco Use    Smoking status: Never     Passive exposure: Past    Smokeless tobacco: Never   Vaping Use    Vaping status: Never Used   Substance Use Topics    Alcohol use: Not Currently     Alcohol/week: 14.0 standard drinks of alcohol     Types: 14 Cans of beer per week     Comment: occ    Drug use: No       Review of Systems   Gastrointestinal:  Positive for blood in stool. Negative for abdominal pain.   Neurological:  Positive for dizziness.   All other systems reviewed and are negative.      Physical Exam  Physical Exam  Vitals and nursing note reviewed.   Constitutional:       General: He is not in acute distress.     Appearance: He is well-developed. He is not diaphoretic.   HENT:      Head: Normocephalic and atraumatic.      Right  Ear: External ear normal.      Left Ear: External ear normal.      Nose: Nose normal.   Eyes:      General: Lids are normal. No scleral icterus.  Cardiovascular:      Rate and Rhythm: Normal rate and regular rhythm.      Heart sounds: Normal heart sounds. No murmur heard.     No friction rub. No gallop.   Pulmonary:      Effort: Pulmonary effort is normal. No respiratory distress.      Breath sounds: Normal breath sounds. No wheezing or rales.   Abdominal:      Palpations: Abdomen is soft.      Tenderness: There is no abdominal tenderness. There is no guarding or rebound.   Musculoskeletal:         General: No deformity. Normal range of motion.      Cervical back: Normal range of motion and neck supple.   Skin:     General: Skin is warm and dry.   Neurological:      General: No focal deficit present.      Mental Status: He is alert.   Psychiatric:         Mood and Affect: Mood normal.         Behavior: Behavior normal.         Vital Signs  ED Triage Vitals [06/07/24 1320]   Temperature Pulse Respirations Blood Pressure SpO2   99.4 °F (37.4 °C) 88 18 105/58 98 %      Temp Source Heart Rate Source Patient Position - Orthostatic VS BP Location FiO2 (%)   Tympanic Monitor Sitting Left arm --      Pain Score       No Pain           Vitals:    06/07/24 1730 06/07/24 1732 06/07/24 1745 06/07/24 1800   BP: 118/63  124/71 125/70   Pulse: 76 78 78 74   Patient Position - Orthostatic VS: Lying  Lying Lying         Visual Acuity      ED Medications  Medications   pantoprazole (PROTONIX) 80 mg in sodium chloride 0.9 % 100 mL infusion (8 mg/hr Intravenous New Bag 6/7/24 1526)       Diagnostic Studies  Results Reviewed       Procedure Component Value Units Date/Time    HS Troponin I 2hr [[id number]]  (Normal) Collected: 06/07/24 1719    Lab Status: Final result Specimen: Blood from Hand, Left Updated: 06/07/24 1753     hs TnI 2hr 10 ng/L      Delta 2hr hsTnI 0 ng/L     Occult blood 1-3, stool [[id number]]     Lab Status: No result  Specimen: Stool     HS Troponin 0hr (reflex protocol) [[id number]]  (Normal) Collected: 06/07/24 1515    Lab Status: Final result Specimen: Blood from Arm, Right Updated: 06/07/24 1603     hs TnI 0hr 10 ng/L     HS Troponin I 4hr [[id number]]     Lab Status: No result Specimen: Blood     Comprehensive metabolic panel [[id number]]  (Abnormal) Collected: 06/07/24 1515    Lab Status: Final result Specimen: Blood from Arm, Right Updated: 06/07/24 1558     Sodium 142 mmol/L      Potassium 3.6 mmol/L      Chloride 113 mmol/L      CO2 22 mmol/L      ANION GAP 7 mmol/L      BUN 15 mg/dL      Creatinine 0.84 mg/dL      Glucose 115 mg/dL      Calcium 8.1 mg/dL      AST 8 U/L      ALT 9 U/L      Alkaline Phosphatase 77 U/L      Total Protein 5.0 g/dL      Albumin 3.5 g/dL      Total Bilirubin 0.43 mg/dL      eGFR 91 ml/min/1.73sq m     Narrative:      National Kid[employer] guidelines for Chronic Kidney Disease (CKD):     Stage 1 with normal or high GFR (GFR > 90 mL/min/1.73 square meters)    Stage 2 Mild CKD (GFR = 60-89 mL/min/1.73 square meters)    Stage 3A Moderate CKD (GFR = 45-59 mL/min/1.73 square meters)    Stage 3B Moderate CKD (GFR = 30-44 mL/min/1.73 square meters)    Stage 4 Severe CKD (GFR = 15-29 mL/min/1.73 square meters)    Stage 5 End Stage CKD (GFR <15 mL/min/1.73 square meters)  Note: GFR calculation is accurate only with a steady state creatinine    CBC and differential [[id number]]  (Abnormal) Collected: 06/07/24 1515    Lab Status: Final result Specimen: Blood from Arm, Right Updated: 06/07/24 1521     WBC 6.93 Thousand/uL      RBC 2.54 Million/uL      Hemoglobin 7.4 g/dL      Hematocrit 23.4 %      MCV 92 fL      MCH 29.1 pg      MCHC 31.6 g/dL      RDW 13.2 %      MPV 8.5 fL      Platelets 266 Thousands/uL      nRBC 1 /100 WBCs      Segmented % 65 %      Immature Grans % 1 %      Lymphocytes % 17 %      Monocytes % 14 %      Eosinophils Relative 3 %      Basophils Relative 0 %      Absolute  Neutrophils 4.51 Thousands/µL      Absolute Immature Grans 0.06 Thousand/uL      Absolute Lymphocytes 1.16 Thousands/µL      Absolute Monocytes 0.97 Thousand/µL      Eosinophils Absolute 0.20 Thousand/µL      Basophils Absolute 0.03 Thousands/µL                    XR chest 1 view portable   ED Interpretation by Jayy Mendoza DO (06/07 1924)   No acute cardiopulmonary disease                 Procedures  ECG 12 Lead Documentation Only    Date/Time: 6/7/2024 4:57 PM    Performed by: Jayy Mendoza DO  Authorized by: Jayy Mendoza DO    ECG reviewed by me, the ED Provider: yes    Patient location:  ED  Interpretation:     Interpretation: normal    Rate:     ECG rate:  77    ECG rate assessment: normal    Rhythm:     Rhythm: sinus rhythm    Ectopy:     Ectopy: none    QRS:     QRS axis:  Normal  Conduction:     Conduction: normal    ST segments:     ST segments:  Normal  T waves:     T waves: normal    CriticalCare Time    Date/Time: 6/7/2024 7:20 PM    Performed by: Jayy Mendoza DO  Authorized by: Jayy Mendoza DO    Critical care provider statement:     Critical care time (minutes):  32    Critical care start time:  6/7/2024 2:42 PM    Critical care end time:  6/7/2024 4:16 PM    Critical care time was exclusive of:  Separately billable procedures and treating other patients and teaching time    Critical care was necessary to treat or prevent imminent or life-threatening deterioration of the following conditions:  Circulatory failure    Critical care was time spent personally by me on the following activities:  Blood draw for specimens, obtaining history from patient or surrogate, development of treatment plan with patient or surrogate, evaluation of patient's response to treatment, examination of patient, re-evaluation of patient's condition, ordering and review of laboratory studies, ordering and performing treatments and interventions and review of old charts    I assumed direction of critical  "care for this patient from another provider in my specialty: no             ED Course  ED Course as of 06/07/24 1925   Fri Jun 07, 2024   1523 Hemoglobin(!): 7.4  Significantly decreased from a week ago   1616 Discussed with SLIM. Accepts admission.     Will transfuse one unit                               SBIRT 22yo+      Flowsheet Row Most Recent Value   Initial Alcohol Screen: US AUDIT-C     1. How often do you have a drink containing alcohol? 0 Filed at: 06/07/2024 1322   3a. Male UNDER 65: How often do you have five or more drinks on one occasion? 0 Filed at: 06/07/2024 1322   Audit-C Score 0 Filed at: 06/07/2024 1322   PIERO: How many times in the past year have you...    Used an illegal drug or used a prescription medication for non-medical reasons? Never Filed at: 06/07/2024 1322                      Medical Decision Making  Patient is a 68 y.o. male who presents to the ED for acute on chronic anemia.  Patient is nontoxic and well-appearing.  Vitals are stable.  Physical exam is unremarkable..    Differential includes but is not limited to: GI bleed (although unclear if black stool is secondary to recently starting iron versus upper GI bleed), iron deficiency anemia.  Presentation not consistent with hemolysis.    Plan: Labs, admit                 Portions of the record may have been created with voice recognition software. Occasional wrong word or \"sound a like\" substitutions may have occurred due to the inherent limitations of voice recognition software. Read the chart carefully and recognize, using context, where substitutions have occurred.    Problems Addressed:  Anemia: acute illness or injury    Amount and/or Complexity of Data Reviewed  Labs: ordered. Decision-making details documented in ED Course.  Radiology: ordered.    Risk  Decision regarding hospitalization.             Disposition  Final diagnoses:   Anemia   Black stool     Time reflects when diagnosis was documented in both MDM as applicable " and the Disposition within this note       Time User Action Codes Description Comment    6/7/2024  4:16 PM Jayy Mendoza Add [D64.9] Anemia     6/7/2024  4:31 PM Victoria Alcantara Add [Z87.19] History of GI diverticular bleed     6/7/2024  4:31 PM Victoria Alcantara Remove [Z87.19] History of GI diverticular bleed     6/7/2024  7:25 PM Jayy Mendoza Add [K92.1] Black stool           ED Disposition       ED Disposition   Admit    Condition   Stable    Date/Time   Fri Jun 7, 2024 1924    Comment   Case was discussed with Dr Alcantara and the patient's admission status was agreed to be Admission Status: observation status to the service of Dr. Alcantara .               Follow-up Information    None         Current Discharge Medication List        CONTINUE these medications which have NOT CHANGED    Details   apixaban (Eliquis) 5 mg Take 5 mg by mouth 2 (two) times a day      atorvastatin (LIPITOR) 40 mg tablet Take 1 tablet (40 mg total) by mouth every evening  Qty: 90 tablet, Refills: 1    Associated Diagnoses: History of cardioembolic cerebrovascular accident (CVA)      clopidogrel (PLAVIX) 75 mg tablet Take 75 mg by mouth daily      ferrous sulfate 324 (65 Fe) mg Take 1 tablet (324 mg total) by mouth 2 (two) times a day before meals    Associated Diagnoses: Iron deficiency anemia due to chronic blood loss      fexofenadine (ALLEGRA) 180 MG tablet Take 1 tablet (180 mg total) by mouth daily for 30 days  Qty: 30 tablet, Refills: 0    Associated Diagnoses: Upper respiratory tract infection, unspecified type      memantine (NAMENDA) 5 mg tablet Take 5 mg by mouth daily      metoprolol succinate (TOPROL-XL) 50 mg 24 hr tablet TAKE 1 TABLET BY MOUTH TWICE A DAY  Qty: 60 tablet, Refills: 14    Associated Diagnoses: Coronary artery disease involving native coronary artery of native heart without angina pectoris; NSVT (nonsustained ventricular tachycardia) ([facility])      bisacodyl (DULCOLAX) 5 mg EC tablet Take 4 tablets (20 mg total) by  mouth 1 (one) time for 1 dose  Qty: 4 tablet, Refills: 0    Associated Diagnoses: Screening for colon cancer      polyethylene glycol (GLYCOLAX) 17 GM/SCOOP powder Take 119 g by mouth once for 1 dose  Qty: 119 g, Refills: 0    Associated Diagnoses: Screening for colon cancer      polyethylene glycol (GOLYTELY) 4000 mL solution Take 4,000 mL by mouth once for 1 dose  Qty: 4000 mL, Refills: 0    Associated Diagnoses: Screening for colon cancer             No discharge procedures on file.    PDMP Review         Value Time User    PDMP Reviewed  Yes 6/10/2021  4:59 PM Jadiel George MD            ED Provider  Electronically Signed by             Jayy Mendoza DO  06/07/24 [id number]

## 2024-06-07 NOTE — H&P
[facility]  [facility]  Name: Christopher Razo 68 y.o. male I MRN: [id number]  Unit/Bed#: ED 03 I Date of Admission: 6/7/2024   Date of Service: 6/7/2024 I Hospital Day: 0      Assessment & Plan   * Acute on chronic anemia  Assessment & Plan  Pt presents with dark stools and worsening anemia after restarting Plavix and Eliquis. Pt has recent GI workup to evaluate for possible GI bleed.  Pt will be transfused with pRBC now.  Will resume IV Protonix drip, follow CBC, hold Eliquis and Plavix and consult GI.  Risks vs benefits of holding AC and Plavix discussed with pt and family at bedside and they agree.     History of GI diverticular bleed  Assessment & Plan  Will hold Eliquis and Plavix, tranfuse with pRBC, check FOBT, follow CBC, resume IV PPI and consult GI     CAD (coronary artery disease)  Assessment & Plan  Resume Statin and Metoprolol.  Holding Plavix for now     Paroxysmal A-fib (HCC)  Assessment & Plan  Resume Metoprolol.  Holding Eliquis as above     History of stroke  Assessment & Plan  Holding Plavix for now.  Resume Statin     Dementia without behavioral disturbance (HCC)  Assessment & Plan  Resume Namenda     Benign essential hypertension  Assessment & Plan  Resume Metoprolol          VTE Pharmacologic Prophylaxis: VTE Score: 4 Moderate Risk (Score 3-4) - Pharmacological DVT Prophylaxis Contraindicated. Sequential Compression Devices Ordered.  Code Status: Prior   Discussion with family: Updated  (wife) at bedside.    Anticipated Length of Stay: Patient will be admitted on an inpatient basis with an anticipated length of stay of greater than 2 midnights secondary to anemia with concern for GI bleed .    Total Time Spent on Date of Encounter in care of patient: 55 mins. This time was spent on one or more of the following: performing physical exam; counseling and coordination of care; obtaining or reviewing history; documenting in the medical record; reviewing/ordering tests,  medications or procedures; communicating with other healthcare professionals and discussing with patient's family/caregivers.    Chief Complaint: dark stools, fatigue    History of Present Illness:  Christopher Razo is a 68 y.o. male with a PMH of CAD, A Fib on Eliquis, HTN, Dementia and CVA who presents with dark stools and fatigue.  History was also obtained from family at bedside. Pt was recently discharged from our service after being evaluated for a GI bleed. At the time he was started back on his Plavix and Eliquis.  He states that since his discharge he was noted dark stools and fatigue.  He has been having his hgb checked and was told it was getting lower.  Due to these symptoms the pt came to the ER for evaluation.  Currently he denies any chest pain, SOB or abdominal pain.  He reports occasional dizziness and SOB with exertion.  He denies any recent fall or LOC.  He denies alida blood in his stools.     Review of Systems:  Review of Systems    Past Medical and Surgical History:   Past Medical History:   Diagnosis Date    Arthropathy of knee     last assessed 8/19/15    Bacterial pneumonia 02/05/2007    last assessed 2/5/7    Brain atrophy (HCC)     Colon polyp     CPAP (continuous positive airway pressure) dependence     Disorder of male genital organ 02/12/2008    last assessed 2/12/08    ED (erectile dysfunction) of organic origin     last assessed 2/13/17     History of hypertension     Seasonal allergies     Situational anxiety     resolved 3/16/17     Sleep apnea     Stroke (HCC)     09/2020 TIA    Tinnitus     Wears hearing aid     bilateral       Past Surgical History:   Procedure Laterality Date    CARDIAC CATHETERIZATION Left 10/28/2022    Procedure: Cardiac Left Heart Cath;  Surgeon: Christian Burr MD;  Location: [facility];  Service: Cardiology    CARDIAC CATHETERIZATION  10/28/2022    Procedure: Cardiac catheterization;  Surgeon: Christian Burr MD;  Location: [facility];  Service:  Cardiology    CARDIAC CATHETERIZATION N/A 10/28/2022    Procedure: Cardiac Coronary Angiogram;  Surgeon: Christian Burr MD;  Location:  CARDIAC CATH LAB;  Service: Cardiology    CARDIAC ELECTROPHYSIOLOGY PROCEDURE N/A 12/22/2021    Procedure: Cardiac Loop Recorder Implant;  Surgeon: Judy Norris DO;  Location: [facility];  Service: Cardiology    COLONOSCOPY      x3-w/polyps    HERNIA REPAIR      umbilical,hemal inguinal    KNEE ARTHROSCOPY Left     meniscus    NJ COLONOSCOPY FLX DX W/COLLJ SPEC WHEN PFRMD N/A 5/7/2018    Procedure: COLONOSCOPY;  Surgeon: Thiago Lopes MD;  Location: [facility] GI [facility];  Service: Gastroenterology       Meds/Allergies:  Prior to Admission medications    Medication Sig Start Date End Date Taking? Authorizing Provider   atorvastatin (LIPITOR) 40 mg tablet Take 1 tablet (40 mg total) by mouth every evening 4/15/24   Richard Varela DO   bisacodyl (DULCOLAX) 5 mg EC tablet Take 4 tablets (20 mg total) by mouth 1 (one) time for 1 dose 6/6/24 6/6/24  Renetta Lundberg PA-C   ferrous sulfate 324 (65 Fe) mg Take 1 tablet (324 mg total) by mouth 2 (two) times a day before meals 6/5/24   Richard Varela DO   fexofenadine (ALLEGRA) 180 MG tablet Take 1 tablet (180 mg total) by mouth daily for 30 days  Patient taking differently: Take 180 mg by mouth every morning 2/15/18 6/5/24  Frank Lombardi, DO   memantine (NAMENDA) 5 mg tablet Take 5 mg by mouth 2 (two) times a day 7/8/23   Historical Provider, MD   metoprolol succinate (TOPROL-XL) 50 mg 24 hr tablet TAKE 1 TABLET BY MOUTH TWICE A DAY 3/25/24   Judy Norris DO   polyethylene glycol (GLYCOLAX) 17 GM/SCOOP powder Take 119 g by mouth once for 1 dose 6/6/24 6/6/24  Renetta Lundberg PA-C   polyethylene glycol (GOLYTELY) 4000 mL solution Take 4,000 mL by mouth once for 1 dose 6/6/24 6/6/24  Renetta Lundberg PA-C   clopidogrel (PLAVIX) 75 mg tablet Take 1 tablet (75 mg total) by mouth daily 4/15/24 6/7/24  Richard Varela DO    dofetilide (TIKOSYN) 500 mcg capsule Take 1 capsule (500 mcg total) by mouth 2 (two) times a day 10/28/22 11/1/22  Ceci Lee PA-C   Eliquis 5 MG TAKE 1 TABLET BY MOUTH TWICE A DAY 4/10/24 6/7/24  Judy Norris DO   magnesium oxide (MAG-OX) 400 mg Take 1 tablet (400 mg total) by mouth daily Do not start before November 2, 2022. 11/2/22 11/3/22  Shruthi Youngblood DO     I have reviewed home medications with patient personally.    Allergies:   Allergies   Allergen Reactions    Pollen Extract Sneezing     Reaction Date: 18Sep2006;     Shellfish-Derived Products - Food Allergy Other (See Comments)     Eye swelling    Iodine Solution [Povidone Iodine] Rash     Eyes swell       Social History:  Marital Status: /Civil Union   Patient Pre-hospital Living Situation: Home  Patient Pre-hospital Level of Mobility: walks  Patient Pre-hospital Diet Restrictions: cardiac diet  Substance Use History:   Social History     Substance and Sexual Activity   Alcohol Use Not Currently    Alcohol/week: 14.0 standard drinks of alcohol    Types: 14 Cans of beer per week    Comment: occ     Social History     Tobacco Use   Smoking Status Never    Passive exposure: Past   Smokeless Tobacco Never     Social History     Substance and Sexual Activity   Drug Use No       Family History:  Family History   Problem Relation Age of Onset    Cancer Mother         breast    Diverticulitis Mother         colostomy    Breast cancer Mother     Heart disease Father         CHF    Cancer Sister         breast    Depression Sister     Cancer Sister         skin cancer    Cancer Sister         skin cancer    Colon cancer Neg Hx     Liver disease Neg Hx        Physical Exam:     Vitals:   Blood Pressure: 111/76 (06/07/24 1600)  Pulse: 78 (06/07/24 1600)  Temperature: 99.4 °F (37.4 °C) (06/07/24 1320)  Temp Source: Tympanic (06/07/24 1320)  Respirations: 18 (06/07/24 1600)  Weight - Scale: 108 kg (237 lb 3.2 oz) (06/07/24 1320)  SpO2: 98 %  (06/07/24 1600)    General: in no acute distress  HEENT: atraumatic, normocephalic  Skin: no jaundice  CVS: RRR, murmur present   Lungs: CTAL, no wheezing or rales appreciated  Abdomen: soft, nondistended, bowel sounds normal, nontender upon palpation, no guarding or rebound tenderness  Extremities: no edema, no calf swelling or tenderness  Neuro: alert and oriented x3, no tremors   Psych: calm, cooperative      Additional Data:     Lab Results:  Results from last 7 days   Lab Units 06/07/24  1515   WBC Thousand/uL 6.93   HEMOGLOBIN g/dL 7.4*   HEMATOCRIT % 23.4*   PLATELETS Thousands/uL 266   SEGS PCT % 65   LYMPHO PCT % 17   MONO PCT % 14*   EOS PCT % 3     Results from last 7 days   Lab Units 06/07/24  1515   SODIUM mmol/L 142   POTASSIUM mmol/L 3.6   CHLORIDE mmol/L 113*   CO2 mmol/L 22   BUN mg/dL 15   CREATININE mg/dL 0.84   ANION GAP mmol/L 7   CALCIUM mg/dL 8.1*   ALBUMIN g/dL 3.5   TOTAL BILIRUBIN mg/dL 0.43   ALK PHOS U/L 77   ALT U/L 9   AST U/L 8*   GLUCOSE RANDOM mg/dL 115                       Lines/Drains:  Invasive Devices       Peripheral Intravenous Line  Duration             Peripheral IV 06/07/24 Right;Upper;Ventral (anterior) Arm <1 day                        Imaging: No pertinent imaging reviewed.  XR chest 1 view portable    (Results Pending)         ** Please Note: This note has been constructed using a voice recognition system. **

## 2024-06-07 NOTE — ASSESSMENT & PLAN NOTE
Will hold Eliquis and Plavix, tranfuse with pRBC, check FOBT, follow CBC, resume IV PPI and consult GI

## 2024-06-07 NOTE — ASSESSMENT & PLAN NOTE
Pt presents with dark stools and worsening anemia after restarting Plavix and Eliquis. Pt has recent GI workup to evaluate for possible GI bleed.  Pt will be transfused with pRBC now.  Will resume IV Protonix drip, follow CBC, hold Eliquis and Plavix and consult GI.  Risks vs benefits of holding AC and Plavix discussed with pt and family at bedside and they agree.

## 2024-06-08 ENCOUNTER — APPOINTMENT (INPATIENT)
Dept: RADIOLOGY | Facility: HOSPITAL | Age: 69
DRG: 379 | End: 2024-06-08
Payer: COMMERCIAL

## 2024-06-08 LAB
ABO GROUP BLD BPU: NORMAL
ABO GROUP BLD BPU: NORMAL
ANION GAP SERPL CALCULATED.3IONS-SCNC: 7 MMOL/L (ref 4–13)
APTT PPP: 31 SECONDS (ref 23–37)
BASOPHILS # BLD AUTO: 0.03 THOUSANDS/ÂΜL (ref 0–0.1)
BASOPHILS NFR BLD AUTO: 0 % (ref 0–1)
BPU ID: NORMAL
BPU ID: NORMAL
BUN SERPL-MCNC: 18 MG/DL (ref 5–25)
CALCIUM SERPL-MCNC: 7.3 MG/DL (ref 8.4–10.2)
CHLORIDE SERPL-SCNC: 115 MMOL/L (ref 96–108)
CO2 SERPL-SCNC: 20 MMOL/L (ref 21–32)
CREAT SERPL-MCNC: 0.8 MG/DL (ref 0.6–1.3)
CROSSMATCH: NORMAL
CROSSMATCH: NORMAL
EOSINOPHIL # BLD AUTO: 0.25 THOUSAND/ÂΜL (ref 0–0.61)
EOSINOPHIL NFR BLD AUTO: 3 % (ref 0–6)
ERYTHROCYTE [DISTWIDTH] IN BLOOD BY AUTOMATED COUNT: 13.5 % (ref 11.6–15.1)
GFR SERPL CREATININE-BSD FRML MDRD: 93 ML/MIN/1.73SQ M
GLUCOSE SERPL-MCNC: 106 MG/DL (ref 65–140)
HCT VFR BLD AUTO: 22 % (ref 36.5–49.3)
HCT VFR BLD AUTO: 22.2 % (ref 36.5–49.3)
HCT VFR BLD AUTO: 22.4 % (ref 36.5–49.3)
HCT VFR BLD AUTO: 22.8 % (ref 36.5–49.3)
HEMOCCULT STL QL: ABNORMAL
HEMOCCULT STL QL: ABNORMAL
HEMOCCULT STL QL: POSITIVE
HGB BLD-MCNC: 7.1 G/DL (ref 12–17)
HGB BLD-MCNC: 7.1 G/DL (ref 12–17)
HGB BLD-MCNC: 7.3 G/DL (ref 12–17)
HGB BLD-MCNC: 7.3 G/DL (ref 12–17)
IMM GRANULOCYTES # BLD AUTO: 0.06 THOUSAND/UL (ref 0–0.2)
IMM GRANULOCYTES NFR BLD AUTO: 1 % (ref 0–2)
INR PPP: 1.18 (ref 0.84–1.19)
LYMPHOCYTES # BLD AUTO: 1.44 THOUSANDS/ÂΜL (ref 0.6–4.47)
LYMPHOCYTES NFR BLD AUTO: 20 % (ref 14–44)
MAGNESIUM SERPL-MCNC: 1.8 MG/DL (ref 1.9–2.7)
MCH RBC QN AUTO: 29.1 PG (ref 26.8–34.3)
MCHC RBC AUTO-ENTMCNC: 32 G/DL (ref 31.4–37.4)
MCV RBC AUTO: 91 FL (ref 82–98)
MONOCYTES # BLD AUTO: 0.9 THOUSAND/ÂΜL (ref 0.17–1.22)
MONOCYTES NFR BLD AUTO: 12 % (ref 4–12)
NEUTROPHILS # BLD AUTO: 4.59 THOUSANDS/ÂΜL (ref 1.85–7.62)
NEUTS SEG NFR BLD AUTO: 64 % (ref 43–75)
NRBC BLD AUTO-RTO: 0 /100 WBCS
PLATELET # BLD AUTO: 213 THOUSANDS/UL (ref 149–390)
PMV BLD AUTO: 8.5 FL (ref 8.9–12.7)
POTASSIUM SERPL-SCNC: 3.6 MMOL/L (ref 3.5–5.3)
PROTHROMBIN TIME: 15.3 SECONDS (ref 11.6–14.5)
RBC # BLD AUTO: 2.51 MILLION/UL (ref 3.88–5.62)
SODIUM SERPL-SCNC: 142 MMOL/L (ref 135–147)
UNIT DISPENSE STATUS: NORMAL
UNIT DISPENSE STATUS: NORMAL
UNIT PRODUCT CODE: NORMAL
UNIT PRODUCT CODE: NORMAL
UNIT PRODUCT VOLUME: 350 ML
UNIT PRODUCT VOLUME: 350 ML
UNIT RH: NORMAL
UNIT RH: NORMAL
WBC # BLD AUTO: 7.27 THOUSAND/UL (ref 4.31–10.16)

## 2024-06-08 PROCEDURE — 82272 OCCULT BLD FECES 1-3 TESTS: CPT | Performed by: STUDENT IN AN ORGANIZED HEALTH CARE EDUCATION/TRAINING PROGRAM

## 2024-06-08 PROCEDURE — 85610 PROTHROMBIN TIME: CPT | Performed by: INTERNAL MEDICINE

## 2024-06-08 PROCEDURE — 85025 COMPLETE CBC W/AUTO DIFF WBC: CPT | Performed by: STUDENT IN AN ORGANIZED HEALTH CARE EDUCATION/TRAINING PROGRAM

## 2024-06-08 PROCEDURE — 99223 1ST HOSP IP/OBS HIGH 75: CPT | Performed by: INTERNAL MEDICINE

## 2024-06-08 PROCEDURE — 74177 CT ABD & PELVIS W/CONTRAST: CPT

## 2024-06-08 PROCEDURE — 83735 ASSAY OF MAGNESIUM: CPT | Performed by: STUDENT IN AN ORGANIZED HEALTH CARE EDUCATION/TRAINING PROGRAM

## 2024-06-08 PROCEDURE — 85018 HEMOGLOBIN: CPT | Performed by: NURSE PRACTITIONER

## 2024-06-08 PROCEDURE — 99233 SBSQ HOSP IP/OBS HIGH 50: CPT

## 2024-06-08 PROCEDURE — C9113 INJ PANTOPRAZOLE SODIUM, VIA: HCPCS | Performed by: STUDENT IN AN ORGANIZED HEALTH CARE EDUCATION/TRAINING PROGRAM

## 2024-06-08 PROCEDURE — 85018 HEMOGLOBIN: CPT

## 2024-06-08 PROCEDURE — 80048 BASIC METABOLIC PNL TOTAL CA: CPT | Performed by: STUDENT IN AN ORGANIZED HEALTH CARE EDUCATION/TRAINING PROGRAM

## 2024-06-08 PROCEDURE — P9016 RBC LEUKOCYTES REDUCED: HCPCS

## 2024-06-08 PROCEDURE — 85014 HEMATOCRIT: CPT

## 2024-06-08 PROCEDURE — 85730 THROMBOPLASTIN TIME PARTIAL: CPT | Performed by: INTERNAL MEDICINE

## 2024-06-08 PROCEDURE — 85014 HEMATOCRIT: CPT | Performed by: NURSE PRACTITIONER

## 2024-06-08 RX ORDER — METRONIDAZOLE 500 MG/100ML
500 INJECTION, SOLUTION INTRAVENOUS EVERY 8 HOURS
Status: DISCONTINUED | OUTPATIENT
Start: 2024-06-08 | End: 2024-06-08

## 2024-06-08 RX ORDER — POLYETHYLENE GLYCOL 3350 17 G/17G
238 POWDER, FOR SOLUTION ORAL ONCE
Status: COMPLETED | OUTPATIENT
Start: 2024-06-08 | End: 2024-06-08

## 2024-06-08 RX ORDER — HYDROMORPHONE HCL IN WATER/PF 6 MG/30 ML
0.2 PATIENT CONTROLLED ANALGESIA SYRINGE INTRAVENOUS
Status: DISCONTINUED | OUTPATIENT
Start: 2024-06-08 | End: 2024-06-13 | Stop reason: HOSPADM

## 2024-06-08 RX ORDER — CEFTRIAXONE 2 G/50ML
2000 INJECTION, SOLUTION INTRAVENOUS EVERY 24 HOURS
Status: DISCONTINUED | OUTPATIENT
Start: 2024-06-08 | End: 2024-06-08

## 2024-06-08 RX ORDER — SODIUM CHLORIDE 9 MG/ML
75 INJECTION, SOLUTION INTRAVENOUS CONTINUOUS
Status: DISCONTINUED | OUTPATIENT
Start: 2024-06-08 | End: 2024-06-11

## 2024-06-08 RX ADMIN — FERROUS SULFATE TAB 325 MG (65 MG ELEMENTAL FE) 325 MG: 325 (65 FE) TAB at 10:38

## 2024-06-08 RX ADMIN — PANTOPRAZOLE SODIUM 8 MG/HR: 40 INJECTION, POWDER, FOR SOLUTION INTRAVENOUS at 10:25

## 2024-06-08 RX ADMIN — METRONIDAZOLE 500 MG: 500 SOLUTION INTRAVENOUS at 01:40

## 2024-06-08 RX ADMIN — HYDROMORPHONE HYDROCHLORIDE 0.2 MG: 0.2 INJECTION, SOLUTION INTRAMUSCULAR; INTRAVENOUS; SUBCUTANEOUS at 00:52

## 2024-06-08 RX ADMIN — ACETAMINOPHEN 650 MG: 325 TABLET ORAL at 00:20

## 2024-06-08 RX ADMIN — METOPROLOL SUCCINATE 50 MG: 50 TABLET, EXTENDED RELEASE ORAL at 21:47

## 2024-06-08 RX ADMIN — CEFTRIAXONE 2000 MG: 2 INJECTION, SOLUTION INTRAVENOUS at 03:05

## 2024-06-08 RX ADMIN — MEMANTINE 5 MG: 5 TABLET ORAL at 19:14

## 2024-06-08 RX ADMIN — DIPHENHYDRAMINE HYDROCHLORIDE 25 MG: 25 TABLET ORAL at 00:20

## 2024-06-08 RX ADMIN — MEMANTINE 5 MG: 5 TABLET ORAL at 10:38

## 2024-06-08 RX ADMIN — IOHEXOL 100 ML: 350 INJECTION, SOLUTION INTRAVENOUS at 01:30

## 2024-06-08 RX ADMIN — Medication 3 MG: at 21:37

## 2024-06-08 RX ADMIN — PANTOPRAZOLE SODIUM 8 MG/HR: 40 INJECTION, POWDER, FOR SOLUTION INTRAVENOUS at 21:46

## 2024-06-08 RX ADMIN — POLYETHYLENE GLYCOL 3350 238 G: 17 POWDER, FOR SOLUTION ORAL at 14:55

## 2024-06-08 RX ADMIN — METRONIDAZOLE 500 MG: 500 SOLUTION INTRAVENOUS at 10:29

## 2024-06-08 RX ADMIN — METOPROLOL SUCCINATE 50 MG: 50 TABLET, EXTENDED RELEASE ORAL at 10:38

## 2024-06-08 RX ADMIN — SODIUM CHLORIDE 50 ML/HR: 0.9 INJECTION, SOLUTION INTRAVENOUS at 01:35

## 2024-06-08 RX ADMIN — ATORVASTATIN CALCIUM 40 MG: 40 TABLET, FILM COATED ORAL at 19:14

## 2024-06-08 RX ADMIN — SODIUM CHLORIDE 75 ML/HR: 0.9 INJECTION, SOLUTION INTRAVENOUS at 21:46

## 2024-06-08 NOTE — PROGRESS NOTES
[facility]  Progress Note  Name: Christopher Razo I  MRN: [id number]  Unit/Bed#: 2 [address] Date of Admission: 6/7/2024   Date of Service: 6/8/2024 I Hospital Day: 1    Assessment & Plan   * Anemia and GI bleed  Assessment & Plan  Patient recently hospitalized 5/28-5/30 with bloody stools. Colonoscopy at that time showed old blood, suspected diverticular bleed. Restarted on Plavis and Eliquis and now presents with bloody stools and anemia on outpatient labs.  CT a/p: New high attenuation material in the splenic flexure of the colon. In the given clinical setting this does raise concern for hemorrhage but is otherwise nonspecific on single phase CT. No other interval abnormality.  Now s/p 2 units PRBC with hemoglobin 7.3  Continue to hold Eliquis and Plavix  GI consulted  Continue protonix drip  Bowel prep today, possible repeat colonoscopy this admission  BP stable, continue IVF  Trend h&h q8h    CAD (coronary artery disease)  Assessment & Plan  Continue statin and metoprolol  Plavix on hold    Paroxysmal A-fib (HCC)  Assessment & Plan  Continue metoprolol  Holding eliquis    History of stroke  Assessment & Plan  Holding plavix  Continue statin    Dementia without behavioral disturbance (HCC)  Assessment & Plan  continue Namenda     Benign essential hypertension  Assessment & Plan  Continue metoprolol            VTE Pharmacologic Prophylaxis: VTE Score: 4 Moderate Risk (Score 3-4) - Pharmacological DVT Prophylaxis Contraindicated. Sequential Compression Devices Ordered.    Mobility:   Basic Mobility Inpatient Raw Score: 18  JH-HLM Goal: 6: Walk 10 steps or more  JH-HLM Achieved: 6: Walk 10 steps or more  JH-HLM Goal achieved. Continue to encourage appropriate mobility.    Patient Centered Rounds: I performed bedside rounds with nursing staff today.   Discussions with Specialists or Other Care Team Provider: GI, rn    Education and Discussions with Family / Patient: Updated   (wife) at bedside.    Total Time Spent on Date of Encounter in care of patient: 40 mins. This time was spent on one or more of the following: performing physical exam; counseling and coordination of care; obtaining or reviewing history; documenting in the medical record; reviewing/ordering tests, medications or procedures; communicating with other healthcare professionals and discussing with patient's family/caregivers.    Current Length of Stay: 1 day(s)  Current Patient Status: Inpatient   Certification Statement: The patient will continue to require additional inpatient hospital stay due to GI bleed, anemia  Discharge Plan: Anticipate discharge in 48-72 hrs to home.    Code Status: Level 1 - Full Code    Subjective:   Patient reports he had sharp abdominal pain last night that is now resolved. He also reports he had two episodes of large bloody red stools overnight. Feels a little dizzy/lightheaded. Denies current chest pain, shortness of breath, nausea, vomiting, or abdominal pain.     Objective:     Vitals:   Temp (24hrs), Av.7 °F (37.1 °C), Min:97.8 °F (36.6 °C), Max:99.9 °F (37.7 °C)    Temp:  [97.8 °F (36.6 °C)-99.9 °F (37.7 °C)] 97.8 °F (36.6 °C)  HR:  [74-94] 75  Resp:  [17-20] 18  BP: (104-153)/(61-92) 116/72  SpO2:  [95 %-99 %] 96 %  Body mass index is 31.78 kg/m².     Input and Output Summary (last 24 hours):     Intake/Output Summary (Last 24 hours) at 2024 1358  Last data filed at 2024 0415  Gross per 24 hour   Intake 700 ml   Output --   Net 700 ml       Physical Exam:   Physical Exam  Vitals and nursing note reviewed.   Constitutional:       General: He is not in acute distress.     Appearance: He is well-developed.   Cardiovascular:      Rate and Rhythm: Normal rate and regular rhythm.   Pulmonary:      Effort: Pulmonary effort is normal. No respiratory distress.      Breath sounds: Normal breath sounds.   Abdominal:      Palpations: Abdomen is soft.      Tenderness: There is no  abdominal tenderness.   Musculoskeletal:         General: No swelling.   Skin:     General: Skin is warm and dry.      Capillary Refill: Capillary refill takes less than 2 seconds.   Neurological:      Mental Status: He is alert.   Psychiatric:         Mood and Affect: Mood normal.          Additional Data:     Labs:  Results from last 7 days   Lab Units 06/08/24  1143 06/08/24  0611   WBC Thousand/uL  --  7.27   HEMOGLOBIN g/dL 7.3* 7.3*   HEMATOCRIT % 22.4* 22.8*   PLATELETS Thousands/uL  --  213   SEGS PCT %  --  64   LYMPHO PCT %  --  20   MONO PCT %  --  12   EOS PCT %  --  3     Results from last 7 days   Lab Units 06/08/24  0611 06/07/24  1515   SODIUM mmol/L 142 142   POTASSIUM mmol/L 3.6 3.6   CHLORIDE mmol/L 115* 113*   CO2 mmol/L 20* 22   BUN mg/dL 18 15   CREATININE mg/dL 0.80 0.84   ANION GAP mmol/L 7 7   CALCIUM mg/dL 7.3* 8.1*   ALBUMIN g/dL  --  3.5   TOTAL BILIRUBIN mg/dL  --  0.43   ALK PHOS U/L  --  77   ALT U/L  --  9   AST U/L  --  8*   GLUCOSE RANDOM mg/dL 106 115     Results from last 7 days   Lab Units 06/08/24  1143   INR  1.18                   Lines/Drains:  Invasive Devices       Peripheral Intravenous Line  Duration             Peripheral IV 06/07/24 Proximal;Right;Ventral (anterior) Forearm 1 day    Peripheral IV 06/07/24 Right;Upper;Ventral (anterior) Arm <1 day    Peripheral IV 06/08/24 Dorsal (posterior);Right Hand <1 day                          Imaging: Reviewed radiology reports from this admission including: abdominal/pelvic CT    Recent Cultures (last 7 days):         Last 24 Hours Medication List:   Current Facility-Administered Medications   Medication Dose Route Frequency Provider Last Rate    acetaminophen  650 mg Oral Q6H PRN ELVIS Valencia      atorvastatin  40 mg Oral QPM Victoria Alcantara MD      ferrous sulfate  325 mg Oral Daily With Breakfast Victoria Alcantara MD      HYDROmorphone  0.2 mg Intravenous Q3H PRN ELVIS Valencia      melatonin  3 mg Oral HS Jose  ELVIS Mckinney      memantine  5 mg Oral BID Victoria Alcantara MD      metoprolol succinate  50 mg Oral BID Victoria Alcantara MD      ondansetron  4 mg Intravenous Q6H PRN ELVIS Valencia      pantoprazole (PROTONIX) 80 mg in sodium chloride 0.9 % 100 mL infusion  8 mg/hr Intravenous Continuous Jayy Mendoza DO 8 mg/hr (06/08/24 1025)    polyethylene glycol  238 g Oral Once Prateek Bolivar MD      sodium chloride  75 mL/hr Intravenous Continuous Carrie Carrillo PA-C 75 mL/hr (06/08/24 1025)        Today, Patient Was Seen By: Carrie Carrillo PA-C    **Please Note: This note may have been constructed using a voice recognition system.**

## 2024-06-08 NOTE — ASSESSMENT & PLAN NOTE
Patient recently hospitalized 5/28-5/30 with bloody stools. Colonoscopy at that time showed old blood, suspected diverticular bleed. Restarted on Plavis and Eliquis and now presents with bloody stools and anemia on outpatient labs.  CT a/p: New high attenuation material in the splenic flexure of the colon. In the given clinical setting this does raise concern for hemorrhage but is otherwise nonspecific on single phase CT. No other interval abnormality.  Now s/p 2 units PRBC with hemoglobin 7.3  Continue to hold Eliquis and Plavix  GI consulted  Continue protonix drip  Bowel prep today, possible repeat colonoscopy this admission  BP stable, continue IVF  Trend h&h q8h

## 2024-06-08 NOTE — QUICK NOTE
Seen pt for worsening abdomen.     Diffuse abdomen tenderness, no rebound no guarding.  Abdomen slight distended.  Low-grade fever    Patient was ordered 1 more unit of RBC for hemoglobin of 7.2.  WBC was normal on admission.  Stat CT abdomen ordered.  Start patient on Rocephin Flagyl empirically  N.p.o.  Gentle hydration

## 2024-06-08 NOTE — CONSULTS
Consultation -  [facility]  Christopher Razo 68 y.o. male MRN: [id number]  Unit/Bed#: [address] [place] [address] Encounter: [id number]        Consults    ASSESSMENT/PLAN:     68-year-old gentleman, history of dementia, atrial fibrillation on Eliquis, recent lower GI bleed presumably diverticular, presents with anemia, since admission having epigastric pain, hematochezia overnight.  An inappropriate response to transfusions.  Has had several diverticular bleeds in the past.    1.  Anemia and GI bleed: Suspect recurrent lower diverticular bleed, ischemic colitis, peptic ulcer disease and upper GI are also on the differential although less likely  -Continue to follow hemoglobin, transfuse as clinically indicated  -Awaiting CT scan results  -If CT scan does not show any significant inflammatory changes, recommend bowel prep today, possible repeat colonoscopy during this hospital stay versus upper endoscopy and colonoscopy pending his clinical course  -Twice daily PPI therapy  -Clear liquid diet  Correct his anticoagulation          ______________________________________________________________________    Reason for Consult / Principal Problem: Acute on chronic anemia    HPI: Christopher Razo is a 68 y.o. year old male who presents with Acute on chronic anemia this is a 68-year-old gentleman, history of coronary disease, history of atrial fibrillation, on anticoagulation notably atrial fibrillation, has a history of dementia, reports having significant 55 to have anemia as an outpatient was recently just admitted to the hospital with similar presentation, at that time was having hematochezia.  Had a colonoscopy with old blood in his stool, suspected diverticular bleed.  He was discharged home back on his anticoagulation, presented with anemia.  Since admission, he was noted to have 2 more bloody bowel movements overnight, increased abdominal pain and mild abdominal distention.    Patient reports having some epigastric  discomfort yesterday.    He denies any nausea, vomiting.  Laboratory studies demonstrates stable him with however this is after 2 units of PRBC transfusion.  His BUN is normal.    Review of Systems:  The remainder of the review of systems was negative except for the pertinent positives noted in HPI.    Patient is a poor historian.  Unable to obtain reliable review of systems.    Historical Information   Past Medical History:   Diagnosis Date    Arthropathy of knee     last assessed 8/19/15    Bacterial pneumonia 02/05/2007    last assessed 2/5/7    Brain atrophy (HCC)     Colon polyp     CPAP (continuous positive airway pressure) dependence     Disorder of male genital organ 02/12/2008    last assessed 2/12/08    ED (erectile dysfunction) of organic origin     last assessed 2/13/17     History of hypertension     Seasonal allergies     Situational anxiety     resolved 3/16/17     Sleep apnea     Stroke (HCC)     09/2020 TIA    Tinnitus     Wears hearing aid     bilateral     Past Surgical History:   Procedure Laterality Date    CARDIAC CATHETERIZATION Left 10/28/2022    Procedure: Cardiac Left Heart Cath;  Surgeon: Christian Burr MD;  Location:  CARDIAC CATH LAB;  Service: Cardiology    CARDIAC CATHETERIZATION  10/28/2022    Procedure: Cardiac catheterization;  Surgeon: Christian Burr MD;  Location:  CARDIAC CATH LAB;  Service: Cardiology    CARDIAC CATHETERIZATION N/A 10/28/2022    Procedure: Cardiac Coronary Angiogram;  Surgeon: Christian Burr MD;  Location:  CARDIAC CATH LAB;  Service: Cardiology    CARDIAC ELECTROPHYSIOLOGY PROCEDURE N/A 12/22/2021    Procedure: Cardiac Loop Recorder Implant;  Surgeon: Judy Norris DO;  Location: [place] [facility]ARDI[facility] CATH LAB;  Service: Cardiology    COLONOSCOPY      x3-w/polyps    HERNIA REPAIR      umbilical,hemal inguinal    KNEE ARTHROSCOPY Left     meniscus    OK COLONOSCOPY FLX DX W/COLLJ SPEC WHEN PFRMD N/A 5/7/2018    Procedure: COLONOSCOPY;  Surgeon: Thiago Lopes MD;  Location:  [place] [facility];  Service: Gastroenterology     Social History   Social History     Substance and Sexual Activity   Alcohol Use Not Currently    Alcohol/week: 14.0 standard drinks of alcohol    Types: 14 Cans of beer per week    Comment: occ     Social History     Substance and Sexual Activity   Drug Use No     Social History     Tobacco Use   Smoking Status Never    Passive exposure: Past   Smokeless Tobacco Never     Family History   Problem Relation Age of Onset    Cancer Mother         breast    Diverticulitis Mother         colostomy    Breast cancer Mother     Heart disease Father         CHF    Cancer Sister         breast    Depression Sister     Cancer Sister         skin cancer    Cancer Sister         skin cancer    Colon cancer Neg Hx     Liver disease Neg Hx        Meds/Allergies       Medications Prior to Admission:     apixaban (Eliquis) 5 mg    atorvastatin (LIPITOR) 40 mg tablet    clopidogrel (PLAVIX) 75 mg tablet    ferrous sulfate 324 (65 Fe) mg    fexofenadine (ALLEGRA) 180 MG tablet    Melatonin 5 MG TABS    memantine (NAMENDA) 5 mg tablet    metoprolol succinate (TOPROL-XL) 50 mg 24 hr tablet    bisacodyl (DULCOLAX) 5 mg EC tablet    polyethylene glycol (GLYCOLAX) 17 GM/SCOOP powder    polyethylene glycol (GOLYTELY) 4000 mL solution  Current Facility-Administered Medications   Medication Dose Route Frequency    acetaminophen (TYLENOL) tablet 650 mg  650 mg Oral Q6H PRN    atorvastatin (LIPITOR) tablet 40 mg  40 mg Oral QPM    cefTRIAXone (ROCEPHIN) IVPB (premix in dextrose) 2,000 mg 50 mL  2,000 mg Intravenous Q24H    ferrous sulfate tablet 325 mg  325 mg Oral Daily With Breakfast    HYDROmorphone HCl (DILAUDID) injection 0.2 mg  0.2 mg Intravenous Q3H PRN    melatonin tablet 3 mg  3 mg Oral HS    memantine (NAMENDA) tablet 5 mg  5 mg Oral BID    metoprolol succinate (TOPROL-XL) 24 hr tablet 50 mg  50 mg Oral BID    metroNIDAZOLE (FLAGYL) IVPB (premix) 500 mg 100 mL  500 mg Intravenous Q8H     ondansetron (ZOFRAN) injection 4 mg  4 mg Intravenous Q6H PRN    pantoprazole (PROTONIX) 80 mg in sodium chloride 0.9 % 100 mL infusion  8 mg/hr Intravenous Continuous    sodium chloride 0.9 % infusion  75 mL/hr Intravenous Continuous       Allergies   Allergen Reactions    Pollen Extract Sneezing     Reaction Date: 18Sep2006;     Shellfish-Derived Products - Food Allergy Other (See Comments)     Eye swelling    Iodine Solution [Povidone Iodine] Rash     Eyes swell       Objective     Blood pressure 116/72, pulse 75, temperature 97.8 °F (36.6 °C), resp. rate 18, height 6' (1.829 m), weight 106 kg (234 lb 5.6 oz), SpO2 96%.      Intake/Output Summary (Last 24 hours) at 6/8/2024 1026  Last data filed at 6/8/2024 0415  Gross per 24 hour   Intake 700 ml   Output --   Net 700 ml       PHYSICAL EXAM     GEN: well nourished, well developed, no acute distress  HEENT: anicteric, MMM, no cervical or supraclavicular lymphadenopathy  CV: RRR, no m/r/g  CHEST: CTA b/l, no WRR  ABD: +BS, soft, mildly distended, epigastric tenderness, no rebound or guarding   EXT: no c/c/e  SKIN: no rashes,  NEURO: aao    Lab Results:   Admission on 06/07/2024   Component Date Value    WBC 06/07/2024 6.93     RBC 06/07/2024 2.54 (L)     Hemoglobin 06/07/2024 7.4 (L)     Hematocrit 06/07/2024 23.4 (L)     MCV 06/07/2024 92     MCH 06/07/2024 29.1     MCHC 06/07/2024 31.6     RDW 06/07/2024 13.2     MPV 06/07/2024 8.5 (L)     Platelets 06/07/2024 266     nRBC 06/07/2024 1     Segmented % 06/07/2024 65     Immature Grans % 06/07/2024 1     Lymphocytes % 06/07/2024 17     Monocytes % 06/07/2024 14 (H)     Eosinophils Relative 06/07/2024 3     Basophils Relative 06/07/2024 0     Absolute Neutrophils 06/07/2024 4.51     Absolute Immature Grans 06/07/2024 0.06     Absolute Lymphocytes 06/07/2024 1.16     Absolute Monocytes 06/07/2024 0.97     Eosinophils Absolute 06/07/2024 0.20     Basophils Absolute 06/07/2024 0.03     Sodium 06/07/2024 142      Potassium 06/07/2024 3.6     Chloride 06/07/2024 113 (H)     CO2 06/07/2024 22     ANION GAP 06/07/2024 7     BUN 06/07/2024 15     Creatinine 06/07/2024 0.84     Glucose 06/07/2024 115     Calcium 06/07/2024 8.1 (L)     AST 06/07/2024 8 (L)     ALT 06/07/2024 9     Alkaline Phosphatase 06/07/2024 77     Total Protein 06/07/2024 5.0 (L)     Albumin 06/07/2024 3.5     Total Bilirubin 06/07/2024 0.43     eGFR 06/07/2024 91     hs TnI 0hr 06/07/2024 10     ABO Grouping 06/07/2024 O     Rh Factor 06/07/2024 Positive     Antibody Screen 06/07/2024 Negative     Specimen Expiration Date 06/07/2024 [id number]     hs TnI 2hr 06/07/2024 10     Delta 2hr hsTnI 06/07/2024 0     hs TnI 4hr 06/07/2024 11     Delta 4hr hsTnI 06/07/2024 1     Unit Product Code 06/08/2024 E7521S52     Unit Number 06/08/2024 [id number]     Unit ABO 06/08/2024 O     Unit RH 06/08/2024 POS     Crossmatch 06/08/2024 Compatible     Unit Dispense Status 06/08/2024 Presumed Trans     Unit Product Volume 06/08/2024 350     Fecal Occult Blood Diagn* 06/08/2024 Positive (A)     Fecal Occult Blood Diagn* 06/08/2024 Test not performed     Fecal Occult Blood Diagn* 06/08/2024 Test not performed     Hemoglobin 06/07/2024 7.2 (L)     Hematocrit 06/07/2024 23.1 (L)     Unit Product Code 06/08/2024 E8995M37     Unit Number 06/08/2024 [id number]     Unit ABO 06/08/2024 O     Unit RH 06/08/2024 POS     Crossmatch 06/08/2024 Compatible     Unit Dispense Status 06/08/2024 Presumed Trans     Unit Product Volume 06/08/2024 350     Magnesium 06/08/2024 1.8 (L)     Sodium 06/08/2024 142     Potassium 06/08/2024 3.6     Chloride 06/08/2024 115 (H)     CO2 06/08/2024 20 (L)     ANION GAP 06/08/2024 7     BUN 06/08/2024 18     Creatinine 06/08/2024 0.80     Glucose 06/08/2024 106     Calcium 06/08/2024 7.3 (L)     eGFR 06/08/2024 93     WBC 06/08/2024 7.27     RBC 06/08/2024 2.51 (L)     Hemoglobin 06/08/2024 7.3 (L)     Hematocrit 06/08/2024 22.8 (L)     MCV  06/08/2024 91     MCH 06/08/2024 29.1     MCHC 06/08/2024 32.0     RDW 06/08/2024 13.5     MPV 06/08/2024 8.5 (L)     Platelets 06/08/2024 213     nRBC 06/08/2024 0     Segmented % 06/08/2024 64     Immature Grans % 06/08/2024 1     Lymphocytes % 06/08/2024 20     Monocytes % 06/08/2024 12     Eosinophils Relative 06/08/2024 3     Basophils Relative 06/08/2024 0     Absolute Neutrophils 06/08/2024 4.59     Absolute Immature Grans 06/08/2024 0.06     Absolute Lymphocytes 06/08/2024 1.44     Absolute Monocytes 06/08/2024 0.90     Eosinophils Absolute 06/08/2024 0.25     Basophils Absolute 06/08/2024 0.03      Imaging Studies: I have personally reviewed pertinent reports.

## 2024-06-08 NOTE — PLAN OF CARE
Problem: SAFETY ADULT  Goal: Patient will remain free of falls  Description: INTERVENTIONS:  - Educate patient/family on patient safety including physical limitations  - Instruct patient to call for assistance with activity   - Consult OT/PT to assist with strengthening/mobility   - Keep Call bell within reach  - Keep bed low and locked with side rails adjusted as appropriate  - Keep care items and personal belongings within reach  - Initiate and maintain comfort rounds  - Make Fall Risk Sign visible to staff  - Offer Toileting every 2 Hours, in advance of need  - Initiate/Maintain bed alarm  - Obtain necessary fall risk management equipment: cane, walker  - Apply yellow socks and bracelet for high fall risk patients  - Consider moving patient to room near nurses station  Outcome: Progressing     Problem: DISCHARGE PLANNING  Goal: Discharge to home or other facility with appropriate resources  Description: INTERVENTIONS:  - Identify barriers to discharge w/patient and caregiver  - Arrange for needed discharge resources and transportation as appropriate  - Identify discharge learning needs (meds, wound care, etc.)  - Arrange for interpretive services to assist at discharge as needed  - Refer to Case Management Department for coordinating discharge planning if the patient needs post-hospital services based on physician/advanced practitioner order or complex needs related to functional status, cognitive ability, or social support system  Outcome: Progressing     Problem: Knowledge Deficit  Goal: Patient/family/caregiver demonstrates understanding of disease process, treatment plan, medications, and discharge instructions  Description: Complete learning assessment and assess knowledge base.  Interventions:  - Provide teaching at level of understanding  - Provide teaching via preferred learning methods  Outcome: Progressing     Problem: RESPIRATORY - ADULT  Goal: Achieves optimal ventilation and  oxygenation  Description: INTERVENTIONS:  - Assess for changes in respiratory status  - Assess for changes in mentation and behavior  - Position to facilitate oxygenation and minimize respiratory effort  - Oxygen administered by appropriate delivery if ordered  - Initiate smoking cessation education as indicated  - Encourage broncho-pulmonary hygiene including cough, deep breathe, Incentive Spirometry  - Assess the need for suctioning and aspirate as needed  - Assess and instruct to report SOB or any respiratory difficulty  - Respiratory Therapy support as indicated  Outcome: Progressing     Problem: HEMATOLOGIC - ADULT  Goal: Maintains hematologic stability  Description: INTERVENTIONS  - Assess for signs and symptoms of bleeding or hemorrhage  - Monitor labs  - Administer supportive blood products/factors as ordered and appropriate  Outcome: Progressing

## 2024-06-08 NOTE — PLAN OF CARE
Problem: SAFETY ADULT  Goal: Patient will remain free of falls  Description: INTERVENTIONS:  - Educate patient/family on patient safety including physical limitations  - Instruct patient to call for assistance with activity   - Consult OT/PT to assist with strengthening/mobility   - Keep Call bell within reach  - Keep bed low and locked with side rails adjusted as appropriate  - Keep care items and personal belongings within reach  - Initiate and maintain comfort rounds  - Make Fall Risk Sign visible to staff  - Offer Toileting every 2 Hours, in advance of need  - Initiate/Maintain bed alarm  - Obtain necessary fall risk management equipment: alarm  - Apply yellow socks and bracelet for high fall risk patients  - Consider moving patient to room near nurses station  Outcome: Progressing     Problem: DISCHARGE PLANNING  Goal: Discharge to home or other facility with appropriate resources  Description: INTERVENTIONS:  - Identify barriers to discharge w/patient and caregiver  - Arrange for needed discharge resources and transportation as appropriate  - Identify discharge learning needs (meds, wound care, etc.)  - Arrange for interpretive services to assist at discharge as needed  - Refer to Case Management Department for coordinating discharge planning if the patient needs post-hospital services based on physician/advanced practitioner order or complex needs related to functional status, cognitive ability, or social support system  Outcome: Progressing     Problem: Knowledge Deficit  Goal: Patient/family/caregiver demonstrates understanding of disease process, treatment plan, medications, and discharge instructions  Description: Complete learning assessment and assess knowledge base.  Interventions:  - Provide teaching at level of understanding  - Provide teaching via preferred learning methods  Outcome: Progressing     Problem: RESPIRATORY - ADULT  Goal: Achieves optimal ventilation and oxygenation  Description:  INTERVENTIONS:  - Assess for changes in respiratory status  - Assess for changes in mentation and behavior  - Position to facilitate oxygenation and minimize respiratory effort  - Oxygen administered by appropriate delivery if ordered  - Initiate smoking cessation education as indicated  - Encourage broncho-pulmonary hygiene including cough, deep breathe, Incentive Spirometry  - Assess the need for suctioning and aspirate as needed  - Assess and instruct to report SOB or any respiratory difficulty  - Respiratory Therapy support as indicated  Outcome: Progressing     Problem: HEMATOLOGIC - ADULT  Goal: Maintains hematologic stability  Description: INTERVENTIONS  - Assess for signs and symptoms of bleeding or hemorrhage  - Monitor labs  - Administer supportive blood products/factors as ordered and appropriate  Outcome: Progressing

## 2024-06-09 LAB
ANION GAP SERPL CALCULATED.3IONS-SCNC: 4 MMOL/L (ref 4–13)
BUN SERPL-MCNC: 14 MG/DL (ref 5–25)
CALCIUM SERPL-MCNC: 7.4 MG/DL (ref 8.4–10.2)
CHLORIDE SERPL-SCNC: 114 MMOL/L (ref 96–108)
CO2 SERPL-SCNC: 22 MMOL/L (ref 21–32)
CREAT SERPL-MCNC: 0.79 MG/DL (ref 0.6–1.3)
ERYTHROCYTE [DISTWIDTH] IN BLOOD BY AUTOMATED COUNT: 14.3 % (ref 11.6–15.1)
GFR SERPL CREATININE-BSD FRML MDRD: 93 ML/MIN/1.73SQ M
GLUCOSE SERPL-MCNC: 115 MG/DL (ref 65–140)
HCT VFR BLD AUTO: 20.2 % (ref 36.5–49.3)
HCT VFR BLD AUTO: 22.9 % (ref 36.5–49.3)
HCT VFR BLD AUTO: 23.8 % (ref 36.5–49.3)
HGB BLD-MCNC: 6.6 G/DL (ref 12–17)
HGB BLD-MCNC: 7.4 G/DL (ref 12–17)
HGB BLD-MCNC: 7.6 G/DL (ref 12–17)
MCH RBC QN AUTO: 29.9 PG (ref 26.8–34.3)
MCHC RBC AUTO-ENTMCNC: 32.7 G/DL (ref 31.4–37.4)
MCV RBC AUTO: 91 FL (ref 82–98)
PLATELET # BLD AUTO: 224 THOUSANDS/UL (ref 149–390)
PMV BLD AUTO: 8.8 FL (ref 8.9–12.7)
POTASSIUM SERPL-SCNC: 3.4 MMOL/L (ref 3.5–5.3)
RBC # BLD AUTO: 2.21 MILLION/UL (ref 3.88–5.62)
SODIUM SERPL-SCNC: 140 MMOL/L (ref 135–147)
WBC # BLD AUTO: 6.36 THOUSAND/UL (ref 4.31–10.16)

## 2024-06-09 PROCEDURE — 85027 COMPLETE CBC AUTOMATED: CPT

## 2024-06-09 PROCEDURE — 85018 HEMOGLOBIN: CPT | Performed by: NURSE PRACTITIONER

## 2024-06-09 PROCEDURE — 85014 HEMATOCRIT: CPT | Performed by: NURSE PRACTITIONER

## 2024-06-09 PROCEDURE — P9016 RBC LEUKOCYTES REDUCED: HCPCS

## 2024-06-09 PROCEDURE — 99233 SBSQ HOSP IP/OBS HIGH 50: CPT | Performed by: INTERNAL MEDICINE

## 2024-06-09 PROCEDURE — 99232 SBSQ HOSP IP/OBS MODERATE 35: CPT

## 2024-06-09 PROCEDURE — C9113 INJ PANTOPRAZOLE SODIUM, VIA: HCPCS | Performed by: STUDENT IN AN ORGANIZED HEALTH CARE EDUCATION/TRAINING PROGRAM

## 2024-06-09 PROCEDURE — 80048 BASIC METABOLIC PNL TOTAL CA: CPT

## 2024-06-09 RX ORDER — POLYETHYLENE GLYCOL 3350 17 G/17G
238 POWDER, FOR SOLUTION ORAL ONCE
Status: COMPLETED | OUTPATIENT
Start: 2024-06-09 | End: 2024-06-09

## 2024-06-09 RX ORDER — POTASSIUM CHLORIDE 20 MEQ/1
40 TABLET, EXTENDED RELEASE ORAL ONCE
Status: COMPLETED | OUTPATIENT
Start: 2024-06-09 | End: 2024-06-09

## 2024-06-09 RX ADMIN — Medication 3 MG: at 21:39

## 2024-06-09 RX ADMIN — POLYETHYLENE GLYCOL 3350 238 G: 17 POWDER, FOR SOLUTION ORAL at 11:31

## 2024-06-09 RX ADMIN — PANTOPRAZOLE SODIUM 8 MG/HR: 40 INJECTION, POWDER, FOR SOLUTION INTRAVENOUS at 08:23

## 2024-06-09 RX ADMIN — MEMANTINE 5 MG: 5 TABLET ORAL at 08:02

## 2024-06-09 RX ADMIN — PANTOPRAZOLE SODIUM 8 MG/HR: 40 INJECTION, POWDER, FOR SOLUTION INTRAVENOUS at 20:30

## 2024-06-09 RX ADMIN — METOPROLOL SUCCINATE 50 MG: 50 TABLET, EXTENDED RELEASE ORAL at 20:30

## 2024-06-09 RX ADMIN — FERROUS SULFATE TAB 325 MG (65 MG ELEMENTAL FE) 325 MG: 325 (65 FE) TAB at 07:57

## 2024-06-09 RX ADMIN — ATORVASTATIN CALCIUM 40 MG: 40 TABLET, FILM COATED ORAL at 17:05

## 2024-06-09 RX ADMIN — MEMANTINE 5 MG: 5 TABLET ORAL at 17:05

## 2024-06-09 RX ADMIN — METOPROLOL SUCCINATE 50 MG: 50 TABLET, EXTENDED RELEASE ORAL at 08:02

## 2024-06-09 RX ADMIN — SODIUM CHLORIDE 75 ML/HR: 0.9 INJECTION, SOLUTION INTRAVENOUS at 08:24

## 2024-06-09 RX ADMIN — POTASSIUM CHLORIDE 40 MEQ: 1500 TABLET, EXTENDED RELEASE ORAL at 07:56

## 2024-06-09 NOTE — PROGRESS NOTES
[facility]  Progress Note  Name: Christopher Razo I  MRN: [id number]  Unit/Bed#: 2 [address] Date of Admission: 6/7/2024   Date of Service: 6/9/2024 I Hospital Day: 2    Assessment & Plan   * Anemia and GI bleed  Assessment & Plan  Patient recently hospitalized 5/28-5/30 with bloody stools. Colonoscopy at that time showed old blood, suspected diverticular bleed. Restarted on Plavis and Eliquis and now presents with bloody stools and anemia on outpatient labs.  CT a/p: New high attenuation material in the splenic flexure of the colon. In the given clinical setting this does raise concern for hemorrhage but is otherwise nonspecific on single phase CT. No other interval abnormality.  s/p 2 units PRBC  Hemoglobin this morning 6.6, another unit PRBC ordered  Continue with bloody bowel movements  GI following  Continue to hold Eliquis and Plavix  Continue protonix drip  Per GI, if active bleeding continues will proceed with colonoscopy today with possible IR guided intervention and if bleeding stops, can monitor conservatively  BP stable, continue IVF  Trend h&h q6h    CAD (coronary artery disease)  Assessment & Plan  Continue statin and metoprolol  Plavix on hold    Paroxysmal A-fib (HCC)  Assessment & Plan  Continue metoprolol  Holding eliquis    History of stroke  Assessment & Plan  Holding plavix  Continue statin    Dementia without behavioral disturbance (HCC)  Assessment & Plan  continue Namenda     Benign essential hypertension  Assessment & Plan  Continue metoprolol  BP stable           VTE Pharmacologic Prophylaxis: VTE Score: 4 Moderate Risk (Score 3-4) - Pharmacological DVT Prophylaxis Contraindicated. Sequential Compression Devices Ordered.    Mobility:   Basic Mobility Inpatient Raw Score: 18  JH-HLM Goal: 6: Walk 10 steps or more  JH-HLM Achieved: 6: Walk 10 steps or more  JH-HLM Goal achieved. Continue to encourage appropriate mobility.    Patient Centered Rounds: I performed bedside  rounds with nursing staff today.   Discussions with Specialists or Other Care Team Provider: ANAID, rn    Education and Discussions with Family / Patient:  wife updated at bedside by GI.     Total Time Spent on Date of Encounter in care of patient: 35 mins. This time was spent on one or more of the following: performing physical exam; counseling and coordination of care; obtaining or reviewing history; documenting in the medical record; reviewing/ordering tests, medications or procedures; communicating with other healthcare professionals and discussing with patient's family/caregivers.    Current Length of Stay: 2 day(s)  Current Patient Status: Inpatient   Certification Statement: The patient will continue to require additional inpatient hospital stay due to anemia, GI bleed  Discharge Plan: Anticipate discharge in 48-72 hrs to home.    Code Status: Level 1 - Full Code    Subjective:   Patient reports his abdominal pain has resolved. Had additional bloody bowel movements during the day yesterday. Reports occasional dizziness/lightheadedness. No chest pain, shortness of breath, nausea, or vomiting. Agreeable to blood transfusion.    Objective:     Vitals:   Temp (24hrs), Av.4 °F (36.9 °C), Min:98.1 °F (36.7 °C), Max:98.8 °F (37.1 °C)    Temp:  [98.1 °F (36.7 °C)-98.8 °F (37.1 °C)] 98.8 °F (37.1 °C)  HR:  [79-91] 84  Resp:  [15-20] 17  BP: (114-140)/(67-79) 122/68  SpO2:  [92 %-97 %] 94 %  Body mass index is 32.74 kg/m².     Input and Output Summary (last 24 hours):     Intake/Output Summary (Last 24 hours) at 2024 1150  Last data filed at 2024 0900  Gross per 24 hour   Intake 1110 ml   Output 400 ml   Net 710 ml       Physical Exam:   Physical Exam  Vitals and nursing note reviewed.   Constitutional:       General: He is not in acute distress.     Appearance: He is well-developed.   Cardiovascular:      Rate and Rhythm: Normal rate and regular rhythm.   Pulmonary:      Effort: Pulmonary effort is normal. No  respiratory distress.      Breath sounds: Normal breath sounds.   Abdominal:      Palpations: Abdomen is soft.      Tenderness: There is no abdominal tenderness.   Musculoskeletal:         General: No swelling.   Skin:     General: Skin is warm and dry.      Capillary Refill: Capillary refill takes less than 2 seconds.   Neurological:      Mental Status: He is alert.   Psychiatric:         Mood and Affect: Mood normal.          Additional Data:     Labs:  Results from last 7 days   Lab Units 06/09/24  0538 06/08/24  1143 06/08/24  0611   WBC Thousand/uL 6.36  --  7.27   HEMOGLOBIN g/dL 6.6*   < > 7.3*   HEMATOCRIT % 20.2*   < > 22.8*   PLATELETS Thousands/uL 224  --  213   SEGS PCT %  --   --  64   LYMPHO PCT %  --   --  20   MONO PCT %  --   --  12   EOS PCT %  --   --  3    < > = values in this interval not displayed.     Results from last 7 days   Lab Units 06/09/24  0538 06/08/24  0611 06/07/24  1515   SODIUM mmol/L 140   < > 142   POTASSIUM mmol/L 3.4*   < > 3.6   CHLORIDE mmol/L 114*   < > 113*   CO2 mmol/L 22   < > 22   BUN mg/dL 14   < > 15   CREATININE mg/dL 0.79   < > 0.84   ANION GAP mmol/L 4   < > 7   CALCIUM mg/dL 7.4*   < > 8.1*   ALBUMIN g/dL  --   --  3.5   TOTAL BILIRUBIN mg/dL  --   --  0.43   ALK PHOS U/L  --   --  77   ALT U/L  --   --  9   AST U/L  --   --  8*   GLUCOSE RANDOM mg/dL 115   < > 115    < > = values in this interval not displayed.     Results from last 7 days   Lab Units 06/08/24  1143   INR  1.18                   Lines/Drains:  Invasive Devices       Peripheral Intravenous Line  Duration             Peripheral IV 06/08/24 Left;Upper;Ventral (anterior) Arm <1 day    Peripheral IV 06/09/24 Distal;Right;Ventral (anterior) Forearm <1 day                          Imaging: Reviewed radiology reports from this admission including: abdominal/pelvic CT    Recent Cultures (last 7 days):         Last 24 Hours Medication List:   Current Facility-Administered Medications   Medication Dose  Route Frequency Provider Last Rate    acetaminophen  650 mg Oral Q6H PRN ELVIS Valencia      atorvastatin  40 mg Oral QPM Victoria Alcantara MD      ferrous sulfate  325 mg Oral Daily With Breakfast Victoria Alcantara MD      HYDROmorphone  0.2 mg Intravenous Q3H PRN ELVIS Valencia      melatonin  3 mg Oral HS ELVIS Valencia      memantine  5 mg Oral BID Victoria Alcantara MD      metoprolol succinate  50 mg Oral BID Victoria Alcantara MD      ondansetron  4 mg Intravenous Q6H PRN ELVIS Valencia      pantoprazole (PROTONIX) 80 mg in sodium chloride 0.9 % 100 mL infusion  8 mg/hr Intravenous Continuous Jayy Mendoza DO 8 mg/hr (06/09/24 0823)    sodium chloride  75 mL/hr Intravenous Continuous Carrie Carrillo PA-C 75 mL/hr (06/09/24 0824)        Today, Patient Was Seen By: Carrie Carrillo PA-C    **Please Note: This note may have been constructed using a voice recognition system.**

## 2024-06-09 NOTE — PLAN OF CARE
Problem: SAFETY ADULT  Goal: Patient will remain free of falls  Description: INTERVENTIONS:  - Educate patient/family on patient safety including physical limitations  - Instruct patient to call for assistance with activity   - Consult OT/PT to assist with strengthening/mobility   - Keep Call bell within reach  - Keep bed low and locked with side rails adjusted as appropriate  - Keep care items and personal belongings within reach  - Initiate and maintain comfort rounds  - Make Fall Risk Sign visible to staff  - Offer Toileting every 2 Hours, in advance of need  - Initiate/Maintain bed alarm  - Obtain necessary fall risk management equipment: cane  - Apply yellow socks and bracelet for high fall risk patients  - Consider moving patient to room near nurses station  Outcome: Progressing     Problem: DISCHARGE PLANNING  Goal: Discharge to home or other facility with appropriate resources  Description: INTERVENTIONS:  - Identify barriers to discharge w/patient and caregiver  - Arrange for needed discharge resources and transportation as appropriate  - Identify discharge learning needs (meds, wound care, etc.)  - Arrange for interpretive services to assist at discharge as needed  - Refer to Case Management Department for coordinating discharge planning if the patient needs post-hospital services based on physician/advanced practitioner order or complex needs related to functional status, cognitive ability, or social support system  Outcome: Progressing     Problem: Knowledge Deficit  Goal: Patient/family/caregiver demonstrates understanding of disease process, treatment plan, medications, and discharge instructions  Description: Complete learning assessment and assess knowledge base.  Interventions:  - Provide teaching at level of understanding  - Provide teaching via preferred learning methods  Outcome: Progressing     Problem: RESPIRATORY - ADULT  Goal: Achieves optimal ventilation and oxygenation  Description:  INTERVENTIONS:  - Assess for changes in respiratory status  - Assess for changes in mentation and behavior  - Position to facilitate oxygenation and minimize respiratory effort  - Oxygen administered by appropriate delivery if ordered  - Initiate smoking cessation education as indicated  - Encourage broncho-pulmonary hygiene including cough, deep breathe, Incentive Spirometry  - Assess the need for suctioning and aspirate as needed  - Assess and instruct to report SOB or any respiratory difficulty  - Respiratory Therapy support as indicated  Outcome: Progressing     Problem: HEMATOLOGIC - ADULT  Goal: Maintains hematologic stability  Description: INTERVENTIONS  - Assess for signs and symptoms of bleeding or hemorrhage  - Monitor labs  - Administer supportive blood products/factors as ordered and appropriate  Outcome: Progressing

## 2024-06-09 NOTE — ASSESSMENT & PLAN NOTE
Patient recently hospitalized 5/28-5/30 with bloody stools. Colonoscopy at that time showed old blood, suspected diverticular bleed. Restarted on Plavis and Eliquis and now presents with bloody stools and anemia on outpatient labs.  CT a/p: New high attenuation material in the splenic flexure of the colon. In the given clinical setting this does raise concern for hemorrhage but is otherwise nonspecific on single phase CT. No other interval abnormality.  s/p 2 units PRBC  Hemoglobin this morning 7.6  Continue with bloody bowel movements; last one 6/9 in the afternoon  GI following  Continue to hold Eliquis and Plavix  Continue protonix drip  Per GI, if active bleeding continues will proceed with colonoscopy with possible IR guided intervention and if bleeding stops, can monitor conservatively  BP stable, continue IVF  Trend h&h q6h

## 2024-06-09 NOTE — ASSESSMENT & PLAN NOTE
Patient recently hospitalized 5/28-5/30 with bloody stools. Colonoscopy at that time showed old blood, suspected diverticular bleed. Restarted on Plavis and Eliquis and now presents with bloody stools and anemia on outpatient labs.  CT a/p: New high attenuation material in the splenic flexure of the colon. In the given clinical setting this does raise concern for hemorrhage but is otherwise nonspecific on single phase CT. No other interval abnormality.  s/p 2 units PRBC  Hemoglobin this morning 6.6, another unit PRBC ordered  Continue with bloody bowel movements  GI following  Continue to hold Eliquis and Plavix  Continue protonix drip  Per GI, if active bleeding continues will proceed with colonoscopy today with possible IR guided intervention and if bleeding stops, can monitor conservatively  BP stable, continue IVF  Trend h&h q8h

## 2024-06-09 NOTE — PROGRESS NOTES
[facility]  Progress Note - Christopher Razo 68 y.o. male MRN: [id number]    Unit/Bed#: 2 [place] [address] Encounter: [id number]    Assessment/Plan:    68-year-old gentleman, history of dementia, on anticoagulation for atrial fibrillation, has a history of recurrent presumed diverticular bleeds, was just admitted for the same, presented with hematochezia, drop in hemoglobin.  Prep started yesterday with continued bleeding.    1.  Lower GI bleed: Positive CT scan around the splenic flexure, most likely secondary to recurrent diverticular bleed, ischemic colitis is also on the differential  -Patient had suboptimal prep on his last colonoscopy, given continued blood overnight will prep again today  -If bleeding continues actively will proceed with colonoscopy today with possible IR guided intervention if needed otherwise if bleeding stops can monitor conservatively  -Continue to hold anticoagulation  -Follow hemoglobin and transfuse as clinically indicated  -Discussed with patient and his wife at the bedside          Subjective:   Patient had continued bleeding with bowel prep overnight.  Reports that his abdominal pain is somewhat improved today.  Hemoglobin dropped this morning, hemodynamically stable overnight.    Objective:     Vitals: Blood pressure 122/68, pulse 84, temperature 98.8 °F (37.1 °C), temperature source Oral, resp. rate 17, height 6' (1.829 m), weight 109 kg (241 lb 6.4 oz), SpO2 94%.,Body mass index is 32.74 kg/m².      Intake/Output Summary (Last 24 hours) at 6/9/2024 1144  Last data filed at 6/9/2024 0900  Gross per 24 hour   Intake 1110 ml   Output 400 ml   Net 710 ml       Physical Exam:    GEN: wn/wd, NAD  HEENT: MMM, no cervical or supraclavicular LAD, anciteric  CV: RRR, no m/r/g  CHEST: CTA b/l, no w/r/r  ABD: +BS, soft, less tenderness in epigastric compared to yesterday, no hepatosplenomegaly  EXT: no c/c/e  SKIN: no rashes  NEURO: aaox3      Invasive Devices       Peripheral  Intravenous Line  Duration             Peripheral IV 06/08/24 Left;Upper;Ventral (anterior) Arm <1 day    Peripheral IV 06/09/24 Distal;Right;Ventral (anterior) Forearm <1 day                            Lab, Imaging and other studies:     Admission on 06/07/2024   Component Date Value    WBC 06/07/2024 6.93     RBC 06/07/2024 2.54 (L)     Hemoglobin 06/07/2024 7.4 (L)     Hematocrit 06/07/2024 23.4 (L)     MCV 06/07/2024 92     MCH 06/07/2024 29.1     MCHC 06/07/2024 31.6     RDW 06/07/2024 13.2     MPV 06/07/2024 8.5 (L)     Platelets 06/07/2024 266     nRBC 06/07/2024 1     Segmented % 06/07/2024 65     Immature Grans % 06/07/2024 1     Lymphocytes % 06/07/2024 17     Monocytes % 06/07/2024 14 (H)     Eosinophils Relative 06/07/2024 3     Basophils Relative 06/07/2024 0     Absolute Neutrophils 06/07/2024 4.51     Absolute Immature Grans 06/07/2024 0.06     Absolute Lymphocytes 06/07/2024 1.16     Absolute Monocytes 06/07/2024 0.97     Eosinophils Absolute 06/07/2024 0.20     Basophils Absolute 06/07/2024 0.03     Sodium 06/07/2024 142     Potassium 06/07/2024 3.6     Chloride 06/07/2024 113 (H)     CO2 06/07/2024 22     ANION GAP 06/07/2024 7     BUN 06/07/2024 15     Creatinine 06/07/2024 0.84     Glucose 06/07/2024 115     Calcium 06/07/2024 8.1 (L)     AST 06/07/2024 8 (L)     ALT 06/07/2024 9     Alkaline Phosphatase 06/07/2024 77     Total Protein 06/07/2024 5.0 (L)     Albumin 06/07/2024 3.5     Total Bilirubin 06/07/2024 0.43     eGFR 06/07/2024 91     hs TnI 0hr 06/07/2024 10     ABO Grouping 06/07/2024 O     Rh Factor 06/07/2024 Positive     Antibody Screen 06/07/2024 Negative     Specimen Expiration Date 06/07/2024 [id number]     hs TnI 2hr 06/07/2024 10     Delta 2hr hsTnI 06/07/2024 0     hs TnI 4hr 06/07/2024 11     Delta 4hr hsTnI 06/07/2024 1     Unit Product Code 06/08/2024 Q2028Z55     Unit Number 06/08/2024 [id number]     Unit ABO 06/08/2024 O     Unit RH 06/08/2024 POS     Crossmatch  06/08/2024 Compatible     Unit Dispense Status 06/08/2024 Presumed Trans     Unit Product Volume 06/08/2024 350     Fecal Occult Blood Diagn* 06/08/2024 Positive (A)     Fecal Occult Blood Diagn* 06/08/2024 Test not performed     Fecal Occult Blood Diagn* 06/08/2024 Test not performed     Hemoglobin 06/07/2024 7.2 (L)     Hematocrit 06/07/2024 23.1 (L)     Unit Product Code 06/08/2024 [id number]     Unit Number 06/08/2024 [id number]     Unit ABO 06/08/2024 O     Unit RH 06/08/2024 POS     Crossmatch 06/08/2024 Compatible     Unit Dispense Status 06/08/2024 Presumed Trans     Unit Product Volume 06/08/2024 350     Magnesium 06/08/2024 1.8 (L)     Sodium 06/08/2024 142     Potassium 06/08/2024 3.6     Chloride 06/08/2024 115 (H)     CO2 06/08/2024 20 (L)     ANION GAP 06/08/2024 7     BUN 06/08/2024 18     Creatinine 06/08/2024 0.80     Glucose 06/08/2024 106     Calcium 06/08/2024 7.3 (L)     eGFR 06/08/2024 93     WBC 06/08/2024 7.27     RBC 06/08/2024 2.51 (L)     Hemoglobin 06/08/2024 7.3 (L)     Hematocrit 06/08/2024 22.8 (L)     MCV 06/08/2024 91     MCH 06/08/2024 29.1     MCHC 06/08/2024 32.0     RDW 06/08/2024 13.5     MPV 06/08/2024 8.5 (L)     Platelets 06/08/2024 213     nRBC 06/08/2024 0     Segmented % 06/08/2024 64     Immature Grans % 06/08/2024 1     Lymphocytes % 06/08/2024 20     Monocytes % 06/08/2024 12     Eosinophils Relative 06/08/2024 3     Basophils Relative 06/08/2024 0     Absolute Neutrophils 06/08/2024 4.59     Absolute Immature Grans 06/08/2024 0.06     Absolute Lymphocytes 06/08/2024 1.44     Absolute Monocytes 06/08/2024 0.90     Eosinophils Absolute 06/08/2024 0.25     Basophils Absolute 06/08/2024 0.03     Hemoglobin 06/08/2024 7.3 (L)     Hematocrit 06/08/2024 22.4 (L)     Protime 06/08/2024 15.3 (H)     INR 06/08/2024 1.18     PTT 06/08/2024 31     Hemoglobin 06/08/2024 7.1 (L)     Hematocrit 06/08/2024 22.0 (L)     Hemoglobin 06/08/2024 7.1 (L)     Hematocrit 06/08/2024 22.2  (L)     WBC 06/09/2024 6.36     RBC 06/09/2024 2.21 (L)     Hemoglobin 06/09/2024 6.6 (L)     Hematocrit 06/09/2024 20.2 (L)     MCV 06/09/2024 91     MCH 06/09/2024 29.9     MCHC 06/09/2024 32.7     RDW 06/09/2024 14.3     Platelets 06/09/2024 224     MPV 06/09/2024 8.8 (L)     Sodium 06/09/2024 140     Potassium 06/09/2024 3.4 (L)     Chloride 06/09/2024 114 (H)     CO2 06/09/2024 22     ANION GAP 06/09/2024 4     BUN 06/09/2024 14     Creatinine 06/09/2024 0.79     Glucose 06/09/2024 115     Calcium 06/09/2024 7.4 (L)     eGFR 06/09/2024 93     Unit Product Code 06/09/2024 F4741F87     Unit Number 06/09/2024 [id number]     Unit ABO 06/09/2024 O     Unit RH 06/09/2024 POS     Crossmatch 06/09/2024 Compatible     Unit Dispense Status 06/09/2024 Issued     Unit Product Volume 06/09/2024 350          I have personally reviewed pertinent reports.      Current Facility-Administered Medications   Medication Dose Route Frequency    acetaminophen (TYLENOL) tablet 650 mg  650 mg Oral Q6H PRN    atorvastatin (LIPITOR) tablet 40 mg  40 mg Oral QPM    ferrous sulfate tablet 325 mg  325 mg Oral Daily With Breakfast    HYDROmorphone HCl (DILAUDID) injection 0.2 mg  0.2 mg Intravenous Q3H PRN    melatonin tablet 3 mg  3 mg Oral HS    memantine (NAMENDA) tablet 5 mg  5 mg Oral BID    metoprolol succinate (TOPROL-XL) 24 hr tablet 50 mg  50 mg Oral BID    ondansetron (ZOFRAN) injection 4 mg  4 mg Intravenous Q6H PRN    pantoprazole (PROTONIX) 80 mg in sodium chloride 0.9 % 100 mL infusion  8 mg/hr Intravenous Continuous    sodium chloride 0.9 % infusion  75 mL/hr Intravenous Continuous

## 2024-06-10 LAB
ABO GROUP BLD BPU: NORMAL
ABO GROUP BLD BPU: NORMAL
ANION GAP SERPL CALCULATED.3IONS-SCNC: 7 MMOL/L (ref 4–13)
ATRIAL RATE: 77 BPM
BPU ID: NORMAL
BPU ID: NORMAL
BUN SERPL-MCNC: 7 MG/DL (ref 5–25)
CALCIUM SERPL-MCNC: 7.6 MG/DL (ref 8.4–10.2)
CHLORIDE SERPL-SCNC: 114 MMOL/L (ref 96–108)
CO2 SERPL-SCNC: 21 MMOL/L (ref 21–32)
CREAT SERPL-MCNC: 0.84 MG/DL (ref 0.6–1.3)
CROSSMATCH: NORMAL
CROSSMATCH: NORMAL
ERYTHROCYTE [DISTWIDTH] IN BLOOD BY AUTOMATED COUNT: 15 % (ref 11.6–15.1)
GFR SERPL CREATININE-BSD FRML MDRD: 91 ML/MIN/1.73SQ M
GLUCOSE SERPL-MCNC: 112 MG/DL (ref 65–140)
HCT VFR BLD AUTO: 21.5 % (ref 36.5–49.3)
HCT VFR BLD AUTO: 23.3 % (ref 36.5–49.3)
HCT VFR BLD AUTO: 23.8 % (ref 36.5–49.3)
HCT VFR BLD AUTO: 24.2 % (ref 36.5–49.3)
HGB BLD-MCNC: 6.9 G/DL (ref 12–17)
HGB BLD-MCNC: 7.6 G/DL (ref 12–17)
HGB BLD-MCNC: 7.8 G/DL (ref 12–17)
HGB BLD-MCNC: 7.8 G/DL (ref 12–17)
MCH RBC QN AUTO: 29.6 PG (ref 26.8–34.3)
MCHC RBC AUTO-ENTMCNC: 32.6 G/DL (ref 31.4–37.4)
MCV RBC AUTO: 91 FL (ref 82–98)
P AXIS: 75 DEGREES
PLATELET # BLD AUTO: 210 THOUSANDS/UL (ref 149–390)
PMV BLD AUTO: 8.7 FL (ref 8.9–12.7)
POTASSIUM SERPL-SCNC: 3.7 MMOL/L (ref 3.5–5.3)
PR INTERVAL: 168 MS
QRS AXIS: -8 DEGREES
QRSD INTERVAL: 92 MS
QT INTERVAL: 382 MS
QTC INTERVAL: 432 MS
RBC # BLD AUTO: 2.57 MILLION/UL (ref 3.88–5.62)
SODIUM SERPL-SCNC: 142 MMOL/L (ref 135–147)
T WAVE AXIS: 4 DEGREES
UNIT DISPENSE STATUS: NORMAL
UNIT DISPENSE STATUS: NORMAL
UNIT PRODUCT CODE: NORMAL
UNIT PRODUCT CODE: NORMAL
UNIT PRODUCT VOLUME: 350 ML
UNIT PRODUCT VOLUME: 350 ML
UNIT RH: NORMAL
UNIT RH: NORMAL
VENTRICULAR RATE: 77 BPM
WBC # BLD AUTO: 5.93 THOUSAND/UL (ref 4.31–10.16)

## 2024-06-10 PROCEDURE — 85018 HEMOGLOBIN: CPT | Performed by: NURSE PRACTITIONER

## 2024-06-10 PROCEDURE — 85014 HEMATOCRIT: CPT | Performed by: NURSE PRACTITIONER

## 2024-06-10 PROCEDURE — P9016 RBC LEUKOCYTES REDUCED: HCPCS

## 2024-06-10 PROCEDURE — 99232 SBSQ HOSP IP/OBS MODERATE 35: CPT | Performed by: NURSE PRACTITIONER

## 2024-06-10 PROCEDURE — 93010 ELECTROCARDIOGRAM REPORT: CPT | Performed by: INTERNAL MEDICINE

## 2024-06-10 PROCEDURE — 80048 BASIC METABOLIC PNL TOTAL CA: CPT

## 2024-06-10 PROCEDURE — 99233 SBSQ HOSP IP/OBS HIGH 50: CPT | Performed by: INTERNAL MEDICINE

## 2024-06-10 PROCEDURE — 85027 COMPLETE CBC AUTOMATED: CPT

## 2024-06-10 PROCEDURE — C9113 INJ PANTOPRAZOLE SODIUM, VIA: HCPCS | Performed by: STUDENT IN AN ORGANIZED HEALTH CARE EDUCATION/TRAINING PROGRAM

## 2024-06-10 RX ORDER — DIPHENHYDRAMINE HCL 25 MG
25 TABLET ORAL ONCE
Status: COMPLETED | OUTPATIENT
Start: 2024-06-10 | End: 2024-06-10

## 2024-06-10 RX ORDER — ACETAMINOPHEN 325 MG/1
650 TABLET ORAL ONCE
Status: COMPLETED | OUTPATIENT
Start: 2024-06-10 | End: 2024-06-10

## 2024-06-10 RX ADMIN — SODIUM CHLORIDE 75 ML/HR: 0.9 INJECTION, SOLUTION INTRAVENOUS at 01:33

## 2024-06-10 RX ADMIN — MEMANTINE 5 MG: 5 TABLET ORAL at 11:03

## 2024-06-10 RX ADMIN — PANTOPRAZOLE SODIUM 8 MG/HR: 40 INJECTION, POWDER, FOR SOLUTION INTRAVENOUS at 17:45

## 2024-06-10 RX ADMIN — PANTOPRAZOLE SODIUM 8 MG/HR: 40 INJECTION, POWDER, FOR SOLUTION INTRAVENOUS at 07:39

## 2024-06-10 RX ADMIN — MEMANTINE 5 MG: 5 TABLET ORAL at 17:13

## 2024-06-10 RX ADMIN — FERROUS SULFATE TAB 325 MG (65 MG ELEMENTAL FE) 325 MG: 325 (65 FE) TAB at 11:03

## 2024-06-10 RX ADMIN — METOPROLOL SUCCINATE 50 MG: 50 TABLET, EXTENDED RELEASE ORAL at 21:37

## 2024-06-10 RX ADMIN — Medication 3 MG: at 21:37

## 2024-06-10 RX ADMIN — DIPHENHYDRAMINE HYDROCHLORIDE 25 MG: 25 TABLET ORAL at 01:18

## 2024-06-10 RX ADMIN — ATORVASTATIN CALCIUM 40 MG: 40 TABLET, FILM COATED ORAL at 17:13

## 2024-06-10 RX ADMIN — ACETAMINOPHEN 650 MG: 325 TABLET ORAL at 01:18

## 2024-06-10 RX ADMIN — METOPROLOL SUCCINATE 50 MG: 50 TABLET, EXTENDED RELEASE ORAL at 11:03

## 2024-06-10 RX ADMIN — POLYETHYLENE GLYCOL 3350, SODIUM SULFATE ANHYDROUS, SODIUM BICARBONATE, SODIUM CHLORIDE, POTASSIUM CHLORIDE 4000 ML: 236; 22.74; 6.74; 5.86; 2.97 POWDER, FOR SOLUTION ORAL at 17:13

## 2024-06-10 NOTE — PROGRESS NOTES
[facility]  Progress Note  Name: Christopher Razo I  MRN: [id number]  Unit/Bed#: 2 [address] Date of Admission: 6/7/2024   Date of Service: 6/10/2024 I Hospital Day: 3    Assessment & Plan   * Anemia and GI bleed  Assessment & Plan  Patient recently hospitalized 5/28-5/30 with bloody stools. Colonoscopy at that time showed old blood, suspected diverticular bleed. Restarted on Plavis and Eliquis and now presents with bloody stools and anemia on outpatient labs.  CT a/p: New high attenuation material in the splenic flexure of the colon. In the given clinical setting this does raise concern for hemorrhage but is otherwise nonspecific on single phase CT. No other interval abnormality.  s/p 2 units PRBC  Hemoglobin this morning 7.6  Continue with bloody bowel movements; last one 6/9 in the afternoon  GI following  Continue to hold Eliquis and Plavix  Continue protonix drip  Per GI, if active bleeding continues will proceed with colonoscopy with possible IR guided intervention and if bleeding stops, can monitor conservatively  BP stable, continue IVF  Trend h&h q6h    Paroxysmal A-fib (HCC)  Assessment & Plan  Continue metoprolol  Holding eliquis    CAD (coronary artery disease)  Assessment & Plan  Continue statin and metoprolol  Plavix on hold    Benign essential hypertension  Assessment & Plan  Continue metoprolol  BP stable    History of stroke  Assessment & Plan  Holding plavix  Continue statin    Dementia without behavioral disturbance (HCC)  Assessment & Plan  continue Namenda   Supportive care                VTE Pharmacologic Prophylaxis: VTE Score: 4 Moderate Risk (Score 3-4) - Pharmacological DVT Prophylaxis Contraindicated. Sequential Compression Devices Ordered.    Mobility:   Basic Mobility Inpatient Raw Score: 18  JH-HLM Goal: 6: Walk 10 steps or more  JH-HLM Achieved: 7: Walk 25 feet or more  JH-HLM Goal achieved. Continue to encourage appropriate mobility.    Patient Centered Rounds:  I performed bedside rounds with nursing staff today.   Discussions with Specialists or Other Care Team Provider: multidisciplinary team     Education and Discussions with Family / Patient: Updated  (wife) via phone.    Total Time Spent on Date of Encounter in care of patient: greater than 45 mins. This time was spent on one or more of the following: performing physical exam; counseling and coordination of care; obtaining or reviewing history; documenting in the medical record; reviewing/ordering tests, medications or procedures; communicating with other healthcare professionals and discussing with patient's family/caregivers.    Current Length of Stay: 3 day(s)  Current Patient Status: Inpatient   Certification Statement: The patient will continue to require additional inpatient hospital stay due to serial hgb; GI consult, possible colonoscopy   Discharge Plan: Anticipate discharge in 48 hrs to home.    Code Status: Level 1 - Full Code    Subjective:   Patient seen sitting up in bed resting comfortably.  Reports last bowel movement was yesterday afternoon, reports it was watery still bloody with some black noted.  No nausea or vomiting.  Is asking if he is going to be having colonoscopy done today or not.    Objective:     Vitals:   Temp (24hrs), Av.2 °F (36.8 °C), Min:97.6 °F (36.4 °C), Max:99 °F (37.2 °C)    Temp:  [97.6 °F (36.4 °C)-99 °F (37.2 °C)] 97.6 °F (36.4 °C)  HR:  [76-89] 76  Resp:  [16-18] 18  BP: (118-134)/(69-82) 134/81  SpO2:  [94 %-98 %] 97 %  Body mass index is 31.86 kg/m².     Input and Output Summary (last 24 hours):     Intake/Output Summary (Last 24 hours) at 6/10/2024 1055  Last data filed at 6/10/2024 0507  Gross per 24 hour   Intake 1410 ml   Output --   Net 1410 ml       Physical Exam:   Physical Exam  Vitals and nursing note reviewed.   Constitutional:       Appearance: Normal appearance.   HENT:      Head: Normocephalic.      Nose: Nose normal.      Mouth/Throat:       Mouth: Mucous membranes are moist.   Eyes:      Extraocular Movements: Extraocular movements intact.      Conjunctiva/sclera: Conjunctivae normal.      Pupils: Pupils are equal, round, and reactive to light.   Cardiovascular:      Rate and Rhythm: Normal rate and regular rhythm.      Pulses: Normal pulses.   Pulmonary:      Effort: Pulmonary effort is normal.      Breath sounds: Normal breath sounds.   Abdominal:      General: Bowel sounds are normal. There is no distension.      Palpations: Abdomen is soft.      Tenderness: There is no abdominal tenderness.      Comments: Reports last bm yesterday afternoon; bloody/watery    Genitourinary:     Comments: Voiding spontaneously   Musculoskeletal:         General: Normal range of motion.      Cervical back: Normal range of motion.   Skin:     General: Skin is warm and dry.      Capillary Refill: Capillary refill takes less than 2 seconds.   Neurological:      General: No focal deficit present.      Mental Status: He is alert and oriented to person, place, and time.   Psychiatric:         Mood and Affect: Mood normal.         Behavior: Behavior normal.         Thought Content: Thought content normal.         Judgment: Judgment normal.          Additional Data:     Labs:  Results from last 7 days   Lab Units 06/10/24  0652 06/08/24  1143 06/08/24  0611   WBC Thousand/uL 5.93   < > 7.27   HEMOGLOBIN g/dL 7.6*   < > 7.3*   HEMATOCRIT % 23.3*   < > 22.8*   PLATELETS Thousands/uL 210   < > 213   SEGS PCT %  --   --  64   LYMPHO PCT %  --   --  20   MONO PCT %  --   --  12   EOS PCT %  --   --  3    < > = values in this interval not displayed.     Results from last 7 days   Lab Units 06/10/24  0652 06/08/24  0611 06/07/24  1515   SODIUM mmol/L 142   < > 142   POTASSIUM mmol/L 3.7   < > 3.6   CHLORIDE mmol/L 114*   < > 113*   CO2 mmol/L 21   < > 22   BUN mg/dL 7   < > 15   CREATININE mg/dL 0.84   < > 0.84   ANION GAP mmol/L 7   < > 7   CALCIUM mg/dL 7.6*   < > 8.1*   ALBUMIN  g/dL  --   --  3.5   TOTAL BILIRUBIN mg/dL  --   --  0.43   ALK PHOS U/L  --   --  77   ALT U/L  --   --  9   AST U/L  --   --  8*   GLUCOSE RANDOM mg/dL 112   < > 115    < > = values in this interval not displayed.     Results from last 7 days   Lab Units 06/08/24  1143   INR  1.18                   Lines/Drains:  Invasive Devices       Peripheral Intravenous Line  Duration             Peripheral IV 06/08/24 Left;Upper;Ventral (anterior) Arm 1 day    Peripheral IV 06/09/24 Distal;Right;Ventral (anterior) Forearm 1 day                          Imaging: No pertinent imaging reviewed.    Recent Cultures (last 7 days):         Last 24 Hours Medication List:   Current Facility-Administered Medications   Medication Dose Route Frequency Provider Last Rate    acetaminophen  650 mg Oral Q6H PRN ELVIS Valencia      atorvastatin  40 mg Oral QPM Victoria Alcantara MD      ferrous sulfate  325 mg Oral Daily With Breakfast Victoria Alcantara MD      HYDROmorphone  0.2 mg Intravenous Q3H PRN ELVIS Valencia      melatonin  3 mg Oral HS ELVIS Valencia      memantine  5 mg Oral BID Victoria Alcantara MD      metoprolol succinate  50 mg Oral BID Victoria Alcantara MD      ondansetron  4 mg Intravenous Q6H PRN ELVIS Valencia      pantoprazole (PROTONIX) 80 mg in sodium chloride 0.9 % 100 mL infusion  8 mg/hr Intravenous Continuous Jayy Mendoza DO 8 mg/hr (06/10/24 0739)    sodium chloride  75 mL/hr Intravenous Continuous Carrie Carrillo PA-C 75 mL/hr (06/10/24 0133)        Today, Patient Was Seen By: ELVIS Ramos    **Please Note: This note may have been constructed using a voice recognition system.**

## 2024-06-10 NOTE — UTILIZATION REVIEW
Initial Clinical Review    Admission: Date/Time/Statement:   Admission Orders (From admission, onward)       Ordered        06/07/24 1616  INPATIENT ADMISSION  Once                          Orders Placed This Encounter   Procedures    INPATIENT ADMISSION     Standing Status:   Standing     Number of Occurrences:   1     Order Specific Question:   Level of Care     Answer:   Med Surg [16]     Order Specific Question:   Estimated length of stay     Answer:   More than 2 Midnights     Order Specific Question:   Certification     Answer:   I certify that inpatient services are medically necessary for this patient for a duration of greater than two midnights. See H&P and MD Progress Notes for additional information about the patient's course of treatment.     ED Arrival Information       Expected   -    Arrival   6/7/2024 12:59    Acuity   Emergent              Means of arrival   Walk-In    Escorted by   Spouse    Service   Hospitalist    Admission type   Emergency              Arrival complaint   Dizziness             Chief Complaint   Patient presents with    Abnormal Lab     Patient was admitted on 5/28 for rectal bleeding. Having black stools. Labs today HGB 7.4. HCT 23.3 . Patient states he is dizzy. Patient on Eliquis and Plavix. C/O LUNA    Dizziness       Initial Presentation:   68 yom to ER from home for evaluation of low Hgb, dizziness. Recently admitted for rectal bleeding, colonoscopy neg, d/c'd home, Plavix & Eliquis resumed. Now reports dizziness, LUNA, Hgb lower than when d/c'd, dark stools (takes iron). Hx CAD, A Fib on Eliquis, HTN, Dementia and CVA. Presents with low grade fever, soft BP. Admission CT a/p: New high attenuation material in the splenic flexure of the colon. In the given clinical setting this does raise concern for hemorrhage but is otherwise nonspecific on single phase CT. No other interval abnormality. Labs: Hgb 7.4, , Ca 8.1, PT 15.3, Iron 21.  Admitted to inpatient status for  anemia, GI bleed.     Anticipated Length of Stay/Certification Statement:   Patient will be admitted on an inpatient basis with an anticipated length of stay of greater than 2 midnights secondary to anemia with concern for GI bleed .     Date: 6/8/24   Day 2:   Anemia, GI bleed. Patient reports he had sharp abdominal pain last night that is now resolved. He also reports he had two episodes of large bloody red stools overnight. Feels a little dizzy/lightheaded. Hgb 7.6, tranfusion ordered. Follow H&H. GI consulted, following.     Per GI: atrial fibrillation on Eliquis, recent lower GI bleed presumably diverticular, presents with anemia, since admission having epigastric pain, hematochezia overnight.  An inappropriate response to transfusions.  Has had several diverticular bleeds in the past.  Anemia and GI bleed: Suspect recurrent lower diverticular bleed, ischemic colitis, peptic ulcer disease and upper GI are also on the differential although less likely  -Continue to follow hemoglobin, transfuse as clinically indicated  -Awaiting CT scan results  -If CT scan does not show any significant inflammatory changes, recommend bowel prep today, possible repeat colonoscopy during this hospital stay versus upper endoscopy and colonoscopy pending his clinical course  -Twice daily PPI therapy  -Clear liquid diet    Date: 6/9/24   Day 3: Has surpassed a 2nd midnight with active treatments and services.  Dx: anemia, GI bleed. Additional bloody bowel movements during the day yesterday. Reports occasional dizziness/lightheadedness. Per GI, if active bleeding continues will proceed with colonoscopy today with possible IR guided intervention and if bleeding stops, can monitor conservatively. Hgb drop to 6.6, additional transfusion ordered.  Continue to monitor H&H. GI following      ED Triage Vitals [06/07/24 1320]   Temperature Pulse Respirations Blood Pressure SpO2   99.4 °F (37.4 °C) 88 18 105/58 98 %      Temp Source Heart Rate  Source Patient Position - Orthostatic VS BP Location FiO2 (%)   Tympanic Monitor Sitting Left arm --      Pain Score       No Pain          Wt Readings from Last 1 Encounters:   06/10/24 107 kg (234 lb 14.4 oz)     Additional Vital Signs:   06/09/24 22:13:52 99 °F (37.2 °C) 77 18 126/72 90 95 % -- --   06/09/24 20:28:29 98.1 °F (36.7 °C) 80 16 128/73 91 96 % -- --   06/09/24 15:11:38 98.5 °F (36.9 °C) 86 18 121/71 88 96 % -- --   06/09/24 1156 98 °F (36.7 °C) -- 18 -- -- -- -- --   06/09/24 11:54:23 -- 89 -- 122/69 87 94 % -- --   06/09/24 1030 98.8 °F (37.1 °C) 84 17 122/68 -- 94 % None (Room air) --   06/09/24 10:29:26 98.8 °F (37.1 °C) 85 -- 122/68 86 94 % -- --   06/09/24 1013 -- 85 -- 125/69 -- 97 % -- --   06/09/24 10:01:25 98.2 °F (36.8 °C) 85 17 125/69 88 92 % None (Room air) --   06/09/24 07:52:23 98.1 °F (36.7 °C) 91 16 127/67 87 96 % None (Room air) Lying   06/08/24 23:41:58 98.8 °F (37.1 °C) 86 15 124/67 86 96 % -- --   06/08/24 22:35:57 98.2 °F (36.8 °C) 90 18 140/73 95 97 % -- --   06/08/24 21:46:28 -- 87 -- 129/79 96 97 % -- --   06/08/24 19:49:14 98.1 °F (36.7 °C) 79 20 116/71 86 95 % None (Room air) --   06/08/24 15:26:34 98.4 °F (36.9 °C) 80 20 114/71 85 95 % -- --   06/08/24 10:25:59 97.8 °F (36.6 °C) 75 18 116/72 87 96 % None (Room air) --   06/08/24 04:15:42 98.1 °F (36.7 °C) 81 18 116/71 86 96 % None (Room air) --   06/08/24 02:53:56 98 °F (36.7 °C) 76 18 111/69 83 96 % -- --   06/08/24 0045 98.9 °F (37.2 °C) 84 20 113/74 -- 96 % None (Room air) --   06/08/24 00:06:17 98.3 °F (36.8 °C) 94 18 153/87 109 95 % -- --     Pertinent Labs/Diagnostic Test Results:   CT abdomen pelvis w contrast   Final Result  (06/08 1043)      New high attenuation material in the splenic flexure of the colon. In the given clinical setting this does raise concern for hemorrhage but is otherwise nonspecific on single phase CT. No other interval abnormality.         XR chest 1 view portable   ED Interpretation  (06/07  1924)   No acute cardiopulmonary disease      Final Result  (06/08 0908)      No acute cardiopulmonary disease.     6/7 EKG=  Interpretation:     Interpretation: normal    Rate:     ECG rate:  77     ECG rate assessment: normal    Rhythm:     Rhythm: sinus rhythm    Ectopy:     Ectopy: none    QRS:     QRS axis:  Normal   Conduction:     Conduction: normal    ST segments:     ST segments:  Normal   T waves:     T waves: normal     Results from last 7 days   Lab Units 06/10/24  0652 06/10/24  0001 06/09/24  1810 06/09/24  1345 06/09/24  0538 06/08/24  1143 06/08/24  0611 06/07/24  2229 06/07/24  1515 06/07/24  1004   WBC Thousand/uL 5.93  --   --   --  6.36  --  7.27  --  6.93 7.28   HEMOGLOBIN g/dL 7.6* 6.9* 7.4* 7.6* 6.6*   < > 7.3*   < > 7.4* 7.4*   HEMATOCRIT % 23.3* 21.5* 22.9* 23.8* 20.2*   < > 22.8*   < > 23.4* 23.3*   PLATELETS Thousands/uL 210  --   --   --  224  --  213  --  266 270   TOTAL NEUT ABS Thousands/µL  --   --   --   --   --   --  4.59  --  4.51 5.17    < > = values in this interval not displayed.     Results from last 7 days   Lab Units 06/10/24  0652 06/09/24  0538 06/08/24  0611 06/07/24  1515   SODIUM mmol/L 142 140 142 142   POTASSIUM mmol/L 3.7 3.4* 3.6 3.6   CHLORIDE mmol/L 114* 114* 115* 113*   CO2 mmol/L 21 22 20* 22   ANION GAP mmol/L 7 4 7 7   BUN mg/dL 7 14 18 15   CREATININE mg/dL 0.84 0.79 0.80 0.84   EGFR ml/min/1.73sq m 91 93 93 91   CALCIUM mg/dL 7.6* 7.4* 7.3* 8.1*   MAGNESIUM mg/dL  --   --  1.8*  --      Results from last 7 days   Lab Units 06/07/24  1515   AST U/L 8*   ALT U/L 9   ALK PHOS U/L 77   TOTAL PROTEIN g/dL 5.0*   ALBUMIN g/dL 3.5   TOTAL BILIRUBIN mg/dL 0.43     Results from last 7 days   Lab Units 06/10/24  0652 06/09/24  0538 06/08/24  0611 06/07/24  1515   GLUCOSE RANDOM mg/dL 112 115 106 115     Results from last 7 days   Lab Units 06/07/24  2229 06/07/24  1719 06/07/24  1515   HS TNI 0HR ng/L  --   --  10   HS TNI 2HR ng/L  --  10  --    HSTNI D2 ng/L  --  0   --    HS TNI 4HR ng/L 11  --   --    HSTNI D4 ng/L 1  --   --      Results from last 7 days   Lab Units 06/08/24  1143   PROTIME seconds 15.3*   INR  1.18   PTT seconds 31     Results from last 7 days   Lab Units 06/05/24  1450   IRON SATURATION % 7*   IRON ug/dL 21*   TIBC ug/dL 290     Results from last 7 days   Lab Units 06/10/24  0615 06/08/24  0615   UNIT PRODUCT CODE  [id number]  [id number]  [id number]   UNIT NUMBER  [id number]  [id number]  [id number]   UNITABO  O  O O  O   UNITRH  POS  POS POS  POS   CROSSMATCH  Compatible  Compatible Compatible  Compatible   UNIT DISPENSE STATUS  Presumed Trans  Presumed Trans Presumed Trans  Presumed Trans   UNIT PRODUCT VOL mL 350  350 350  350       ED Treatment:   Medication Administration from 06/07/2024 1259 to 06/07/2024 1842         Date/Time Order Dose Route Action     06/07/2024 1526 EDT pantoprazole (PROTONIX) 80 mg in sodium chloride 0.9 % 100 mL infusion 8 mg/hr Intravenous New Bag          Past Medical History:   Diagnosis Date    Arthropathy of knee     last assessed 8/19/15    Bacterial pneumonia 02/05/2007    last assessed 2/5/7    Brain atrophy (HCC)     Colon polyp     CPAP (continuous positive airway pressure) dependence     Disorder of male genital organ 02/12/2008    last assessed 2/12/08    ED (erectile dysfunction) of organic origin     last assessed 2/13/17     History of hypertension     Seasonal allergies     Situational anxiety     resolved 3/16/17     Sleep apnea     Stroke (HCC)     09/2020 TIA    Tinnitus     Wears hearing aid     bilateral     Present on Admission:   Dementia without behavioral disturbance (HCC)   Paroxysmal A-fib (HCC)   CAD (coronary artery disease)   Anemia and GI bleed   Benign essential hypertension      Admitting Diagnosis: Dizziness [R42]  Anemia [D64.9]  Abnormal laboratory test [R89.9]  Age/Sex: 68 y.o. male  Admission Orders:  Consult GI  Scd/foot pumps  Tranfuse  1uprb's 6/7, additional [facility]'s 6/8 & 6/9    Scheduled Medications:  Medications 06/01 06/02 06/03 06/04 06/05 06/06 06/07 06/08 06/09 06/10   acetaminophen (TYLENOL) tablet 650 mg  Dose: 650 mg  Freq: Once Route: PO  Start: 06/10/24 0100 End: 06/10/24 0118   Admin Instructions:                0118        acetaminophen (TYLENOL) tablet 650 mg  Dose: 650 mg  Freq: Once Route: PO  Start: 06/07/24 2345 End: 06/08/24 0020   Admin Instructions:              0020          atorvastatin (LIPITOR) tablet 40 mg  Dose: 40 mg  Freq: Every evening Route: PO  Start: 06/07/24 2000 2020      1914      1705      1800        atorvastatin (LIPITOR) tablet 40 mg  Dose: 40 mg  Freq: Every evening Route: PO  Start: 05/28/24 2200 End: 05/30/24 1546                cefTRIAXone (ROCEPHIN) IVPB (premix in dextrose) 2,000 mg 50 mL  Dose: 2,000 mg  Freq: Every 24 hours Route: IV  Last Dose: Stopped (06/08/24 1355)  Start: 06/08/24 0100 End: 06/08/24 1350   Admin Instructions:      Order specific questions:              0305     1350-D/C'd  1355          diphenhydrAMINE (BENADRYL) tablet 25 mg  Dose: 25 mg  Freq: Once Route: PO  Start: 06/10/24 0100 End: 06/10/24 0118   Admin Instructions:                0118        diphenhydrAMINE (BENADRYL) tablet 25 mg  Dose: 25 mg  Freq: Once Route: PO  Start: 06/07/24 2345 End: 06/08/24 0020   Admin Instructions:              0020          ferrous sulfate tablet 325 mg  Dose: 325 mg  Freq: Daily with breakfast Route: PO  Start: 06/08/24 0730   Admin Instructions:              1038      0757      0730        loratadine (CLARITIN) tablet 10 mg  Dose: 10 mg  Freq: Daily Route: PO  Start: 05/29/24 0900 End: 05/30/24 1546                melatonin tablet 3 mg  Dose: 3 mg  Freq: Daily at bedtime Route: PO  Start: 06/07/24 2200 2226 2137 2139      2200        memantine (NAMENDA) tablet 5 mg  Dose: 5 mg  Freq: 2 times daily Route: PO  Start: 06/07/24 2000          (2020) [C]      1038      1914      0802     1705      0900     1800        memantine (NAMENDA) tablet 5 mg  Dose: 5 mg  Freq: 2 times daily Route: PO  Start: 05/28/24 2200 End: 05/30/24 1546                metoprolol succinate (TOPROL-XL) 24 hr tablet 50 mg  Dose: 50 mg  Freq: 2 times daily Route: PO  Start: 06/07/24 2000   Admin Instructions:      Order specific questions:             2020      1038     2147      0802     2030      0900     2100        metoprolol succinate (TOPROL-XL) 24 hr tablet 50 mg  Dose: 50 mg  Freq: 2 times daily Route: PO  Start: 05/28/24 2200 End: 05/30/24 1546   Admin Instructions:      Order specific questions:                   metroNIDAZOLE (FLAGYL) IVPB (premix) 500 mg 100 mL  Dose: 500 mg  Freq: Every 8 hours Route: IV  Last Dose: Stopped (06/08/24 1356)  Start: 06/08/24 0100 End: 06/08/24 1350   Admin Instructions:      Order specific questions:              0140     1029     1350-D/C'd  1356          pantoprazole (PROTONIX) 80 mg in sodium chloride 0.9 % 100 mL IVPB  Dose: 80 mg  Freq: Once Route: IV  Last Dose: Stopped (05/28/24 1735)  Start: 05/28/24 1715 End: 05/28/24 1735   Admin Instructions:                   pantoprazole (PROTONIX) injection 40 mg  Dose: 40 mg  Freq: Every 12 hours Route: IV  Start: 05/28/24 2200 End: 05/30/24 1546   Admin Instructions:                   polyethylene glycol (GLYCOLAX) bowel prep 238 g  Dose: 238 g  Freq: Once Route: PO  Indications of Use: COLONOSCOPY  Start: 06/09/24 1115 End: 06/09/24 1131   Admin Instructions:               1131         polyethylene glycol (GLYCOLAX) bowel prep 238 g  Dose: 238 g  Freq: Once Route: PO  Start: 06/08/24 1045 End: 06/08/24 1455   Admin Instructions:              1455          polyethylene glycol (GLYCOLAX) bowel prep 238 g  Dose: 238 g  Freq: Once Route: PO  Start: 05/28/24 2200 End: 05/28/24 2224   Admin Instructions:                   potassium chloride (Klor-Con M20) CR tablet 40 mEq  Dose: 40 mEq  Freq: Once Route: PO  Start:  06/09/24 0745 End: 06/09/24 0756   Admin Instructions:               0756         potassium chloride (Klor-Con M20) CR tablet 40 mEq  Dose: 40 mEq  Freq: Once Route: PO  Start: 05/30/24 0815 End: 05/30/24 0911   Admin Instructions:                   prothrombin complex concentrate (human) (Kcentra) 2,000 Units  Dose: 2,000 Units  Freq: Once Route: IV  Start: 05/28/24 2200 End: 05/28/24 2220   Admin Instructions:      Order specific questions:                   sodium chloride 0.9 % bolus 500 mL  Dose: 500 mL  Freq: Once Route: IV  Last Dose: Stopped (05/28/24 1812)  Start: 05/28/24 1715 End: 05/28/24 1812                            Continuous Meds Sorted by Name  for Mohit Razo as of 06/01/24 through 6/10/24  Legend:       Medications 06/01 06/02 06/03 06/04 06/05 06/06 06/07 06/08 06/09 06/10   lactated ringers infusion  Freq: Continuous PRN Route: IV  Start: 05/29/24 1520 End: 05/29/24 1543                pantoprazole (PROTONIX) 80 mg in sodium chloride 0.9 % 100 mL infusion  Rate: 10 mL/hr Dose: 8 mg/hr  Freq: Continuous Route: IV  Last Dose: 8 mg/hr (06/10/24 0739)  Start: 06/07/24 1500   Admin Instructions:             1526     2231      1025     2146      0823     2030      0739        propofol (DIPRIVAN) 1000 mg in 100 mL infusion (premix)  Freq: Continuous PRN Route: IV  Last Dose: Stopped (05/29/24 1543)  Start: 05/29/24 1531 End: 05/29/24 1543                sodium chloride 0.9 % infusion  Rate: 75 mL/hr Dose: 75 mL/hr  Freq: Continuous Route: IV  Indications of Use: IV Hydration  Last Dose: 75 mL/hr (06/10/24 0133)  Start: 06/08/24 0100           0135     1025     2146      0824      0133        sodium chloride 0.9 % infusion  Rate: 125 mL/hr Dose: 125 mL/hr  Freq: Continuous Route: IV  Indications of Use: IV Hydration  Last Dose: 125 mL/hr (05/28/24 2233)  Start: 05/28/24 2200 End: 05/29/24 1828                            PRN Meds Sorted by Name  for Mohit Razo as of 06/01/24 through 6/10/24  Legend:          Medications 06/01 06/02 06/03 06/04 06/05 06/06 06/07 06/08 06/09 06/10   acetaminophen (TYLENOL) tablet 650 mg  Dose: 650 mg  Freq: Every 6 hours PRN Route: PO  PRN Reasons: mild pain,headaches,fever  Start: 06/07/24 2336                acetaminophen (TYLENOL) tablet 650 mg  Dose: 650 mg  Freq: Every 6 hours PRN Route: PO  PRN Reasons: mild pain,fever,headaches  Indications of Use: FEVER,HEADACHE,MILD PAIN  Start: 05/28/24 2154 End: 05/30/24 1546                HYDROmorphone HCl (DILAUDID) injection 0.2 mg  Dose: 0.2 mg  Freq: Every 3 hours PRN Route: IV  PRN Reasons: severe pain,moderate pain  Start: 06/08/24 0050   Admin Instructions:              0052          iohexol (OMNIPAQUE) 350 MG/ML injection (MULTI-DOSE) 100 mL  Dose: 100 mL  Freq: Once in imaging Route: IV  PRN Reason: contrast  Start: 06/08/24 0129 End: 06/08/24 0130           0130          iohexol (OMNIPAQUE) 350 MG/ML injection (MULTI-DOSE) 100 mL  Dose: 100 mL  Freq: Once in imaging Route: IV  PRN Reason: contrast  Start: 05/28/24 1828 End: 05/28/24 1828                lactated ringers infusion  Freq: Continuous PRN Route: IV  Start: 05/29/24 1520 End: 05/29/24 1543                lidocaine (PF) (XYLOCAINE-MPF) 1 % injection  Freq: As needed Route: IV  Start: 05/29/24 1526 End: 05/29/24 1543                ondansetron (ZOFRAN) injection 4 mg  Dose: 4 mg  Freq: Every 6 hours PRN Route: IV  PRN Reasons: nausea,vomiting  Start: 06/07/24 2336   Admin Instructions:                       Network Utilization Review Department  ATTENTION: Please call with any questions or concerns to [phone] and carefully listen to the prompts so that you are directed to the right person. All voicemails are confidential.   For Discharge needs, contact Care Management DC Support Team at [phone] opt. 2  Send all requests for admission clinical reviews, approved or denied determinations and any other requests to dedicated fax number below belonging to the  [facility] where the patient is receiving treatment. List of dedicated fax numbers for the Facilities:  FACILITY NAME UR FAX NUMBER   ADMISSION DENIALS (Administrative/Medical Necessity) [phone]   DISCHARGE SUPPORT TEAM (NETWORK) [phone]   PARENT CHILD HEALTH (Maternity/NICU/Pediatrics) [phone]   [facility] [phone]   [facility] [phone]   [facility] [phone]   [facility] [phone]   [facility] [phone]   [facility] [phone]   [facility] [phone]   [facility] [phone]   [facility] [phone]   [facility] [phone]   [facility] [phone]   [facility] [phone]

## 2024-06-10 NOTE — PLAN OF CARE
Problem: SAFETY ADULT  Goal: Patient will remain free of falls  Description: INTERVENTIONS:  - Educate patient/family on patient safety including physical limitations  - Instruct patient to call for assistance with activity   - Consult OT/PT to assist with strengthening/mobility   - Keep Call bell within reach  - Keep bed low and locked with side rails adjusted as appropriate  - Keep care items and personal belongings within reach  - Initiate and maintain comfort rounds  - Make Fall Risk Sign visible to staff  - Offer Toileting every 2 Hours, in advance of need  - Initiate/Maintain bed alarm  Problem: DISCHARGE PLANNING  Goal: Discharge to home or other facility with appropriate resources  Description: INTERVENTIONS:  - Identify barriers to discharge w/patient and caregiver  - Arrange for needed discharge resources and transportation as appropriate  - Identify discharge learning needs (meds, wound care, etc.)  - Arrange for interpretive services to assist at discharge as needed  - Refer to Case Management Department for coordinating discharge planning if the patient needs post-hospital services based on physician/advanced practitioner order or complex needs related to functional status, cognitive ability, or social support system  Outcome: Progressing     Problem: Knowledge Deficit  Goal: Patient/family/caregiver demonstrates understanding of disease process, treatment plan, medications, and discharge instructions  Description: Complete learning assessment and assess knowledge base.  Interventions:  - Provide teaching at level of understanding  - Provide teaching via preferred learning methods  Outcome: Progressing     Problem: RESPIRATORY - ADULT  Goal: Achieves optimal ventilation and oxygenation  Description: INTERVENTIONS:  - Assess for changes in respiratory status  - Assess for changes in mentation and behavior  - Position to facilitate oxygenation and minimize respiratory effort  - Oxygen administered by  appropriate delivery if ordered  - Initiate smoking cessation education as indicated  - Encourage broncho-pulmonary hygiene including cough, deep breathe, Incentive Spirometry  - Assess the need for suctioning and aspirate as needed  - Assess and instruct to report SOB or any respiratory difficulty  - Respiratory Therapy support as indicated  Outcome: Progressing     Problem: HEMATOLOGIC - ADULT  Goal: Maintains hematologic stability  Description: INTERVENTIONS  - Assess for signs and symptoms of bleeding or hemorrhage  - Monitor labs  - Administer supportive blood products/factors as ordered and appropriate  Outcome: Progressing     - Apply yellow socks and bracelet for high fall risk patients  - Consider moving patient to room near nurses station  Outcome: Progressing

## 2024-06-10 NOTE — PROGRESS NOTES
Progress Note - [facility]  Christopher Razo 68 y.o. male MRN: [id number]    Unit/Bed#: [address] [place] [address] Encounter: [id number]      Assessment/Plan:  68-year-old gentleman, history of dementia, on anticoagulation for atrial fibrillation, has a history of recurrent presumed diverticular bleeds, was just admitted for the same, presented with hematochezia, drop in hemoglobin.  Prep started yesterday with continued bleeding.     1.  Lower GI bleed:   Patient presented with report of bright red blood from rectal area.   Positive CT scan around the splenic flexure, most likely secondary to recurrent diverticular bleed, ischemic colitis is also on the differential. Patient noted to have liquid dark stool and blood in stool last evening.  Patient continues with liquid dark stool today.  Patient not cleaned out for colonoscopy today.  -Patient had suboptimal prep on his last colonoscopy, given continued blood overnight will prep again today  -GoLytely bowel prep  -Continue clear liquid diet  -Continue to hold anticoagulation  -Scheduled for colonoscopy tomorrow.  Prep and procedure explained to patient and wife.  -Further recommendations pending results of colonoscopy.  Monitor hemoglobin  -Transfuse blood products as needed to keep hemoglobin greater than 7  -Monitor stool for signs of GI bleed    Subjective:   Lying in bed in no acute distress.  Tolerating clear liquid diet.  Denies nausea, vomiting, or abdominal pain.  Patient did have episode of liquid dark stool with blood last evening.  Continues with liquid dark stool today.    Objective:     Vitals: Blood pressure 133/79, pulse 78, temperature (!) 97.4 °F (36.3 °C), resp. rate 16, height 6' (1.829 m), weight 107 kg (234 lb 14.4 oz), SpO2 93%.,Body mass index is 31.86 kg/m².      Intake/Output Summary (Last 24 hours) at 6/10/2024 4505  Last data filed at 6/10/2024 0507  Gross per 24 hour   Intake 1060 ml   Output --   Net 1060 ml       Physical Exam: Physical  Exam  Constitutional:       General: He is not in acute distress.  Cardiovascular:      Rate and Rhythm: Normal rate and regular rhythm.      Pulses: Normal pulses.      Heart sounds: Normal heart sounds.   Pulmonary:      Effort: Pulmonary effort is normal. No respiratory distress.      Breath sounds: Normal breath sounds. No stridor. No wheezing, rhonchi or rales.   Abdominal:      General: Bowel sounds are normal. There is no distension.      Palpations: Abdomen is soft. There is no mass.      Tenderness: There is no abdominal tenderness. There is no guarding or rebound.      Hernia: No hernia is present.   Musculoskeletal:      Right lower leg: No edema.      Left lower leg: No edema.   Skin:     General: Skin is warm and dry.      Coloration: Skin is not jaundiced or pale.   Neurological:      Mental Status: He is alert and oriented to person, place, and time.   Psychiatric:         Mood and Affect: Mood normal.         Invasive Devices       Peripheral Intravenous Line  Duration             Peripheral IV 06/08/24 Left;Upper;Ventral (anterior) Arm 1 day    Peripheral IV 06/09/24 Distal;Right;Ventral (anterior) Forearm 1 day                    Current Facility-Administered Medications:     acetaminophen (TYLENOL) tablet 650 mg, 650 mg, Oral, Q6H PRN, ELVIS Valencia    atorvastatin (LIPITOR) tablet 40 mg, 40 mg, Oral, QPM, Victoria Alcantara MD, 40 mg at 06/09/24 1705    ferrous sulfate tablet 325 mg, 325 mg, Oral, Daily With Breakfast, Victoria Alcantara MD, 325 mg at 06/10/24 1103    HYDROmorphone HCl (DILAUDID) injection 0.2 mg, 0.2 mg, Intravenous, Q3H PRN, ELVIS Valencia, 0.2 mg at 06/08/24 0052    melatonin tablet 3 mg, 3 mg, Oral, HS, ELVIS Valencia, 3 mg at 06/09/24 2139    memantine (NAMENDA) tablet 5 mg, 5 mg, Oral, BID, Victoria Alcantara MD, 5 mg at 06/10/24 1103    metoprolol succinate (TOPROL-XL) 24 hr tablet 50 mg, 50 mg, Oral, BID, Victoria Alcantara MD, 50 mg at 06/10/24 1103    ondansetron  (ZOFRAN) injection 4 mg, 4 mg, Intravenous, Q6H PRN, ELVIS Valencia    pantoprazole (PROTONIX) 80 mg in sodium chloride 0.9 % 100 mL infusion, 8 mg/hr, Intravenous, Continuous, Jayy Mendoza DO, Last Rate: 10 mL/hr at 06/10/24 0739, 8 mg/hr at 06/10/24 0739    polyethylene glycol (GOLYTELY) bowel prep 4,000 mL, 4,000 mL, Oral, Once, ELVIS Hinojosa    sodium chloride 0.9 % infusion, 75 mL/hr, Intravenous, Continuous, Carrie Carrillo PA-C, Last Rate: 75 mL/hr at 06/10/24 0133, 75 mL/hr at 06/10/24 0133    Lab Results:   Recent Results (from the past 24 hour(s))   Hemoglobin and hematocrit, blood    Collection Time: 06/09/24  6:10 PM   Result Value Ref Range    Hemoglobin 7.4 (L) 12.0 - 17.0 g/dL    Hematocrit 22.9 (L) 36.5 - 49.3 %   Hemoglobin and hematocrit, blood    Collection Time: 06/10/24 12:01 AM   Result Value Ref Range    Hemoglobin 6.9 (L) 12.0 - 17.0 g/dL    Hematocrit 21.5 (L) 36.5 - 49.3 %   Prepare Leukoreduced RBC: 1 Units    Collection Time: 06/10/24  6:15 AM   Result Value Ref Range    Unit Product Code G2601J46     Unit Number [id number]     Unit ABO O     Unit RH POS     Crossmatch Compatible     Unit Dispense Status Presumed Trans     Unit Product Volume 350 mL   Prepare Leukoreduced RBC: 1 Units, Leukoreduced    Collection Time: 06/10/24  6:15 AM   Result Value Ref Range    Unit Product Code G1223A69     Unit Number [id number]     Unit ABO O     Unit RH POS     Crossmatch Compatible     Unit Dispense Status Presumed Trans     Unit Product Volume 350 mL   CBC    Collection Time: 06/10/24  6:52 AM   Result Value Ref Range    WBC 5.93 4.31 - 10.16 Thousand/uL    RBC 2.57 (L) 3.88 - 5.62 Million/uL    Hemoglobin 7.6 (L) 12.0 - 17.0 g/dL    Hematocrit 23.3 (L) 36.5 - 49.3 %    MCV 91 82 - 98 fL    MCH 29.6 26.8 - 34.3 pg    MCHC 32.6 31.4 - 37.4 g/dL    RDW 15.0 11.6 - 15.1 %    Platelets 210 149 - 390 Thousands/uL    MPV 8.7 (L) 8.9 - 12.7 fL   Basic metabolic panel     Collection Time: 06/10/24  6:52 AM   Result Value Ref Range    Sodium 142 135 - 147 mmol/L    Potassium 3.7 3.5 - 5.3 mmol/L    Chloride 114 (H) 96 - 108 mmol/L    CO2 21 21 - 32 mmol/L    ANION GAP 7 4 - 13 mmol/L    BUN 7 5 - 25 mg/dL    Creatinine 0.84 0.60 - 1.30 mg/dL    Glucose 112 65 - 140 mg/dL    Calcium 7.6 (L) 8.4 - 10.2 mg/dL    eGFR 91 ml/min/1.73sq m   Hemoglobin and hematocrit, blood    Collection Time: 06/10/24  1:26 PM   Result Value Ref Range    Hemoglobin 7.8 (L) 12.0 - 17.0 g/dL    Hematocrit 24.2 (L) 36.5 - 49.3 %             Imaging Studies: CT abdomen pelvis w contrast    Result Date: 6/8/2024  Narrative: CT ABDOMEN AND PELVIS WITH IV CONTRAST INDICATION: worseing abdomen pain,lower GI bleed. COMPARISON: CT abdomen pelvis 5/28/2024. TECHNIQUE: CT examination of the abdomen and pelvis was performed. Multiplanar 2D reformatted images were created from the source data. This examination, like all CT scans performed in the [facility], was performed utilizing techniques to minimize radiation dose exposure, including the use of iterative reconstruction and automated exposure control. Radiation dose length product (DLP) for this visit: 997.94 mGy-cm IV Contrast: 100 mL of iohexol (OMNIPAQUE) Enteric Contrast: Not administered. FINDINGS: ABDOMEN LOWER CHEST: Cardiomegaly. Aortic calcifications. Mild bibasilar atelectasis. LIVER/BILIARY TREE: Simple hepatic cyst(s). No suspicious mass. Normal hepatic contours. No biliary dilation. GALLBLADDER: No calcified gallstones. No pericholecystic inflammatory change. SPLEEN: Unremarkable. PANCREAS: Unremarkable. ADRENAL GLANDS: Stable adrenal thickening versus small nodules. KIDNEYS/URETERS: 3.7 cm right lower pole simple cyst. No hydronephrosis. STOMACH AND BOWEL: No dilated loops of bowel. There is new hyperattenuating material noted within the colon in the region of the splenic flexure (series 2 image 52). No other high attenuation  intraluminal contents are seen. No wall thickening or inflammatory change. Severe colonic diverticulosis without evidence of acute diverticulitis. APPENDIX: Normal. ABDOMINOPELVIC CAVITY: No ascites. No pneumoperitoneum. No lymphadenopathy. VESSELS: Atherosclerosis without abdominal aortic aneurysm. PELVIS REPRODUCTIVE ORGANS: Enlarged prostate. URINARY BLADDER: Unremarkable. ABDOMINAL WALL/INGUINAL REGIONS: Small fat-containing right inguinal hernia. BONES: No acute fracture or suspicious osseous lesion.     Impression: New high attenuation material in the splenic flexure of the colon. In the given clinical setting this does raise concern for hemorrhage but is otherwise nonspecific on single phase CT. No other interval abnormality. This finding is in agreement with the preliminary [facility] report. I discussed this finding with Prateek Bolivar MD via [employer] messaging. Workstation performed: [id number]     XR chest 1 view portable    Result Date: 6/8/2024  Narrative: XR CHEST PORTABLE INDICATION: SOB. COMPARISON: 6/7/2022 FINDINGS: Clear lungs. No pneumothorax or pleural effusion. Normal cardiomediastinal silhouette. Loop recorder noted. Bones are unremarkable for age. Normal upper abdomen.     Impression: No acute cardiopulmonary disease. Workstation performed: [id number]     Colonoscopy    Result Date: 5/29/2024  Narrative: Table formatting from the original result was not included. [facility] Operating Room [address] [place] [id number] [phone] DATE OF SERVICE: 5/29/24 PHYSICIAN(S): Attending: Collins Carbone MD Fellow: No Staff Documented INDICATION: Bright red rectal bleeding POST-OP DIAGNOSIS: See the impression below. HISTORY: Prior colonoscopy: Less than 3 years ago. It is being repeated at an interval of less than 3 years because: This colonoscopy is being performed for a diagnostic indication BOWEL PREPARATION: Miralax/Dulcolax PREPROCEDURE: Informed consent was obtained for the procedure,  including sedation. Risks including but not limited to bleeding, infection, perforation, adverse drug reaction and aspiration were explained in detail. Also explained about less than 100% sensitivity with the exam and other alternatives. The patient was placed in the left lateral decubitus position. Procedure: Colonoscopy DETAILS OF PROCEDURE: Patient was taken to the procedure room where a time out was performed to confirm correct patient and correct procedure. The patient underwent monitored anesthesia care, which was administered by an anesthesia professional. The patient's blood pressure, heart rate, level of consciousness, oxygen, respirations, ECG and ETCO2 were monitored throughout the procedure. A digital rectal exam was performed. The scope was introduced through the anus and advanced to the terminal ileum. Photodocumentation was obtained at the ileocecal valve, appendiceal orifice and retroflexed view of the rectum. Retroflexion was performed in the rectum. The quality of bowel preparation was evaluated using the [place] Bowel Preparation Scale with scores of: right colon = 1, transverse colon = 1, left colon = 1. The total BBPS score was 3. Bowel prep was not adequate. The patient experienced no blood loss. The procedure was moderately difficult due to poor preparation. The patient tolerated the procedure well. There were no apparent adverse events. ANESTHESIA INFORMATION: ASA: III Anesthesia Type: IV Sedation with Anesthesia MEDICATIONS: No administrations occurring from 1503 to 1544 on 05/29/24 FINDINGS: Significant amount of pancolonic hematin. Old blood in stool throughout the entire colon but no active bleeding or fresh blood visualized.  There was no evidence of blood within the terminal ileum. Multiple pancolonic diverticula. Pandiverticulosis but concentrated primarily in the left colon. Sessile polyp measuring 5-9 mm in the cecum. Single polyp visualized in the cecum, not removed at this time  EVENTS: Procedure Events Event Event Time ENDO CECUM REACHED 5/29/2024  3:29 PM ENDO SCOPE OUT TIME 5/29/2024  3:43 PM SPECIMENS: * No specimens in log * EQUIPMENT: Colonoscope -[id number]      Impression: Significant amount of pancolonic hematin Diverticulosis Subcentimeter polyp in the cecum RECOMMENDATION: Repeat colonoscopy in 3 months, due: 8/27/2024 Inadequate bowel preparation Personal history of colon polyps  Maintain large-bore IV access.  Follow hemoglobin and transfuse to maintain greater than 7.  Repeat colonoscopy in 3 to 6 months off Eliquis and Plavix for reevaluation and polypectomy    Collins Carbone MD     CT high volume bleeding scan abdomen pelvis    Result Date: 5/28/2024  Narrative: CT ABDOMEN AND PELVIS - WITHOUT AND WITH IV CONTRAST INDICATION:   BRBPR. History of diverticular bleed. On Plavix and Eliquis. Patient complains of weakness. COMPARISON: 7/7/2020 TECHNIQUE: CT examination of the abdomen and pelvis was performed both prior to and after the administration of intravenous contrast. Multiplanar 2D reformatted images were created from the source data. Radiation dose length product (DLP) for this visit: 3671 mGy-cm . This examination, like all CT scans performed in the [facility], was performed utilizing techniques to minimize radiation dose exposure, including the use of iterative reconstruction and automated exposure control. IV Contrast: 100 mL of iohexol (OMNIPAQUE) Enteric Contrast: Not administered. FINDINGS: ABDOMEN BOWEL: Chronic marked colonic diverticulosis. No new wall thickening, dilation, fluid levels or secondary signs of inflammation. No contrast extravasation identified. LOWER CHEST: Dependent atelectasis. Cardiomegaly and atherosclerosis. LIVER/BILIARY TREE: Chronic cysts. Normal hepatic morphology and bile duct caliber. GALLBLADDER: Within normal limits. SPLEEN: Within normal limits. PANCREAS: Within normal limits. ADRENAL GLANDS: Chronic mild nodular  "thickening. KIDNEYS/URETERS: Chronic simple right cortical cyst and tiny hyperdense left cortical cyst. APPENDIX: Within normal limits. ABDOMINOPELVIC CAVITY: No abnormal fluid, air or lymphadenopathy. VESSELS: Chronic atherosclerosis. Normal aortic caliber. Patent mesenteric vessels. PELVIS REPRODUCTIVE ORGANS: Chronic prostate enlargement. URINARY BLADDER: Chronic trabeculation. ABDOMINAL WALL/INGUINAL REGIONS: Within normal limits. BONES: Degenerative changes.     Impression: No evidence of active high-volume gastrointestinal hemorrhage. No acute abnormality identified in the abdomen or pelvis. Workstation performed: [id number]           ELVIS Ascencio      Please Note: \"This note has been constructed using a voice recognition system.Therefore there may be syntax, spelling, and/or grammatical errors. Please call if you have any questions. \"**   "

## 2024-06-11 ENCOUNTER — ANESTHESIA (INPATIENT)
Dept: PERIOP | Facility: HOSPITAL | Age: 69
DRG: 379 | End: 2024-06-11
Payer: COMMERCIAL

## 2024-06-11 ENCOUNTER — APPOINTMENT (OUTPATIENT)
Dept: PERIOP | Facility: HOSPITAL | Age: 69
DRG: 379 | End: 2024-06-11
Payer: COMMERCIAL

## 2024-06-11 ENCOUNTER — ANESTHESIA EVENT (INPATIENT)
Dept: PERIOP | Facility: HOSPITAL | Age: 69
DRG: 379 | End: 2024-06-11
Payer: COMMERCIAL

## 2024-06-11 LAB
ANION GAP SERPL CALCULATED.3IONS-SCNC: 5 MMOL/L (ref 4–13)
BUN SERPL-MCNC: 5 MG/DL (ref 5–25)
CALCIUM SERPL-MCNC: 7.5 MG/DL (ref 8.4–10.2)
CHLORIDE SERPL-SCNC: 113 MMOL/L (ref 96–108)
CO2 SERPL-SCNC: 23 MMOL/L (ref 21–32)
CREAT SERPL-MCNC: 0.79 MG/DL (ref 0.6–1.3)
GFR SERPL CREATININE-BSD FRML MDRD: 93 ML/MIN/1.73SQ M
GLUCOSE SERPL-MCNC: 108 MG/DL (ref 65–140)
GLUCOSE SERPL-MCNC: 111 MG/DL (ref 65–140)
HCT VFR BLD AUTO: 24.3 % (ref 36.5–49.3)
HCT VFR BLD AUTO: 24.5 % (ref 36.5–49.3)
HCT VFR BLD AUTO: 24.8 % (ref 36.5–49.3)
HCT VFR BLD AUTO: 24.9 % (ref 36.5–49.3)
HGB BLD-MCNC: 7.9 G/DL (ref 12–17)
HGB BLD-MCNC: 8 G/DL (ref 12–17)
HGB BLD-MCNC: 8 G/DL (ref 12–17)
HGB BLD-MCNC: 8.1 G/DL (ref 12–17)
POTASSIUM SERPL-SCNC: 3.2 MMOL/L (ref 3.5–5.3)
SODIUM SERPL-SCNC: 141 MMOL/L (ref 135–147)

## 2024-06-11 PROCEDURE — 85014 HEMATOCRIT: CPT | Performed by: NURSE PRACTITIONER

## 2024-06-11 PROCEDURE — C9113 INJ PANTOPRAZOLE SODIUM, VIA: HCPCS | Performed by: STUDENT IN AN ORGANIZED HEALTH CARE EDUCATION/TRAINING PROGRAM

## 2024-06-11 PROCEDURE — 88305 TISSUE EXAM BY PATHOLOGIST: CPT | Performed by: PATHOLOGY

## 2024-06-11 PROCEDURE — 85018 HEMOGLOBIN: CPT | Performed by: NURSE PRACTITIONER

## 2024-06-11 PROCEDURE — 45385 COLONOSCOPY W/LESION REMOVAL: CPT | Performed by: INTERNAL MEDICINE

## 2024-06-11 PROCEDURE — 85014 HEMATOCRIT: CPT | Performed by: INTERNAL MEDICINE

## 2024-06-11 PROCEDURE — 82948 REAGENT STRIP/BLOOD GLUCOSE: CPT

## 2024-06-11 PROCEDURE — 85018 HEMOGLOBIN: CPT | Performed by: INTERNAL MEDICINE

## 2024-06-11 PROCEDURE — 0DBH8ZZ EXCISION OF CECUM, VIA NATURAL OR ARTIFICIAL OPENING ENDOSCOPIC: ICD-10-PCS | Performed by: INTERNAL MEDICINE

## 2024-06-11 PROCEDURE — 99232 SBSQ HOSP IP/OBS MODERATE 35: CPT | Performed by: NURSE PRACTITIONER

## 2024-06-11 PROCEDURE — 80048 BASIC METABOLIC PNL TOTAL CA: CPT | Performed by: NURSE PRACTITIONER

## 2024-06-11 RX ORDER — POTASSIUM CHLORIDE 20 MEQ/1
40 TABLET, EXTENDED RELEASE ORAL ONCE
Status: COMPLETED | OUTPATIENT
Start: 2024-06-11 | End: 2024-06-11

## 2024-06-11 RX ORDER — DEXTROSE MONOHYDRATE AND SODIUM CHLORIDE 5; .9 G/100ML; G/100ML
50 INJECTION, SOLUTION INTRAVENOUS CONTINUOUS
Status: DISCONTINUED | OUTPATIENT
Start: 2024-06-11 | End: 2024-06-13 | Stop reason: HOSPADM

## 2024-06-11 RX ORDER — PANTOPRAZOLE SODIUM 40 MG/1
40 TABLET, DELAYED RELEASE ORAL
Status: DISCONTINUED | OUTPATIENT
Start: 2024-06-11 | End: 2024-06-13 | Stop reason: HOSPADM

## 2024-06-11 RX ORDER — PROPOFOL 10 MG/ML
INJECTION, EMULSION INTRAVENOUS AS NEEDED
Status: DISCONTINUED | OUTPATIENT
Start: 2024-06-11 | End: 2024-06-11

## 2024-06-11 RX ORDER — PROPOFOL 10 MG/ML
INJECTION, EMULSION INTRAVENOUS CONTINUOUS PRN
Status: DISCONTINUED | OUTPATIENT
Start: 2024-06-11 | End: 2024-06-11

## 2024-06-11 RX ORDER — POTASSIUM CHLORIDE 14.9 MG/ML
20 INJECTION INTRAVENOUS ONCE
Status: COMPLETED | OUTPATIENT
Start: 2024-06-11 | End: 2024-06-11

## 2024-06-11 RX ORDER — POTASSIUM CHLORIDE 29.8 MG/ML
40 INJECTION INTRAVENOUS ONCE
Status: DISCONTINUED | OUTPATIENT
Start: 2024-06-11 | End: 2024-06-11

## 2024-06-11 RX ORDER — SODIUM CHLORIDE, SODIUM LACTATE, POTASSIUM CHLORIDE, CALCIUM CHLORIDE 600; 310; 30; 20 MG/100ML; MG/100ML; MG/100ML; MG/100ML
INJECTION, SOLUTION INTRAVENOUS CONTINUOUS PRN
Status: DISCONTINUED | OUTPATIENT
Start: 2024-06-11 | End: 2024-06-11

## 2024-06-11 RX ADMIN — METOPROLOL SUCCINATE 50 MG: 50 TABLET, EXTENDED RELEASE ORAL at 09:06

## 2024-06-11 RX ADMIN — SODIUM CHLORIDE 75 ML/HR: 0.9 INJECTION, SOLUTION INTRAVENOUS at 00:21

## 2024-06-11 RX ADMIN — PANTOPRAZOLE SODIUM 8 MG/HR: 40 INJECTION, POWDER, FOR SOLUTION INTRAVENOUS at 03:30

## 2024-06-11 RX ADMIN — DEXTROSE AND SODIUM CHLORIDE 50 ML/HR: 5; .9 INJECTION, SOLUTION INTRAVENOUS at 09:08

## 2024-06-11 RX ADMIN — MEMANTINE 5 MG: 5 TABLET ORAL at 09:07

## 2024-06-11 RX ADMIN — POTASSIUM CHLORIDE 20 MEQ: 14.9 INJECTION, SOLUTION INTRAVENOUS at 11:56

## 2024-06-11 RX ADMIN — POTASSIUM CHLORIDE 20 MEQ: 14.9 INJECTION, SOLUTION INTRAVENOUS at 09:08

## 2024-06-11 RX ADMIN — POTASSIUM CHLORIDE 40 MEQ: 1500 TABLET, EXTENDED RELEASE ORAL at 09:07

## 2024-06-11 RX ADMIN — SODIUM CHLORIDE, SODIUM LACTATE, POTASSIUM CHLORIDE, AND CALCIUM CHLORIDE: .6; .31; .03; .02 INJECTION, SOLUTION INTRAVENOUS at 14:23

## 2024-06-11 RX ADMIN — MEMANTINE 5 MG: 5 TABLET ORAL at 17:35

## 2024-06-11 RX ADMIN — METOPROLOL SUCCINATE 50 MG: 50 TABLET, EXTENDED RELEASE ORAL at 22:02

## 2024-06-11 RX ADMIN — ATORVASTATIN CALCIUM 40 MG: 40 TABLET, FILM COATED ORAL at 17:35

## 2024-06-11 RX ADMIN — PROPOFOL 100 MG: 10 INJECTION, EMULSION INTRAVENOUS at 14:35

## 2024-06-11 RX ADMIN — PANTOPRAZOLE SODIUM 40 MG: 40 TABLET, DELAYED RELEASE ORAL at 17:35

## 2024-06-11 RX ADMIN — Medication 3 MG: at 22:02

## 2024-06-11 RX ADMIN — ACETAMINOPHEN 650 MG: 325 TABLET ORAL at 09:07

## 2024-06-11 RX ADMIN — PROPOFOL 100 MCG/KG/MIN: 10 INJECTION, EMULSION INTRAVENOUS at 14:38

## 2024-06-11 RX ADMIN — FERROUS SULFATE TAB 325 MG (65 MG ELEMENTAL FE) 325 MG: 325 (65 FE) TAB at 09:07

## 2024-06-11 NOTE — ANESTHESIA PREPROCEDURE EVALUATION
Procedure:  COLONOSCOPY    Relevant Problems   CARDIO   (+) Aortic stenosis   (+) Benign essential hypertension   (+) CAD (coronary artery disease)   (+) Dyspnea on exertion   (+) Mitral regurgitation   (+) Paroxysmal A-fib (HCC)   (+) Probable Chronic diastolic congestive heart failure (HCC)      GI/HEPATIC   (+) Diverticulosis of large intestine without perforation or abscess with bleeding      HEMATOLOGY   (+) Anemia and GI bleed      NEURO/PSYCH   (+) Dementia without behavioral disturbance (HCC)   (+) Dementia without behavioral disturbance, psychotic disturbance, mood disturbance, or anxiety (HCC)   (+) CAMMIE (generalized anxiety disorder)      PULMONARY   (+) Asthma, mild intermittent   (+) Dyspnea on exertion   (+) Obstructive sleep apnea        Physical Exam    Airway    Mallampati score: II  TM Distance: >3 FB  Neck ROM: full     Dental   No notable dental hx     Cardiovascular  Cardiovascular exam normal    Pulmonary  Pulmonary exam normal     Other Findings        Anesthesia Plan  ASA Score- 3     Anesthesia Type- IV sedation with anesthesia with ASA Monitors.         Additional Monitors:     Airway Plan:            Plan Factors-Exercise tolerance (METS): >4 METS.    Chart reviewed.   Existing labs reviewed. Patient summary reviewed.    Patient is not a current smoker.      Obstructive sleep apnea risk education given perioperatively.        Induction-     Postoperative Plan-     Perioperative Resuscitation Plan - Level 1 - Full Code.       Informed Consent- Anesthetic plan and risks discussed with patient.  I personally reviewed this patient with the CRNA. Discussed and agreed on the Anesthesia Plan with the CRNA..

## 2024-06-11 NOTE — PLAN OF CARE
Problem: SAFETY ADULT  Goal: Patient will remain free of falls  Description: INTERVENTIONS:  - Educate patient/family on patient safety including physical limitations  - Instruct patient to call for assistance with activity   - Consult OT/PT to assist with strengthening/mobility   - Keep Call bell within reach  - Keep bed low and locked with side rails adjusted as appropriate  - Keep care items and personal belongings within reach  - Initiate and maintain comfort rounds  - Make Fall Risk Sign visible to staff  - Offer Toileting every 2 Hours, in advance of need  - Initiate/Maintain bed alarm  - Obtain necessary fall risk management equipment: walker   - Apply yellow socks and bracelet for high fall risk patients  - Consider moving patient to room near nurses station  Outcome: Progressing     Problem: DISCHARGE PLANNING  Goal: Discharge to home or other facility with appropriate resources  Description: INTERVENTIONS:  - Identify barriers to discharge w/patient and caregiver  - Arrange for needed discharge resources and transportation as appropriate  - Identify discharge learning needs (meds, wound care, etc.)  - Arrange for interpretive services to assist at discharge as needed  - Refer to Case Management Department for coordinating discharge planning if the patient needs post-hospital services based on physician/advanced practitioner order or complex needs related to functional status, cognitive ability, or social support system  Outcome: Progressing     Problem: Knowledge Deficit  Goal: Patient/family/caregiver demonstrates understanding of disease process, treatment plan, medications, and discharge instructions  Description: Complete learning assessment and assess knowledge base.  Interventions:  - Provide teaching at level of understanding  - Provide teaching via preferred learning methods  Outcome: Progressing     Problem: RESPIRATORY - ADULT  Goal: Achieves optimal ventilation and oxygenation  Description:  INTERVENTIONS:  - Assess for changes in respiratory status  - Assess for changes in mentation and behavior  - Position to facilitate oxygenation and minimize respiratory effort  - Oxygen administered by appropriate delivery if ordered  - Initiate smoking cessation education as indicated  - Encourage broncho-pulmonary hygiene including cough, deep breathe, Incentive Spirometry  - Assess the need for suctioning and aspirate as needed  - Assess and instruct to report SOB or any respiratory difficulty  - Respiratory Therapy support as indicated  Outcome: Progressing     Problem: HEMATOLOGIC - ADULT  Goal: Maintains hematologic stability  Description: INTERVENTIONS  - Assess for signs and symptoms of bleeding or hemorrhage  - Monitor labs  - Administer supportive blood products/factors as ordered and appropriate  Outcome: Progressing

## 2024-06-11 NOTE — PROGRESS NOTES
[facility]  Progress Note  Name: Christopher Razo I  MRN: [id number]  Unit/Bed#: 2 [address] Date of Admission: 6/7/2024   Date of Service: 6/11/2024 I Hospital Day: 4    Assessment & Plan   * Anemia and GI bleed  Assessment & Plan  Patient recently hospitalized 5/28-5/30 with bloody stools. Colonoscopy at that time showed old blood, suspected diverticular bleed. Restarted on Plavis and Eliquis and now presents with bloody stools and anemia on outpatient labs.  CT a/p: New high attenuation material in the splenic flexure of the colon. In the given clinical setting this does raise concern for hemorrhage but is otherwise nonspecific on single phase CT. No other interval abnormality.  s/p 2 units PRBC  Hemoglobin this morning 8.1  Continue with bloody bowel movements; last one 6/9 in the afternoon  GI following  Continue to hold Eliquis and Plavix  Continue protonix drip  Patient for EGD and colonoscopy 6/11  BP stable, continue IVF  Trend h&h q12h    Paroxysmal A-fib (HCC)  Assessment & Plan  Continue metoprolol  Holding eliquis    CAD (coronary artery disease)  Assessment & Plan  Continue statin and metoprolol  Plavix on hold    Benign essential hypertension  Assessment & Plan  Continue metoprolol  BP stable    History of stroke  Assessment & Plan  Holding plavix  Continue statin    Dementia without behavioral disturbance (HCC)  Assessment & Plan  continue Namenda   Supportive care                VTE Pharmacologic Prophylaxis: VTE Score: 4 Moderate Risk (Score 3-4) - Pharmacological DVT Prophylaxis Contraindicated. Sequential Compression Devices Ordered.    Mobility:   Basic Mobility Inpatient Raw Score: 18  JH-HLM Goal: 6: Walk 10 steps or more  JH-HLM Achieved: 6: Walk 10 steps or more  JH-HLM Goal achieved. Continue to encourage appropriate mobility.    Patient Centered Rounds: I performed bedside rounds with nursing staff today.   Discussions with Specialists or Other Care Team Provider:  "multidisciplinary team     Education and Discussions with Family / Patient: Updated  (wife) via phone.    Total Time Spent on Date of Encounter in care of patient: greater than 45 mins. This time was spent on one or more of the following: performing physical exam; counseling and coordination of care; obtaining or reviewing history; documenting in the medical record; reviewing/ordering tests, medications or procedures; communicating with other healthcare professionals and discussing with patient's family/caregivers.    Current Length of Stay: 4 day(s)  Current Patient Status: Inpatient   Certification Statement: The patient will continue to require additional inpatient hospital stay due to EGD and colonoscopy, repeat labs  Discharge Plan: Anticipate discharge in 24-48 hrs to home.    Code Status: Level 1 - Full Code    Subjective:   Patient seen sitting up in bed resting, appears anxious.  He reports that his bowel movement is still brown in color, watery.  Denies any blood.  Reports that he slept okay last night.  Indicated that he had some \"floaters\" in both eyes that lasted about 20 minutes.  Reports he has had this happen in the past, discussed having his eyes checked which he said was \"quite sometime ago\".    Objective:     Vitals:   Temp (24hrs), Av °F (36.7 °C), Min:97.4 °F (36.3 °C), Max:98.2 °F (36.8 °C)    Temp:  [97.4 °F (36.3 °C)-98.2 °F (36.8 °C)] 98.1 °F (36.7 °C)  HR:  [73-85] 85  Resp:  [16-20] 16  BP: (127-146)/(61-91) 136/74  SpO2:  [93 %-98 %] 98 %  Body mass index is 32.98 kg/m².     Input and Output Summary (last 24 hours):     Intake/Output Summary (Last 24 hours) at 2024 1442  Last data filed at 2024 1438  Gross per 24 hour   Intake --   Output 0 ml   Net 0 ml       Physical Exam:   Physical Exam  Vitals and nursing note reviewed.   Constitutional:       General: He is not in acute distress.  HENT:      Head: Normocephalic.      Nose: Nose normal.      Mouth/Throat: " "     Mouth: Mucous membranes are moist.   Eyes:      Extraocular Movements: Extraocular movements intact.      Conjunctiva/sclera: Conjunctivae normal.      Pupils: Pupils are equal, round, and reactive to light.      Comments: Reported \"floaters\" in both eyes which resolved after about 20 minutes; reports no eye exam for \"a while\"; encouraged outpatient follow up    Cardiovascular:      Rate and Rhythm: Normal rate.      Pulses: Normal pulses.   Pulmonary:      Effort: Pulmonary effort is normal.      Breath sounds: Normal breath sounds.   Abdominal:      General: Bowel sounds are normal. There is no distension.      Palpations: Abdomen is soft.      Comments: Reports stool is not clear, liquid but brown; no blood    Genitourinary:     Comments: Voiding spontaneously   Musculoskeletal:         General: Normal range of motion.      Right lower leg: No edema.      Left lower leg: No edema.   Skin:     General: Skin is warm and dry.      Capillary Refill: Capillary refill takes less than 2 seconds.   Neurological:      General: No focal deficit present.      Mental Status: He is oriented to person, place, and time.   Psychiatric:      Comments: Anxious, reports nervous about test.           Additional Data:     Labs:  Results from last 7 days   Lab Units 06/11/24  1215 06/10/24  1326 06/10/24  0652 06/08/24  1143 06/08/24  0611   WBC Thousand/uL  --   --  5.93   < > 7.27   HEMOGLOBIN g/dL 8.0*   < > 7.6*   < > 7.3*   HEMATOCRIT % 24.3*   < > 23.3*   < > 22.8*   PLATELETS Thousands/uL  --   --  210   < > 213   SEGS PCT %  --   --   --   --  64   LYMPHO PCT %  --   --   --   --  20   MONO PCT %  --   --   --   --  12   EOS PCT %  --   --   --   --  3    < > = values in this interval not displayed.     Results from last 7 days   Lab Units 06/11/24  0449 06/08/24  0611 06/07/24  1515   SODIUM mmol/L 141   < > 142   POTASSIUM mmol/L 3.2*   < > 3.6   CHLORIDE mmol/L 113*   < > 113*   CO2 mmol/L 23   < > 22   BUN mg/dL 5   < " > 15   CREATININE mg/dL 0.79   < > 0.84   ANION GAP mmol/L 5   < > 7   CALCIUM mg/dL 7.5*   < > 8.1*   ALBUMIN g/dL  --   --  3.5   TOTAL BILIRUBIN mg/dL  --   --  0.43   ALK PHOS U/L  --   --  77   ALT U/L  --   --  9   AST U/L  --   --  8*   GLUCOSE RANDOM mg/dL 111   < > 115    < > = values in this interval not displayed.     Results from last 7 days   Lab Units 06/08/24  1143   INR  1.18     Results from last 7 days   Lab Units 06/11/24  0843   POC GLUCOSE mg/dl 108               Lines/Drains:  Invasive Devices       Peripheral Intravenous Line  Duration             Peripheral IV 06/11/24 Right Antecubital <1 day                          Imaging: No pertinent imaging reviewed.    Recent Cultures (last 7 days):         Last 24 Hours Medication List:   Current Facility-Administered Medications   Medication Dose Route Frequency Provider Last Rate    acetaminophen  650 mg Oral Q6H PRN ELVIS Valencia      atorvastatin  40 mg Oral QPM Victoria Alcantara MD      dextrose 5 % and sodium chloride 0.9 %  50 mL/hr Intravenous Continuous J LUIS RamosNP 50 mL/hr (06/11/24 0908)    ferrous sulfate  325 mg Oral Daily With Breakfast Victoria Alcantara MD      HYDROmorphone  0.2 mg Intravenous Q3H PRN ELVIS Valencia      melatonin  3 mg Oral HS ELVIS Valencia      memantine  5 mg Oral BID Victoria Alcantara MD      metoprolol succinate  50 mg Oral BID Victoria Alcantara MD      ondansetron  4 mg Intravenous Q6H PRN ELVIS Valencia      pantoprazole (PROTONIX) 80 mg in sodium chloride 0.9 % 100 mL infusion  8 mg/hr Intravenous Continuous Jayy Mendoza DO 8 mg/hr (06/11/24 0330)     Facility-Administered Medications Ordered in Other Encounters   Medication Dose Route Frequency Provider Last Rate    lactated ringers   Intravenous Continuous PRN Kate Sharma CRNA      propofol   Intravenous Continuous PRN Kate Sharma CRNA 100 mcg/kg/min (06/11/24 1438)    propofol   Intravenous PRN Kate Sharma CRNA          Today,  Patient Was Seen By: ELVIS Ramos    **Please Note: This note may have been constructed using a voice recognition system.**

## 2024-06-11 NOTE — PLAN OF CARE
Problem: SAFETY ADULT  Goal: Patient will remain free of falls  Description: INTERVENTIONS:  - Educate patient/family on patient safety including physical limitations  - Instruct patient to call for assistance with activity   - Consult OT/PT to assist with strengthening/mobility   - Keep Call bell within reach  - Keep bed low and locked with side rails adjusted as appropriate  - Keep care items and personal belongings within reach  - Initiate and maintain comfort rounds  - Make Fall Risk Sign visible to staff  - Offer Toileting every 2 Hours, in advance of need  - Initiate/Maintain bed alarm  - Obtain necessary fall risk management equipment: call bell   - Apply yellow socks and bracelet for high fall risk patients  - Consider moving patient to room near nurses station  Outcome: Progressing     Problem: DISCHARGE PLANNING  Goal: Discharge to home or other facility with appropriate resources  Description: INTERVENTIONS:  - Identify barriers to discharge w/patient and caregiver  - Arrange for needed discharge resources and transportation as appropriate  - Identify discharge learning needs (meds, wound care, etc.)  - Arrange for interpretive services to assist at discharge as needed  - Refer to Case Management Department for coordinating discharge planning if the patient needs post-hospital services based on physician/advanced practitioner order or complex needs related to functional status, cognitive ability, or social support system  Outcome: Progressing     Problem: Knowledge Deficit  Goal: Patient/family/caregiver demonstrates understanding of disease process, treatment plan, medications, and discharge instructions  Description: Complete learning assessment and assess knowledge base.  Interventions:  - Provide teaching at level of understanding  - Provide teaching via preferred learning methods  Outcome: Progressing     Problem: RESPIRATORY - ADULT  Goal: Achieves optimal ventilation and oxygenation  Description:  INTERVENTIONS:  - Assess for changes in respiratory status  - Assess for changes in mentation and behavior  - Position to facilitate oxygenation and minimize respiratory effort  - Oxygen administered by appropriate delivery if ordered  - Initiate smoking cessation education as indicated  - Encourage broncho-pulmonary hygiene including cough, deep breathe, Incentive Spirometry  - Assess the need for suctioning and aspirate as needed  - Assess and instruct to report SOB or any respiratory difficulty  - Respiratory Therapy support as indicated  Outcome: Progressing     Problem: HEMATOLOGIC - ADULT  Goal: Maintains hematologic stability  Description: INTERVENTIONS  - Assess for signs and symptoms of bleeding or hemorrhage  - Monitor labs  - Administer supportive blood products/factors as ordered and appropriate  Outcome: Progressing

## 2024-06-11 NOTE — ASSESSMENT & PLAN NOTE
Patient recently hospitalized 5/28-5/30 with bloody stools. Colonoscopy at that time showed old blood, suspected diverticular bleed. Restarted on Plavis and Eliquis and now presents with bloody stools and anemia on outpatient labs.  CT a/p: New high attenuation material in the splenic flexure of the colon. In the given clinical setting this does raise concern for hemorrhage but is otherwise nonspecific on single phase CT. No other interval abnormality.  s/p 2 units PRBC  Hemoglobin this morning 8.1  Continue with bloody bowel movements; last one 6/9 in the afternoon  GI following  Continue to hold Eliquis and Plavix  Continue protonix drip  Patient for EGD and colonoscopy 6/11  BP stable, continue IVF  Trend h&h q12h

## 2024-06-11 NOTE — ANESTHESIA POSTPROCEDURE EVALUATION
Post-Op Assessment Note    CV Status:  Stable  Pain Score: 0    Pain management: adequate       Mental Status:  Arousable   Hydration Status:  Stable   PONV Controlled:  None   Airway Patency:  Patent     Post Op Vitals Reviewed: Yes    No anethesia notable event occurred.    Staff: Anesthesiologist, CRNA               BP   156/75   Temp      Pulse  70   Resp   14   SpO2   98

## 2024-06-12 LAB
ANION GAP SERPL CALCULATED.3IONS-SCNC: 4 MMOL/L (ref 4–13)
BUN SERPL-MCNC: 5 MG/DL (ref 5–25)
CALCIUM SERPL-MCNC: 7.7 MG/DL (ref 8.4–10.2)
CHLORIDE SERPL-SCNC: 112 MMOL/L (ref 96–108)
CO2 SERPL-SCNC: 24 MMOL/L (ref 21–32)
CREAT SERPL-MCNC: 0.72 MG/DL (ref 0.6–1.3)
GFR SERPL CREATININE-BSD FRML MDRD: 97 ML/MIN/1.73SQ M
GLUCOSE SERPL-MCNC: 110 MG/DL (ref 65–140)
HCT VFR BLD AUTO: 24.2 % (ref 36.5–49.3)
HCT VFR BLD AUTO: 26.2 % (ref 36.5–49.3)
HGB BLD-MCNC: 7.8 G/DL (ref 12–17)
HGB BLD-MCNC: 8.4 G/DL (ref 12–17)
MAGNESIUM SERPL-MCNC: 2 MG/DL (ref 1.9–2.7)
POTASSIUM SERPL-SCNC: 3.3 MMOL/L (ref 3.5–5.3)
SODIUM SERPL-SCNC: 140 MMOL/L (ref 135–147)

## 2024-06-12 PROCEDURE — 80048 BASIC METABOLIC PNL TOTAL CA: CPT | Performed by: INTERNAL MEDICINE

## 2024-06-12 PROCEDURE — 99232 SBSQ HOSP IP/OBS MODERATE 35: CPT

## 2024-06-12 PROCEDURE — 85018 HEMOGLOBIN: CPT | Performed by: INTERNAL MEDICINE

## 2024-06-12 PROCEDURE — 83735 ASSAY OF MAGNESIUM: CPT | Performed by: INTERNAL MEDICINE

## 2024-06-12 PROCEDURE — 85014 HEMATOCRIT: CPT | Performed by: INTERNAL MEDICINE

## 2024-06-12 PROCEDURE — 99232 SBSQ HOSP IP/OBS MODERATE 35: CPT | Performed by: INTERNAL MEDICINE

## 2024-06-12 RX ORDER — CLOPIDOGREL BISULFATE 75 MG/1
75 TABLET ORAL DAILY
Status: DISCONTINUED | OUTPATIENT
Start: 2024-06-12 | End: 2024-06-13 | Stop reason: HOSPADM

## 2024-06-12 RX ORDER — POTASSIUM CHLORIDE 20 MEQ/1
40 TABLET, EXTENDED RELEASE ORAL 2 TIMES DAILY
Status: COMPLETED | OUTPATIENT
Start: 2024-06-12 | End: 2024-06-12

## 2024-06-12 RX ADMIN — PANTOPRAZOLE SODIUM 40 MG: 40 TABLET, DELAYED RELEASE ORAL at 06:42

## 2024-06-12 RX ADMIN — ATORVASTATIN CALCIUM 40 MG: 40 TABLET, FILM COATED ORAL at 17:22

## 2024-06-12 RX ADMIN — DEXTROSE AND SODIUM CHLORIDE 50 ML/HR: 5; .9 INJECTION, SOLUTION INTRAVENOUS at 06:51

## 2024-06-12 RX ADMIN — MEMANTINE 5 MG: 5 TABLET ORAL at 10:03

## 2024-06-12 RX ADMIN — METOPROLOL SUCCINATE 50 MG: 50 TABLET, EXTENDED RELEASE ORAL at 21:59

## 2024-06-12 RX ADMIN — METOPROLOL SUCCINATE 50 MG: 50 TABLET, EXTENDED RELEASE ORAL at 10:03

## 2024-06-12 RX ADMIN — POTASSIUM CHLORIDE 40 MEQ: 1500 TABLET, EXTENDED RELEASE ORAL at 17:21

## 2024-06-12 RX ADMIN — PANTOPRAZOLE SODIUM 40 MG: 40 TABLET, DELAYED RELEASE ORAL at 17:21

## 2024-06-12 RX ADMIN — MEMANTINE 5 MG: 5 TABLET ORAL at 17:22

## 2024-06-12 RX ADMIN — Medication 3 MG: at 21:59

## 2024-06-12 RX ADMIN — DEXTROSE AND SODIUM CHLORIDE 50 ML/HR: 5; .9 INJECTION, SOLUTION INTRAVENOUS at 17:22

## 2024-06-12 RX ADMIN — POTASSIUM CHLORIDE 40 MEQ: 1500 TABLET, EXTENDED RELEASE ORAL at 10:03

## 2024-06-12 RX ADMIN — FERROUS SULFATE TAB 325 MG (65 MG ELEMENTAL FE) 325 MG: 325 (65 FE) TAB at 10:03

## 2024-06-12 RX ADMIN — CLOPIDOGREL 75 MG: 75 TABLET ORAL at 10:03

## 2024-06-12 NOTE — PROGRESS NOTES
Progress Note - [facility] [place] [facility]  Christopher Razo 68 y.o. male MRN: [id number]    Unit/Bed#: [address] [place] [address] Encounter: [id number]      Assessment/Plan:  68-year-old gentleman, history of dementia, on anticoagulation for atrial fibrillation, has a history of recurrent presumed diverticular bleeds, was just admitted for the same, presented with hematochezia, drop in hemoglobin.  Prep started yesterday with continued bleeding.     1.  Lower GI bleed:   Patient presented with report of bright red blood from rectal area.   Positive CT scan around the splenic flexure, most likely secondary to recurrent diverticular bleed, ischemic colitis is also on the differential.  Per nursing staff patient had no further episodes of blood in stool.    -Colonoscopy done 6/11 showed 6 mm cecal polyp removed by cold snare.  Extensive diverticulosis with no signs of active bleeding, no clot, no stigmata.  Internal hemorrhoids.  Suspect diverticular bleed.  Monitor hemoglobin  -Transfuse blood products as needed to keep hemoglobin greater than 7  -Monitor for signs of active bleeding  -May restart anticoagulation today  if possible could start with antiplatelets first and then anticoagulation.  -If patient has recurrent GI bleed, consider repeat CT scan with bleeding protocol and if positive will consider IR guided therapy given that patient has had several colonoscopies which have been nondiagnostic for active bleeding.  -GI will follow as needed, call GI if needed    Subjective:   Lying in bed in no acute distress.  Tolerating diet.  Denies nausea, vomiting, or abdominal pain.  Per nursing staff patient had no further episodes of bright red blood in stool, bright red blood from rectal area or black tarry stool.    Objective:     Vitals: Blood pressure 139/91, pulse 83, temperature 97.5 °F (36.4 °C), temperature source Oral, resp. rate 16, height 6' (1.829 m), weight 110 kg (243 lb 3.2 oz), SpO2 95%.,Body mass index is 32.98 kg/m².      Intake/Output  Summary (Last 24 hours) at 6/12/2024 1247  Last data filed at 6/12/2024 1216  Gross per 24 hour   Intake 437 ml   Output 0 ml   Net 437 ml       Physical Exam: Physical Exam  Vitals and nursing note reviewed.   Constitutional:       General: He is not in acute distress.  Cardiovascular:      Rate and Rhythm: Normal rate and regular rhythm.      Pulses: Normal pulses.      Heart sounds: Normal heart sounds.   Pulmonary:      Effort: Pulmonary effort is normal. No respiratory distress.      Breath sounds: Normal breath sounds. No stridor. No wheezing, rhonchi or rales.   Abdominal:      General: Bowel sounds are normal. There is no distension.      Palpations: Abdomen is soft. There is no mass.      Tenderness: There is no abdominal tenderness. There is no guarding or rebound.      Hernia: No hernia is present.   Musculoskeletal:      Right lower leg: No edema.      Left lower leg: No edema.   Skin:     General: Skin is warm and dry.      Capillary Refill: Capillary refill takes less than 2 seconds.      Coloration: Skin is not jaundiced or pale.   Neurological:      Mental Status: He is alert and oriented to person, place, and time.         Invasive Devices       Peripheral Intravenous Line  Duration             Peripheral IV 06/11/24 Right Antecubital 1 day                    Current Facility-Administered Medications:     acetaminophen (TYLENOL) tablet 650 mg, 650 mg, Oral, Q6H PRN, Prateek Bolivar MD, 650 mg at 06/11/24 0907    atorvastatin (LIPITOR) tablet 40 mg, 40 mg, Oral, QPM, Prateek Bolivar MD, 40 mg at 06/11/24 1735    clopidogrel (PLAVIX) tablet 75 mg, 75 mg, Oral, Daily, Carrie Carrillo PA-C, 75 mg at 06/12/24 1003    dextrose 5 % and sodium chloride 0.9 % infusion, 50 mL/hr, Intravenous, Continuous, Prateek Bolivar MD, Last Rate: 50 mL/hr at 06/12/24 0651, 50 mL/hr at 06/12/24 0651    ferrous sulfate tablet 325 mg, 325 mg, Oral, Daily With Breakfast, Prateek Bolivar MD, 325 mg at 06/12/24 1003    HYDROmorphone HCl  (DILAUDID) injection 0.2 mg, 0.2 mg, Intravenous, Q3H PRN, Prateek Bolivar MD, 0.2 mg at 06/08/24 0052    melatonin tablet 3 mg, 3 mg, Oral, HS, Prateek Bolivar MD, 3 mg at 06/11/24 2202    memantine (NAMENDA) tablet 5 mg, 5 mg, Oral, BID, Prateek Bolivar MD, 5 mg at 06/12/24 1003    metoprolol succinate (TOPROL-XL) 24 hr tablet 50 mg, 50 mg, Oral, BID, Prateek Bolivar MD, 50 mg at 06/12/24 1003    ondansetron (ZOFRAN) injection 4 mg, 4 mg, Intravenous, Q6H PRN, Prateek Bolivar MD    pantoprazole (PROTONIX) EC tablet 40 mg, 40 mg, Oral, BID AC, Prateek Bolivar MD, 40 mg at 06/12/24 0642    potassium chloride (Klor-Con M20) CR tablet 40 mEq, 40 mEq, Oral, BID, Carrie Carrillo PA-C, 40 mEq at 06/12/24 1003    Lab Results:   Recent Results (from the past 24 hour(s))   Hemoglobin and hematocrit, blood    Collection Time: 06/11/24 10:22 PM   Result Value Ref Range    Hemoglobin 7.9 (L) 12.0 - 17.0 g/dL    Hematocrit 24.5 (L) 36.5 - 49.3 %   Basic metabolic panel    Collection Time: 06/12/24  5:47 AM   Result Value Ref Range    Sodium 140 135 - 147 mmol/L    Potassium 3.3 (L) 3.5 - 5.3 mmol/L    Chloride 112 (H) 96 - 108 mmol/L    CO2 24 21 - 32 mmol/L    ANION GAP 4 4 - 13 mmol/L    BUN 5 5 - 25 mg/dL    Creatinine 0.72 0.60 - 1.30 mg/dL    Glucose 110 65 - 140 mg/dL    Calcium 7.7 (L) 8.4 - 10.2 mg/dL    eGFR 97 ml/min/1.73sq m   Magnesium    Collection Time: 06/12/24  5:47 AM   Result Value Ref Range    Magnesium 2.0 1.9 - 2.7 mg/dL   Hemoglobin and hematocrit, blood    Collection Time: 06/12/24  7:58 AM   Result Value Ref Range    Hemoglobin 8.4 (L) 12.0 - 17.0 g/dL    Hematocrit 26.2 (L) 36.5 - 49.3 %             Imaging Studies: Colonoscopy    Result Date: 6/11/2024  Narrative: Table formatting from the original result was not included. [facility] Operating Room [address] [place] [id number] [phone] DATE OF SERVICE: 6/11/24 PHYSICIAN(S): Attending: Prateek Bolivar MD Fellow: No Staff Documented INDICATION:  BRBPR (bright red blood per rectum) POST-OP DIAGNOSIS: See the impression below. HISTORY: Prior colonoscopy: Less than 3 years ago. It is being repeated at an interval of less than 3 years because: This colonoscopy is being performed for a diagnostic indication BOWEL PREPARATION: Golytely/Colyte/Trilyte PREPROCEDURE: Informed consent was obtained for the procedure, including sedation. Risks including but not limited to bleeding, infection, perforation, adverse drug reaction and aspiration were explained in detail. Also explained about less than 100% sensitivity with the exam and other alternatives. The patient was placed in the left lateral decubitus position. Procedure: Colonoscopy DETAILS OF PROCEDURE: Patient was taken to the procedure room where a time out was performed to confirm correct patient and correct procedure. The patient underwent monitored anesthesia care, which was administered by an anesthesia professional. The patient's blood pressure, heart rate, level of consciousness, oxygen, respirations, ECG and ETCO2 were monitored throughout the procedure. A digital rectal exam was performed. The scope was introduced through the anus and advanced to the terminal ileum. Photodocumentation was obtained at the ileocecal valve, appendiceal orifice and retroflexed view of the rectum. Retroflexion was performed in the rectum. The quality of bowel preparation was evaluated using the [place] Bowel Preparation Scale with scores of: right colon = 2, transverse colon = 2, left colon = 2. The total BBPS score was 6. Bowel prep was adequate. The patient experienced no blood loss. The procedure was not difficult. The patient tolerated the procedure well. There were no apparent adverse events. ANESTHESIA INFORMATION: ASA: III Anesthesia Type: Anesthesia type not filed in the log. MEDICATIONS: No administrations occurring from 1411 to 1450 on 06/11/24 FINDINGS: One sessile polyp measuring 5-9 mm in the cecum; performed cold  snare with en bloc removal and retrieved specimen Internal hemorrhoids Multiple extensive diverticula with no inflammation containing no content in the sigmoid colon; no bleeding was identified EVENTS: Procedure Events Event Event Time ENDO CECUM REACHED 6/11/2024  2:37 PM ENDO SCOPE OUT TIME 6/11/2024  2:49 PM SPECIMENS: ID Type Source Tests Collected by Time Destination 1 : cold snare cecal polyp Tissue Large Intestine, Cecum TISSUE EXAM Prateek Bolivar MD 6/11/2024  2:40 PM  EQUIPMENT: Colonoscope -[id number]     Impression: 6 mm cecal polyp removed by cold snare Extensive diverticulosis with no signs of active bleeding, no clot, no stigmata Internal hemorrhoids Suspect diverticular bleed RECOMMENDATION: Repeat colonoscopy in 5 years, due: 6/10/2029 Personal history of colon polyps  Return for Diet as tolerated Recommend starting anticoagulation tomorrow, if possible could start with antiplatelets first and then anticoagulation or would defer to cardiology and primary service which of the 2 blood thinners would be preferred before starting both of them. If there is strong indications both can be started if needed as well at this time with monitoring If patient has recurrent GI bleeding, consider repeat CT scan with bleeding protocol and if positive would consider IR guided therapy given that he has had several colonoscopies which have been nondiagnostic and there is no no active bleeding at this time  Prateek Bolivar MD     CT abdomen pelvis w contrast    Result Date: 6/8/2024  Narrative: CT ABDOMEN AND PELVIS WITH IV CONTRAST INDICATION: worseing abdomen pain,lower GI bleed. COMPARISON: CT abdomen pelvis 5/28/2024. TECHNIQUE: CT examination of the abdomen and pelvis was performed. Multiplanar 2D reformatted images were created from the source data. This examination, like all CT scans performed in the [facility], was performed utilizing techniques to minimize radiation dose exposure, including the use  of iterative reconstruction and automated exposure control. Radiation dose length product (DLP) for this visit: 997.94 mGy-cm IV Contrast: 100 mL of iohexol (OMNIPAQUE) Enteric Contrast: Not administered. FINDINGS: ABDOMEN LOWER CHEST: Cardiomegaly. Aortic calcifications. Mild bibasilar atelectasis. LIVER/BILIARY TREE: Simple hepatic cyst(s). No suspicious mass. Normal hepatic contours. No biliary dilation. GALLBLADDER: No calcified gallstones. No pericholecystic inflammatory change. SPLEEN: Unremarkable. PANCREAS: Unremarkable. ADRENAL GLANDS: Stable adrenal thickening versus small nodules. KIDNEYS/URETERS: 3.7 cm right lower pole simple cyst. No hydronephrosis. STOMACH AND BOWEL: No dilated loops of bowel. There is new hyperattenuating material noted within the colon in the region of the splenic flexure (series 2 image 52). No other high attenuation intraluminal contents are seen. No wall thickening or inflammatory change. Severe colonic diverticulosis without evidence of acute diverticulitis. APPENDIX: Normal. ABDOMINOPELVIC CAVITY: No ascites. No pneumoperitoneum. No lymphadenopathy. VESSELS: Atherosclerosis without abdominal aortic aneurysm. PELVIS REPRODUCTIVE ORGANS: Enlarged prostate. URINARY BLADDER: Unremarkable. ABDOMINAL WALL/INGUINAL REGIONS: Small fat-containing right inguinal hernia. BONES: No acute fracture or suspicious osseous lesion.     Impression: New high attenuation material in the splenic flexure of the colon. In the given clinical setting this does raise concern for hemorrhage but is otherwise nonspecific on single phase CT. No other interval abnormality. This finding is in agreement with the preliminary [facility] report. I discussed this finding with Prateek Bolivar MD via [employer] messaging. Workstation performed: [id number]     XR chest 1 view portable    Result Date: 6/8/2024  Narrative: XR CHEST PORTABLE INDICATION: SOB. COMPARISON: 6/7/2022 FINDINGS: Clear lungs. No pneumothorax or pleural effusion.  Normal cardiomediastinal silhouette. Loop recorder noted. Bones are unremarkable for age. Normal upper abdomen.     Impression: No acute cardiopulmonary disease. Workstation performed: [id number]     Colonoscopy    Result Date: 5/29/2024  Narrative: Table formatting from the original result was not included. [facility] Operating Room [address] [place] [id number] [phone] DATE OF SERVICE: 5/29/24 PHYSICIAN(S): Attending: Collins Carbone MD Fellow: No Staff Documented INDICATION: Bright red rectal bleeding POST-OP DIAGNOSIS: See the impression below. HISTORY: Prior colonoscopy: Less than 3 years ago. It is being repeated at an interval of less than 3 years because: This colonoscopy is being performed for a diagnostic indication BOWEL PREPARATION: Miralax/Dulcolax PREPROCEDURE: Informed consent was obtained for the procedure, including sedation. Risks including but not limited to bleeding, infection, perforation, adverse drug reaction and aspiration were explained in detail. Also explained about less than 100% sensitivity with the exam and other alternatives. The patient was placed in the left lateral decubitus position. Procedure: Colonoscopy DETAILS OF PROCEDURE: Patient was taken to the procedure room where a time out was performed to confirm correct patient and correct procedure. The patient underwent monitored anesthesia care, which was administered by an anesthesia professional. The patient's blood pressure, heart rate, level of consciousness, oxygen, respirations, ECG and ETCO2 were monitored throughout the procedure. A digital rectal exam was performed. The scope was introduced through the anus and advanced to the terminal ileum. Photodocumentation was obtained at the ileocecal valve, appendiceal orifice and retroflexed view of the rectum. Retroflexion was performed in the rectum. The quality of bowel preparation was evaluated using the [place] Bowel Preparation Scale with scores of:  right colon = 1, transverse colon = 1, left colon = 1. The total BBPS score was 3. Bowel prep was not adequate. The patient experienced no blood loss. The procedure was moderately difficult due to poor preparation. The patient tolerated the procedure well. There were no apparent adverse events. ANESTHESIA INFORMATION: ASA: III Anesthesia Type: IV Sedation with Anesthesia MEDICATIONS: No administrations occurring from 1503 to 1544 on 05/29/24 FINDINGS: Significant amount of pancolonic hematin. Old blood in stool throughout the entire colon but no active bleeding or fresh blood visualized.  There was no evidence of blood within the terminal ileum. Multiple pancolonic diverticula. Pandiverticulosis but concentrated primarily in the left colon. Sessile polyp measuring 5-9 mm in the cecum. Single polyp visualized in the cecum, not removed at this time EVENTS: Procedure Events Event Event Time ENDO CECUM REACHED 5/29/2024  3:29 PM ENDO SCOPE OUT TIME 5/29/2024  3:43 PM SPECIMENS: * No specimens in log * EQUIPMENT: Colonoscope -[id number]      Impression: Significant amount of pancolonic hematin Diverticulosis Subcentimeter polyp in the cecum RECOMMENDATION: Repeat colonoscopy in 3 months, due: 8/27/2024 Inadequate bowel preparation Personal history of colon polyps  Maintain large-bore IV access.  Follow hemoglobin and transfuse to maintain greater than 7.  Repeat colonoscopy in 3 to 6 months off Eliquis and Plavix for reevaluation and polypectomy    Collins Carbone MD     CT high volume bleeding scan abdomen pelvis    Result Date: 5/28/2024  Narrative: CT ABDOMEN AND PELVIS - WITHOUT AND WITH IV CONTRAST INDICATION:   BRBPR. History of diverticular bleed. On Plavix and Eliquis. Patient complains of weakness. COMPARISON: 7/7/2020 TECHNIQUE: CT examination of the abdomen and pelvis was performed both prior to and after the administration of intravenous contrast. Multiplanar 2D reformatted images were created from the source  "data. Radiation dose length product (DLP) for this visit: 3671 mGy-cm . This examination, like all CT scans performed in the [facility], was performed utilizing techniques to minimize radiation dose exposure, including the use of iterative reconstruction and automated exposure control. IV Contrast: 100 mL of iohexol (OMNIPAQUE) Enteric Contrast: Not administered. FINDINGS: ABDOMEN BOWEL: Chronic marked colonic diverticulosis. No new wall thickening, dilation, fluid levels or secondary signs of inflammation. No contrast extravasation identified. LOWER CHEST: Dependent atelectasis. Cardiomegaly and atherosclerosis. LIVER/BILIARY TREE: Chronic cysts. Normal hepatic morphology and bile duct caliber. GALLBLADDER: Within normal limits. SPLEEN: Within normal limits. PANCREAS: Within normal limits. ADRENAL GLANDS: Chronic mild nodular thickening. KIDNEYS/URETERS: Chronic simple right cortical cyst and tiny hyperdense left cortical cyst. APPENDIX: Within normal limits. ABDOMINOPELVIC CAVITY: No abnormal fluid, air or lymphadenopathy. VESSELS: Chronic atherosclerosis. Normal aortic caliber. Patent mesenteric vessels. PELVIS REPRODUCTIVE ORGANS: Chronic prostate enlargement. URINARY BLADDER: Chronic trabeculation. ABDOMINAL WALL/INGUINAL REGIONS: Within normal limits. BONES: Degenerative changes.     Impression: No evidence of active high-volume gastrointestinal hemorrhage. No acute abnormality identified in the abdomen or pelvis. Workstation performed: [id number]           ELVIS Ascencio      Please Note: \"This note has been constructed using a voice recognition system.Therefore there may be syntax, spelling, and/or grammatical errors. Please call if you have any questions. \"**   "

## 2024-06-12 NOTE — CASE MANAGEMENT
Case Management Discharge Planning Note    Patient name Christopher Razo  Location [address] [facility]/[address] MRN [id number]  : 1955 Date 2024       Current Admission Date: 2024  Current Admission Diagnosis:Anemia and GI bleed   Patient Active Problem List    Diagnosis Date Noted Date Diagnosed    Perseveration 2024     Diverticulosis of large intestine without perforation or abscess with bleeding 2024     History of GI diverticular bleed 2024     Dementia without behavioral disturbance, psychotic disturbance, mood disturbance, or anxiety ([facility]) 2023     NSVT (nonsustained ventricular tachycardia) ([facility]) 2022     H/O heart artery stent 2022     CAD (coronary artery disease) 2022     Probable Chronic diastolic congestive heart failure ([facility]) 10/28/2022     Dyspnea on exertion 2022     Anemia and GI bleed 2022     Paroxysmal A-fib ([facility]) 2022     Personal history of colonic polyps 2022     History of stroke 2021     Dementia without behavioral disturbance ([facility]) 10/29/2020     Aortic stenosis 10/14/2020     Mitral regurgitation 10/14/2020     History of cardioembolic cerebrovascular accident (CVA) 2020     Unsteady gait 2020     Persistent insomnia 2020     Brain atrophy ([facility]) 2020     Imbalance 2020     [place] cardiac risk 10-20% in next 10 years 2020     Neuropathy 2020     ACMMIE (generalized anxiety disorder) 2020     Cubital tunnel syndrome on right 2019     Immunization due 10/17/2018     Arthropathy 2017     Internal hemorrhoids 2017     Obesity (BMI 30-39.9) 2016     Low HDL (under 40) 2016     Pre-diabetes 2016     Benign essential hypertension 2016     Tubular adenoma of colon 2014     [place] 2014     Asthma, mild intermittent 2014     Colon, diverticulosis 2013     Chronic rhinitis 2012     Obstructive sleep  apnea 09/04/2012     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       LOS (days): 5  Geometric Mean LOS (GMLOS) (days): 3  Days to GMLOS:-1.9     OBJECTIVE:  Risk of Unplanned Readmission Score: 16.99     Current admission status: Inpatient   Preferred Pharmacy:   [facility]/pharmacy #[id number] - [place], [place] - [address]  [address] [address]  Phone: [phone] Fax: [phone]    [facility]/pharmacy #[id number] - [place], [place] - [address]   [address] [facility] [address]  Phone: [phone] Fax: [phone]    Primary Care Provider: Richard Varela DO    Primary Insurance: NANDA  REP  Secondary Insurance:     DISCHARGE DETAILS:    Discharge planning discussed with:: Patient    IMM Given (Date):: 06/12/24 (IMM reviewed with patient bedside. Patient verbalized understanding, signed, and was provided original form. Copy placed in scan bin for patient's chart.)  IMM Given to:: Patient

## 2024-06-12 NOTE — PLAN OF CARE
Problem: SAFETY ADULT  Goal: Patient will remain free of falls  Description: INTERVENTIONS:  - Educate patient/family on patient safety including physical limitations  - Instruct patient to call for assistance with activity   - Consult OT/PT to assist with strengthening/mobility   - Keep Call bell within reach  - Keep bed low and locked with side rails adjusted as appropriate  - Keep care items and personal belongings within reach  - Initiate and maintain comfort rounds  - Make Fall Risk Sign visible to staff  - Offer Toileting every 2 Hours, in advance of need  - Initiate/Maintain bed alarm  - Obtain necessary fall risk management equipment: walker   - Apply yellow socks and bracelet for high fall risk patients  - Consider moving patient to room near nurses station  6/11/2024 2311 by Wanda Rich RN  Outcome: Progressing  6/11/2024 2311 by Wanda Rich RN  Outcome: Progressing     Problem: DISCHARGE PLANNING  Goal: Discharge to home or other facility with appropriate resources  Description: INTERVENTIONS:  - Identify barriers to discharge w/patient and caregiver  - Arrange for needed discharge resources and transportation as appropriate  - Identify discharge learning needs (meds, wound care, etc.)  - Arrange for interpretive services to assist at discharge as needed  - Refer to Case Management Department for coordinating discharge planning if the patient needs post-hospital services based on physician/advanced practitioner order or complex needs related to functional status, cognitive ability, or social support system  6/11/2024 2311 by Wanda Rich RN  Outcome: Progressing  6/11/2024 2311 by Wanda Rich RN  Outcome: Progressing     Problem: Knowledge Deficit  Goal: Patient/family/caregiver demonstrates understanding of disease process, treatment plan, medications, and discharge instructions  Description: Complete learning assessment and assess knowledge base.  Interventions:  - Provide teaching at level of  understanding  - Provide teaching via preferred learning methods  6/11/2024 2311 by Wanda Rich RN  Outcome: Progressing  6/11/2024 2311 by Wanda Rich RN  Outcome: Progressing     Problem: RESPIRATORY - ADULT  Goal: Achieves optimal ventilation and oxygenation  Description: INTERVENTIONS:  - Assess for changes in respiratory status  - Assess for changes in mentation and behavior  - Position to facilitate oxygenation and minimize respiratory effort  - Oxygen administered by appropriate delivery if ordered  - Initiate smoking cessation education as indicated  - Encourage broncho-pulmonary hygiene including cough, deep breathe, Incentive Spirometry  - Assess the need for suctioning and aspirate as needed  - Assess and instruct to report SOB or any respiratory difficulty  - Respiratory Therapy support as indicated  6/11/2024 2311 by Wanda Rich RN  Outcome: Progressing  6/11/2024 2311 by Wanda Rich RN  Outcome: Progressing     Problem: HEMATOLOGIC - ADULT  Goal: Maintains hematologic stability  Description: INTERVENTIONS  - Assess for signs and symptoms of bleeding or hemorrhage  - Monitor labs  - Administer supportive blood products/factors as ordered and appropriate  6/11/2024 2311 by Wanda Rich RN  Outcome: Progressing  6/11/2024 2311 by Wanda Rich RN  Outcome: Progressing

## 2024-06-12 NOTE — ASSESSMENT & PLAN NOTE
Patient recently hospitalized 5/28-5/30 with bloody stools. Colonoscopy at that time showed old blood, suspected diverticular bleed. Restarted on Plavis and Eliquis and now presents with bloody stools and anemia on outpatient labs.  CT a/p: New high attenuation material in the splenic flexure of the colon. In the given clinical setting this does raise concern for hemorrhage but is otherwise nonspecific on single phase CT. No other interval abnormality.  s/p 4 units PRBC  GI following  Continue protonix 40 mg bid  6/11 colonoscopy showed extensive diverticulosis with no signs of active bleeding, no clot, no stigmata; suspect diverticular bleed  Per GI, if patient has recurrent bleeding, recommend IR guided therapy  Cleared to resume plavix this morning and likely can resume Eliquis tomorrow  Monitor hemoglobin

## 2024-06-12 NOTE — PROGRESS NOTES
[facility]  Progress Note  Name: Christopher Razo I  MRN: [id number]  Unit/Bed#: 2 [address] Date of Admission: 6/7/2024   Date of Service: 6/12/2024 I Hospital Day: 5    Assessment & Plan   * Anemia and GI bleed  Assessment & Plan  Patient recently hospitalized 5/28-5/30 with bloody stools. Colonoscopy at that time showed old blood, suspected diverticular bleed. Restarted on Plavis and Eliquis and now presents with bloody stools and anemia on outpatient labs.  CT a/p: New high attenuation material in the splenic flexure of the colon. In the given clinical setting this does raise concern for hemorrhage but is otherwise nonspecific on single phase CT. No other interval abnormality.  s/p 4 units PRBC  GI following  Continue protonix 40 mg bid  6/11 colonoscopy showed extensive diverticulosis with no signs of active bleeding, no clot, no stigmata; suspect diverticular bleed  Per GI, if patient has recurrent bleeding, recommend IR guided therapy  Cleared to resume plavix this morning and likely can resume Eliquis tomorrow  Monitor hemoglobin    CAD (coronary artery disease)  Assessment & Plan  Continue statin and metoprolol  Plavix resumed this morning    Paroxysmal A-fib (HCC)  Assessment & Plan  Continue metoprolol  Holding eliquis    History of stroke  Assessment & Plan  Plavix resumed this morning  Continue statin    Dementia without behavioral disturbance (HCC)  Assessment & Plan  continue Namenda   Supportive care     Benign essential hypertension  Assessment & Plan  Continue metoprolol  BP stable         VTE Pharmacologic Prophylaxis: VTE Score: 4 Moderate Risk (Score 3-4) - Pharmacological DVT Prophylaxis Contraindicated. Sequential Compression Devices Ordered. Plavix resumed this am    Mobility:   Basic Mobility Inpatient Raw Score: 18  JH-HLM Goal: 6: Walk 10 steps or more  JH-HLM Achieved: 7: Walk 25 feet or more  JH-HLM Goal achieved. Continue to encourage appropriate  mobility.    Patient Centered Rounds: I performed bedside rounds with nursing staff today.   Discussions with Specialists or Other Care Team Provider: GI, rn, cm    Education and Discussions with Family / Patient: Updated  (wife) at bedside.    Total Time Spent on Date of Encounter in care of patient: 45 mins. This time was spent on one or more of the following: performing physical exam; counseling and coordination of care; obtaining or reviewing history; documenting in the medical record; reviewing/ordering tests, medications or procedures; communicating with other healthcare professionals and discussing with patient's family/caregivers.    Current Length of Stay: 5 day(s)  Current Patient Status: Inpatient   Certification Statement: The patient will continue to require additional inpatient hospital stay due to resume anticoagulation, monitor hemoglobin, monitor for rebleeding  Discharge Plan: Anticipate discharge in 24-48 hrs to home.    Code Status: Level 1 - Full Code    Subjective:   Patient reports he is feeling well and wants to go home. Denies further bloody bowel movements. Still occasionally lightheaded, though improving. Denies nausea, vomiting, abdominal pain, chest pain, or shortness of breath.    Objective:     Vitals:   Temp (24hrs), Av.2 °F (36.8 °C), Min:97.5 °F (36.4 °C), Max:98.7 °F (37.1 °C)    Temp:  [97.5 °F (36.4 °C)-98.7 °F (37.1 °C)] 98.5 °F (36.9 °C)  HR:  [76-88] 78  Resp:  [16-18] 16  BP: (123-139)/(69-91) 130/85  SpO2:  [93 %-95 %] 94 %  Body mass index is 32.98 kg/m².     Input and Output Summary (last 24 hours):     Intake/Output Summary (Last 24 hours) at 2024 1636  Last data filed at 2024 1216  Gross per 24 hour   Intake 237 ml   Output --   Net 237 ml       Physical Exam:   Physical Exam  Vitals and nursing note reviewed.   Constitutional:       General: He is not in acute distress.     Appearance: He is well-developed.   Cardiovascular:      Rate and  Rhythm: Normal rate and regular rhythm.   Pulmonary:      Effort: Pulmonary effort is normal. No respiratory distress.      Breath sounds: Normal breath sounds.   Abdominal:      Palpations: Abdomen is soft.      Tenderness: There is no abdominal tenderness.   Musculoskeletal:         General: No swelling.   Skin:     General: Skin is warm and dry.   Neurological:      Mental Status: He is alert.   Psychiatric:         Mood and Affect: Mood normal.          Additional Data:     Labs:  Results from last 7 days   Lab Units 06/12/24  0758 06/10/24  1326 06/10/24  0652 06/08/24  1143 06/08/24  0611   WBC Thousand/uL  --   --  5.93   < > 7.27   HEMOGLOBIN g/dL 8.4*   < > 7.6*   < > 7.3*   HEMATOCRIT % 26.2*   < > 23.3*   < > 22.8*   PLATELETS Thousands/uL  --   --  210   < > 213   SEGS PCT %  --   --   --   --  64   LYMPHO PCT %  --   --   --   --  20   MONO PCT %  --   --   --   --  12   EOS PCT %  --   --   --   --  3    < > = values in this interval not displayed.     Results from last 7 days   Lab Units 06/12/24  0547 06/08/24  0611 06/07/24  1515   SODIUM mmol/L 140   < > 142   POTASSIUM mmol/L 3.3*   < > 3.6   CHLORIDE mmol/L 112*   < > 113*   CO2 mmol/L 24   < > 22   BUN mg/dL 5   < > 15   CREATININE mg/dL 0.72   < > 0.84   ANION GAP mmol/L 4   < > 7   CALCIUM mg/dL 7.7*   < > 8.1*   ALBUMIN g/dL  --   --  3.5   TOTAL BILIRUBIN mg/dL  --   --  0.43   ALK PHOS U/L  --   --  77   ALT U/L  --   --  9   AST U/L  --   --  8*   GLUCOSE RANDOM mg/dL 110   < > 115    < > = values in this interval not displayed.     Results from last 7 days   Lab Units 06/08/24  1143   INR  1.18     Results from last 7 days   Lab Units 06/11/24  0843   POC GLUCOSE mg/dl 108               Lines/Drains:  Invasive Devices       Peripheral Intravenous Line  Duration             Peripheral IV 06/11/24 Right Antecubital 1 day                          Imaging: Reviewed radiology reports from this admission including: procedure reports    Recent  Cultures (last 7 days):         Last 24 Hours Medication List:   Current Facility-Administered Medications   Medication Dose Route Frequency Provider Last Rate    acetaminophen  650 mg Oral Q6H PRN Prateek Bolivar MD      atorvastatin  40 mg Oral QPM Prateek Bolivar MD      clopidogrel  75 mg Oral Daily Carrie Carrillo PA-C      dextrose 5 % and sodium chloride 0.9 %  50 mL/hr Intravenous Continuous Prateek Bolivar MD 50 mL/hr (06/12/24 0651)    ferrous sulfate  325 mg Oral Daily With Breakfast Prateek Bolivar MD      HYDROmorphone  0.2 mg Intravenous Q3H PRN Prateek Bolivar MD      melatonin  3 mg Oral HS Prateek Bolivar MD      memantine  5 mg Oral BID Prateek Bolivar MD      metoprolol succinate  50 mg Oral BID Prateek Bolivar MD      ondansetron  4 mg Intravenous Q6H PRN Prateek Bolivar MD      pantoprazole  40 mg Oral BID AC Prateek Bolivar MD      potassium chloride  40 mEq Oral BID Carrie Carrillo PA-C          Today, Patient Was Seen By: Carrie Carrillo PA-C    **Please Note: This note may have been constructed using a voice recognition system.**

## 2024-06-12 NOTE — CASE MANAGEMENT
Case Management Assessment & Discharge Planning Note    Patient name Christopher Razo  Location [address] MRN [id number]  : 1955 Date 2024       Current Admission Date: 2024  Current Admission Diagnosis:Anemia and GI bleed   Patient Active Problem List    Diagnosis Date Noted Date Diagnosed    Perseveration 2024     Diverticulosis of large intestine without perforation or abscess with bleeding 2024     History of GI diverticular bleed 2024     Dementia without behavioral disturbance, psychotic disturbance, mood disturbance, or anxiety ([facility]) 2023     NSVT (nonsustained ventricular tachycardia) ([facility]) 2022     H/O heart artery stent 2022     CAD (coronary artery disease) 2022     Probable Chronic diastolic congestive heart failure ([facility]) 10/28/2022     Dyspnea on exertion 2022     Anemia and GI bleed 2022     Paroxysmal A-fib ([facility]) 2022     Personal history of colonic polyps 2022     History of stroke 2021     Dementia without behavioral disturbance ([facility]) 10/29/2020     Aortic stenosis 10/14/2020     Mitral regurgitation 10/14/2020     History of cardioembolic cerebrovascular accident (CVA) 2020     Unsteady gait 2020     Persistent insomnia 2020     Brain atrophy ([facility]) 2020     Imbalance 2020     Calvin cardiac risk 10-20% in next 10 years 2020     Neuropathy 2020     CAMMIE (generalized anxiety disorder) 2020     Cubital tunnel syndrome on right 2019     Immunization due 10/17/2018     Arthropathy 2017     Internal hemorrhoids 2017     Obesity (BMI 30-39.9) 2016     Low HDL (under 40) 2016     Pre-diabetes 2016     Benign essential hypertension 2016     Tubular adenoma of colon 2014     [place] 2014     Asthma, mild intermittent 2014     Colon, diverticulosis 2013     Chronic rhinitis 2012      Obstructive sleep apnea 09/04/2012     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       LOS (days): 5  Geometric Mean LOS (GMLOS) (days): 3  Days to GMLOS:-1.8     OBJECTIVE:  PATIENT READMITTED TO HOSPITAL  Risk of Unplanned Readmission Score: 17.12     Current admission status: Inpatient    Preferred Pharmacy:   [id number] - [place], [place] - [address]  [address] [address]  Phone: [phone] Fax: [phone]    [id number] - [place], [place] - [address]  [address]  [facility] [address]  Phone: [phone] Fax: [phone]    Primary Care Provider: Richard Varela DO    Primary Insurance: [employer] REP  Secondary Insurance:     ASSESSMENT:  Active Health Care Proxies    There are no active Health Care Proxies on file.       Readmission Root Cause  30 Day Readmission: Yes  Who directed you to return to the hospital?: Self, Family  Did you understand whom to contact if you had questions or problems?: Yes  Did you get your prescriptions before you left the hospital?: No  Reason:: Preference for own pharmacy  Were you able to get your prescriptions filled when you left the hospital?: Yes  Did you take your medications as prescribed?: Yes  Were you able to get to your follow-up appointments?: No  Reason:: Readmitted prior to appointment  Patient was readmitted due to: Dizziness, Anemia, abnormal blood test  Action Plan: Medical management    Patient Information  Admitted from:: Home  Mental Status: Alert  During Assessment patient was accompanied by: Not accompanied during assessment  Assessment information provided by:: Patient  Primary Caregiver: Spouse  Caregiver's Name:: Wife Harriet  Caregiver's Relationship to Patient:: Significant Other  Caregiver's Telephone Number:: [phone]  Support Systems: Self, Spouse/significant other  County of Residence: [place]  What city do you live in?: [place]  Home entry access options. Select all that apply.: Stairs  Number  of steps to enter home.: 2  Do the steps have railings?: Yes  Type of Current Residence: [facility]  Living Arrangements: Lives w/ Spouse/significant other    Activities of Daily Living Prior to Admission  Functional Status: Independent  Completes ADLs independently?: Yes  Ambulates independently?: Yes (Patient uses a cane to ambulate.)  Does patient use assisted devices?: Yes  Assisted Devices (DME) used: Straight Cane  Does patient currently own DME?: Yes  What DME does the patient currently own?: Straight Cane  Does patient have a history of Outpatient Therapy (PT/OT)?: Yes  Does the patient have a history of Short-Term Rehab?: No  Does patient have a history of HHC?: No  Does patient currently have HHC?: No    Patient Information Continued  Income Source: Self-employed  Does patient have prescription coverage?: Yes  Does patient receive dialysis treatments?: No    Means of Transportation  Means of Transport to Appts:: Family transport (Patient stated he doesn't drive and his wife provides transportation.)      Social Determinants of Health (SDOH)      Flowsheet Row Most Recent Value   Housing Stability    In the past 12 months, how many times have you moved where you were living? 1   At any time in the past 12 months, were you homeless or living in a shelter (including now)? N   Transportation Needs    In the past 12 months, has lack of transportation kept you from medical appointments or from getting medications? no   In the past 12 months, has lack of transportation kept you from meetings, work, or from getting things needed for daily living? No   Food Insecurity    Within the past 12 months, you worried that your food would run out before you got the money to buy more. Never true   Within the past 12 months, the food you bought just didn't last and you didn't have money to get more. Never true   Utilities    In the past 12 months has the electric, gas, oil, or water company threatened to shut off services in your  home? No            DISCHARGE DETAILS:    Discharge planning discussed with:: Patient  Freedom of Choice: Yes    CM contacted family/caregiver?: Yes    Contacts  Patient Contacts: Harriet Razo  Relationship to Patient:: Family  Reason/Outcome: Emergency Contact, Continuity of Care, Discharge Planning

## 2024-06-13 ENCOUNTER — TELEPHONE (OUTPATIENT)
Dept: FAMILY MEDICINE CLINIC | Facility: CLINIC | Age: 69
End: 2024-06-13

## 2024-06-13 ENCOUNTER — TELEPHONE (OUTPATIENT)
Age: 69
End: 2024-06-13

## 2024-06-13 ENCOUNTER — TRANSITIONAL CARE MANAGEMENT (OUTPATIENT)
Dept: FAMILY MEDICINE CLINIC | Facility: CLINIC | Age: 69
End: 2024-06-13

## 2024-06-13 VITALS
SYSTOLIC BLOOD PRESSURE: 125 MMHG | WEIGHT: 232.2 LBS | TEMPERATURE: 98.1 F | RESPIRATION RATE: 16 BRPM | HEIGHT: 72 IN | BODY MASS INDEX: 31.45 KG/M2 | HEART RATE: 75 BPM | OXYGEN SATURATION: 96 % | DIASTOLIC BLOOD PRESSURE: 76 MMHG

## 2024-06-13 LAB
ANION GAP SERPL CALCULATED.3IONS-SCNC: 14 MMOL/L (ref 4–13)
BUN SERPL-MCNC: 8 MG/DL (ref 5–25)
CALCIUM SERPL-MCNC: 7.9 MG/DL (ref 8.4–10.2)
CHLORIDE SERPL-SCNC: 106 MMOL/L (ref 96–108)
CO2 SERPL-SCNC: 23 MMOL/L (ref 21–32)
CREAT SERPL-MCNC: 0.75 MG/DL (ref 0.6–1.3)
ERYTHROCYTE [DISTWIDTH] IN BLOOD BY AUTOMATED COUNT: 14.5 % (ref 11.6–15.1)
GFR SERPL CREATININE-BSD FRML MDRD: 95 ML/MIN/1.73SQ M
GLUCOSE SERPL-MCNC: 113 MG/DL (ref 65–140)
HCT VFR BLD AUTO: 26.4 % (ref 36.5–49.3)
HCT VFR BLD AUTO: 26.8 % (ref 36.5–49.3)
HGB BLD-MCNC: 8.4 G/DL (ref 12–17)
HGB BLD-MCNC: 8.5 G/DL (ref 12–17)
MCH RBC QN AUTO: 28.5 PG (ref 26.8–34.3)
MCHC RBC AUTO-ENTMCNC: 31.7 G/DL (ref 31.4–37.4)
MCV RBC AUTO: 90 FL (ref 82–98)
PLATELET # BLD AUTO: 239 THOUSANDS/UL (ref 149–390)
PMV BLD AUTO: 9.1 FL (ref 8.9–12.7)
POTASSIUM SERPL-SCNC: 3.7 MMOL/L (ref 3.5–5.3)
RBC # BLD AUTO: 2.98 MILLION/UL (ref 3.88–5.62)
SODIUM SERPL-SCNC: 143 MMOL/L (ref 135–147)
WBC # BLD AUTO: 6.19 THOUSAND/UL (ref 4.31–10.16)

## 2024-06-13 PROCEDURE — 85027 COMPLETE CBC AUTOMATED: CPT

## 2024-06-13 PROCEDURE — 99239 HOSP IP/OBS DSCHRG MGMT >30: CPT

## 2024-06-13 PROCEDURE — 85018 HEMOGLOBIN: CPT | Performed by: INTERNAL MEDICINE

## 2024-06-13 PROCEDURE — 85014 HEMATOCRIT: CPT | Performed by: INTERNAL MEDICINE

## 2024-06-13 PROCEDURE — 80048 BASIC METABOLIC PNL TOTAL CA: CPT

## 2024-06-13 RX ADMIN — APIXABAN 5 MG: 5 TABLET, FILM COATED ORAL at 09:31

## 2024-06-13 RX ADMIN — PANTOPRAZOLE SODIUM 40 MG: 40 TABLET, DELAYED RELEASE ORAL at 06:06

## 2024-06-13 RX ADMIN — METOPROLOL SUCCINATE 50 MG: 50 TABLET, EXTENDED RELEASE ORAL at 09:34

## 2024-06-13 RX ADMIN — FERROUS SULFATE TAB 325 MG (65 MG ELEMENTAL FE) 325 MG: 325 (65 FE) TAB at 09:31

## 2024-06-13 RX ADMIN — MEMANTINE 5 MG: 5 TABLET ORAL at 09:31

## 2024-06-13 RX ADMIN — CLOPIDOGREL 75 MG: 75 TABLET ORAL at 09:31

## 2024-06-13 NOTE — TELEPHONE ENCOUNTER
Wife called Mohit has had hospitizations for GI bleeds     Concerned about the blood thinners, asking about the Plavix.     Would like to speak to you about this    C/b [phone]

## 2024-06-13 NOTE — NURSING NOTE
Patient discharged home with supportive spouse. Written and verbal discharge instruction provided to patient. All questions answered. IV removed per hospital protocol, occlusive dressing applied. Patient left unit with all personal belongings, accompanied by LPN and spouse.

## 2024-06-13 NOTE — DISCHARGE SUMMARY
[facility]  Discharge- Christopher Razo 1955, 68 y.o. male MRN: [id number]  Unit/Bed#: 2 [place] [address] Encounter: [id number]  Primary Care Provider: Richard Varela DO   Date and time admitted to hospital: 6/7/2024  2:41 PM    * Anemia and GI bleed  Assessment & Plan  Patient recently hospitalized 5/28-5/30 with bloody stools. Colonoscopy at that time showed old blood, suspected diverticular bleed. Restarted on Plavis and Eliquis and now presents with bloody stools and anemia on outpatient labs.  CT a/p: New high attenuation material in the splenic flexure of the colon. In the given clinical setting this does raise concern for hemorrhage but is otherwise nonspecific on single phase CT. No other interval abnormality.  s/p 4 units PRBC  GI following  6/11 colonoscopy showed extensive diverticulosis with no signs of active bleeding, no clot, no stigmata; suspect diverticular bleed  Per GI, if patient has recurrent bleeding, recommend IR guided therapy  Cleared to resume plavix 6/12 and Eliquis this morning per discussion GI NP  Hemoglobin has remained stable, patient had brown BM with no blood  Patient and wife would like to discuss with his cardiologist Dr. Norris if he may be able to continue on only Eliquis  Wife has reached out to cardiology office and will discontinue meds if indicated  Patient to repeat hemoglobin in 5-7 days and educated on reasons to return to ED    CAD (coronary artery disease)  Assessment & Plan  Continue statin and metoprolol  Plavix resumed 6/12    Paroxysmal A-fib (HCC)  Assessment & Plan  Continue metoprolol  Eliquis resumed this morning    History of stroke  Assessment & Plan  Plavix resumed 6/12  Continue statin    Dementia without behavioral disturbance (HCC)  Assessment & Plan  continue Namenda   Supportive care     Benign essential hypertension  Assessment & Plan  Continue metoprolol  BP stable      Medical Problems       Resolved Problems  Date Reviewed:  6/13/2024   None       Discharging Physician / Practitioner: Carrie Carrillo PA-C  PCP: Richard Varela DO  Admission Date:   Admission Orders (From admission, onward)       Ordered        06/07/24 1616  INPATIENT ADMISSION  Once                          Discharge Date: 06/13/24    Consultations During Hospital Stay:  Gastroenterology    Procedures Performed:   6/10/2024 Colonoscopy  6 mm cecal polyp removed by cold snare  Extensive diverticulosis with no signs of active bleeding, no clot, no stigmata  Internal hemorrhoids  Suspect diverticular bleed    Significant Findings / Test Results:   CXR: No acute cardiopulmonary disease  CT abdomen/pelvis: New high attenuation material in the splenic flexure of the colon. In the given clinical setting this does raise concern for hemorrhage but is otherwise nonspecific on single phase CT. No other interval abnormality.     Incidental Findings:   none    Test Results Pending at Discharge (will require follow up):   Biopsies from colonoscopy     Outpatient Tests Requested:  Repeat cbc Monday    Complications:  none    Reason for Admission: anemia, GI bleed    Hospital Course:   Christopher Razo is a 68 y.o. male patient who originally presented to the hospital on 6/7/2024 due to black stools and low hemoglobin on outpatient labs. Patient was recently hospitalized 5/28-5/30 with bloody stools and suspected to have diverticular bleeds. He was restarted on his Eliquis and Plavix and again developed bloody stools with outpatient labs showing acute drop in hemoglobin. He received total of 4 units PRBC this admission and continued to have bloody stools. CT showed possible hemorrhage at splenic flexure but otherwise unremarkable. He had colonoscopy done which did not show any signs of active bleeding, suspected to have diverticular bleed. Plavix and Eliquis were resumed and patient is having brown bowel movements. Hemoglobin has remained stable. Patient is discussing with his cardiologist  if he needs to remain on both Plavix and Eliquis long term. He will repeat hemoglobin in a few days and follow up with PCP. Cleared by GI prior to discharge.    Please see above list of diagnoses and related plan for additional information.     Condition at Discharge: stable    Discharge Day Visit / Exam:   Subjective:  Patient very eager to go home today. He has a brown, nonbloody bowel movement. He denies any current symptoms including chest pain, shortness of breath, nausea, vomiting, or abdominal pain. Wife reports she would like to speak to his cardiologist Dr. Norris about discontinuing Plavix and indicated she is in touch with the office. We discussed return precautions and repeating hemoglobin in a few days with outpatient follow up with PCP.   Vitals: Blood Pressure: 125/76 (06/13/24 0933)  Pulse: 75 (06/13/24 0933)  Temperature: 98.1 °F (36.7 °C) (06/13/24 0933)  Temp Source: Oral (06/13/24 0933)  Respirations: 16 (06/13/24 0933)  Height: 6' (182.9 cm) (06/07/24 1911)  Weight - Scale: 105 kg (232 lb 3.2 oz) (06/13/24 0600)  SpO2: 96 % (06/13/24 0933)  Exam:   Physical Exam  Vitals and nursing note reviewed.   Constitutional:       General: He is not in acute distress.     Appearance: He is well-developed.   Cardiovascular:      Rate and Rhythm: Normal rate and regular rhythm.   Pulmonary:      Effort: Pulmonary effort is normal. No respiratory distress.      Breath sounds: Normal breath sounds.   Abdominal:      Palpations: Abdomen is soft.      Tenderness: There is no abdominal tenderness.   Musculoskeletal:         General: No swelling.   Skin:     General: Skin is warm and dry.   Neurological:      Mental Status: He is alert.   Psychiatric:         Mood and Affect: Mood normal.          Discussion with Family: Updated  (wife) at bedside.    Discharge instructions/Information to patient and family:   See after visit summary for information provided to patient and family.      Provisions for  Follow-Up Care:  See after visit summary for information related to follow-up care and any pertinent home health orders.      Mobility at time of Discharge:   Basic Mobility Inpatient Raw Score: 18  JH-HLM Goal: 6: Walk 10 steps or more  JH-HLM Achieved: 2: Bed activities/Dependent transfer  HLM Goal NOT achieved. Continue to encourage mobility in post discharge setting.     Disposition:   Home    Planned Readmission: none     Discharge Statement:  I spent >30 minutes discharging the patient. This time was spent on the day of discharge. I had direct contact with the patient on the day of discharge. Greater than 50% of the total time was spent examining patient, answering all patient questions, arranging and discussing plan of care with patient as well as directly providing post-discharge instructions.  Additional time then spent on discharge activities.    Discharge Medications:  See after visit summary for reconciled discharge medications provided to patient and/or family.      **Please Note: This note may have been constructed using a voice recognition system**

## 2024-06-13 NOTE — ASSESSMENT & PLAN NOTE
Patient recently hospitalized 5/28-5/30 with bloody stools. Colonoscopy at that time showed old blood, suspected diverticular bleed. Restarted on Plavis and Eliquis and now presents with bloody stools and anemia on outpatient labs.  CT a/p: New high attenuation material in the splenic flexure of the colon. In the given clinical setting this does raise concern for hemorrhage but is otherwise nonspecific on single phase CT. No other interval abnormality.  s/p 4 units PRBC  GI following  6/11 colonoscopy showed extensive diverticulosis with no signs of active bleeding, no clot, no stigmata; suspect diverticular bleed  Per GI, if patient has recurrent bleeding, recommend IR guided therapy  Cleared to resume plavix 6/12 and Eliquis this morning per discussion GI NP  Hemoglobin has remained stable, patient had brown BM with no blood  Patient and wife would like to discuss with his cardiologist Dr. Norris if he may be able to continue on only Eliquis  Wife has reached out to cardiology office and will discontinue meds if indicated  Patient to repeat hemoglobin in 5-7 days and educated on reasons to return to ED

## 2024-06-13 NOTE — TELEPHONE ENCOUNTER
Scheduled TCM appt for patient on 6/26/24 @ 2pm with Dr. Street. Please call patient to complete TCM questions

## 2024-06-14 ENCOUNTER — HOSPITAL ENCOUNTER (INPATIENT)
Facility: HOSPITAL | Age: 69
LOS: 2 days | Discharge: HOME | DRG: 379 | End: 2024-06-17
Attending: EMERGENCY MEDICINE | Admitting: STUDENT IN AN ORGANIZED HEALTH CARE EDUCATION/TRAINING PROGRAM
Payer: COMMERCIAL

## 2024-06-14 ENCOUNTER — TELEPHONE (OUTPATIENT)
Dept: FAMILY MEDICINE CLINIC | Facility: CLINIC | Age: 69
End: 2024-06-14

## 2024-06-14 ENCOUNTER — APPOINTMENT (EMERGENCY)
Dept: RADIOLOGY | Facility: HOSPITAL | Age: 69
DRG: 379 | End: 2024-06-14
Payer: COMMERCIAL

## 2024-06-14 DIAGNOSIS — Z87.19 HISTORY OF GI DIVERTICULAR BLEED: ICD-10-CM

## 2024-06-14 DIAGNOSIS — K92.2 GI BLEED: ICD-10-CM

## 2024-06-14 DIAGNOSIS — K62.5 RECTAL BLEEDING: Primary | ICD-10-CM

## 2024-06-14 LAB
ABO GROUP BLD: NORMAL
ALBUMIN SERPL BCP-MCNC: 3.2 G/DL (ref 3.5–5)
ALP SERPL-CCNC: 86 U/L (ref 34–104)
ALT SERPL W P-5'-P-CCNC: 13 U/L (ref 7–52)
ANION GAP SERPL CALCULATED.3IONS-SCNC: 10 MMOL/L (ref 4–13)
APTT PPP: 31 SECONDS (ref 23–37)
AST SERPL W P-5'-P-CCNC: 11 U/L (ref 13–39)
BASOPHILS # BLD AUTO: 0.03 THOUSANDS/ÂΜL (ref 0–0.1)
BASOPHILS NFR BLD AUTO: 1 % (ref 0–1)
BILIRUB SERPL-MCNC: 0.58 MG/DL (ref 0.2–1)
BLD GP AB SCN SERPL QL: NEGATIVE
BUN SERPL-MCNC: 13 MG/DL (ref 5–25)
CALCIUM ALBUM COR SERPL-MCNC: 8.6 MG/DL (ref 8.3–10.1)
CALCIUM SERPL-MCNC: 8 MG/DL (ref 8.4–10.2)
CHLORIDE SERPL-SCNC: 107 MMOL/L (ref 96–108)
CO2 SERPL-SCNC: 26 MMOL/L (ref 21–32)
CREAT SERPL-MCNC: 0.98 MG/DL (ref 0.6–1.3)
EOSINOPHIL # BLD AUTO: 0.18 THOUSAND/ÂΜL (ref 0–0.61)
EOSINOPHIL NFR BLD AUTO: 3 % (ref 0–6)
ERYTHROCYTE [DISTWIDTH] IN BLOOD BY AUTOMATED COUNT: 14.3 % (ref 11.6–15.1)
FERRITIN SERPL-MCNC: 7 NG/ML (ref 24–336)
GFR SERPL CREATININE-BSD FRML MDRD: 80 ML/MIN/1.73SQ M
GLUCOSE SERPL-MCNC: 117 MG/DL (ref 65–140)
HCT VFR BLD AUTO: 24.4 % (ref 36.5–49.3)
HCT VFR BLD AUTO: 25.4 % (ref 36.5–49.3)
HGB BLD-MCNC: 7.7 G/DL (ref 12–17)
HGB BLD-MCNC: 8 G/DL (ref 12–17)
IMM GRANULOCYTES # BLD AUTO: 0.03 THOUSAND/UL (ref 0–0.2)
IMM GRANULOCYTES NFR BLD AUTO: 1 % (ref 0–2)
INR PPP: 1.16 (ref 0.84–1.19)
IRON SATN MFR SERPL: 5 % (ref 15–50)
IRON SERPL-MCNC: 13 UG/DL (ref 50–212)
LIPASE SERPL-CCNC: 24 U/L (ref 11–82)
LYMPHOCYTES # BLD AUTO: 0.68 THOUSANDS/ÂΜL (ref 0.6–4.47)
LYMPHOCYTES NFR BLD AUTO: 12 % (ref 14–44)
MCH RBC QN AUTO: 28.3 PG (ref 26.8–34.3)
MCHC RBC AUTO-ENTMCNC: 31.5 G/DL (ref 31.4–37.4)
MCV RBC AUTO: 90 FL (ref 82–98)
MONOCYTES # BLD AUTO: 0.59 THOUSAND/ÂΜL (ref 0.17–1.22)
MONOCYTES NFR BLD AUTO: 10 % (ref 4–12)
NEUTROPHILS # BLD AUTO: 4.18 THOUSANDS/ÂΜL (ref 1.85–7.62)
NEUTS SEG NFR BLD AUTO: 73 % (ref 43–75)
NRBC BLD AUTO-RTO: 0 /100 WBCS
PLATELET # BLD AUTO: 228 THOUSANDS/UL (ref 149–390)
PMV BLD AUTO: 8.9 FL (ref 8.9–12.7)
POTASSIUM SERPL-SCNC: 3.8 MMOL/L (ref 3.5–5.3)
PROT SERPL-MCNC: 5.1 G/DL (ref 6.4–8.4)
PROTHROMBIN TIME: 15 SECONDS (ref 11.6–14.5)
RBC # BLD AUTO: 2.83 MILLION/UL (ref 3.88–5.62)
RH BLD: POSITIVE
SODIUM SERPL-SCNC: 143 MMOL/L (ref 135–147)
SPECIMEN EXPIRATION DATE: NORMAL
TIBC SERPL-MCNC: 274 UG/DL (ref 250–450)
UIBC SERPL-MCNC: 261 UG/DL (ref 155–355)
VIT B12 SERPL-MCNC: 176 PG/ML (ref 180–914)
WBC # BLD AUTO: 5.69 THOUSAND/UL (ref 4.31–10.16)

## 2024-06-14 PROCEDURE — 88305 TISSUE EXAM BY PATHOLOGIST: CPT | Performed by: PATHOLOGY

## 2024-06-14 PROCEDURE — C9113 INJ PANTOPRAZOLE SODIUM, VIA: HCPCS | Performed by: INTERNAL MEDICINE

## 2024-06-14 PROCEDURE — 83550 IRON BINDING TEST: CPT | Performed by: STUDENT IN AN ORGANIZED HEALTH CARE EDUCATION/TRAINING PROGRAM

## 2024-06-14 PROCEDURE — 99285 EMERGENCY DEPT VISIT HI MDM: CPT | Performed by: EMERGENCY MEDICINE

## 2024-06-14 PROCEDURE — 99222 1ST HOSP IP/OBS MODERATE 55: CPT | Performed by: INTERNAL MEDICINE

## 2024-06-14 PROCEDURE — 86901 BLOOD TYPING SEROLOGIC RH(D): CPT | Performed by: EMERGENCY MEDICINE

## 2024-06-14 PROCEDURE — 86923 COMPATIBILITY TEST ELECTRIC: CPT

## 2024-06-14 PROCEDURE — 99285 EMERGENCY DEPT VISIT HI MDM: CPT

## 2024-06-14 PROCEDURE — 83690 ASSAY OF LIPASE: CPT | Performed by: EMERGENCY MEDICINE

## 2024-06-14 PROCEDURE — 86900 BLOOD TYPING SEROLOGIC ABO: CPT | Performed by: EMERGENCY MEDICINE

## 2024-06-14 PROCEDURE — 85025 COMPLETE CBC W/AUTO DIFF WBC: CPT | Performed by: EMERGENCY MEDICINE

## 2024-06-14 PROCEDURE — 85018 HEMOGLOBIN: CPT | Performed by: STUDENT IN AN ORGANIZED HEALTH CARE EDUCATION/TRAINING PROGRAM

## 2024-06-14 PROCEDURE — 83540 ASSAY OF IRON: CPT | Performed by: STUDENT IN AN ORGANIZED HEALTH CARE EDUCATION/TRAINING PROGRAM

## 2024-06-14 PROCEDURE — 86850 RBC ANTIBODY SCREEN: CPT | Performed by: EMERGENCY MEDICINE

## 2024-06-14 PROCEDURE — 85730 THROMBOPLASTIN TIME PARTIAL: CPT | Performed by: EMERGENCY MEDICINE

## 2024-06-14 PROCEDURE — 36415 COLL VENOUS BLD VENIPUNCTURE: CPT | Performed by: EMERGENCY MEDICINE

## 2024-06-14 PROCEDURE — 82607 VITAMIN B-12: CPT | Performed by: STUDENT IN AN ORGANIZED HEALTH CARE EDUCATION/TRAINING PROGRAM

## 2024-06-14 PROCEDURE — 80053 COMPREHEN METABOLIC PANEL: CPT | Performed by: EMERGENCY MEDICINE

## 2024-06-14 PROCEDURE — 82728 ASSAY OF FERRITIN: CPT | Performed by: STUDENT IN AN ORGANIZED HEALTH CARE EDUCATION/TRAINING PROGRAM

## 2024-06-14 PROCEDURE — 74178 CT ABD&PLV WO CNTR FLWD CNTR: CPT

## 2024-06-14 PROCEDURE — 85014 HEMATOCRIT: CPT | Performed by: STUDENT IN AN ORGANIZED HEALTH CARE EDUCATION/TRAINING PROGRAM

## 2024-06-14 PROCEDURE — 85610 PROTHROMBIN TIME: CPT | Performed by: EMERGENCY MEDICINE

## 2024-06-14 RX ORDER — METOPROLOL SUCCINATE 50 MG/1
50 TABLET, EXTENDED RELEASE ORAL 2 TIMES DAILY
Status: DISCONTINUED | OUTPATIENT
Start: 2024-06-14 | End: 2024-06-17 | Stop reason: HOSPADM

## 2024-06-14 RX ORDER — PANTOPRAZOLE SODIUM 40 MG/10ML
40 INJECTION, POWDER, LYOPHILIZED, FOR SOLUTION INTRAVENOUS EVERY 12 HOURS
Status: DISCONTINUED | OUTPATIENT
Start: 2024-06-14 | End: 2024-06-17 | Stop reason: HOSPADM

## 2024-06-14 RX ORDER — FERROUS SULFATE 325(65) MG
325 TABLET ORAL 2 TIMES DAILY WITH MEALS
Status: DISCONTINUED | OUTPATIENT
Start: 2024-06-14 | End: 2024-06-16

## 2024-06-14 RX ORDER — ATORVASTATIN CALCIUM 40 MG/1
40 TABLET, FILM COATED ORAL EVERY EVENING
Status: DISCONTINUED | OUTPATIENT
Start: 2024-06-14 | End: 2024-06-17 | Stop reason: HOSPADM

## 2024-06-14 RX ORDER — ACETAMINOPHEN 325 MG/1
650 TABLET ORAL EVERY 6 HOURS PRN
Status: DISCONTINUED | OUTPATIENT
Start: 2024-06-14 | End: 2024-06-17 | Stop reason: HOSPADM

## 2024-06-14 RX ORDER — MEMANTINE HYDROCHLORIDE 5 MG/1
5 TABLET ORAL DAILY
Status: DISCONTINUED | OUTPATIENT
Start: 2024-06-15 | End: 2024-06-17 | Stop reason: HOSPADM

## 2024-06-14 RX ORDER — LORATADINE 10 MG/1
10 TABLET ORAL DAILY
Status: DISCONTINUED | OUTPATIENT
Start: 2024-06-15 | End: 2024-06-17 | Stop reason: HOSPADM

## 2024-06-14 RX ADMIN — METOPROLOL SUCCINATE 50 MG: 50 TABLET, EXTENDED RELEASE ORAL at 17:43

## 2024-06-14 RX ADMIN — FERROUS SULFATE TAB 325 MG (65 MG ELEMENTAL FE) 325 MG: 325 (65 FE) TAB at 17:43

## 2024-06-14 RX ADMIN — PANTOPRAZOLE SODIUM 40 MG: 40 INJECTION, POWDER, FOR SOLUTION INTRAVENOUS at 17:43

## 2024-06-14 RX ADMIN — IOHEXOL 100 ML: 350 INJECTION, SOLUTION INTRAVENOUS at 14:31

## 2024-06-14 RX ADMIN — ATORVASTATIN CALCIUM 40 MG: 40 TABLET, FILM COATED ORAL at 17:43

## 2024-06-14 NOTE — ASSESSMENT & PLAN NOTE
Denies any chest pain  Continue statin and metoprolol  Eliquis and plavix held in the setting of GI bleed

## 2024-06-14 NOTE — TELEPHONE ENCOUNTER
----- Message from Carrie Carrillo PA-C sent at 6/13/2024 10:40 AM EDT -----  Regarding: Hospital Discharge  Thank you for allowing us to participate in the care of your patient, Christopher Razo, who was hospitalized from 6/7/2024 through 6/13/2024 with the admitting diagnosis of anemia, GI bleed.      Suspected recurrent diverticular bleed in setting of plavix and Eliquis. Colonoscopy unrevealing. Plavix and eliquis have been resumed, repeat hemoglobin outpatient ordered. Patient discussing with cardiologist if he can discontinue plavix.     Medication Changes:  None    Outpatient testing recommended:  Repeat cbc next week ordered    If you have any additional questions or would like to discuss further, please feel free to contact me.    Carrie Carrillo PA-C  [facility], Hospitalist  [phone]

## 2024-06-14 NOTE — ASSESSMENT & PLAN NOTE
Patient presents to the ED for rectal bleeding that started this morning.  Patient has been hospitalized multiple times over the last month for GI bleeding.  Patient does have a history of diverticular bleeding.  Patient most recently discharged from the hospital yesterday.  His Plavix and Eliquis had been resumed on 6/12/2024.  He denies any associated lightheadedness, chest pain, shortness of breath, abdominal pain, nausea or vomiting.  CT high volume bleed: No CT evidence of active high-volume gastrointestinal hemorrhage. Stable diffuse colonic diverticulosis without diverticulitis.  Hemoglobin dropped to 8.0 from 8.4 yesterday  Holding Plavix and Eliquis. Will need to discuss with Cardiology/Neurology regarding the need for both medications given recurrent GI bleeding  Continue ferrous sulfate tablets. Repeat iron panel pending  Monitor H&H. Transfuse for hemoglobin <7  NPO at midnight  Spoke to GI, will also consult General Surgery given likelihood of diverticular bleed

## 2024-06-14 NOTE — H&P
[facility]  H&P  Name: Christopher Razo 68 y.o. male I MRN: [id number]  Unit/Bed#: ED 08 I Date of Admission: 6/14/2024   Date of Service: 6/14/2024 I Hospital Day: 0      Assessment & Plan   * GI bleed  Assessment & Plan  Patient presents to the ED for rectal bleeding that started this morning.  Patient has been hospitalized multiple times over the last month for GI bleeding.  Patient does have a history of diverticular bleeding.  Patient most recently discharged from the hospital yesterday.  His Plavix and Eliquis had been resumed on 6/12/2024.  He denies any associated lightheadedness, chest pain, shortness of breath, abdominal pain, nausea or vomiting.  CT high volume bleed: No CT evidence of active high-volume gastrointestinal hemorrhage. Stable diffuse colonic diverticulosis without diverticulitis.  Hemoglobin dropped to 8.0 from 8.4 yesterday  Holding Plavix and Eliquis. Will need to discuss with Cardiology/Neurology regarding the need for both medications given recurrent GI bleeding  Continue ferrous sulfate tablets. Repeat iron panel pending  Monitor H&H. Transfuse for hemoglobin <7  NPO at midnight  Spoke to GI, will also consult General Surgery given likelihood of diverticular bleed    CAD (coronary artery disease)  Assessment & Plan  Denies any chest pain  Continue statin and metoprolol  Eliquis and plavix held in the setting of GI bleed    Paroxysmal A-fib (HCC)  Assessment & Plan  Rate controlled with metoprolol  Eliquis on hold in the setting of GI bleed    History of stroke  Assessment & Plan  Continue statin therapy  Holding Plavix in the setting of GI bleed    Dementia without behavioral disturbance (HCC)  Assessment & Plan  Continue Namenda  Delirium precautions    Benign essential hypertension  Assessment & Plan  BP stable  Continue home metoprolol  Monitor vitals per routine           VTE Pharmacologic Prophylaxis: VTE Score: 3 Moderate Risk (Score 3-4) - Pharmacological DVT  Prophylaxis Contraindicated. Sequential Compression Devices Ordered.  Code Status: Level 1 - Full Code   Discussion with family: Attempted to update  (wife) via phone. Left voicemail.     Anticipated Length of Stay: Patient will be admitted on an observation basis with an anticipated length of stay of less than 2 midnights secondary to GI bleed.      Chief Complaint: Rectal bleeding    History of Present Illness:  Christopher Razo is a 68 y.o. male with a PMH of hypertension, paroxysmal A-fib, CAD, history of stroke, dementia. Patient presents to the ED for rectal bleeding that started this morning.  Patient has been hospitalized multiple times over the last month for GI bleeding.  Patient does have a history of diverticular bleeding.  Patient most recently discharged from the hospital yesterday.  His Plavix and Eliquis had been resumed on 6/12/2024.  He denies any associated lightheadedness, chest pain, shortness of breath, abdominal pain, nausea or vomiting.  Patient requiring medical admission for further treatment and workup of GI bleeding with gastroenterology and general surgery consultation.  All patient questions answered to the best of my ability.    Review of Systems:  Review of Systems   Constitutional:  Negative for chills and fever.   HENT:  Negative for ear pain and sore throat.    Eyes:  Negative for pain and visual disturbance.   Respiratory:  Negative for cough and shortness of breath.    Cardiovascular:  Negative for chest pain and palpitations.   Gastrointestinal:  Positive for blood in stool. Negative for abdominal pain and vomiting.   Genitourinary:  Negative for dysuria and hematuria.   Musculoskeletal:  Negative for arthralgias and back pain.   Skin:  Negative for color change and rash.   Neurological:  Negative for seizures and syncope.   All other systems reviewed and are negative.      Past Medical and Surgical History:   Past Medical History:   Diagnosis Date    Arthropathy of  knee     last assessed 8/19/15    Bacterial pneumonia 02/05/2007    last assessed 2/5/7    Brain atrophy (HCC)     Colon polyp     CPAP (continuous positive airway pressure) dependence     Disorder of male genital organ 02/12/2008    last assessed 2/12/08    ED (erectile dysfunction) of organic origin     last assessed 2/13/17     History of hypertension     Seasonal allergies     Situational anxiety     resolved 3/16/17     Sleep apnea     Stroke (HCC)     09/2020 TIA    Tinnitus     Wears hearing aid     bilateral       Past Surgical History:   Procedure Laterality Date    CARDIAC CATHETERIZATION Left 10/28/2022    Procedure: Cardiac Left Heart Cath;  Surgeon: Christian Burr MD;  Location:  CARDIAC CATH LAB;  Service: Cardiology    CARDIAC CATHETERIZATION  10/28/2022    Procedure: Cardiac catheterization;  Surgeon: Christian Burr MD;  Location:  CARDIAC CATH LAB;  Service: Cardiology    CARDIAC CATHETERIZATION N/A 10/28/2022    Procedure: Cardiac Coronary Angiogram;  Surgeon: Christian Burr MD;  Location:  CARDIAC CATH LAB;  Service: Cardiology    CARDIAC ELECTROPHYSIOLOGY PROCEDURE N/A 12/22/2021    Procedure: Cardiac Loop Recorder Implant;  Surgeon: Judy Norris DO;  Location: [place] CARDIAC CATH LAB;  Service: Cardiology    COLONOSCOPY      x3-w/polyps    HERNIA REPAIR      umbilical,hemal inguinal    KNEE ARTHROSCOPY Left     meniscus    AR COLONOSCOPY FLX DX W/COLLJ SPEC WHEN PFRMD N/A 5/7/2018    Procedure: COLONOSCOPY;  Surgeon: Thiago Lopes MD;  Location: [facility] GI LAB;  Service: Gastroenterology       Meds/Allergies:  Prior to Admission medications    Medication Sig Start Date End Date Taking? Authorizing Provider   apixaban (Eliquis) 5 mg Take 5 mg by mouth 2 (two) times a day    Historical Provider, MD   atorvastatin (LIPITOR) 40 mg tablet Take 1 tablet (40 mg total) by mouth every evening 4/15/24   Richard Varela DO   clopidogrel (PLAVIX) 75 mg tablet Take 75 mg by mouth daily    Historical Provider, MD    ferrous sulfate 324 (65 Fe) mg Take 1 tablet (324 mg total) by mouth 2 (two) times a day before meals 6/5/24   Richard Varela DO   fexofenadine (ALLEGRA) 180 MG tablet Take 1 tablet (180 mg total) by mouth daily for 30 days  Patient taking differently: Take 180 mg by mouth every morning 2/15/18 6/7/24  Frank Lombardi, DO   Melatonin 5 MG TABS Take 5 mg by mouth daily    Historical Provider, MD   memantine (NAMENDA) 5 mg tablet Take 5 mg by mouth daily 7/8/23   Historical Provider, MD   metoprolol succinate (TOPROL-XL) 50 mg 24 hr tablet TAKE 1 TABLET BY MOUTH TWICE A DAY 3/25/24   Judy Norris DO   dofetilide (TIKOSYN) 500 mcg capsule Take 1 capsule (500 mcg total) by mouth 2 (two) times a day 10/28/22 11/1/22  Ceci Lee PA-C   magnesium oxide (MAG-OX) 400 mg Take 1 tablet (400 mg total) by mouth daily Do not start before November 2, 2022. 11/2/22 11/3/22  Shruthi Youngblood DO     I have reviewed home medications using recent Epic encounter.    Allergies:   Allergies   Allergen Reactions    Pollen Extract Sneezing     Reaction Date: 18Sep2006;     Shellfish-Derived Products - Food Allergy Other (See Comments)     Eye swelling    Iodine Solution [Povidone Iodine] Rash     Eyes swell       Social History:  Marital Status: /Civil Union   Occupation: NA  Patient Pre-hospital Living Situation: Home  Patient Pre-hospital Level of Mobility: walks  Patient Pre-hospital Diet Restrictions: None  Substance Use History:   Social History     Substance and Sexual Activity   Alcohol Use Not Currently    Alcohol/week: 14.0 standard drinks of alcohol    Types: 14 Cans of beer per week    Comment: occ     Social History     Tobacco Use   Smoking Status Never    Passive exposure: Past   Smokeless Tobacco Never     Social History     Substance and Sexual Activity   Drug Use No       Family History:  Family History   Problem Relation Age of Onset    Cancer Mother         breast    Diverticulitis Mother          colostomy    Breast cancer Mother     Heart disease Father         CHF    Cancer Sister         breast    Depression Sister     Cancer Sister         skin cancer    Cancer Sister         skin cancer    Colon cancer Neg Hx     Liver disease Neg Hx        Physical Exam:     Vitals:   Blood Pressure: 119/62 (06/14/24 1550)  Pulse: 69 (06/14/24 1550)  Temperature: 98.3 °F (36.8 °C) (06/14/24 1228)  Temp Source: Tympanic (06/14/24 1228)  Respirations: 18 (06/14/24 1550)  SpO2: 97 % (06/14/24 1550)    Physical Exam  Vitals and nursing note reviewed.   Constitutional:       General: He is not in acute distress.     Appearance: He is well-developed.   HENT:      Head: Normocephalic and atraumatic.   Eyes:      Conjunctiva/sclera: Conjunctivae normal.   Cardiovascular:      Rate and Rhythm: Normal rate and regular rhythm.      Heart sounds: No murmur heard.  Pulmonary:      Effort: Pulmonary effort is normal. No respiratory distress.      Breath sounds: Normal breath sounds.   Abdominal:      Palpations: Abdomen is soft.      Tenderness: There is no abdominal tenderness.   Musculoskeletal:         General: No swelling.      Cervical back: Neck supple.   Skin:     General: Skin is warm and dry.      Capillary Refill: Capillary refill takes less than 2 seconds.   Neurological:      Mental Status: He is alert. Mental status is at baseline.   Psychiatric:         Mood and Affect: Mood normal.          Additional Data:     Lab Results:  Results from last 7 days   Lab Units 06/14/24  1323   WBC Thousand/uL 5.69   HEMOGLOBIN g/dL 8.0*   HEMATOCRIT % 25.4*   PLATELETS Thousands/uL 228   SEGS PCT % 73   LYMPHO PCT % 12*   MONO PCT % 10   EOS PCT % 3     Results from last 7 days   Lab Units 06/14/24  1323   SODIUM mmol/L 143   POTASSIUM mmol/L 3.8   CHLORIDE mmol/L 107   CO2 mmol/L 26   BUN mg/dL 13   CREATININE mg/dL 0.98   ANION GAP mmol/L 10   CALCIUM mg/dL 8.0*   ALBUMIN g/dL 3.2*   TOTAL BILIRUBIN mg/dL 0.58   ALK PHOS U/L 86    ALT U/L 13   AST U/L 11*   GLUCOSE RANDOM mg/dL 117     Results from last 7 days   Lab Units 06/14/24  1323   INR  1.16     Results from last 7 days   Lab Units 06/11/24  0843   POC GLUCOSE mg/dl 108     Lab Results   Component Value Date    HGBA1C 5.1 04/17/2024    HGBA1C 5.1 07/07/2022    HGBA1C 5.3 11/12/2021           Lines/Drains:  Invasive Devices       Peripheral Intravenous Line  Duration             Peripheral IV 06/14/24 Left Antecubital <1 day                        Imaging: Reviewed radiology reports from this admission including: CT high volume bleed  CT high volume bleeding scan abdomen pelvis   Final Result by Isacc Amaya MD (06/14 4599)      No CT evidence of active high-volume gastrointestinal hemorrhage.      Stable diffuse colonic diverticulosis without diverticulitis.               Workstation performed: [id number]             EKG and Other Studies Reviewed on Admission:   EKG: No EKG obtained.    ** Please Note: This note has been constructed using a voice recognition system. **

## 2024-06-14 NOTE — TELEPHONE ENCOUNTER
I spoke with Mrs Razo during my TCM outreach. She states that Mohit had another episode of rectal bleeding this am. He had a loose bowel movement with alida red blood. She states that to her, it appeared to be quite a bit of blood. I discuss with ELVIS Bryant who advised that he go back to the ER now for evaluation. They agreed and will head to the ER now [place]

## 2024-06-15 LAB
ALBUMIN SERPL BCP-MCNC: 2.8 G/DL (ref 3.5–5)
ANION GAP SERPL CALCULATED.3IONS-SCNC: 5 MMOL/L (ref 4–13)
BUN SERPL-MCNC: 11 MG/DL (ref 5–25)
CALCIUM ALBUM COR SERPL-MCNC: 8.6 MG/DL (ref 8.3–10.1)
CALCIUM SERPL-MCNC: 7.6 MG/DL (ref 8.4–10.2)
CHLORIDE SERPL-SCNC: 110 MMOL/L (ref 96–108)
CO2 SERPL-SCNC: 24 MMOL/L (ref 21–32)
CREAT SERPL-MCNC: 0.85 MG/DL (ref 0.6–1.3)
ERYTHROCYTE [DISTWIDTH] IN BLOOD BY AUTOMATED COUNT: 14.1 % (ref 11.6–15.1)
GFR SERPL CREATININE-BSD FRML MDRD: 90 ML/MIN/1.73SQ M
GLUCOSE P FAST SERPL-MCNC: 103 MG/DL (ref 65–99)
GLUCOSE SERPL-MCNC: 103 MG/DL (ref 65–140)
HCT VFR BLD AUTO: 21.9 % (ref 36.5–49.3)
HCT VFR BLD AUTO: 22.4 % (ref 36.5–49.3)
HCT VFR BLD AUTO: 25.1 % (ref 36.5–49.3)
HGB BLD-MCNC: 6.9 G/DL (ref 12–17)
HGB BLD-MCNC: 7 G/DL (ref 12–17)
HGB BLD-MCNC: 8.1 G/DL (ref 12–17)
MAGNESIUM SERPL-MCNC: 1.9 MG/DL (ref 1.9–2.7)
MCH RBC QN AUTO: 28 PG (ref 26.8–34.3)
MCHC RBC AUTO-ENTMCNC: 31.5 G/DL (ref 31.4–37.4)
MCV RBC AUTO: 89 FL (ref 82–98)
PHOSPHATE SERPL-MCNC: 3.5 MG/DL (ref 2.3–4.1)
PLATELET # BLD AUTO: 199 THOUSANDS/UL (ref 149–390)
PMV BLD AUTO: 9.1 FL (ref 8.9–12.7)
POTASSIUM SERPL-SCNC: 3.5 MMOL/L (ref 3.5–5.3)
RBC # BLD AUTO: 2.46 MILLION/UL (ref 3.88–5.62)
SODIUM SERPL-SCNC: 139 MMOL/L (ref 135–147)
WBC # BLD AUTO: 4.82 THOUSAND/UL (ref 4.31–10.16)

## 2024-06-15 PROCEDURE — 85014 HEMATOCRIT: CPT | Performed by: NURSE PRACTITIONER

## 2024-06-15 PROCEDURE — P9016 RBC LEUKOCYTES REDUCED: HCPCS

## 2024-06-15 PROCEDURE — 99232 SBSQ HOSP IP/OBS MODERATE 35: CPT | Performed by: NURSE PRACTITIONER

## 2024-06-15 PROCEDURE — 85018 HEMOGLOBIN: CPT | Performed by: NURSE PRACTITIONER

## 2024-06-15 PROCEDURE — 83735 ASSAY OF MAGNESIUM: CPT | Performed by: STUDENT IN AN ORGANIZED HEALTH CARE EDUCATION/TRAINING PROGRAM

## 2024-06-15 PROCEDURE — 99222 1ST HOSP IP/OBS MODERATE 55: CPT | Performed by: SURGERY

## 2024-06-15 PROCEDURE — 30233N1 TRANSFUSION OF NONAUTOLOGOUS RED BLOOD CELLS INTO PERIPHERAL VEIN, PERCUTANEOUS APPROACH: ICD-10-PCS | Performed by: STUDENT IN AN ORGANIZED HEALTH CARE EDUCATION/TRAINING PROGRAM

## 2024-06-15 PROCEDURE — C9113 INJ PANTOPRAZOLE SODIUM, VIA: HCPCS | Performed by: INTERNAL MEDICINE

## 2024-06-15 PROCEDURE — 85014 HEMATOCRIT: CPT | Performed by: INTERNAL MEDICINE

## 2024-06-15 PROCEDURE — 80069 RENAL FUNCTION PANEL: CPT | Performed by: STUDENT IN AN ORGANIZED HEALTH CARE EDUCATION/TRAINING PROGRAM

## 2024-06-15 PROCEDURE — 85027 COMPLETE CBC AUTOMATED: CPT | Performed by: STUDENT IN AN ORGANIZED HEALTH CARE EDUCATION/TRAINING PROGRAM

## 2024-06-15 PROCEDURE — 85018 HEMOGLOBIN: CPT | Performed by: INTERNAL MEDICINE

## 2024-06-15 PROCEDURE — 99223 1ST HOSP IP/OBS HIGH 75: CPT | Performed by: INTERNAL MEDICINE

## 2024-06-15 RX ORDER — LANOLIN ALCOHOL/MO/W.PET/CERES
3 CREAM (GRAM) TOPICAL
Status: DISCONTINUED | OUTPATIENT
Start: 2024-06-15 | End: 2024-06-17 | Stop reason: HOSPADM

## 2024-06-15 RX ADMIN — FERROUS SULFATE TAB 325 MG (65 MG ELEMENTAL FE) 325 MG: 325 (65 FE) TAB at 16:34

## 2024-06-15 RX ADMIN — PANTOPRAZOLE SODIUM 40 MG: 40 INJECTION, POWDER, FOR SOLUTION INTRAVENOUS at 16:33

## 2024-06-15 RX ADMIN — FERROUS SULFATE TAB 325 MG (65 MG ELEMENTAL FE) 325 MG: 325 (65 FE) TAB at 08:09

## 2024-06-15 RX ADMIN — Medication 3 MG: at 22:37

## 2024-06-15 RX ADMIN — Medication 3 MG: at 02:10

## 2024-06-15 RX ADMIN — LORATADINE 10 MG: 10 TABLET ORAL at 08:09

## 2024-06-15 RX ADMIN — PANTOPRAZOLE SODIUM 40 MG: 40 INJECTION, POWDER, FOR SOLUTION INTRAVENOUS at 04:58

## 2024-06-15 RX ADMIN — ATORVASTATIN CALCIUM 40 MG: 40 TABLET, FILM COATED ORAL at 17:38

## 2024-06-15 RX ADMIN — MEMANTINE 5 MG: 5 TABLET ORAL at 08:09

## 2024-06-15 RX ADMIN — METOPROLOL SUCCINATE 50 MG: 50 TABLET, EXTENDED RELEASE ORAL at 17:38

## 2024-06-15 NOTE — PROGRESS NOTES
[facility]  Progress Note  Name: Christopher Razo I  MRN: [id number]  Unit/Bed#: 2 [address] Date of Admission: 6/14/2024   Date of Service: 6/15/2024 I Hospital Day: 0    Assessment & Plan   * GI bleed  Assessment & Plan  Patient presents to the ED for rectal bleeding that started this morning.  Patient has been hospitalized multiple times over the last month for GI bleeding.  Patient does have a history of diverticular bleeding.  Patient most recently discharged from the hospital yesterday.  His Plavix and Eliquis had been resumed on 6/12/2024.  He denies any associated lightheadedness, chest pain, shortness of breath, abdominal pain, nausea or vomiting.  CT high volume bleed: No CT evidence of active high-volume gastrointestinal hemorrhage. Stable diffuse colonic diverticulosis without diverticulitis.  Hemoglobin dropped to 8.0 from 8.4 yesterday  Holding Plavix and Eliquis. Will need to discuss with Cardiology/Neurology regarding the need for both medications given recurrent GI bleeding  Continue ferrous sulfate tablets. Repeat iron panel pending  Monitor H&H. Transfuse for hemoglobin <7  NPO at midnight  Spoke to GI, will also consult General Surgery given likelihood of diverticular bleed - consultations pending     CAD (coronary artery disease)  Assessment & Plan  Denies any chest pain  Continue statin and metoprolol  Eliquis and plavix held in the setting of GI bleed    Paroxysmal A-fib (HCC)  Assessment & Plan  Rate controlled with metoprolol  Eliquis on hold in the setting of GI bleed    History of stroke  Assessment & Plan  Continue statin therapy  Holding Plavix in the setting of GI bleed    Dementia without behavioral disturbance (HCC)  Assessment & Plan  Continue Namenda  Delirium precautions    Benign essential hypertension  Assessment & Plan  BP stable  Continue home metoprolol  Monitor vitals per routine             VTE Pharmacologic Prophylaxis:   Pharmacologic: Pharmacologic  VTE Prophylaxis contraindicated due to GI bleed  Mechanical VTE Prophylaxis in Place: Yes    Patient Centered Rounds: I have performed bedside rounds with nursing staff today.  Discussions with Specialists or Other Care Team Provider: Yes  Education and Discussions with Family / Patient:Yes  Time Spent for Care: 20 minutes.  More than 50% of total time spent on counseling and coordination of care as described above.    Current Length of Stay: 0 day(s)  Current Patient Status: Observation     Discharge Plan: pending consultations, stabilization of Hgb    Code Status: Level 1 - Full Code      Subjective:   Patient resting in bed.  He had a bowel movement last night with no blood.  Hgb dropped this morning, repeat pending in addition to type and screen.  He feels okay otherwise but is frustrated by the recurrent hospitalization.        Objective:     Vitals:   Temp (24hrs), Av.2 °F (36.8 °C), Min:98 °F (36.7 °C), Max:98.3 °F (36.8 °C)    Temp:  [98 °F (36.7 °C)-98.3 °F (36.8 °C)] 98.3 °F (36.8 °C)  HR:  [69-78] 72  Resp:  [18-24] 18  BP: (101-123)/(56-77) 107/66  SpO2:  [95 %-98 %] 97 %  There is no height or weight on file to calculate BMI.     Input and Output Summary (last 24 hours):   No intake or output data in the 24 hours ending 06/15/24 0915     Physical Exam:     Physical Exam  Vitals and nursing note reviewed.   HENT:      Head: Normocephalic.   Eyes:      Extraocular Movements: Extraocular movements intact.   Cardiovascular:      Rate and Rhythm: Normal rate and regular rhythm.   Pulmonary:      Effort: Pulmonary effort is normal.      Breath sounds: Normal breath sounds.   Abdominal:      General: Abdomen is flat. Bowel sounds are normal. There is no distension.      Palpations: Abdomen is soft.   Musculoskeletal:         General: Normal range of motion.   Skin:     General: Skin is warm and dry.   Neurological:      General: No focal deficit present.      Mental Status: He is alert and oriented to  "person, place, and time.         Additional Data:     Labs:    Results from last 7 days   Lab Units 06/15/24  0501 06/14/24  1801 06/14/24  1323 06/13/24  0757 06/13/24  0606   WBC Thousand/uL 4.82  --  5.69  --  6.19   HEMOGLOBIN g/dL 6.9* 7.7* 8.0*   < > 8.5*   HEMATOCRIT % 21.9* 24.4* 25.4*   < > 26.8*   PLATELETS Thousands/uL 199  --  228  --  239   SEGS PCT %  --   --  73  --   --     < > = values in this interval not displayed.     Results from last 7 days   Lab Units 06/15/24  0501 06/14/24  1323 06/13/24  0606   SODIUM mmol/L 139 143 143   POTASSIUM mmol/L 3.5 3.8 3.7   CHLORIDE mmol/L 110* 107 106   CO2 mmol/L 24 26 23   BUN mg/dL 11 13 8   CREATININE mg/dL 0.85 0.98 0.75   CALCIUM mg/dL 7.6* 8.0* 7.9*   TOTAL BILIRUBIN mg/dL  --  0.58  --    ALK PHOS U/L  --  86  --    ALT U/L  --  13  --    AST U/L  --  11*  --      Results from last 7 days   Lab Units 06/14/24  1323 06/08/24  1143   INR  1.16 1.18         Lab Results   Component Value Date/Time    HGBA1C 5.1 04/17/2024 08:18 AM     Results from last 7 days   Lab Units 06/11/24  0843   POC GLUCOSE mg/dl 108           * I Have Reviewed All Lab Data Listed Above.  * Additional Pertinent Lab Tests Reviewed: All Labs Within Last 24 Hours Reviewed    Imaging:     CT high volume bleeding scan abdomen pelvis   Final Result by Isacc Amaya MD (06/14 7866)      No CT evidence of active high-volume gastrointestinal hemorrhage.      Stable diffuse colonic diverticulosis without diverticulitis.               Workstation performed: [id number]           Imaging Reports Reviewed by myself    Cultures:   Blood Culture:   Lab Results   Component Value Date    BLOODCX No Growth After 5 Days. 06/09/2022    BLOODCX No Growth After 5 Days. 06/09/2022    BLOODCX No Growth After 5 Days. 06/07/2022    BLOODCX Staphylococcus coagulase negative (A) 06/07/2022     Urine Culture: No results found for: \"URINECX\"  Sputum Culture: No components found for: \"SPUTUMCX\"  Wound Culture: " "No results found for: \"WOUNDCULT\"    Last 24 Hours Medication List:   Current Facility-Administered Medications   Medication Dose Route Frequency Provider Last Rate    acetaminophen  650 mg Oral Q6H PRN Jada Velazquez PA-C      atorvastatin  40 mg Oral QPM Jada Velazquez PA-C      ferrous sulfate  325 mg Oral BID With Meals Jada Velazquez PA-C      loratadine  10 mg Oral Daily Jada Velazquez PA-C      melatonin  3 mg Oral HS Deepti Patrick PA-C      memantine  5 mg Oral Daily Jada Velazquez PA-C      metoprolol succinate  50 mg Oral BID Jada Velazquez PA-C      pantoprazole  40 mg Intravenous Q12H Jada Velazquez PA-C          Today, Patient Was Seen By: ELVIS Barrow    ** Please Note: Dragon 360 Dictation voice to text software may have been used in the creation of this document. **    "

## 2024-06-15 NOTE — CONSULTS
Consultation -  [facility]  Christopher Razo 68 y.o. male MRN: [id number]  Unit/Bed#: [address] [place] [address] Encounter: [id number]        Inpatient consult to gastroenterology  Consult performed by: Sara Singh MD  Consult ordered by: Jada Velazquez PA-C          Reason for Consult / Principal Problem:     Principal Problem:    GI bleed  Active Problems:    Benign essential hypertension    Dementia without behavioral disturbance (HCC)    History of stroke    Paroxysmal A-fib (HCC)    CAD (coronary artery disease)          ASSESSMENT AND PLAN:      Hematochezia  Diverticular bleed  Acute GI blood loss anemia  Need for anticoagulation and antiplatelet therapy  -Patient readmitted after multiple admissions for recurrent hematochezia, acute GI blood loss anemia, consistent with diverticular bleeding  -Colonoscopy 6/11 with sigmoid diverticular disease without any evidence of active bleeding  - Hgb 7 on admission, received 1 unit prbc  - no bloody bowel movements since admission  - would benefit from surgical management  - repeat CTA if active bleeding for IR intervention  -Continue to monitor stool output, hemoglobin, transfuse as needed  - continue bowel regimen  - recommend risks/benefits evaluation of anticoagulation/antiplatelet therapy in the setting of recurrent lower GI bleeding  - continue iron supplementation  - no GI intervention planned at this time given recent colonoscopy, diet as tolerated    ______________________________________________________________________    HPI:      Patient is a 68-year-old male with HTN, atrial fibrillation on Eliquis, CVA's, CAD on Plavix, dementia readmitted with hematochezia. He was recently discharged on 6/12 after evaluation for rectal bleeding, consistent with diverticular bleed. Colonoscopy 6/11/2024- extensive sigmoid diverticulosis; subcentimeter cecal polypectomy.    He represented yesterday evening, 2 days after discharge with rectal bleeding occurring in the  morning of admission.  He had one bowel movement with rectal bleeding. He cannot recall if he had abdominal pain with it. He does report dizziness/lightheadedness. His wife encouraged him to call primary care doctor, who recommended ER evaluation.    In the ER,  CT high volume lower GI bleed 6/14- no evidence of active bleeding  No further bleeding since admission.      REVIEW OF SYSTEMS:    CONSTITUTIONAL: Denies any fever, chills, rigors, and weight loss.  HEENT: No earache or tinnitus. Denies hearing loss or visual disturbances.  CARDIOVASCULAR: No chest pain or palpitations.   RESPIRATORY: Denies any cough, hemoptysis, shortness of breath or dyspnea on exertion.  GASTROINTESTINAL: As noted in the History of Present Illness.   GENITOURINARY: No problems with urination. Denies any hematuria or dysuria.  NEUROLOGIC: No dizziness or vertigo, denies headaches.   MUSCULOSKELETAL: Denies any muscle or joint pain.   SKIN: Denies skin rashes or itching.   ENDOCRINE: Denies excessive thirst. Denies intolerance to heat or cold.  PSYCHOSOCIAL: Denies depression or anxiety. Denies any recent memory loss.       Historical Information   Past Medical History:   Diagnosis Date    Arthropathy of knee     last assessed 8/19/15    Bacterial pneumonia 02/05/2007    last assessed 2/5/7    Brain atrophy (HCC)     Colon polyp     CPAP (continuous positive airway pressure) dependence     Disorder of male genital organ 02/12/2008    last assessed 2/12/08    ED (erectile dysfunction) of organic origin     last assessed 2/13/17     History of hypertension     Seasonal allergies     Situational anxiety     resolved 3/16/17     Sleep apnea     Stroke (HCC)     09/2020 TIA    Tinnitus     Wears hearing aid     bilateral     Past Surgical History:   Procedure Laterality Date    CARDIAC CATHETERIZATION Left 10/28/2022    Procedure: Cardiac Left Heart Cath;  Surgeon: Christian Burr MD;  [facility];  Service: Cardiology     CARDIAC CATHETERIZATION  10/28/2022    Procedure: Cardiac catheterization;  Surgeon: Christian Burr MD;  Location:  CARDI[facility] CATH LAB;  Service: Cardiology    CARDIAC CATHETERIZATION N/A 10/28/2022    Procedure: Cardiac Coronary Angiogram;  Surgeon: Christian Burr MD;  Location:  CARDIAC CATH LAB;  Service: Cardiology    CARDIAC ELECTROPHYSIOLOGY PROCEDURE N/A 12/22/2021    Procedure: Cardiac Loop Recorder Implant;  Surgeon: Judy Norris DO;  Location: [facility];  Service: Cardiology    COLONOSCOPY      x3-w/polyps    HERNIA REPAIR      umbilical,hemal inguinal    KNEE ARTHROSCOPY Left     meniscus    DC COLONOSCOPY FLX DX W/COLLJ SPEC WHEN PFRMD N/A 5/7/2018    Procedure: COLONOSCOPY;  Surgeon: Thiago Lopes MD;  Location: [facility];  Service: Gastroenterology     Social History   Social History     Substance and Sexual Activity   Alcohol Use Not Currently    Alcohol/week: 14.0 standard drinks of alcohol    Types: 14 Cans of beer per week    Comment: occ     Social History     Substance and Sexual Activity   Drug Use No     Social History     Tobacco Use   Smoking Status Never    Passive exposure: Past   Smokeless Tobacco Never     Family History   Problem Relation Age of Onset    Cancer Mother         breast    Diverticulitis Mother         colostomy    Breast cancer Mother     Heart disease Father         CHF    Cancer Sister         breast    Depression Sister     Cancer Sister         skin cancer    Cancer Sister         skin cancer    Colon cancer Neg Hx     Liver disease Neg Hx        Meds/Allergies       Medications Prior to Admission:     apixaban (Eliquis) 5 mg    atorvastatin (LIPITOR) 40 mg tablet    clopidogrel (PLAVIX) 75 mg tablet    ferrous sulfate 324 (65 Fe) mg    Melatonin 5 MG TABS    memantine (NAMENDA) 5 mg tablet    metoprolol succinate (TOPROL-XL) 50 mg 24 hr tablet    fexofenadine (ALLEGRA) 180 MG tablet  Current Facility-Administered Medications   Medication Dose Route Frequency     acetaminophen (TYLENOL) tablet 650 mg  650 mg Oral Q6H PRN    atorvastatin (LIPITOR) tablet 40 mg  40 mg Oral QPM    ferrous sulfate tablet 325 mg  325 mg Oral BID With Meals    loratadine (CLARITIN) tablet 10 mg  10 mg Oral Daily    melatonin tablet 3 mg  3 mg Oral HS    memantine (NAMENDA) tablet 5 mg  5 mg Oral Daily    metoprolol succinate (TOPROL-XL) 24 hr tablet 50 mg  50 mg Oral BID    pantoprazole (PROTONIX) injection 40 mg  40 mg Intravenous Q12H       Allergies   Allergen Reactions    Pollen Extract Sneezing     Reaction Date: 18Sep2006;     Shellfish-Derived Products - Food Allergy Other (See Comments)     Eye swelling    Iodine Solution [Povidone Iodine] Rash     Eyes swell           Objective     Blood pressure 107/66, pulse 72, temperature 98.3 °F (36.8 °C), temperature source Oral, resp. rate 18, SpO2 97%. There is no height or weight on file to calculate BMI.    No intake or output data in the 24 hours ending 06/15/24 0953      PHYSICAL EXAM:      General Appearance:   Alert, cooperative, no distress   HEENT:   Normocephalic, atraumatic, anicteric.     Neck:  Supple, symmetrical, trachea midline   Lungs:   Clear to auscultation bilaterally; no rales, rhonchi or wheezing; respirations unlabored    Heart::   Regular rate and rhythm; no murmur, rub, or gallop.   Abdomen:   Soft, non-tender, non-distended; normal bowel sounds; no masses, no organomegaly    Genitalia:   Deferred    Rectal:   Deferred    Extremities:  No cyanosis, clubbing or edema    Pulses:  2+ and symmetric all extremities    Skin:  No jaundice, rashes, or lesions    Lymph nodes:  No palpable cervical lymphadenopathy        Lab Results:     Results from last 7 days   Lab Units 06/15/24  0929 06/15/24  0501 06/14/24  1801 06/14/24  1323 06/13/24  0757 06/13/24  0606   WBC Thousand/uL  --  4.82  --  5.69  --  6.19   HEMOGLOBIN g/dL 7.0* 6.9* 7.7* 8.0*   < > 8.5*   HEMATOCRIT % 22.4* 21.9* 24.4* 25.4*   < > 26.8*   PLATELETS  Thousands/uL  --  199  --  228  --  239    < > = values in this interval not displayed.     Results from last 7 days   Lab Units 06/15/24  0501 06/14/24  1323 06/13/24  0606   SODIUM mmol/L 139 143 143   POTASSIUM mmol/L 3.5 3.8 3.7   CHLORIDE mmol/L 110* 107 106   CO2 mmol/L 24 26 23   BUN mg/dL 11 13 8   CREATININE mg/dL 0.85 0.98 0.75   CALCIUM mg/dL 7.6* 8.0* 7.9*   ALK PHOS U/L  --  86  --    ALT U/L  --  13  --    AST U/L  --  11*  --          Imaging Studies:    Procedure: CT high volume bleeding scan abdomen pelvis  Result Date: 6/14/2024  Impression: No CT evidence of active high-volume gastrointestinal hemorrhage. Stable diffuse colonic diverticulosis without diverticulitis.

## 2024-06-15 NOTE — PLAN OF CARE
Problem: PAIN - ADULT  Goal: Verbalizes/displays adequate comfort level or baseline comfort level  Description: Interventions:  - Encourage patient to monitor pain and request assistance  - Assess pain using appropriate pain scale  - Administer analgesics based on type and severity of pain and evaluate response  - Implement non-pharmacological measures as appropriate and evaluate response  - Consider cultural and social influences on pain and pain management  - Notify physician/advanced practitioner if interventions unsuccessful or patient reports new pain  Outcome: Progressing     Problem: INFECTION - ADULT  Goal: Absence or prevention of progression during hospitalization  Description: INTERVENTIONS:  - Assess and monitor for signs and symptoms of infection  - Monitor lab/diagnostic results  - Monitor all insertion sites, i.e. indwelling lines, tubes, and drains  - Monitor endotracheal if appropriate and nasal secretions for changes in amount and color  - Esko appropriate cooling/warming therapies per order  - Administer medications as ordered  - Instruct and encourage patient and family to use good hand hygiene technique  - Identify and instruct in appropriate isolation precautions for identified infection/condition  Outcome: Progressing  Goal: Absence of fever/infection during neutropenic period  Description: INTERVENTIONS:  - Monitor WBC    Outcome: Progressing     Problem: SAFETY ADULT  Goal: Patient will remain free of falls  Description: INTERVENTIONS:  - Educate patient/family on patient safety including physical limitations  - Instruct patient to call for assistance with activity   - Consult OT/PT to assist with strengthening/mobility   - Keep Call bell within reach  - Keep bed low and locked with side rails adjusted as appropriate  - Keep care items and personal belongings within reach  - Initiate and maintain comfort rounds  - Make Fall Risk Sign visible to staff  - Offer Toileting every 2 Hours,  in advance of need  - Initiate/Maintain bed alarm  - Obtain necessary fall risk management equipment: alarm  - Apply yellow socks and bracelet for high fall risk patients  - Consider moving patient to room near nurses station  Outcome: Progressing  Goal: Maintain or return to baseline ADL function  Description: INTERVENTIONS:  -  Assess patient's ability to carry out ADLs; assess patient's baseline for ADL function and identify physical deficits which impact ability to perform ADLs (bathing, care of mouth/teeth, toileting, grooming, dressing, etc.)  - Assess/evaluate cause of self-care deficits   - Assess range of motion  - Assess patient's mobility; develop plan if impaired  - Assess patient's need for assistive devices and provide as appropriate  - Encourage maximum independence but intervene and supervise when necessary  - Involve family in performance of ADLs  - Assess for home care needs following discharge   - Consider OT consult to assist with ADL evaluation and planning for discharge  - Provide patient education as appropriate  Outcome: Progressing  Goal: Maintains/Returns to pre admission functional level  Description: INTERVENTIONS:  - Perform AM-PAC 6 Click Basic Mobility/ Daily Activity assessment daily.  - Set and communicate daily mobility goal to care team and patient/family/caregiver.   - Collaborate with rehabilitation services on mobility goals if consulted      - Stand patient 3 times a day  - Ambulate patient 3 times a day  - Out of bed to chair 3 times a day   - Out of bed for meals 3 times a day  - Out of bed for toileting  - Record patient progress and toleration of activity level   Outcome: Progressing

## 2024-06-15 NOTE — CONSULTS
Consultation - General Surgery   Christopher Razo 68 y.o. male MRN: [id number]  Unit/Bed#: [address] [place] [address] Encounter: [id number]    Assessment & Plan     Assessment:  67 y/o M with PMH of HTN, paroxysmal A-Fib, CAD, hx of stroke, dementia, and multiple recent hospitalizations for GI bleed (most recent 6/7-6/13) who presented with BRBPR     CT high volume bleeding scan Abd/Pelv: No CT evidence of active high-volume GI hemorrhage. Stable diffuse colonic diverticulosis without diverticulitis     Hgb 7.0 today (down from 7.7 yesterday, was 8.4 prior to patient's most recent discharge 2 days ago)     Plan:  Current episode of bleeding has resolved, pt had non-bloody BM last evening   No evidence of diverticulitis, no surgical intervention indicated at this time  GI and IR to evaluate patient for recurrent lower GI bleeding  -Last hospitalization: GI stated would consider IR guided therapy if bleeding recurred   Transfuse <7  Surgery to follow in background     History of Present Illness   HPI:  Christopher Razo is a 68 y.o. male with PMH of HTN, paroxysmal A-Fib, CAD, hx of stroke, dementia, and multiple recent hospitalizations for GI bleed who presented to the ER yesterday for bright red blood per rectum. Pt reports he was just discharged from the hospital 2 days ago for the same problem and then yesterday had a bowel movement with bright red blood filling the toilet bowel. Pt received transfusions during most recent hospitalization and Hgb was stable on discharge so he was restarted on Eliquis and Plavix before discharge. He was instructed by PCP to return to the hospital. He reports he had another BM yesterday evening in the hospital but was non-bloody. He is unable to report how long this has been happening for, but does state he has been happening on and off for some time of which he has been hospitalized for multiple times. Reports being told that he has diverticulosis. He currently denies any abdominal pain. Also denies  fatigue, lightheadedness, or dizziness but states he has not stood up yet today. Also denies nausea, vomiting, chest pain, SOB, leg pain/swelling and is eager to go home.    Inpatient consult to Acute Care Surgery  Consult performed by: Emerald Salas PA-C  Consult ordered by: Jada Velazquez PA-C          Review of Systems   Constitutional:  Negative for chills, fatigue and fever.   Respiratory:  Negative for cough and shortness of breath.    Cardiovascular:  Negative for chest pain and leg swelling.   Gastrointestinal:  Negative for abdominal pain, blood in stool (PTA but now resolved; non-bloody BM last night), nausea and vomiting.   Genitourinary:  Negative for hematuria.   Neurological:  Negative for dizziness, light-headedness and headaches.       Historical Information   Past Medical History:   Diagnosis Date    Arthropathy of knee     last assessed 8/19/15    Bacterial pneumonia 02/05/2007    last assessed 2/5/7    Brain atrophy (HCC)     Colon polyp     CPAP (continuous positive airway pressure) dependence     Disorder of male genital organ 02/12/2008    last assessed 2/12/08    ED (erectile dysfunction) of organic origin     last assessed 2/13/17     History of hypertension     Seasonal allergies     Situational anxiety     resolved 3/16/17     Sleep apnea     Stroke (HCC)     09/2020 TIA    Tinnitus     Wears hearing aid     bilateral     Past Surgical History:   Procedure Laterality Date    CARDIAC CATHETERIZATION Left 10/28/2022    Procedure: Cardiac Left Heart Cath;  Surgeon: Christian Burr MD;  [facility];  Service: Cardiology    CARDIAC CATHETERIZATION  10/28/2022    Procedure: Cardiac catheterization;  Surgeon: Christian Burr MD;  [facility];  Service: Cardiology    CARDIAC CATHETERIZATION N/A 10/28/2022    Procedure: Cardiac Coronary Angiogram;  Surgeon: Christian Burr MD;  [facility];  Service: Cardiology    CARDIAC ELECTROPHYSIOLOGY  PROCEDURE N/A 12/22/2021    Procedure: Cardiac Loop Recorder Implant;  Surgeon: Judy Norris DO;  Location: [facility];  Service: Cardiology    COLONOSCOPY      x3-w/polyps    HERNIA REPAIR      umbilical,hemal inguinal    KNEE ARTHROSCOPY Left     meniscus    LA COLONOSCOPY FLX DX W/COLLJ SPEC WHEN PFRMD N/A 5/7/2018    Procedure: COLONOSCOPY;  Surgeon: Thiago Lopes MD;  Location: [facility] GI [facility];  Service: Gastroenterology     Social History   Social History     Substance and Sexual Activity   Alcohol Use Not Currently    Alcohol/week: 14.0 standard drinks of alcohol    Types: 14 Cans of beer per week    Comment: occ     Social History     Substance and Sexual Activity   Drug Use No     E-Cigarette/Vaping    E-Cigarette Use Never User     Cartridges/Day n/a      E-Cigarette/Vaping Substances    Nicotine No     THC No     CBD No     Flavoring No     Other No     Unknown No      Social History     Tobacco Use   Smoking Status Never    Passive exposure: Past   Smokeless Tobacco Never     Family History:   Family History   Problem Relation Age of Onset    Cancer Mother         breast    Diverticulitis Mother         colostomy    Breast cancer Mother     Heart disease Father         CHF    Cancer Sister         breast    Depression Sister     Cancer Sister         skin cancer    Cancer Sister         skin cancer    Colon cancer Neg Hx     Liver disease Neg Hx        Meds/Allergies   all current active meds have been reviewed  Allergies   Allergen Reactions    Pollen Extract Sneezing     Reaction Date: 18Sep2006;     Shellfish-Derived Products - Food Allergy Other (See Comments)     Eye swelling    Iodine Solution [Povidone Iodine] Rash     Eyes swell       Objective   First Vitals:   Blood Pressure: 111/56 (06/14/24 1228)  Pulse: 78 (06/14/24 1228)  Temperature: 98.3 °F (36.8 °C) (06/14/24 1228)  Temp Source: Tympanic (06/14/24 1228)  Respirations: (!) 24 (06/14/24 1228)  SpO2: 96 % (06/14/24 1228)    Current  Vitals:   Blood Pressure: 107/66 (06/15/24 0726)  Pulse: 72 (06/15/24 0726)  Temperature: 98.3 °F (36.8 °C) (06/15/24 0726)  Temp Source: Oral (06/15/24 0726)  Respirations: 18 (06/14/24 2226)  SpO2: 97 % (06/15/24 0726)    No intake or output data in the 24 hours ending 06/15/24 0928    Invasive Devices       Peripheral Intravenous Line  Duration             Peripheral IV 06/14/24 Left Antecubital <1 day                    Physical Exam  Constitutional:       Appearance: Normal appearance.   HENT:      Head: Normocephalic and atraumatic.   Cardiovascular:      Rate and Rhythm: Normal rate and regular rhythm.      Heart sounds: No murmur heard.  Pulmonary:      Effort: Pulmonary effort is normal.      Breath sounds: Normal breath sounds.   Abdominal:      General: Abdomen is flat. There is no distension.      Palpations: Abdomen is soft.      Tenderness: There is no abdominal tenderness.   Skin:     General: Skin is warm and dry.      Capillary Refill: Capillary refill takes less than 2 seconds.      Coloration: Skin is not pale.   Neurological:      Mental Status: He is alert.   Psychiatric:         Behavior: Behavior is cooperative.         Lab Results: I have personally reviewed pertinent lab results.  , CBC:   Lab Results   Component Value Date    WBC 4.82 06/15/2024    HGB 6.9 (L) 06/15/2024    HCT 21.9 (L) 06/15/2024    MCV 89 06/15/2024     06/15/2024    RBC 2.46 (L) 06/15/2024    MCH 28.0 06/15/2024    MCHC 31.5 06/15/2024    RDW 14.1 06/15/2024    MPV 9.1 06/15/2024    NRBC 0 06/14/2024   , CMP:   Lab Results   Component Value Date    SODIUM 139 06/15/2024    K 3.5 06/15/2024     (H) 06/15/2024    CO2 24 06/15/2024    BUN 11 06/15/2024    CREATININE 0.85 06/15/2024    CALCIUM 7.6 (L) 06/15/2024    AST 11 (L) 06/14/2024    ALT 13 06/14/2024    ALKPHOS 86 06/14/2024    EGFR 90 06/15/2024   , Coagulation:   Lab Results   Component Value Date    INR 1.16 06/14/2024     Imaging: I have personally  reviewed pertinent reports.    EKG, Pathology, and Other Studies: I have personally reviewed pertinent reports.      Emerald Salas  6/15/2024

## 2024-06-15 NOTE — UTILIZATION REVIEW
Initial Clinical Review    Observation 6/14/24 @ 1613 converted to inpatient admission 6/15/24 @ 1306 for continued care & tx for GI bleed.    Admission: Date/Time/Statement:   Admission Orders (From admission, onward)       Ordered        06/15/24 1306  INPATIENT ADMISSION  Once            06/14/24 1613  Place in Observation  Once                          Orders Placed This Encounter   Procedures    INPATIENT ADMISSION     Standing Status:   Standing     Number of Occurrences:   1     Order Specific Question:   Level of Care     Answer:   Med Surg [16]     Order Specific Question:   Estimated length of stay     Answer:   More than 2 Midnights     Order Specific Question:   Certification     Answer:   I certify that inpatient services are medically necessary for this patient for a duration of greater than two midnights. See H&P and MD Progress Notes for additional information about the patient's course of treatment.     ED Arrival Information       Expected   -    Arrival   6/14/2024 12:04    Acuity   Emergent              Means of arrival   Walk-In    Escorted by   Spouse    Service   Hospitalist    Admission type   Emergency              Arrival complaint   Bleeding             Chief Complaint   Patient presents with    Rectal Bleeding     Had colonoscopy two days ago, restarted on blood thinners,  two of them. Started having rectal bleeding again today, no pain or respiratory issue       Initial Presentation:   68 yom to ER from home for rectal bleeding, started this morning. Patient has been hospitalized multiple times over the last month for GI bleeding. Patient does have a history of diverticular bleeding. Patient most recently discharged from the hospital yesterday. His Plavix and Eliquis had been resumed on 6/12/2024.  Hx Afib CAD, stroke, dementia, GI bleed. Presents tachypneic, blood in stool. Admission CT high volume bleed: No CT evidence of active high-volume gastrointestinal hemorrhage. Stable diffuse  colonic diverticulosis without diverticulitis. Labs: Hgb 8, Ca 8, ast 11, tprot 5.1, alb 3.2, Pt 15, ferritin 7, iron sat 5, iron 13. Placed in observation status for GI bleed.     Observation to IP admission 6/15/24:  GI bleed. BM last night with no blood noted. Hgb drop to 6.9. transfusion ordered, given. Monitor H&H, VS.     Per surgery: rectal bleeding off-and-on. Hemoglobin is 7 today.  Patient has diverticulosis on last colonoscopy.  No bleeding observed in high-volume bleeding scan or prior colonoscopy.  No mention of any visible bleeding points in the colon on colonoscopy.  Patient was on Plavix and Eliquis.  Plan: Serial hemoglobin.  Transfuse as needed.  Consult with cardiology regarding need for antiplatelet and anticoagulation.  Follow-up with GI.      Per GI: GI bleed, hematochezia. He was recently discharged on 6/12 after evaluation for rectal bleeding, consistent with diverticular bleed. Colonoscopy 6/11/2024- extensive sigmoid diverticulosis; subcentimeter cecal polypectomy. He represented yesterday evening, 2 days after discharge with rectal bleeding occurring in the morning of admission.  He had one bowel movement with rectal bleeding. He cannot recall if he had abdominal pain with it. He does report dizziness/lightheadedness   - would benefit from surgical management  - repeat CTA if active bleeding for IR intervention  -Continue to monitor stool output, hemoglobin, transfuse as needed  - continue bowel regimen  - recommend risks/benefits evaluation of anticoagulation/antiplatelet therapy in the setting of recurrent lower GI bleeding  - continue iron supplementation  - no GI intervention planned at this time given recent colonoscopy, diet as tolerated    Date: 6/16/24  Day 3: Has surpassed a 2nd midnight with active treatments and services.  Dx: GI bleed. Tmax 99.3. Hgb 7.8, no further bleeding noted. Surgery recommending stool softner. IV Venofer ordered for today. Continue IV PPI. Monitor H&H,  s/s bleeding.       ED Triage Vitals   Temperature Pulse Respirations Blood Pressure SpO2 Pain Score   06/14/24 1228 06/14/24 1228 06/14/24 1228 06/14/24 1228 06/14/24 1228 06/14/24 1550   98.3 °F (36.8 °C) 78 (!) 24 111/56 96 % No Pain     Weight (last 2 days)       None            Vital Signs (last 3 days)       Date/Time Temp Pulse Resp BP MAP (mmHg) SpO2 O2 Device Patient Position - Orthostatic VS Pain    06/16/24 07:46:12 98.3 °F (36.8 °C) 83 -- 149/78 102 98 % -- -- --    06/15/24 22:54:44 97.7 °F (36.5 °C) 75 18 113/62 79 97 % -- -- --    06/15/24 20:29:44 -- 72 -- 120/66 84 96 % -- -- --    06/15/24 2028 -- -- -- -- -- -- -- -- No Pain    06/15/24 19:31:35 97.2 °F (36.2 °C) 73 18 143/73 96 97 % -- -- --    06/15/24 15:51:31 -- 77 -- 110/62 78 95 % -- -- --    06/15/24 15:32:32 97.9 °F (36.6 °C) 77 15 100/67 78 92 % -- -- --    06/15/24 1215 99.3 °F (37.4 °C) 65 18 111/64 80 98 % -- -- --    06/15/24 12:14:22 99.3 °F (37.4 °C) 71 18 111/64 80 98 % None (Room air) -- --    06/15/24 10:56:45 99 °F (37.2 °C) 62 18 102/62 75 96 % None (Room air) -- --    06/15/24 10:30:25 99 °F (37.2 °C) 72 18 116/70 85 99 % None (Room air) -- --    06/15/24 07:26:19 98.3 °F (36.8 °C) 72 -- 107/66 80 97 % None (Room air) -- No Pain    06/14/24 22:26:08 98.2 °F (36.8 °C) 69 18 101/65 77 98 % None (Room air) Lying --    06/14/24 1953 -- -- -- -- -- -- -- -- No Pain    06/14/24 1800 -- 72 -- -- -- 95 % None (Room air) -- No Pain    06/14/24 17:00:07 98 °F (36.7 °C) 72 18 123/77 92 98 % -- -- --    06/14/24 1657 -- -- -- -- -- -- -- -- No Pain    06/14/24 1550 -- 69 18 119/62 -- 97 % None (Room air) Lying No Pain    06/14/24 1228 98.3 °F (36.8 °C) 78 24 111/56 -- 96 % None (Room air) Sitting --              Pertinent Labs/Diagnostic Test Results:   Radiology:  CT high volume bleeding scan abdomen pelvis   Final Interpretation by Isacc Amaya MD (06/14 5809)      No CT evidence of active high-volume gastrointestinal hemorrhage.       Stable diffuse colonic diverticulosis without diverticulitis.               Workstation performed: [id number]           Results from last 7 days   Lab Units 06/16/24  0537 06/15/24  1809 06/15/24  0929 06/15/24  0501 06/14/24  1801 06/14/24  1323   WBC Thousand/uL 4.99  --   --  4.82  --  5.69   HEMOGLOBIN g/dL 7.8* 8.1* 7.0* 6.9* 7.7* 8.0*   HEMATOCRIT % 24.5* 25.1* 22.4* 21.9* 24.4* 25.4*   PLATELETS Thousands/uL 234  --   --  199  --  228   TOTAL NEUT ABS Thousands/µL  --   --   --   --   --  4.18     Results from last 7 days   Lab Units 06/16/24  0537 06/15/24  0501 06/14/24  1323 06/13/24  0606 06/12/24  0547   SODIUM mmol/L 138 139 143 143 140   POTASSIUM mmol/L 3.5 3.5 3.8 3.7 3.3*   CHLORIDE mmol/L 110* 110* 107 106 112*   CO2 mmol/L 22 24 26 23 24   ANION GAP mmol/L 6 5 10 14* 4   BUN mg/dL 15 11 13 8 5   CREATININE mg/dL 0.82 0.85 0.98 0.75 0.72   EGFR ml/min/1.73sq m 92 90 80 95 97   CALCIUM mg/dL 7.8* 7.6* 8.0* 7.9* 7.7*   MAGNESIUM mg/dL 2.0 1.9  --   --  2.0   PHOSPHORUS mg/dL 3.3 3.5  --   --   --      Results from last 7 days   Lab Units 06/16/24  0537 06/15/24  0501 06/14/24  1323   AST U/L  --   --  11*   ALT U/L  --   --  13   ALK PHOS U/L  --   --  86   TOTAL PROTEIN g/dL  --   --  5.1*   ALBUMIN g/dL 3.1* 2.8* 3.2*   TOTAL BILIRUBIN mg/dL  --   --  0.58     Results from last 7 days   Lab Units 06/11/24  0843   POC GLUCOSE mg/dl 108     Results from last 7 days   Lab Units 06/16/24  0537 06/15/24  0501 06/14/24  1323 06/13/24  0606 06/12/24  0547 06/11/24  0449 06/10/24  0652   GLUCOSE RANDOM mg/dL 107 103 117 113 110 111 112     Results from last 7 days   Lab Units 06/14/24  1323   PROTIME seconds 15.0*   INR  1.16   PTT seconds 31     Results from last 7 days   Lab Units 06/14/24  1323   FERRITIN ng/mL 7*   IRON SATURATION % 5*   IRON ug/dL 13*   TIBC ug/dL 274     Results from last 7 days   Lab Units 06/16/24  0615 06/10/24  0615   UNIT PRODUCT CODE  R3430B95 [id number]  [id number]   UNIT NUMBER   [id number]  [id number]   UNITABO  O O  O   UNITRH  POS POS  POS   CROSSMATCH  Compatible Compatible  Compatible   UNIT DISPENSE STATUS  Presumed Trans Presumed Trans  Presumed Trans   UNIT PRODUCT VOL mL 350 350  350     Results from last 7 days   Lab Units 06/14/24  1323   LIPASE u/L 24       ED Treatment-Medication Administration from 06/14/2024 1204 to 06/14/2024 1648         Date/Time Order Dose Route Action     06/14/2024 1431 iohexol (OMNIPAQUE) 350 MG/ML injection (MULTI-DOSE) 100 mL 100 mL Intravenous Given            Past Medical History:   Diagnosis Date    Arthropathy of knee     last assessed 8/19/15    Bacterial pneumonia 02/05/2007    last assessed 2/5/7    Brain atrophy (HCC)     Colon polyp     CPAP (continuous positive airway pressure) dependence     Disorder of male genital organ 02/12/2008    last assessed 2/12/08    ED (erectile dysfunction) of organic origin     last assessed 2/13/17     History of hypertension     Seasonal allergies     Situational anxiety     resolved 3/16/17     Sleep apnea     Stroke (HCC)     09/2020 TIA    Tinnitus     Wears hearing aid     bilateral     Present on Admission:   GI bleed   Benign essential hypertension   Dementia without behavioral disturbance (HCC)   Paroxysmal A-fib (HCC)   CAD (coronary artery disease)      Admitting Diagnosis: Rectal bleeding [K62.5]  GI bleed [K92.2]  Rectal bleed [K62.5]  Age/Sex: 68 y.o. male  Admission Orders:  Scd/foot pumps  Consult GI  Consult surgery    Scheduled Medications:  Medications 06/07 06/08 06/09 06/10 06/11 06/12 06/13 06/14 06/15 06/16   atorvastatin (LIPITOR) tablet 40 mg  Dose: 40 mg  Freq: Every evening Route: PO  Start: 06/14/24 1800           1743      1738      1800        ferrous sulfate tablet 325 mg  Dose: 325 mg  Freq: 2 times daily with meals Route: PO  Start: 06/17/24 0730   Admin Instructions:                   ferrous sulfate tablet 325 mg  Dose: 325 mg  Freq: 2 times  daily with meals Route: PO  Start: 06/14/24 1645 End: 06/16/24 1000   Admin Instructions:              1743      0809     1634      0831     1000-D/C'd      iron sucrose (VENOFER) 200 mg in sodium chloride 0.9 % 100 mL IVPB  Dose: 200 mg  Freq: Once Route: IV  Start: 06/16/24 1015             1015        loratadine (CLARITIN) tablet 10 mg  Dose: 10 mg  Freq: Daily Route: PO  Start: 06/15/24 0900            0809      0831        melatonin tablet 3 mg  Dose: 3 mg  Freq: Daily at bedtime Route: PO  Start: 06/15/24 0215            0210     2237      2200        memantine (NAMENDA) tablet 5 mg  Dose: 5 mg  Freq: Daily Route: PO  Indications of Use: ALZHEIMER'S DISEASE  Start: 06/15/24 0900            0809      0831        metoprolol succinate (TOPROL-XL) 24 hr tablet 50 mg  Dose: 50 mg  Freq: 2 times daily Route: PO  Start: 06/14/24 1800   Admin Instructions:      Order specific questions:              1743      [phone]     1800        pantoprazole (PROTONIX) injection 40 mg  Dose: 40 mg  Freq: Every 12 hours Route: IV  Start: 06/14/24 1645   Admin Instructions:              1743      0458     1633      0534     1645                    CoPRN Meds Sorted by Name  for Mohit Razo as of 06/07/24 through 6/16/24  Legend:         Medications 06/07 06/08 06/09 06/10 06/11 06/12 06/13 06/14 06/15 06/16   acetaminophen (TYLENOL) tablet 650 mg  Dose: 650 mg  Freq: Every 6 hours PRN Route: PO  PRN Reasons: mild pain,headaches,fever  Indications of Use: FEVER,HEADACHE,MILD PAIN  Start: 06/14/24 1636                iohexol (OMNIPAQUE) 350 MG/ML injection (MULTI-DOSE) 100 mL  Dose: 100 mL  Freq: Once in imaging Route: IV  PRN Reason: contrast  Start: 06/14/24 1431 End: 06/14/24 1431           1431                Network Utilization Review Department  ATTENTION: Please call with any questions or concerns to [phone] and carefully listen to the prompts so that you are directed to the right person. All voicemails  are confidential.   For Discharge needs, contact Care Management DC Support Team at [phone] opt. 2  Send all requests for admission clinical reviews, approved or denied determinations and any other requests to dedicated fax number below belonging to the campus where the patient is receiving treatment. List of dedicated fax numbers for the Facilities:  FACILITY NAME UR FAX NUMBER   ADMISSION DENIALS (Administrative/Medical Necessity) [phone]   DISCHARGE SUPPORT TEAM (NETWORK) [phone]   PARENT CHILD HEALTH (Maternity/NICU/Pediatrics) [phone]   [facility] [phone]   [facility] [phone]   [facility] [phone]   [facility] [phone]   [facility] [phone]   [facility] [phone]   [facility] [phone]   [facility] [phone]   [facility] [phone]   [facility] [phone]   [facility] [phone]   [facility] [phone]

## 2024-06-15 NOTE — ASSESSMENT & PLAN NOTE
Patient presents to the ED for rectal bleeding that started this morning.  Patient has been hospitalized multiple times over the last month for GI bleeding.  Patient does have a history of diverticular bleeding.  Patient most recently discharged from the hospital yesterday.  His Plavix and Eliquis had been resumed on 6/12/2024.  He denies any associated lightheadedness, chest pain, shortness of breath, abdominal pain, nausea or vomiting.  CT high volume bleed: No CT evidence of active high-volume gastrointestinal hemorrhage. Stable diffuse colonic diverticulosis without diverticulitis.  Hemoglobin dropped to 8.0 from 8.4 yesterday  Holding Plavix and Eliquis. Will need to discuss with Cardiology/Neurology regarding the need for both medications given recurrent GI bleeding  Continue ferrous sulfate tablets. Repeat iron panel pending  Monitor H&H. Transfuse for hemoglobin <7  NPO at midnight  Spoke to GI, will also consult General Surgery given likelihood of diverticular bleed - consultations pending

## 2024-06-15 NOTE — ED PROVIDER NOTES
History  Chief Complaint   Patient presents with    Rectal Bleeding     Had colonoscopy two days ago, restarted on blood thinners,  two of them. Started having rectal bleeding again today, no pain or respiratory issue     Patient presents with spouse for evaluation of rectal bleeding.  He had a bowel movement this morning with a large amount of bright red blood per rectum.  No bowel movement or bleeding since.  Stills feels lightheaded.  Patient was recently admitted to the hospital requiring blood transfusion for GI bleed.  He underwent a colonoscopy 2 days ago but the bleeding had already resolved at that time.  Patient was restarted on the Eliquis and Plavix and discharged yesterday.  He was told to follow-up with cardiology to see if he needs to be on both blood thinners.  Wife states she has been reaching out to the office but has not heard back.      History provided by:  Patient and spouse   used: No        Prior to Admission Medications   Prescriptions Last Dose Informant Patient Reported? Taking?   Melatonin 5 MG TABS Past Week at 2100 Self, Spouse/Significant Other Yes Yes   Sig: Take 5 mg by mouth daily   apixaban (Eliquis) 5 mg 6/14/2024 at 1000 Self, Spouse/Significant Other Yes Yes   Sig: Take 5 mg by mouth 2 (two) times a day   atorvastatin (LIPITOR) 40 mg tablet 6/13/2024 at 1900  No Yes   Sig: Take 1 tablet (40 mg total) by mouth every evening   clopidogrel (PLAVIX) 75 mg tablet 6/13/2024 at 1900 Spouse/Significant Other, Self Yes Yes   Sig: Take 75 mg by mouth daily   ferrous sulfate 324 (65 Fe) mg Past Week  No Yes   Sig: Take 1 tablet (324 mg total) by mouth 2 (two) times a day before meals   fexofenadine (ALLEGRA) 180 MG tablet  Self No No   Sig: Take 1 tablet (180 mg total) by mouth daily for 30 days   Patient taking differently: Take 180 mg by mouth every morning   memantine (NAMENDA) 5 mg tablet 6/14/2024 at 1000  Yes Yes   Sig: Take 5 mg by mouth daily   metoprolol  succinate (TOPROL-XL) 50 mg 24 hr tablet 6/14/2024 at 1000  No Yes   Sig: TAKE 1 TABLET BY MOUTH TWICE A DAY      Facility-Administered Medications: None       Past Medical History:   Diagnosis Date    Arthropathy of knee     last assessed 8/19/15    Bacterial pneumonia 02/05/2007    last assessed 2/5/7    Brain atrophy (HCC)     Colon polyp     CPAP (continuous positive airway pressure) dependence     Disorder of male genital organ 02/12/2008    last assessed 2/12/08    ED (erectile dysfunction) of organic origin     last assessed 2/13/17     History of hypertension     Seasonal allergies     Situational anxiety     resolved 3/16/17     Sleep apnea     Stroke (HCC)     09/2020 TIA    Tinnitus     Wears hearing aid     bilateral       Past Surgical History:   Procedure Laterality Date    CARDIAC CATHETERIZATION Left 10/28/2022    Procedure: Cardiac Left Heart Cath;  Surgeon: Christian Burr MD;  Location:  CARDIAC CATH LAB;  Service: Cardiology    CARDIAC CATHETERIZATION  10/28/2022    Procedure: Cardiac catheterization;  Surgeon: Christian Burr MD;  Location:  CARDIAC CATH LAB;  Service: Cardiology    CARDIAC CATHETERIZATION N/A 10/28/2022    Procedure: Cardiac Coronary Angiogram;  Surgeon: Christian Burr MD;  Location:  CARDIAC CATH LAB;  Service: Cardiology    CARDIAC ELECTROPHYSIOLOGY PROCEDURE N/A 12/22/2021    Procedure: Cardiac Loop Recorder Implant;  Surgeon: Judy Norris DO;  Location: [place] CARDIAC CATH LAB;  Service: Cardiology    COLONOSCOPY      x3-w/polyps    HERNIA REPAIR      umbilical,hemal inguinal    KNEE ARTHROSCOPY Left     meniscus    [place] COLONOSCOPY FLX DX W/COLLJ SPEC WHEN PFRMD N/A 5/7/2018    Procedure: COLONOSCOPY;  Surgeon: Thiago Lopes MD;  Location: [facility] GI LAB;  Service: Gastroenterology       Family History   Problem Relation Age of Onset    Cancer Mother         breast    Diverticulitis Mother         colostomy    Breast cancer Mother     Heart disease Father         CHF    Cancer  Sister         breast    Depression Sister     Cancer Sister         skin cancer    Cancer Sister         skin cancer    Colon cancer Neg Hx     Liver disease Neg Hx      I have reviewed and agree with the history as documented.    E-Cigarette/Vaping    E-Cigarette Use Never User     Cartridges/Day n/a      E-Cigarette/Vaping Substances    Nicotine No     THC No     CBD No     Flavoring No     Other No     Unknown No      Social History     Tobacco Use    Smoking status: Never     Passive exposure: Past    Smokeless tobacco: Never   Vaping Use    Vaping status: Never Used   Substance Use Topics    Alcohol use: Not Currently     Alcohol/week: 14.0 standard drinks of alcohol     Types: 14 Cans of beer per week     Comment: occ    Drug use: No       Review of Systems   Gastrointestinal:  Positive for blood in stool. Negative for abdominal pain.   All other systems reviewed and are negative.      Physical Exam  Physical Exam  Vitals and nursing note reviewed.   Constitutional:       General: He is not in acute distress.     Appearance: He is ill-appearing.   Cardiovascular:      Rate and Rhythm: Normal rate and regular rhythm.   Pulmonary:      Effort: Pulmonary effort is normal. No respiratory distress.      Breath sounds: Normal breath sounds.   Abdominal:      Tenderness: There is no abdominal tenderness.   Skin:     Capillary Refill: Capillary refill takes less than 2 seconds.      Coloration: Skin is pale.   Neurological:      General: No focal deficit present.      Mental Status: He is alert and oriented to person, place, and time.         Vital Signs  ED Triage Vitals   Temperature Pulse Respirations Blood Pressure SpO2   06/14/24 1228 06/14/24 1228 06/14/24 1228 06/14/24 1228 06/14/24 1228   98.3 °F (36.8 °C) 78 (!) 24 111/56 96 %      Temp Source Heart Rate Source Patient Position - Orthostatic VS BP Location FiO2 (%)   06/14/24 1228 06/14/24 1228 06/14/24 1228 06/14/24 1228 --   Tympanic Monitor Sitting Right  arm       Pain Score       06/14/24 1550       No Pain           Vitals:    06/15/24 0726 06/15/24 1030 06/15/24 1056 06/15/24 1214   BP: 107/66 116/70 102/62 111/64   Pulse: 72 72 62 71   Patient Position - Orthostatic VS:             Visual Acuity      ED Medications  Medications   atorvastatin (LIPITOR) tablet 40 mg (40 mg Oral Given 6/14/24 1743)   ferrous sulfate tablet 325 mg (325 mg Oral Given 6/15/24 0809)   loratadine (CLARITIN) tablet 10 mg (10 mg Oral Given 6/15/24 0809)   memantine (NAMENDA) tablet 5 mg (5 mg Oral Given 6/15/24 0809)   metoprolol succinate (TOPROL-XL) 24 hr tablet 50 mg (50 mg Oral Not Given 6/15/24 0808)   acetaminophen (TYLENOL) tablet 650 mg (has no administration in time range)   pantoprazole (PROTONIX) injection 40 mg (40 mg Intravenous Given 6/15/24 0458)   melatonin tablet 3 mg (3 mg Oral Given 6/15/24 0210)   iohexol (OMNIPAQUE) 350 MG/ML injection (MULTI-DOSE) 100 mL (100 mL Intravenous Given 6/14/24 1431)       Diagnostic Studies  Results Reviewed       Procedure Component Value Units Date/Time    Renal function panel [[id number]]  (Abnormal) Collected: 06/15/24 0501    Lab Status: Final result Specimen: Blood from Arm, Right Updated: 06/15/24 0602     Albumin 2.8 g/dL      Calcium 7.6 mg/dL      Corrected Calcium 8.6 mg/dL      Phosphorus 3.5 mg/dL      Glucose 103 mg/dL      BUN 11 mg/dL      Creatinine 0.85 mg/dL      Sodium 139 mmol/L      Potassium 3.5 mmol/L      Chloride 110 mmol/L      CO2 24 mmol/L      ANION GAP 5 mmol/L      eGFR 90 ml/min/1.73sq m      Glucose, Fasting 103 mg/dL     Narrative:      National Kidney Disease Foundation guidelines for Chronic Kidney Disease (CKD):     Stage 1 with normal or high GFR (GFR > 90 mL/min/1.73 square meters)    Stage 2 Mild CKD (GFR = 60-89 mL/min/1.73 square meters)    Stage 3A Moderate CKD (GFR = 45-59 mL/min/1.73 square meters)    Stage 3B Moderate CKD (GFR = 30-44 mL/min/1.73 square meters)    Stage 4 Severe CKD (GFR =  15-29 mL/min/1.73 square meters)    Stage 5 End Stage CKD (GFR <15 mL/min/1.73 square meters)  Note: GFR calculation is accurate only with a steady state creatinine    Magnesium [[id number]]  (Normal) Collected: 06/15/24 0501    Lab Status: Final result Specimen: Blood from Arm, Right Updated: 06/15/24 0602     Magnesium 1.9 mg/dL     CBC [[id number]]  (Abnormal) Collected: 06/15/24 0501    Lab Status: Final result Specimen: Blood from Arm, Right Updated: 06/15/24 0543     WBC 4.82 Thousand/uL      RBC 2.46 Million/uL      Hemoglobin 6.9 g/dL      Hematocrit 21.9 %      MCV 89 fL      MCH 28.0 pg      MCHC 31.5 g/dL      RDW 14.1 %      Platelets 199 Thousands/uL      MPV 9.1 fL     TIBC Panel (incl. Iron, TIBC, % Iron Saturation) [[id number]]  (Abnormal) Collected: 06/14/24 1323    Lab Status: Final result Specimen: Blood from Arm, Left Updated: 06/14/24 2251     Iron Saturation 5 %      TIBC 274 ug/dL      Iron 13 ug/dL      UIBC 261 ug/dL     Vitamin B12 [[id number]]  (Abnormal) Collected: 06/14/24 1323    Lab Status: Final result Specimen: Blood from Arm, Left Updated: 06/14/24 2238     Vitamin B-12 176 pg/mL     Ferritin [[id number]]  (Abnormal) Collected: 06/14/24 1323    Lab Status: Final result Specimen: Blood from Arm, Left Updated: 06/14/24 2238     Ferritin 7 ng/mL     Hemoglobin and hematocrit, blood [[id number]]  (Abnormal) Collected: 06/14/24 1801    Lab Status: Final result Specimen: Blood from Arm, Right Updated: 06/14/24 1805     Hemoglobin 7.7 g/dL      Hematocrit 24.4 %     Comprehensive metabolic panel [[id number]]  (Abnormal) Collected: 06/14/24 1323    Lab Status: Final result Specimen: Blood from Arm, Left Updated: 06/14/24 1358     Sodium 143 mmol/L      Potassium 3.8 mmol/L      Chloride 107 mmol/L      CO2 26 mmol/L      ANION GAP 10 mmol/L      BUN 13 mg/dL      Creatinine 0.98 mg/dL      Glucose 117 mg/dL      Calcium 8.0 mg/dL      Corrected Calcium 8.6 mg/dL      AST 11 U/L      ALT 13 U/L       Alkaline Phosphatase 86 U/L      Total Protein 5.1 g/dL      Albumin 3.2 g/dL      Total Bilirubin 0.58 mg/dL      eGFR 80 ml/min/1.73sq m     Narrative:      National Kidney Disease Foundation guidelines for Chronic Kidney Disease (CKD):     Stage 1 with normal or high GFR (GFR > 90 mL/min/1.73 square meters)    Stage 2 Mild CKD (GFR = 60-89 mL/min/1.73 square meters)    Stage 3A Moderate CKD (GFR = 45-59 mL/min/1.73 square meters)    Stage 3B Moderate CKD (GFR = 30-44 mL/min/1.73 square meters)    Stage 4 Severe CKD (GFR = 15-29 mL/min/1.73 square meters)    Stage 5 End Stage CKD (GFR <15 mL/min/1.73 square meters)  Note: GFR calculation is accurate only with a steady state creatinine    Lipase [[id number]]  (Normal) Collected: 06/14/24 1323    Lab Status: Final result Specimen: Blood from Arm, Left Updated: 06/14/24 1358     Lipase 24 u/L     APTT [[id number]]  (Normal) Collected: 06/14/24 1323    Lab Status: Final result Specimen: Blood from Arm, Left Updated: 06/14/24 1357     PTT 31 seconds     Protime-INR [[id number]]  (Abnormal) Collected: 06/14/24 1323    Lab Status: Final result Specimen: Blood from Arm, Left Updated: 06/14/24 1357     Protime 15.0 seconds      INR 1.16    CBC and differential [[id number]]  (Abnormal) Collected: 06/14/24 1323    Lab Status: Final result Specimen: Blood from Arm, Left Updated: 06/14/24 1331     WBC 5.69 Thousand/uL      RBC 2.83 Million/uL      Hemoglobin 8.0 g/dL      Hematocrit 25.4 %      MCV 90 fL      MCH 28.3 pg      MCHC 31.5 g/dL      RDW 14.3 %      MPV 8.9 fL      Platelets 228 Thousands/uL      nRBC 0 /100 WBCs      Segmented % 73 %      Immature Grans % 1 %      Lymphocytes % 12 %      Monocytes % 10 %      Eosinophils Relative 3 %      Basophils Relative 1 %      Absolute Neutrophils 4.18 Thousands/µL      Absolute Immature Grans 0.03 Thousand/uL      Absolute Lymphocytes 0.68 Thousands/µL      Absolute Monocytes 0.59 Thousand/µL      Eosinophils Absolute 0.18  Thousand/µL      Basophils Absolute 0.03 Thousands/µL                    CT high volume bleeding scan abdomen pelvis   Final Result by Isacc Amaya MD (06/14 1591)      No CT evidence of active high-volume gastrointestinal hemorrhage.      Stable diffuse colonic diverticulosis without diverticulitis.               Workstation performed: [id number]                    Procedures  Procedures         ED Course                                             Medical Decision Making  Pulse ox 98% on room air indicating adequate oxygenation.        Laboratory evaluation revealed a hemoglobin of 8 down slightly from 8.4 yesterday.  CT scan of the abdomen was negative for any large volume bleed.  GI on-call IBAN Hinojosa contacted and case discussed.  Hemorrhoidal bleed or diverticular bleed again.  If it is a diverticular bleed may need a surgical consult.  Will admit overnight for observation.  Reached out to cardiology on-call asked the patient need to continue the Plavix as well as the Eliquis but there is no response from the on-call provider.  Case discussed with hospitalist on-call Dr. Angeles for admission to the medical service.    Amount and/or Complexity of Data Reviewed  Labs: ordered.  Radiology: ordered.    Risk  Prescription drug management.  Decision regarding hospitalization.             Disposition  Final diagnoses:   Rectal bleeding     Time reflects when diagnosis was documented in both MDM as applicable and the Disposition within this note       Time User Action Codes Description Comment    6/14/2024  4:12 PM Christopher Humphries Add [K62.5] Rectal bleeding     6/14/2024  4:36 PM Jada Velazquez Add [K92.2] GI bleed     6/14/2024  4:59 PM Jada Velazquez Add [Z87.19] History of GI diverticular bleed           ED Disposition       ED Disposition   Admit    Condition   Stable    Date/Time   Fri Jun 14, 2024  4:12 PM    Comment   Case was discussed with Dr. Angeles and the patient's admission status was agreed  to be Admission Status: observation status to the service of Dr. Angeles.               Follow-up Information    None         Current Discharge Medication List        CONTINUE these medications which have NOT CHANGED    Details   apixaban (Eliquis) 5 mg Take 5 mg by mouth 2 (two) times a day      atorvastatin (LIPITOR) 40 mg tablet Take 1 tablet (40 mg total) by mouth every evening  Qty: 90 tablet, Refills: 1    Associated Diagnoses: History of cardioembolic cerebrovascular accident (CVA)      clopidogrel (PLAVIX) 75 mg tablet Take 75 mg by mouth daily      ferrous sulfate 324 (65 Fe) mg Take 1 tablet (324 mg total) by mouth 2 (two) times a day before meals    Associated Diagnoses: Iron deficiency anemia due to chronic blood loss      Melatonin 5 MG TABS Take 5 mg by mouth daily      memantine (NAMENDA) 5 mg tablet Take 5 mg by mouth daily      metoprolol succinate (TOPROL-XL) 50 mg 24 hr tablet TAKE 1 TABLET BY MOUTH TWICE A DAY  Qty: 60 tablet, Refills: 14    Associated Diagnoses: Coronary artery disease involving native coronary artery of native heart without angina pectoris; NSVT (nonsustained ventricular tachycardia) ([facility])      fexofenadine (ALLEGRA) 180 MG tablet Take 1 tablet (180 mg total) by mouth daily for 30 days  Qty: 30 tablet, Refills: 0    Associated Diagnoses: Upper respiratory tract infection, unspecified type             No discharge procedures on file.    PDMP Review         Value Time User    PDMP Reviewed  Yes 6/10/2021  4:59 PM Jadiel George MD            ED Provider  Electronically Signed by             Christopher Humphries DO  06/15/24 9772

## 2024-06-15 NOTE — PLAN OF CARE
Problem: PAIN - ADULT  Goal: Verbalizes/displays adequate comfort level or baseline comfort level  Description: Interventions:  - Encourage patient to monitor pain and request assistance  - Assess pain using appropriate pain scale  - Administer analgesics based on type and severity of pain and evaluate response  - Implement non-pharmacological measures as appropriate and evaluate response  - Consider cultural and social influences on pain and pain management  - Notify physician/advanced practitioner if interventions unsuccessful or patient reports new pain  Outcome: Progressing     Problem: INFECTION - ADULT  Goal: Absence or prevention of progression during hospitalization  Description: INTERVENTIONS:  - Assess and monitor for signs and symptoms of infection  - Monitor lab/diagnostic results  - Monitor all insertion sites, i.e. indwelling lines, tubes, and drains  - Monitor endotracheal if appropriate and nasal secretions for changes in amount and color  - [place] appropriate cooling/warming therapies per order  - Administer medications as ordered  - Instruct and encourage patient and family to use good hand hygiene technique  - Identify and instruct in appropriate isolation precautions for identified infection/condition  Outcome: Progressing  Goal: Absence of fever/infection during neutropenic period  Description: INTERVENTIONS:  - Monitor WBC    Outcome: Progressing     Problem: SAFETY ADULT  Goal: Patient will remain free of falls  Description: INTERVENTIONS:  - Educate patient/family on patient safety including physical limitations  - Instruct patient to call for assistance with activity   - Consult OT/PT to assist with strengthening/mobility   - Keep Call bell within reach  - Keep bed low and locked with side rails adjusted as appropriate  - Keep care items and personal belongings within reach  - Initiate and maintain comfort rounds  - Make Fall Risk Sign visible to staff  - Offer Toileting every  Hours,  in advance of need  - Initiate/Maintain alarm  - Obtain necessary fall risk management equipment:   - Apply yellow socks and bracelet for high fall risk patients  - Consider moving patient to room near nurses station  Outcome: Progressing  Goal: Maintain or return to baseline ADL function  Description: INTERVENTIONS:  -  Assess patient's ability to carry out ADLs; assess patient's baseline for ADL function and identify physical deficits which impact ability to perform ADLs (bathing, care of mouth/teeth, toileting, grooming, dressing, etc.)  - Assess/evaluate cause of self-care deficits   - Assess range of motion  - Assess patient's mobility; develop plan if impaired  - Assess patient's need for assistive devices and provide as appropriate  - Encourage maximum independence but intervene and supervise when necessary  - Involve family in performance of ADLs  - Assess for home care needs following discharge   - Consider OT consult to assist with ADL evaluation and planning for discharge  - Provide patient education as appropriate  Outcome: Progressing  Goal: Maintains/Returns to pre admission functional level  Description: INTERVENTIONS:  - Perform AM-PAC 6 Click Basic Mobility/ Daily Activity assessment daily.  - Set and communicate daily mobility goal to care team and patient/family/caregiver.   - Collaborate with rehabilitation services on mobility goals if consulted  - Perform Range of Motion  times a day.  - Reposition patient every  hours.  - Dangle patient  times a day  - Stand patient  times a day  - Ambulate patient  times a day  - Out of bed to chair  times a day   - Out of bed for meals  times a day  - Out of bed for toileting  - Record patient progress and toleration of activity level   Outcome: Progressing     Problem: DISCHARGE PLANNING  Goal: Discharge to home or other facility with appropriate resources  Description: INTERVENTIONS:  - Identify barriers to discharge w/patient and caregiver  - Arrange for  needed discharge resources and transportation as appropriate  - Identify discharge learning needs (meds, wound care, etc.)  - Arrange for interpretive services to assist at discharge as needed  - Refer to Case Management Department for coordinating discharge planning if the patient needs post-hospital services based on physician/advanced practitioner order or complex needs related to functional status, cognitive ability, or social support system  Outcome: Progressing     Problem: Knowledge Deficit  Goal: Patient/family/caregiver demonstrates understanding of disease process, treatment plan, medications, and discharge instructions  Description: Complete learning assessment and assess knowledge base.  Interventions:  - Provide teaching at level of understanding  - Provide teaching via preferred learning methods  Outcome: Progressing     Problem: GASTROINTESTINAL - ADULT  Goal: Minimal or absence of nausea and/or vomiting  Description: INTERVENTIONS:  - Administer IV fluids if ordered to ensure adequate hydration  - Maintain NPO status until nausea and vomiting are resolved  - Nasogastric tube if ordered  - Administer ordered antiemetic medications as needed  - Provide nonpharmacologic comfort measures as appropriate  - Advance diet as tolerated, if ordered  - Consider nutrition services referral to assist patient with adequate nutrition and appropriate food choices  Outcome: Progressing  Goal: Maintains or returns to baseline bowel function  Description: INTERVENTIONS:  - Assess bowel function  - Encourage oral fluids to ensure adequate hydration  - Administer IV fluids if ordered to ensure adequate hydration  - Administer ordered medications as needed  - Encourage mobilization and activity  - Consider nutritional services referral to assist patient with adequate nutrition and appropriate food choices  Outcome: Progressing  Goal: Maintains adequate nutritional intake  Description: INTERVENTIONS:  - Monitor percentage  of each meal consumed  - Identify factors contributing to decreased intake, treat as appropriate  - Assist with meals as needed  - Monitor I&O, weight, and lab values if indicated  - Obtain nutrition services referral as needed  Outcome: Progressing  Goal: Establish and maintain optimal ostomy function  Description: INTERVENTIONS:  - Assess bowel function  - Encourage oral fluids to ensure adequate hydration  - Administer IV fluids if ordered to ensure adequate hydration   - Administer ordered medications as needed  - Encourage mobilization and activity  - Nutrition services referral to assist patient with appropriate food choices  - Assess stoma site  - Consider wound care consult   Outcome: Progressing  Goal: Oral mucous membranes remain intact  Description: INTERVENTIONS  - Assess oral mucosa and hygiene practices  - Implement preventative oral hygiene regimen  - Implement oral medicated treatments as ordered  - Initiate Nutrition services referral as needed  Outcome: Progressing

## 2024-06-15 NOTE — PLAN OF CARE
Problem: GASTROINTESTINAL - ADULT  Goal: Minimal or absence of nausea and/or vomiting  Description: INTERVENTIONS:  - Administer IV fluids if ordered to ensure adequate hydration  - Maintain NPO status until nausea and vomiting are resolved  - Nasogastric tube if ordered  - Administer ordered antiemetic medications as needed  - Provide nonpharmacologic comfort measures as appropriate  - Advance diet as tolerated, if ordered  - Consider nutrition services referral to assist patient with adequate nutrition and appropriate food choices  Outcome: Progressing  Goal: Maintains or returns to baseline bowel function  Description: INTERVENTIONS:  - Assess bowel function  - Encourage oral fluids to ensure adequate hydration  - Administer IV fluids if ordered to ensure adequate hydration  - Administer ordered medications as needed  - Encourage mobilization and activity  - Consider nutritional services referral to assist patient with adequate nutrition and appropriate food choices  Outcome: Progressing  Goal: Maintains adequate nutritional intake  Description: INTERVENTIONS:  - Monitor percentage of each meal consumed  - Identify factors contributing to decreased intake, treat as appropriate  - Assist with meals as needed  - Monitor I&O, weight, and lab values if indicated  - Obtain nutrition services referral as needed  Outcome: Progressing  Goal: Establish and maintain optimal ostomy function  Description: INTERVENTIONS:  - Assess bowel function  - Encourage oral fluids to ensure adequate hydration  - Administer IV fluids if ordered to ensure adequate hydration   - Administer ordered medications as needed  - Encourage mobilization and activity  - Nutrition services referral to assist patient with appropriate food choices  - Assess stoma site  - Consider wound care consult   Outcome: Progressing  Goal: Oral mucous membranes remain intact  Description: INTERVENTIONS  - Assess oral mucosa and hygiene practices  - Implement  preventative oral hygiene regimen  - Implement oral medicated treatments as ordered  - Initiate Nutrition services referral as needed  Outcome: Progressing     Problem: SAFETY ADULT  Goal: Patient will remain free of falls  Description: INTERVENTIONS:  - Educate patient/family on patient safety including physical limitations  - Instruct patient to call for assistance with activity   - Consult OT/PT to assist with strengthening/mobility   - Keep Call bell within reach  - Keep bed low and locked with side rails adjusted as appropriate  - Keep care items and personal belongings within reach  - Initiate and maintain comfort rounds  - Make Fall Risk Sign visible to staff  - Offer Toileting every 3 Hours, in advance of need  - Initiate/Maintain bed alarm  - Obtain necessary fall risk management equipment:   - Apply yellow socks and bracelet for high fall risk patients  - Consider moving patient to room near nurses station  Outcome: Progressing  Goal: Maintain or return to baseline ADL function  Description: INTERVENTIONS:  -  Assess patient's ability to carry out ADLs; assess patient's baseline for ADL function and identify physical deficits which impact ability to perform ADLs (bathing, care of mouth/teeth, toileting, grooming, dressing, etc.)  - Assess/evaluate cause of self-care deficits   - Assess range of motion  - Assess patient's mobility; develop plan if impaired  - Assess patient's need for assistive devices and provide as appropriate  - Encourage maximum independence but intervene and supervise when necessary  - Involve family in performance of ADLs  - Assess for home care needs following discharge   - Consider OT consult to assist with ADL evaluation and planning for discharge  - Provide patient education as appropriate  Outcome: Progressing  Goal: Maintains/Returns to pre admission functional level  Description: INTERVENTIONS:  - Perform AM-PAC 6 Click Basic Mobility/ Daily Activity assessment daily.  - Set  and communicate daily mobility goal to care team and patient/family/caregiver.   - Collaborate with rehabilitation services on mobility goals if consulted  - Perform Range of Motion 3 times a day.  - Reposition patient every 3 hours.  - Dangle patient 3 times a day  - Stand patient 3 times a day  - Ambulate patient 3 times a day  - Out of bed to chair 3 times a day   - Out of bed for meals 3 times a day  - Out of bed for toileting  - Record patient progress and toleration of activity level   Outcome: Progressing     Problem: DISCHARGE PLANNING  Goal: Discharge to home or other facility with appropriate resources  Description: INTERVENTIONS:  - Identify barriers to discharge w/patient and caregiver  - Arrange for needed discharge resources and transportation as appropriate  - Identify discharge learning needs (meds, wound care, etc.)  - Arrange for interpretive services to assist at discharge as needed  - Refer to Case Management Department for coordinating discharge planning if the patient needs post-hospital services based on physician/advanced practitioner order or complex needs related to functional status, cognitive ability, or social support system  Outcome: Progressing     Problem: PAIN - ADULT  Goal: Verbalizes/displays adequate comfort level or baseline comfort level  Description: Interventions:  - Encourage patient to monitor pain and request assistance  - Assess pain using appropriate pain scale  - Administer analgesics based on type and severity of pain and evaluate response  - Implement non-pharmacological measures as appropriate and evaluate response  - Consider cultural and social influences on pain and pain management  - Notify physician/advanced practitioner if interventions unsuccessful or patient reports new pain  Outcome: Progressing

## 2024-06-16 LAB
ABO GROUP BLD BPU: NORMAL
ALBUMIN SERPL BCP-MCNC: 3.1 G/DL (ref 3.5–5)
ANION GAP SERPL CALCULATED.3IONS-SCNC: 6 MMOL/L (ref 4–13)
BPU ID: NORMAL
BUN SERPL-MCNC: 15 MG/DL (ref 5–25)
CALCIUM ALBUM COR SERPL-MCNC: 8.5 MG/DL (ref 8.3–10.1)
CALCIUM SERPL-MCNC: 7.8 MG/DL (ref 8.4–10.2)
CHLORIDE SERPL-SCNC: 110 MMOL/L (ref 96–108)
CO2 SERPL-SCNC: 22 MMOL/L (ref 21–32)
CREAT SERPL-MCNC: 0.82 MG/DL (ref 0.6–1.3)
CROSSMATCH: NORMAL
ERYTHROCYTE [DISTWIDTH] IN BLOOD BY AUTOMATED COUNT: 13.7 % (ref 11.6–15.1)
GFR SERPL CREATININE-BSD FRML MDRD: 92 ML/MIN/1.73SQ M
GLUCOSE SERPL-MCNC: 107 MG/DL (ref 65–140)
HCT VFR BLD AUTO: 24.5 % (ref 36.5–49.3)
HGB BLD-MCNC: 7.8 G/DL (ref 12–17)
MAGNESIUM SERPL-MCNC: 2 MG/DL (ref 1.9–2.7)
MCH RBC QN AUTO: 27.8 PG (ref 26.8–34.3)
MCHC RBC AUTO-ENTMCNC: 31.8 G/DL (ref 31.4–37.4)
MCV RBC AUTO: 87 FL (ref 82–98)
PHOSPHATE SERPL-MCNC: 3.3 MG/DL (ref 2.3–4.1)
PLATELET # BLD AUTO: 234 THOUSANDS/UL (ref 149–390)
PMV BLD AUTO: 9 FL (ref 8.9–12.7)
POTASSIUM SERPL-SCNC: 3.5 MMOL/L (ref 3.5–5.3)
RBC # BLD AUTO: 2.81 MILLION/UL (ref 3.88–5.62)
SODIUM SERPL-SCNC: 138 MMOL/L (ref 135–147)
UNIT DISPENSE STATUS: NORMAL
UNIT PRODUCT CODE: NORMAL
UNIT PRODUCT VOLUME: 350 ML
UNIT RH: NORMAL
WBC # BLD AUTO: 4.99 THOUSAND/UL (ref 4.31–10.16)

## 2024-06-16 PROCEDURE — C9113 INJ PANTOPRAZOLE SODIUM, VIA: HCPCS | Performed by: INTERNAL MEDICINE

## 2024-06-16 PROCEDURE — 80069 RENAL FUNCTION PANEL: CPT | Performed by: INTERNAL MEDICINE

## 2024-06-16 PROCEDURE — 85027 COMPLETE CBC AUTOMATED: CPT | Performed by: INTERNAL MEDICINE

## 2024-06-16 PROCEDURE — 83735 ASSAY OF MAGNESIUM: CPT | Performed by: INTERNAL MEDICINE

## 2024-06-16 PROCEDURE — 99232 SBSQ HOSP IP/OBS MODERATE 35: CPT | Performed by: INTERNAL MEDICINE

## 2024-06-16 PROCEDURE — 99231 SBSQ HOSP IP/OBS SF/LOW 25: CPT | Performed by: SURGERY

## 2024-06-16 RX ORDER — FERROUS SULFATE 325(65) MG
325 TABLET ORAL 2 TIMES DAILY WITH MEALS
Status: DISCONTINUED | OUTPATIENT
Start: 2024-06-17 | End: 2024-06-17 | Stop reason: HOSPADM

## 2024-06-16 RX ORDER — DOCUSATE SODIUM 100 MG/1
100 CAPSULE, LIQUID FILLED ORAL 2 TIMES DAILY
Status: DISCONTINUED | OUTPATIENT
Start: 2024-06-16 | End: 2024-06-17 | Stop reason: HOSPADM

## 2024-06-16 RX ORDER — CLOPIDOGREL BISULFATE 75 MG/1
75 TABLET ORAL DAILY
Status: DISCONTINUED | OUTPATIENT
Start: 2024-06-16 | End: 2024-06-17 | Stop reason: HOSPADM

## 2024-06-16 RX ADMIN — METOPROLOL SUCCINATE 50 MG: 50 TABLET, EXTENDED RELEASE ORAL at 08:31

## 2024-06-16 RX ADMIN — MEMANTINE 5 MG: 5 TABLET ORAL at 08:31

## 2024-06-16 RX ADMIN — FERROUS SULFATE TAB 325 MG (65 MG ELEMENTAL FE) 325 MG: 325 (65 FE) TAB at 08:31

## 2024-06-16 RX ADMIN — LORATADINE 10 MG: 10 TABLET ORAL at 08:31

## 2024-06-16 RX ADMIN — PANTOPRAZOLE SODIUM 40 MG: 40 INJECTION, POWDER, FOR SOLUTION INTRAVENOUS at 16:25

## 2024-06-16 RX ADMIN — ATORVASTATIN CALCIUM 40 MG: 40 TABLET, FILM COATED ORAL at 17:27

## 2024-06-16 RX ADMIN — APIXABAN 5 MG: 5 TABLET, FILM COATED ORAL at 17:27

## 2024-06-16 RX ADMIN — CLOPIDOGREL 75 MG: 75 TABLET ORAL at 13:22

## 2024-06-16 RX ADMIN — PANTOPRAZOLE SODIUM 40 MG: 40 INJECTION, POWDER, FOR SOLUTION INTRAVENOUS at 05:34

## 2024-06-16 RX ADMIN — IRON SUCROSE 200 MG: 20 INJECTION, SOLUTION INTRAVENOUS at 12:13

## 2024-06-16 RX ADMIN — Medication 3 MG: at 21:45

## 2024-06-16 RX ADMIN — METOPROLOL SUCCINATE 50 MG: 50 TABLET, EXTENDED RELEASE ORAL at 17:26

## 2024-06-16 RX ADMIN — DOCUSATE SODIUM 100 MG: 100 CAPSULE, LIQUID FILLED ORAL at 17:27

## 2024-06-16 RX ADMIN — DOCUSATE SODIUM 100 MG: 100 CAPSULE, LIQUID FILLED ORAL at 13:22

## 2024-06-16 NOTE — PROGRESS NOTES
Pt seen and examined with attending. Pt reports that his last BM was yesterday morning and was non-bloody. No abdominal pain or tenderness on exam. Would recommend stool softener. No surgical intervention needed.     Emerald Salas  6/16/2024

## 2024-06-16 NOTE — PLAN OF CARE
Problem: PAIN - ADULT  Goal: Verbalizes/displays adequate comfort level or baseline comfort level  Description: Interventions:  - Encourage patient to monitor pain and request assistance  - Assess pain using appropriate pain scale  - Administer analgesics based on type and severity of pain and evaluate response  - Implement non-pharmacological measures as appropriate and evaluate response  - Consider cultural and social influences on pain and pain management  - Notify physician/advanced practitioner if interventions unsuccessful or patient reports new pain  Outcome: Progressing     Problem: INFECTION - ADULT  Goal: Absence or prevention of progression during hospitalization  Description: INTERVENTIONS:  - Assess and monitor for signs and symptoms of infection  - Monitor lab/diagnostic results  - Monitor all insertion sites, i.e. indwelling lines, tubes, and drains  - Monitor endotracheal if appropriate and nasal secretions for changes in amount and color  - [place] appropriate cooling/warming therapies per order  - Administer medications as ordered  - Instruct and encourage patient and family to use good hand hygiene technique  - Identify and instruct in appropriate isolation precautions for identified infection/condition  Outcome: Progressing  Goal: Absence of fever/infection during neutropenic period  Description: INTERVENTIONS:  - Monitor WBC    Outcome: Progressing     Problem: SAFETY ADULT  Goal: Patient will remain free of falls  Description: INTERVENTIONS:  - Educate patient/family on patient safety including physical limitations  - Instruct patient to call for assistance with activity   - Consult OT/PT to assist with strengthening/mobility   - Keep Call bell within reach  - Keep bed low and locked with side rails adjusted as appropriate  - Keep care items and personal belongings within reach  - Initiate and maintain comfort rounds  - Make Fall Risk Sign visible to staff  - Offer Toileting every 3 Hours,  in advance of need  - Initiate/Maintain bed alarm  - Obtain necessary fall risk management equipment:   - Apply yellow socks and bracelet for high fall risk patients  - Consider moving patient to room near nurses station  Outcome: Progressing  Goal: Maintain or return to baseline ADL function  Description: INTERVENTIONS:  -  Assess patient's ability to carry out ADLs; assess patient's baseline for ADL function and identify physical deficits which impact ability to perform ADLs (bathing, care of mouth/teeth, toileting, grooming, dressing, etc.)  - Assess/evaluate cause of self-care deficits   - Assess range of motion  - Assess patient's mobility; develop plan if impaired  - Assess patient's need for assistive devices and provide as appropriate  - Encourage maximum independence but intervene and supervise when necessary  - Involve family in performance of ADLs  - Assess for home care needs following discharge   - Consider OT consult to assist with ADL evaluation and planning for discharge  - Provide patient education as appropriate  Outcome: Progressing  Goal: Maintains/Returns to pre admission functional level  Description: INTERVENTIONS:  - Perform AM-PAC 6 Click Basic Mobility/ Daily Activity assessment daily.  - Set and communicate daily mobility goal to care team and patient/family/caregiver.   - Collaborate with rehabilitation services on mobility goals if consulted  - Perform Range of Motion 3 times a day.  - Reposition patient every 3 hours.  - Dangle patient 3 times a day  - Stand patient 3 times a day  - Ambulate patient 3 times a day  - Out of bed to chair 3 times a day   - Out of bed for meals 3 times a day  - Out of bed for toileting  - Record patient progress and toleration of activity level   Outcome: Progressing     Problem: DISCHARGE PLANNING  Goal: Discharge to home or other facility with appropriate resources  Description: INTERVENTIONS:  - Identify barriers to discharge w/patient and caregiver  -  Arrange for needed discharge resources and transportation as appropriate  - Identify discharge learning needs (meds, wound care, etc.)  - Arrange for interpretive services to assist at discharge as needed  - Refer to Case Management Department for coordinating discharge planning if the patient needs post-hospital services based on physician/advanced practitioner order or complex needs related to functional status, cognitive ability, or social support system  Outcome: Progressing     Problem: Knowledge Deficit  Goal: Patient/family/caregiver demonstrates understanding of disease process, treatment plan, medications, and discharge instructions  Description: Complete learning assessment and assess knowledge base.  Interventions:  - Provide teaching at level of understanding  - Provide teaching via preferred learning methods  Outcome: Progressing     Problem: GASTROINTESTINAL - ADULT  Goal: Minimal or absence of nausea and/or vomiting  Description: INTERVENTIONS:  - Administer IV fluids if ordered to ensure adequate hydration  - Maintain NPO status until nausea and vomiting are resolved  - Nasogastric tube if ordered  - Administer ordered antiemetic medications as needed  - Provide nonpharmacologic comfort measures as appropriate  - Advance diet as tolerated, if ordered  - Consider nutrition services referral to assist patient with adequate nutrition and appropriate food choices  Outcome: Progressing  Goal: Maintains or returns to baseline bowel function  Description: INTERVENTIONS:  - Assess bowel function  - Encourage oral fluids to ensure adequate hydration  - Administer IV fluids if ordered to ensure adequate hydration  - Administer ordered medications as needed  - Encourage mobilization and activity  - Consider nutritional services referral to assist patient with adequate nutrition and appropriate food choices  Outcome: Progressing  Goal: Maintains adequate nutritional intake  Description: INTERVENTIONS:  - Monitor  percentage of each meal consumed  - Identify factors contributing to decreased intake, treat as appropriate  - Assist with meals as needed  - Monitor I&O, weight, and lab values if indicated  - Obtain nutrition services referral as needed  Outcome: Progressing  Goal: Establish and maintain optimal ostomy function  Description: INTERVENTIONS:  - Assess bowel function  - Encourage oral fluids to ensure adequate hydration  - Administer IV fluids if ordered to ensure adequate hydration   - Administer ordered medications as needed  - Encourage mobilization and activity  - Nutrition services referral to assist patient with appropriate food choices  - Assess stoma site  - Consider wound care consult   Outcome: Progressing  Goal: Oral mucous membranes remain intact  Description: INTERVENTIONS  - Assess oral mucosa and hygiene practices  - Implement preventative oral hygiene regimen  - Implement oral medicated treatments as ordered  - Initiate Nutrition services referral as needed  Outcome: Progressing

## 2024-06-16 NOTE — ASSESSMENT & PLAN NOTE
Patient presents to the ED for rectal bleeding that started this morning.  Patient has been hospitalized multiple times over the last month for GI bleeding.  Patient does have a history of diverticular bleeding.  Patient most recently discharged from the hospital yesterday.  His Plavix and Eliquis had been resumed on 6/12/2024.  He denies any associated lightheadedness, chest pain, shortness of breath, abdominal pain, nausea or vomiting.  CT high volume bleed: No CT evidence of active high-volume gastrointestinal hemorrhage. Stable diffuse colonic diverticulosis without diverticulitis.  H/o RADAMES. Takes ferrous sulfate tablets at home. Will give dose of IV venofer 200mg today.  S/p 1 U PRBC this admission  Hemoglobin trends: 8.0->7.0->7.8  Monitor CBC. Transfuse for hemoglobin <7  No further rectal bleeding/bloody stools by patient or staff  for 24 hours*  Chronically on Eliquis and Plavix for history of afib, CAD and stroke. Discussed with Cardiology patient will require both AC and AP therapy at this time. Could consider decreasing Eliquis to 2.5mg BID or switching plavix to ASA. Spoke to GI, they do not feel changes to these other regimens will reduce bleeding risk. Will plan to resume home regimen and monitor for recurrent bleeding  General Surgery consulted, no indication for surgical intervention at this time  GI consulted, no need for repeat colonoscopy at this time. If patient has any recurrent bleeding place IR consult and re-consult general surgery team

## 2024-06-16 NOTE — PROGRESS NOTES
[facility]  Progress Note  Name: Christopher Razo I  MRN: [id number]  Unit/Bed#: 2 [address] Date of Admission: 6/14/2024   Date of Service: 6/16/2024 I Hospital Day: 1    Assessment & Plan   * GI bleed  Assessment & Plan  Patient presents to the ED for rectal bleeding that started this morning.  Patient has been hospitalized multiple times over the last month for GI bleeding.  Patient does have a history of diverticular bleeding.  Patient most recently discharged from the hospital yesterday.  His Plavix and Eliquis had been resumed on 6/12/2024.  He denies any associated lightheadedness, chest pain, shortness of breath, abdominal pain, nausea or vomiting.  CT high volume bleed: No CT evidence of active high-volume gastrointestinal hemorrhage. Stable diffuse colonic diverticulosis without diverticulitis.  H/o RADAMES. Takes ferrous sulfate tablets at home. Will give dose of IV venofer 200mg today.  S/p 1 U PRBC this admission  Hemoglobin trends: 8.0->7.0->7.8  Monitor CBC. Transfuse for hemoglobin <7  No further rectal bleeding/bloody stools by patient or staff  for 24 hours*  Chronically on Eliquis and Plavix for history of afib, CAD and stroke. Discussed with Cardiology patient will require both AC and AP therapy at this time. Could consider decreasing Eliquis to 2.5mg BID or switching plavix to ASA. Spoke to GI, they do not feel changes to these other regimens will reduce bleeding risk. Will plan to resume home regimen and monitor for recurrent bleeding  General Surgery consulted, no indication for surgical intervention at this time  GI consulted, no need for repeat colonoscopy at this time. If patient has any recurrent bleeding place IR consult and re-consult general surgery team    CAD (coronary artery disease)  Assessment & Plan  Denies any chest pain  Continue statin and metoprolol  Resume Eliquis and Plavix 6/16/24    Paroxysmal A-fib (HCC)  Assessment & Plan  Rate controlled with  metoprolol  Resume Eliquis 24    History of stroke  Assessment & Plan  Continue statin therapy  Holding Plavix in the setting of GI bleed    Dementia without behavioral disturbance (HCC)  Assessment & Plan  Continue Namenda  Delirium precautions    Benign essential hypertension  Assessment & Plan  BP stable  Continue home metoprolol  Monitor vitals per routine             VTE Pharmacologic Prophylaxis: VTE Score: 3 Moderate Risk (Score 3-4) - Pharmacological DVT Prophylaxis Contraindicated. Sequential Compression Devices Ordered.    Mobility:   Basic Mobility Inpatient Raw Score: 24  JH-HLM Goal: 8: Walk 250 feet or more  JH-HLM Achieved: 8: Walk 250 feet ot more  JH-HLM Goal achieved. Continue to encourage appropriate mobility.    Patient Centered Rounds: I performed bedside rounds with nursing staff today.   Discussions with Specialists or Other Care Team Provider: Nursing, GI, Surgery, Cardiology    Education and Discussions with Family / Patient: Updated  (wife) at bedside.    Total Time Spent on Date of Encounter in care of patient: 45 mins. This time was spent on one or more of the following: performing physical exam; counseling and coordination of care; obtaining or reviewing history; documenting in the medical record; reviewing/ordering tests, medications or procedures; communicating with other healthcare professionals and discussing with patient's family/caregivers.    Current Length of Stay: 1 day(s)  Current Patient Status: Inpatient   Certification Statement: The patient will continue to require additional inpatient hospital stay due to GI bleed  Discharge Plan: Anticipate discharge in 24-48 hrs to home.    Code Status: Level 1 - Full Code    Subjective:   Patient denies any further rectal bleeding >24 hours. No acute complaints.     Objective:     Vitals:   Temp (24hrs), Av.8 °F (36.6 °C), Min:97.2 °F (36.2 °C), Max:98.3 °F (36.8 °C)    Temp:  [97.2 °F (36.2 °C)-98.3 °F (36.8 °C)]  98.3 °F (36.8 °C)  HR:  [72-83] 83  Resp:  [15-18] 18  BP: (100-149)/(62-78) 149/78  SpO2:  [92 %-98 %] 98 %  There is no height or weight on file to calculate BMI.     Input and Output Summary (last 24 hours):     Intake/Output Summary (Last 24 hours) at 6/16/2024 1309  Last data filed at 6/16/2024 0501  Gross per 24 hour   Intake 250 ml   Output 200 ml   Net 50 ml       Physical Exam:   Physical Exam  Vitals and nursing note reviewed.   Constitutional:       General: He is not in acute distress.     Appearance: He is well-developed.   HENT:      Head: Normocephalic and atraumatic.   Eyes:      Conjunctiva/sclera: Conjunctivae normal.   Cardiovascular:      Rate and Rhythm: Normal rate and regular rhythm.      Heart sounds: No murmur heard.  Pulmonary:      Effort: Pulmonary effort is normal. No respiratory distress.      Breath sounds: Normal breath sounds.   Abdominal:      Palpations: Abdomen is soft.      Tenderness: There is no abdominal tenderness.   Musculoskeletal:         General: No swelling.      Cervical back: Neck supple.   Skin:     General: Skin is warm and dry.      Capillary Refill: Capillary refill takes less than 2 seconds.   Neurological:      Mental Status: He is alert. Mental status is at baseline.   Psychiatric:         Mood and Affect: Mood normal.          Additional Data:     Labs:  Results from last 7 days   Lab Units 06/16/24  0537 06/14/24  1801 06/14/24  1323   WBC Thousand/uL 4.99   < > 5.69   HEMOGLOBIN g/dL 7.8*   < > 8.0*   HEMATOCRIT % 24.5*   < > 25.4*   PLATELETS Thousands/uL 234   < > 228   SEGS PCT %  --   --  73   LYMPHO PCT %  --   --  12*   MONO PCT %  --   --  10   EOS PCT %  --   --  3    < > = values in this interval not displayed.     Results from last 7 days   Lab Units 06/16/24  0537 06/15/24  0501 06/14/24  1323   SODIUM mmol/L 138   < > 143   POTASSIUM mmol/L 3.5   < > 3.8   CHLORIDE mmol/L 110*   < > 107   CO2 mmol/L 22   < > 26   BUN mg/dL 15   < > 13   CREATININE  mg/dL 0.82   < > 0.98   ANION GAP mmol/L 6   < > 10   CALCIUM mg/dL 7.8*   < > 8.0*   ALBUMIN g/dL 3.1*   < > 3.2*   TOTAL BILIRUBIN mg/dL  --   --  0.58   ALK PHOS U/L  --   --  86   ALT U/L  --   --  13   AST U/L  --   --  11*   GLUCOSE RANDOM mg/dL 107   < > 117    < > = values in this interval not displayed.     Results from last 7 days   Lab Units 06/14/24  1323   INR  1.16     Results from last 7 days   Lab Units 06/11/24  0843   POC GLUCOSE mg/dl 108               Lines/Drains:  Invasive Devices       Peripheral Intravenous Line  Duration             Peripheral IV 06/14/24 Left Antecubital 1 day                          Imaging: No pertinent imaging reviewed.    Recent Cultures (last 7 days):         Last 24 Hours Medication List:   Current Facility-Administered Medications   Medication Dose Route Frequency Provider Last Rate    acetaminophen  650 mg Oral Q6H PRN Jada Velazquez PA-C      apixaban  5 mg Oral BID Jada Velazquez PA-C      atorvastatin  40 mg Oral QPM Jada Velazquez PA-C      clopidogrel  75 mg Oral Daily Jada Velazquez PA-C      [START ON 6/17/2024] ferrous sulfate  325 mg Oral BID With Meals Jada Velazquez PA-C      iron sucrose  200 mg Intravenous Once Jada Velazquez PA-C 200 mg (06/16/24 1213)    loratadine  10 mg Oral Daily Jada Velazquez PA-C      melatonin  3 mg Oral HS Deepti Patrick PA-C      memantine  5 mg Oral Daily Jada Velazquez PA-C      metoprolol succinate  50 mg Oral BID Jada Velazquez PA-C      pantoprazole  40 mg Intravenous Q12H Jada Velazquez PA-C          Today, Patient Was Seen By: Jada Velazquez PA-C    **Please Note: This note may have been constructed using a voice recognition system.**

## 2024-06-16 NOTE — PLAN OF CARE
Problem: PAIN - ADULT  Goal: Verbalizes/displays adequate comfort level or baseline comfort level  Description: Interventions:  - Encourage patient to monitor pain and request assistance  - Assess pain using appropriate pain scale  - Administer analgesics based on type and severity of pain and evaluate response  - Implement non-pharmacological measures as appropriate and evaluate response  - Consider cultural and social influences on pain and pain management  - Notify physician/advanced practitioner if interventions unsuccessful or patient reports new pain  Outcome: Progressing     Problem: INFECTION - ADULT  Goal: Absence or prevention of progression during hospitalization  Description: INTERVENTIONS:  - Assess and monitor for signs and symptoms of infection  - Monitor lab/diagnostic results  - Monitor all insertion sites, i.e. indwelling lines, tubes, and drains  - Monitor endotracheal if appropriate and nasal secretions for changes in amount and color  - [place] appropriate cooling/warming therapies per order  - Administer medications as ordered  - Instruct and encourage patient and family to use good hand hygiene technique  - Identify and instruct in appropriate isolation precautions for identified infection/condition  Outcome: Progressing  Goal: Absence of fever/infection during neutropenic period  Description: INTERVENTIONS:  - Monitor WBC    Outcome: Progressing     Problem: SAFETY ADULT  Goal: Patient will remain free of falls  Description: INTERVENTIONS:  - Educate patient/family on patient safety including physical limitations  - Instruct patient to call for assistance with activity   - Consult OT/PT to assist with strengthening/mobility   - Keep Call bell within reach  - Keep bed low and locked with side rails adjusted as appropriate  - Keep care items and personal belongings within reach  - Initiate and maintain comfort rounds  - Make Fall Risk Sign visible to staff  - Offer Toileting every  Hours,  in advance of need  - Initiate/Maintain alarm  - Obtain necessary fall risk management equipment:   - Apply yellow socks and bracelet for high fall risk patients  - Consider moving patient to room near nurses station  Outcome: Progressing  Goal: Maintain or return to baseline ADL function  Description: INTERVENTIONS:  -  Assess patient's ability to carry out ADLs; assess patient's baseline for ADL function and identify physical deficits which impact ability to perform ADLs (bathing, care of mouth/teeth, toileting, grooming, dressing, etc.)  - Assess/evaluate cause of self-care deficits   - Assess range of motion  - Assess patient's mobility; develop plan if impaired  - Assess patient's need for assistive devices and provide as appropriate  - Encourage maximum independence but intervene and supervise when necessary  - Involve family in performance of ADLs  - Assess for home care needs following discharge   - Consider OT consult to assist with ADL evaluation and planning for discharge  - Provide patient education as appropriate  Outcome: Progressing  Goal: Maintains/Returns to pre admission functional level  Description: INTERVENTIONS:  - Perform AM-PAC 6 Click Basic Mobility/ Daily Activity assessment daily.  - Set and communicate daily mobility goal to care team and patient/family/caregiver.   - Collaborate with rehabilitation services on mobility goals if consulted  - Perform Range of Motion  times a day.  - Reposition patient every  hours.  - Dangle patient  times a day  - Stand patient  times a day  - Ambulate patient  times a day  - Out of bed to chair  times a day   - Out of bed for meals times a day  - Out of bed for toileting  - Record patient progress and toleration of activity level   Outcome: Progressing     Problem: DISCHARGE PLANNING  Goal: Discharge to home or other facility with appropriate resources  Description: INTERVENTIONS:  - Identify barriers to discharge w/patient and caregiver  - Arrange for  needed discharge resources and transportation as appropriate  - Identify discharge learning needs (meds, wound care, etc.)  - Arrange for interpretive services to assist at discharge as needed  - Refer to Case Management Department for coordinating discharge planning if the patient needs post-hospital services based on physician/advanced practitioner order or complex needs related to functional status, cognitive ability, or social support system  Outcome: Progressing     Problem: Knowledge Deficit  Goal: Patient/family/caregiver demonstrates understanding of disease process, treatment plan, medications, and discharge instructions  Description: Complete learning assessment and assess knowledge base.  Interventions:  - Provide teaching at level of understanding  - Provide teaching via preferred learning methods  Outcome: Progressing     Problem: GASTROINTESTINAL - ADULT  Goal: Minimal or absence of nausea and/or vomiting  Description: INTERVENTIONS:  - Administer IV fluids if ordered to ensure adequate hydration  - Maintain NPO status until nausea and vomiting are resolved  - Nasogastric tube if ordered  - Administer ordered antiemetic medications as needed  - Provide nonpharmacologic comfort measures as appropriate  - Advance diet as tolerated, if ordered  - Consider nutrition services referral to assist patient with adequate nutrition and appropriate food choices  Outcome: Progressing  Goal: Maintains or returns to baseline bowel function  Description: INTERVENTIONS:  - Assess bowel function  - Encourage oral fluids to ensure adequate hydration  - Administer IV fluids if ordered to ensure adequate hydration  - Administer ordered medications as needed  - Encourage mobilization and activity  - Consider nutritional services referral to assist patient with adequate nutrition and appropriate food choices  Outcome: Progressing  Goal: Maintains adequate nutritional intake  Description: INTERVENTIONS:  - Monitor percentage  of each meal consumed  - Identify factors contributing to decreased intake, treat as appropriate  - Assist with meals as needed  - Monitor I&O, weight, and lab values if indicated  - Obtain nutrition services referral as needed  Outcome: Progressing  Goal: Establish and maintain optimal ostomy function  Description: INTERVENTIONS:  - Assess bowel function  - Encourage oral fluids to ensure adequate hydration  - Administer IV fluids if ordered to ensure adequate hydration   - Administer ordered medications as needed  - Encourage mobilization and activity  - Nutrition services referral to assist patient with appropriate food choices  - Assess stoma site  - Consider wound care consult   Outcome: Progressing  Goal: Oral mucous membranes remain intact  Description: INTERVENTIONS  - Assess oral mucosa and hygiene practices  - Implement preventative oral hygiene regimen  - Implement oral medicated treatments as ordered  - Initiate Nutrition services referral as needed  Outcome: Progressing

## 2024-06-17 ENCOUNTER — TRANSITIONAL CARE MANAGEMENT (OUTPATIENT)
Dept: FAMILY MEDICINE CLINIC | Facility: CLINIC | Age: 69
End: 2024-06-17

## 2024-06-17 ENCOUNTER — TELEPHONE (OUTPATIENT)
Dept: FAMILY MEDICINE CLINIC | Facility: CLINIC | Age: 69
End: 2024-06-17

## 2024-06-17 VITALS
HEIGHT: 72 IN | OXYGEN SATURATION: 96 % | RESPIRATION RATE: 20 BRPM | HEART RATE: 75 BPM | SYSTOLIC BLOOD PRESSURE: 110 MMHG | DIASTOLIC BLOOD PRESSURE: 57 MMHG | TEMPERATURE: 98.2 F | BODY MASS INDEX: 31.49 KG/M2

## 2024-06-17 LAB
ANION GAP SERPL CALCULATED.3IONS-SCNC: 6 MMOL/L (ref 4–13)
BUN SERPL-MCNC: 15 MG/DL (ref 5–25)
CALCIUM SERPL-MCNC: 8.1 MG/DL (ref 8.4–10.2)
CHLORIDE SERPL-SCNC: 111 MMOL/L (ref 96–108)
CO2 SERPL-SCNC: 23 MMOL/L (ref 21–32)
CREAT SERPL-MCNC: 0.8 MG/DL (ref 0.6–1.3)
ERYTHROCYTE [DISTWIDTH] IN BLOOD BY AUTOMATED COUNT: 13.9 % (ref 11.6–15.1)
GFR SERPL CREATININE-BSD FRML MDRD: 93 ML/MIN/1.73SQ M
GLUCOSE SERPL-MCNC: 106 MG/DL (ref 65–140)
HCT VFR BLD AUTO: 24.9 % (ref 36.5–49.3)
HGB BLD-MCNC: 7.8 G/DL (ref 12–17)
MCH RBC QN AUTO: 27.5 PG (ref 26.8–34.3)
MCHC RBC AUTO-ENTMCNC: 31.3 G/DL (ref 31.4–37.4)
MCV RBC AUTO: 88 FL (ref 82–98)
PLATELET # BLD AUTO: 224 THOUSANDS/UL (ref 149–390)
PMV BLD AUTO: 8.6 FL (ref 8.9–12.7)
POTASSIUM SERPL-SCNC: 3.8 MMOL/L (ref 3.5–5.3)
RBC # BLD AUTO: 2.84 MILLION/UL (ref 3.88–5.62)
SODIUM SERPL-SCNC: 140 MMOL/L (ref 135–147)
WBC # BLD AUTO: 5.06 THOUSAND/UL (ref 4.31–10.16)

## 2024-06-17 PROCEDURE — 80048 BASIC METABOLIC PNL TOTAL CA: CPT | Performed by: INTERNAL MEDICINE

## 2024-06-17 PROCEDURE — C9113 INJ PANTOPRAZOLE SODIUM, VIA: HCPCS | Performed by: INTERNAL MEDICINE

## 2024-06-17 PROCEDURE — 99234 HOSP IP/OBS SM DT SF/LOW 45: CPT

## 2024-06-17 PROCEDURE — 85027 COMPLETE CBC AUTOMATED: CPT | Performed by: INTERNAL MEDICINE

## 2024-06-17 RX ORDER — PANTOPRAZOLE SODIUM 40 MG/1
40 TABLET, DELAYED RELEASE ORAL DAILY
Qty: 30 TABLET | Refills: 0 | Status: SHIPPED | OUTPATIENT
Start: 2024-06-17 | End: 2024-07-17

## 2024-06-17 RX ADMIN — LORATADINE 10 MG: 10 TABLET ORAL at 09:27

## 2024-06-17 RX ADMIN — APIXABAN 5 MG: 5 TABLET, FILM COATED ORAL at 09:27

## 2024-06-17 RX ADMIN — CLOPIDOGREL 75 MG: 75 TABLET ORAL at 09:27

## 2024-06-17 RX ADMIN — DOCUSATE SODIUM 100 MG: 100 CAPSULE, LIQUID FILLED ORAL at 09:27

## 2024-06-17 RX ADMIN — PANTOPRAZOLE SODIUM 40 MG: 40 INJECTION, POWDER, FOR SOLUTION INTRAVENOUS at 05:05

## 2024-06-17 RX ADMIN — MEMANTINE 5 MG: 5 TABLET ORAL at 09:27

## 2024-06-17 RX ADMIN — FERROUS SULFATE TAB 325 MG (65 MG ELEMENTAL FE) 325 MG: 325 (65 FE) TAB at 09:27

## 2024-06-17 RX ADMIN — METOPROLOL SUCCINATE 50 MG: 50 TABLET, EXTENDED RELEASE ORAL at 09:27

## 2024-06-17 NOTE — ASSESSMENT & PLAN NOTE
Patient presents to the ED for rectal bleeding that started this morning.  Patient has been hospitalized multiple times over the last month for GI bleeding.  Patient does have a history of diverticular bleeding.  Patient most recently discharged from the hospital yesterday.  His Plavix and Eliquis had been resumed on 6/12/2024.  He denies any associated lightheadedness, chest pain, shortness of breath, abdominal pain, nausea or vomiting.  CT high volume bleed: No CT evidence of active high-volume gastrointestinal hemorrhage. Stable diffuse colonic diverticulosis without diverticulitis.  H/o RADAMES. Takes ferrous sulfate tablets at home. Will give dose of IV venofer 200mg today.  S/p 1 U PRBC this admission  Hemoglobin trends: 8.0->7.0->7.8  Monitor CBC. Transfuse for hemoglobin <7  No further rectal bleeding/bloody stools by patient or staff  for 24 hours*  Chronically on Eliquis and Plavix for history of afib, CAD and stroke. Discussed with Cardiology patient will require both AC and AP therapy at this time. Could consider decreasing Eliquis to 2.5mg BID or switching plavix to ASA. Spoke to GI, they do not feel changes to these other regimens will reduce bleeding risk. Will plan to resume home regimen and monitor for recurrent bleeding  General Surgery consulted, no indication for surgical intervention at this time  GI consulted, no need for repeat colonoscopy at this time. If patient has any recurrent bleeding place IR consult and re-consult general surgery team  Check CBC outpatient in 3 days . Patient has script from PCP

## 2024-06-17 NOTE — TELEPHONE ENCOUNTER
First attempt to reach Mohit. Message left to call back on 6/17/24 so I can see how he is feeling and review hospital discharge instructions. . He already has an appt scheduled [email or web address]

## 2024-06-17 NOTE — QUICK NOTE
68-year-old male with past medical history of hypertension paroxysmal A-fib (Eliquis and Plavix), CAD, history of CVA, dementia and multiple recent hospitalizations for GI bleed presenting with GI bleed.  Patient with a nonbloody bowel movement yesterday.  Hemoglobin 7.8.  Tolerating regular diet.  No acute surgical intervention indicated.  Will follow in the periphery.  Please call with any questions or concerns.

## 2024-06-17 NOTE — CASE MANAGEMENT
Case Management Assessment & Discharge Planning Note    Patient name Christopher Razo  Location [facility] MRN [id number]  : 1955 Date 2024       Current Admission Date: 2024  Current Admission Diagnosis:GI bleed   Patient Active Problem List    Diagnosis Date Noted Date Diagnosed    Perseveration 2024     Diverticulosis of large intestine without perforation or abscess with bleeding 2024     History of GI diverticular bleed 2024     Dementia without behavioral disturbance, psychotic disturbance, mood disturbance, or anxiety ([facility]) 2023     NSVT (nonsustained ventricular tachycardia) ([facility]) 2022     H/O heart artery stent 2022     CAD (coronary artery disease) 2022     Probable Chronic diastolic congestive heart failure ([facility]) 10/28/2022     Dyspnea on exertion 2022     Anemia and GI bleed 2022     Paroxysmal A-fib ([facility]) 2022     GI bleed 2022     Personal history of colonic polyps 2022     History of stroke 2021     Dementia without behavioral disturbance ([facility]) 10/29/2020     Aortic stenosis 10/14/2020     Mitral regurgitation 10/14/2020     History of cardioembolic cerebrovascular accident (CVA) 2020     Unsteady gait 2020     Persistent insomnia 2020     Brain atrophy ([facility]) 2020     Imbalance 2020     [place] cardiac risk 10-20% in next 10 years 2020     Neuropathy 2020     CAMMIE (generalized anxiety disorder) 2020     Cubital tunnel syndrome on right 2019     Immunization due 10/17/2018     Arthropathy 2017     Internal hemorrhoids 2017     Obesity (BMI 30-39.9) 2016     Low HDL (under 40) 2016     Pre-diabetes 2016     Benign essential hypertension 2016     Tubular adenoma of colon 2014     [place] 2014     Asthma, mild intermittent 2014     Colon, diverticulosis 2013     Chronic rhinitis  09/04/2012     Obstructive sleep apnea 09/04/2012     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       LOS (days): 2  Geometric Mean LOS (GMLOS) (days):   Days to GMLOS:     OBJECTIVE:  PATIENT READMITTED TO HOSPITAL  Risk of Unplanned Readmission Score: 20.15     Current admission status: Inpatient    Preferred Pharmacy:   [id number] - [place], [place] - [address]  [address] [address]  Phone: [phone] Fax: [phone]    [id number] - [place], [place] - [address]  [address]  [facility] [address]  Phone: [phone] Fax: [phone]    Primary Care Provider: Richard Varela DO    Primary Insurance: [employer] REP  Secondary Insurance:     ASSESSMENT:  Active Health Care Proxies    There are no active Health Care Proxies on file.       Advance Directives  Does patient currently have a Health Care decision maker?: Yes, please see Health Care Proxy section  Primary Contact: Harriet Razo    Readmission Root Cause  30 Day Readmission: Yes  Who directed you to return to the hospital?: Family  Did you understand whom to contact if you had questions or problems?: Yes  Did you get your prescriptions before you left the hospital?: No  Reason:: Preference for own pharmacy  Were you able to get your prescriptions filled when you left the hospital?: Yes  Did you take your medications as prescribed?: Yes  Were you able to get to your follow-up appointments?: No  Reason:: Readmitted prior to appointment  During previous admission, was a post-acute recommendation made?: No (home with wife)  Patient was readmitted due to: GI bleed  Action Plan: medical management    Patient Information  Admitted from:: Home  Mental Status: Alert  During Assessment patient was accompanied by: Spouse  Assessment information provided by:: Patient, Spouse  Primary Caregiver: Spouse  Caregiver's Name:: Harriet Razo  Caregiver's Relationship to Patient:: Family Member (wife)  Caregiver's Telephone Number::  [phone]  Support Systems: Spouse/significant other  [facility] of Residence: [place]  What city do you live in?: [place]  Home entry access options. Select all that apply.: Stairs  Number of steps to enter home.: 2  Type of Current Residence: [facility]    Activities of Daily Living Prior to Admission  Functional Status: Independent  Completes ADLs independently?: Yes  Ambulates independently?: Yes  Does patient use assisted devices?: Yes  Assisted Devices (DME) used: Straight Cane  Does patient currently own DME?: Yes  What DME does the patient currently own?: Straight Cane  Does patient have a history of Outpatient Therapy (PT/OT)?: Yes  Does the patient have a history of Short-Term Rehab?: No  Does patient have a history of HHC?: No  Does patient currently have HHC?: No    Patient Information Continued  Income Source: Pension/custodial  Does patient have prescription coverage?: Yes  Does patient receive dialysis treatments?: No    Means of Transportation  [facility]      Social Determinants of Health (SDOH)      Flowsheet Row Most Recent Value   Housing Stability    In the last 12 months, was there a time when you were not able to pay the mortgage or rent on time? N   In the past 12 months, how many times have you moved where you were living? 0   At any time in the past 12 months, were you homeless or living in a shelter (including now)? N   Transportation Needs    In the past 12 months, has lack of transportation kept you from medical appointments or from getting medications? no   In the past 12 months, has lack of transportation kept you from meetings, work, or from getting things needed for daily living? No   Food Insecurity    Within the past 12 months, you worried that your food would run out before you got the money to buy more. Never true   Within the past 12 months, the food you bought just didn't last and you didn't have money to get more. Never true   Utilities    In  the past 12 months has the electric, gas, oil, or water company threatened to shut off services in your home? No            DISCHARGE DETAILS:    Discharge planning discussed with:: Patient and wife, Harriet Razo  Freedom of Choice: Yes  Comments - Freedom of Choice: SW met with pt and wife to assess needs and discuss plans.  Pt and wife are planning on pt returning home today. SW talked with wife about plans to follow up with PCP and specialists.  Wife said pt has PCP appt next week and she plans to make an appointment with cardiology about the blood thinners pt is on.  Wife said she had difficulty getting in contact with cardiologist this hospitalization but was able to talk to Dr. Castillo last night.  Wife expressed that coming back to hospital is not what they want to keep doing.  No discharge needs expressed by pt or wife at time of discharge. Offered assistance in future if needed.  CM contacted family/caregiver?: Yes  Were Treatment Team discharge recommendations reviewed with patient/caregiver?: Yes  Did patient/caregiver verbalize understanding of patient care needs?: Yes    Contacts  Patient Contacts: Harriet Razo  Relationship to Patient:: Family (wife)  Contact Method: In Person  Reason/Outcome: Discharge Planning    Requested Home Health Care         Is the patient interested in HHC at discharge?: No    DME Referral Provided  Referral made for DME?: No    Other Referral/Resources/Interventions Provided:  Interventions: None Indicated    Treatment Team Recommendation: Home  Discharge Destination Plan:: Home  Transport at Discharge : Family

## 2024-06-17 NOTE — PLAN OF CARE
Problem: PAIN - ADULT  Goal: Verbalizes/displays adequate comfort level or baseline comfort level  Description: Interventions:  - Encourage patient to monitor pain and request assistance  - Assess pain using appropriate pain scale  - Administer analgesics based on type and severity of pain and evaluate response  - Implement non-pharmacological measures as appropriate and evaluate response  - Consider cultural and social influences on pain and pain management  - Notify physician/advanced practitioner if interventions unsuccessful or patient reports new pain  Outcome: Progressing     Problem: INFECTION - ADULT  Goal: Absence or prevention of progression during hospitalization  Description: INTERVENTIONS:  - Assess and monitor for signs and symptoms of infection  - Monitor lab/diagnostic results  - Monitor all insertion sites, i.e. indwelling lines, tubes, and drains  - Monitor endotracheal if appropriate and nasal secretions for changes in amount and color  - [place] appropriate cooling/warming therapies per order  - Administer medications as ordered  - Instruct and encourage patient and family to use good hand hygiene technique  - Identify and instruct in appropriate isolation precautions for identified infection/condition  Outcome: Progressing     Problem: GASTROINTESTINAL - ADULT  Goal: Minimal or absence of nausea and/or vomiting  Description: INTERVENTIONS:  - Administer IV fluids if ordered to ensure adequate hydration  - Maintain NPO status until nausea and vomiting are resolved  - Nasogastric tube if ordered  - Administer ordered antiemetic medications as needed  - Provide nonpharmacologic comfort measures as appropriate  - Advance diet as tolerated, if ordered  - Consider nutrition services referral to assist patient with adequate nutrition and appropriate food choices  Outcome: Progressing     Problem: HEMATOLOGIC - ADULT  Goal: Maintains hematologic stability  Description: INTERVENTIONS  - Assess for  signs and symptoms of bleeding or hemorrhage  - Monitor labs  - Administer supportive blood products/factors as ordered and appropriate  Outcome: Progressing

## 2024-06-17 NOTE — NURSING NOTE
Review discharge instruction with pt and spouse, verbalized understanding of instruction. IV and Masimo discontinue. Pt discharge with cane and cell phone.

## 2024-06-17 NOTE — PLAN OF CARE
Problem: PAIN - ADULT  Goal: Verbalizes/displays adequate comfort level or baseline comfort level  Description: Interventions:  - Encourage patient to monitor pain and request assistance  - Assess pain using appropriate pain scale  - Administer analgesics based on type and severity of pain and evaluate response  - Implement non-pharmacological measures as appropriate and evaluate response  - Consider cultural and social influences on pain and pain management  - Notify physician/advanced practitioner if interventions unsuccessful or patient reports new pain  Outcome: Progressing     Problem: INFECTION - ADULT  Goal: Absence or prevention of progression during hospitalization  Description: INTERVENTIONS:  - Assess and monitor for signs and symptoms of infection  - Monitor lab/diagnostic results  - Monitor all insertion sites, i.e. indwelling lines, tubes, and drains  - Monitor endotracheal if appropriate and nasal secretions for changes in amount and color  - [place] appropriate cooling/warming therapies per order  - Administer medications as ordered  - Instruct and encourage patient and family to use good hand hygiene technique  - Identify and instruct in appropriate isolation precautions for identified infection/condition  Outcome: Progressing  Goal: Absence of fever/infection during neutropenic period  Description: INTERVENTIONS:  - Monitor WBC    Outcome: Progressing     Problem: SAFETY ADULT  Goal: Patient will remain free of falls  Description: INTERVENTIONS:  - Educate patient/family on patient safety including physical limitations  - Instruct patient to call for assistance with activity   - Consult OT/PT to assist with strengthening/mobility   - Keep Call bell within reach  - Keep bed low and locked with side rails adjusted as appropriate  - Keep care items and personal belongings within reach  - Initiate and maintain comfort rounds  - Make Fall Risk Sign visible to staff  - Offer Toileting every 2 Hours,  in advance of need  - Initiate/Maintain bed alarm  - Obtain necessary fall risk management equipment: cane  - Apply yellow socks and bracelet for high fall risk patients  - Consider moving patient to room near nurses station  Outcome: Progressing  Goal: Maintain or return to baseline ADL function  Description: INTERVENTIONS:  -  Assess patient's ability to carry out ADLs; assess patient's baseline for ADL function and identify physical deficits which impact ability to perform ADLs (bathing, care of mouth/teeth, toileting, grooming, dressing, etc.)  - Assess/evaluate cause of self-care deficits   - Assess range of motion  - Assess patient's mobility; develop plan if impaired  - Assess patient's need for assistive devices and provide as appropriate  - Encourage maximum independence but intervene and supervise when necessary  - Involve family in performance of ADLs  - Assess for home care needs following discharge   - Consider OT consult to assist with ADL evaluation and planning for discharge  - Provide patient education as appropriate  Outcome: Progressing  Goal: Maintains/Returns to pre admission functional level  Description: INTERVENTIONS:  - Perform AM-PAC 6 Click Basic Mobility/ Daily Activity assessment daily.  - Set and communicate daily mobility goal to care team and patient/family/caregiver.   - Collaborate with rehabilitation services on mobility goals if consulted  - Perform Range of Motion 3 times a day.  - Reposition patient every 2 hours.  - Dangle patient 3 times a day  - Stand patient 3 times a day  - Ambulate patient 3 times a day  - Out of bed to chair 3 times a day   - Out of bed for meals 3 times a day  - Out of bed for toileting  - Record patient progress and toleration of activity level   Outcome: Progressing     Problem: DISCHARGE PLANNING  Goal: Discharge to home or other facility with appropriate resources  Description: INTERVENTIONS:  - Identify barriers to discharge w/patient and  caregiver  - Arrange for needed discharge resources and transportation as appropriate  - Identify discharge learning needs (meds, wound care, etc.)  - Arrange for interpretive services to assist at discharge as needed  - Refer to Case Management Department for coordinating discharge planning if the patient needs post-hospital services based on physician/advanced practitioner order or complex needs related to functional status, cognitive ability, or social support system  Outcome: Progressing     Problem: Knowledge Deficit  Goal: Patient/family/caregiver demonstrates understanding of disease process, treatment plan, medications, and discharge instructions  Description: Complete learning assessment and assess knowledge base.  Interventions:  - Provide teaching at level of understanding  - Provide teaching via preferred learning methods  Outcome: Progressing     Problem: GASTROINTESTINAL - ADULT  Goal: Minimal or absence of nausea and/or vomiting  Description: INTERVENTIONS:  - Administer IV fluids if ordered to ensure adequate hydration  - Maintain NPO status until nausea and vomiting are resolved  - Nasogastric tube if ordered  - Administer ordered antiemetic medications as needed  - Provide nonpharmacologic comfort measures as appropriate  - Advance diet as tolerated, if ordered  - Consider nutrition services referral to assist patient with adequate nutrition and appropriate food choices  Outcome: Progressing  Goal: Maintains or returns to baseline bowel function  Description: INTERVENTIONS:  - Assess bowel function  - Encourage oral fluids to ensure adequate hydration  - Administer IV fluids if ordered to ensure adequate hydration  - Administer ordered medications as needed  - Encourage mobilization and activity  - Consider nutritional services referral to assist patient with adequate nutrition and appropriate food choices  Outcome: Progressing  Goal: Maintains adequate nutritional intake  Description:  INTERVENTIONS:  - Monitor percentage of each meal consumed  - Identify factors contributing to decreased intake, treat as appropriate  - Assist with meals as needed  - Monitor I&O, weight, and lab values if indicated  - Obtain nutrition services referral as needed  Outcome: Progressing  Goal: Establish and maintain optimal ostomy function  Description: INTERVENTIONS:  - Assess bowel function  - Encourage oral fluids to ensure adequate hydration  - Administer IV fluids if ordered to ensure adequate hydration   - Administer ordered medications as needed  - Encourage mobilization and activity  - Nutrition services referral to assist patient with appropriate food choices  - Assess stoma site  - Consider wound care consult   Outcome: Progressing  Goal: Oral mucous membranes remain intact  Description: INTERVENTIONS  - Assess oral mucosa and hygiene practices  - Implement preventative oral hygiene regimen  - Implement oral medicated treatments as ordered  - Initiate Nutrition services referral as needed  Outcome: Progressing

## 2024-06-17 NOTE — PLAN OF CARE
Problem: INFECTION - ADULT  Goal: Absence or prevention of progression during hospitalization  Description: INTERVENTIONS:  - Assess and monitor for signs and symptoms of infection  - Monitor lab/diagnostic results  - Monitor all insertion sites, i.e. indwelling lines, tubes, and drains  - Monitor endotracheal if appropriate and nasal secretions for changes in amount and color  - [place] appropriate cooling/warming therapies per order  - Administer medications as ordered  - Instruct and encourage patient and family to use good hand hygiene technique  - Identify and instruct in appropriate isolation precautions for identified infection/condition  6/17/2024 1208 by Angela Gamign RN  Outcome: Adequate for Discharge  6/17/2024 0932 by Angela Gaming RN  Outcome: Progressing  6/17/2024 0932 by Angela Gaming RN  Outcome: Progressing     Problem: GASTROINTESTINAL - ADULT  Goal: Minimal or absence of nausea and/or vomiting  Description: INTERVENTIONS:  - Administer IV fluids if ordered to ensure adequate hydration  - Maintain NPO status until nausea and vomiting are resolved  - Nasogastric tube if ordered  - Administer ordered antiemetic medications as needed  - Provide nonpharmacologic comfort measures as appropriate  - Advance diet as tolerated, if ordered  - Consider nutrition services referral to assist patient with adequate nutrition and appropriate food choices  6/17/2024 1208 by Angela Gaming RN  Outcome: Adequate for Discharge  6/17/2024 0932 by Angela Gaming RN  Outcome: Progressing  6/17/2024 0932 by Angela Gaming RN  Outcome: Progressing

## 2024-06-17 NOTE — UTILIZATION REVIEW
NOTIFICATION OF ADMISSION DISCHARGE   This is a Notification of Discharge from [facility]. Please be advised that this patient has been discharge from our facility. Below you will find the admission and discharge date and time including the patient’s disposition.   UTILIZATION REVIEW CONTACT:  Rita Boyle  Utilization   Network Utilization Review Department  Phone: [phone] x carefully listen to the prompts. All voicemails are confidential.  Email: [email or web address]     ADMISSION INFORMATION  PRESENTATION DATE: 6/14/2024  1:07 PM  OBERVATION ADMISSION DATE:   INPATIENT ADMISSION DATE: 6/15/24  1:06 PM   DISCHARGE DATE: 6/17/2024 12:29 PM   DISPOSITION:Home/Self Care    Network Utilization Review Department  ATTENTION: Please call with any questions or concerns to [phone] and carefully listen to the prompts so that you are directed to the right person. All voicemails are confidential.   For Discharge needs, contact Care Management DC Support Team at [phone] opt. 2  Send all requests for admission clinical reviews, approved or denied determinations and any other requests to dedicated fax number below belonging to the campus where the patient is receiving treatment. List of dedicated fax numbers for the Facilities:  FACILITY NAME UR FAX NUMBER   ADMISSION DENIALS (Administrative/Medical Necessity) [phone]   DISCHARGE SUPPORT TEAM ([facility]) [phone]   PARENT CHILD HEALTH (Maternity/NICU/Pediatrics) [phone]   [facility] [phone]   General acute hospital [phone]   Critical access hospital [phone]   [facility] [phone]   [facility] [phone]   [facility] [phone]   [facility] [phone]   [facility] [phone]    [facility] [phone]   [facility] [phone]   [facility] [phone]   [facility] [phone]

## 2024-06-17 NOTE — DISCHARGE SUMMARY
[facility]  Discharge- Christopher Razo 1955, 68 y.o. male MRN: [id number]  Unit/Bed#: [address] [place] [address] Encounter: [id number]  Primary Care Provider: Richard Varela DO   Date and time admitted to hospital: 6/14/2024  1:07 PM    * GI bleed  Assessment & Plan  Patient presents to the ED for rectal bleeding that started this morning.  Patient has been hospitalized multiple times over the last month for GI bleeding.  Patient does have a history of diverticular bleeding.  Patient most recently discharged from the hospital yesterday.  His Plavix and Eliquis had been resumed on 6/12/2024.  He denies any associated lightheadedness, chest pain, shortness of breath, abdominal pain, nausea or vomiting.  CT high volume bleed: No CT evidence of active high-volume gastrointestinal hemorrhage. Stable diffuse colonic diverticulosis without diverticulitis.  H/o RADAMES. Takes ferrous sulfate tablets at home. Will give dose of IV venofer 200mg today.  S/p 1 U PRBC this admission  Hemoglobin trends: 8.0->7.0->7.8  Monitor CBC. Transfuse for hemoglobin <7  No further rectal bleeding/bloody stools by patient or staff  for 24 hours*  Chronically on Eliquis and Plavix for history of afib, CAD and stroke. Discussed with Cardiology patient will require both AC and AP therapy at this time. Could consider decreasing Eliquis to 2.5mg BID or switching plavix to ASA. Spoke to GI, they do not feel changes to these other regimens will reduce bleeding risk. Will plan to resume home regimen and monitor for recurrent bleeding  General Surgery consulted, no indication for surgical intervention at this time  GI consulted, no need for repeat colonoscopy at this time. If patient has any recurrent bleeding place IR consult and re-consult general surgery team  Check CBC outpatient in 3 days     Paroxysmal A-fib (HCC)  Assessment & Plan  Rate controlled with metoprolol  Resume Eliquis 6/16/24    CAD (coronary artery disease)  Assessment &  Plan  Denies any chest pain  Continue statin and metoprolol  Resume Eliquis and Plavix 6/16/24    History of stroke  Assessment & Plan  Continue statin therapy   Plavix restarted on 6/16    Dementia without behavioral disturbance (HCC)  Assessment & Plan  Continue Namenda  Delirium precautions    Benign essential hypertension  Assessment & Plan  BP stable  Continue home metoprolol  Monitor vitals per routine        Medical Problems       Resolved Problems  Date Reviewed: 6/17/2024   None       Discharging Physician / Practitioner: ELVIS Hummel  PCP: Richard Varela DO  Admission Date:   Admission Orders (From admission, onward)       Ordered        06/15/24 1306  INPATIENT ADMISSION  Once            06/14/24 1613  Place in Observation  Once                          Discharge Date: 06/17/24    Consultations During Hospital Stay:  General surgery, GI    Procedures Performed:   CT  Abd/pel High volume bleeding scan:No  evidence of active high-volume gastrointestinal hemorrhage. Stable diffuse colonic diverticulosis without diverticuliti    Significant Findings / Test Results:   None       Outpatient Tests Requested:  CBC IN 3 DAYS    Complications:  None    Reason for Admission: Rectal bleeding    Hospital Course:   Christopher Razo is a 68 y.o. male patient who originally presented to the hospital on 6/14/2024 due to above. CT with no evidence of bleeding. Eliquis and Plavix were held due to hemoglobin drop and restarted prior to discharge. GI was consulted, patient recently had colonoscopy on 6/11   with sigmoid diverticular disease without any evidence of active bleeding . Hence ,no GI intervention planned at this time.  General surgery was consulted and no surgical intervention.  Patient received 1 unit PRBC for hemoglobin 6.9 and Iron infusion for IDF.  Patient is being discharged home to follow up with PCP  for further management of hemoglobin. Will check in 3 days .  On examination today, no reported  rectal bleeding.        Please see above list of diagnoses and related plan for additional information.     Condition at Discharge: stable    Discharge Day Visit / Exam:     Subjective:      Vitals: Blood Pressure: 110/57 (06/17/24 0928)  Pulse: 75 (06/17/24 0924)  Temperature: 98.2 °F (36.8 °C) (06/17/24 0924)  Temp Source: Oral (06/17/24 0924)  Respirations: 20 (06/16/24 2307)  Height: 6' (182.9 cm) (06/16/24 2100)  SpO2: 96 % (06/17/24 0924)    Exam:   Physical Exam  Vitals and nursing note reviewed.   Constitutional:       General: He is not in acute distress.     Appearance: He is well-developed.   HENT:      Head: Normocephalic and atraumatic.   Eyes:      Conjunctiva/sclera: Conjunctivae normal.   Cardiovascular:      Rate and Rhythm: Normal rate and regular rhythm.      Heart sounds: No murmur heard.  Pulmonary:      Effort: Pulmonary effort is normal. No respiratory distress.      Breath sounds: Normal breath sounds.   Abdominal:      Palpations: Abdomen is soft.      Tenderness: There is no abdominal tenderness.   Musculoskeletal:         General: No swelling.      Cervical back: Neck supple.   Skin:     General: Skin is warm and dry.      Capillary Refill: Capillary refill takes less than 2 seconds.   Neurological:      Mental Status: He is alert.   Psychiatric:         Mood and Affect: Mood normal.          Discussion with Family: Updated  (wife) at bedside.    Discharge instructions/Information to patient and family:   See after visit summary for information provided to patient and family.      Provisions for Follow-Up Care:  See after visit summary for information related to follow-up care and any pertinent home health orders.      Mobility at time of Discharge:   Basic Mobility Inpatient Raw Score: 23  JH-HLM Goal: 7: Walk 25 feet or more  JH-HLM Achieved: 6: Walk 10 steps or more  HLM Goal achieved. Continue to encourage appropriate mobility.     Disposition:   Home    Planned  Readmission:      Discharge Statement:  I spent  minutes discharging the patient. This time was spent on the day of discharge. I had direct contact with the patient on the day of discharge. Greater than 50% of the total time was spent examining patient, answering all patient questions, arranging and discussing plan of care with patient as well as directly providing post-discharge instructions.  Additional time then spent on discharge activities.    Discharge Medications:  See after visit summary for reconciled discharge medications provided to patient and/or family.      **Please Note: This note may have been constructed using a voice recognition system**

## 2024-06-18 ENCOUNTER — PATIENT OUTREACH (OUTPATIENT)
Dept: FAMILY MEDICINE CLINIC | Facility: CLINIC | Age: 69
End: 2024-06-18

## 2024-06-18 ENCOUNTER — TELEPHONE (OUTPATIENT)
Dept: FAMILY MEDICINE CLINIC | Facility: CLINIC | Age: 69
End: 2024-06-18

## 2024-06-18 DIAGNOSIS — K92.2 GASTROINTESTINAL HEMORRHAGE, UNSPECIFIED GASTROINTESTINAL HEMORRHAGE TYPE: Primary | ICD-10-CM

## 2024-06-18 NOTE — TELEPHONE ENCOUNTER
----- Message from ELVIS Ashton sent at 6/17/2024 12:03 PM EDT -----  Thank you for allowing us to participate in the care of your patient, Christopher Razo, who was hospitalized from 6/14/2024 through 6/17/2024 with the admitting diagnosis of GI Bleed.    Medication Changes:  Protonix 40 mg daily      If you have any additional questions or would like to discuss further, please feel free to contact me.    ELVIS Hummel  [facility] Internal Medicine, Hospitalist  [phone]

## 2024-06-18 NOTE — TELEPHONE ENCOUNTER
Harriet is calling to see if there is an order for Mohit to get his A1C checked. She states that the CRNP at the hospital wanted him to get this re-checked.   No active labs in chart.   Please advise and return call to patient.     Thank you

## 2024-06-18 NOTE — PROGRESS NOTES
OPCM SW attempted second outreach to patient regarding SDOH referral.  Per chart review of recent hospital stay, no SDOH concerns reported at that time. OPCSUMIT ALVARADO left a voicemail and sent UTR letter to Yael

## 2024-06-18 NOTE — LETTER
[address]  [address]  [facility] [id number]    Re: Unable to Reach   6/18/2024       Dear Christopher,    I am a [facility]  and wanted to be certain you had information to contact me should you desire assistance with or have questions about non-medical aspects of your care such as [but not limited to] medical insurance, housing, transportation, material needs, or emergency needs.  If I do not have an answer I will assist you in finding the appropriate agency or individual who can help.      Please feel free to contact me at [phone]. Thank You.    Sincerely,         GARY Luong

## 2024-06-19 ENCOUNTER — APPOINTMENT (OUTPATIENT)
Dept: LAB | Facility: HOSPITAL | Age: 69
End: 2024-06-19
Payer: COMMERCIAL

## 2024-06-19 ENCOUNTER — TELEPHONE (OUTPATIENT)
Age: 69
End: 2024-06-19

## 2024-06-19 DIAGNOSIS — K92.2 GASTROINTESTINAL HEMORRHAGE, UNSPECIFIED GASTROINTESTINAL HEMORRHAGE TYPE: ICD-10-CM

## 2024-06-19 DIAGNOSIS — D64.9 ANEMIA: ICD-10-CM

## 2024-06-19 LAB
BASOPHILS # BLD AUTO: 0.04 THOUSANDS/ÂΜL (ref 0–0.1)
BASOPHILS NFR BLD AUTO: 1 % (ref 0–1)
EOSINOPHIL # BLD AUTO: 0.26 THOUSAND/ÂΜL (ref 0–0.61)
EOSINOPHIL NFR BLD AUTO: 5 % (ref 0–6)
ERYTHROCYTE [DISTWIDTH] IN BLOOD BY AUTOMATED COUNT: 15.1 % (ref 11.6–15.1)
HCT VFR BLD AUTO: 28.2 % (ref 36.5–49.3)
HGB BLD-MCNC: 8.5 G/DL (ref 12–17)
IMM GRANULOCYTES # BLD AUTO: 0.03 THOUSAND/UL (ref 0–0.2)
IMM GRANULOCYTES NFR BLD AUTO: 1 % (ref 0–2)
LYMPHOCYTES # BLD AUTO: 0.81 THOUSANDS/ÂΜL (ref 0.6–4.47)
LYMPHOCYTES NFR BLD AUTO: 15 % (ref 14–44)
MCH RBC QN AUTO: 27.6 PG (ref 26.8–34.3)
MCHC RBC AUTO-ENTMCNC: 30.1 G/DL (ref 31.4–37.4)
MCV RBC AUTO: 92 FL (ref 82–98)
MONOCYTES # BLD AUTO: 0.51 THOUSAND/ÂΜL (ref 0.17–1.22)
MONOCYTES NFR BLD AUTO: 10 % (ref 4–12)
NEUTROPHILS # BLD AUTO: 3.66 THOUSANDS/ÂΜL (ref 1.85–7.62)
NEUTS SEG NFR BLD AUTO: 68 % (ref 43–75)
NRBC BLD AUTO-RTO: 0 /100 WBCS
PLATELET # BLD AUTO: 299 THOUSANDS/UL (ref 149–390)
PMV BLD AUTO: 9.1 FL (ref 8.9–12.7)
RBC # BLD AUTO: 3.08 MILLION/UL (ref 3.88–5.62)
WBC # BLD AUTO: 5.31 THOUSAND/UL (ref 4.31–10.16)

## 2024-06-19 PROCEDURE — 36415 COLL VENOUS BLD VENIPUNCTURE: CPT

## 2024-06-19 PROCEDURE — 85025 COMPLETE CBC W/AUTO DIFF WBC: CPT

## 2024-06-19 NOTE — TELEPHONE ENCOUNTER
Patient called back for call received today.  Patient wanted to make Dr. Varela aware that blood work was completed today.

## 2024-06-25 DIAGNOSIS — K92.2 GASTROINTESTINAL HEMORRHAGE, UNSPECIFIED GASTROINTESTINAL HEMORRHAGE TYPE: Primary | ICD-10-CM

## 2024-06-25 NOTE — TELEPHONE ENCOUNTER
Harriet (wife) called [facility] has appointment 6/26/2024 and would like to know if Hemoglobin A1C should be done prior to visit, would like to know where level is. Does go to a [facility] lab and would like a call when placed [phone]

## 2024-06-25 NOTE — TELEPHONE ENCOUNTER
Left message on machine for patient to call office back. Okay to give message if patient returns call.     Kalpana Schmitt LPN

## 2024-06-25 NOTE — TELEPHONE ENCOUNTER
Order for CBC in chart. Advised to get this done today or early tomorrow morning so Dr. Street will have it in time for appointment tomorrow

## 2024-06-26 ENCOUNTER — OFFICE VISIT (OUTPATIENT)
Dept: FAMILY MEDICINE CLINIC | Facility: CLINIC | Age: 69
End: 2024-06-26
Payer: COMMERCIAL

## 2024-06-26 ENCOUNTER — APPOINTMENT (OUTPATIENT)
Dept: LAB | Facility: HOSPITAL | Age: 69
End: 2024-06-26
Payer: COMMERCIAL

## 2024-06-26 VITALS
RESPIRATION RATE: 20 BRPM | WEIGHT: 235 LBS | HEIGHT: 72 IN | DIASTOLIC BLOOD PRESSURE: 74 MMHG | OXYGEN SATURATION: 98 % | BODY MASS INDEX: 31.83 KG/M2 | HEART RATE: 68 BPM | TEMPERATURE: 97.6 F | SYSTOLIC BLOOD PRESSURE: 116 MMHG

## 2024-06-26 DIAGNOSIS — I47.29 NSVT (NONSUSTAINED VENTRICULAR TACHYCARDIA) (HCC): ICD-10-CM

## 2024-06-26 DIAGNOSIS — K92.2 GASTROINTESTINAL HEMORRHAGE, UNSPECIFIED GASTROINTESTINAL HEMORRHAGE TYPE: ICD-10-CM

## 2024-06-26 DIAGNOSIS — F03.90 DEMENTIA WITHOUT BEHAVIORAL DISTURBANCE (HCC): ICD-10-CM

## 2024-06-26 DIAGNOSIS — I25.10 CORONARY ARTERY DISEASE INVOLVING NATIVE CORONARY ARTERY OF NATIVE HEART WITHOUT ANGINA PECTORIS: ICD-10-CM

## 2024-06-26 DIAGNOSIS — I48.0 PAROXYSMAL A-FIB (HCC): ICD-10-CM

## 2024-06-26 DIAGNOSIS — D64.9 ACUTE ON CHRONIC ANEMIA: Primary | ICD-10-CM

## 2024-06-26 DIAGNOSIS — I10 BENIGN ESSENTIAL HYPERTENSION: ICD-10-CM

## 2024-06-26 LAB
ERYTHROCYTE [DISTWIDTH] IN BLOOD BY AUTOMATED COUNT: 14.4 % (ref 11.6–15.1)
HCT VFR BLD AUTO: 27.4 % (ref 36.5–49.3)
HGB BLD-MCNC: 8 G/DL (ref 12–17)
MCH RBC QN AUTO: 26.2 PG (ref 26.8–34.3)
MCHC RBC AUTO-ENTMCNC: 29.2 G/DL (ref 31.4–37.4)
MCV RBC AUTO: 90 FL (ref 82–98)
PLATELET # BLD AUTO: 332 THOUSANDS/UL (ref 149–390)
PMV BLD AUTO: 8.9 FL (ref 8.9–12.7)
RBC # BLD AUTO: 3.05 MILLION/UL (ref 3.88–5.62)
WBC # BLD AUTO: 5.45 THOUSAND/UL (ref 4.31–10.16)

## 2024-06-26 PROCEDURE — 85027 COMPLETE CBC AUTOMATED: CPT

## 2024-06-26 PROCEDURE — 36415 COLL VENOUS BLD VENIPUNCTURE: CPT

## 2024-06-26 PROCEDURE — 99214 OFFICE O/P EST MOD 30 MIN: CPT | Performed by: INTERNAL MEDICINE

## 2024-06-26 RX ORDER — METOPROLOL SUCCINATE 50 MG/1
50 TABLET, EXTENDED RELEASE ORAL DAILY
Start: 2024-06-26

## 2024-06-26 NOTE — ASSESSMENT & PLAN NOTE
This has resolved. He is on both eliquis and plavix, for afib and s/p stent respectively.  He and his wife will discuss with cardiology whether both are still necessary, as it has been almost 2 years since his stenting.  Has CBC to be done today, and gave another blood count request to be done in one month.  Continue po iron and add fiber for constipation.

## 2024-06-26 NOTE — ASSESSMENT & PLAN NOTE
This has been low per patient and his wife, and he has been having dizziness.  Will decrease toprol to 50 mg once daily.

## 2024-06-26 NOTE — PROGRESS NOTES
Transition of Care Visit  Name: Christopher Razo      : 1955      MRN: [id number]  Encounter Provider: [address] MD  Encounter Date: 2024   Encounter department: [facility]    Assessment & Plan   1. Anemia and GI bleed  Assessment & Plan:  This has resolved. He is on both eliquis and plavix, for afib and s/p stent respectively.  He and his wife will discuss with cardiology whether both are still necessary, as it has been almost 2 years since his stenting.  Has CBC to be done today, and gave another blood count request to be done in one month.  Continue po iron and add fiber for constipation.  Orders:  -     CBC; Future; Expected date: 2024  2. Coronary artery disease involving native coronary artery of native heart without angina pectoris  Assessment & Plan:  Denies symptoms, treatment per cardiology.  Orders:  -     metoprolol succinate (TOPROL-XL) 50 mg 24 hr tablet; Take 1 tablet (50 mg total) by mouth daily  3. Benign essential hypertension  Assessment & Plan:  This has been low per patient and his wife, and he has been having dizziness.  Will decrease toprol to 50 mg once daily.   4. Dementia without behavioral disturbance (HCC)  Assessment & Plan:  Continue memantine as ordered.  5. Gastrointestinal hemorrhage, unspecified gastrointestinal hemorrhage type  6. Paroxysmal A-fib (Formerly [facility])  Assessment & Plan:  Loop recorder in place, continue metoprolol and eliquis.  7. NSVT (nonsustained ventricular tachycardia) (Formerly [facility])  Assessment & Plan:  Managed by cardiology and has a loop recorder in place.  Orders:  -     metoprolol succinate (TOPROL-XL) 50 mg 24 hr tablet; Take 1 tablet (50 mg total) by mouth daily       History of Present Illness     Transitional Care Management Review:   Christopher Razo is a 68 y.o. male here for TCM follow up.     During the TCM phone call patient stated:  TCM Call     Date and time call was made  2024  1:37 PM    Hospital care reviewed  Records reviewed     Patient was hospitialized at  [facility]    Date of Admission  06/14/24    Date of discharge  06/17/24    Diagnosis  GI Bleed    Disposition  Home    Were the patients medications reviewed and updated  Yes    Current Symptoms  None      TCM Call     Post hospital issues  None    Should patient be enrolled in anticoag monitoring?  No    Scheduled for follow up?  Yes    Patients specialists  Other (comment); Cardiologist    Cardiologist name  Dr Reza    Neurologist name  [facility]    Other specialists names  GI    Did you obtain your prescribed medications  Yes    Do you need help managing your prescriptions or medications  No    Is transportation to your appointment needed  No    I have advised the patient to call PCP with any new or worsening symptoms  Lashawn Mota CMA    Living Arrangements  Spouse or Significiant other    Support System  Family; Friends; Children; Spouse    The type of support provided  Emotional; Physical    Do you have social support  Yes, as much as I need    Are you recieving any outpatient services  No    Are you recieving home care services  No    Are you using any community resources  No    Have you fallen in the last 12 months  Yes    How many times  twice    Interperter language line needed  No    Counseling  Patient; Family    Counseling topics  Diagnostic results; Prognosis    Comments  go to ED is any bleeding, SOB   sas/cma        As per TCM.  He feels well and denies further rectal bleeding.  He is still fatigued and wants to sleep all the time.  He is s/p one infusion of venofer in the hospital and now continues on po iron once a day.    Continues on eliquis and plavix and he and his wife are questioning whether he needs both ongoing and will discuss with cardiology.    Review of Systems   Constitutional:  Negative for chills and fever.   HENT:  Negative for ear pain and sore throat.    Eyes:  Negative for pain and visual disturbance.   Respiratory:   Negative for cough and shortness of breath.    Cardiovascular:  Negative for chest pain and palpitations.   Gastrointestinal:  Positive for constipation. Negative for abdominal pain, anal bleeding, diarrhea and vomiting.   Genitourinary:  Negative for dysuria and hematuria.   Musculoskeletal:  Negative for arthralgias and back pain.   Skin:  Negative for color change and rash.   Neurological:  Negative for seizures and syncope.   Hematological:  Negative for adenopathy. Bruises/bleeds easily.   All other systems reviewed and are negative.    Objective     /74   Pulse 68   Temp 97.6 °F (36.4 °C)   Resp 20   Ht 6' (1.829 m)   Wt 107 kg (235 lb)   SpO2 98%   BMI 31.87 kg/m²     Physical Exam  Vitals and nursing note reviewed.   Constitutional:       General: He is not in acute distress.     Appearance: He is well-developed.   HENT:      Head: Normocephalic and atraumatic.   Eyes:      Conjunctiva/sclera: Conjunctivae normal.   Cardiovascular:      Rate and Rhythm: Normal rate and regular rhythm.      Heart sounds: No murmur heard.  Pulmonary:      Effort: Pulmonary effort is normal. No respiratory distress.      Breath sounds: Normal breath sounds.   Abdominal:      Palpations: Abdomen is soft.      Tenderness: There is no abdominal tenderness.   Musculoskeletal:         General: No swelling.      Cervical back: Neck supple.   Skin:     General: Skin is warm and dry.      Capillary Refill: Capillary refill takes less than 2 seconds.   Neurological:      Mental Status: He is alert.      Gait: Gait abnormal.      Comments: Using a cane   Psychiatric:         Mood and Affect: Mood normal.     Medications have been reviewed by provider in current encounter    Administrative Statements

## 2024-07-08 PROBLEM — F03.90 DEMENTIA WITHOUT BEHAVIORAL DISTURBANCE, PSYCHOTIC DISTURBANCE, MOOD DISTURBANCE, OR ANXIETY (HCC): Status: ACTIVE | Noted: 2020-10-29

## 2024-07-26 ENCOUNTER — REMOTE DEVICE CLINIC VISIT (OUTPATIENT)
Dept: CARDIOLOGY CLINIC | Facility: CLINIC | Age: 69
End: 2024-07-26
Payer: COMMERCIAL

## 2024-07-26 DIAGNOSIS — I48.0 PAROXYSMAL A-FIB (HCC): Primary | ICD-10-CM

## 2024-07-26 PROCEDURE — 93298 REM INTERROG DEV EVAL SCRMS: CPT | Performed by: INTERNAL MEDICINE

## 2024-07-26 NOTE — PROGRESS NOTES
Results for orders placed or performed in visit on 07/26/24   Cardiac EP device report    Narrative    MDT LNQ22/ ACTIVE SYSTEM IS MRI CONDITIONAL  CARELINK TRANSMISSION: BATTERY VOLTAGE ADEQUATE. NO PATIENT OR DEVICE ACTIVATED EPISODES. ---DOAN

## 2024-07-30 ENCOUNTER — TELEPHONE (OUTPATIENT)
Dept: GASTROENTEROLOGY | Facility: CLINIC | Age: 69
End: 2024-07-30

## 2024-07-30 NOTE — TELEPHONE ENCOUNTER
Dr. Varela    Our mutual patient is scheduled for procedure: Colonoscopy    On: __8__/_30    _/_24    _      With: Dr. Ball________    He/She is taking the following blood thinner:   Plavix       Can this be stopped ___5___ days prior to the procedure?      Physician Approving clearance: ________________________    Thank you!

## 2024-07-30 NOTE — TELEPHONE ENCOUNTER
Dr. Norris    Our mutual patient is scheduled for procedure: Colonoscopy    On: _08___/_30    _/_ 24   _      With: Dr. Ball________    He/She is taking the following blood thinner:  Eliquis & Plavix       Can this be stopped ___2_& 5__ days prior to the procedure?      Physician Approving clearance: ________________________    Thank you!

## 2024-08-16 ENCOUNTER — ANESTHESIA EVENT (OUTPATIENT)
Dept: ANESTHESIOLOGY | Facility: HOSPITAL | Age: 69
End: 2024-08-16

## 2024-08-16 ENCOUNTER — ANESTHESIA (OUTPATIENT)
Dept: ANESTHESIOLOGY | Facility: HOSPITAL | Age: 69
End: 2024-08-16

## 2024-08-20 ENCOUNTER — TELEPHONE (OUTPATIENT)
Age: 69
End: 2024-08-20

## 2024-08-20 NOTE — TELEPHONE ENCOUNTER
Pts wife called in asking to make  a f/u appt. LOV was 11/2021 with Dr. English.   Scheduled f/u with Dr. Esparza for 11/21 @ 4:00 in [place]. Added to wait list.     Pts wife would like to have images done before appt due to worsening memory issues.     Please assist with placing a referral for imaging.     Thank you.

## 2024-08-20 NOTE — TELEPHONE ENCOUNTER
Dr. Esparza: please see message below and advise if updated imaging can be ordered prior to pt's appt with you     8/17/23 MRI Brain

## 2024-08-25 DIAGNOSIS — F03.90 DEMENTIA WITHOUT BEHAVIORAL DISTURBANCE, PSYCHOTIC DISTURBANCE, MOOD DISTURBANCE, OR ANXIETY, UNSPECIFIED DEMENTIA SEVERITY, UNSPECIFIED DEMENTIA TYPE (HCC): Primary | ICD-10-CM

## 2024-08-26 NOTE — TELEPHONE ENCOUNTER
History     Chief Complaint   Patient presents with     Vomiting     Fever     HPI    History obtained from family - here with mom and grandmother    Gerardo is a 7 year old otherwise healthy male who presents at  8:59 PM with one day of vomiting, diarrhea and fever. Mom reports he didn't want to eat this morning and at 10 am she was called from school to pick him up as he had vomited. At home he continued to have nbnb emesis after every attempt of oral intake. In the afternoon he developed loose stools and a fever to 102 at home and continued to vomit. He has had normal urination. Here he says his tummy hurts a little bit. Denies any pain or discomfort. There are no known sick contacts.    PMHx:  History reviewed. No pertinent past medical history.  History reviewed. No pertinent past surgical history.  These were reviewed with the patient/family.    MEDICATIONS were reviewed and are as follows:   No current facility-administered medications for this encounter.      Current Outpatient Prescriptions   Medication     ondansetron (ZOFRAN ODT) 4 MG ODT tab     ibuprofen (ADVIL,MOTRIN) 100 MG/5ML suspension     acetaminophen (TYLENOL) 160 MG/5ML elixir       ALLERGIES:  Review of patient's allergies indicates no known allergies.    IMMUNIZATIONS:  UTD by report.    SOCIAL HISTORY: Gerardo lives with mom, dad, 3 siblings, grandmother.  He does attend .      I have reviewed the Medications, Allergies, Past Medical and Surgical History, and Social History in the Epic system.    Review of Systems  Please see HPI for pertinent positives and negatives.  All other systems reviewed and found to be negative.        Physical Exam   Pulse: 122  Temp: 99.9  F (37.7  C)  Resp: 20  Weight: 25.2 kg (55 lb 8.9 oz)  SpO2: 97 %    Physical Exam  Appearance: Alert and appropriate, well developed, nontoxic, with moist mucous membranes.  HEENT: Head: Normocephalic and atraumatic. Eyes: PERRL, EOM grossly intact, conjunctivae and   Enriqueta Esparza, DO  You12 hours ago (8:31 PM)     Absolutely. I think that's very reasonable. Please let family know I ordered a MRI brain w/wo contrast, specifically NeuroQuant series which is a specified version for cognitive evaluation.    If they need any help with scheduling, they can also reach out to Central Scheduling.  Contact [facility] Central Scheduling at [phone] or [phone](toll-free) (Hours:Monday to Friday: 7:30 am to 5:30 pm, Saturday: 8 am to 12 pm).    Thank you,  Enriqueta      sclerae clear. Nose: Nares clear with no active discharge.  Mouth/Throat: No oral lesions, pharynx clear with no erythema or exudate.  Neck: Supple, no masses, no meningismus. No significant cervical lymphadenopathy.  Pulmonary: No grunting, flaring, retractions or stridor. Good air entry, clear to auscultation bilaterally, with no rales, rhonchi, or wheezing.  Cardiovascular: Regular rate and rhythm, normal S1 and S2, with no murmurs.  Normal symmetric peripheral pulses and brisk cap refill.  Abdominal: Normal bowel sounds, soft, nondistended, with no masses and no hepatosplenomegaly. Mildly and diffusely tender to palpation.  Neurologic: Alert and oriented, cranial nerves II-XII grossly intact, moving all extremities equally with grossly normal coordination and normal gait.  Extremities/Back: No deformity, no CVA tenderness.  Skin: No significant rashes, ecchymoses, or lacerations.  Genitourinary: Normal male. No inguinal hernia.  Rectal:  Deferred    ED Course     ED Course     Procedures    No results found for this or any previous visit (from the past 24 hour(s)).    Medications   ondansetron (ZOFRAN-ODT) ODT tab 4 mg (4 mg Oral Given 3/17/17 2057)       Old chart from [facility] reviewed, noncontributory.  History obtained from family.  Zofran given, oral challenge tolerated, reported feeling better    Critical care time:  none       Assessments & Plan (with Medical Decision Making)   7 y o M presenting with one day of vomiting, diarrhea and fever, consistent with a viral gastroenteritis. No signs of bacterial infection or acute intraabdominal pathology and he is not dehydrated. Tolerated oral intake here after zofran.    - Dc home  - Few doses of zofran prescribed  - ED return indications discussed    I have reviewed the nursing notes.    I have reviewed the findings, diagnosis, plan and need for follow up with the patient.  New Prescriptions    ONDANSETRON (ZOFRAN ODT) 4 MG ODT TAB    Take 1 tablet (4 mg) by  mouth every 8 hours as needed for nausea       Final diagnoses:   Viral gastroenteritis       3/17/2017   [facility] EMERGENCY DEPARTMENT     Dania Rivera MD  03/17/17 [id number]

## 2024-08-26 NOTE — TELEPHONE ENCOUNTER
Outbound call placed to patient due to no communication form completed in patient's chart. Left generic message on pt's machine informing him to call the office back or to respond to the [employer] message that RN will send. Provided office telephone number as well.

## 2024-09-05 ENCOUNTER — NURSE TRIAGE (OUTPATIENT)
Age: 69
End: 2024-09-05

## 2024-09-05 NOTE — TELEPHONE ENCOUNTER
Regarding: Worsening Symptoms  ----- Message from Marysol ALFREDO sent at 9/5/2024 11:22 AM EDT -----  Patients sonia Larios called to inform of patients worsening symptoms; advised someone will call back. Attempted to warm transfer; currently not working.  Please assist, thank you.

## 2024-09-05 NOTE — TELEPHONE ENCOUNTER
"Outbound call placed to Harriet, patient's spouse.     Harriet stated that patient has been acting confused, more than normal over the past few days. Stated that patient doesn't know where he is. 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 Harriet stated she had to yell/redirect patient back in bed because pt was adamant that he needed to go to the restaurant.  Stated patient was belligerent and upset and typically does not get that way.    Tamika stated that her and the pt typically stay home and don't go out anywhere, but yesterday the pt and Harriet went out for a little bit. Harriet is not sure if this possibly could have triggered the patient since he was in new surroundings.     Harriet had to redirect the pt a little bit this morning as well. Directing pt to go get changed and shower. Stated that pt didn't shower but did change. Stated that pt does not has such push back normally.  Denies any issues with:  -Bowel/Bladder  -Eating/Weight  -Falls  -Medications- does not believe the Memantine is helping at all.     Current Medications:  --Memantine 5 mg daily: Dementia   --Plavix 75 mg daily: CVA  --Eliquis 5 mg BID: CVA % Secondary to Afib   --Metoprolol Succinate 50 mg daily: HTN  --Atorvastatin 40 mg daily: Cholesterol    MRI brain NeuroQuant wo and w contrast   10/1/24 at 0830 @ [place]      11/21/24 @ 4 pm with Dr. Enriqueta Esparza  -RN reviewed provider's schedule and there are no OVS 60 minute appointments available sooner that 11/21/24. Harriet asked if pt could be seen by Dr. Rob if possible since pt was not seen by Dr. Esparza. Informed Harriet that Dr. Rob does not have any sooner appts either. Harriet would like to stay at the [place] office due to living right around the corner. Please advise if a sooner appt is available.     Dr. Esparza: please review and advise.     Clinical: please call Harriet back with " provider's recommendations. Okay to leave a message and Caorle will call back if needed.   Reason for Disposition  • Nursing judgment    Protocols used: Information Only Call - No Triage-ADULT-[place]

## 2024-09-06 ENCOUNTER — NURSE TRIAGE (OUTPATIENT)
Age: 69
End: 2024-09-06

## 2024-09-06 NOTE — TELEPHONE ENCOUNTER
Outbound call placed to Harriet. No answer, LMOM. Provided office number to call back with any questions.

## 2024-09-06 NOTE — TELEPHONE ENCOUNTER
I called the patients wife and she stated she already got the patient an appointment for Monday with Dr Varela. Sending as an FYI for appointment. JOE.     Kalpana Schmitt LPN

## 2024-09-06 NOTE — TELEPHONE ENCOUNTER
Harriet returned my call, and appt scheduled for 10/7 at 9:30 am with Dr. Rob at the [facility].  Address confirmed with Harriet.

## 2024-09-06 NOTE — TELEPHONE ENCOUNTER
3 attempts to triage patient.  Voice mail left for call back.       Reason for Disposition   Third attempt to contact caller AND no contact made. Phone number verified.    Answer Assessment - Initial Assessment Questions  N/A    Protocols used: No Contact or Duplicate Contact Call-ADULT-[place]

## 2024-09-06 NOTE — TELEPHONE ENCOUNTER
I called the patient and left a voicemail that I was calling to schedule him for a sooner appt with Dr. Rob at the [place] office.  I left my direct TEAMS number for call back.

## 2024-09-06 NOTE — TELEPHONE ENCOUNTER
Regarding: confusion/mental state  ----- Message from Eloisa CONRAD sent at 9/6/2024 11:55 AM EDT -----  Wife is stating he seems to be more confused than normal.  Woke up and didn't know where he was.  Stated he needed to clean the fryer.  Per Dr. Rob patient should be seen by PCP.   Can we call and triage to see if apt is needed today.  Thank you    [phone]

## 2024-09-06 NOTE — TELEPHONE ENCOUNTER
Enriqueta Esparza, DO  [facility]; [facility]; Ralph Deng MD; Ronda Pina MA15 hours ago (7:09 PM)     Mohit Gilbert is a patient last seen by our neurology team in 2021 by Dr. Brittney English with concerns at the time for memory issues possibly secondary to chronic alcohol usage and CAA, along with history of stroke.    I am currently being messaged due to worsening agitation afua over the last few days. I have never evaluated Mohit before and his appointment with me is currently scheduled for 11/21/24.    @ Dr. Deng and Ronda - Is it possible to schedule for an earlier appointment with any of our other neurology providers?    @ [facility] and clinical team - Would also advise family to speak with PCP to evaluate for concerns of worsening dementia in the interim vs possibly treatable secondary cause, such as a UTI.    Thank you!    Sincerely,  Enriqueta

## 2024-09-06 NOTE — TELEPHONE ENCOUNTER
"Received call from patient's spouse Harriet to report episodes of brief confusion when waking up overnight or first thing in the morning for the past 1 month. Patient does not know where he is, feels he has to go do something related to job he retired from 2 years ago. Patient does recall these moments of confusion after they pass. Also reports weakness from lack of activity. Patient should be seen and evaluated by PCP. Same Day OV scheduled with PCP for Monday 9/9 at 9:45 AM.    Reason for Disposition   Brief confusion (now gone)    Answer Assessment - Initial Assessment Questions  1. LEVEL OF CONSCIOUSNESS: \"How is he (she, the patient) acting right now?\" (e.g., alert-oriented, confused, lethargic, stuporous, comatose)      When patient wakes up overnight or in the morning, has confusion as to where he is  2. ONSET: \"When did the confusion start?\"  (minutes, hours, days)      Past 1 month off and on  3. PATTERN \"Does this come and go, or has it been constant since it started?\"  \"Is it present now?\"      Comes and goes  4. ALCOHOL or DRUGS: \"Has he been drinking alcohol or taking any drugs?\"       Occasional beer or alcoholic beverage; no drugs  5. NARCOTIC MEDICATIONS: \"Has he been receiving any narcotic medications?\" (e.g., morphine, Vicodin)      Denies  6. CAUSE: \"What do you think is causing the confusion?\"       No recent illness  7. OTHER SYMPTOMS: \"Are there any other symptoms?\" (e.g., difficulty breathing, headache, fever, weakness)      Weakness due lack of activity    Protocols used: Confusion - Delirium-ADULT-[place]    "

## 2024-09-08 RX ORDER — CICLOPIROX 80 MG/ML
SOLUTION TOPICAL
COMMUNITY
Start: 2024-06-26

## 2024-09-09 ENCOUNTER — OFFICE VISIT (OUTPATIENT)
Dept: FAMILY MEDICINE CLINIC | Facility: CLINIC | Age: 69
End: 2024-09-09
Payer: COMMERCIAL

## 2024-09-09 VITALS
TEMPERATURE: 96.5 F | RESPIRATION RATE: 18 BRPM | WEIGHT: 237.6 LBS | HEIGHT: 72 IN | BODY MASS INDEX: 32.18 KG/M2 | DIASTOLIC BLOOD PRESSURE: 80 MMHG | HEART RATE: 68 BPM | SYSTOLIC BLOOD PRESSURE: 132 MMHG

## 2024-09-09 DIAGNOSIS — G31.9 BRAIN ATROPHY (HCC): ICD-10-CM

## 2024-09-09 DIAGNOSIS — F03.90 DEMENTIA WITHOUT BEHAVIORAL DISTURBANCE, PSYCHOTIC DISTURBANCE, MOOD DISTURBANCE, OR ANXIETY, UNSPECIFIED DEMENTIA SEVERITY, UNSPECIFIED DEMENTIA TYPE (HCC): ICD-10-CM

## 2024-09-09 DIAGNOSIS — G47.33 OBSTRUCTIVE SLEEP APNEA: Primary | ICD-10-CM

## 2024-09-09 PROCEDURE — 99214 OFFICE O/P EST MOD 30 MIN: CPT | Performed by: FAMILY MEDICINE

## 2024-09-09 PROCEDURE — G2211 COMPLEX E/M VISIT ADD ON: HCPCS | Performed by: FAMILY MEDICINE

## 2024-09-09 NOTE — PROGRESS NOTES
Assessment/Plan:    No problem-specific Assessment & Plan notes found for this encounter.    Dementia unchanged  On namenda  Doubt related to confusion episodes since no daytime occurrences  Continue meds but offer senior care referral for further dementia tx options    Cva hx  Continue meds    Maged hypoxia suspected  Retry his cpap  Referral to sleep center  Pt and wife agreeable    Htn stable     Diagnoses and all orders for this visit:    Obstructive sleep apnea  -     Ambulatory referral to Sleep Medicine; Future    Brain atrophy (HCC)    Dementia without behavioral disturbance, psychotic disturbance, mood disturbance, or anxiety, unspecified dementia severity, unspecified dementia type (HCC)    Other orders  -     ciclopirox (PENLAC) 8 % solution; APPLY ONCE DAILY TO AFFECTED NAILS.          Return if symptoms worsen or fail to improve.    Subjective:      Patient ID: Christopher Razo is a 68 y.o. male.    Chief Complaint   Patient presents with    Follow-up     Some days fine, others very confused.  Sandra Bradford MA       HPI  Pt having memory issues  Some days fine  Once this week  Happens in the middle of night  Confusion episodes  Needs redirection  Aware he is retired  Sleeps ok  Not using his cpap, has maged  Could not get used to it, takes it off  No snoring  Questionable pausing  Years of maged per pt  Was seeing Dr. Eng in past  No recent sleep study  Last machine 2y old  Never has confusion episodes in the daytime    The following portions of the patient's history were reviewed and updated as appropriate: allergies, current medications, past family history, past medical history, past social history, past surgical history and problem list.    Review of Systems   Constitutional:  Negative for fever.   Cardiovascular:  Negative for chest pain.         Current Outpatient Medications   Medication Sig Dispense Refill    apixaban (Eliquis) 5 mg Take 5 mg by mouth 2 (two) times a day      atorvastatin (LIPITOR)  40 mg tablet Take 1 tablet (40 mg total) by mouth every evening 90 tablet 1    ciclopirox (PENLAC) 8 % solution APPLY ONCE DAILY TO AFFECTED NAILS.      ferrous sulfate 324 (65 Fe) mg Take 1 tablet (324 mg total) by mouth 2 (two) times a day before meals      fexofenadine (ALLEGRA) 180 MG tablet Take 1 tablet (180 mg total) by mouth daily for 30 days (Patient taking differently: Take 180 mg by mouth every morning) 30 tablet 0    memantine (NAMENDA) 5 mg tablet Take 5 mg by mouth daily      metoprolol succinate (TOPROL-XL) 50 mg 24 hr tablet Take 1 tablet (50 mg total) by mouth daily (Patient taking differently: Take 50 mg by mouth 2 (two) times a day)       No current facility-administered medications for this visit.       Objective:    /80   Pulse 68   Temp (!) 96.5 °F (35.8 °C)   Resp 18   Ht 6' (1.829 m)   Wt 108 kg (237 lb 9.6 oz)   BMI 32.22 kg/m²        Physical Exam  Vitals and nursing note reviewed.   Constitutional:       General: He is not in acute distress.     Appearance: He is well-developed. He is not ill-appearing.   HENT:      Head: Normocephalic.      Right Ear: Tympanic membrane normal.      Left Ear: Tympanic membrane normal.   Eyes:      General: No scleral icterus.     Conjunctiva/sclera: Conjunctivae normal.   Cardiovascular:      Rate and Rhythm: Normal rate and regular rhythm.   Pulmonary:      Effort: Pulmonary effort is normal. No respiratory distress.      Breath sounds: No rales.   Abdominal:      Palpations: Abdomen is soft.   Musculoskeletal:         General: No deformity.      Cervical back: Neck supple.   Skin:     General: Skin is warm and dry.      Coloration: Skin is not pale.   Neurological:      Mental Status: He is alert.   Psychiatric:         Mood and Affect: Mood normal.         Behavior: Behavior normal.         Thought Content: Thought content normal.                Richard Varela DO

## 2024-09-12 ENCOUNTER — TELEPHONE (OUTPATIENT)
Dept: CARDIOLOGY CLINIC | Facility: CLINIC | Age: 69
End: 2024-09-12

## 2024-09-12 NOTE — TELEPHONE ENCOUNTER
Tried to call patient's spouse to inform her we do have samples available for patient to be picked up.  Samples can be picked up today before 5 or tomorrow before 4.  Left message for patient's spouse to call back [phone].

## 2024-09-12 NOTE — TELEPHONE ENCOUNTER
Patient spouse called in requesting if the office can provide her with samples of   Eliquis 5 mg. As per spouse she is having trouble with Patient's insurance and it's unable to get a refill through her pharmacy. Please reach out to Patient if samples can be provided.

## 2024-09-18 ENCOUNTER — ANESTHESIA (OUTPATIENT)
Dept: ANESTHESIOLOGY | Facility: HOSPITAL | Age: 69
End: 2024-09-18

## 2024-09-18 ENCOUNTER — ANESTHESIA EVENT (OUTPATIENT)
Dept: ANESTHESIOLOGY | Facility: HOSPITAL | Age: 69
End: 2024-09-18

## 2024-09-23 ENCOUNTER — TELEPHONE (OUTPATIENT)
Age: 69
End: 2024-09-23

## 2024-09-23 DIAGNOSIS — F40.240 CLAUSTROPHOBIA: ICD-10-CM

## 2024-09-23 RX ORDER — ALPRAZOLAM 0.5 MG
0.5 TABLET ORAL
Qty: 2 TABLET | Refills: 0 | Status: SHIPPED | OUTPATIENT
Start: 2024-09-23

## 2024-10-01 ENCOUNTER — HOSPITAL ENCOUNTER (OUTPATIENT)
Dept: RADIOLOGY | Facility: HOSPITAL | Age: 69
Discharge: HOME | End: 2024-10-01
Payer: COMMERCIAL

## 2024-10-01 DIAGNOSIS — F03.90 DEMENTIA WITHOUT BEHAVIORAL DISTURBANCE, PSYCHOTIC DISTURBANCE, MOOD DISTURBANCE, OR ANXIETY, UNSPECIFIED DEMENTIA SEVERITY, UNSPECIFIED DEMENTIA TYPE (HCC): ICD-10-CM

## 2024-10-01 PROCEDURE — 70553 MRI BRAIN STEM W/O & W/DYE: CPT

## 2024-10-01 PROCEDURE — A9585 GADOBUTROL INJECTION: HCPCS

## 2024-10-01 RX ORDER — GADOBUTROL 604.72 MG/ML
10 INJECTION INTRAVENOUS
Status: COMPLETED | OUTPATIENT
Start: 2024-10-01 | End: 2024-10-01

## 2024-10-01 RX ADMIN — GADOBUTROL 10 ML: 604.72 INJECTION INTRAVENOUS at 09:00

## 2024-10-03 ENCOUNTER — TELEPHONE (OUTPATIENT)
Age: 69
End: 2024-10-03

## 2024-10-03 NOTE — TELEPHONE ENCOUNTER
Pts wife called in stating she was returning a call from 2 days ago asking if pt is still on Eliquis. Pts wife states that yes pt is still taking that medication.

## 2024-10-07 ENCOUNTER — OFFICE VISIT (OUTPATIENT)
Dept: NEUROLOGY | Facility: CLINIC | Age: 69
End: 2024-10-07
Payer: COMMERCIAL

## 2024-10-07 VITALS
HEIGHT: 72 IN | DIASTOLIC BLOOD PRESSURE: 86 MMHG | OXYGEN SATURATION: 97 % | TEMPERATURE: 96.4 F | BODY MASS INDEX: 32.23 KG/M2 | SYSTOLIC BLOOD PRESSURE: 130 MMHG | WEIGHT: 238 LBS | HEART RATE: 83 BPM

## 2024-10-07 DIAGNOSIS — I73.9 SMALL VESSEL DISEASE (HCC): ICD-10-CM

## 2024-10-07 DIAGNOSIS — E53.8 B12 DEFICIENCY: ICD-10-CM

## 2024-10-07 DIAGNOSIS — G31.84 MCI (MILD COGNITIVE IMPAIRMENT): ICD-10-CM

## 2024-10-07 DIAGNOSIS — F01.50 VASCULAR DEMENTIA (HCC): ICD-10-CM

## 2024-10-07 DIAGNOSIS — I68.0 CEREBRAL AMYLOID ANGIOPATHY  (HCC): ICD-10-CM

## 2024-10-07 DIAGNOSIS — Z86.73 CHRONIC ARTERIAL ISCHEMIC STROKE, VERTEBROBASILAR, CEREBELLAR: ICD-10-CM

## 2024-10-07 DIAGNOSIS — I63.81 CEREBROVASCULAR ACCIDENT (CVA) OF LEFT THALAMUS (HCC): Primary | ICD-10-CM

## 2024-10-07 DIAGNOSIS — I48.91 ATRIAL FIBRILLATION (HCC): ICD-10-CM

## 2024-10-07 DIAGNOSIS — E85.4 CEREBRAL AMYLOID ANGIOPATHY  (HCC): ICD-10-CM

## 2024-10-07 PROCEDURE — 99417 PROLNG OP E/M EACH 15 MIN: CPT | Performed by: PSYCHIATRY & NEUROLOGY

## 2024-10-07 PROCEDURE — 99215 OFFICE O/P EST HI 40 MIN: CPT | Performed by: PSYCHIATRY & NEUROLOGY

## 2024-10-07 PROCEDURE — 96372 THER/PROPH/DIAG INJ SC/IM: CPT | Performed by: PSYCHIATRY & NEUROLOGY

## 2024-10-07 RX ORDER — CYANOCOBALAMIN 1000 UG/ML
1000 INJECTION, SOLUTION INTRAMUSCULAR; SUBCUTANEOUS
Status: SHIPPED | OUTPATIENT
Start: 2024-10-07

## 2024-10-07 RX ORDER — CLOPIDOGREL BISULFATE 75 MG/1
75 TABLET ORAL DAILY
COMMUNITY

## 2024-10-07 RX ADMIN — CYANOCOBALAMIN 1000 MCG: 1000 INJECTION, SOLUTION INTRAMUSCULAR; SUBCUTANEOUS at 12:08

## 2024-10-07 NOTE — PROGRESS NOTES
Neurology outpatient HPI        Christopher Razo  [id number]  69 y.o.  1955       History of stroke , Vascular dementia without behavioral disturbance , Gait Problem, and Cerebrovascular Accident        History obtained from:  Patient     HPI/Subjective:    Christopher Razo is a 70 yo M with PMH of stroke, vascular dementia presents as f/u. He is former patient of our residency team. He started seeing neurology team in March 2024. He hasn't been seen by them even since Nov 2021.  He had small right cerebellar stroke and then again another small ischemic stroke in June of 2024. He has been on Eliquis 5mg bid since. He's now on plavix 75mg as well. Prior to knowledge of his strokes, he was on aspirin 81mg.  His multiple MRIs have also showed evidence of microbleeds. Prior CTA has showed 4x4 mm probable cavernoma.   His most recent MRI brain neuroquant from earlier this month has showed  tiny focus of increased diffusion signal in the dorsal left thalamus without obvious low signal on ADC map may suggest late subacute infarct, re demonstrated sequela of chronic lacunar infarcts and microangiopathic changes, grossly stable peripherally distributed foci of old microbleeds in the supratentorial and infratentorial brain, underlying cerebral amyloid angiopathy.     He doesn't have focal sxs for the new stroke that was noted in recent MRI brain except had brief episode of confusion couple of weeks ago.        He has loop in place. It's showed VT with syncope once. He required 2 stents then. He follows with cardiology.     As for memory, he's losing motivation. He depends on his wife to do everything. She does his medications. He can take care of his own hygiene. He doesn't drive.    He has been declining for past 10 years.       Past Medical History:   Diagnosis Date    Arthropathy of knee     last assessed 8/19/15    Bacterial pneumonia 02/05/2007    last assessed 2/5/7    Brain atrophy (HCC)     Colon polyp     CPAP  (continuous positive airway pressure) dependence     Disorder of male genital organ 02/12/2008    last assessed 2/12/08    ED (erectile dysfunction) of organic origin     last assessed 2/13/17     History of hypertension     Seasonal allergies     Situational anxiety     resolved 3/16/17     Sleep apnea     Stroke (HCC)     09/2020 TIA    Tinnitus     Wears hearing aid     bilateral     Social History     Socioeconomic History    Marital status: /Civil Union     Spouse name: Not on file    Number of children: Not on file    Years of education: Not on file    Highest education level: Not on file   Occupational History    Not on file   Tobacco Use    Smoking status: Never     Passive exposure: Past    Smokeless tobacco: Never   Vaping Use    Vaping status: Never Used   Substance and Sexual Activity    Alcohol use: Not Currently     Alcohol/week: 14.0 standard drinks of alcohol     Types: 14 Cans of beer per week     Comment: occ    Drug use: No    Sexual activity: Not on file   Other Topics Concern    Not on file   Social History Narrative    Not on file     Social Determinants of Health     Financial Resource Strain: Not on file   Food Insecurity: No Food Insecurity (6/17/2024)    Hunger Vital Sign     Worried About Running Out of Food in the Last Year: Never true     Ran Out of Food in the Last Year: Never true   Transportation Needs: No Transportation Needs (6/17/2024)    PRAPARE - Transportation     Lack of Transportation (Medical): No     Lack of Transportation (Non-Medical): No   Physical Activity: Not on file   Stress: Not on file   Social Connections: Not on file   Intimate Partner Violence: Not on file   Housing Stability: Low Risk  (6/17/2024)    Housing Stability Vital Sign     Unable to Pay for Housing in the Last Year: No     Number of Times Moved in the Last Year: 0     Homeless in the Last Year: No   Recent Concern: Housing Stability - High Risk (6/12/2024)    Housing Stability Vital Sign      Unable to Pay for Housing in the Last Year: Yes     Number of Times Moved in the Last Year: 1     Homeless in the Last Year: No     Family History   Problem Relation Age of Onset    Cancer Mother         breast    Diverticulitis Mother         colostomy    Breast cancer Mother     Heart disease Father         CHF    Cancer Sister         breast    Depression Sister     Cancer Sister         skin cancer    Cancer Sister         skin cancer    Colon cancer Neg Hx     Liver disease Neg Hx      Allergies   Allergen Reactions    Pollen Extract Sneezing     Reaction Date: 18Sep2006;     Shellfish-Derived Products - Food Allergy Other (See Comments)     Eye swelling    Iodine Solution [Povidone Iodine] Rash     Eyes swell     Current Outpatient Medications on File Prior to Visit   Medication Sig Dispense Refill    ALPRAZolam (XANAX) 0.5 mg tablet Take 1 tablet (0.5 mg total) by mouth 30 min pre-procedure 2 tablet 0    apixaban (Eliquis) 5 mg Take 5 mg by mouth 2 (two) times a day      atorvastatin (LIPITOR) 40 mg tablet Take 1 tablet (40 mg total) by mouth every evening 90 tablet 1    ciclopirox (PENLAC) 8 % solution APPLY ONCE DAILY TO AFFECTED NAILS.      clopidogrel (PLAVIX) 75 mg tablet Take 75 mg by mouth daily      fexofenadine (ALLEGRA) 180 MG tablet Take 1 tablet (180 mg total) by mouth daily for 30 days (Patient taking differently: Take 180 mg by mouth every morning) 30 tablet 0    memantine (NAMENDA) 5 mg tablet Take 5 mg by mouth daily      metoprolol succinate (TOPROL-XL) 50 mg 24 hr tablet Take 1 tablet (50 mg total) by mouth daily (Patient taking differently: Take 50 mg by mouth 2 (two) times a day)      ferrous sulfate 324 (65 Fe) mg Take 1 tablet (324 mg total) by mouth 2 (two) times a day before meals (Patient not taking: Reported on 10/7/2024)      [DISCONTINUED] dofetilide (TIKOSYN) 500 mcg capsule Take 1 capsule (500 mcg total) by mouth 2 (two) times a day 60 capsule 3    [DISCONTINUED] magnesium oxide  (MAG-OX) 400 mg Take 1 tablet (400 mg total) by mouth daily Do not start before November 2, 2022. 30 tablet 0     No current facility-administered medications on file prior to visit.         Review of Systems   Refer to positive review of systems in HPI.   Review of Systems    Constitutional- No fever  Eyes- No visual change  ENT- Hearing normal  CV- No chest pain  Resp- No Shortness of breath  GI- No diarrhea  - Bladder normal  MS- No Arthritis   Skin- No rash  Psych- No depression  Endo- No DM  Heme- No nodes    Vitals:    10/07/24 1004   BP: 130/86   BP Location: Left arm   Patient Position: Sitting   Cuff Size: Standard   Pulse: 83   Temp: (!) 96.4 °F (35.8 °C)   TempSrc: Tympanic   SpO2: 97%   Weight: 108 kg (238 lb)   Height: 6' (1.829 m)       PHYSICAL EXAM:  Appearance: No Acute Distress  Ophthalmoscopic: Disc Flat, Normal fundus  Mental status:  Orientation: Awake, Alert, and Orientedx2  Memory: MOCA: 18/30  Attention: normal  Knowledge: good  Language: No aphasia  Speech: No dysarthria  Cranial Nerves:  2 No Visual Defect on Confrontation, Pupils round, equal, reactive to light  3,4,6 Extraocular Movements Intact, no nystagmus  5 Facial Sensation Intact  7 No facial asymmetry  8 Intact hearing  9,10 Palate symmetric, normal gag  11 Good shoulder shrug  12 Tongue Midline  Gait: Stable  Coordination: No ataxia with finger to nose testing, and heel to shin  Sensory: Intact, Symmetric to pinprick, light touch, vibration, and joint position  Muscle Tone: Normal              Muscle exam:  Arm Right Left Leg Right Left   Deltoid 5/5 5/5 Iliopsoas 5/5 5/5   Biceps 5/5 5/5 Quads 5/5 5/5   Triceps 5/5 5/5 Hamstrings 5/5 5/5   Wrist Extension 5/5 5/5 Ankle Dorsi Flexion 5/5 5/5   Wrist Flexion 5/5 5/5 Ankle Plantar Flexion 5/5 5/5   Interossei 5/5 5/5 Ankle Eversion 5/5 5/5   APB 5/5 5/5 Ankle Inversion 5/5 5/5       Reflexes:  Brisk all over     Personal review of  Multiple MRIs of brain.  MRI cervical spine:  2020    Multilevel degenerative spondylosis, consistent with CTA     Mild bilateral foraminal stenosis C3-4, C4-5     Moderate bilateral foraminal stenosis C5-6     Moderate left foraminal stenosis C6-7    Labs:                    Diagnoses and all orders for this visit:        1. Cerebrovascular accident (CVA) of left thalamus (HCC)  Ambulatory Referral to Physical Therapy      2. Atrial fibrillation (HCC)        3. Small vessel disease (HCC)        4. MCI (mild cognitive impairment)        5. Vascular dementia (HCC)        6. Cerebral amyloid angiopathy  (HCC)        7. Chronic arterial ischemic stroke, vertebrobasilar, cerebellar        8. B12 deficiency  cyanocobalamin injection 1,000 mcg            Discussed that patient is very high risk for strokes and that also increases his risk for vascular dementia.   He's on maximum therapy with plavix and eliquis so can't make any changes.   He is to resume lipitor 40mg.  He scored 18/30 on MOCA which places him at moderate degree of dementia.   Asked him to try PT.  Will start him on B12 supplements IM for def. Once every 2 weeks for 2 months and then once monthly.   Spent extensive time educating and counseling patient.   Spent a lot of time going though each of MRIs and explaining results to patient and wife.             Total time of encounter:  60 min  More than 50% of the time was used in counseling and/or coordination of care  Extent of counseling and/or coordination of care        Althea Rob MD  [facility]  [address] [place] [id number] [phone]

## 2024-10-14 ENCOUNTER — OFFICE VISIT (OUTPATIENT)
Dept: CARDIOLOGY CLINIC | Facility: CLINIC | Age: 69
End: 2024-10-14
Payer: COMMERCIAL

## 2024-10-14 VITALS
HEIGHT: 72 IN | WEIGHT: 237 LBS | DIASTOLIC BLOOD PRESSURE: 92 MMHG | OXYGEN SATURATION: 96 % | SYSTOLIC BLOOD PRESSURE: 128 MMHG | BODY MASS INDEX: 32.1 KG/M2 | HEART RATE: 97 BPM

## 2024-10-14 DIAGNOSIS — E78.5 HYPERLIPIDEMIA LDL GOAL <130: ICD-10-CM

## 2024-10-14 DIAGNOSIS — I48.0 PAROXYSMAL A-FIB (HCC): Primary | ICD-10-CM

## 2024-10-14 DIAGNOSIS — I35.0 AORTIC VALVE STENOSIS, ETIOLOGY OF CARDIAC VALVE DISEASE UNSPECIFIED: ICD-10-CM

## 2024-10-14 DIAGNOSIS — Z86.73 HISTORY OF CARDIOEMBOLIC CEREBROVASCULAR ACCIDENT (CVA): ICD-10-CM

## 2024-10-14 DIAGNOSIS — I47.29 NSVT (NONSUSTAINED VENTRICULAR TACHYCARDIA) (HCC): ICD-10-CM

## 2024-10-14 PROCEDURE — 99214 OFFICE O/P EST MOD 30 MIN: CPT | Performed by: INTERNAL MEDICINE

## 2024-10-14 PROCEDURE — 93000 ELECTROCARDIOGRAM COMPLETE: CPT | Performed by: INTERNAL MEDICINE

## 2024-10-14 NOTE — PROGRESS NOTES
Cardiology   Judy Norris DO, [facility]  Terrence Velasco MD, [facility]  Chandler Saunders MD, [facility]  Calista Gleason MD, [facility]  -------------------------------------------------------------------  [facility]  [address], [address], [address]  [place], [place], [address]  [phone]    Cardiology Follow Up  Christopher Razo  1955  [id number]          Assessment/Plan:    1. VT with syncope -   Cause of tachycardia may have been ischemic.  He had 2 stents placed.  - Loop recorder has overall been free of arrhythmias.  There was an episode of tachycardia noted which was likely due to artifact and PACs.    - will continue monitoring for further arrhythmias.    - metoprolol was increased to 50 mg twice daily during hospitalization.  He was temporarily on Tikosyn which was discontinued.    2. CAD with recent PCI x2 - continue Plavix.    - continue atorvastatin  - Lipid panel and CMP ordered  - Echocardiogram ordered as he also has dyspnea and history of aortic valve disease.     3. History of CVA  - continue Plavix  - continue Eliquis.    4. Paroxysmal atrial fibrillation  - currently in sinus rhythm.  Continue Eliquis and metoprolol  - Echocardiogram ordered  - Loop recorder interrogation in 2 weeks as scheduled.       Interval History:     Christopher Razo is 69 y.o. male here for followup of CAD and atrial fibrillation.    Since his last visit, he has been feeling well.  he denies any palpitations, chest pain, LE edema, orthopnea or PND.   He has some shortness of breath episodes.   Diet is overall unchanged.  There has not been a significant change in weight.    No events on last pacemaker check.     Neurology evaluation has revealed additional CVA.  He reports good adherence with Eliquis and Plavix.    Cardiac History:  In October 2022 he was admitted to [facility] after a syncopal event during PT.  He had a loop recorder in place and was noted to have VT at that  time.  He underwent further workup including cardiac catheterization which showed a 1st diagonal 90% stenosis of ramus with 90% stenosis and circumflex with 60% stenosis in our PDA of 80% stenosis.  Subsequently, he underwent drug-eluting stent placement to the 1st diagonal and ramus lesions.  Echocardiogram during admission showed ejection fraction of 60% with mild to moderate aortic valve stenosis.  Aortic root was dilated at 4.5 cm.  He subsequent underwent cardiac MRI which showed ejection fraction of 57%, moderate concentric LVH there was a small focus of intra myocardial fibrosis of the basal inferolateral wall.  VT was likely from the anterolateral mitral annulus but there was no evidence of scar in that area.  EP recommended increasing Toprol to 50 mg b.i.d..  He was initially started on Tikosyn which was discontinued.     Patient had a loop recorder in place because of history of cryptogenic CVA.  He has had episodes of atrial fibrillation noted on loop recorder in the past and is now on Eliquis.      The following portions of the patient's history were reviewed and updated as appropriate: allergies, current medications, past family history, past medical history, past social history, past surgical history, and problem list.       Current Outpatient Medications:     ALPRAZolam (XANAX) 0.5 mg tablet, Take 1 tablet (0.5 mg total) by mouth 30 min pre-procedure, Disp: 2 tablet, Rfl: 0    apixaban (Eliquis) 5 mg, Take 5 mg by mouth 2 (two) times a day, Disp: , Rfl:     atorvastatin (LIPITOR) 40 mg tablet, Take 1 tablet (40 mg total) by mouth every evening, Disp: 90 tablet, Rfl: 1    ciclopirox (PENLAC) 8 % solution, APPLY ONCE DAILY TO AFFECTED NAILS., Disp: , Rfl:     clopidogrel (PLAVIX) 75 mg tablet, Take 75 mg by mouth daily, Disp: , Rfl:     memantine (NAMENDA) 5 mg tablet, Take 5 mg by mouth daily, Disp: , Rfl:     metoprolol succinate (TOPROL-XL) 50 mg 24 hr tablet, Take 1 tablet (50 mg total) by mouth  daily (Patient taking differently: Take 50 mg by mouth 2 (two) times a day), Disp: , Rfl:     ferrous sulfate 324 (65 Fe) mg, Take 1 tablet (324 mg total) by mouth 2 (two) times a day before meals (Patient not taking: Reported on 10/7/2024), Disp: , Rfl:     fexofenadine (ALLEGRA) 180 MG tablet, Take 1 tablet (180 mg total) by mouth daily for 30 days (Patient taking differently: Take 180 mg by mouth every morning), Disp: 30 tablet, Rfl: 0    Current Facility-Administered Medications:     cyanocobalamin injection 1,000 mcg, 1,000 mcg, Intramuscular, Q30 Days, , 1,000 mcg at 10/07/24 1208        Review of Systems:  Review of Systems   Respiratory:  Negative for shortness of breath.    Cardiovascular:  Negative for chest pain, palpitations and leg swelling.   Musculoskeletal:  Positive for arthralgias.   Psychiatric/Behavioral:  Positive for confusion and decreased concentration.    All other systems reviewed and are negative.        Physical Exam:  Vitals:  Vitals:    10/14/24 1025   BP: 128/92   BP Location: Left arm   Patient Position: Sitting   Cuff Size: Standard   Pulse: 97   SpO2: 96%   Weight: 108 kg (237 lb)   Height: 6' (1.829 m)     Physical Exam   Constitutional: He appears healthy. No distress.   Eyes: Pupils are equal, round, and reactive to light. Conjunctivae are normal.   Neck: No JVD present.   Cardiovascular: Normal rate and regular rhythm. Exam reveals no gallop and no friction rub.   Murmur heard.  Systolic murmur is present.  Pulmonary/Chest: Effort normal and breath sounds normal. He has no wheezes. He has no rales.   Musculoskeletal:         General: No tenderness, deformity or edema.      Cervical back: Normal range of motion and neck supple.   Neurological: He is alert and oriented to person, place, and time.   Skin: Skin is warm and dry.        Cardiographics:  EKG: Sinus rhythm with LVH  Last known EF: 55%    This note was completed in part utilizing M-Files Direct Software.   Grammatical errors, random word insertions, spelling mistakes, and incomplete sentences can be an occasional consequence of this system secondary to software limitations, ambient noise, and hardware issues.  If you have any questions or concerns about the content, text, or information contained within the body of this dictation, please contact the provider for clarification.

## 2024-10-21 ENCOUNTER — APPOINTMENT (OUTPATIENT)
Dept: LAB | Facility: HOSPITAL | Age: 69
End: 2024-10-21
Attending: INTERNAL MEDICINE
Payer: COMMERCIAL

## 2024-10-21 ENCOUNTER — TELEPHONE (OUTPATIENT)
Dept: CARDIOLOGY CLINIC | Facility: CLINIC | Age: 69
End: 2024-10-21

## 2024-10-21 ENCOUNTER — CLINICAL SUPPORT (OUTPATIENT)
Dept: NEUROLOGY | Facility: CLINIC | Age: 69
End: 2024-10-21
Payer: COMMERCIAL

## 2024-10-21 DIAGNOSIS — E53.8 B12 DEFICIENCY: Primary | ICD-10-CM

## 2024-10-21 DIAGNOSIS — I48.0 PAROXYSMAL A-FIB (HCC): ICD-10-CM

## 2024-10-21 LAB
ALBUMIN SERPL BCG-MCNC: 4.1 G/DL (ref 3.5–5)
ALP SERPL-CCNC: 105 U/L (ref 34–104)
ALT SERPL W P-5'-P-CCNC: 15 U/L (ref 7–52)
ANION GAP SERPL CALCULATED.3IONS-SCNC: 6 MMOL/L (ref 4–13)
AST SERPL W P-5'-P-CCNC: 11 U/L (ref 13–39)
BILIRUB SERPL-MCNC: 0.96 MG/DL (ref 0.2–1)
BUN SERPL-MCNC: 14 MG/DL (ref 5–25)
CALCIUM SERPL-MCNC: 8.6 MG/DL (ref 8.4–10.2)
CHLORIDE SERPL-SCNC: 107 MMOL/L (ref 96–108)
CHOLEST SERPL-MCNC: 95 MG/DL
CO2 SERPL-SCNC: 25 MMOL/L (ref 21–32)
CREAT SERPL-MCNC: 0.84 MG/DL (ref 0.6–1.3)
ERYTHROCYTE [DISTWIDTH] IN BLOOD BY AUTOMATED COUNT: 18.4 % (ref 11.6–15.1)
GFR SERPL CREATININE-BSD FRML MDRD: 90 ML/MIN/1.73SQ M
GLUCOSE P FAST SERPL-MCNC: 112 MG/DL (ref 65–99)
HCT VFR BLD AUTO: 46.3 % (ref 36.5–49.3)
HDLC SERPL-MCNC: 27 MG/DL
HGB BLD-MCNC: 14.2 G/DL (ref 12–17)
LDLC SERPL CALC-MCNC: 53 MG/DL (ref 0–100)
MCH RBC QN AUTO: 24.4 PG (ref 26.8–34.3)
MCHC RBC AUTO-ENTMCNC: 30.7 G/DL (ref 31.4–37.4)
MCV RBC AUTO: 80 FL (ref 82–98)
NONHDLC SERPL-MCNC: 68 MG/DL
PLATELET # BLD AUTO: 241 THOUSANDS/UL (ref 149–390)
PMV BLD AUTO: 8.7 FL (ref 8.9–12.7)
POTASSIUM SERPL-SCNC: 3.9 MMOL/L (ref 3.5–5.3)
PROT SERPL-MCNC: 6.5 G/DL (ref 6.4–8.4)
RBC # BLD AUTO: 5.82 MILLION/UL (ref 3.88–5.62)
SODIUM SERPL-SCNC: 138 MMOL/L (ref 135–147)
TRIGL SERPL-MCNC: 75 MG/DL
WBC # BLD AUTO: 5.94 THOUSAND/UL (ref 4.31–10.16)

## 2024-10-21 PROCEDURE — 80053 COMPREHEN METABOLIC PANEL: CPT

## 2024-10-21 PROCEDURE — 80061 LIPID PANEL: CPT | Performed by: INTERNAL MEDICINE

## 2024-10-21 PROCEDURE — 36415 COLL VENOUS BLD VENIPUNCTURE: CPT

## 2024-10-21 PROCEDURE — 96372 THER/PROPH/DIAG INJ SC/IM: CPT | Performed by: PSYCHIATRY & NEUROLOGY

## 2024-10-21 PROCEDURE — 85027 COMPLETE CBC AUTOMATED: CPT

## 2024-10-21 RX ORDER — CYANOCOBALAMIN 1000 UG/ML
1000 INJECTION, SOLUTION INTRAMUSCULAR; SUBCUTANEOUS WEEKLY
Status: SHIPPED | OUTPATIENT
Start: 2024-10-21 | End: 2024-11-11

## 2024-10-21 RX ADMIN — CYANOCOBALAMIN 1000 MCG: 1000 INJECTION, SOLUTION INTRAMUSCULAR; SUBCUTANEOUS at 14:35

## 2024-10-21 NOTE — TELEPHONE ENCOUNTER
----- Message from Judy Norris DO sent at 10/21/2024 12:35 PM EDT -----  Can you please let the patient know Lipid levels were good.  Take iron tablets if not currently using

## 2024-10-21 NOTE — TELEPHONE ENCOUNTER
Spoke with pts wife, states pt was taking an over the counter iron pill, there is an rx from dr dailey however pt never filled it. Would you be able to send refill to [facility] for pt?

## 2024-10-28 ENCOUNTER — OFFICE VISIT (OUTPATIENT)
Dept: NEUROLOGY | Facility: CLINIC | Age: 69
End: 2024-10-28
Payer: COMMERCIAL

## 2024-10-28 DIAGNOSIS — E53.8 B12 DEFICIENCY: Primary | ICD-10-CM

## 2024-10-28 PROCEDURE — 96372 THER/PROPH/DIAG INJ SC/IM: CPT | Performed by: PSYCHIATRY & NEUROLOGY

## 2024-10-28 RX ORDER — CYANOCOBALAMIN 1000 UG/ML
1000 INJECTION, SOLUTION INTRAMUSCULAR; SUBCUTANEOUS WEEKLY
Status: CANCELLED | OUTPATIENT
Start: 2024-10-28

## 2024-10-28 RX ADMIN — CYANOCOBALAMIN 1000 MCG: 1000 INJECTION, SOLUTION INTRAMUSCULAR; SUBCUTANEOUS at 09:59

## 2024-11-01 ENCOUNTER — REMOTE DEVICE CLINIC VISIT (OUTPATIENT)
Dept: CARDIOLOGY CLINIC | Facility: CLINIC | Age: 69
End: 2024-11-01
Payer: COMMERCIAL

## 2024-11-01 DIAGNOSIS — Z86.73 HISTORY OF CARDIOEMBOLIC CEREBROVASCULAR ACCIDENT (CVA): ICD-10-CM

## 2024-11-01 DIAGNOSIS — Z86.73 HISTORY OF STROKE: Primary | ICD-10-CM

## 2024-11-01 PROCEDURE — 93298 REM INTERROG DEV EVAL SCRMS: CPT | Performed by: INTERNAL MEDICINE

## 2024-11-01 RX ORDER — ATORVASTATIN CALCIUM 40 MG/1
40 TABLET, FILM COATED ORAL EVERY EVENING
Qty: 90 TABLET | Refills: 1 | Status: SHIPPED | OUTPATIENT
Start: 2024-11-01

## 2024-11-01 NOTE — PROGRESS NOTES
Results for orders placed or performed in visit on 11/01/24   Cardiac EP device report    Narrative    MDT LNQ22/ ACTIVE SYSTEM IS MRI CONDITIONAL  CARELINK TRANSMISSION: BATTERY VOLTAGE ADEQUATE. NO PATIENT OR DEVICE ACTIVATED EPISODES. ---DOAN

## 2024-11-04 ENCOUNTER — OFFICE VISIT (OUTPATIENT)
Dept: NEUROLOGY | Facility: CLINIC | Age: 69
End: 2024-11-04
Payer: COMMERCIAL

## 2024-11-04 DIAGNOSIS — E53.8 B12 DEFICIENCY: Primary | ICD-10-CM

## 2024-11-04 PROCEDURE — 96372 THER/PROPH/DIAG INJ SC/IM: CPT | Performed by: PSYCHIATRY & NEUROLOGY

## 2024-11-04 RX ADMIN — CYANOCOBALAMIN 1000 MCG: 1000 INJECTION, SOLUTION INTRAMUSCULAR; SUBCUTANEOUS at 10:06

## 2024-11-06 ENCOUNTER — HOSPITAL ENCOUNTER (OUTPATIENT)
Dept: NON INVASIVE DIAGNOSTICS | Facility: HOSPITAL | Age: 69
Discharge: HOME | End: 2024-11-06
Attending: INTERNAL MEDICINE
Payer: COMMERCIAL

## 2024-11-06 VITALS
DIASTOLIC BLOOD PRESSURE: 92 MMHG | WEIGHT: 237 LBS | SYSTOLIC BLOOD PRESSURE: 128 MMHG | BODY MASS INDEX: 32.1 KG/M2 | HEIGHT: 72 IN | HEART RATE: 97 BPM

## 2024-11-06 DIAGNOSIS — I48.0 PAROXYSMAL A-FIB (HCC): ICD-10-CM

## 2024-11-06 LAB
AORTIC ROOT: 4.1 CM
AORTIC VALVE MEAN VELOCITY: 19.2 M/S
APICAL FOUR CHAMBER EJECTION FRACTION: 55 %
ASCENDING AORTA: 4.4 CM
AV AREA BY CONTINUOUS VTI: 1.4 CM2
AV AREA PEAK VELOCITY: 1.4 CM2
AV LVOT MEAN GRADIENT: 1 MMHG
AV LVOT PEAK GRADIENT: 2 MMHG
AV MEAN GRADIENT: 16 MMHG
AV PEAK GRADIENT: 26 MMHG
AV VALVE AREA: 1.43 CM2
AV VELOCITY RATIO: 0.26
BSA FOR ECHO PROCEDURE: 2.29 M2
DOP CALC AO PEAK VEL: 2.57 M/S
DOP CALC AO VTI: 55.57 CM
DOP CALC LVOT AREA: 5.31 CM2
DOP CALC LVOT CARDIAC INDEX: 2.19 L/MIN/M2
DOP CALC LVOT CARDIAC OUTPUT: 5.02 L/MIN
DOP CALC LVOT DIAMETER: 2.6 CM
DOP CALC LVOT PEAK VEL VTI: 15.02 CM
DOP CALC LVOT PEAK VEL: 0.68 M/S
DOP CALC LVOT STROKE INDEX: 34.5 ML/M2
DOP CALC LVOT STROKE VOLUME: 79.71
E WAVE DECELERATION TIME: 361 MS
E/A RATIO: 0.74
FRACTIONAL SHORTENING: 24 (ref 28–44)
INTERVENTRICULAR SEPTUM IN DIASTOLE (PARASTERNAL SHORT AXIS VIEW): 1.5 CM
INTERVENTRICULAR SEPTUM: 1.5 CM (ref 0.6–1.1)
LAAS-AP2: 25 CM2
LAAS-AP4: 28.6 CM2
LEFT ATRIUM SIZE: 3.9 CM
LEFT ATRIUM VOLUME (MOD BIPLANE): 93 ML
LEFT ATRIUM VOLUME INDEX (MOD BIPLANE): 40.6 ML/M2
LEFT INTERNAL DIMENSION IN SYSTOLE: 4.1 CM (ref 2.1–4)
LEFT VENTRICULAR INTERNAL DIMENSION IN DIASTOLE: 5.4 CM (ref 3.5–6)
LEFT VENTRICULAR POSTERIOR WALL IN END DIASTOLE: 1.4 CM
LEFT VENTRICULAR STROKE VOLUME: 66 ML
LVSV (TEICH): 66 ML
MV E'TISSUE VEL-LAT: 11 CM/S
MV E'TISSUE VEL-SEP: 8 CM/S
MV PEAK A VEL: 0.72 M/S
MV PEAK E VEL: 53 CM/S
MV STENOSIS PRESSURE HALF TIME: 105 MS
MV VALVE AREA P 1/2 METHOD: 2.1
RIGHT ATRIUM AREA SYSTOLE A4C: 16.3 CM2
RIGHT VENTRICLE ID DIMENSION: 3.2 CM
SINOTUBULAR JUNCTION: 4.3 CM
SL CV LEFT ATRIUM LENGTH A2C: 6.1 CM
SL CV LV EF: 55
SL CV PED ECHO LEFT VENTRICLE DIASTOLIC VOLUME (MOD BIPLANE) 2D: 139 ML
SL CV PED ECHO LEFT VENTRICLE SYSTOLIC VOLUME (MOD BIPLANE) 2D: 73 ML
SL CV SINUS OF VALSALVA 2D: 4 CM
STJ: 4.3 CM
TRICUSPID ANNULAR PLANE SYSTOLIC EXCURSION: 1.5 CM

## 2024-11-06 PROCEDURE — 93306 TTE W/DOPPLER COMPLETE: CPT

## 2024-11-06 PROCEDURE — 93306 TTE W/DOPPLER COMPLETE: CPT | Performed by: INTERNAL MEDICINE

## 2024-11-07 ENCOUNTER — TELEPHONE (OUTPATIENT)
Dept: CARDIOLOGY CLINIC | Facility: CLINIC | Age: 69
End: 2024-11-07

## 2024-11-07 NOTE — TELEPHONE ENCOUNTER
----- Message from Judy Norris DO sent at 11/7/2024 12:54 PM EST -----  Can you please let the patient know echo was unchanged from last one

## 2024-11-21 ENCOUNTER — TELEPHONE (OUTPATIENT)
Age: 69
End: 2024-11-21

## 2024-11-21 NOTE — TELEPHONE ENCOUNTER
Pt's wife Harriet called to reschedule December 4th B12 injection to next week because they will be away during week of December 4th.

## 2024-11-27 ENCOUNTER — CLINICAL SUPPORT (OUTPATIENT)
Age: 69
End: 2024-11-27
Payer: COMMERCIAL

## 2024-11-27 DIAGNOSIS — E53.8 B12 DEFICIENCY: Primary | ICD-10-CM

## 2024-11-27 PROCEDURE — 96372 THER/PROPH/DIAG INJ SC/IM: CPT

## 2024-11-27 RX ADMIN — CYANOCOBALAMIN 1000 MCG: 1000 INJECTION, SOLUTION INTRAMUSCULAR; SUBCUTANEOUS at 11:31

## 2024-11-27 NOTE — PROGRESS NOTES
Patient came in for a B12 injection. 5 of 5. It was given in his right deltoid. Patient tolerated well

## 2025-01-31 ENCOUNTER — REMOTE DEVICE CLINIC VISIT (OUTPATIENT)
Dept: CARDIOLOGY CLINIC | Facility: CLINIC | Age: 70
End: 2025-01-31
Payer: COMMERCIAL

## 2025-01-31 DIAGNOSIS — I48.0 PAROXYSMAL A-FIB (HCC): Primary | ICD-10-CM

## 2025-01-31 PROCEDURE — 93298 REM INTERROG DEV EVAL SCRMS: CPT | Performed by: INTERNAL MEDICINE

## 2025-01-31 NOTE — PROGRESS NOTES
Results for orders placed or performed in visit on 01/31/25   Cardiac EP device report    Narrative    MDT LNQ22/ ACTIVE SYSTEM IS MRI CONDITIONAL  CARELINK TRANSMISSION: BATTERY VOLTAGE ADEQUATE. NO PATIENT OR DEVICE ACTIVATED EPISODES. ---DOAN

## 2025-02-02 ENCOUNTER — TELEPHONE (OUTPATIENT)
Dept: FAMILY MEDICINE CLINIC | Facility: CLINIC | Age: 70
End: 2025-02-02

## 2025-02-02 DIAGNOSIS — Z86.73 HISTORY OF CARDIOEMBOLIC CEREBROVASCULAR ACCIDENT (CVA): Primary | ICD-10-CM

## 2025-02-03 ENCOUNTER — RESULTS FOLLOW-UP (OUTPATIENT)
Dept: CARDIOLOGY CLINIC | Facility: CLINIC | Age: 70
End: 2025-02-03

## 2025-02-05 RX ORDER — CLOPIDOGREL BISULFATE 75 MG/1
75 TABLET ORAL DAILY
Qty: 90 TABLET | Refills: 1 | Status: SHIPPED | OUTPATIENT
Start: 2025-02-05

## 2025-04-08 DIAGNOSIS — I47.29 NSVT (NONSUSTAINED VENTRICULAR TACHYCARDIA) (HCC): ICD-10-CM

## 2025-04-08 DIAGNOSIS — I25.10 CORONARY ARTERY DISEASE INVOLVING NATIVE CORONARY ARTERY OF NATIVE HEART WITHOUT ANGINA PECTORIS: ICD-10-CM

## 2025-04-08 RX ORDER — METOPROLOL SUCCINATE 50 MG/1
50 TABLET, EXTENDED RELEASE ORAL 2 TIMES DAILY
Qty: 60 TABLET | Refills: 5 | Status: SHIPPED | OUTPATIENT
Start: 2025-04-08

## 2025-04-21 ENCOUNTER — TELEPHONE (OUTPATIENT)
Age: 70
End: 2025-04-21

## 2025-04-21 NOTE — TELEPHONE ENCOUNTER
New Patient      Insurance   Current Insurance? Yes    Insurance E-verified? Yes      History   Reason for appointment/active symptoms?  Nocturia      Has the patient had any previous Urologist(s)? No      Was the patient seen in the ED? No      Labs/Imaging(Including Out Of Network)? No updated imaging or PSA      Records Requested? No    Records Visible in EPIC? Yes      Personal history of cancer? No       Appointment   Office location preference:  [facility]   ?   Appointment Details   Date:  4/22/25  Time:  10:00 am   Location: [facility]    Provider:  Heath   Does the appointment need further review? No

## 2025-04-22 ENCOUNTER — OFFICE VISIT (OUTPATIENT)
Dept: UROLOGY | Facility: CLINIC | Age: 70
End: 2025-04-22
Payer: COMMERCIAL

## 2025-04-22 ENCOUNTER — TELEPHONE (OUTPATIENT)
Dept: CARDIOLOGY CLINIC | Facility: CLINIC | Age: 70
End: 2025-04-22

## 2025-04-22 ENCOUNTER — APPOINTMENT (EMERGENCY)
Dept: RADIOLOGY | Facility: HOSPITAL | Age: 70
DRG: 177 | End: 2025-04-22
Payer: COMMERCIAL

## 2025-04-22 ENCOUNTER — HOSPITAL ENCOUNTER (INPATIENT)
Facility: HOSPITAL | Age: 70
LOS: 1 days | Discharge: HOME | DRG: 177 | End: 2025-04-24
Attending: EMERGENCY MEDICINE | Admitting: INTERNAL MEDICINE
Payer: COMMERCIAL

## 2025-04-22 VITALS
HEART RATE: 85 BPM | OXYGEN SATURATION: 95 % | WEIGHT: 231 LBS | SYSTOLIC BLOOD PRESSURE: 122 MMHG | HEIGHT: 72 IN | DIASTOLIC BLOOD PRESSURE: 80 MMHG | BODY MASS INDEX: 31.29 KG/M2

## 2025-04-22 DIAGNOSIS — I63.9 STROKE (HCC): Primary | ICD-10-CM

## 2025-04-22 DIAGNOSIS — I48.0 PAROXYSMAL A-FIB (HCC): Primary | ICD-10-CM

## 2025-04-22 DIAGNOSIS — R41.82 ALTERED MENTAL STATUS: ICD-10-CM

## 2025-04-22 DIAGNOSIS — R32 URINARY INCONTINENCE: ICD-10-CM

## 2025-04-22 DIAGNOSIS — R35.0 URINE FREQUENCY: Primary | ICD-10-CM

## 2025-04-22 PROBLEM — F41.1 GAD (GENERALIZED ANXIETY DISORDER): Status: RESOLVED | Noted: 2020-03-13 | Resolved: 2025-04-22

## 2025-04-22 LAB
2HR DELTA HS TROPONIN: -2 NG/L
4HR DELTA HS TROPONIN: -2 NG/L
ANION GAP SERPL CALCULATED.3IONS-SCNC: 7 MMOL/L (ref 4–13)
APTT PPP: 33 SECONDS (ref 23–34)
ATRIAL RATE: 84 BPM
BACTERIA UR QL AUTO: ABNORMAL /HPF
BACTERIA UR QL AUTO: ABNORMAL /HPF
BILIRUB UR QL STRIP: NEGATIVE
BILIRUB UR QL STRIP: NEGATIVE
BUN SERPL-MCNC: 11 MG/DL (ref 5–25)
CALCIUM SERPL-MCNC: 8.9 MG/DL (ref 8.4–10.2)
CARDIAC TROPONIN I PNL SERPL HS: 18 NG/L (ref ?–50)
CARDIAC TROPONIN I PNL SERPL HS: 18 NG/L (ref ?–50)
CARDIAC TROPONIN I PNL SERPL HS: 20 NG/L (ref ?–50)
CHLORIDE SERPL-SCNC: 104 MMOL/L (ref 96–108)
CLARITY UR: CLEAR
CLARITY UR: CLEAR
CO2 SERPL-SCNC: 26 MMOL/L (ref 21–32)
COLOR UR: YELLOW
COLOR UR: YELLOW
CREAT SERPL-MCNC: 0.95 MG/DL (ref 0.6–1.3)
ERYTHROCYTE [DISTWIDTH] IN BLOOD BY AUTOMATED COUNT: 12.8 % (ref 11.6–15.1)
GFR SERPL CREATININE-BSD FRML MDRD: 82 ML/MIN/1.73SQ M
GLUCOSE SERPL-MCNC: 110 MG/DL (ref 65–140)
GLUCOSE SERPL-MCNC: 116 MG/DL (ref 65–140)
GLUCOSE UR STRIP-MCNC: NEGATIVE MG/DL
GLUCOSE UR STRIP-MCNC: NEGATIVE MG/DL
HCT VFR BLD AUTO: 46 % (ref 36.5–49.3)
HGB BLD-MCNC: 16.5 G/DL (ref 12–17)
HGB UR QL STRIP.AUTO: ABNORMAL
HGB UR QL STRIP.AUTO: ABNORMAL
INR PPP: 1.04 (ref 0.85–1.19)
KETONES UR STRIP-MCNC: NEGATIVE MG/DL
KETONES UR STRIP-MCNC: NEGATIVE MG/DL
LEUKOCYTE ESTERASE UR QL STRIP: NEGATIVE
LEUKOCYTE ESTERASE UR QL STRIP: NEGATIVE
MCH RBC QN AUTO: 31.7 PG (ref 26.8–34.3)
MCHC RBC AUTO-ENTMCNC: 35.9 G/DL (ref 31.4–37.4)
MCV RBC AUTO: 89 FL (ref 82–98)
MUCOUS THREADS UR QL AUTO: ABNORMAL
MUCOUS THREADS UR QL AUTO: ABNORMAL
NITRITE UR QL STRIP: NEGATIVE
NITRITE UR QL STRIP: NEGATIVE
NON-SQ EPI CELLS URNS QL MICRO: ABNORMAL /HPF
NON-SQ EPI CELLS URNS QL MICRO: ABNORMAL /HPF
P AXIS: 41 DEGREES
PH UR STRIP.AUTO: 5.5 [PH]
PH UR STRIP.AUTO: 5.5 [PH]
PLATELET # BLD AUTO: 164 THOUSANDS/UL (ref 149–390)
PMV BLD AUTO: 8.8 FL (ref 8.9–12.7)
POST-VOID RESIDUAL VOLUME, ML POC: 21 ML
POTASSIUM SERPL-SCNC: 3.8 MMOL/L (ref 3.5–5.3)
PR INTERVAL: 174 MS
PROT UR STRIP-MCNC: ABNORMAL MG/DL
PROT UR STRIP-MCNC: ABNORMAL MG/DL
PROTHROMBIN TIME: 13.9 SECONDS (ref 12.3–15)
QRS AXIS: -1 DEGREES
QRSD INTERVAL: 90 MS
QT INTERVAL: 374 MS
QTC INTERVAL: 441 MS
RBC # BLD AUTO: 5.2 MILLION/UL (ref 3.88–5.62)
RBC #/AREA URNS AUTO: ABNORMAL /HPF
RBC #/AREA URNS AUTO: ABNORMAL /HPF
SL AMB  POCT GLUCOSE, UA: NORMAL
SL AMB LEUKOCYTE ESTERASE,UA: NORMAL
SL AMB POCT BILIRUBIN,UA: NORMAL
SL AMB POCT BLOOD,UA: NORMAL
SL AMB POCT CLARITY,UA: CLEAR
SL AMB POCT COLOR,UA: NORMAL
SL AMB POCT KETONES,UA: NORMAL
SL AMB POCT NITRITE,UA: NORMAL
SL AMB POCT PH,UA: 5
SL AMB POCT SPECIFIC GRAVITY,UA: 1.02
SL AMB POCT URINE PROTEIN: NORMAL
SL AMB POCT UROBILINOGEN: NORMAL
SODIUM SERPL-SCNC: 137 MMOL/L (ref 135–147)
SP GR UR STRIP.AUTO: 1.02 (ref 1–1.03)
SP GR UR STRIP.AUTO: 1.04 (ref 1–1.03)
T WAVE AXIS: 14 DEGREES
TSH SERPL DL<=0.05 MIU/L-ACNC: 1.53 UIU/ML (ref 0.45–4.5)
TSH SERPL DL<=0.05 MIU/L-ACNC: 1.73 UIU/ML (ref 0.45–4.5)
UROBILINOGEN UR STRIP-ACNC: <2 MG/DL
UROBILINOGEN UR STRIP-ACNC: <2 MG/DL
VENTRICULAR RATE: 84 BPM
WBC # BLD AUTO: 5.96 THOUSAND/UL (ref 4.31–10.16)
WBC #/AREA URNS AUTO: ABNORMAL /HPF
WBC #/AREA URNS AUTO: ABNORMAL /HPF

## 2025-04-22 PROCEDURE — 85730 THROMBOPLASTIN TIME PARTIAL: CPT

## 2025-04-22 PROCEDURE — 85610 PROTHROMBIN TIME: CPT

## 2025-04-22 PROCEDURE — 93005 ELECTROCARDIOGRAM TRACING: CPT

## 2025-04-22 PROCEDURE — 99291 CRITICAL CARE FIRST HOUR: CPT | Performed by: STUDENT IN AN ORGANIZED HEALTH CARE EDUCATION/TRAINING PROGRAM

## 2025-04-22 PROCEDURE — 51798 US URINE CAPACITY MEASURE: CPT | Performed by: PHYSICIAN ASSISTANT

## 2025-04-22 PROCEDURE — 99223 1ST HOSP IP/OBS HIGH 75: CPT | Performed by: FAMILY MEDICINE

## 2025-04-22 PROCEDURE — 81001 URINALYSIS AUTO W/SCOPE: CPT | Performed by: PHYSICIAN ASSISTANT

## 2025-04-22 PROCEDURE — 84484 ASSAY OF TROPONIN QUANT: CPT

## 2025-04-22 PROCEDURE — 93010 ELECTROCARDIOGRAM REPORT: CPT | Performed by: INTERNAL MEDICINE

## 2025-04-22 PROCEDURE — 70496 CT ANGIOGRAPHY HEAD: CPT

## 2025-04-22 PROCEDURE — 99285 EMERGENCY DEPT VISIT HI MDM: CPT | Performed by: EMERGENCY MEDICINE

## 2025-04-22 PROCEDURE — 71045 X-RAY EXAM CHEST 1 VIEW: CPT

## 2025-04-22 PROCEDURE — 99285 EMERGENCY DEPT VISIT HI MDM: CPT

## 2025-04-22 PROCEDURE — 70498 CT ANGIOGRAPHY NECK: CPT

## 2025-04-22 PROCEDURE — 84443 ASSAY THYROID STIM HORMONE: CPT | Performed by: FAMILY MEDICINE

## 2025-04-22 PROCEDURE — 84484 ASSAY OF TROPONIN QUANT: CPT | Performed by: FAMILY MEDICINE

## 2025-04-22 PROCEDURE — 82948 REAGENT STRIP/BLOOD GLUCOSE: CPT

## 2025-04-22 PROCEDURE — 85027 COMPLETE CBC AUTOMATED: CPT

## 2025-04-22 PROCEDURE — NC001 PR NO CHARGE: Performed by: STUDENT IN AN ORGANIZED HEALTH CARE EDUCATION/TRAINING PROGRAM

## 2025-04-22 PROCEDURE — 36415 COLL VENOUS BLD VENIPUNCTURE: CPT

## 2025-04-22 PROCEDURE — 81001 URINALYSIS AUTO W/SCOPE: CPT | Performed by: FAMILY MEDICINE

## 2025-04-22 PROCEDURE — 99204 OFFICE O/P NEW MOD 45 MIN: CPT | Performed by: PHYSICIAN ASSISTANT

## 2025-04-22 PROCEDURE — 81002 URINALYSIS NONAUTO W/O SCOPE: CPT | Performed by: PHYSICIAN ASSISTANT

## 2025-04-22 PROCEDURE — 80048 BASIC METABOLIC PNL TOTAL CA: CPT

## 2025-04-22 RX ORDER — ATORVASTATIN CALCIUM 40 MG/1
40 TABLET, FILM COATED ORAL EVERY EVENING
Status: DISCONTINUED | OUTPATIENT
Start: 2025-04-22 | End: 2025-04-24 | Stop reason: HOSPADM

## 2025-04-22 RX ORDER — MEMANTINE HYDROCHLORIDE 5 MG/1
5 TABLET ORAL DAILY
Status: DISCONTINUED | OUTPATIENT
Start: 2025-04-22 | End: 2025-04-24 | Stop reason: HOSPADM

## 2025-04-22 RX ORDER — ACETAMINOPHEN 325 MG/1
650 TABLET ORAL EVERY 6 HOURS PRN
Status: DISCONTINUED | OUTPATIENT
Start: 2025-04-22 | End: 2025-04-24 | Stop reason: HOSPADM

## 2025-04-22 RX ORDER — CLOPIDOGREL BISULFATE 75 MG/1
75 TABLET ORAL DAILY
Status: DISCONTINUED | OUTPATIENT
Start: 2025-04-22 | End: 2025-04-24 | Stop reason: HOSPADM

## 2025-04-22 RX ORDER — TERBINAFINE HYDROCHLORIDE 250 MG/1
TABLET ORAL
COMMUNITY
Start: 2025-04-18 | End: 2025-04-24

## 2025-04-22 RX ORDER — METOPROLOL SUCCINATE 50 MG/1
50 TABLET, EXTENDED RELEASE ORAL 2 TIMES DAILY
Status: DISCONTINUED | OUTPATIENT
Start: 2025-04-22 | End: 2025-04-24 | Stop reason: HOSPADM

## 2025-04-22 RX ADMIN — MEMANTINE 5 MG: 5 TABLET ORAL at 15:51

## 2025-04-22 RX ADMIN — METOPROLOL SUCCINATE 50 MG: 50 TABLET, EXTENDED RELEASE ORAL at 15:51

## 2025-04-22 RX ADMIN — IOHEXOL 85 ML: 350 INJECTION, SOLUTION INTRAVENOUS at 11:57

## 2025-04-22 RX ADMIN — ACETAMINOPHEN 650 MG: 325 TABLET, FILM COATED ORAL at 20:47

## 2025-04-22 RX ADMIN — ATORVASTATIN CALCIUM 40 MG: 40 TABLET, FILM COATED ORAL at 17:43

## 2025-04-22 RX ADMIN — CLOPIDOGREL BISULFATE 75 MG: 75 TABLET, FILM COATED ORAL at 15:51

## 2025-04-22 RX ADMIN — APIXABAN 5 MG: 5 TABLET, FILM COATED ORAL at 17:43

## 2025-04-22 NOTE — H&P
H&P - Hospitalist   Name: Christopher Razo 69 y.o. male I MRN: [id number]  Unit/Bed#: [facility] 701-01 I Date of Admission: 4/22/2025   Date of Service: 4/22/2025 I Hospital Day: 0     Assessment & Plan  Altered mental status  Patient has history of prior strokes as well as vascular dementia who presents with confusion overnight  Wife noted that patient had urinary frequency and incontinence associated with his confusion.  Patient was seen by urology with concern of a UTI however a urologist recommended patient to come to the emergency department for stroke evaluation.  At this time patient is back to baseline  Neurology consult  Reviewed CT head  Continue Plavix and Eliquis  Continue statin  Follow-up MRI  PT/OT/speech therapy evaluation  F/U labs  F/U UA  Neurology consulted  Benign essential hypertension  Blood pressure is at goal 130s over 80s  Continue metoprolol succinate 50 mg twice daily  Paroxysmal A-fib (HCC)  Rate controlled with metoprolol  Anticoagulation with Eliquis  CAD (coronary artery disease)  Continue Eliquis, Plavix, statin  Dementia without behavioral disturbance, psychotic disturbance, mood disturbance, or anxiety (HCC)  Patient appears to be at baseline  May continue Namenda      VTE Pharmacologic Prophylaxis:   Moderate Risk (Score 3-4) - Pharmacological DVT Prophylaxis Ordered: apixaban (Eliquis).  Code Status: Level 1 - Full Code   Discussion with family: Updated  (wife) at bedside.    Anticipated Length of Stay: Patient will be admitted on an inpatient basis with an anticipated length of stay of greater than 2 midnights secondary to AMS.    History of Present Illness   Chief Complaint: AMS    Christopher Razo is a 69 y.o. male with a PMH of CVA, paroxysmal atrial fibrillation, CAD, dementia who presents with altered mental status.  Patient will be admitted to medicine for stroke workup.  Patient's wife states that he was having urinary incontinence and confusion.  Patient was seen by  urology this morning as he had an appointment and there was concern of a UTI precipitating this.  Urologist recommended patient to come to the emergency department to have further evaluation.  At this time patient is back to baseline.    Review of Systems   Constitutional:  Positive for activity change. Negative for chills and fever.   HENT:  Negative for ear pain and sore throat.    Eyes:  Negative for pain and visual disturbance.   Respiratory:  Negative for cough and shortness of breath.    Cardiovascular:  Negative for chest pain and palpitations.   Gastrointestinal:  Negative for abdominal pain and vomiting.   Genitourinary:  Negative for dysuria and hematuria.   Musculoskeletal:  Negative for arthralgias and back pain.   Skin:  Negative for color change and rash.   Neurological:  Negative for seizures and syncope.   Psychiatric/Behavioral:  Positive for confusion.    All other systems reviewed and are negative.      Historical Information   Past Medical History:   Diagnosis Date    Arthropathy of knee     last assessed 8/19/15    Bacterial pneumonia 02/05/2007    last assessed 2/5/7    Brain atrophy (HCC)     Colon polyp     CPAP (continuous positive airway pressure) dependence     Disorder of male genital organ 02/12/2008    last assessed 2/12/08    ED (erectile dysfunction) of organic origin     last assessed 2/13/17     History of hypertension     Seasonal allergies     Situational anxiety     resolved 3/16/17     Sleep apnea     Stroke (HCC)     09/2020 TIA    Tinnitus     Wears hearing aid     bilateral     Past Surgical History:   Procedure Laterality Date    CARDIAC CATHETERIZATION Left 10/28/2022    Procedure: Cardiac Left Heart Cath;  Surgeon: Christian Burr MD;  Location: [facility];  Service: Cardiology    CARDIAC CATHETERIZATION  10/28/2022    Procedure: Cardiac catheterization;  Surgeon: Christian Burr MD;  Location: [facility];  Service: Cardiology    CARDIAC CATHETERIZATION N/A  10/28/2022    Procedure: Cardiac Coronary Angiogram;  Surgeon: Christian Burr MD;  Location:  CARDI[facility] CATH [facility];  Service: Cardiology    CARDIAC ELECTROPHYSIOLOGY PROCEDURE N/A 12/22/2021    Procedure: Cardiac Loop Recorder Implant;  Surgeon: Judy Norris DO;  Location: [facility];  Service: Cardiology    COLONOSCOPY      x3-w/polyps    HERNIA REPAIR      umbilical,hemal inguinal    KNEE ARTHROSCOPY Left     meniscus    CA COLONOSCOPY FLX DX W/COLLJ SPEC WHEN PFRMD N/A 5/7/2018    Procedure: COLONOSCOPY;  Surgeon: Thiago Lopes MD;  Location: [facility];  Service: Gastroenterology     Social History     Tobacco Use    Smoking status: Never     Passive exposure: Past    Smokeless tobacco: Never   Vaping Use    Vaping status: Never Used   Substance and Sexual Activity    Alcohol use: Not Currently     Alcohol/week: 14.0 standard drinks of alcohol     Types: 14 Cans of beer per week     Comment: occ    Drug use: No    Sexual activity: Not on file     E-Cigarette/Vaping    E-Cigarette Use Never User     Cartridges/Day n/a      E-Cigarette/Vaping Substances    Nicotine No     THC No     CBD No     Flavoring No     Other No     Unknown No      Family history non-contributory  Social History:  Marital Status: /Civil Union   Occupation: None  Patient Pre-hospital Living Situation: With spouse  Patient Pre-hospital Level of Mobility: walks  Patient Pre-hospital Diet Restrictions: None    Meds/Allergies   I have reviewed home medications with patient personally.  Prior to Admission medications    Medication Sig Start Date End Date Taking? Authorizing Provider   ALPRAZolam (XANAX) 0.5 mg tablet Take 1 tablet (0.5 mg total) by mouth 30 min pre-procedure  Patient not taking: Reported on 4/22/2025 9/23/24   Richard Varela DO   apixaban (Eliquis) 5 mg Take 5 mg by mouth 2 (two) times a day    Historical Provider, MD   apixaban (Eliquis) 5 mg Take 1 tablet (5 mg total) by mouth 2 (two) times a day for 28 days  4/22/25 5/20/25  Judy Norris DO   atorvastatin (LIPITOR) 40 mg tablet TAKE 1 TABLET BY MOUTH EVERY DAY IN THE EVENING 11/1/24   Richard Varela DO   ciclopirox (PENLAC) 8 % solution APPLY ONCE DAILY TO AFFECTED NAILS. 6/26/24   Historical Provider, MD   clopidogrel (PLAVIX) 75 mg tablet TAKE 1 TABLET BY MOUTH EVERY DAY 2/5/25   Richard Varela DO   ferrous sulfate 324 (65 Fe) mg Take 1 tablet (324 mg total) by mouth 2 (two) times a day before meals  Patient not taking: Reported on 10/7/2024 6/5/24   Richard Varela DO   fexofenadine (ALLEGRA) 180 MG tablet Take 1 tablet (180 mg total) by mouth daily for 30 days  Patient taking differently: Take 180 mg by mouth every morning 2/15/18 10/7/24  Frank Lombardi, DO   memantine (NAMENDA) 5 mg tablet Take 5 mg by mouth daily 7/8/23   Historical Provider, MD   metoprolol succinate (TOPROL-XL) 50 mg 24 hr tablet TAKE 1 TABLET BY MOUTH TWICE A DAY 4/8/25   Judy Norris DO   terbinafine (LamISIL) 250 mg tablet  4/18/25   Historical Provider, MD   dofetilide (TIKOSYN) 500 mcg capsule Take 1 capsule (500 mcg total) by mouth 2 (two) times a day 10/28/22 11/1/22  Ceci Lee PA-C   magnesium oxide (MAG-OX) 400 mg Take 1 tablet (400 mg total) by mouth daily Do not start before November 2, 2022. 11/2/22 11/3/22  Shruthi Youngblood DO     Allergies   Allergen Reactions    Pollen Extract Sneezing     Reaction Date: 18Sep2006;     Shellfish-Derived Products - Food Allergy Other (See Comments)     Eye swelling    Iodine Solution [Povidone Iodine] Rash     Eyes swell       Objective :  Temp:  [98 °F (36.7 °C)-98.7 °F (37.1 °C)] 98 °F (36.7 °C)  HR:  [78-88] 83  BP: (110-149)/(69-99) 111/70  Resp:  [16-18] 16  SpO2:  [92 %-98 %] 93 %  O2 Device: None (Room air)    Physical Exam  Vitals reviewed.   HENT:      Head: Normocephalic.      Nose: No congestion.      Mouth/Throat:      Pharynx: No oropharyngeal exudate or posterior oropharyngeal erythema.   Eyes:       Conjunctiva/sclera: Conjunctivae normal.   Cardiovascular:      Rate and Rhythm: Normal rate and regular rhythm.   Pulmonary:      Effort: Pulmonary effort is normal.   Abdominal:      General: Abdomen is flat.      Palpations: Abdomen is soft.   Skin:     General: Skin is warm and dry.   Neurological:      Mental Status: He is alert. Mental status is at baseline.      Sensory: No sensory deficit.      Motor: No weakness.          Lines/Drains:            Lab Results: I have reviewed the following results:  Results from last 7 days   Lab Units 04/22/25  1150   WBC Thousand/uL 5.96   HEMOGLOBIN g/dL 16.5   HEMATOCRIT % 46.0   PLATELETS Thousands/uL 164     Results from last 7 days   Lab Units 04/22/25  1150   SODIUM mmol/L 137   POTASSIUM mmol/L 3.8   CHLORIDE mmol/L 104   CO2 mmol/L 26   BUN mg/dL 11   CREATININE mg/dL 0.95   ANION GAP mmol/L 7   CALCIUM mg/dL 8.9   GLUCOSE RANDOM mg/dL 110     Results from last 7 days   Lab Units 04/22/25  1150   INR  1.04     Results from last 7 days   Lab Units 04/22/25  1144   POC GLUCOSE mg/dl 116     Lab Results   Component Value Date    HGBA1C 5.1 04/17/2024    HGBA1C 5.1 07/07/2022    HGBA1C 5.3 11/12/2021           Imaging Results Review: I reviewed radiology reports from this admission including: CTA and CT head.  Other Study Results Review: EKG was reviewed.     Administrative Statements   I have spent a total time of 80 minutes in caring for this patient on the day of the visit/encounter including Diagnostic results, Prognosis, Instructions for management, Patient and family education, Counseling / Coordination of care, Reviewing/placing orders in the medical record (including tests, medications, and/or procedures), Obtaining or reviewing history  , and Communicating with other healthcare professionals .    ** Please Note: This note has been constructed using a voice recognition system. **

## 2025-04-22 NOTE — PLAN OF CARE
Problem: PAIN - ADULT  Goal: Verbalizes/displays adequate comfort level or baseline comfort level  Description: Interventions:- Encourage patient to monitor pain and request assistance- Assess pain using appropriate pain scale- Administer analgesics based on type and severity of pain and evaluate response- Implement non-pharmacological measures as appropriate and evaluate response- Consider cultural and social influences on pain and pain management- Notify physician/advanced practitioner if interventions unsuccessful or patient reports new pain  Outcome: Progressing     Problem: INFECTION - ADULT  Goal: Absence or prevention of progression during hospitalization  Description: INTERVENTIONS:- Assess and monitor for signs and symptoms of infection- Monitor lab/diagnostic results- Monitor all insertion sites, i.e. indwelling lines, tubes, and drains- Monitor endotracheal if appropriate and nasal secretions for changes in amount and color- [place] appropriate cooling/warming therapies per order- Administer medications as ordered- Instruct and encourage patient and family to use good hand hygiene technique- Identify and instruct in appropriate isolation precautions for identified infection/condition  Outcome: Progressing  Goal: Absence of fever/infection during neutropenic period  Description: INTERVENTIONS:- Monitor WBC  Outcome: Progressing     Problem: SAFETY ADULT  Goal: Patient will remain free of falls  Description: INTERVENTIONS:- Educate patient/family on patient safety including physical limitations- Instruct patient to call for assistance with activity - Consult OT/PT to assist with strengthening/mobility - Keep Call bell within reach- Keep bed low and locked with side rails adjusted as appropriate- Keep care items and personal belongings within reach- Initiate and maintain comfort rounds- Make Fall Risk Sign visible to staff- Offer Toileting every 2 Hours, in advance of need- Initiate/Maintain bed alarm-  Apply yellow socks and bracelet for high fall risk patients- Consider moving patient to room near nurses station  Outcome: Progressing  Goal: Maintain or return to baseline ADL function  Description: INTERVENTIONS:-  Assess patient's ability to carry out ADLs; assess patient's baseline for ADL function and identify physical deficits which impact ability to perform ADLs (bathing, care of mouth/teeth, toileting, grooming, dressing, etc.)- Assess/evaluate cause of self-care deficits - Assess range of motion- Assess patient's mobility; develop plan if impaired- Assess patient's need for assistive devices and provide as appropriate- Encourage maximum independence but intervene and supervise when necessary- Involve family in performance of ADLs- Assess for home care needs following discharge - Consider OT consult to assist with ADL evaluation and planning for discharge- Provide patient education as appropriate  Outcome: Progressing  Goal: Maintains/Returns to pre admission functional level  Description: INTERVENTIONS:- Perform AM-PAC 6 Click Basic Mobility/ Daily Activity assessment daily.- Set and communicate daily mobility goal to care team and patient/family/caregiver. - Collaborate with rehabilitation services on mobility goals if consulted- Perform Range of Motion 3 times a day.- Reposition patient every 2 hours.- Dangle patient 3 times a day- Stand patient 2 times a day- Ambulate patient 3 times a day- Out of bed to chair 2 times a day - Out of bed for meals 3 times a day- Out of bed for toileting- Record patient progress and toleration of activity level   Outcome: Progressing     Problem: DISCHARGE PLANNING  Goal: Discharge to home or other facility with appropriate resources  Description: INTERVENTIONS:- Identify barriers to discharge w/patient and caregiver- Arrange for needed discharge resources and transportation as appropriate- Identify discharge learning needs (meds, wound care, etc.)- Arrange for  interpretive services to assist at discharge as needed- Refer to Case Management Department for coordinating discharge planning if the patient needs post-hospital services based on physician/advanced practitioner order or complex needs related to functional status, cognitive ability, or social support system  Outcome: Progressing     Problem: Knowledge Deficit  Goal: Patient/family/caregiver demonstrates understanding of disease process, treatment plan, medications, and discharge instructions  Description: Complete learning assessment and assess knowledge base.Interventions:- Provide teaching at level of understanding- Provide teaching via preferred learning methods  Outcome: Progressing

## 2025-04-22 NOTE — ED PROVIDER NOTES
Time reflects when diagnosis was documented in both MDM as applicable and the Disposition within this note       Time User Action Codes Description Comment    4/22/2025 11:39 AM Markle, Brooke M Add [I63.9] Stroke (HCC)     4/22/2025  1:07 PM Rony Baird Add [R41.82] Altered mental status     4/22/2025  1:07 PM Rony Baird Add [R32] Urinary incontinence           ED Disposition       ED Disposition   Admit    Condition   Stable    Date/Time   Tue Apr 22, 2025  1:07 PM    Comment   --             Assessment & Plan       Medical Decision Making  69-year-old male with history of CVA, dementia, and hypertension presenting today for evaluation of altered mental status.  Patient brought in with his wife who states that last night patient had increasing urinary frequency and urinary incontinence as well as confusion and balance issues with walking.  Last known well at 10 PM last night.  Patient a stroke alert from the waiting room.  Patient currently denying any complaints.    Differential: CVA, acute metabolic encephalopathy, UTI, pneumonia, electrolyte abnormality, ANNABEL, mild dehydration, polypharmacy.    Plan: Stroke alert initiated, will plan to obtain CBC, CMP, PT/PT/INR, Pica glucose, TSH, UA, EKG, chest x-ray, and CTA head and neck.  Neurology consulted, appreciate recommendations.    Patient's labs are grossly unremarkable.  CT imaging shows no evidence of acute stroke.  After discussion with neurology, patient will be admitted to medicine for further evaluation of metabolic etiology of altered mental status.,  No    Amount and/or Complexity of Data Reviewed  Radiology: ordered.    Risk  Decision regarding hospitalization.             Medications   apixaban (ELIQUIS) tablet 5 mg (has no administration in time range)   atorvastatin (LIPITOR) tablet 40 mg (has no administration in time range)   clopidogrel (PLAVIX) tablet 75 mg (has no administration in time range)   memantine (NAMENDA) tablet 5 mg (has no  administration in time range)   metoprolol succinate (TOPROL-XL) 24 hr tablet 50 mg (has no administration in time range)   iohexol (OMNIPAQUE) 350 MG/ML injection (MULTI-DOSE) 85 mL (85 mL Intravenous Given 4/22/25 1157)       ED Risk Strat Scores                    No data recorded        SBIRT 22yo+      Flowsheet Row Most Recent Value   Initial Alcohol Screen: US AUDIT-C     2. How many drinks containing alcohol do you have on a typical day you are drinking?  0 Filed at: 04/22/2025 0998   3a. Male UNDER 65: How often do you have five or more drinks on one occasion? 0 Filed at: 04/22/2025 3617   3b. FEMALE Any Age, or MALE 65+: How often do you have 4 or more drinks on one occassion? 0 Filed at: 04/22/2025 7470   Audit-C Score 0 Filed at: 04/22/2025 8553   PIERO: How many times in the past year have you...    Used an illegal drug or used a prescription medication for non-medical reasons? Never Filed at: 04/22/2025 2419                            History of Present Illness       Chief Complaint   Patient presents with    Altered Mental Status     Pt having increased of urinary incontience.  Pt also having confusion and balance issues since 2 am. No slurred speech        Past Medical History:   Diagnosis Date    Arthropathy of knee     last assessed 8/19/15    Bacterial pneumonia 02/05/2007    last assessed 2/5/7    Brain atrophy (HCC)     Colon polyp     CPAP (continuous positive airway pressure) dependence     Disorder of male genital organ 02/12/2008    last assessed 2/12/08    ED (erectile dysfunction) of organic origin     last assessed 2/13/17     History of hypertension     Seasonal allergies     Situational anxiety     resolved 3/16/17     Sleep apnea     Stroke (HCC)     09/2020 TIA    Tinnitus     Wears hearing aid     bilateral      Past Surgical History:   Procedure Laterality Date    CARDIAC CATHETERIZATION Left 10/28/2022    Procedure: Cardiac Left Heart Cath;  Surgeon: Christian Burr MD;  Location: [facility]  CARDIAC CATH LAB;  Service: Cardiology    CARDIAC CATHETERIZATION  10/28/2022    Procedure: Cardiac catheterization;  Surgeon: Christian Burr MD;  Location:  CARDIAC CATH LAB;  Service: Cardiology    CARDIAC CATHETERIZATION N/A 10/28/2022    Procedure: Cardiac Coronary Angiogram;  Surgeon: Christian Burr MD;  Location:  CARDIAC CATH LAB;  Service: Cardiology    CARDIAC ELECTROPHYSIOLOGY PROCEDURE N/A 12/22/2021    Procedure: Cardiac Loop Recorder Implant;  Surgeon: Judy Norris DO;  Location: [facility] CARDIAC CATH LAB;  Service: Cardiology    COLONOSCOPY      x3-w/polyps    HERNIA REPAIR      umbilical,hemal inguinal    KNEE ARTHROSCOPY Left     meniscus    NV COLONOSCOPY FLX DX W/COLLJ SPEC WHEN PFRMD N/A 5/7/2018    Procedure: COLONOSCOPY;  Surgeon: Thiago Lopes MD;  Location: [facility] GI LAB;  Service: Gastroenterology      Family History   Problem Relation Age of Onset    Cancer Mother         breast    Diverticulitis Mother         colostomy    Breast cancer Mother     Heart disease Father         CHF    Cancer Sister         breast    Depression Sister     Cancer Sister         skin cancer    Cancer Sister         skin cancer    Colon cancer Neg Hx     Liver disease Neg Hx       Social History     Tobacco Use    Smoking status: Never     Passive exposure: Past    Smokeless tobacco: Never   Vaping Use    Vaping status: Never Used   Substance Use Topics    Alcohol use: Not Currently     Alcohol/week: 14.0 standard drinks of alcohol     Types: 14 Cans of beer per week     Comment: occ    Drug use: No      E-Cigarette/Vaping    E-Cigarette Use Never User     Cartridges/Day n/a       E-Cigarette/Vaping Substances    Nicotine No     THC No     CBD No     Flavoring No     Other No     Unknown No       I have reviewed and agree with the history as documented.     Patient is a 69-year-old male with history of CVA, dementia, and hypertension presenting today for evaluation of altered mental status.  Patient is brought in by his  wife who aids in history.  Last night at around 2 AM, patient was noted to be going to the bathroom more frequently than normal.  He seemed to have some gait instability walking to and from the bathroom and had several episodes of urinary incontinence on the floor and in the bed.  Patient's wife asked him to trauma lights which patient did not seem to understand and said he was opening closing the door.  This morning, patient's wife took him to the urologist office who tested his urine for infection which was negative.  He was sent to the ER for further evaluation.  Patient currently states that he feels well and denies any complaints.  Last known well was at 10 PM last night when patient went to bed.        Review of Systems   Constitutional:  Negative for chills and fever.   HENT:  Negative for congestion, rhinorrhea and sore throat.    Eyes:  Negative for pain and redness.   Respiratory:  Negative for cough and shortness of breath.    Cardiovascular:  Negative for chest pain and palpitations.   Gastrointestinal:  Negative for abdominal pain, constipation, diarrhea, nausea and vomiting.   Genitourinary:  Negative for dysuria and hematuria.   Musculoskeletal:  Negative for back pain and neck pain.   Skin:  Negative for pallor and rash.   Neurological:  Negative for weakness and numbness.   All other systems reviewed and are negative.          Objective       ED Triage Vitals   Temperature Pulse Blood Pressure Respirations SpO2 Patient Position - Orthostatic VS   04/22/25 1137 04/22/25 1137 04/22/25 1137 04/22/25 1137 04/22/25 1137 04/22/25 1137   98.2 °F (36.8 °C) 83 142/81 18 95 % Sitting      Temp Source Heart Rate Source BP Location FiO2 (%) Pain Score    04/22/25 1416 04/22/25 1140 04/22/25 1137 -- 04/22/25 1137    Oral Monitor Right arm  No Pain      Vitals      Date and Time Temp Pulse SpO2 Resp BP Pain Score FACES Pain Rating User   04/22/25 1440 -- -- -- 17 -- -- -- AMM   04/22/25 1440 98.7 °F (37.1 °C) 83  93 % -- 138/99 -- -- DII   04/22/25 1416 98.3 °F (36.8 °C) 88 98 % 16 110/69 -- -- MP   04/22/25 1400 -- 81 94 % 17 126/78 -- -- KM   04/22/25 1300 -- 79 92 % 18 135/82 -- -- KM   04/22/25 1230 -- -- -- -- -- No Pain --    04/22/25 1200 -- 87 96 % 16 139/77 -- --    04/22/25 1151 -- 81 94 % 18 135/74 -- -- FR   04/22/25 1140 -- 78 96 % 18 149/77 -- --    04/22/25 1137 98.2 °F (36.8 °C) 83 95 % 18 142/81 No Pain -- BG            Physical Exam  Vitals and nursing note reviewed.   Constitutional:       General: He is not in acute distress.     Appearance: Normal appearance. He is well-developed. He is not ill-appearing, toxic-appearing or diaphoretic.   HENT:      Head: Normocephalic and atraumatic.      Right Ear: External ear normal.      Left Ear: External ear normal.      Nose: Nose normal. No congestion or rhinorrhea.      Mouth/Throat:      Mouth: Mucous membranes are moist.   Eyes:      General: No visual field deficit or scleral icterus.        Right eye: No discharge.         Left eye: No discharge.      Extraocular Movements: Extraocular movements intact.      Right eye: Normal extraocular motion and no nystagmus.      Left eye: Normal extraocular motion and no nystagmus.      Conjunctiva/sclera: Conjunctivae normal.      Pupils: Pupils are equal, round, and reactive to light. Pupils are equal.      Right eye: Pupil is round and reactive.      Left eye: Pupil is round and reactive.   Cardiovascular:      Rate and Rhythm: Normal rate and regular rhythm.      Pulses: Normal pulses.      Heart sounds: Normal heart sounds. No murmur heard.     No friction rub. No gallop.   Pulmonary:      Effort: Pulmonary effort is normal. No respiratory distress.      Breath sounds: Normal breath sounds. No stridor. No wheezing, rhonchi or rales.   Abdominal:      General: Abdomen is flat. There is no distension.      Palpations: Abdomen is soft. There is no mass.      Tenderness: There is no abdominal tenderness. There  is no guarding.   Musculoskeletal:         General: Normal range of motion.      Cervical back: Normal range of motion and neck supple. No rigidity or tenderness.   Skin:     General: Skin is warm and dry.      Capillary Refill: Capillary refill takes less than 2 seconds.      Coloration: Skin is not jaundiced or pale.   Neurological:      General: No focal deficit present.      Mental Status: He is alert. Mental status is at baseline. He is disoriented.      GCS: GCS eye subscore is 4. GCS verbal subscore is 5. GCS motor subscore is 6.      Cranial Nerves: No cranial nerve deficit, dysarthria or facial asymmetry.      Sensory: No sensory deficit.      Motor: No weakness.      Coordination: Coordination normal.   Psychiatric:         Mood and Affect: Mood normal.         Behavior: Behavior normal.         Results Reviewed       Procedure Component Value Units Date/Time    HS Troponin I 2hr [[id number]] Collected: 04/22/25 1449    Lab Status: In process Specimen: Blood from Arm, Right Updated: 04/22/25 1455    TSH, 3rd generation [[id number]] Collected: 04/22/25 1449    Lab Status: In process Specimen: Blood from Arm, Right Updated: 04/22/25 1455    TSH, 3rd generation with Free T4 reflex [[id number]] Collected: 04/22/25 1150    Lab Status: In process Specimen: Blood from Arm, Right Updated: 04/22/25 1351    Basic metabolic panel [[id number]] Collected: 04/22/25 1150    Lab Status: Final result Specimen: Blood from Arm, Right Updated: 04/22/25 1227     Sodium 137 mmol/L      Potassium 3.8 mmol/L      Chloride 104 mmol/L      CO2 26 mmol/L      ANION GAP 7 mmol/L      BUN 11 mg/dL      Creatinine 0.95 mg/dL      Glucose 110 mg/dL      Calcium 8.9 mg/dL      eGFR 82 ml/min/1.73sq m     Narrative:      National Kid[employer] Foundation guidelines for Chronic Kidney Disease (CKD):     Stage 1 with normal or high GFR (GFR > 90 mL/min/1.73 square meters)    Stage 2 Mild CKD (GFR = 60-89 mL/min/1.73 square meters)    Stage 3A  Moderate CKD (GFR = 45-59 mL/min/1.73 square meters)    Stage 3B Moderate CKD (GFR = 30-44 mL/min/1.73 square meters)    Stage 4 Severe CKD (GFR = 15-29 mL/min/1.73 square meters)    Stage 5 End Stage CKD (GFR <15 mL/min/1.73 square meters)  Note: GFR calculation is accurate only with a steady state creatinine    HS Troponin I 4hr [[id number]]     Lab Status: No result Specimen: Blood     HS Troponin 0hr (reflex protocol) [[id number]]  (Normal) Collected: 04/22/25 1150    Lab Status: Final result Specimen: Blood from Arm, Right Updated: 04/22/25 1225     hs TnI 0hr 20 ng/L     UA w Reflex to Microscopic w Reflex to Culture [[id number]]     Lab Status: No result Specimen: Urine     Protime-INR [[id number]]  (Normal) Collected: 04/22/25 1150    Lab Status: Final result Specimen: Blood from Arm, Right Updated: 04/22/25 1216     Protime 13.9 seconds      INR 1.04    Narrative:      INR Therapeutic Range    Indication                                             INR Range      Atrial Fibrillation                                               2.0-3.0  Hypercoagulable State                                    2.0.2.3  Left Ventricular Asist Device                            2.0-3.0  Mechanical Heart Valve                                  -    Aortic(with afib, MI, embolism, HF, LA enlargement,    and/or coagulopathy)                                     2.0-3.0 (2.5-3.5)     Mitral                                                             2.5-3.5  Prosthetic/Bioprosthetic Heart Valve               2.0-3.0  Venous thromboembolism (VTE: VT, PE        2.0-3.0    APTT [[id number]]  (Normal) Collected: 04/22/25 1150    Lab Status: Final result Specimen: Blood from Arm, Right Updated: 04/22/25 1216     PTT 33 seconds     CBC and Platelet [[id number]]  (Abnormal) Collected: 04/22/25 1150    Lab Status: Final result Specimen: Blood from Arm, Right Updated: 04/22/25 1213     WBC 5.96 Thousand/uL      RBC 5.20 Million/uL      Hemoglobin  16.5 g/dL      Hematocrit 46.0 %      MCV 89 fL      MCH 31.7 pg      MCHC 35.9 g/dL      RDW 12.8 %      Platelets 164 Thousands/uL      MPV 8.8 fL     Fingerstick Glucose (POCT) [[id number]]  (Normal) Collected: 04/22/25 1144    Lab Status: Final result Specimen: Blood Updated: 04/22/25 1145     POC Glucose 116 mg/dl             XR chest 1 view portable   Final Interpretation by Nancy Campa MD (04/22 1300)      No acute cardiopulmonary disease.            Workstation performed: [id number]         CTA stroke alert (head/neck)   Final Interpretation by Jeanine Hernandez MD (04/22 1216)      1.  No intracranial large vessel occlusion.   2.  Focal high-grade stenosis in the mid basilar artery.   3.  Focal severe stenosis in the right PCA P2 segment.   4.  No significant stenosis in the cervical carotid or vertebral arteries.               Findings were directly discussed with Ismael Sousa at 12:05 p.m.      Workstation performed: [id number]         CT stroke alert brain   Final Interpretation by Jeanine Hernandez MD (04/22 1216)      1.  No acute intracranial CT abnormality.   2.  Chronic lacunar infarcts in bilateral corona radiata and thalami. Chronic microangiopathic change.            Findings were directly discussed with Ismael Sousa at approximately 12:05 p.m.      Workstation performed: [id number]             Procedures    ED Medication and Procedure Management   Prior to Admission Medications   Prescriptions Last Dose Informant Patient Reported? Taking?   ALPRAZolam (XANAX) 0.5 mg tablet  Spouse/Significant Other No No   Sig: Take 1 tablet (0.5 mg total) by mouth 30 min pre-procedure   Patient not taking: Reported on 4/22/2025   apixaban (Eliquis) 5 mg  Spouse/Significant Other Yes No   Sig: Take 5 mg by mouth 2 (two) times a day   apixaban (Eliquis) 5 mg   No No   Sig: Take 1 tablet (5 mg total) by mouth 2 (two) times a day for 28 days   atorvastatin (LIPITOR) 40 mg tablet  Spouse/Significant Other No No   Sig:  TAKE 1 TABLET BY MOUTH EVERY DAY IN THE EVENING   ciclopirox (PENLAC) 8 % solution  Spouse/Significant Other Yes No   Sig: APPLY ONCE DAILY TO AFFECTED NAILS.   clopidogrel (PLAVIX) 75 mg tablet  Spouse/Significant Other No No   Sig: TAKE 1 TABLET BY MOUTH EVERY DAY   ferrous sulfate 324 (65 Fe) mg  Spouse/Significant Other No No   Sig: Take 1 tablet (324 mg total) by mouth 2 (two) times a day before meals   Patient not taking: Reported on 10/7/2024   fexofenadine (ALLEGRA) 180 MG tablet  Self No No   Sig: Take 1 tablet (180 mg total) by mouth daily for 30 days   Patient taking differently: Take 180 mg by mouth every morning   memantine (NAMENDA) 5 mg tablet  Spouse/Significant Other Yes No   Sig: Take 5 mg by mouth daily   metoprolol succinate (TOPROL-XL) 50 mg 24 hr tablet  Spouse/Significant Other No No   Sig: TAKE 1 TABLET BY MOUTH TWICE A DAY   terbinafine (LamISIL) 250 mg tablet  Spouse/Significant Other Yes No   Patient not taking: Reported on 4/22/2025      Facility-Administered Medications Last Administration Doses Remaining   cyanocobalamin injection 1,000 mcg 11/27/2024 11:31 AM         Current Discharge Medication List        CONTINUE these medications which have NOT CHANGED    Details   ALPRAZolam (XANAX) 0.5 mg tablet Take 1 tablet (0.5 mg total) by mouth 30 min pre-procedure  Qty: 2 tablet, Refills: 0    Comments: Call pt when ready  Associated Diagnoses: Claustrophobia      !! apixaban (Eliquis) 5 mg Take 5 mg by mouth 2 (two) times a day      !! apixaban (Eliquis) 5 mg Take 1 tablet (5 mg total) by mouth 2 (two) times a day for 28 days  Qty: 56 tablet, Refills: 0    Comments: Lot # [id number]  Exp 02/2026  Associated Diagnoses: Paroxysmal A-fib (HCC)      atorvastatin (LIPITOR) 40 mg tablet TAKE 1 TABLET BY MOUTH EVERY DAY IN THE EVENING  Qty: 90 tablet, Refills: 1    Associated Diagnoses: History of cardioembolic cerebrovascular accident (CVA)      ciclopirox (PENLAC) 8 % solution APPLY ONCE DAILY TO  AFFECTED NAILS.      clopidogrel (PLAVIX) 75 mg tablet TAKE 1 TABLET BY MOUTH EVERY DAY  Qty: 90 tablet, Refills: 1    Associated Diagnoses: History of cardioembolic cerebrovascular accident (CVA)      ferrous sulfate 324 (65 Fe) mg Take 1 tablet (324 mg total) by mouth 2 (two) times a day before meals    Associated Diagnoses: Iron deficiency anemia due to chronic blood loss      fexofenadine (ALLEGRA) 180 MG tablet Take 1 tablet (180 mg total) by mouth daily for 30 days  Qty: 30 tablet, Refills: 0    Associated Diagnoses: Upper respiratory tract infection, unspecified type      memantine (NAMENDA) 5 mg tablet Take 5 mg by mouth daily      metoprolol succinate (TOPROL-XL) 50 mg 24 hr tablet TAKE 1 TABLET BY MOUTH TWICE A DAY  Qty: 60 tablet, Refills: 5    Associated Diagnoses: Coronary artery disease involving native coronary artery of native heart without angina pectoris; NSVT (nonsustained ventricular tachycardia) ([facility])      terbinafine (LamISIL) 250 mg tablet        !! - Potential duplicate medications found. Please discuss with provider.        No discharge procedures on file.  ED SEPSIS DOCUMENTATION   Time reflects when diagnosis was documented in both MDM as applicable and the Disposition within this note       Time User Action Codes Description Comment    4/22/2025 11:39 AM Markle, Brooke M Add [I63.9] Stroke (HCC)     4/22/2025  1:07 PM Rony Baird [R41.82] Altered mental status     4/22/2025  1:07 PM Rony Baird [R32] Urinary incontinence                  Rony Baird MD  04/22/25 6877

## 2025-04-22 NOTE — CONSULTS
Consultation - Neurology   Name: Christopher Razo 69 y.o. male I MRN: [id number]  Unit/Bed#: ED 01 I Date of Admission: 4/22/2025   Date of Service: 4/22/2025 I Hospital Day: 0   Consult to Neurology  Consult performed by: ELVIS Guajardo  Consult ordered by: Triage Protocol Emergency, MD        Physician Requesting Evaluation: Nellie Larson DO   Reason for Evaluation / Principal Problem: Stroke    Assessment & Plan  Altered mental status   69 y.o.  male with history of prior strokes with residual ataxia, PAF on Eliquis (ILR in place), vascular dementia, probable cavernoma, possible CAA, HTN, GRACIE, CAD s/p PCI x 2 on Plavix, who presents with confusion.    Patient woke up at 2 AM the day of admission with urinary frequency and incontinence which is unusual for him.  Patient could not find the light switch to the bathroom which is not his baseline.  Patient was able to eat breakfast a few hours later, however his wife noted his baseline ataxia was worse than normal.  He had a urology appointment the day of admission and it was recommended to come to the ER for further evaluation    BP on arrival 142/81.    NIHSS 2 (1 point for orientation, 1 point for limb ataxia).    Initial CT imaging revealed focal high-grade stenosis of mid basilar artery and severe stenosis in the right PCA P2 segment.  Chronic lacunar infarcts in bilateral corona radiata and thalami  Patient was not a TNK candidate due to bleeding risk and nondisabling (patient reports he felt at baseline), no IR target    Lower overall suspicion for stroke at this time, however recommend stroke pathway given risk factors.  Recommend metabolic/infectious workup.    Plan:  - Stroke pathway  - Continue with home Plavix 75 mg daily and Eliquis 5 mg twice daily  - Continue with Atorvastatin 40 mg qHS   - Recommend MRI brain wo, echo, lipid panel and hemoglobin A1c   - No need for permissive HTN, maintain normotension  - Euglycemic, normothermic goal  -  Continue telemetry  - PT/OT/ST  - Secondary risk factor modification.   - Stroke education  - Frequent neuro checks. Continue to monitor and notify neurology with any changes.  - STAT CT head for any acute change in neuro exam  - Patient at risk for delirium secondary to age, stroke, comorbidities, etc.   - Provide frequent redirection, reorientation, distraction techniques  - Avoid deliriogenic medications such as tramadol, benzodiazepines, anticholinergics, Benadryl  - Treat pain as appropriate per primary team  - Monitor for constipation and urinary retention  - Encourage early and frequent moblization, OOB, Hydration/ Nutrition  - Medical management and supportive care per primary team. Correction of any metabolic or infectious disturbances.     Plan discussed with Attending Neurologist, please see attestation for further input/recommendations.       Thrombolytic Decision: Patient not a candidate. Unclear time of onset outside appropriate time window., Symptoms resolved/clearly non disabling., and Bleeding risk.    Recommendations for outpatient neurological follow up have yet to be determined.    History of Present Illness   Hx and PE limited by: N/A, wife present at bedside  Patient last known well: 2 AM  Stroke alert called: 11:41 AM  Neurology time of arrival: 11:43 AM  HPI: Christopher Razo is a 69 y.o.  male with history of prior strokes with residual ataxia, PAF on Eliquis (ILR in place), vascular dementia, probable cavernoma, possible CAA, HTN, GRACIE, CAD s/p PCI x 2 on Plavix, who presents with confusion.    Patient's wife reports they went to sleep the night before admission at 10 PM and patient was at his baseline.  Patient and his wife woke up around 2 AM as patient had urinary frequency and needed to use the bathroom.  Patient was incontinent on the bathroom floor which is unusual for him.  His wife told him to turn the light off to the bathroom and he could not find the light switch so he just closed the  door which is also unusual for him.  Patient woke up the morning of admission and was incontinent again.  He was able to eat breakfast independently, however his wife noted his baseline ataxia was worse than normal.  Patient had a urology appointment this morning and it was recommended that patient come to the ER for further evaluation.    BP on arrival 142/81.  NIHSS 2 (1 point for orientation, 1 point for limb ataxia).  Initial CT imaging revealed focal high-grade stenosis of mid basilar artery and severe stenosis in the right PCA P2 segment.  There are no prior CTA head/neck to compare results to.    Per chart review, patient previously saw Dr. Rob with  neurology in October 2024 for follow-up.  Patient has a history of strokes - right cerebellar, bilateral centrum semiovale, bilateral thalamus and possible underlying CAA.  He is on Eliquis 5 mg twice daily and Plavix 75 mg.  Patient had MRI a neuro quant done which revealed possible subacute left thalamic infarct, however patient did not notice any focal symptoms aside from brief episode of confusions a few weeks prior to appointment.  Patient has a loop recorder in place and he is currently being evaluated by cardiology for Watchman device so he can come off of AC.    Patient's wife is in charge of his medication and he is compliant with taking them, including AC/AP.      Review of Systems   Constitutional:  Negative for chills and diaphoresis.   HENT:  Negative for trouble swallowing.    Eyes:  Negative for photophobia and visual disturbance.   Respiratory:  Negative for cough and shortness of breath.    Cardiovascular:  Negative for chest pain and palpitations.   Gastrointestinal:  Negative for nausea and vomiting.   Musculoskeletal:  Positive for gait problem (Baseline ataxia). Negative for back pain and neck pain.   Skin:  Negative for color change and pallor.   Neurological:  Negative for dizziness, facial asymmetry, speech difficulty, weakness,  light-headedness, numbness and headaches.   Psychiatric/Behavioral:  Positive for confusion (Baseline vascular dementia). The patient is not nervous/anxious.        Historical Information   Past Medical History:   Diagnosis Date    Arthropathy of knee     last assessed 8/19/15    Bacterial pneumonia 02/05/2007    last assessed 2/5/7    Brain atrophy (HCC)     Colon polyp     CPAP (continuous positive airway pressure) dependence     Disorder of male genital organ 02/12/2008    last assessed 2/12/08    ED (erectile dysfunction) of organic origin     last assessed 2/13/17     History of hypertension     Seasonal allergies     Situational anxiety     resolved 3/16/17     Sleep apnea     Stroke (HCC)     09/2020 TIA    Tinnitus     Wears hearing aid     bilateral     Past Surgical History:   Procedure Laterality Date    CARDIAC CATHETERIZATION Left 10/28/2022    Procedure: Cardiac Left Heart Cath;  Surgeon: Christian Burr MD;  Location:  CARDIAC CATH LAB;  Service: Cardiology    CARDIAC CATHETERIZATION  10/28/2022    Procedure: Cardiac catheterization;  Surgeon: Christian Burr MD;  Location:  CARDIAC CATH LAB;  Service: Cardiology    CARDIAC CATHETERIZATION N/A 10/28/2022    Procedure: Cardiac Coronary Angiogram;  Surgeon: Christian Burr MD;  Location:  CARDIAC CATH LAB;  Service: Cardiology    CARDIAC ELECTROPHYSIOLOGY PROCEDURE N/A 12/22/2021    Procedure: Cardiac Loop Recorder Implant;  Surgeon: Judy Norris DO;  Location: [facility] CARDIAC CATH LAB;  Service: Cardiology    COLONOSCOPY      x3-w/polyps    HERNIA REPAIR      umbilical,hemal inguinal    KNEE ARTHROSCOPY Left     meniscus    PA COLONOSCOPY FLX DX W/COLLJ SPEC WHEN PFRMD N/A 5/7/2018    Procedure: COLONOSCOPY;  Surgeon: Thiago Lopes MD;  Location: [facility] GI LAB;  Service: Gastroenterology     Social History     Tobacco Use    Smoking status: Never     Passive exposure: Past    Smokeless tobacco: Never   Vaping Use    Vaping status: Never Used   Substance and  Sexual Activity    Alcohol use: Not Currently     Alcohol/week: 14.0 standard drinks of alcohol     Types: 14 Cans of beer per week     Comment: occ    Drug use: No    Sexual activity: Not on file     E-Cigarette/Vaping    E-Cigarette Use Never User     Cartridges/Day n/a      E-Cigarette/Vaping Substances    Nicotine No     THC No     CBD No     Flavoring No     Other No     Unknown No      Family History   Problem Relation Age of Onset    Cancer Mother         breast    Diverticulitis Mother         colostomy    Breast cancer Mother     Heart disease Father         CHF    Cancer Sister         breast    Depression Sister     Cancer Sister         skin cancer    Cancer Sister         skin cancer    Colon cancer Neg Hx     Liver disease Neg Hx      Social History     Tobacco Use    Smoking status: Never     Passive exposure: Past    Smokeless tobacco: Never   Vaping Use    Vaping status: Never Used   Substance and Sexual Activity    Alcohol use: Not Currently     Alcohol/week: 14.0 standard drinks of alcohol     Types: 14 Cans of beer per week     Comment: occ    Drug use: No    Sexual activity: Not on file       Current Facility-Administered Medications:     cyanocobalamin injection 1,000 mcg, Q30 Days  Prior to Admission Medications   Prescriptions Last Dose Informant Patient Reported? Taking?   ALPRAZolam (XANAX) 0.5 mg tablet  Spouse/Significant Other No No   Sig: Take 1 tablet (0.5 mg total) by mouth 30 min pre-procedure   Patient not taking: Reported on 4/22/2025   apixaban (Eliquis) 5 mg  Spouse/Significant Other Yes No   Sig: Take 5 mg by mouth 2 (two) times a day   apixaban (Eliquis) 5 mg   No No   Sig: Take 1 tablet (5 mg total) by mouth 2 (two) times a day for 28 days   atorvastatin (LIPITOR) 40 mg tablet  Spouse/Significant Other No No   Sig: TAKE 1 TABLET BY MOUTH EVERY DAY IN THE EVENING   ciclopirox (PENLAC) 8 % solution  Spouse/Significant Other Yes No   Sig: APPLY ONCE DAILY TO AFFECTED NAILS.    clopidogrel (PLAVIX) 75 mg tablet  Spouse/Significant Other No No   Sig: TAKE 1 TABLET BY MOUTH EVERY DAY   ferrous sulfate 324 (65 Fe) mg  Spouse/Significant Other No No   Sig: Take 1 tablet (324 mg total) by mouth 2 (two) times a day before meals   Patient not taking: Reported on 10/7/2024   fexofenadine (ALLEGRA) 180 MG tablet  Self No No   Sig: Take 1 tablet (180 mg total) by mouth daily for 30 days   Patient taking differently: Take 180 mg by mouth every morning   memantine (NAMENDA) 5 mg tablet  Spouse/Significant Other Yes No   Sig: Take 5 mg by mouth daily   metoprolol succinate (TOPROL-XL) 50 mg 24 hr tablet  Spouse/Significant Other No No   Sig: TAKE 1 TABLET BY MOUTH TWICE A DAY   terbinafine (LamISIL) 250 mg tablet  Spouse/Significant Other Yes No   Patient not taking: Reported on 4/22/2025      Facility-Administered Medications Last Administration Doses Remaining   cyanocobalamin injection 1,000 mcg 11/27/2024 11:31 AM         Pollen extract, Shellfish-derived products - food allergy, and Iodine solution [povidone iodine]    Objective :  Temp:  [98.2 °F (36.8 °C)] 98.2 °F (36.8 °C)  HR:  [78-87] 87  BP: (122-149)/(74-81) 139/77  Resp:  [16-18] 16  SpO2:  [94 %-96 %] 96 %  O2 Device: None (Room air)    Physical Exam  Vitals reviewed. Chaperone present: Wife present at bedside.   Constitutional:       General: He is not in acute distress.     Appearance: Normal appearance. He is normal weight. He is not ill-appearing.   HENT:      Head: Normocephalic and atraumatic.      Right Ear: External ear normal.      Left Ear: External ear normal.      Nose: Nose normal.      Mouth/Throat:      Mouth: Mucous membranes are moist.   Eyes:      General:         Right eye: No discharge.         Left eye: No discharge.      Extraocular Movements: Extraocular movements intact and EOM normal.      Conjunctiva/sclera: Conjunctivae normal.      Pupils: Pupils are equal, round, and reactive to light.   Cardiovascular:       Rate and Rhythm: Normal rate.   Pulmonary:      Effort: Pulmonary effort is normal. No respiratory distress.   Musculoskeletal:         General: Normal range of motion.      Right lower leg: No edema.      Left lower leg: No edema.   Skin:     General: Skin is warm and dry.      Coloration: Skin is not jaundiced or pale.   Neurological:      Mental Status: He is alert.      Motor: Motor strength is normal.     Coordination: Finger-Nose-Finger Test normal. Heel-to-shin test: Mild RLE ataxia.     Comments: See below   Psychiatric:         Speech: Speech normal.       Neurologic Exam     Mental Status   Oriented to person.   Oriented to place.   Disoriented to time. Disoriented to year, month, date and season. (Baseline per wife)  Follows 2 step commands.   Attention: normal. Concentration: normal.   Speech: speech is normal   Level of consciousness: alert  Able to name object. Able to read. Able to repeat.     Cranial Nerves     CN II   Visual fields full to confrontation.     CN III, IV, VI   Pupils are equal, round, and reactive to light.  Extraocular motions are normal.   Nystagmus: none   Diplopia: none  Upgaze: normal  Downgaze: normal  Conjugate gaze: present    CN V   Facial sensation intact.     CN VII   Facial expression full, symmetric.     CN VIII   CN VIII normal.     CN XII   CN XII normal.     Motor Exam   Muscle bulk: normal  Right arm pronator drift: absent  Left arm pronator drift: absent    Strength   Strength 5/5 throughout.     Sensory Exam   Light touch normal.     Gait, Coordination, and Reflexes     Coordination   Finger to nose coordination: normal  Heel-to-shin test: Mild RLE ataxia.    Tremor   Resting tremor: absent        NIHSS:  1a.Level of Consciousness: 0 = Alert   1b. LOC Questions: 1 = Answers one correctly (wife reports baseline)   1c. LOC Commands: 0 = Obeys both correctly   2. Best Gaze: 0 = Normal   3. Visual: 0 = No visual field loss   4. Facial Palsy: 0=Normal symmetric movement    5a. Motor Right Arm: 0=No drift, limb holds 90 (or 45) degrees for full 10 seconds   5b. Motor Left Arm: 0=No drift, limb holds 90 (or 45) degrees for full 10 seconds   6a. Motor Right Le=No drift, limb holds 90 (or 45) degrees for full 10 seconds   6b. Motor Left Le=No drift, limb holds 90 (or 45) degrees for full 10 seconds   7. Limb Ataxia:  1=Present in one limb (wife reports baseline)   8. Sensory: 0=Normal; no sensory loss   9. Best Language:  0=No aphasia, normal   10. Dysarthria: 0=Normal articulation   11. Extinction and Inattention (formerly Neglect): 0=No abnormality   Total Score: 2     Time NIHSS was completed: After CT    Modified Gadsden Score:  Unable to determine currently, will gather additional data      Lab Results: I have reviewed the following results:CBC/BMP:   .     25  1150   WBC 5.96   HGB 16.5   HCT 46.0           Imaging Results Review: CT head CTA head/neck    VTE Prophylaxis: VTE covered by:    None       Code Status: Prior  Advance Directive and Living Will:      Power of :    POLST:      Administrative Statements   Critical Care Time Statement: Upon my evaluation, this patient had a high probability of imminent or life-threatening deterioration due to stroke alert, which required my direct attention, intervention, and personal management.  I spent a total of 40 minutes directly providing critical care services, including evaluating for the presence of life-threatening injuries or illnesses and complex medical decision making (to support/prevent further life-threatening deterioration).. This time is exclusive of procedures, teaching, family meetings, and any prior time recorded by providers other than myself.

## 2025-04-22 NOTE — ASSESSMENT & PLAN NOTE
Patient has history of prior strokes as well as vascular dementia who presents with confusion overnight  Wife noted that patient had urinary frequency and incontinence associated with his confusion.  Patient was seen by urology with concern of a UTI however a urologist recommended patient to come to the emergency department for stroke evaluation.  At this time patient is back to baseline  Neurology consult  Reviewed CT head  Continue Plavix and Eliquis  Continue statin  Follow-up MRI  PT/OT/speech therapy evaluation  F/U labs  F/U UA  Neurology consulted

## 2025-04-22 NOTE — PROGRESS NOTES
Christopher Razo is a(n) 69 y.o. male. , :  1955    Subjective     Assessment:  The encounter diagnosis was Urine frequency.  BPH  BPH on CT exam and EMILIANO  Baseline frequency/urgency and occ leakage  Yesterday 25 new onset leakage without awareness and nocturia q 30mins with leakage to bathroom  Simultaneous confusion, ambulatory dysfunction, falling into chair.    Hx of mult CVAs.  Last confirmed ~ 3-4 years ago.   PVR 21mL today 25       Microhematuria.    Microhematuria on dip.   No hx of gross hematuria    Mult contrast CT scans  neg for hydro, stones, or bladder abnormalities  (CT done for GI bleed)    Never a smoker, worked for [employer].     Plan:  Go to ER to r/o recent CVA as cause of his weakness, confusion, and new onset urinary incontinence.   UA from office for micro   F/u after ER and CVA ruled out for possible tx of BPH .      Radiology  24 CT ap w CTS  KIDNEYS/URETERS: 3.7 cm right lower pole simple cyst. No hydronephrosis.   URINARY BLADDER: Unremarkable     2024 CT high-volume bleeding scan abdomen/pelvis  KIDNEYS/URETERS: Stable simple right renal cyst. No hydronephrosis.   REPRODUCTIVE ORGANS: Enlarged prostate unchanged     URINARY BLADDER: Unremarkable.    Labs   2016 PSA 2.4  2018 PSA 1.0  2020 PSA 1.3  2021 PSA 1.0  2024 PSA 1.04        Past Medical History  Hypertension  Aortic stenosis  Coronary disease  History of V. Tach  Asthma  Anxiety  Unsteady gait  Coronary stent  CVA history    Past  History  ED    Past  surgical history       Prior Visits    2025 [facility]  69-year-old gentleman with a history of hypertension, coronary disease, history of V. tach, unsteady gait, cardiac stent, and CVA history currently presents for urinary frequency/urgency.  Having increase frequency, urgency, and leakage.  Currently having leakage without awareness.  Notes had multiple CVAs with most recent CVA approx 3-4 wks  "ago.  Been also having increase confusion since yesterday as well and did not know how to turn off the light in the house and trouble standing.     Review of Systems    Lab Results   Component Value Date    PSA 1.04 04/17/2024    PSA 1.0 11/12/2021    PSA 1.3 03/18/2020     No results found for: \"TESTOSTERONE\"  No components found for: \"CR\"  No results found for: \"HBA1C\"    Objective     /80 (BP Location: Left arm, Patient Position: Sitting, Cuff Size: Large)   Pulse 85   Ht 6' (1.829 m)   Wt 105 kg (231 lb)   SpO2 95%   BMI 31.33 kg/m²     Physical Exam  Genitourinary:     Comments: Prostate  Grade II  Soft, NT  No nodules   Musculoskeletal:      Comments: Ambulatory dysfunction    Psychiatric:         Mood and Affect: Mood normal.           [facility]  "

## 2025-04-22 NOTE — SPEECH THERAPY NOTE
Speech Language/Pathology  Speech-Language Pathology Screen      Patient Name: Christopher Razo    Today's Date: 4/22/2025     Problem List  Principal Problem:    Altered mental status  Active Problems:    Benign essential hypertension    Paroxysmal A-fib (HCC)    CAD (coronary artery disease)    Dementia without behavioral disturbance, psychotic disturbance, mood disturbance, or anxiety (HCC)      Past Medical History  Past Medical History:   Diagnosis Date    Arthropathy of knee     last assessed 8/19/15    Bacterial pneumonia 02/05/2007    last assessed 2/5/7    Brain atrophy (HCC)     Colon polyp     CPAP (continuous positive airway pressure) dependence     Disorder of male genital organ 02/12/2008    last assessed 2/12/08    ED (erectile dysfunction) of organic origin     last assessed 2/13/17     History of hypertension     Seasonal allergies     Situational anxiety     resolved 3/16/17     Sleep apnea     Stroke (HCC)     09/2020 TIA    Tinnitus     Wears hearing aid     bilateral       Past Surgical History  Past Surgical History:   Procedure Laterality Date    CARDIAC CATHETERIZATION Left 10/28/2022    Procedure: Cardiac Left Heart Cath;  Surgeon: Christian Burr MD;  Location:  CARDIAC CATH LAB;  Service: Cardiology    CARDIAC CATHETERIZATION  10/28/2022    Procedure: Cardiac catheterization;  Surgeon: Christian Burr MD;  Location:  CARDIAC CATH LAB;  Service: Cardiology    CARDIAC CATHETERIZATION N/A 10/28/2022    Procedure: Cardiac Coronary Angiogram;  Surgeon: Christian Burr MD;  Location:  CARDIAC CATH LAB;  Service: Cardiology    CARDIAC ELECTROPHYSIOLOGY PROCEDURE N/A 12/22/2021    Procedure: Cardiac Loop Recorder Implant;  Surgeon: Judy Norris DO;  Location: [facility] CARDIAC CATH LAB;  Service: Cardiology    COLONOSCOPY      x3-w/polyps    HERNIA REPAIR      umbilical,hemal inguinal    KNEE ARTHROSCOPY Left     meniscus    [place] COLONOSCOPY FLX DX W/COLLJ SPEC WHEN PFRMD N/A 5/7/2018    Procedure: COLONOSCOPY;   Surgeon: Thiago Lopes MD;  Location: [facility] GI LAB;  Service: Gastroenterology       Summary   Order received, chart reviewed.  Pt screened bedside.  Speech is clear, completed all of pm meal and took meds w/o difficulty.  NIH 1 for left leg motor, passed nsg screen.    No formal assessment at this time.  Re consult as needed.       Current Medical Status  Pt is a 69 y.o. male who presented to [facility] with change in MS.  Stroke alert called.      Current Precautions:   Fall    Allergies:  No known food allergies    Past medical history:  Please see H&P for details    Special Studies:  CT head -1.  No acute intracranial CT abnormality.  2.  Chronic lacunar infarcts in bilateral corona radiata and thalami. Chronic microangiopathic change.

## 2025-04-22 NOTE — ED ATTENDING ATTESTATION
4/22/2025  I, Nellie Larson DO, saw and evaluated the patient. I have discussed the patient with the resident/non-physician practitioner and agree with the resident's/non-physician practitioner's findings, Plan of Care, and MDM as documented in the resident's/non-physician practitioner's note, except where noted. All available labs and Radiology studies were reviewed.  I was present for key portions of any procedure(s) performed by the resident/non-physician practitioner and I was immediately available to provide assistance.       At this point I agree with the current assessment done in the Emergency Department.  I have conducted an independent evaluation of this patient a history and physical is as follows:    Chief Complaint   Patient presents with    Altered Mental Status     Pt having increased of urinary incontience.  Pt also having confusion and balance issues since 2 am. No slurred speech      69-year-old male presents for evaluation.  Patient having urinary incontinence and confusion as well as balance issues last night.  Was having difficulty trying to turn the light off and kept closing the door instead.  Has history of strokes in the past.  This morning still having difficulty with balance and still some confusion per wife.  Denies pain.  On exam-no acute distress, heart regular, no respiratory distress, answers questions appropriately.  Plan-patient was stroke alert in the department, neurology saw and evaluate the patient.  Plan for admission    ED Course         Critical Care Time  Procedures

## 2025-04-22 NOTE — ASSESSMENT & PLAN NOTE
69 y.o.  male with history of prior strokes with residual ataxia, PAF on Eliquis (ILR in place), vascular dementia, probable cavernoma, possible CAA, HTN, GRACIE, CAD s/p PCI x 2 on Plavix, who presents with confusion.    Patient woke up at 2 AM the day of admission with urinary frequency and incontinence which is unusual for him.  Patient could not find the light switch to the bathroom which is not his baseline.  Patient was able to eat breakfast a few hours later, however his wife noted his baseline ataxia was worse than normal.  He had a urology appointment the day of admission and it was recommended to come to the ER for further evaluation    BP on arrival 142/81.    NIHSS 2 (1 point for orientation, 1 point for limb ataxia).    Initial CT imaging revealed focal high-grade stenosis of mid basilar artery and severe stenosis in the right PCA P2 segment.  Chronic lacunar infarcts in bilateral corona radiata and thalami  Patient was not a TNK candidate due to bleeding risk and nondisabling (patient reports he felt at baseline), no IR target    Lower overall suspicion for stroke at this time, however recommend stroke pathway given risk factors.  Recommend metabolic/infectious workup.    Plan:  - Stroke pathway  - Continue with home Plavix 75 mg daily and Eliquis 5 mg twice daily  - Continue with Atorvastatin 40 mg qHS   - Recommend MRI brain wo, echo, lipid panel and hemoglobin A1c   - No need for permissive HTN, maintain normotension  - Euglycemic, normothermic goal  - Continue telemetry  - PT/OT/ST  - Secondary risk factor modification.   - Stroke education  - Frequent neuro checks. Continue to monitor and notify neurology with any changes.  - STAT CT head for any acute change in neuro exam  - Patient at risk for delirium secondary to age, stroke, comorbidities, etc.   - Provide frequent redirection, reorientation, distraction techniques  - Avoid deliriogenic medications such as tramadol, benzodiazepines,  anticholinergics, Benadryl  - Treat pain as appropriate per primary team  - Monitor for constipation and urinary retention  - Encourage early and frequent moblization, OOB, Hydration/ Nutrition  - Medical management and supportive care per primary team. Correction of any metabolic or infectious disturbances.     Plan discussed with Attending Neurologist, please see attestation for further input/recommendations.

## 2025-04-22 NOTE — TELEPHONE ENCOUNTER
Provided patient's Wife with the BMSPAF application form. Will await completion/return of forms to the office.

## 2025-04-23 ENCOUNTER — APPOINTMENT (OUTPATIENT)
Dept: RADIOLOGY | Facility: HOSPITAL | Age: 70
DRG: 177 | End: 2025-04-23
Payer: COMMERCIAL

## 2025-04-23 ENCOUNTER — APPOINTMENT (OUTPATIENT)
Dept: NON INVASIVE DIAGNOSTICS | Facility: HOSPITAL | Age: 70
DRG: 177 | End: 2025-04-23
Payer: COMMERCIAL

## 2025-04-23 LAB
ALBUMIN SERPL BCG-MCNC: 4 G/DL (ref 3.5–5)
ALP SERPL-CCNC: 94 U/L (ref 34–104)
ALT SERPL W P-5'-P-CCNC: 20 U/L (ref 7–52)
ANION GAP SERPL CALCULATED.3IONS-SCNC: 8 MMOL/L (ref 4–13)
AST SERPL W P-5'-P-CCNC: 17 U/L (ref 13–39)
BASOPHILS # BLD AUTO: 0.03 THOUSANDS/ÂΜL (ref 0–0.1)
BASOPHILS NFR BLD AUTO: 1 % (ref 0–1)
BILIRUB SERPL-MCNC: 0.91 MG/DL (ref 0.2–1)
BUN SERPL-MCNC: 15 MG/DL (ref 5–25)
CALCIUM SERPL-MCNC: 8.5 MG/DL (ref 8.4–10.2)
CHLORIDE SERPL-SCNC: 106 MMOL/L (ref 96–108)
CHOLEST SERPL-MCNC: 82 MG/DL (ref ?–200)
CO2 SERPL-SCNC: 23 MMOL/L (ref 21–32)
CREAT SERPL-MCNC: 0.81 MG/DL (ref 0.6–1.3)
EOSINOPHIL # BLD AUTO: 0.1 THOUSAND/ÂΜL (ref 0–0.61)
EOSINOPHIL NFR BLD AUTO: 2 % (ref 0–6)
ERYTHROCYTE [DISTWIDTH] IN BLOOD BY AUTOMATED COUNT: 13 % (ref 11.6–15.1)
EST. AVERAGE GLUCOSE BLD GHB EST-MCNC: 100 MG/DL
GFR SERPL CREATININE-BSD FRML MDRD: 91 ML/MIN/1.73SQ M
GLUCOSE SERPL-MCNC: 108 MG/DL (ref 65–140)
HBA1C MFR BLD: 5.1 %
HCT VFR BLD AUTO: 45.5 % (ref 36.5–49.3)
HDLC SERPL-MCNC: 23 MG/DL
HGB BLD-MCNC: 16.7 G/DL (ref 12–17)
IMM GRANULOCYTES # BLD AUTO: 0.04 THOUSAND/UL (ref 0–0.2)
IMM GRANULOCYTES NFR BLD AUTO: 1 % (ref 0–2)
LDLC SERPL CALC-MCNC: 45 MG/DL (ref 0–100)
LYMPHOCYTES # BLD AUTO: 1.11 THOUSANDS/ÂΜL (ref 0.6–4.47)
LYMPHOCYTES NFR BLD AUTO: 20 % (ref 14–44)
MAGNESIUM SERPL-MCNC: 2.2 MG/DL (ref 1.9–2.7)
MCH RBC QN AUTO: 32.4 PG (ref 26.8–34.3)
MCHC RBC AUTO-ENTMCNC: 36.7 G/DL (ref 31.4–37.4)
MCV RBC AUTO: 88 FL (ref 82–98)
MONOCYTES # BLD AUTO: 1 THOUSAND/ÂΜL (ref 0.17–1.22)
MONOCYTES NFR BLD AUTO: 18 % (ref 4–12)
NEUTROPHILS # BLD AUTO: 3.32 THOUSANDS/ÂΜL (ref 1.85–7.62)
NEUTS SEG NFR BLD AUTO: 58 % (ref 43–75)
NRBC BLD AUTO-RTO: 0 /100 WBCS
PHOSPHATE SERPL-MCNC: 3.6 MG/DL (ref 2.3–4.1)
PLATELET # BLD AUTO: 158 THOUSANDS/UL (ref 149–390)
PMV BLD AUTO: 9.2 FL (ref 8.9–12.7)
POTASSIUM SERPL-SCNC: 3.7 MMOL/L (ref 3.5–5.3)
PROT SERPL-MCNC: 6.4 G/DL (ref 6.4–8.4)
RBC # BLD AUTO: 5.16 MILLION/UL (ref 3.88–5.62)
SODIUM SERPL-SCNC: 137 MMOL/L (ref 135–147)
TRIGL SERPL-MCNC: 69 MG/DL (ref ?–150)
WBC # BLD AUTO: 5.6 THOUSAND/UL (ref 4.31–10.16)

## 2025-04-23 PROCEDURE — 99232 SBSQ HOSP IP/OBS MODERATE 35: CPT | Performed by: INTERNAL MEDICINE

## 2025-04-23 PROCEDURE — 70551 MRI BRAIN STEM W/O DYE: CPT

## 2025-04-23 PROCEDURE — 83735 ASSAY OF MAGNESIUM: CPT | Performed by: FAMILY MEDICINE

## 2025-04-23 PROCEDURE — 97163 PT EVAL HIGH COMPLEX 45 MIN: CPT

## 2025-04-23 PROCEDURE — 84100 ASSAY OF PHOSPHORUS: CPT | Performed by: FAMILY MEDICINE

## 2025-04-23 PROCEDURE — 80053 COMPREHEN METABOLIC PANEL: CPT | Performed by: FAMILY MEDICINE

## 2025-04-23 PROCEDURE — 85025 COMPLETE CBC W/AUTO DIFF WBC: CPT | Performed by: FAMILY MEDICINE

## 2025-04-23 PROCEDURE — 83036 HEMOGLOBIN GLYCOSYLATED A1C: CPT | Performed by: FAMILY MEDICINE

## 2025-04-23 PROCEDURE — 97167 OT EVAL HIGH COMPLEX 60 MIN: CPT

## 2025-04-23 PROCEDURE — 80061 LIPID PANEL: CPT | Performed by: FAMILY MEDICINE

## 2025-04-23 RX ORDER — ALPRAZOLAM 0.5 MG
0.5 TABLET ORAL
Status: COMPLETED | OUTPATIENT
Start: 2025-04-23 | End: 2025-04-23

## 2025-04-23 RX ADMIN — ATORVASTATIN CALCIUM 40 MG: 40 TABLET, FILM COATED ORAL at 17:36

## 2025-04-23 RX ADMIN — APIXABAN 5 MG: 5 TABLET, FILM COATED ORAL at 17:36

## 2025-04-23 RX ADMIN — METOPROLOL SUCCINATE 50 MG: 50 TABLET, EXTENDED RELEASE ORAL at 17:36

## 2025-04-23 RX ADMIN — METOPROLOL SUCCINATE 50 MG: 50 TABLET, EXTENDED RELEASE ORAL at 05:22

## 2025-04-23 RX ADMIN — APIXABAN 5 MG: 5 TABLET, FILM COATED ORAL at 09:24

## 2025-04-23 RX ADMIN — MEMANTINE 5 MG: 5 TABLET ORAL at 09:24

## 2025-04-23 RX ADMIN — ALPRAZOLAM 0.5 MG: 0.5 TABLET ORAL at 21:06

## 2025-04-23 RX ADMIN — CLOPIDOGREL BISULFATE 75 MG: 75 TABLET, FILM COATED ORAL at 09:24

## 2025-04-23 NOTE — PHYSICAL THERAPY NOTE
Physical Therapy Evaluation    Patient Name: Christopher Razo    Today's Date: 4/23/2025     Problem List  Principal Problem:    Altered mental status  Active Problems:    Benign essential hypertension    Paroxysmal A-fib (HCC)    CAD (coronary artery disease)    Dementia without behavioral disturbance, psychotic disturbance, mood disturbance, or anxiety (HCC)       Past Medical History  Past Medical History:   Diagnosis Date    Arthropathy of knee     last assessed 8/19/15    Bacterial pneumonia 02/05/2007    last assessed 2/5/7    Brain atrophy (HCC)     Colon polyp     CPAP (continuous positive airway pressure) dependence     Disorder of male genital organ 02/12/2008    last assessed 2/12/08    ED (erectile dysfunction) of organic origin     last assessed 2/13/17     History of hypertension     Seasonal allergies     Situational anxiety     resolved 3/16/17     Sleep apnea     Stroke (HCC)     09/2020 TIA    Tinnitus     Wears hearing aid     bilateral        Past Surgical History  Past Surgical History:   Procedure Laterality Date    CARDIAC CATHETERIZATION Left 10/28/2022    Procedure: Cardiac Left Heart Cath;  Surgeon: Christian Burr MD;  Location:  CARDIAC CATH LAB;  Service: Cardiology    CARDIAC CATHETERIZATION  10/28/2022    Procedure: Cardiac catheterization;  Surgeon: Christian Burr MD;  Location:  CARDIAC CATH LAB;  Service: Cardiology    CARDIAC CATHETERIZATION N/A 10/28/2022    Procedure: Cardiac Coronary Angiogram;  Surgeon: Christian Burr MD;  Location:  CARDIAC CATH LAB;  Service: Cardiology    CARDIAC ELECTROPHYSIOLOGY PROCEDURE N/A 12/22/2021    Procedure: Cardiac Loop Recorder Implant;  Surgeon: Judy Norris DO;  Location: [facility] CARDIAC CATH LAB;  Service: Cardiology    COLONOSCOPY      x3-w/polyps    HERNIA REPAIR      umbilical,hemal inguinal    KNEE ARTHROSCOPY Left     meniscus    NM COLONOSCOPY FLX DX W/COLLJ SPEC WHEN PFRMD N/A 5/7/2018     Procedure: COLONOSCOPY;  Surgeon: Thiago Lopes MD;  Location: [facility] GI LAB;  Service: Gastroenterology         04/23/25 0881   PT Last Visit   PT Visit Date 04/23/25   Note Type   Note type Evaluation   Pain Assessment   Pain Assessment Tool 0-10   Pain Score No Pain   Restrictions/Precautions   Weight Bearing Precautions Per Order No   Other Precautions Cognitive;Chair Alarm;Bed Alarm;Multiple lines;Telemetry;Fall Risk  (h/o vascular dementia)   Home Living   Type of Home House   Home Layout Two level;Stairs to enter with rails;1/2 bath on main level;Bed/bath upstairs  (3 LYLY)   Bathroom Shower/Tub Tub/shower unit   Bathroom Toilet Raised   Home Equipment Cane   Additional Comments cane use PTA. pt reports they recently sold their above house and they will be moving to [place] [employer] in a few weeks. need to confirm   Prior Function   Level of [place] Independent with ADLs;Independent with functional mobility;Needs assistance with IADLS   Lives With Spouse   Receives Help From Family   IADLs Family/Friend/Other provides transportation;Family/Friend/Other provides meals;Family/Friend/Other provides medication management   Falls in the last 6 months 0   Comments pt reports spouse is not always home. need to confirm level of support she is able to provide to patient   General   Family/Caregiver Present No   Cognition   Overall Cognitive Status Impaired   Arousal/Participation Cooperative   Orientation Level Oriented X4   Following Commands Follows one step commands without difficulty   Comments very pleasant to work with   RLE Assessment   RLE Assessment WFL  (4+/5 functionally)   LLE Assessment   LLE Assessment WFL  (4+/5 functionally)   Bed Mobility   Supine to Sit Unable to assess   Sit to Supine Unable to assess   Transfers   Sit to Stand 4  Minimal assistance   Additional items Assist x 1;Increased time required;Verbal cues   Stand to Sit 4  Minimal assistance   Additional items Assist x 1;Increased time  required;Verbal cues   Additional Comments c RW   Ambulation/Elevation   Gait pattern Improper Weight shift;Decreased foot clearance;Short stride;Excessively slow   Gait Assistance 4  Minimal assist   Additional items Assist x 1;Verbal cues   Assistive Device Rolling walker   Distance 80'x2   Stair Management Assistance 4  Minimal assist   Additional items Assist x 1;Verbal cues;Increased time required   Stair Management Technique One rail L;Sideways;Step to pattern;Nonreciprocal   Number of Stairs 3   Balance   Static Sitting Fair +   Dynamic Sitting Fair   Static Standing Fair -   Dynamic Standing Poor +   Ambulatory Poor +   Endurance Deficit   Endurance Deficit Yes   Activity Tolerance   Activity Tolerance Patient limited by fatigue   Medical Staff Made Aware OT   Nurse Made Aware yes-cleared   Assessment   Prognosis Good   Problem List Decreased strength;Impaired balance;Decreased endurance;Decreased mobility;Decreased coordination;Decreased cognition;Impaired judgement;Decreased safety awareness   Assessment Pt is a 69 y.o. male admitted to hospitals on 4/22/2025 with confusion, urinary frequency and incontinence. Pt received a primary medical dx of AMS. Pt has the following comorbidities which affect their treatment: active problems listed above,  has a past medical history of Arthropathy of knee, Bacterial pneumonia, Brain atrophy (HCC), Colon polyp, CPAP (continuous positive airway pressure) dependence, Disorder of male genital organ, ED (erectile dysfunction) of organic origin, History of hypertension, Seasonal allergies, Situational anxiety, Sleep apnea, Stroke (HCC), Tinnitus, and Wears hearing aid.  , as well as personal factors including stairs to access home and ?caregiver support. Pt has a high complexity clinical presentation due to Ongoing medical management for primary dx, Increased reliance on more restrictive AD compared to baseline, Decreased activity tolerance compared to baseline, Fall risk,  Increased assistance needed from caregiver at current time, Ongoing telemetry monitoring, Cog status, Trending lab values, Diagnostic imaging pending, Continuous pulse oximetry monitoring  , and PMH. PT was consulted to evaluate pt's functional mobility and discharge needs. Upon evaluation, patient required MinAx1 transfers, minAx1 for ambulation, and minAx1 for stairs. Body system impairments include impaired strength, balance, endurance, cognition. Pt's functional activity impairments include: activity tolerance and mobility. Participation restrictions include inability to safely access home/community. At conclusion of eval, pt remained seated in chair with chair alarm, phone, call bell, and all other personal needs within reach. Pt would benefit from skilled PT to address their functional mobility limitations. The patient's AM-PAC Basic Mobility Inpatient Short Form Raw Score is 18. A Raw score of greater than 16 suggests the patient may benefit from discharge to home. Please also refer to the recommendation of the Physical Therapist for safe discharge planning.   Barriers to Discharge Inaccessible home environment;Decreased caregiver support   Goals   Patient Goals to go home   STG Expiration Date 05/07/25   Short Term Goal #1 In 14 days, pt will: 1) perform bed mobility with mod I to promote functional independence 2) perform transfers to<>from all surfaces with mod I to promote safety 3) ambulate 200' with S and least restrictive device to promote safe return ot home 4) negotiate 13 stairs with S to promote safe return to home. 5) improve balance grades by 1/2 to promote safety with functional mobility. 6) improve strength grades by 1/2 to promote safety with functional mobility.   PT Treatment Day 0   Plan   Treatment/Interventions LE strengthening/ROM;Functional transfer training;Elevations;Therapeutic exercise;Endurance training;Cognitive reorientation;Patient/family training;Equipment eval/education;Gait  training;Bed mobility;Spoke to case management;Spoke to nursing;OT   PT Frequency 3-5x/wk   Discharge Recommendation   Rehab Resource Intensity Level, PT II (Moderate Resource Intensity)  (pending avaliable support from spouse)   Equipment Recommended Walker   Walker Package Recommended Wheeled walker   Additional Comments if spouse can stay with pt and provide A at his current level, rec level 3 with focus on HHPT and continued RW use to decrease fall risk   AM-PAC Basic Mobility Inpatient   Turning in Flat Bed Without Bedrails 3   Lying on Back to Sitting on Edge of Flat Bed Without Bedrails 3   Moving Bed to Chair 3   Standing Up From Chair Using Arms 3   Walk in Room 3   Climb 3-5 Stairs With Railing 3   Basic Mobility Inpatient Raw Score 18   Basic Mobility Standardized Score 41.05   [facility] Highest Level Of Mobility   -HLM Goal 6: Walk 10 steps or more   -HLM Achieved 7: Walk 25 feet or more   Modified Jorge Scale   Modified Jorge Scale 4   Barthel Index   Feeding 10   Bathing 5   Grooming Score 0   Dressing Score 10   Bladder Score 10   Bowels Score 10   Toilet Use Score 5   Transfers (Bed/Chair) Score 10   Mobility (Level Surface) Score 10   Stairs Score 5   Barthel Index Score 75   James Fabian, PT, DPT, NCS

## 2025-04-23 NOTE — RESTORATIVE TECHNICIAN NOTE
Restorative Technician Note      Patient Name: Christopher Razo     Note Type: Mobility  Patient Position Upon Consult: Bedside chair  Activity Performed: Ambulated; Dangled; Stood  Assistive Device: Roller walker  Education Provided: Yes  Patient Position at End of Consult: Bedside chair; All needs within reach; Bed/Chair alarm activated    Benny SALES, Restorative Technician,

## 2025-04-23 NOTE — OCCUPATIONAL THERAPY NOTE
Occupational Therapy Evaluation     Patient Name: Christopher Razo  Today's Date: 4/23/2025  Problem List  Principal Problem:    Altered mental status  Active Problems:    Benign essential hypertension    Paroxysmal A-fib (HCC)    CAD (coronary artery disease)    Dementia without behavioral disturbance, psychotic disturbance, mood disturbance, or anxiety (HCC)    Past Medical History  Past Medical History:   Diagnosis Date    Arthropathy of knee     last assessed 8/19/15    Bacterial pneumonia 02/05/2007    last assessed 2/5/7    Brain atrophy (HCC)     Colon polyp     CPAP (continuous positive airway pressure) dependence     Disorder of male genital organ 02/12/2008    last assessed 2/12/08    ED (erectile dysfunction) of organic origin     last assessed 2/13/17     History of hypertension     Seasonal allergies     Situational anxiety     resolved 3/16/17     Sleep apnea     Stroke (HCC)     09/2020 TIA    Tinnitus     Wears hearing aid     bilateral     Past Surgical History  Past Surgical History:   Procedure Laterality Date    CARDIAC CATHETERIZATION Left 10/28/2022    Procedure: Cardiac Left Heart Cath;  Surgeon: Christian Burr MD;  Location:  CARDIAC CATH LAB;  Service: Cardiology    CARDIAC CATHETERIZATION  10/28/2022    Procedure: Cardiac catheterization;  Surgeon: Christian Burr MD;  Location:  CARDIAC CATH LAB;  Service: Cardiology    CARDIAC CATHETERIZATION N/A 10/28/2022    Procedure: Cardiac Coronary Angiogram;  Surgeon: Christian Burr MD;  Location:  CARDIAC CATH LAB;  Service: Cardiology    CARDIAC ELECTROPHYSIOLOGY PROCEDURE N/A 12/22/2021    Procedure: Cardiac Loop Recorder Implant;  Surgeon: Judy Norris DO;  Location: [place] CARDIAC CATH LAB;  Service: Cardiology    COLONOSCOPY      x3-w/polyps    HERNIA REPAIR      umbilical,hemal inguinal    KNEE ARTHROSCOPY Left     meniscus    [place] COLONOSCOPY FLX DX W/COLLJ SPEC WHEN PFRMD N/A 5/7/2018    Procedure: COLONOSCOPY;  Surgeon: Thiago Lopes MD;   "Location: [facility] GI LAB;  Service: Gastroenterology      04/23/25 0820   OT Last Visit   OT Visit Date 04/23/25   Note Type   Note type Evaluation   Pain Assessment   Pain Assessment Tool 0-10   Pain Score No Pain   Restrictions/Precautions   Weight Bearing Precautions Per Order No   Other Precautions Cognitive;Chair Alarm;Bed Alarm;Telemetry;Fall Risk  (History of Vascular Dementia)   Home Living   Type of Home House   Home Layout Two level;1/2 bath on main level;Bed/bath upstairs;Stairs to enter with rails -  (3STE)   Bathroom Shower/Tub Tub/shower unit   Bathroom Toilet Raised   Home Equipment Walker;Cane   Additional Comments pt reports \"we just sold our house and our moving to [place]\" continue to assess ~ unreliable historian (?)   Prior Function   Level of [place] Independent with ADLs;Needs assistance with IADLS   Lives With Spouse   Receives Help From Family   IADLs Family/Friend/Other provides transportation;Family/Friend/Other provides meals;Family/Friend/Other provides medication management   Falls in the last 6 months 0   Vocational Retired   Lifestyle   Autonomy I with ADL's/assistance with IADL's, +SPC with functional mobility (-) drives, spouse assists   Reciprocal Relationships spouse   Service to Others retired   Intrinsic Gratification working outside, going to [place]   General   Family/Caregiver Present No   ADL   Eating Assistance 7  Independent   Grooming Assistance 5  Supervision/Setup   UB Bathing Assistance 5  Supervision/Setup   LB Bathing Assistance 4  Minimal Assistance   UB Dressing Assistance 5  Supervision/Setup   LB Dressing Assistance 4  Minimal Assistance   Toileting Assistance  4 minimal assistance   Bed Mobility   Supine to Sit Unable to assess   Sit to Supine Unable to assess   Additional Comments pt in chair up on arrival   Transfers   Sit to Stand 4  Minimal assistance   Additional items Assist x 1   Stand to Sit 4  Minimal assistance   Additional items Assist x 1 " "  Additional Comments +RW   Functional Mobility   Functional Mobility 4  Minimal assistance   Additional Comments min a x 1 with RW household distance functional mobility into hallway, increased time, difficulty dividing attention, evaluative questions requiring standing breaks.   Balance   Static Sitting Good   Dynamic Sitting Fair +   Static Standing Fair   Dynamic Standing Poor +   Ambulatory Poor +   Activity Tolerance   Activity Tolerance Patient tolerated treatment well   Medical Staff Made Aware PT due to the patient's co-morbidities, clinically unstable presentation, and present impairments which are a regression from the patient's baseline, CM   Nurse Made Aware RN cleared pt for therapy   RUE Assessment   RUE Assessment WFL   LUE Assessment   LUE Assessment WFL   Hand Function   Gross Motor Coordination Functional   Fine Motor Coordination Functional   Vision-Basic Assessment   Current Vision Wears glasses only for reading   Vision - Complex Assessment   Acuity Able to read normal print without difficulty   Cognition   Overall Cognitive Status (S)  Impaired  (History of Vascular Dementia)   Arousal/Participation Alert;Responsive;Cooperative   Attention Attends with cues to redirect   Orientation Level Oriented X4   Memory Decreased recall of precautions;Decreased recall of recent events   Following Commands Follows one step commands with increased time or repetition   Comments pt pleasant and motivated for therapy, grossly oriented with increased time for year, impaired STM~ forgetful with selling house details, when moving? Where moving to in [place]? Stating\" Im not good with names, repetitive cues for >2 step directives.    Assessment   Limitation Decreased ADL status   Prognosis Fair   Assessment Pt is a 69 y.o. male who was admitted to [facility] on 4/22/2025 with Altered mental status urinary incontience-urologist recommend coming to ED, HTN, a-fib, dmentia, CAD. Patient   has a past " medical history of Arthropathy of knee, Bacterial pneumonia (02/05/2007), Brain atrophy (HCC), Colon polyp, CPAP (continuous positive airway pressure) dependence, Disorder of male genital organ (02/12/2008), ED (erectile dysfunction) of organic origin, History of hypertension, Seasonal allergies, Situational anxiety, Sleep apnea, Stroke (HCC), Tinnitus, and Wears hearing aid. At baseline pt was completing I with ALD's/assistance with IaDL's, +SPC with functional mobility (-) drives. Pt lives with spouse in a 2SH with 3-4 LYLY (?) continue to assess.. Currently pt requires S/set-up UB, min a LB for overall ADLS and min a with RW and cues for safety for functional mobility/transfers. Pt currently presents with impairments in the following categories -difficulty performing ADLS and difficulty performing IADLS  activity tolerance, endurance, and standing balance/tolerance. These impairments, as well as pt's fatigue and risk for falls  limit pt's ability to safely engage in all baseline areas of occupation, including toileting and functional mobility/transfers From OT standpoint, recommend mod level II vs III pending progress and increased home support upon D/C. The patient's raw score on the AM-PAC Daily Activity Inpatient Short Form is 18. A raw score of less than 19 suggests the patient may benefit from discharge to post-acute rehabilitation services. Please refer to the recommendation of the Occupational Therapist for safe discharge planning. OT will continue to follow to address the below stated goals.   Goals   Patient Goals go home   LTG Time Frame 10-14   Long Term Goal #1 see goals below   Plan   Treatment Interventions ADL retraining;Functional transfer training;UE strengthening/ROM;Endurance training;Cognitive reorientation;Patient/family training;Equipment evaluation/education;Compensatory technique education;Activityengagement;Continued evaluation   Goal Expiration Date 05/07/25   OT Frequency 2-3x/wk    Discharge Recommendation   Rehab Resource Intensity Level, OT II (Moderate Resource Intensity)  (mod II vs III pending progress/spouse ability to provide increased Supervision)   AM-PAC Daily Activity Inpatient   Lower Body Dressing 3   Bathing 3   Toileting 3   Upper Body Dressing 3   Grooming 3   Eating 3   Daily Activity Raw Score 18   Daily Activity Standardized Score (Calc for Raw Score >=11) 38.66   AM-PAC Applied Cognition Inpatient   Following a Speech/Presentation 1   Understanding Ordinary Conversation 4   Taking Medications 2   Remembering Where Things Are Placed or Put Away 2   Remembering List of 4-5 Errands 2   Taking Care of Complicated Tasks 1   Applied Cognition Raw Score 12   Applied Cognition Standardized Score 28.82   Barthel Index   Feeding 10   Bathing 0   Grooming Score 0   Dressing Score 10   Bladder Score 10   Bowels Score 10   Toilet Use Score 5   Transfers (Bed/Chair) Score 10   Mobility (Level Surface) Score 10   Stairs Score 5   Barthel Index Score 70   Modified [place] Scale   Modified [place] Scale 4   End of Consult   Patient Position at End of Consult Bedside chair;Bed/Chair alarm activated;All needs within reach   Nurse Communication Nurse aware of consult   LTG's    *S with bed mobility to engage in functional tasks.  *S Adl's after setup with use of AE PRN  *S toileting and clothing management   *S functional mobility and transfers to/from all surfaces with Fair + dynamic balance and safety for participation in dynamic adls and iadl tasks   *Demonstrate good carryover with safe use of RW during functional tasks   *Assess DME needs   *Increase activity tolerance to 25-30 minutes for participation in adls and enjoyable activities  *Pt to participate in further cognitive testing with good attention and participation to assist with safe d/c recommendations  *Demonstrate good carryover of pt/family education and training with good tolerance for increased safety and independence with  ADL's/ADl's.  *Pt will improve standing balance to 4-5 minutes with functional tasks to increase I with toileting/transfers.  *Patient will demonstrate 100% carryover of energy conservation techniques t/o functional I/ADL/leisure tasks w/o cues s/p skilled education to increase endurance during functional tasks  *Pt will increase attention to  follow 100% simple >1step verbal commands and be A/Ox4 consistently t/o use of external environmental cues w/ mod I  Dyana Le OTR/L

## 2025-04-23 NOTE — PLAN OF CARE
Problem: PAIN - ADULT  Goal: Verbalizes/displays adequate comfort level or baseline comfort level  Description: Interventions:- Encourage patient to monitor pain and request assistance- Assess pain using appropriate pain scale- Administer analgesics based on type and severity of pain and evaluate response- Implement non-pharmacological measures as appropriate and evaluate response- Consider cultural and social influences on pain and pain management- Notify physician/advanced practitioner if interventions unsuccessful or patient reports new pain  Outcome: Progressing     Problem: INFECTION - ADULT  Goal: Absence or prevention of progression during hospitalization  Description: INTERVENTIONS:- Assess and monitor for signs and symptoms of infection- Monitor lab/diagnostic results- Monitor all insertion sites, i.e. indwelling lines, tubes, and drains- Monitor endotracheal if appropriate and nasal secretions for changes in amount and color- [place] appropriate cooling/warming therapies per order- Administer medications as ordered- Instruct and encourage patient and family to use good hand hygiene technique- Identify and instruct in appropriate isolation precautions for identified infection/condition  Outcome: Progressing  Goal: Absence of fever/infection during neutropenic period  Description: INTERVENTIONS:- Monitor WBC  Outcome: Progressing     Problem: SAFETY ADULT  Goal: Patient will remain free of falls  Description: INTERVENTIONS:- Educate patient/family on patient safety including physical limitations- Instruct patient to call for assistance with activity - Consult OT/PT to assist with strengthening/mobility - Keep Call bell within reach- Keep bed low and locked with side rails adjusted as appropriate- Keep care items and personal belongings within reach- Initiate and maintain comfort rounds- Make Fall Risk Sign visible to staff- Offer Toileting every 2 Hours, in advance of need- Initiate/Maintain bed alarm-  Apply yellow socks and bracelet for high fall risk patients- Consider moving patient to room near nurses station  Outcome: Progressing  Goal: Maintain or return to baseline ADL function  Description: INTERVENTIONS:-  Assess patient's ability to carry out ADLs; assess patient's baseline for ADL function and identify physical deficits which impact ability to perform ADLs (bathing, care of mouth/teeth, toileting, grooming, dressing, etc.)- Assess/evaluate cause of self-care deficits - Assess range of motion- Assess patient's mobility; develop plan if impaired- Assess patient's need for assistive devices and provide as appropriate- Encourage maximum independence but intervene and supervise when necessary- Involve family in performance of ADLs- Assess for home care needs following discharge - Consider OT consult to assist with ADL evaluation and planning for discharge- Provide patient education as appropriate  Outcome: Progressing  Goal: Maintains/Returns to pre admission functional level  Description: INTERVENTIONS:- Perform AM-PAC 6 Click Basic Mobility/ Daily Activity assessment daily.- Set and communicate daily mobility goal to care team and patient/family/caregiver. - Collaborate with rehabilitation services on mobility goals if consulted- Perform Range of Motion 3 times a day.- Reposition patient every 2 hours.- Dangle patient 3 times a day- Stand patient 2 times a day- Ambulate patient 3 times a day- Out of bed to chair 2 times a day - Out of bed for meals 3 times a day- Out of bed for toileting- Record patient progress and toleration of activity level   Outcome: Progressing     Problem: DISCHARGE PLANNING  Goal: Discharge to home or other facility with appropriate resources  Description: INTERVENTIONS:- Identify barriers to discharge w/patient and caregiver- Arrange for needed discharge resources and transportation as appropriate- Identify discharge learning needs (meds, wound care, etc.)- Arrange for  interpretive services to assist at discharge as needed- Refer to Case Management Department for coordinating discharge planning if the patient needs post-hospital services based on physician/advanced practitioner order or complex needs related to functional status, cognitive ability, or social support system  Outcome: Progressing     Problem: Knowledge Deficit  Goal: Patient/family/caregiver demonstrates understanding of disease process, treatment plan, medications, and discharge instructions  Description: Complete learning assessment and assess knowledge base.Interventions:- Provide teaching at level of understanding- Provide teaching via preferred learning methods  Outcome: Progressing     Problem: Neurological Deficit  Goal: Neurological status is stable or improving  Description: Interventions:- Monitor and assess patient's level of consciousness, motor function, sensory function, and level of assistance needed for ADLs. - Monitor and report changes from baseline. Collaborate with interdisciplinary team to initiate plan and implement interventions as ordered. - Provide and maintain a safe environment.- Consider seizure precautions.- Consider fall precautions.- Consider aspiration precautions.- Consider bleeding precautions.  Outcome: Progressing     Problem: Activity Intolerance/Impaired Mobility  Goal: Mobility/activity is maintained at optimum level for patient  Description: Interventions:- Assess and monitor patient  barriers to mobility and need for assistive/adaptive devices.- Assess patient's emotional response to limitations.- Collaborate with interdisciplinary team and initiate plans and interventions as ordered.- Encourage independent activity per ability.- Maintain proper body alignment.- Perform active/passive rom as tolerated/ordered.- Plan activities to conserve energy.- Turn patient as appropriate  Outcome: Progressing     Problem: Communication Impairment  Goal: Ability to express needs and  understand communication  Description: Assess patient's communication skills and ability to understand information.  Patient will demonstrate use of effective communication techniques, alternative methods of communication and understanding even if not able to speak. - Encourage communication and provide alternate methods of communication as needed.- Collaborate with case management/ for discharge needs.- Include patient/family/caregiver in decisions related to communication.  Outcome: Progressing     Problem: Potential for Aspiration  Goal: Non-ventilated patient's risk of aspiration is minimized  Description: Assess and monitor vital signs, respiratory status, and labs (WBC).  Monitor for signs of aspiration (tachypnea, cough, rales, wheezing, cyanosis, fever).- Assess and monitor patient's ability to swallow.- Place patient up in chair to eat if possible.- HOB up at 90 degrees to eat if unable to get patient up into chair.- Supervise patient during oral intake. - Instruct patient/ family to take small bites.- Instruct patient/ family to take small single sips when taking liquids.- Follow patient-specific strategies generated by speech pathologist.  Outcome: Progressing  Goal: Ventilated patient's risk of aspiration is minimized  Description: Assess and monitor vital signs, respiratory status, airway cuff pressure, and labs (WBC).  Monitor for signs of aspiration (tachypnea, cough, rales, wheezing, cyanosis, fever).- Elevate head of bed 30 degrees if patient has tube feeding.- Monitor tube feeding.  Outcome: Progressing     Problem: Nutrition  Goal: Nutrition/Hydration status is improving  Description: Monitor and assess patient's nutrition/hydration status for malnutrition (ex- brittle hair, bruises, dry skin, pale skin and conjunctiva, muscle wasting, smooth red tongue, and disorientation). Collaborate with interdisciplinary team and initiate plan and interventions as ordered.  Monitor patient's  weight and dietary intake as ordered or per policy. Utilize nutrition screening tool and intervene per policy. Determine patient's food preferences and provide high-protein, high-caloric foods as appropriate. - Assist patient with eating.- Allow adequate time for meals.- Encourage patient to take dietary supplement as ordered.- Collaborate with clinical nutritionist.- Include patient/family/caregiver in decisions related to nutrition.  Outcome: Progressing

## 2025-04-23 NOTE — CASE MANAGEMENT
Case Management Assessment & Discharge Planning Note    Patient name Christopher Razo  Location [facility]01 MRN [id number]  : 1955 Date 2025       Current Admission Date: 2025  Current Admission Diagnosis:Altered mental status   Patient Active Problem List    Diagnosis Date Noted Date Diagnosed    Altered mental status 2025     Perseveration 2024     Diverticulosis of large intestine without perforation or abscess with bleeding 2024     History of GI diverticular bleed 2024     NSVT (nonsustained ventricular tachycardia) ([facility]) 2022     H/O heart artery stent 2022     CAD (coronary artery disease) 2022     Probable Chronic diastolic congestive heart failure ([facility]) 10/28/2022     Dyspnea on exertion 2022     Anemia and GI bleed 2022     Paroxysmal A-fib ([facility]) 2022     GI bleed 2022     Personal history of colonic polyps 2022     Dementia without behavioral disturbance, psychotic disturbance, mood disturbance, or anxiety ([facility]) 10/29/2020     Aortic stenosis 10/14/2020     Mitral regurgitation 10/14/2020     History of cardioembolic cerebrovascular accident (CVA) 2020     Unsteady gait 2020     Persistent insomnia 2020     Brain atrophy ([facility]) 2020     Imbalance 2020     [place] cardiac risk 10-20% in next 10 years 2020     Neuropathy 2020     Cubital tunnel syndrome on right 2019     Immunization due 10/17/2018     Arthropathy 2017     Internal hemorrhoids 2017     Obesity (BMI 30-39.9) 2016     Low HDL (under 40) 2016     Pre-diabetes 2016     Benign essential hypertension 2016     Tubular adenoma of colon 2014     [place] 2014     Asthma, mild intermittent 2014     Colon, diverticulosis 2013     Chronic rhinitis 2012     Obstructive sleep apnea 2012     Organic impotence 2012       LOS (days):  0  Geometric Mean LOS (GMLOS) (days):   Days to GMLOS:     OBJECTIVE:              Current admission status: Observation       Preferred Pharmacy:   [id number] - [place], [place] - [address]  [address] [address]  Phone: [phone] Fax: [phone]    [id number] - [place], [place] - [address]  [address]  [facility] [address]  Phone: [phone] Fax: [phone]    Primary Care Provider: Richard Varela DO    Primary Insurance: [email or web address]  Secondary Insurance:     ASSESSMENT:  Active Health Care Proxies       Harriet Razo Health Care Representative - Spouse   Primary Phone: [phone] (Mobile)                 Advance Directives  Does patient have a Health Care POA?: No  Was patient offered paperwork?: No  Does patient currently have a Health Care decision maker?: Yes, please see Health Care Proxy section  Does patient have Advance Directives?: No  Was patient offered paperwork?: No  Primary Contact: Harriet Razo (wife)         Readmission Root Cause  30 Day Readmission: No    Patient Information  Admitted from:: Home  Mental Status: Alert  During Assessment patient was accompanied by: Not accompanied during assessment  Assessment information provided by:: Patient  Primary Caregiver: Self  Support Systems: Self, Spouse/significant other, Son, Daughter  County of Residence: [place]  [facility] do you live in?: [place]  Home entry access options. Select all that apply.: No steps to enter home  Type of Current Residence: 2 story home  Upon entering residence, is there a bedroom on the main floor (no further steps)?: No  A bedroom is located on the following floor levels of residence (select all that apply):: 2nd Floor  Upon entering residence, is there a bathroom on the main floor (no further steps)?: Yes  Number of steps to 2nd floor from main floor: One Flight  Living Arrangements: Lives w/ Spouse/significant other  Is patient a ?:  No    Activities of Daily Living Prior to Admission  Functional Status: Independent  Completes ADLs independently?: Yes  Ambulates independently?: Yes  Does patient use assisted devices?: Yes  Assisted Devices (DME) used: Straight Cane  Does patient currently own DME?: Yes  What DME does the patient currently own?: Straight Cane  Does patient have a history of Outpatient Therapy (PT/OT)?: Yes  Does the patient have a history of Short-Term Rehab?: No  Does patient have a history of HHC?: No  Does patient currently have HHC?: No         Patient Information Continued  Income Source: Pension/FDC  Does patient have prescription coverage?: Yes  Can the patient afford their medications and any related supplies (such as glucometers or test strips)?: Yes  Does patient receive dialysis treatments?: No  Does patient have a history of substance abuse?: No  Does patient have a history of Mental Health Diagnosis?: No         Means of Transportation  Means of Transport to Appts:: Drives Self      Social Determinants of Health (SDOH)      Flowsheet Row Most Recent Value   Housing Stability    In the last 12 months, was there a time when you were not able to pay the mortgage or rent on time? N   In the past 12 months, how many times have you moved where you were living? 0   At any time in the past 12 months, were you homeless or living in a shelter (including now)? N   Transportation Needs    In the past 12 months, has lack of transportation kept you from medical appointments or from getting medications? no   In the past 12 months, has lack of transportation kept you from meetings, work, or from getting things needed for daily living? No   Food Insecurity    Within the past 12 months, you worried that your food would run out before you got the money to buy more. Never true   Within the past 12 months, the food you bought just didn't last and you didn't have money to get more. Never true   Utilities    In the past 12 months  has the electric, gas, oil, or water company threatened to shut off services in your home? No            DISCHARGE DETAILS:    Discharge planning discussed with:: Patient  Freedom of Choice: Yes  Comments - Freedom of Choice: Discussed FOC                Contacts  Patient Contacts: Harriet Razo  Relationship to Patient:: Family  Contact Method: Phone  Phone Number: [phone]  Reason/Outcome: Discharge Planning, Continuity of Care, Emergency Contact     This CM introduced self sand role.  Patient lives with wife in a 2 story home- no LYLY, bathroom on first floor, bedroom on 2nd. He is IADL's- uses a cane for ambulation assistance.  Patient drives self.  CM will follow for discharge needs.

## 2025-04-23 NOTE — PLAN OF CARE
Problem: PAIN - ADULT  Goal: Verbalizes/displays adequate comfort level or baseline comfort level  Description: Interventions:- Encourage patient to monitor pain and request assistance- Assess pain using appropriate pain scale- Administer analgesics based on type and severity of pain and evaluate response- Implement non-pharmacological measures as appropriate and evaluate response- Consider cultural and social influences on pain and pain management- Notify physician/advanced practitioner if interventions unsuccessful or patient reports new pain  Outcome: Progressing     Problem: SAFETY ADULT  Goal: Patient will remain free of falls  Description: INTERVENTIONS:- Educate patient/family on patient safety including physical limitations- Instruct patient to call for assistance with activity - Consult OT/PT to assist with strengthening/mobility - Keep Call bell within reach- Keep bed low and locked with side rails adjusted as appropriate- Keep care items and personal belongings within reach- Initiate and maintain comfort rounds- Make Fall Risk Sign visible to staff- Offer Toileting every 2 Hours, in advance of need- Initiate/Maintain bed alarm- Apply yellow socks and bracelet for high fall risk patients- Consider moving patient to room near nurses station  Outcome: Progressing     Problem: Knowledge Deficit  Goal: Patient/family/caregiver demonstrates understanding of disease process, treatment plan, medications, and discharge instructions  Description: Complete learning assessment and assess knowledge base.Interventions:- Provide teaching at level of understanding- Provide teaching via preferred learning methods  Outcome: Progressing     Problem: Neurological Deficit  Goal: Neurological status is stable or improving  Description: Interventions:- Monitor and assess patient's level of consciousness, motor function, sensory function, and level of assistance needed for ADLs. - Monitor and report changes from baseline.  Collaborate with interdisciplinary team to initiate plan and implement interventions as ordered. - Provide and maintain a safe environment.- Consider seizure precautions.- Consider fall precautions.- Consider aspiration precautions.- Consider bleeding precautions.  Outcome: Progressing     Problem: Communication Impairment  Goal: Ability to express needs and understand communication  Description: Assess patient's communication skills and ability to understand information.  Patient will demonstrate use of effective communication techniques, alternative methods of communication and understanding even if not able to speak. - Encourage communication and provide alternate methods of communication as needed.- Collaborate with case management/ for discharge needs.- Include patient/family/caregiver in decisions related to communication.  Outcome: Progressing

## 2025-04-23 NOTE — PLAN OF CARE
Problem: OCCUPATIONAL THERAPY ADULT  Goal: Performs self-care activities at highest level of function for planned discharge setting.  See evaluation for individualized goals.  Description: Treatment Interventions: ADL retraining, Functional transfer training, UE strengthening/ROM, Endurance training, Cognitive reorientation, Patient/family training, Equipment evaluation/education, Compensatory technique education, Activityengagement, Continued evaluation          See flowsheet documentation for full assessment, interventions and recommendations.   Note: Limitation: Decreased ADL status  Prognosis: Fair  Assessment: Pt is a 69 y.o. male who was admitted to [facility] on 4/22/2025 with Altered mental status urinary incontience-urologist recommend coming to ED, HTN, a-fib, dmentia, CAD. Patient   has a past medical history of Arthropathy of knee, Bacterial pneumonia (02/05/2007), Brain atrophy (HCC), Colon polyp, CPAP (continuous positive airway pressure) dependence, Disorder of male genital organ (02/12/2008), ED (erectile dysfunction) of organic origin, History of hypertension, Seasonal allergies, Situational anxiety, Sleep apnea, Stroke (HCC), Tinnitus, and Wears hearing aid. At baseline pt was completing I with ALD's/assistance with IaDL's, +SPC with functional mobility (-) drives. Pt lives with spouse in a 2SH with 3-4 LYLY (?) continue to assess.. Currently pt requires S/set-up UB, min a LB for overall ADLS and min a with RW and cues for safety for functional mobility/transfers. Pt currently presents with impairments in the following categories -difficulty performing ADLS and difficulty performing IADLS  activity tolerance, endurance, and standing balance/tolerance. These impairments, as well as pt's fatigue and risk for falls  limit pt's ability to safely engage in all baseline areas of occupation, including toileting and functional mobility/transfers From OT standpoint, recommend mod level II vs III  pending progress and increased home support upon D/C. The patient's raw score on the AM-PAC Daily Activity Inpatient Short Form is 18. A raw score of less than 19 suggests the patient may benefit from discharge to post-acute rehabilitation services. Please refer to the recommendation of the Occupational Therapist for safe discharge planning. OT will continue to follow to address the below stated goals.     Rehab Resource Intensity Level, OT: II (Moderate Resource Intensity) (mod II vs III pending progress/spouse ability to provide increased Supervision)

## 2025-04-23 NOTE — ASSESSMENT & PLAN NOTE
Patient has history of prior strokes as well as vascular dementia who presents with confusion overnight  Wife noted that patient had urinary frequency and incontinence associated with his confusion.  Patient was seen by urology with concern of a UTI however a urologist recommended patient to come to the emergency department for stroke evaluation. UA was negative    Patient's wife reports his mental status back to baseline today.  Follow-up MRI of the brain   continue Plavix, Eliquis, statin  Prescient neurology recommendations  Neurochecks per protocol

## 2025-04-23 NOTE — UTILIZATION REVIEW
Initial Clinical Review    Observation on 04/22 @ 1310 upgraded to Inpatient on 04/24 @ 1005. Pt requiring continued stay d/t continued neuro w/u.    Admission: Date/Time/Statement:   Admission Orders (From admission, onward)       Ordered        04/24/25 1005  INPATIENT ADMISSION  Once            04/22/25 1310  Place in Observation  Once                          Orders Placed This Encounter   Procedures    INPATIENT ADMISSION     Standing Status:   Standing     Number of Occurrences:   1     Level of Care:   Med Surg [16]     Estimated length of stay:   More than 2 Midnights     Certification:   I certify that inpatient services are medically necessary for this patient for a duration of greater than two midnights. See H&P and MD Progress Notes for additional information about the patient's course of treatment.     ED Arrival Information       Expected   -    Arrival   4/22/2025 11:34    Acuity   Emergent              Means of arrival   Walk-In    Escorted by   Spouse    Service   Hospitalist    Admission type   Emergency              Arrival complaint   stroke like             Chief Complaint   Patient presents with    Altered Mental Status     Pt having increased of urinary incontience.  Pt also having confusion and balance issues since 2 am. No slurred speech        Initial Presentation: 69 y.o. male with a PMH of CVA, paroxysmal atrial fibrillation, CAD, dementia presented to the ED from home w/ AMS.  Pt was having urinary incontinence and confusion. Seen by OP urology this am, concern for UTI precipitating this and recommended to come to the ED from further eval.  In the ED, stroke alert was called. labs are grossly unremarkable. CT imaging shows no evidence of acute stroke. Now MS back to baseline.    Admit as observation level of care for AMS.  Plan: Neurology consult. Continue Plavix and Eliquis. Continue statin. Follow-up MRI. PT/OT/speech therapy evaluation. F/U labs. F/U UA    Anticipated Length of  Stay/Certification Statement: Patient will be admitted on an inpatient basis with an anticipated length of stay of greater than 2 midnights secondary to AMS.     Neurology Consult: stroke alert for confusion episode overnight. CTH without acute bleed or large territorial infarct. CTA without LVO, noted basilar stenosis. No focal weakness or numbness noted. AAO x4. NIHSS 2. Not a candicate for thrombolytic therapy d/t unclear time of onset outside appropriate time window., Symptoms resolved/clearly non disabling, and Bleeding risk.   Plan: Admit to stroke pathway, MRI brain, Echo, A1c, lipid panel, telemetry. Can dc stroke pathway if MRI negative. Continue home eliquis. Plavix 75 mg daily and statin. Freq neuro checks. PT/OT/ST    Date: 04/23  Day 2:   Pt's MS back to baseline. No neurologic deficit. Follow-up MRI of the brain. continue Plavix, Eliquis, statin. Cont neuro checks. ontinue metoprolol succinate 50 mg twice daily cont Eliquis, plavix, statin.  PT/OT recommending level 2 rehab, moderate resource intensity.    Date: 04/24  Day 3: Has surpassed a 2nd midnight with active treatments and services.  Pt's MS at baseline, no acute complaints. MRI brain results did not show evidence of acute infarct. UA negative for leukocytes/nitrates. found to be COVID +, etiology of confusion likely 2/2 infectious process in the setting of poor cognitive reserve. Can dc stroke pathway.  No need for further inpatient neurodiagnostics. Continue home medications: Atorvastatin 40 mg qHS, Plavix 75 mg daily and Eliquis 5 mg twice daily. maintain normotension       ED Treatment-Medication Administration from 04/22/2025 1134 to 04/22/2025 1416         Date/Time Order Dose Route Action     04/22/2025 1157 iohexol (OMNIPAQUE) 350 MG/ML injection (MULTI-DOSE) 85 mL 85 mL Intravenous Given            Scheduled Medications:  apixaban, 5 mg, Oral, BID  atorvastatin, 40 mg, Oral, QPM  clopidogrel, 75 mg, Oral, Daily  memantine, 5 mg, Oral,  Daily  metoprolol succinate, 50 mg, Oral, BID      Continuous IV Infusions: none     PRN Meds:  acetaminophen, 650 mg, Oral, Q6H PRN      ED Triage Vitals [04/22/25 1137]   Temperature Pulse Respirations Blood Pressure SpO2 Pain Score   98.2 °F (36.8 °C) 83 18 142/81 95 % No Pain     Weight (last 2 days)       Date/Time Weight    04/22/25 1500 104 (229)    04/22/25 1157 104 (229.72)    04/22/25 1137 105 (231)            Vital Signs (last 3 days)       Date/Time Temp Pulse Resp BP MAP (mmHg) SpO2 O2 Device Patient Position - Orthostatic VS [place] Coma Scale Score Pain    04/24/25 0800 -- -- -- -- -- -- -- -- 15 No Pain    04/24/25 07:08:44 97.1 °F (36.2 °C) 83 18 125/84 98 90 % -- -- -- --    04/24/25 06:28:21 -- 85 -- 115/78 90 95 % -- -- -- --    04/24/25 0626 -- 85 -- 115/78 -- -- -- -- -- --    04/23/25 23:01:56 98 °F (36.7 °C) 77 18 119/78 92 91 % -- -- -- --    04/23/25 21:09:59 97.2 °F (36.2 °C) 78 -- 120/82 95 93 % -- -- -- --    04/23/25 2000 -- -- -- -- -- -- None (Room air) -- 15 No Pain    04/23/25 19:25:06 98.4 °F (36.9 °C) 83 18 119/82 94 94 % -- Sitting -- No Pain    04/23/25 1500 -- 81 -- 124/90 101 -- -- -- -- --    04/23/25 14:53:51 98 °F (36.7 °C) 85 -- 124/90 101 94 % -- -- -- --    04/23/25 0845 -- -- -- -- -- -- -- -- 15 --    04/23/25 0828 -- -- -- -- -- -- -- -- -- No Pain    04/23/25 0820 -- -- -- -- -- -- -- -- -- No Pain    04/23/25 07:01:41 100.9 °F (38.3 °C) 78 18 125/89 101 93 % None (Room air) Sitting -- --    04/23/25 0700 100.9 °F (38.3 °C) 75 -- 125/89 101 93 % -- -- -- --    04/23/25 05:22:30 -- 74 -- 122/73 89 91 % -- -- -- --    04/23/25 0415 -- 60 -- 128/80 96 94 % -- -- -- --    04/23/25 0400 -- -- -- -- -- -- -- -- 15 --    04/23/25 0000 -- 75 -- 118/75 89 94 % -- -- 15 --    04/22/25 23:47:08 99.7 °F (37.6 °C) 72 17 118/75 89 93 % -- -- -- --    04/22/25 2000 -- -- -- -- -- -- None (Room air) -- 15 No Pain    04/22/25 19:50:14 100.7 °F (38.2 °C) 95 -- 134/83 100 94 % -- -- --  --    04/22/25 1800 98.5 °F (36.9 °C) 94 16 135/85 -- 92 % None (Room air) Sitting 15 No Pain    04/22/25 1600 -- 92 -- 134/90 105 91 % -- -- 15 --    04/22/25 15:52:37 -- 91 -- 134/90 105 94 % -- -- -- --    04/22/25 1551 -- 91 -- 134/90 -- -- -- -- -- --    04/22/25 1530 -- -- -- -- -- -- -- -- -- No Pain    04/22/25 1500 98 °F (36.7 °C) -- 16 111/70 84 -- -- Sitting 15 --    04/22/25 14:40:21 98.7 °F (37.1 °C) 83 17 138/99 112 93 % -- -- -- --    04/22/25 1416 98.3 °F (36.8 °C) 88 16 110/69 83 98 % -- Sitting 15 --    04/22/25 1400 -- 81 17 126/78 -- 94 % None (Room air) -- 15 --    04/22/25 1300 -- 79 18 135/82 -- 92 % None (Room air) -- 15 --    04/22/25 1230 -- -- -- -- -- -- -- -- -- No Pain    04/22/25 12:00:22 -- 87 16 139/77 -- 96 % None (Room air) -- 14 --    04/22/25 11:51:54 -- 81 18 135/74 -- 94 % None (Room air) -- 14 --    04/22/25 1140 -- 78 18 149/77 -- 96 % None (Room air) -- 14 --    04/22/25 1137 98.2 °F (36.8 °C) 83 18 142/81 106 95 % None (Room air) Sitting -- No Pain              Pertinent Labs/Diagnostic Test Results:   Radiology:  MRI brain wo contrast   Final Interpretation by Maynor Marroquin MD (04/24 0141)         No evidence of acute infarct or acute intracranial hemorrhage.      Workstation performed: [id number]         XR chest 1 view portable   Final Interpretation by Nancy Campa MD (04/22 1300)      No acute cardiopulmonary disease.            Workstation performed: [id number]         CTA stroke alert (head/neck)   Final Interpretation by Jeanine Hernandez MD (04/22 1216)      1.  No intracranial large vessel occlusion.   2.  Focal high-grade stenosis in the mid basilar artery.   3.  Focal severe stenosis in the right PCA P2 segment.   4.  No significant stenosis in the cervical carotid or vertebral arteries.               Findings were directly discussed with Ismael Sousa at 12:05 p.m.      Workstation performed: [id number]         CT stroke alert brain   Final  "Interpretation by Jeanine Hernandez MD (04/22 1216)      1.  No acute intracranial CT abnormality.   2.  Chronic lacunar infarcts in bilateral corona radiata and thalami. Chronic microangiopathic change.            Findings were directly discussed with Ismael Sousa at approximately 12:05 p.m.      Workstation performed: [id number]           Cardiology:  ECG 12 lead   Final Result by Renny Campos MD (04/22 2310)   Sinus rhythm with occasional Premature ventricular complexes and Premature    atrial complexes   Otherwise normal ECG   When compared with ECG of 07-Jun-2024 15:13,   Premature ventricular complexes are now Present   Premature atrial complexes are now Present   Confirmed by Renny Campos ([id number]) on 4/22/2025 11:10:02 PM        GI:  No orders to display       Results from last 7 days   Lab Units 04/24/25  1020   SARS-COV-2  Positive*     Results from last 7 days   Lab Units 04/23/25 0529 04/22/25  1150   WBC Thousand/uL 5.60 5.96   HEMOGLOBIN g/dL 16.7 16.5   HEMATOCRIT % 45.5 46.0   PLATELETS Thousands/uL 158 164   TOTAL NEUT ABS Thousands/µL 3.32  --          Results from last 7 days   Lab Units 04/23/25 0529 04/22/25  1150   SODIUM mmol/L 137 137   POTASSIUM mmol/L 3.7 3.8   CHLORIDE mmol/L 106 104   CO2 mmol/L 23 26   ANION GAP mmol/L 8 7   BUN mg/dL 15 11   CREATININE mg/dL 0.81 0.95   EGFR ml/min/1.73sq m 91 82   CALCIUM mg/dL 8.5 8.9   MAGNESIUM mg/dL 2.2  --    PHOSPHORUS mg/dL 3.6  --      Results from last 7 days   Lab Units 04/23/25  0529   AST U/L 17   ALT U/L 20   ALK PHOS U/L 94   TOTAL PROTEIN g/dL 6.4   ALBUMIN g/dL 4.0   TOTAL BILIRUBIN mg/dL 0.91     Results from last 7 days   Lab Units 04/22/25  1144   POC GLUCOSE mg/dl 116     Results from last 7 days   Lab Units 04/23/25  0529 04/22/25  1150   GLUCOSE RANDOM mg/dL 108 110         Results from last 7 days   Lab Units 04/23/25 0529   HEMOGLOBIN A1C % 5.1   EAG mg/dl 100     No results found for: \"BETA-HYDROXYBUTYRATE\"                 "   Results from last 7 days   Lab Units 04/22/25  1646 04/22/25  1449 04/22/25  1150   HS TNI 0HR ng/L  --   --  20   HS TNI 2HR ng/L  --  18  --    HSTNI D2 ng/L  --  -2  --    HS TNI 4HR ng/L 18  --   --    HSTNI D4 ng/L -2  --   --          Results from last 7 days   Lab Units 04/22/25  1150   PROTIME seconds 13.9   INR  1.04   PTT seconds 33     Results from last 7 days   Lab Units 04/22/25  1449 04/22/25  1150   TSH 3RD GENERATON uIU/mL 1.734 1.531                                                         Results from last 7 days   Lab Units 04/22/25 2007 04/22/25  1040 04/22/25  1008   CLARITY UA  Clear Clear clear   COLOR UA  Yellow Yellow dk yellow   SPEC GRAV UA  1.044* 1.018  --    PH UA  5.5 5.5  --    GLUCOSE UA mg/dl Negative Negative -   KETONES UA mg/dl Negative Negative -   BLOOD UA  Small* Trace* +++   PROTEIN UA mg/dl 70 (1+)* 70 (1+)* +++   NITRITE UA  Negative Negative -   BILIRUBIN UA  Negative Negative  --    BILIRUBIN UA POC   --   --  -   UROBILINOGEN UA   --   --  -   UROBILINOGEN UA (BE) mg/dl <2.0 <2.0  --    LEUKOCYTES UA  Negative Negative -   WBC UA /hpf 1-2 1-2  --    RBC UA /hpf 2-4* 1-2  --    BACTERIA UA /hpf None Seen None Seen  --    EPITHELIAL CELLS WET PREP /hpf None Seen Occasional  --    MUCUS THREADS  Occasional* Moderate*  --      Results from last 7 days   Lab Units 04/24/25  1020   INFLUENZA A PCR  Negative   INFLUENZA B PCR  Negative   RSV PCR  Negative                                               Past Medical History:   Diagnosis Date    Arthropathy of knee     last assessed 8/19/15    Bacterial pneumonia 02/05/2007    last assessed 2/5/7    Brain atrophy (HCC)     Colon polyp     CPAP (continuous positive airway pressure) dependence     Disorder of male genital organ 02/12/2008    last assessed 2/12/08    ED (erectile dysfunction) of organic origin     last assessed 2/13/17     History of hypertension     Seasonal allergies     Situational anxiety     resolved 3/16/17      Sleep apnea     Stroke (HCC)     09/2020 TIA    Tinnitus     Wears hearing aid     bilateral     Present on Admission:   Benign essential hypertension   CAD (coronary artery disease)   Dementia without behavioral disturbance, psychotic disturbance, mood disturbance, or anxiety (HCC)   (Resolved) CAMMIE (generalized anxiety disorder)   Paroxysmal A-fib (HCC)      Admitting Diagnosis: Altered mental status [R41.82]  Urinary incontinence [R32]  Stroke (HCC) [I63.9]  Age/Sex: 69 y.o. male    Network Utilization Review Department  ATTENTION: Please call with any questions or concerns to [phone] and carefully listen to the prompts so that you are directed to the right person. All voicemails are confidential.   For Discharge needs, contact Care Management DC Support Team at [phone] opt. [phone]  Send all requests for admission clinical reviews, approved or denied determinations and any other requests to dedicated fax number below belonging to the campus where the patient is receiving treatment. List of dedicated fax numbers for the Facilities:  FACILITY NAME UR FAX NUMBER   ADMISSION DENIALS (Administrative/Medical Necessity) [phone]   DISCHARGE SUPPORT TEAM (NETWORK) [phone]   PARENT CHILD HEALTH (Maternity/NICU/Pediatrics) [phone]   [facility] [phone]   [facility] [phone]   [facility] [phone]   [facility] [phone]   [facility] [phone]   [facility] [phone]   [facility] [phone]   [facility] [phone]   [facility] [phone]   Critical access hospital [phone]   [facility] [phone]   [facility] [phone]

## 2025-04-23 NOTE — PROGRESS NOTES
Progress Note - Hospitalist   Name: Christopher Razo 69 y.o. male I MRN: [id number]  [facility] I Date of Admission: 4/22/2025   Date of Service: 4/23/2025 I Hospital Day: 0    Assessment & Plan  Altered mental status  Patient has history of prior strokes as well as vascular dementia who presents with confusion overnight  Wife noted that patient had urinary frequency and incontinence associated with his confusion.  Patient was seen by urology with concern of a UTI however a urologist recommended patient to come to the emergency department for stroke evaluation. UA was negative    Patient's wife reports his mental status back to baseline today.  Follow-up MRI of the brain   continue Plavix, Eliquis, statin  Prescient neurology recommendations  Neurochecks per protocol  Benign essential hypertension  Blood pressure is at goal 130s over 80s  Continue metoprolol succinate 50 mg twice daily  Paroxysmal A-fib (HCC)  Rate controlled with metoprolol  Anticoagulation with Eliquis  CAD (coronary artery disease)  Continue Eliquis, Plavix, statin  Dementia without behavioral disturbance, psychotic disturbance, mood disturbance, or anxiety (HCC)  Patient appears to be at baseline  May continue Namenda    VTE Pharmacologic Prophylaxis:   Moderate Risk (Score 3-4) - Pharmacological DVT Prophylaxis Ordered: apixaban (Eliquis).    Mobility:   Basic Mobility Inpatient Raw Score: 18  JH-HLM Goal: 6: Walk 10 steps or more  JH-HLM Achieved: 7: Walk 25 feet or more  JH-HLM Goal achieved. Continue to encourage appropriate mobility.    Patient Centered Rounds: I performed bedside rounds with nursing staff today.   Discussions with Specialists or Other Care Team Provider: VIDHYA. Neuro    Education and Discussions with Family / Patient: Updated  (wife) at bedside.    Current Length of Stay: 0 day(s)  Current Patient Status: Observation   Certification Statement: The patient will continue to require additional inpatient  hospital stay due to stroke workup, MRI   Discharge Plan: Anticipate discharge later today or tomorrow to home.    Code Status: Level 1 - Full Code    Subjective   Patient seen and examined..  Mental status back to baseline per wife at bedside.  Afebrile.  No new neurologic deficits.    Objective :  Temp:  [98 °F (36.7 °C)-100.9 °F (38.3 °C)] 100.9 °F (38.3 °C)  HR:  [60-95] 78  BP: (110-138)/(69-99) 125/89  Resp:  [16-18] 18  SpO2:  [91 %-98 %] 93 %  O2 Device: None (Room air)    Body mass index is 31.94 kg/m².     Input and Output Summary (last 24 hours):     Intake/Output Summary (Last 24 hours) at 4/23/2025 1341  Last data filed at 4/23/2025 1100  Gross per 24 hour   Intake 820 ml   Output 600 ml   Net 220 ml     PHYSICAL EXAM:    Vitals signs reviewed  Constitutional   Awake and cooperative. NAD.   Head/Neck   Normocephalic. Atraumatic.   HEENT   No scleral icterus. EOMI.   Heart   Regular rate and rhythm. No murmurs.   Lungs   Clear to auscultation bilaterally. Respirations unlaboured.   Abdomen   Soft. Nontender. Nondistended.    Skin   Skin color normal. No rashes.   Extremities   No deformities. No peripheral edema.   Neuro   Alert and oriented.  Strength equal and symmetric.  Cranial nerves grossly intact.  No apparent focal deficits.   Psych   Mood stable. Affect normal.           Lab Results: I have reviewed the following results:   Results from last 7 days   Lab Units 04/23/25  0529   WBC Thousand/uL 5.60   HEMOGLOBIN g/dL 16.7   HEMATOCRIT % 45.5   PLATELETS Thousands/uL 158   SEGS PCT % 58   LYMPHO PCT % 20   MONO PCT % 18*   EOS PCT % 2     Results from last 7 days   Lab Units 04/23/25  0529   SODIUM mmol/L 137   POTASSIUM mmol/L 3.7   CHLORIDE mmol/L 106   CO2 mmol/L 23   BUN mg/dL 15   CREATININE mg/dL 0.81   ANION GAP mmol/L 8   CALCIUM mg/dL 8.5   ALBUMIN g/dL 4.0   TOTAL BILIRUBIN mg/dL 0.91   ALK PHOS U/L 94   ALT U/L 20   AST U/L 17   GLUCOSE RANDOM mg/dL 108     Results from last 7 days   Lab  Units 04/22/25  1150   INR  1.04     Results from last 7 days   Lab Units 04/22/25  1144   POC GLUCOSE mg/dl 116     Results from last 7 days   Lab Units 04/23/25  0529   HEMOGLOBIN A1C % 5.1           Recent Cultures (last 7 days):         Imaging Results Review: I reviewed radiology reports from this admission including: CT head.  Other Study Results Review: No additional pertinent studies reviewed.    Last 24 Hours Medication List:     Current Facility-Administered Medications:     acetaminophen (TYLENOL) tablet 650 mg, Q6H PRN    apixaban (ELIQUIS) tablet 5 mg, BID    atorvastatin (LIPITOR) tablet 40 mg, QPM    clopidogrel (PLAVIX) tablet 75 mg, Daily    memantine (NAMENDA) tablet 5 mg, Daily    metoprolol succinate (TOPROL-XL) 24 hr tablet 50 mg, BID    Administrative Statements   Today, Patient Was Seen By: Abdoul Miranda DO      **Please Note: This note may have been constructed using a voice recognition system.**

## 2025-04-23 NOTE — PLAN OF CARE
Problem: PHYSICAL THERAPY ADULT  Goal: Performs mobility at highest level of function for planned discharge setting.  See evaluation for individualized goals.  Description: Treatment/Interventions: LE strengthening/ROM, Functional transfer training, Elevations, Therapeutic exercise, Endurance training, Cognitive reorientation, Patient/family training, Equipment eval/education, Gait training, Bed mobility, Spoke to case management, Spoke to nursing, OT  Equipment Recommended: Walker       See flowsheet documentation for full assessment, interventions and recommendations.  Note: Prognosis: Good  Problem List: Decreased strength, Impaired balance, Decreased endurance, Decreased mobility, Decreased coordination, Decreased cognition, Impaired judgement, Decreased safety awareness  Assessment: Pt is a 69 y.o. male [facility]22/[facility]25 with confusion, urinary frequency and incontinence. Pt received a primary medical dx of AMS. Pt has the following comorbidities which affect their treatment: active problems listed above,  has a past medical history of Arthropathy of knee, Bacterial pneumonia, Brain atrophy (HCC), Colon polyp, CPAP (continuous positive airway pressure) dependence, Disorder of male genital organ, ED (erectile dysfunction) of organic origin, History of hypertension, Seasonal allergies, Situational anxiety, Sleep apnea, Stroke (HCC), Tinnitus, and Wears hearing aid.  , as well as personal factors including stairs to access home and ?caregiver support. Pt has a high complexity clinical presentation due to Ongoing medical management for primary dx, Increased reliance on more restrictive AD compared to baseline, Decreased activity tolerance compared to baseline, Fall risk, Increased assistance needed from caregiver at current time, Ongoing telemetry monitoring, Cog status, Trending lab values, Diagnostic imaging pending, Continuous pulse oximetry monitoring  , and PMH. PT was consulted to evaluate pt's  functional mobility and discharge needs. Upon evaluation, patient required MinAx1 transfers, minAx1 for ambulation, and minAx1 for stairs. Body system impairments include impaired strength, balance, endurance, cognition. Pt's functional activity impairments include: activity tolerance and mobility. Participation restrictions include inability to safely access home/community. At conclusion of eval, pt remained seated in chair with chair alarm, phone, call bell, and all other personal needs within reach. Pt would benefit from skilled PT to address their functional mobility limitations. The patient's -[facility] Basic Mobility Inpatient Short Form Raw Score is 18. A Raw score of greater than 16 suggests the patient may benefit from discharge to home. Please also refer to the recommendation of the Physical Therapist for safe discharge planning.  Barriers to Discharge: Inaccessible home environment, Decreased caregiver support     Rehab Resource Intensity Level, PT: II (Moderate Resource Intensity) (pending avaliable support from spouse)    See flowsheet documentation for full assessment.

## 2025-04-24 ENCOUNTER — APPOINTMENT (OUTPATIENT)
Dept: NON INVASIVE DIAGNOSTICS | Facility: HOSPITAL | Age: 70
DRG: 177 | End: 2025-04-24
Payer: COMMERCIAL

## 2025-04-24 ENCOUNTER — TELEPHONE (OUTPATIENT)
Age: 70
End: 2025-04-24

## 2025-04-24 VITALS
RESPIRATION RATE: 18 BRPM | BODY MASS INDEX: 32.1 KG/M2 | DIASTOLIC BLOOD PRESSURE: 84 MMHG | OXYGEN SATURATION: 90 % | WEIGHT: 229.28 LBS | HEART RATE: 83 BPM | SYSTOLIC BLOOD PRESSURE: 125 MMHG | HEIGHT: 71 IN | TEMPERATURE: 97.1 F

## 2025-04-24 DIAGNOSIS — R35.0 URINE FREQUENCY: Primary | ICD-10-CM

## 2025-04-24 PROBLEM — U07.1 COVID-19 VIRUS INFECTION: Status: ACTIVE | Noted: 2025-04-24

## 2025-04-24 LAB
AORTIC VALVE MEAN VELOCITY: 19.9 M/S
AV AREA BY CONTINUOUS VTI: 2.2 CM2
AV AREA PEAK VELOCITY: 2.1 CM2
AV LVOT MEAN GRADIENT: 3 MMHG
AV LVOT PEAK GRADIENT: 5 MMHG
AV MEAN PRESS GRAD SYS DOP V1V2: 17 MMHG
AV ORIFICE AREA US: 1.58 CM2
AV PEAK GRADIENT: 27 MMHG
AV VELOCITY RATIO: 0.41
AV VMAX SYS DOP: 2.58 M/S
BSA FOR ECHO PROCEDURE: 2.23 M2
DOP CALC AO VTI: 49.67 CM
DOP CALC LVOT AREA: 3.8 CM2
DOP CALC LVOT CARDIAC INDEX: 3.73 L/MIN/M2
DOP CALC LVOT CARDIAC OUTPUT: 8.32 L/MIN
DOP CALC LVOT DIAMETER: 2.2 CM
DOP CALC LVOT PEAK VEL VTI: 20.6 CM
DOP CALC LVOT PEAK VEL: 1.04 M/S
DOP CALC LVOT STROKE INDEX: 50.7 ML/M2
DOP CALC LVOT STROKE VOLUME: 78.27 CM3
FLUAV RNA RESP QL NAA+PROBE: NEGATIVE
FLUBV RNA RESP QL NAA+PROBE: NEGATIVE
LV EF US.2D.A4C+ESTIMATED: 65 %
RSV RNA RESP QL NAA+PROBE: NEGATIVE
SARS-COV-2 RNA RESP QL NAA+PROBE: POSITIVE
SL CV LV EF: 65

## 2025-04-24 PROCEDURE — 93325 DOPPLER ECHO COLOR FLOW MAPG: CPT

## 2025-04-24 PROCEDURE — 99239 HOSP IP/OBS DSCHRG MGMT >30: CPT | Performed by: INTERNAL MEDICINE

## 2025-04-24 PROCEDURE — 93321 DOPPLER ECHO F-UP/LMTD STD: CPT | Performed by: INTERNAL MEDICINE

## 2025-04-24 PROCEDURE — 93321 DOPPLER ECHO F-UP/LMTD STD: CPT

## 2025-04-24 PROCEDURE — 97530 THERAPEUTIC ACTIVITIES: CPT

## 2025-04-24 PROCEDURE — 93308 TTE F-UP OR LMTD: CPT

## 2025-04-24 PROCEDURE — 93325 DOPPLER ECHO COLOR FLOW MAPG: CPT | Performed by: INTERNAL MEDICINE

## 2025-04-24 PROCEDURE — 0241U HB NFCT DS VIR RESP RNA 4 TRGT: CPT | Performed by: INTERNAL MEDICINE

## 2025-04-24 PROCEDURE — 93308 TTE F-UP OR LMTD: CPT | Performed by: INTERNAL MEDICINE

## 2025-04-24 PROCEDURE — 97116 GAIT TRAINING THERAPY: CPT

## 2025-04-24 PROCEDURE — 99232 SBSQ HOSP IP/OBS MODERATE 35: CPT | Performed by: STUDENT IN AN ORGANIZED HEALTH CARE EDUCATION/TRAINING PROGRAM

## 2025-04-24 RX ORDER — MIRABEGRON 25 MG/1
25 TABLET, FILM COATED, EXTENDED RELEASE ORAL DAILY
Qty: 60 TABLET | Refills: 6 | Status: SHIPPED | OUTPATIENT
Start: 2025-04-24

## 2025-04-24 RX ADMIN — APIXABAN 5 MG: 5 TABLET, FILM COATED ORAL at 08:13

## 2025-04-24 RX ADMIN — MEMANTINE 5 MG: 5 TABLET ORAL at 08:13

## 2025-04-24 RX ADMIN — CLOPIDOGREL BISULFATE 75 MG: 75 TABLET, FILM COATED ORAL at 08:13

## 2025-04-24 RX ADMIN — METOPROLOL SUCCINATE 50 MG: 50 TABLET, EXTENDED RELEASE ORAL at 06:26

## 2025-04-24 NOTE — ASSESSMENT & PLAN NOTE
Patient has history of prior strokes as well as vascular dementia who presents with confusion overnight  Wife noted that patient had urinary frequency and incontinence associated with his confusion.  Patient was seen by urology with concern of a UTI however a urologist recommended patient to come to the emergency department for stroke evaluation. UA was negative    Stroke workup with MRI of the brain negative  continue Plavix, Eliquis, statin  Appreciate neurology recommendations  Patient ultimately tested positive for COVID

## 2025-04-24 NOTE — PLAN OF CARE
Problem: PAIN - ADULT  Goal: Verbalizes/displays adequate comfort level or baseline comfort level  Description: Interventions:- Encourage patient to monitor pain and request assistance- Assess pain using appropriate pain scale- Administer analgesics based on type and severity of pain and evaluate response- Implement non-pharmacological measures as appropriate and evaluate response- Consider cultural and social influences on pain and pain management- Notify physician/advanced practitioner if interventions unsuccessful or patient reports new pain  4/23/2025 2207 by Luis Gutierrez RN  Outcome: Progressing  4/23/2025 2206 by Luis Gutierrez RN  Outcome: Progressing     Problem: INFECTION - ADULT  Goal: Absence or prevention of progression during hospitalization  Description: INTERVENTIONS:- Assess and monitor for signs and symptoms of infection- Monitor lab/diagnostic results- Monitor all insertion sites, i.e. indwelling lines, tubes, and drains- Monitor endotracheal if appropriate and nasal secretions for changes in amount and color- Brady appropriate cooling/warming therapies per order- Administer medications as ordered- Instruct and encourage patient and family to use good hand hygiene technique- Identify and instruct in appropriate isolation precautions for identified infection/condition  4/23/2025 2207 by Luis Gutierrez RN  Outcome: Progressing  4/23/2025 2206 by Luis Gutierrez RN  Outcome: Progressing  Goal: Absence of fever/infection during neutropenic period  Description: INTERVENTIONS:- Monitor WBC  4/23/2025 2207 by Luis Gutierrez RN  Outcome: Progressing  4/23/2025 2206 by Luis Gutierrez RN  Outcome: Progressing     Problem: SAFETY ADULT  Goal: Patient will remain free of falls  Description: INTERVENTIONS:- Educate patient/family on patient safety including physical limitations- Instruct patient to call for assistance with activity - Consult OT/PT to assist with strengthening/mobility -  Keep Call bell within reach- Keep bed low and locked with side rails adjusted as appropriate- Keep care items and personal belongings within reach- Initiate and maintain comfort rounds- Make Fall Risk Sign visible to staff- Offer Toileting every 2 Hours, in advance of need- Initiate/Maintain bed alarm- Apply yellow socks and bracelet for high fall risk patients- Consider moving patient to room near nurses station  4/23/2025 2207 by Luis Gutierrez RN  Outcome: Progressing  4/23/2025 2206 by Luis Gutierrez RN  Outcome: Progressing  Goal: Maintain or return to baseline ADL function  Description: INTERVENTIONS:-  Assess patient's ability to carry out ADLs; assess patient's baseline for ADL function and identify physical deficits which impact ability to perform ADLs (bathing, care of mouth/teeth, toileting, grooming, dressing, etc.)- Assess/evaluate cause of self-care deficits - Assess range of motion- Assess patient's mobility; develop plan if impaired- Assess patient's need for assistive devices and provide as appropriate- Encourage maximum independence but intervene and supervise when necessary- Involve family in performance of ADLs- Assess for home care needs following discharge - Consider OT consult to assist with ADL evaluation and planning for discharge- Provide patient education as appropriate  4/23/2025 2207 by Luis Gutierrez RN  Outcome: Progressing  4/23/2025 2206 by Luis Gutierrez RN  Outcome: Progressing  Goal: Maintains/Returns to pre admission functional level  Description: INTERVENTIONS:- Perform AM-PAC 6 Click Basic Mobility/ Daily Activity assessment daily.- Set and communicate daily mobility goal to care team and patient/family/caregiver. - Collaborate with rehabilitation services on mobility goals if consulted- Perform Range of Motion 3 times a day.- Reposition patient every 2 hours.- Dangle patient 3 times a day- Stand patient 2 times a day- Ambulate patient 3 times a day- Out of bed to  chair 2 times a day - Out of bed for meals 3 times a day- Out of bed for toileting- Record patient progress and toleration of activity level   4/23/2025 2207 by Luis Gutierrez RN  Outcome: Progressing  4/23/2025 2206 by Luis Gutierrez RN  Outcome: Progressing     Problem: DISCHARGE PLANNING  Goal: Discharge to home or other facility with appropriate resources  Description: INTERVENTIONS:- Identify barriers to discharge w/patient and caregiver- Arrange for needed discharge resources and transportation as appropriate- Identify discharge learning needs (meds, wound care, etc.)- Arrange for interpretive services to assist at discharge as needed- Refer to Case Management Department for coordinating discharge planning if the patient needs post-hospital services based on physician/advanced practitioner order or complex needs related to functional status, cognitive ability, or social support system  4/23/2025 2207 by Luis Gutierrez RN  Outcome: Progressing  4/23/2025 2206 by Luis Gutierrez RN  Outcome: Progressing     Problem: Knowledge Deficit  Goal: Patient/family/caregiver demonstrates understanding of disease process, treatment plan, medications, and discharge instructions  Description: Complete learning assessment and assess knowledge base.Interventions:- Provide teaching at level of understanding- Provide teaching via preferred learning methods  4/23/2025 2207 by Luis Gutierrez RN  Outcome: Progressing  4/23/2025 2206 by Luis Gutierrez RN  Outcome: Progressing     Problem: Neurological Deficit  Goal: Neurological status is stable or improving  Description: Interventions:- Monitor and assess patient's level of consciousness, motor function, sensory function, and level of assistance needed for ADLs. - Monitor and report changes from baseline. Collaborate with interdisciplinary team to initiate plan and implement interventions as ordered. - Provide and maintain a safe environment.- Consider seizure  precautions.- Consider fall precautions.- Consider aspiration precautions.- Consider bleeding precautions.  4/23/2025 2207 by Luis Gutierrez RN  Outcome: Progressing  4/23/2025 2206 by Luis Gutierrez RN  Outcome: Progressing     Problem: Activity Intolerance/Impaired Mobility  Goal: Mobility/activity is maintained at optimum level for patient  Description: Interventions:- Assess and monitor patient  barriers to mobility and need for assistive/adaptive devices.- Assess patient's emotional response to limitations.- Collaborate with interdisciplinary team and initiate plans and interventions as ordered.- Encourage independent activity per ability.- Maintain proper body alignment.- Perform active/passive rom as tolerated/ordered.- Plan activities to conserve energy.- Turn patient as appropriate  4/23/2025 2207 by Luis Gutierrez RN  Outcome: Progressing  4/23/2025 2206 by Luis Gutierrez RN  Outcome: Progressing     Problem: Communication Impairment  Goal: Ability to express needs and understand communication  Description: Assess patient's communication skills and ability to understand information.  Patient will demonstrate use of effective communication techniques, alternative methods of communication and understanding even if not able to speak. - Encourage communication and provide alternate methods of communication as needed.- Collaborate with case management/ for discharge needs.- Include patient/family/caregiver in decisions related to communication.  4/23/2025 2207 by Luis Gutierrez RN  Outcome: Progressing  4/23/2025 2206 by Luis Gutierrez RN  Outcome: Progressing     Problem: Potential for Aspiration  Goal: Non-ventilated patient's risk of aspiration is minimized  Description: Assess and monitor vital signs, respiratory status, and labs (WBC).  Monitor for signs of aspiration (tachypnea, cough, rales, wheezing, cyanosis, fever).- Assess and monitor patient's ability to swallow.-  Place patient up in chair to eat if possible.- HOB up at 90 degrees to eat if unable to get patient up into chair.- Supervise patient during oral intake. - Instruct patient/ family to take small bites.- Instruct patient/ family to take small single sips when taking liquids.- Follow patient-specific strategies generated by speech pathologist.  4/23/2025 2207 by Luis Gutierrez RN  Outcome: Progressing  4/23/2025 2206 by Luis Gutierrez RN  Outcome: Progressing  Goal: Ventilated patient's risk of aspiration is minimized  Description: Assess and monitor vital signs, respiratory status, airway cuff pressure, and labs (WBC).  Monitor for signs of aspiration (tachypnea, cough, rales, wheezing, cyanosis, fever).- Elevate head of bed 30 degrees if patient has tube feeding.- Monitor tube feeding.  4/23/2025 2207 by Luis Gutierrez RN  Outcome: Progressing  4/23/2025 2206 by Luis Gutierrez RN  Outcome: Progressing     Problem: Nutrition  Goal: Nutrition/Hydration status is improving  Description: Monitor and assess patient's nutrition/hydration status for malnutrition (ex- brittle hair, bruises, dry skin, pale skin and conjunctiva, muscle wasting, smooth red tongue, and disorientation). Collaborate with interdisciplinary team and initiate plan and interventions as ordered.  Monitor patient's weight and dietary intake as ordered or per policy. Utilize nutrition screening tool and intervene per policy. Determine patient's food preferences and provide high-protein, high-caloric foods as appropriate. - Assist patient with eating.- Allow adequate time for meals.- Encourage patient to take dietary supplement as ordered.- Collaborate with clinical nutritionist.- Include patient/family/caregiver in decisions related to nutrition.  4/23/2025 2207 by Luis Gutierrez RN  Outcome: Progressing  4/23/2025 2206 by Luis Gutierrez RN  Outcome: Progressing

## 2025-04-24 NOTE — PROGRESS NOTES
Progress Note - Neurology   Name: Christopher Razo 69 y.o. male I MRN: [id number]  Unit/Bed#: [facility] 701-01 I Date of Admission: 4/22/2025   Date of Service: 4/24/2025 I Hospital Day: 0    Assessment & Plan  Altered mental status   69 y.o.  male with history of prior strokes with residual ataxia, PAF on Eliquis (ILR in place), vascular dementia, probable cavernoma, possible CAA, HTN, GRACIE, CAD s/p PCI x 2 on Plavix, who presents with confusion.    Patient woke up at 2 AM the day of admission with urinary frequency and incontinence which is unusual for him.  Patient could not find the light switch to the bathroom which is not his baseline.  Patient was able to eat breakfast a few hours later, however his wife noted his baseline ataxia was worse than normal.  He had a urology appointment the day of admission and it was recommended to come to the ER for further evaluation    BP on arrival 142/81.    NIHSS 2 (1 point for orientation, 1 point for limb ataxia).    Initial CT imaging revealed focal high-grade stenosis of mid basilar artery and severe stenosis in the right PCA P2 segment.   Patient was not a TNK candidate due to bleeding risk and nondisabling (patient reports he felt at baseline), no IR target    Workup:  - CTH   No acute intracranial CT abnormality.  Chronic lacunar infarcts in bilateral corona radiata and thalami. Chronic microangiopathic change.  - CTA head/neck:  No intracranial large vessel occlusion.  Focal high-grade stenosis in the mid basilar artery.  Focal severe stenosis in the right PCA P2 segment.  No significant stenosis in the cervical carotid or vertebral arteries.  - MRI brain wo    No evidence of acute infarct or acute intracranial hemorrhage.  - Lipid panel: Cholesterol 82, LDL 45  - Hemoglobin A1c 5.1  - UA negative for leukocytes/nitrates                 Workup negative for acute ischemia. Patient found to be COVID +, etiology of confusion likely secondary to infectious process in the setting  of poor cognitive reserve (hx of dementia, stroke).     Plan:  - Okay to discontinue stroke pathway  - No need for further inpatient neurodiagnostics  - Continue home medications: Atorvastatin 40 mg qHS, Plavix 75 mg daily and Eliquis 5 mg twice daily  - No need for permissive HTN, maintain normotension  - Patient at risk for delirium secondary to age, stroke, comorbidities, etc.   - Provide frequent redirection, reorientation, distraction techniques  - Avoid deliriogenic medications such as tramadol, benzodiazepines, anticholinergics, Benadryl  - Treat pain as appropriate per primary team  - Monitor for constipation and urinary retention  - Encourage early and frequent moblization, OOB, Hydration/ Nutrition  - Medical management and supportive care per primary team. Correction of any metabolic or infectious disturbances.     Plan discussed with Attending Neurologist, please see attestation for further input/recommendations.     Patient has follow-up appointment with Dr. Rob on May 19, 2025 which she should plan to attend.  He will not require sooner appointment or additional outpatient neurological testing.      Subjective   Patient seen resting comfortably in chair, wife at bedside.  Patient reports he continues to feel at baseline no acute complaints this morning.  He is waiting to go on a walk around the hallways with the nurse.  They are made aware of his MRI brain results which did not show evidence of acute infarct and the plan of care moving forward.  All questions/concerns answered.        Review of Systems   Constitutional:  Negative for chills and diaphoresis.   HENT:  Negative for trouble swallowing.    Eyes:  Negative for photophobia and visual disturbance.   Respiratory:  Negative for cough and shortness of breath.    Cardiovascular:  Negative for chest pain and palpitations.   Gastrointestinal:  Negative for nausea and vomiting.   Musculoskeletal:  Positive for gait problem (chronic).   Skin:   Negative for color change and pallor.   Neurological:  Positive for tremors. Negative for dizziness, facial asymmetry, speech difficulty, weakness, light-headedness, numbness and headaches.   Psychiatric/Behavioral:  Positive for confusion (baseline per wife). The patient is not nervous/anxious.          Objective :  Temp:  [97.1 °F (36.2 °C)-98.4 °F (36.9 °C)] 97.1 °F (36.2 °C)  HR:  [77-85] 83  BP: (115-125)/(78-90) 125/84  Resp:  [18] 18  SpO2:  [90 %-95 %] 90 %  O2 Device: None (Room air)    Physical Exam  Vitals reviewed. Chaperone present: wife present at bedside.   Constitutional:       General: He is not in acute distress.     Appearance: Normal appearance. He is normal weight. He is not ill-appearing.   HENT:      Head: Normocephalic and atraumatic.      Right Ear: External ear normal.      Left Ear: External ear normal.      Nose: Nose normal.      Mouth/Throat:      Mouth: Mucous membranes are moist.   Eyes:      General:         Right eye: No discharge.         Left eye: No discharge.      Extraocular Movements: Extraocular movements intact and EOM normal.      Conjunctiva/sclera: Conjunctivae normal.      Pupils: Pupils are equal, round, and reactive to light.   Cardiovascular:      Rate and Rhythm: Normal rate.   Pulmonary:      Effort: Pulmonary effort is normal. No respiratory distress.   Musculoskeletal:         General: Normal range of motion.      Right lower leg: No edema.      Left lower leg: No edema.   Skin:     General: Skin is warm and dry.      Coloration: Skin is not jaundiced or pale.   Neurological:      Mental Status: He is alert.      Motor: Motor strength is normal.     Coordination: Finger-Nose-Finger Test and Heel to Shin Test normal.      Comments: See below   Psychiatric:         Speech: Speech normal.       Neurologic Exam     Mental Status   Oriented to person.   Oriented to place.   Disoriented to year, month and season.   Follows 2 step commands.   Attention: normal.  Concentration: normal.   Speech: speech is normal   Level of consciousness: alert  Able to name object.     Cranial Nerves     CN II   Visual fields full to confrontation.     CN III, IV, VI   Pupils are equal, round, and reactive to light.  Extraocular motions are normal.   Nystagmus: none   Diplopia: none  Upgaze: normal  Downgaze: normal  Conjugate gaze: present    CN V   Facial sensation intact.     CN VII   Facial expression full, symmetric.     CN VIII   CN VIII normal.     CN XII   CN XII normal.     Motor Exam   Muscle bulk: normal  Right arm pronator drift: absent  Left arm pronator drift: absent    Strength   Strength 5/5 throughout.     Sensory Exam   Light touch normal.     Gait, Coordination, and Reflexes     Coordination   Finger to nose coordination: normal  Heel to shin coordination: normal    Tremor   Resting tremor: absent  Intention tremor: present        Lab Results: I have reviewed the following results: See above  Imaging Results Review: CTH CTA head/neck MRI brain    VTE Pharmacologic Prophylaxis: VTE covered by:  apixaban, Oral, 5 mg at 04/24/25 0813

## 2025-04-24 NOTE — ASSESSMENT & PLAN NOTE
Low-grade fevers on admission, ultimately tested positive for COVID 4/24 though I suspect this is the etiology for his symptoms all along.  Has not required any oxygen while inpatient and is afebrile x 24 hours prior to discharge  Will defer Paxil bid on discharge due to drug interactions with home medication regimen

## 2025-04-24 NOTE — TELEPHONE ENCOUNTER
Patient under care of: Heath   Last seen: 4/22/25    Patient is currently being discharged today and was told his symptoms were not a stroke. Patients wife would like to move forward with starting patient on medication for frequency that she spoke with Heath about at patients last visit. Please review with physician and call patients wife back once this is sent over.     CB:     Harriet Razo (Spouse)  [phone] (Mobile)       Pharmacy:     [facility]/pharmacy #[id number]   [address] [address]  Phone: [phone]  Fax: [phone]

## 2025-04-24 NOTE — PLAN OF CARE
Problem: PHYSICAL THERAPY ADULT  Goal: Performs mobility at highest level of function for planned discharge setting.  See evaluation for individualized goals.  Description: Treatment/Interventions: LE strengthening/ROM, Functional transfer training, Elevations, Therapeutic exercise, Endurance training, Cognitive reorientation, Patient/family training, Equipment eval/education, Gait training, Bed mobility, Spoke to case management, Spoke to nursing, OT  Equipment Recommended: Walker       See flowsheet documentation for full assessment, interventions and recommendations.  Outcome: Progressing  Note: Prognosis: Good  Problem List: Decreased strength, Decreased endurance, Impaired balance, Decreased coordination, Decreased mobility, Decreased cognition, Impaired judgement, Decreased safety awareness, Impaired hearing  Assessment: Pt very pleasant and agreeable to participate in PT session. Completes mobility and therapeutic activity as outlined above. Pt requires cues to increase step length and upright posture when ambulating. Continues to require min-CGA for mobility. Pt requires VC for hand placement when completing transfers. Pt demonstrates fair balance standing to toss ball with staff member and CGA. Pt is progressing towards his goals and will continue to benefit from skilled acute PT services. Pt left upright in chair with chair alarm donned, call bell and personal items within reach and all needs met.  Barriers to Discharge: Inaccessible home environment, Decreased caregiver support     Rehab Resource Intensity Level, PT: III (Minimum Resource Intensity) (if pt's wife can provide 24/7 assistance)    See flowsheet documentation for full assessment.

## 2025-04-24 NOTE — TELEPHONE ENCOUNTER
Called and spoke to pt's wife. Heath's message relayed, she verbalized understanding and will  the medication today.

## 2025-04-24 NOTE — ASSESSMENT & PLAN NOTE
69 y.o.  male with history of prior strokes with residual ataxia, PAF on Eliquis (ILR in place), vascular dementia, probable cavernoma, possible CAA, HTN, GRACIE, CAD s/p PCI x 2 on Plavix, who presents with confusion.    Patient woke up at 2 AM the day of admission with urinary frequency and incontinence which is unusual for him.  Patient could not find the light switch to the bathroom which is not his baseline.  Patient was able to eat breakfast a few hours later, however his wife noted his baseline ataxia was worse than normal.  He had a urology appointment the day of admission and it was recommended to come to the ER for further evaluation    BP on arrival 142/81.    NIHSS 2 (1 point for orientation, 1 point for limb ataxia).    Initial CT imaging revealed focal high-grade stenosis of mid basilar artery and severe stenosis in the right PCA P2 segment.   Patient was not a TNK candidate due to bleeding risk and nondisabling (patient reports he felt at baseline), no IR target    Workup:  - CTH   No acute intracranial CT abnormality.  Chronic lacunar infarcts in bilateral corona radiata and thalami. Chronic microangiopathic change.  - CTA head/neck:  No intracranial large vessel occlusion.  Focal high-grade stenosis in the mid basilar artery.  Focal severe stenosis in the right PCA P2 segment.  No significant stenosis in the cervical carotid or vertebral arteries.  - MRI brain wo    No evidence of acute infarct or acute intracranial hemorrhage.  - Lipid panel: Cholesterol 82, LDL 45  - Hemoglobin A1c 5.1  - UA negative for leukocytes/nitrates                 Workup negative for acute ischemia. Patient found to be COVID +, etiology of confusion likely secondary to infectious process in the setting of poor cognitive reserve (hx of dementia, stroke).     Plan:  - Okay to discontinue stroke pathway  - No need for further inpatient neurodiagnostics  - Continue home medications: Atorvastatin 40 mg qHS, Plavix 75 mg daily  and Eliquis 5 mg twice daily  - No need for permissive HTN, maintain normotension  - Patient at risk for delirium secondary to age, stroke, comorbidities, etc.   - Provide frequent redirection, reorientation, distraction techniques  - Avoid deliriogenic medications such as tramadol, benzodiazepines, anticholinergics, Benadryl  - Treat pain as appropriate per primary team  - Monitor for constipation and urinary retention  - Encourage early and frequent moblization, OOB, Hydration/ Nutrition  - Medical management and supportive care per primary team. Correction of any metabolic or infectious disturbances.     Plan discussed with Attending Neurologist, please see attestation for further input/recommendations.

## 2025-04-24 NOTE — DISCHARGE INSTR - AVS FIRST PAGE
Mr Razo,   You were admitted to the hospital for evaluation of confusion. We initially had suscpicion for stroke. You were seen and evaluated by the Neurology team. Fortunately an MRI of the brain was negative for acute stroke.  Subsequent workup revealed a positive test for COVID.  I believe that your initial symptoms of confusion and low-grade fevers on admission were related to developing COVID.  Fortunately not requiring any oxygen and have remained fever free for greater than 24 hours prior to discharge.  I will send a prescription for Paxlovid which is an antiviral medicine used in COVID to your pharmacy.  Please pick this up from your please pick this up on your way home from the hospital.    Otherwise it was a pleasure to care for you during your hospital stay,  [facility]  [phone]

## 2025-04-24 NOTE — UTILIZATION REVIEW
NOTIFICATION OF INPATIENT ADMISSION   AUTHORIZATION REQUEST   SERVICING FACILITY:   Critical access hospital  Address: [address] [place] [address]  Tax ID: [id number]  NPI: [id number] ATTENDING PROVIDER:  Attending Name and NPI#: Abdoul Miranda Do [[id number]]  Address: [address] [place] [address]  Phone: [phone]   ADMISSION INFORMATION:  Place of Service: Inpatient [facility]  Place of Service Code: 21  Inpatient Admission Date/Time: 4/24/25 10:05 AM  Discharge Date/Time: No discharge date for patient encounter.  Admitting Diagnosis Code/Description:  Altered mental status [R41.82]  Urinary incontinence [R32]  Stroke (HCC) [I63.9]     UTILIZATION REVIEW CONTACT:  Liam Atwood Utilization   Network Utilization Review Department  Phone: [phone]  Fax: [phone]  Email: [email or web address]  Contact for approvals/pending authorizations, clinical reviews, and discharge.     PHYSICIAN ADVISORY SERVICES:  Medical Necessity Denial & Orbn-hs-Hydw Review  Phone: [phone]  Fax: [phone]  Email: [email or web address]     DISCHARGE SUPPORT TEAM:  For Patients Discharge Needs & Updates  Phone: [phone] opt. 2 Fax: [phone]  Email: [email or web address]

## 2025-04-24 NOTE — RESTORATIVE TECHNICIAN NOTE
Restorative Technician Note      Patient Name: Christopher Razo     Note Type: Mobility  Patient Position Upon Consult: Bedside chair  Activity Performed: Ambulated; Dangled; Stood  Assistive Device: Roller walker; Other (Comment) (Assist x1 with chair follow. Assisted PT Marysol.)  Education Provided: Yes  Patient Position at End of Consult: Bedside chair; All needs within reach; Bed/Chair alarm activated    Benny SALES, Restorative Technician,

## 2025-04-24 NOTE — PHYSICAL THERAPY NOTE
PHYSICAL THERAPY NOTE          Patient Name: Christopher Razo  Today's Date: 4/24/2025 04/24/25 1251   PT Last Visit   PT Visit Date 04/24/25   Note Type   Note Type Treatment   Pain Assessment   Pain Assessment Tool 0-10   Pain Score No Pain   Restrictions/Precautions   Weight Bearing Precautions Per Order No   Other Precautions Cognitive;Chair Alarm;Bed Alarm;Multiple lines;Fall Risk;Hard of hearing;Contact/isolation;Airborne/isolation;Droplet precautions  (COVID, dementia)   General   Chart Reviewed Yes   Response to Previous Treatment Patient with no complaints from previous session.   Family/Caregiver Present No   Cognition   Overall Cognitive Status Impaired   Arousal/Participation Alert;Responsive;Cooperative   Attention Within functional limits   Orientation Level Oriented to person;Oriented to place   Memory Decreased recall of precautions;Decreased recall of recent events   Following Commands Follows one step commands without difficulty   Comments pt pleasant and cooperative   Subjective   Subjective pt agreeable to mobilize   Bed Mobility   Supine to Sit Unable to assess   Sit to Supine Unable to assess   Additional Comments pt greeted OOB in chair and left in chair at end of session   Transfers   Sit to Stand 4  Minimal assistance   Additional items Assist x 1;Armrests;Increased time required;Verbal cues   Stand to Sit 4  Minimal assistance   Additional items Assist x 1;Increased time required;Verbal cues;Armrests   Additional Comments c RW. x4 STS throughout session   Ambulation/Elevation   Gait pattern Improper Weight shift;Decreased foot clearance;Shuffling;Inconsistent nia;Short stride;Foward flexed   Gait Assistance 4  Minimal assist  (chair follow)   Additional items Assist x 1;Verbal cues   Assistive Device Rolling walker   Distance 130'+130'   Stair Management Assistance Not tested   Ambulation/Elevation  Additional Comments seated rest break   Balance   Static Sitting Fair +   Dynamic Sitting Fair   Static Standing Fair -   Dynamic Standing Poor +   Ambulatory Poor +   Endurance Deficit   Endurance Deficit Yes   Endurance Deficit Description pt limited by decreased activity tolerance   Activity Tolerance   Activity Tolerance Patient tolerated treatment well   Medical Staff Made Aware restorative Benny   Nurse Made Aware yes-RN cleared   Exercises   Knee AROM Long Arc Quad Sitting;10 reps;AROM;Bilateral   Marching Standing;10 reps;AROM;Bilateral  (CGA)   Balance training  pt standing to toss ball for 3-4 minutes with CGA. no overt LOB   Assessment   Prognosis Good   Problem List Decreased strength;Decreased endurance;Impaired balance;Decreased coordination;Decreased mobility;Decreased cognition;Impaired judgement;Decreased safety awareness;Impaired hearing   Assessment Pt very pleasant and agreeable to participate in PT session. Completes mobility and therapeutic activity as outlined above. Pt requires cues to increase step length and upright posture when ambulating. Continues to require min-CGA for mobility. Pt requires VC for hand placement when completing transfers. Pt demonstrates fair balance standing to toss ball with staff member and CGA. Pt is progressing towards his goals and will continue to benefit from skilled acute PT services. Pt left upright in chair with chair alarm donned, call bell and personal items within reach and all needs met.   Barriers to Discharge Inaccessible home environment;Decreased caregiver support   Goals   Patient Goals to go home   STG Expiration Date 05/07/25   PT Treatment Day 1   Plan   Treatment/Interventions LE strengthening/ROM;Functional transfer training;Elevations;Therapeutic exercise;Endurance training;Equipment eval/education;Patient/family training;Bed mobility;Gait training;Spoke to nursing;Compensatory technique education   Progress Progressing toward goals   PT  Frequency 3-5x/wk   Discharge Recommendation   Rehab Resource Intensity Level, PT III (Minimum Resource Intensity)  (if pt's wife can provide 24/7 assistance)   Equipment Recommended Walker   Walker Package Recommended Wheeled walker   AM-PAC Basic Mobility Inpatient   Turning in Flat Bed Without Bedrails 3   Lying on Back to Sitting on Edge of Flat Bed Without Bedrails 3   Moving Bed to Chair 3   Standing Up From Chair Using Arms 3   Walk in Room 3   Climb 3-5 Stairs With Railing 3   Basic Mobility Inpatient Raw Score 18   Basic Mobility Standardized Score 41.05   [facility] Highest Level Of Mobility   -HLM Goal 6: Walk 10 steps or more   -HLM Achieved 7: Walk 25 feet or more   Education   Education Provided Mobility training;Home exercise program;Assistive device   Patient Demonstrates acceptance/verbal understanding   End of Consult   Patient Position at End of Consult Bedside chair;Bed/Chair alarm activated;All needs within reach   INEZ PedrozaT

## 2025-04-24 NOTE — TELEPHONE ENCOUNTER
Myrbetriq 25 mg as prescribed.  Please let patient's wife know he should take 1 pill daily and may see a change in his urinary frequency urgency in approximately 1 month  She also needs to monitor for any possible increase in his blood pressure.  Myrbetriq occasionally may cause the blood pressure to rise

## 2025-04-24 NOTE — DISCHARGE SUMMARY
Discharge Summary - Hospitalist   Name: Christopher Razo 69 y.o. male I MRN: [id number]  [facility] I Date of Admission: 4/22/2025   Date of Service: 4/24/2025 I Hospital Day: 0     Assessment & Plan  Metabolic encephalopathy  Patient has history of prior strokes as well as vascular dementia who presents with confusion overnight  Wife noted that patient had urinary frequency and incontinence associated with his confusion.  Patient was seen by urology with concern of a UTI however a urologist recommended patient to come to the emergency department for stroke evaluation. UA was negative    Stroke workup with MRI of the brain negative  continue Plavix, Eliquis, statin  Appreciate neurology recommendations  Patient ultimately tested positive for COVID  COVID-19 virus infection  Low-grade fevers on admission, ultimately tested positive for COVID 4/24 though I suspect this is the etiology for his symptoms all along.  Has not required any oxygen while inpatient and is afebrile x 24 hours prior to discharge  Will defer Paxil bid on discharge due to drug interactions with home medication regimen  Benign essential hypertension  Blood pressure is at goal 130s over 80s  Continue metoprolol succinate 50 mg twice daily  Paroxysmal A-fib (HCC)  Rate controlled with metoprolol  Anticoagulation with Eliquis  CAD (coronary artery disease)  Continue Eliquis, Plavix, statin  Dementia without behavioral disturbance, psychotic disturbance, mood disturbance, or anxiety (HCC)  Patient appears to be at baseline  May continue Namenda     Medical Problems       Resolved Problems  Date Reviewed: 4/24/2025          Resolved    CAMMIE (generalized anxiety disorder) 4/22/2025     Resolved by  Ford Houston MD        Discharging Physician / Practitioner: Abdoul Miranda DO  PCP: Richard Varela DO  Admission Date:   Admission Orders (From admission, onward)       Ordered        04/24/25 1005  INPATIENT ADMISSION  Once             04/22/25 1310  Place in Observation  Once                          Discharge Date: 04/24/25    Consultations During Hospital Stay:  Neurology    Procedures Performed:   none    Significant Findings / Test Results:   Covid Positive 4/24  MRI brain/24:  No evidence of acute infarct or acute intracranial hemorrhage.    Incidental Findings:   none    Test Results Pending at Discharge (will require follow up):   none     Outpatient Tests Requested:  none    Complications:  none    Reason for Admission: [facility]    Hospital Course:   Christopher Razo is a 69 y.o. male patient who originally presented to the hospital on 4/22/2025 due to acute episode of confusion.  The patient does have a baseline vascular dementia though the patient's wife stated that he was acutely worsened.  She had actually presented to the urologist earlier in the day due to worsening urinary frequency and incontinence.  Urine test there was negative and she was sent to the ER for evaluation while neurologic event.  He was admitted to the hospital service with neurology consultation.  Stroke workup ultimately was negative with a negative MRI on 4/24.  However he did have some low-grade fevers in his hospitalization and infectious workup was positive for COVID.  This is the likely source of his symptoms.  Fortunately he required no oxygen during his hospital stay and is fever free x 24 hours prior to discharge.  He is not a candidate for Paxlovid given drug interactions with home regimen.  Otherwise patient was asymptomatic and feeling well at time of discharge.  All questions and concerns were addressed.  Discharge plan discussed with wife via phone call.    Please see above list of diagnoses and related plan for additional information.     Condition at Discharge: good    Discharge Day Visit / Exam:   Subjective:  Patient seen and examined on day of discharge.  No events overnight.  Denies any chest pain or shortness of breath.  He has been afebrile.   "Tolerating diet.  Ambulating.  Vitals: Blood Pressure: 125/84 (04/24/25 0708)  Pulse: 83 (04/24/25 0708)  Temperature: (!) 97.1 °F (36.2 °C) (04/24/25 0708)  Temp Source: Oral (04/23/25 1925)  Respirations: 18 (04/24/25 0708)  Height: 5' 11\" (180.3 cm) (04/22/25 1500)  Weight - Scale: 104 kg (229 lb) (04/22/25 1500)  SpO2: 90 % (04/24/25 0708)    PHYSICAL EXAM:    Vitals signs reviewed  Constitutional   Awake and cooperative. NAD.   Head/Neck   Normocephalic. Atraumatic.   HEENT   No scleral icterus. EOMI.   Heart   Regular rate and rhythm. No murmurs.   Lungs   Clear to auscultation bilaterally. Respirations unlaboured.   Abdomen   Soft. Nontender. Nondistended.    Skin   Skin color normal. No rashes.   Extremities   No deformities. No peripheral edema.   Neuro   Alert and oriented. No new deficits.   Psych   Mood stable. Affect normal.         Discussion with Family: Updated  (wife) via phone.    Discharge instructions/Information to patient and family:   See after visit summary for information provided to patient and family.      Provisions for Follow-Up Care:  See after visit summary for information related to follow-up care and any pertinent home health orders.      Mobility at time of Discharge:   Basic Mobility Inpatient Raw Score: 18  JH-HLM Goal: 6: Walk 10 steps or more  JH-HLM Achieved: 7: Walk 25 feet or more  HLM Goal achieved. Continue to encourage appropriate mobility.     Disposition:   Home    Planned Readmission: NO    Discharge Medications:  See after visit summary for reconciled discharge medications provided to patient and/or family.      Administrative Statements   Discharge Statement:  I have spent a total time of 45 minutes in caring for this patient on the day of the visit/encounter. >30 minutes of time was spent on: Diagnostic results, Prognosis, Risks and benefits of tx options, Instructions for management, Patient and family education, Importance of tx compliance, Risk " factor reductions, Impressions, Counseling / Coordination of care, Documenting in the medical record, Reviewing / ordering tests, medicine, procedures  , and Communicating with other healthcare professionals .    **Please Note: This note may have been constructed using a voice recognition system**

## 2025-04-25 ENCOUNTER — TRANSITIONAL CARE MANAGEMENT (OUTPATIENT)
Dept: FAMILY MEDICINE CLINIC | Facility: CLINIC | Age: 70
End: 2025-04-25

## 2025-04-25 ENCOUNTER — TELEPHONE (OUTPATIENT)
Dept: FAMILY MEDICINE CLINIC | Facility: CLINIC | Age: 70
End: 2025-04-25

## 2025-04-25 PROBLEM — G93.41 METABOLIC ENCEPHALOPATHY: Status: ACTIVE | Noted: 2025-04-22

## 2025-04-25 NOTE — TELEPHONE ENCOUNTER
----- Message from Abdoul Miranda DO sent at 4/24/2025  2:04 PM EDT -----  Regarding: Hospital Discharge  Thank you for allowing us to participate in the care of your patient, Christopher Razo, who was hospitalized from 4/22/2025 through 4/24/2025 with the admitting diagnosis of confusion.  Given his vascular dementia stroke was on the differential and he was admitted for stroke workup.  This was negative.  Infectious workup positive for COVID which was likely the source of his symptoms.  Will be discharged home in good condition.  Was fever free x 24 hours and no oxygen needs.      If you have any additional questions or would like to discuss further, please feel free to contact me.    Abdoul Miranda DO  [facility], Hospitalist  [phone]

## 2025-04-25 NOTE — UTILIZATION REVIEW
NOTIFICATION OF ADMISSION DISCHARGE   This is a Notification of Discharge from [facility]. Please be advised that this patient has been discharge from our facility. Below you will find the admission and discharge date and time including the patient’s disposition.   UTILIZATION REVIEW CONTACT:  Utilization Review Assistants  Network Utilization Review Department  Phone: [phone] x carefully listen to the prompts. All voicemails are confidential.  Email: [email or web address]     ADMISSION INFORMATION  PRESENTATION DATE: 4/22/2025 11:39 AM  OBERVATION ADMISSION DATE: 04/22/2025 1310  INPATIENT ADMISSION DATE: 4/24/25 10:05 AM   DISCHARGE DATE: 4/24/2025  5:33 PM   DISPOSITION:Home/Self Care    Network Utilization Review Department  ATTENTION: Please call with any questions or concerns to [phone] and carefully listen to the prompts so that you are directed to the right person. All voicemails are confidential.   For Discharge needs, contact Care Management DC Support Team at [phone] opt. 2  Send all requests for admission clinical reviews, approved or denied determinations and any other requests to dedicated fax number below belonging to the campus where the patient is receiving treatment. List of dedicated fax numbers for the Facilities:  FACILITY NAME UR FAX NUMBER   ADMISSION DENIALS (Administrative/Medical Necessity) [phone]   DISCHARGE SUPPORT TEAM ([facility]) [phone]   PARENT CHILD HEALTH (Maternity/NICU/Pediatrics) [phone]   [facility] [phone]   General acute hospital [phone]   [facility] [phone]   [facility] [phone]   formerly [facility] [phone]   [facility] [phone]   [facility] [phone]   [facility] [phone]    [facility] [phone]   [facility] [phone]   [facility] [phone]   [facility] [phone]

## 2025-04-25 NOTE — TELEPHONE ENCOUNTER
First attempt to reach Mohit. Message left to call back on 4/2/25 so I can schedule a TCM visit for him [email or web address]

## 2025-04-29 ENCOUNTER — OFFICE VISIT (OUTPATIENT)
Dept: FAMILY MEDICINE CLINIC | Facility: CLINIC | Age: 70
End: 2025-04-29
Payer: COMMERCIAL

## 2025-04-29 ENCOUNTER — TELEPHONE (OUTPATIENT)
Age: 70
End: 2025-04-29

## 2025-04-29 VITALS
HEIGHT: 71 IN | HEART RATE: 68 BPM | TEMPERATURE: 98.3 F | BODY MASS INDEX: 32.06 KG/M2 | WEIGHT: 229 LBS | OXYGEN SATURATION: 96 % | SYSTOLIC BLOOD PRESSURE: 120 MMHG | DIASTOLIC BLOOD PRESSURE: 78 MMHG | RESPIRATION RATE: 20 BRPM

## 2025-04-29 DIAGNOSIS — F03.90 DEMENTIA WITHOUT BEHAVIORAL DISTURBANCE, PSYCHOTIC DISTURBANCE, MOOD DISTURBANCE, OR ANXIETY, UNSPECIFIED DEMENTIA SEVERITY, UNSPECIFIED DEMENTIA TYPE (HCC): Primary | ICD-10-CM

## 2025-04-29 DIAGNOSIS — I50.32 CHRONIC DIASTOLIC (CONGESTIVE) HEART FAILURE (HCC): ICD-10-CM

## 2025-04-29 DIAGNOSIS — U07.1 COVID-19 VIRUS INFECTION: ICD-10-CM

## 2025-04-29 DIAGNOSIS — R26.81 UNSTEADY GAIT: ICD-10-CM

## 2025-04-29 DIAGNOSIS — E85.4 CEREBRAL AMYLOID ANGIOPATHY  (HCC): ICD-10-CM

## 2025-04-29 DIAGNOSIS — I68.0 CEREBRAL AMYLOID ANGIOPATHY  (HCC): ICD-10-CM

## 2025-04-29 DIAGNOSIS — Z99.89 USE OF CANE AS AMBULATORY AID: ICD-10-CM

## 2025-04-29 DIAGNOSIS — I10 BENIGN ESSENTIAL HYPERTENSION: ICD-10-CM

## 2025-04-29 PROCEDURE — 99496 TRANSJ CARE MGMT HIGH F2F 7D: CPT | Performed by: FAMILY MEDICINE

## 2025-04-29 NOTE — PROGRESS NOTES
Name: Christopher Razo      : 1955      MRN: [id number]  Encounter Provider: Richard Varela DO  Encounter Date: 2025   Encounter department: [facility]  :  Assessment & Plan  Dementia without behavioral disturbance, psychotic disturbance, mood disturbance, or anxiety, unspecified dementia severity, unspecified dementia type (HCC)  Episodes of confusion and agitation  Suggest speaking with his neurologist about prn meds for psychosis and sundowning, wife agrees       COVID-19 virus infection  Seems to be recovering  No respiratory issues  Did not get paxlovid due to medication interactions and inpt status       Use of cane as ambulatory aid  Handicap placard offered, can call if needed       Cerebral amyloid angiopathy  (HCC)  Unchanged, neuro f/u       Chronic diastolic (congestive) heart failure (HCC)  Wt Readings from Last 3 Encounters:   25 104 kg (229 lb)   25 104 kg (229 lb 4.5 oz)   25 104 kg (229 lb 4.5 oz)     stable               Benign essential hypertension         Unsteady gait                History of Present Illness   HPI  First time he had covid, pos in hospital, no major respiratory symptoms  Had vax in past  No fever or sob  Still with urine incontinence episodes  Saw urology, Myrbetriq started  On Eliquis $$$ for PAF  Will be moving in [place] in future  Gets confused afua long trips    DR trip on 25  Feels he can't go due to exacerbates his dementia and recovery from very recent hospitalization  Severe dementia   Episodes of agitation and confusion such as long trips even in car, time zone changes, changes in surroundings  Sees neurologist Dr Rob for dementia meds    TCM Call (since 2025)       Date and time call was made  2025  8:54 AM    Hospital care reviewed  Records reviewed    Patient was hospitialized at  [facility]    Date of Admission  25    Date of discharge  25    Diagnosis  Altered mental status     Disposition  Home    Were the patients medications reviewed and updated  Yes    Current Symptoms  None          TCM Call (since 4/16/2025)       Post hospital issues  None    Scheduled for follow up?  Yes    Referrals needed  none    Did you obtain your prescribed medications  Yes    Do you need help managing your prescriptions or medications  No    Is transportation to your appointment needed  No    I have advised the patient to call PCP with any new or worsening symptoms  JEANNINE Garay    Living Arrangements  Spouse or Significiant other    Support System  Spouse    The type of support provided  Emotional; Financial; Physical    Do you have social support  Yes, as much as I need    Are you recieving home care services  No            Review of Systems   Constitutional:  Negative for chills and fever.   Respiratory:  Negative for shortness of breath.    Musculoskeletal:  Positive for gait problem.     Family History   Problem Relation Age of Onset    Cancer Mother         breast    Diverticulitis Mother         colostomy    Breast cancer Mother     Heart disease Father         CHF    Cancer Sister         breast    Depression Sister     Cancer Sister         skin cancer    Cancer Sister         skin cancer    Colon cancer Neg Hx     Liver disease Neg Hx       Social History     Tobacco Use    Smoking status: Never     Passive exposure: Past    Smokeless tobacco: Never   Vaping Use    Vaping status: Never Used   Substance Use Topics    Alcohol use: Not Currently     Alcohol/week: 14.0 standard drinks of alcohol     Types: 14 Cans of beer per week     Comment: occ    Drug use: No      Current Outpatient Medications   Medication Instructions    apixaban (ELIQUIS) 5 mg, 2 times daily    atorvastatin (LIPITOR) 40 mg, Oral, Every evening    ciclopirox (PENLAC) 8 % solution     clopidogrel (PLAVIX) 75 mg, Oral, Daily    fexofenadine (ALLEGRA) 180 mg, Oral, Daily    memantine (NAMENDA) 5 mg, Daily    metoprolol  "succinate (TOPROL-XL) 50 mg, Oral, 2 times daily    Mirabegron ER 25 mg, Oral, Daily     >>>>>>>>  Return if symptoms worsen or fail to improve.  >>>>>>>>    [POCT]  No results found for this or any previous visit (from the past 24 hours).    Visit Vitals  /78 (BP Location: Right arm, Patient Position: Sitting, Cuff Size: Large)   Pulse 68   Temp 98.3 °F (36.8 °C) (Temporal)   Resp 20   Ht 5' 11\" (1.803 m)   Wt 104 kg (229 lb)   SpO2 96%   BMI 31.94 kg/m²   Smoking Status Never   BSA 2.23 m²          Objective   /78 (BP Location: Right arm, Patient Position: Sitting, Cuff Size: Large)   Pulse 68   Temp 98.3 °F (36.8 °C) (Temporal)   Resp 20   Ht 5' 11\" (1.803 m)   Wt 104 kg (229 lb)   SpO2 96%   BMI 31.94 kg/m²      Physical Exam  Vitals and nursing note reviewed.   Constitutional:       General: He is not in acute distress.     Appearance: He is well-developed. He is obese. He is not ill-appearing.   HENT:      Head: Normocephalic.      Right Ear: Tympanic membrane and external ear normal. There is no impacted cerumen.      Left Ear: Tympanic membrane and external ear normal. There is no impacted cerumen.      Mouth/Throat:      Mouth: Mucous membranes are moist.   Eyes:      General: No scleral icterus.     Conjunctiva/sclera: Conjunctivae normal.   Cardiovascular:      Rate and Rhythm: Normal rate and regular rhythm.      Heart sounds: No murmur heard.  Pulmonary:      Effort: Pulmonary effort is normal. No respiratory distress.      Breath sounds: Normal breath sounds. No stridor. No wheezing or rales.   Abdominal:      General: There is no distension.      Palpations: Abdomen is soft.      Tenderness: There is no abdominal tenderness.   Musculoskeletal:         General: No deformity.      Cervical back: Neck supple.      Right lower leg: No edema.      Left lower leg: No edema.   Skin:     General: Skin is warm and dry.      Coloration: Skin is not pale.   Neurological:      Mental Status: He " is alert.      Gait: Gait abnormal.   Psychiatric:         Mood and Affect: Mood normal.         Behavior: Behavior normal.         Thought Content: Thought content normal.

## 2025-04-29 NOTE — TELEPHONE ENCOUNTER
Patients wife called in requesting sooner appt then one that is scheduled for 5/19/25    Was advised by pcp to be seen sooner for medication purposes     Please advise

## 2025-04-29 NOTE — ASSESSMENT & PLAN NOTE
Seems to be recovering  No respiratory issues  Did not get paxlovid due to medication interactions and inpt status

## 2025-04-29 NOTE — ASSESSMENT & PLAN NOTE
Episodes of confusion and agitation  Suggest speaking with his neurologist about prn meds for psychosis and sundowning, wife agrees

## 2025-04-29 NOTE — TELEPHONE ENCOUNTER
Mohit is scheduled for a TCM today but we need to call him since we did not speak with him to review discharge instructions [email or web address]

## 2025-04-29 NOTE — LETTER
April 30, 2025     Patient: Christopher Razo  YOB: 1955  Date of Visit: 4/29/2025      To Whom it May Concern:    Christopher Razo is under my professional care. Christopher was seen in my office on 4/29/2025.     For medical reasons, Christopher is unable to make his trip scheduled on 5/8/25 to the [facility].    The trip will detrimentally affect his medical condition of dementia with acute encephalopathy.  Additionally, he is recovering from a recent hospitalization for the condition and changes in his environment and travel may worsen his recovery.    If you have any questions or concerns, please don't hesitate to call.         Sincerely,          Richard Varela,         CC: No Recipients

## 2025-04-30 ENCOUNTER — PATIENT MESSAGE (OUTPATIENT)
Dept: FAMILY MEDICINE CLINIC | Facility: CLINIC | Age: 70
End: 2025-04-30

## 2025-04-30 PROBLEM — I50.32 CHRONIC DIASTOLIC (CONGESTIVE) HEART FAILURE (HCC): Status: ACTIVE | Noted: 2025-04-30

## 2025-04-30 PROBLEM — Z99.89 USE OF CANE AS AMBULATORY AID: Status: ACTIVE | Noted: 2025-04-30

## 2025-04-30 PROBLEM — D64.9 ACUTE ON CHRONIC ANEMIA: Status: RESOLVED | Noted: 2022-06-08 | Resolved: 2025-04-30

## 2025-04-30 PROBLEM — K92.2 GI BLEED: Status: RESOLVED | Noted: 2022-06-07 | Resolved: 2025-04-30

## 2025-04-30 NOTE — TELEPHONE ENCOUNTER
Spoke to Harriet and informed her that the patient has the soonest available appointment and no other appointments are currently available. The wife requested that the appointment be canceled stating that they will not be in [place] at the time, I offered the wife to reschedule. The wife declined the offer stating that she will call back to reschedule.

## 2025-04-30 NOTE — ASSESSMENT & PLAN NOTE
Wt Readings from Last 3 Encounters:   04/29/25 104 kg (229 lb)   04/24/25 104 kg (229 lb 4.5 oz)   04/23/25 104 kg (229 lb 4.5 oz)     stable

## 2025-05-02 ENCOUNTER — REMOTE DEVICE CLINIC VISIT (OUTPATIENT)
Dept: CARDIOLOGY CLINIC | Facility: CLINIC | Age: 70
End: 2025-05-02
Payer: COMMERCIAL

## 2025-05-02 ENCOUNTER — RESULTS FOLLOW-UP (OUTPATIENT)
Dept: CARDIOLOGY CLINIC | Facility: CLINIC | Age: 70
End: 2025-05-02

## 2025-05-02 DIAGNOSIS — Z86.73 HISTORY OF CARDIOEMBOLIC CEREBROVASCULAR ACCIDENT (CVA): Primary | ICD-10-CM

## 2025-05-02 PROCEDURE — 93298 REM INTERROG DEV EVAL SCRMS: CPT | Performed by: INTERNAL MEDICINE

## 2025-05-02 NOTE — PROGRESS NOTES
Results for orders placed or performed in visit on 05/02/25   Cardiac EP device report    Narrative    MDT LNQ22/ ACTIVE SYSTEM IS MRI CONDITIONAL  CARELINK TRANSMISSION: BATTERY VOLTAGE ADEQUATE. NO PATIENT OR DEVICE ACTIVATED EPISODES. PVC BURDEN 1.5% ---DOAN

## 2025-05-18 DIAGNOSIS — I25.10 CORONARY ARTERY DISEASE INVOLVING NATIVE CORONARY ARTERY OF NATIVE HEART WITHOUT ANGINA PECTORIS: ICD-10-CM

## 2025-05-18 DIAGNOSIS — Z95.5 H/O HEART ARTERY STENT: ICD-10-CM

## 2025-05-18 DIAGNOSIS — I48.0 PAROXYSMAL A-FIB (HCC): ICD-10-CM

## 2025-05-18 DIAGNOSIS — I35.0 NONRHEUMATIC AORTIC VALVE STENOSIS: Primary | ICD-10-CM

## 2025-05-18 DIAGNOSIS — I34.0 NONRHEUMATIC MITRAL VALVE REGURGITATION: ICD-10-CM

## 2025-05-19 RX ORDER — APIXABAN 5 MG/1
5 TABLET, FILM COATED ORAL 2 TIMES DAILY
Qty: 180 TABLET | Refills: 1 | Status: SHIPPED | OUTPATIENT
Start: 2025-05-19

## 2025-07-29 ENCOUNTER — TELEPHONE (OUTPATIENT)
Dept: CARDIOLOGY CLINIC | Facility: CLINIC | Age: 70
End: 2025-07-29

## 2025-07-29 ENCOUNTER — TELEPHONE (OUTPATIENT)
Age: 70
End: 2025-07-29

## 2025-08-01 ENCOUNTER — REMOTE DEVICE CLINIC VISIT (OUTPATIENT)
Dept: CARDIOLOGY CLINIC | Facility: CLINIC | Age: 70
End: 2025-08-01
Payer: COMMERCIAL

## 2025-08-01 DIAGNOSIS — I48.0 PAROXYSMAL A-FIB (HCC): Primary | ICD-10-CM

## 2025-08-01 PROCEDURE — 93298 REM INTERROG DEV EVAL SCRMS: CPT | Performed by: INTERNAL MEDICINE

## 2025-08-17 DIAGNOSIS — Z86.73 HISTORY OF CARDIOEMBOLIC CEREBROVASCULAR ACCIDENT (CVA): ICD-10-CM

## 2025-08-17 RX ORDER — ATORVASTATIN CALCIUM 40 MG/1
40 TABLET, FILM COATED ORAL EVERY EVENING
Qty: 90 TABLET | Refills: 1 | Status: SHIPPED | OUTPATIENT
Start: 2025-08-17

## (undated) DEVICE — MINI TREK CORONARY DILATATION CATHETER 2.0 MM X 15 MM / RAPID-EXCHANGE: Brand: MINI TREK

## (undated) DEVICE — GUIDEWIRE WHOLEY HI TORQUE INTERM MOD J .035 145CM

## (undated) DEVICE — TR BAND RADIAL ARTERY COMPRESSION DEVICE: Brand: TR BAND

## (undated) DEVICE — AIRLIFE™  ADULT CUSHION NASAL CANNULA WITH 7 FOOT (2.1 M) CRUSH-RESISTANT OXYGEN TUBING, AND U/CONNECT-IT ADAPTER: Brand: AIRLIFE™

## (undated) DEVICE — Device: Brand: OLYMPUS

## (undated) DEVICE — BRUSH ENDO CLEANING DBL-HEADER

## (undated) DEVICE — TUBING AUX CHANNEL

## (undated) DEVICE — 60 ML SYRINGE,REGULAR TIP: Brand: MONOJECT

## (undated) DEVICE — BAG SPECIMEN BIOHAZARD 10 X 10 ADHESIVE

## (undated) DEVICE — GLIDESHEATH SLENDER STAINLESS STEEL KIT: Brand: GLIDESHEATH SLENDER

## (undated) DEVICE — TRAP POLY

## (undated) DEVICE — SNARE BARBED 230CM

## (undated) DEVICE — SOLIDIFIER FLUID WASTE CONTROL 1500ML

## (undated) DEVICE — MARKER SPOT EX  BOWEL TATTOO SYRINGE

## (undated) DEVICE — RADIFOCUS OPTITORQUE ANGIOGRAPHIC CATHETER: Brand: OPTITORQUE

## (undated) DEVICE — BRUSH CYTOLOGY 3 MM 240 CM

## (undated) DEVICE — DISPOSABLE BIOPSY VALVE MAJ-1555: Brand: SINGLE USE BIOPSY VALVE (STERILE)

## (undated) DEVICE — TUBING BUBBLE CLEAR 5MM X 100 FT NS

## (undated) DEVICE — 1200CC GUARDIAN II: Brand: GUARDIAN

## (undated) DEVICE — RUNTHROUGH NS EXTRA FLOPPY PTCA GUIDEWIRE: Brand: RUNTHROUGH

## (undated) DEVICE — PRESSURE GUIDEWIRE: Brand: COMET™ II

## (undated) DEVICE — MEDI-VAC YANKAUER SUCTION HANDLE: Brand: CARDINAL HEALTH

## (undated) DEVICE — SINGLE-USE BIOPSY FORCEPS: Brand: RADIAL JAW 4

## (undated) DEVICE — LUBRICANT SURGILUBE TUBE 4 OZ  FLIP TOP

## (undated) DEVICE — TRAVELKIT CONTAINS FIRST STEP KIT (200ML EP-4 KIT) AND SOILED SCOPE BAG - 1 KIT: Brand: TRAVELKIT CONTAINS FIRST STEP KIT AND SOILED SCOPE BAG

## (undated) DEVICE — FORCEP ELECSURG RADIAL JAW4 2.2 X 240CM  HOT BX

## (undated) DEVICE — GROUNDING PAD UNIVERSAL SLW

## (undated) DEVICE — STERILE ICS MINOR PACK: Brand: CARDINAL HEALTH

## (undated) DEVICE — GAUZE SPONGES,16 PLY: Brand: CURITY

## (undated) DEVICE — GLOVE EXAM NON-STRL NTRL PLUS LRG PF

## (undated) DEVICE — CATH GUIDE LAUNCHER 6FR EBU 3.5

## (undated) DEVICE — DGW .035 FC J3MM 260CM TEF: Brand: EMERALD